# Patient Record
Sex: FEMALE | Race: WHITE | NOT HISPANIC OR LATINO | Employment: OTHER | URBAN - NONMETROPOLITAN AREA
[De-identification: names, ages, dates, MRNs, and addresses within clinical notes are randomized per-mention and may not be internally consistent; named-entity substitution may affect disease eponyms.]

---

## 2017-02-06 RX ORDER — SPIRONOLACTONE 25 MG/1
25 TABLET ORAL DAILY
Qty: 30 TABLET | Refills: 2 | OUTPATIENT
Start: 2017-02-06 | End: 2017-04-13 | Stop reason: SDUPTHER

## 2017-03-24 ENCOUNTER — APPOINTMENT (OUTPATIENT)
Dept: GENERAL RADIOLOGY | Facility: HOSPITAL | Age: 72
End: 2017-03-24

## 2017-03-24 ENCOUNTER — APPOINTMENT (OUTPATIENT)
Dept: CT IMAGING | Facility: HOSPITAL | Age: 72
End: 2017-03-24

## 2017-03-24 ENCOUNTER — HOSPITAL ENCOUNTER (EMERGENCY)
Facility: HOSPITAL | Age: 72
Discharge: HOME | End: 2017-03-24
Attending: FAMILY MEDICINE | Admitting: FAMILY MEDICINE

## 2017-03-24 VITALS
OXYGEN SATURATION: 100 % | BODY MASS INDEX: 24.35 KG/M2 | HEIGHT: 60 IN | RESPIRATION RATE: 20 BRPM | SYSTOLIC BLOOD PRESSURE: 144 MMHG | WEIGHT: 124 LBS | TEMPERATURE: 97.3 F | HEART RATE: 74 BPM | DIASTOLIC BLOOD PRESSURE: 73 MMHG

## 2017-03-24 DIAGNOSIS — M53.3 SACRAL BACK PAIN: ICD-10-CM

## 2017-03-24 DIAGNOSIS — M54.2 NECK PAIN, ACUTE: ICD-10-CM

## 2017-03-24 DIAGNOSIS — M54.6 BACK PAIN OF THORACOLUMBAR REGION: ICD-10-CM
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
## 2017-03-24 DIAGNOSIS — M25.562 ACUTE PAIN OF LEFT KNEE: ICD-10-CM

## 2017-03-24 DIAGNOSIS — M54.50 BACK PAIN OF THORACOLUMBAR REGION: ICD-10-CM

## 2017-03-24 LAB
AMYLASE SERPL-CCNC: 44 U/L (ref 28–100)
ANION GAP SERPL CALCULATED.3IONS-SCNC: 5.8 MMOL/L (ref 3.6–11.2)
BASOPHILS # BLD AUTO: 0.04 10*3/MM3 (ref 0–0.3)
BASOPHILS NFR BLD AUTO: 0.5 % (ref 0–2)
BUN BLD-MCNC: 17 MG/DL (ref 7–21)
BUN/CREAT SERPL: 20 (ref 7–25)
CALCIUM SPEC-SCNC: 8.8 MG/DL (ref 7.7–10)
CHLORIDE SERPL-SCNC: 101 MMOL/L (ref 99–112)
CO2 SERPL-SCNC: 33.2 MMOL/L (ref 24.3–31.9)
CREAT BLD-MCNC: 0.85 MG/DL (ref 0.43–1.29)
DEPRECATED RDW RBC AUTO: 40.1 FL (ref 37–54)
EOSINOPHIL # BLD AUTO: 0.25 10*3/MM3 (ref 0–0.7)
EOSINOPHIL NFR BLD AUTO: 3.3 % (ref 0–7)
ERYTHROCYTE [DISTWIDTH] IN BLOOD BY AUTOMATED COUNT: 12.8 % (ref 11.5–14.5)
GFR SERPL CREATININE-BSD FRML MDRD: 66 ML/MIN/1.73
GLUCOSE BLD-MCNC: 118 MG/DL (ref 70–110)
HCT VFR BLD AUTO: 39.1 % (ref 37–47)
HGB BLD-MCNC: 12.9 G/DL (ref 12–16)
IMM GRANULOCYTES # BLD: 0.02 10*3/MM3 (ref 0–0.03)
IMM GRANULOCYTES NFR BLD: 0.3 % (ref 0–0.5)
LIPASE SERPL-CCNC: 44 U/L (ref 13–60)
LYMPHOCYTES # BLD AUTO: 2.21 10*3/MM3 (ref 1–3)
LYMPHOCYTES NFR BLD AUTO: 29.5 % (ref 16–46)
MCH RBC QN AUTO: 29 PG (ref 27–33)
MCHC RBC AUTO-ENTMCNC: 33 G/DL (ref 33–37)
MCV RBC AUTO: 87.9 FL (ref 80–94)
MONOCYTES # BLD AUTO: 0.82 10*3/MM3 (ref 0.1–0.9)
MONOCYTES NFR BLD AUTO: 10.9 % (ref 0–12)
NEUTROPHILS # BLD AUTO: 4.15 10*3/MM3 (ref 1.4–6.5)
NEUTROPHILS NFR BLD AUTO: 55.5 % (ref 40–75)
OSMOLALITY SERPL CALC.SUM OF ELEC: 282 MOSM/KG (ref 273–305)
PLATELET # BLD AUTO: 231 10*3/MM3 (ref 130–400)
PMV BLD AUTO: 12.1 FL (ref 6–10)
POTASSIUM BLD-SCNC: 3.4 MMOL/L (ref 3.5–5.3)
RBC # BLD AUTO: 4.45 10*6/MM3 (ref 4.2–5.4)
SODIUM BLD-SCNC: 140 MMOL/L (ref 135–153)
WBC NRBC COR # BLD: 7.49 10*3/MM3 (ref 4.5–12.5)

## 2017-03-24 PROCEDURE — 72125 CT NECK SPINE W/O DYE: CPT

## 2017-03-24 PROCEDURE — 80048 BASIC METABOLIC PNL TOTAL CA: CPT | Performed by: FAMILY MEDICINE

## 2017-03-24 PROCEDURE — 25010000002 ONDANSETRON PER 1 MG: Performed by: FAMILY MEDICINE

## 2017-03-24 PROCEDURE — 70486 CT MAXILLOFACIAL W/O DYE: CPT

## 2017-03-24 PROCEDURE — 72128 CT CHEST SPINE W/O DYE: CPT

## 2017-03-24 PROCEDURE — 72125 CT NECK SPINE W/O DYE: CPT | Performed by: RADIOLOGY

## 2017-03-24 PROCEDURE — 96374 THER/PROPH/DIAG INJ IV PUSH: CPT

## 2017-03-24 PROCEDURE — 82150 ASSAY OF AMYLASE: CPT | Performed by: FAMILY MEDICINE

## 2017-03-24 PROCEDURE — 99284 EMERGENCY DEPT VISIT MOD MDM: CPT

## 2017-03-24 PROCEDURE — 72131 CT LUMBAR SPINE W/O DYE: CPT | Performed by: RADIOLOGY

## 2017-03-24 PROCEDURE — 70486 CT MAXILLOFACIAL W/O DYE: CPT | Performed by: RADIOLOGY

## 2017-03-24 PROCEDURE — 70450 CT HEAD/BRAIN W/O DYE: CPT

## 2017-03-24 PROCEDURE — 72192 CT PELVIS W/O DYE: CPT

## 2017-03-24 PROCEDURE — 83690 ASSAY OF LIPASE: CPT | Performed by: FAMILY MEDICINE

## 2017-03-24 PROCEDURE — 85025 COMPLETE CBC W/AUTO DIFF WBC: CPT | Performed by: FAMILY MEDICINE

## 2017-03-24 PROCEDURE — 72131 CT LUMBAR SPINE W/O DYE: CPT

## 2017-03-24 PROCEDURE — 73562 X-RAY EXAM OF KNEE 3: CPT | Performed by: RADIOLOGY

## 2017-03-24 PROCEDURE — 96375 TX/PRO/DX INJ NEW DRUG ADDON: CPT

## 2017-03-24 PROCEDURE — 70450 CT HEAD/BRAIN W/O DYE: CPT | Performed by: RADIOLOGY

## 2017-03-24 PROCEDURE — 72192 CT PELVIS W/O DYE: CPT | Performed by: RADIOLOGY

## 2017-03-24 PROCEDURE — 72128 CT CHEST SPINE W/O DYE: CPT | Performed by: RADIOLOGY

## 2017-03-24 PROCEDURE — 25010000002 MORPHINE PER 10 MG: Performed by: FAMILY MEDICINE

## 2017-03-24 PROCEDURE — 73562 X-RAY EXAM OF KNEE 3: CPT

## 2017-03-24 RX ORDER — ONDANSETRON 2 MG/ML
4 INJECTION INTRAMUSCULAR; INTRAVENOUS ONCE
Status: COMPLETED | OUTPATIENT
Start: 2017-03-24 | End: 2017-03-24

## 2017-03-24 RX ORDER — MORPHINE SULFATE 2 MG/ML
2 INJECTION, SOLUTION INTRAMUSCULAR; INTRAVENOUS ONCE
Status: COMPLETED | OUTPATIENT
Start: 2017-03-24 | End: 2017-03-24

## 2017-03-24 RX ORDER — AMOXICILLIN AND CLAVULANATE POTASSIUM 875; 125 MG/1; MG/1
1 TABLET, FILM COATED ORAL EVERY 12 HOURS
Qty: 20 TABLET | Refills: 0 | Status: SHIPPED | OUTPATIENT
Start: 2017-03-24 | End: 2017-04-03

## 2017-03-24 RX ADMIN — ONDANSETRON 4 MG: 2 INJECTION, SOLUTION INTRAMUSCULAR; INTRAVENOUS at 17:25

## 2017-03-24 RX ADMIN — MORPHINE SULFATE 2 MG: 2 INJECTION, SOLUTION INTRAMUSCULAR; INTRAVENOUS at 17:24
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
## 2017-04-13 ENCOUNTER — TELEPHONE (OUTPATIENT)
Dept: CARDIOLOGY | Facility: CLINIC | Age: 72
End: 2017-04-13

## 2017-04-13 DIAGNOSIS — Z79.899 DRUG THERAPY: Primary | ICD-10-CM

## 2017-04-13 RX ORDER — SPIRONOLACTONE 25 MG/1
25 TABLET ORAL DAILY
Qty: 30 TABLET | Refills: 3 | OUTPATIENT
Start: 2017-04-13 | End: 2017-04-20 | Stop reason: SDUPTHER

## 2017-04-13 NOTE — TELEPHONE ENCOUNTER
Please have her get a BMP to make sure her potassium has corrected since her last one a few weeks ago.

## 2017-04-14 ENCOUNTER — LAB (OUTPATIENT)
Dept: LAB | Facility: HOSPITAL | Age: 72
End: 2017-04-14

## 2017-04-14 ENCOUNTER — TELEPHONE (OUTPATIENT)
Dept: CARDIOLOGY | Facility: CLINIC | Age: 72
End: 2017-04-14

## 2017-04-14 DIAGNOSIS — Z79.899 DRUG THERAPY: ICD-10-CM

## 2017-04-14 LAB
ANION GAP SERPL CALCULATED.3IONS-SCNC: 9.9 MMOL/L (ref 3.6–11.2)
BUN BLD-MCNC: 15 MG/DL (ref 7–21)
BUN/CREAT SERPL: 15.8 (ref 7–25)
CALCIUM SPEC-SCNC: 8.9 MG/DL (ref 7.7–10)
CHLORIDE SERPL-SCNC: 103 MMOL/L (ref 99–112)
CO2 SERPL-SCNC: 26.1 MMOL/L (ref 24.3–31.9)
CREAT BLD-MCNC: 0.95 MG/DL (ref 0.43–1.29)
GFR SERPL CREATININE-BSD FRML MDRD: 58 ML/MIN/1.73
GLUCOSE BLD-MCNC: 85 MG/DL (ref 70–110)
OSMOLALITY SERPL CALC.SUM OF ELEC: 277.6 MOSM/KG (ref 273–305)
POTASSIUM BLD-SCNC: 3.8 MMOL/L (ref 3.5–5.3)
SODIUM BLD-SCNC: 139 MMOL/L (ref 135–153)

## 2017-04-14 PROCEDURE — 36415 COLL VENOUS BLD VENIPUNCTURE: CPT

## 2017-04-14 PROCEDURE — 80048 BASIC METABOLIC PNL TOTAL CA: CPT

## 2017-04-14 NOTE — TELEPHONE ENCOUNTER
See if she is still taking hydrochlorathiazide 25mg QD. If so, take 1/2 (12.5mg QD) and continued to monitor BP twice daily. Call if BP running <110/60 consistently.

## 2017-04-14 NOTE — TELEPHONE ENCOUNTER
"Her daughter stated that Kiah's BP was running low Tuesday and Wednesday. Lowest BP was 77/45 this was on Wednesday. Her daughter stated that she had her eat some salty food and rocking back and forth in her chair.  Kiah stated \"she felt funny\" and had some dizziness and sick to her stomach when this happened. Her top number since Thursday has been in the 120's and bottom number in the 80's.   Advised her daughter to take Kiah to the ER if her BP drops low again. She expressed understanding.     ----- Message from La Hall sent at 4/13/2017 11:36 AM EDT -----  Please call patients daughter Lamar Sharif on cell phone number in regards to patient bloodpressure.    "

## 2017-04-19 ENCOUNTER — TELEPHONE (OUTPATIENT)
Dept: CARDIOLOGY | Facility: CLINIC | Age: 72
End: 2017-04-19

## 2017-04-19 NOTE — TELEPHONE ENCOUNTER
Pt's daughter called and wanted to know Kiah's lab work. I advised pt's daughter that her labs was okay. She expressed understanding.     Notes Recorded by AYDEN Torres on 4/18/2017 at 10:11 AM  BMP ok.

## 2017-04-20 RX ORDER — SPIRONOLACTONE 25 MG/1
25 TABLET ORAL DAILY
Qty: 30 TABLET | Refills: 3 | Status: SHIPPED | OUTPATIENT
Start: 2017-04-20 | End: 2017-04-20 | Stop reason: SDUPTHER

## 2017-04-20 RX ORDER — SPIRONOLACTONE 25 MG/1
TABLET ORAL
Qty: 15 TABLET | Refills: 3 | Status: SHIPPED | OUTPATIENT
Start: 2017-04-20 | End: 2017-08-17 | Stop reason: SDUPTHER

## 2017-04-20 NOTE — TELEPHONE ENCOUNTER
Message    Call patient Received: Today       [facility]                     [phone] RETURNING A CALL REGARDING MEDICATION.

## 2017-04-20 NOTE — TELEPHONE ENCOUNTER
Per call to pt, she is taking 1/2 tablet of 25 mg po daily (12.5 mg po daily).  Re sent rx for Spironolactone 25 mg, 1/2 tab po daily # 15, with 3 RF.

## 2017-04-20 NOTE — TELEPHONE ENCOUNTER
Corrie, on 10/10/16 [facility], you wanted pt to start taking 12.5 mg qd of Spironolactone.  Called pt to confirm dose she is taking, also tried davidr Eunice.  Had to leave  for return call.

## 2017-05-08 ENCOUNTER — HOSPITAL ENCOUNTER (OUTPATIENT)
Dept: PHYSICAL THERAPY | Facility: HOSPITAL | Age: 72
Setting detail: THERAPIES SERIES
Discharge: HOME | End: 2017-05-08

## 2017-05-08 ENCOUNTER — TRANSCRIBE ORDERS (OUTPATIENT)
Dept: PHYSICAL THERAPY | Facility: HOSPITAL | Age: 72
End: 2017-05-08

## 2017-05-08 DIAGNOSIS — M54.2 NECK PAIN: Primary | ICD-10-CM

## 2017-05-08 DIAGNOSIS — M54.50 ACUTE BILATERAL LOW BACK PAIN WITHOUT SCIATICA: ICD-10-CM

## 2017-05-08 DIAGNOSIS — M79.18 LUMBAR MUSCLE PAIN: ICD-10-CM

## 2017-05-08 PROCEDURE — 97162 PT EVAL MOD COMPLEX 30 MIN: CPT | Performed by: PHYSICAL THERAPIST

## 2017-05-09 PROCEDURE — G8978 MOBILITY CURRENT STATUS: HCPCS | Performed by: PHYSICAL THERAPIST

## 2017-05-09 PROCEDURE — G8979 MOBILITY GOAL STATUS: HCPCS | Performed by: PHYSICAL THERAPIST

## 2017-05-11 ENCOUNTER — HOSPITAL ENCOUNTER (OUTPATIENT)
Dept: PHYSICAL THERAPY | Facility: HOSPITAL | Age: 72
Setting detail: THERAPIES SERIES
Discharge: HOME | End: 2017-05-11

## 2017-05-11 DIAGNOSIS — M54.50 ACUTE BILATERAL LOW BACK PAIN WITHOUT SCIATICA: ICD-10-CM

## 2017-05-11 DIAGNOSIS — M54.2 NECK PAIN: Primary | ICD-10-CM

## 2017-05-11 PROCEDURE — 97110 THERAPEUTIC EXERCISES: CPT

## 2017-05-11 PROCEDURE — G0283 ELEC STIM OTHER THAN WOUND: HCPCS

## 2017-05-11 PROCEDURE — 97010 HOT OR COLD PACKS THERAPY: CPT

## 2017-05-11 PROCEDURE — 97140 MANUAL THERAPY 1/> REGIONS: CPT

## 2017-05-16 ENCOUNTER — APPOINTMENT (OUTPATIENT)
Dept: PHYSICAL THERAPY | Facility: HOSPITAL | Age: 72
End: 2017-05-16

## 2017-05-22 ENCOUNTER — HOSPITAL ENCOUNTER (OUTPATIENT)
Dept: PHYSICAL THERAPY | Facility: HOSPITAL | Age: 72
Setting detail: THERAPIES SERIES
Discharge: HOME | End: 2017-05-22

## 2017-05-22 DIAGNOSIS — M54.2 NECK PAIN: Primary | ICD-10-CM

## 2017-05-22 DIAGNOSIS — M54.50 ACUTE BILATERAL LOW BACK PAIN WITHOUT SCIATICA: ICD-10-CM

## 2017-05-22 PROCEDURE — 97110 THERAPEUTIC EXERCISES: CPT | Performed by: PHYSICAL THERAPIST

## 2017-05-22 PROCEDURE — 97010 HOT OR COLD PACKS THERAPY: CPT | Performed by: PHYSICAL THERAPIST

## 2017-05-22 PROCEDURE — 97140 MANUAL THERAPY 1/> REGIONS: CPT | Performed by: PHYSICAL THERAPIST

## 2017-05-22 PROCEDURE — G0283 ELEC STIM OTHER THAN WOUND: HCPCS | Performed by: PHYSICAL THERAPIST

## 2017-05-23 ENCOUNTER — APPOINTMENT (OUTPATIENT)
Dept: PHYSICAL THERAPY | Facility: HOSPITAL | Age: 72
End: 2017-05-23

## 2017-05-31 ENCOUNTER — HOSPITAL ENCOUNTER (OUTPATIENT)
Dept: PHYSICAL THERAPY | Facility: HOSPITAL | Age: 72
Setting detail: THERAPIES SERIES
Discharge: HOME | End: 2017-05-31

## 2017-05-31 DIAGNOSIS — M54.2 NECK PAIN: Primary | ICD-10-CM

## 2017-05-31 DIAGNOSIS — M54.50 ACUTE BILATERAL LOW BACK PAIN WITHOUT SCIATICA: ICD-10-CM

## 2017-05-31 PROCEDURE — 97010 HOT OR COLD PACKS THERAPY: CPT | Performed by: PHYSICAL THERAPIST

## 2017-05-31 PROCEDURE — 97110 THERAPEUTIC EXERCISES: CPT | Performed by: PHYSICAL THERAPIST

## 2017-05-31 PROCEDURE — G0283 ELEC STIM OTHER THAN WOUND: HCPCS | Performed by: PHYSICAL THERAPIST

## 2017-05-31 PROCEDURE — 97140 MANUAL THERAPY 1/> REGIONS: CPT | Performed by: PHYSICAL THERAPIST

## 2017-06-08 ENCOUNTER — APPOINTMENT (OUTPATIENT)
Dept: PHYSICAL THERAPY | Facility: HOSPITAL | Age: 72
End: 2017-06-08

## 2017-06-12 ENCOUNTER — HOSPITAL ENCOUNTER (OUTPATIENT)
Dept: PHYSICAL THERAPY | Facility: HOSPITAL | Age: 72
Setting detail: THERAPIES SERIES
Discharge: HOME | End: 2017-06-12

## 2017-06-12 DIAGNOSIS — M54.2 NECK PAIN: Primary | ICD-10-CM

## 2017-06-12 DIAGNOSIS — M54.50 ACUTE BILATERAL LOW BACK PAIN WITHOUT SCIATICA: ICD-10-CM

## 2017-06-12 PROCEDURE — 97010 HOT OR COLD PACKS THERAPY: CPT | Performed by: PHYSICAL THERAPIST

## 2017-06-12 PROCEDURE — G0283 ELEC STIM OTHER THAN WOUND: HCPCS | Performed by: PHYSICAL THERAPIST

## 2017-06-12 PROCEDURE — 97110 THERAPEUTIC EXERCISES: CPT | Performed by: PHYSICAL THERAPIST

## 2017-06-12 PROCEDURE — G8980 MOBILITY D/C STATUS: HCPCS | Performed by: PHYSICAL THERAPIST

## 2017-06-12 PROCEDURE — G8978 MOBILITY CURRENT STATUS: HCPCS | Performed by: PHYSICAL THERAPIST

## 2017-06-12 PROCEDURE — G8979 MOBILITY GOAL STATUS: HCPCS | Performed by: PHYSICAL THERAPIST

## 2017-06-12 NOTE — THERAPY DISCHARGE NOTE
Outpatient Physical Therapy Ortho Treatment Note/Discharge Summary  [facility]     Patient Name: Kiah Conway  : 1945  MRN: [id number]  Today's Date: 2017      Visit Date: 2017    Visit Dx:    ICD-10-CM ICD-9-CM   1. Neck pain M54.2 723.1   2. Acute bilateral low back pain without sciatica M54.5 724.2     338.19       Patient Active Problem List   Diagnosis   • Cyst of bursa   • Essential hypertension   • Obstructive sleep apnea   • Precordial pain   • SOB (shortness of breath)   • Edema   • Palpitations        Past Medical History:   Diagnosis Date   • Arthritis    • Cardiac disorder    • Diverticulitis    • Dysphagia    • Dysuria    • Fibromyalgia    • GERD (gastroesophageal reflux disease)    • History of rheumatic fever    • Hypertension    • Hypokalemia due to inadequate potassium intake 2014   • Lipoma    • Malignant neoplasm    • Migraine    • Recurrent UTI    • Sleep apnea    • Thyroid cancer    • UTI (urinary tract infection)         Past Surgical History:   Procedure Laterality Date   • BLADDER SURGERY     • HYSTERECTOMY     • THYROID SURGERY               PT Ortho       17 1100    Subjective Comments    Subjective Comments Patient reports that she has been unable to attend therapy due to illness.  She notes that she has noticed little change since starting therapy.  -BC    Subjective Pain    Able to rate subjective pain? yes  -BC    Pre-Treatment Pain Level 5  -BC    Post-Treatment Pain Level 5  -BC    Myotomal Screen- Upper Quarter Clearing    Shoulder flexion (C5) Bilateral:;4 (Good)   Shoulder abduction-4-/5 bilaterally  -BC    Elbow flexion/wrist extension (C6) Bilateral:;4 (Good)  -BC    Elbow extension/wrist flexion (C7) Bilateral:;4 (Good)  -BC    Cervical/Shoulder ROM Screen    Cervical flexion Impaired   20  -BC    Cervical extension Impaired   25  -BC    Cervical lateral flexion Impaired   Right-20, Left-20  -BC    Cervical rotation Impaired   Right-45,  Left-52  -BC    Myotomal Screen- Lower Quarter Clearing    Hip flexion (L2) Bilateral:;4- (Good -)  -BC    Knee extension (L3) Right:;4 (Good);Left:;4- (Good -)  -BC    Knee flexion (S2) Right:;4 (Good);Left:;4- (Good -)  -BC    Lumbar ROM Screen- Lower Quarter Clearing    Lumbar Flexion Impaired   50%  -BC    Lumbar Extension Impaired   25%  -BC    Lumbar Lateral Flexion Impaired   Right-50%, Left-50%  -BC    Lumbar Rotation Impaired   Right-25%, Left-25%  -BC      User Key  (r) = Recorded By, (t) = Taken By, (c) = Cosigned By    Initials Name Provider Type    BC Ashley Jalloh, PT Physical Therapist                            PT Assessment/Plan       06/12/17 1159       PT Assessment    Assessment Comments Session initiated with MH to lumbar/cervical region and ESTIM (IFC) to the lumbar region for 10 minutes; no adverse reactions noted following modalities.  Patient tolerated ther ex, which focused on cervical ROM, lumbar ROM, and lumbar stabilization.  Patient required several verbal and tactile cues for correct form with exercises.    -BC       User Key  (r) = Recorded By, (t) = Taken By, (c) = Cosigned By    Initials Name Provider Type    BC Ashley Jalloh, PT Physical Therapist                Modalities       06/12/17 1100          Moist Heat    MH Applied Yes   No redness following MH  -BC      Location Neck, lumbar  -BC      Rx Minutes 10 mins  -BC      MH Prior to Rx Yes   with ESTIM in seated position  -BC      ELECTRICAL STIMULATION    Attended/Unattended Unattended   No skin irritation following ESTIM  -BC      Stimulation Type IFC  -BC      Max mAmp --   per patient's tolerance  -BC      Location/Electrode Placement/Other Lumbar  -BC      Rx Minutes 10 mins  -BC        User Key  (r) = Recorded By, (t) = Taken By, (c) = Cosigned By    Initials Name Provider Type    SHAWN Jalloh, PT Physical Therapist                Exercises       06/12/17 1100          Subjective Comments     Subjective Comments Patient reports that she has been unable to attend therapy due to illness.  She notes that she has noticed little change since starting therapy.  -BC      Subjective Pain    Able to rate subjective pain? yes  -BC      Pre-Treatment Pain Level 5  -BC      Post-Treatment Pain Level 5  -BC      Exercise 1    Exercise Name 1 UT stretch, chin tucks, levator scap stretch, cervical AROM, LTR, SKTC, PPT,  seated march.  -BC      Cueing 1 Verbal;Tactile  -BC      Time (Minutes) 1 30 min  -BC      Treatment Type 1 Ther Ex  -BC        User Key  (r) = Recorded By, (t) = Taken By, (c) = Cosigned By    Initials Name Provider Type    BC Ashley Jalloh, PT Physical Therapist                               PT OP Goals       06/12/17 1000       PT Short Term Goals    STG 1 Pt will report decrease in c/o pain at worse to 2/10, to promote improved functional mobility and QOL.   -BC     STG 1 Progress Not Met  -BC     STG 2 Pt will demonstrate improved B) UE and B) LE strength to grossly 4+/5 to promote return to functional activities at home such as washing dishes and household chores.   -BC     STG 2 Progress Not Met  -BC     STG 3 Pt will demosntrate improved cervical AROM with flexion, extension by 15 degrees.   -BC     STG 3 Progress Not Met  -BC     STG 4 Pt will be instructed and report daily performance with HEP.   -BC     STG 4 Progress Met  -BC     STG 5 Pt will demosntrates minimal to trace muscule guarding, palpable tenderness or cervcial/upper trap area and lumbo-sacral area.   -BC     STG 5 Progress Not Met  -BC     Long Term Goals    LTG 1 Pt will report no c/o pain with daily functional activities.   -BC     LTG 1 Progress Not Met  -BC     LTG 2 Pt will report improved standing tolerance with household activities such as washing dishes to up to 25 mins.  -BC     LTG 2 Progress Not Met   15min standing tolerance  -BC     LTG 3 Pt will demonstrate normalized cervical AROM and improved lumbar AROM  by grossly 50% to promtoe improved functional mobility with ADLs.   -BC     LTG 3 Progress Not Met  -BC     LTG 4 Pt will score a 20% improvement on the Modified Oswestry LBP Questionaire to promote  decrease pt view of disability.   -BC     LTG 4 Progress Not Met   62%  -BC       User Key  (r) = Recorded By, (t) = Taken By, (c) = Cosigned By    Initials Name Provider Type    SHAWN Jalloh PT Physical Therapist                Therapy Education       06/12/17 1156          Therapy Education    Given HEP;Symptoms/condition management;Pain management;Posture/body mechanics  -BC      Program Reinforced  -BC      How Provided Verbal;Demonstration  -BC      Provided to Patient  -BC      Level of Understanding Verbalized;Demonstrated  -BC        User Key  (r) = Recorded By, (t) = Taken By, (c) = Cosigned By    Initials Name Provider Type    SHAWN Jalloh, PT Physical Therapist                Time Calculation:   Start Time: 1045  Stop Time: 1138  Time Calculation (min): 53 min    Therapy Charges for Today     Code Description Service Date Service Provider Modifiers Qty    55265159593 HC PT MOBILITY PROJECTED 6/12/2017 Ashley Jalloh, PT GP, CI 1    30473196015 HC PT MOBILITY DISCHARGE 6/12/2017 Ashley Jalloh, PT GP, CK 1    17115148791 HC PT THER PROC EA 15 MIN 6/12/2017 Ashley Jalloh, PT GP 2    88407530089 HC PT ELECTRICAL STIM UNATTENDED 6/12/2017 Ashley Jalloh PT  1    58596374419 HC PT HOT/COLD PACK WC NONMCARE 6/12/2017 Ashley Jalloh, PT GP 1          PT G-Codes  Outcome Measure Options: Liz Delacruz  Score: 62%  Functional Limitation: Mobility: Walking and moving around  Mobility: Walking and Moving Around Goal Status (): At least 1 percent but less than 20 percent impaired, limited or restricted  Mobility: Walking and Moving Around Discharge Status (): At least 40 percent but less than 60 percent impaired, limited or restricted     OP PT Discharge  Summary  Date of Discharge: 06/12/17  Reason for Discharge: Maximum functional potential achieved  Outcomes Achieved: Refer to plan of care for updates on goals achieved (No goals met at this time.)  Discharge Destination: Home with home program  Discharge Instructions: Patient to be discharged at this time, due to achieving her maximum level of function.  Patient attended therapy for a total of 5 sessions, beginning on 5/8/2017 and ending on 6/12/2017.  Patient continues to report elevated pain levels and has made no signficant progress in strength or ROM.  Patient is encouraged to follow-up with referring physician regarding elevated pain levels, following discharge from PT.      Ashley Jalloh, PT  6/12/2017

## 2017-06-28 ENCOUNTER — TRANSCRIBE ORDERS (OUTPATIENT)
Dept: ADMINISTRATIVE | Facility: HOSPITAL | Age: 72
End: 2017-06-28

## 2017-06-28 DIAGNOSIS — R41.3 MEMORY LOSS: Primary | ICD-10-CM

## 2017-07-05 ENCOUNTER — HOSPITAL ENCOUNTER (OUTPATIENT)
Dept: CT IMAGING | Facility: HOSPITAL | Age: 72
Discharge: HOME | End: 2017-07-05
Attending: PSYCHIATRY & NEUROLOGY | Admitting: PSYCHIATRY & NEUROLOGY

## 2017-07-05 DIAGNOSIS — R41.3 MEMORY LOSS: ICD-10-CM

## 2017-07-05 PROCEDURE — 70450 CT HEAD/BRAIN W/O DYE: CPT

## 2017-07-05 PROCEDURE — 70450 CT HEAD/BRAIN W/O DYE: CPT | Performed by: RADIOLOGY

## 2017-07-18 ENCOUNTER — TRANSCRIBE ORDERS (OUTPATIENT)
Dept: ADMINISTRATIVE | Facility: HOSPITAL | Age: 72
End: 2017-07-18

## 2017-07-18 ENCOUNTER — LAB (OUTPATIENT)
Dept: LAB | Facility: HOSPITAL | Age: 72
End: 2017-07-18
Attending: INTERNAL MEDICINE

## 2017-07-18 DIAGNOSIS — M06.09 RHEUMATOID ARTHRITIS OF MULTIPLE SITES WITHOUT RHEUMATOID FACTOR (HCC): Primary | ICD-10-CM

## 2017-07-18 DIAGNOSIS — M06.09 RHEUMATOID ARTHRITIS OF MULTIPLE SITES WITHOUT RHEUMATOID FACTOR (HCC): ICD-10-CM

## 2017-07-18 LAB
CHROMATIN AB SERPL-ACNC: 6 IU/ML (ref 0–14)
CRP SERPL-MCNC: 1.03 MG/DL (ref 0–0.99)
ERYTHROCYTE [SEDIMENTATION RATE] IN BLOOD: 17 MM/HR (ref 0–30)

## 2017-07-18 PROCEDURE — 36415 COLL VENOUS BLD VENIPUNCTURE: CPT

## 2017-07-18 PROCEDURE — 86200 CCP ANTIBODY: CPT | Performed by: INTERNAL MEDICINE

## 2017-07-18 PROCEDURE — 86140 C-REACTIVE PROTEIN: CPT | Performed by: INTERNAL MEDICINE

## 2017-07-18 PROCEDURE — 86431 RHEUMATOID FACTOR QUANT: CPT | Performed by: INTERNAL MEDICINE

## 2017-07-18 PROCEDURE — 85652 RBC SED RATE AUTOMATED: CPT | Performed by: INTERNAL MEDICINE

## 2017-07-20 LAB — CCP IGA+IGG SERPL IA-ACNC: 4 UNITS (ref 0–19)

## 2017-08-17 RX ORDER — SPIRONOLACTONE 25 MG/1
TABLET ORAL
Qty: 15 TABLET | Refills: 0 | Status: SHIPPED | OUTPATIENT
Start: 2017-08-17 | End: 2018-06-07 | Stop reason: ALTCHOICE

## 2017-10-03 ENCOUNTER — TRANSCRIBE ORDERS (OUTPATIENT)
Dept: MRI IMAGING | Facility: HOSPITAL | Age: 72
End: 2017-10-03

## 2017-10-03 ENCOUNTER — LAB (OUTPATIENT)
Dept: LAB | Facility: HOSPITAL | Age: 72
End: 2017-10-03

## 2017-10-03 DIAGNOSIS — E04.2 NONTOXIC MULTINODULAR GOITER: Primary | ICD-10-CM

## 2017-10-03 DIAGNOSIS — E04.2 NONTOXIC MULTINODULAR GOITER: ICD-10-CM

## 2017-10-03 DIAGNOSIS — C73 MALIGNANT NEOPLASM OF THYROID GLAND (HCC): ICD-10-CM

## 2017-10-03 LAB — TSH SERPL DL<=0.05 MIU/L-ACNC: 0.31 MIU/ML (ref 0.55–4.78)

## 2017-10-03 PROCEDURE — 84443 ASSAY THYROID STIM HORMONE: CPT | Performed by: INTERNAL MEDICINE

## 2017-10-03 PROCEDURE — 86800 THYROGLOBULIN ANTIBODY: CPT | Performed by: INTERNAL MEDICINE

## 2017-10-03 PROCEDURE — 36415 COLL VENOUS BLD VENIPUNCTURE: CPT

## 2017-10-04 LAB — THYROGLOB AB SERPL-ACNC: 5.2 IU/ML (ref 0–0.9)

## 2017-10-11 ENCOUNTER — OFFICE VISIT (OUTPATIENT)
Dept: CARDIOLOGY | Facility: CLINIC | Age: 72
End: 2017-10-11

## 2017-10-11 VITALS
DIASTOLIC BLOOD PRESSURE: 73 MMHG | BODY MASS INDEX: 24.15 KG/M2 | RESPIRATION RATE: 16 BRPM | WEIGHT: 123 LBS | HEART RATE: 95 BPM | SYSTOLIC BLOOD PRESSURE: 160 MMHG | HEIGHT: 60 IN

## 2017-10-11 DIAGNOSIS — I10 ESSENTIAL HYPERTENSION: ICD-10-CM

## 2017-10-11 DIAGNOSIS — G47.33 OBSTRUCTIVE SLEEP APNEA: ICD-10-CM

## 2017-10-11 DIAGNOSIS — I07.1 RHEUMATIC TRICUSPID VALVE REGURGITATION: ICD-10-CM

## 2017-10-11 DIAGNOSIS — R07.2 PRECORDIAL PAIN: Primary | ICD-10-CM

## 2017-10-11 DIAGNOSIS — I27.20 PULMONARY HYPERTENSION (HCC): ICD-10-CM

## 2017-10-11 PROCEDURE — 93000 ELECTROCARDIOGRAM COMPLETE: CPT | Performed by: PHYSICIAN ASSISTANT

## 2017-10-11 PROCEDURE — 99214 OFFICE O/P EST MOD 30 MIN: CPT | Performed by: PHYSICIAN ASSISTANT

## 2017-10-11 RX ORDER — METOPROLOL TARTRATE 50 MG/1
50 TABLET, FILM COATED ORAL 2 TIMES DAILY
Qty: 60 TABLET | Refills: 5 | Status: SHIPPED | OUTPATIENT
Start: 2017-10-11 | End: 2018-05-02 | Stop reason: SDUPTHER

## 2017-10-11 NOTE — PROGRESS NOTES
MERRY Felix  Kiah Conway  1945  10/11/2017    Patient Active Problem List   Diagnosis   • Cyst of bursa   • Essential hypertension   • Obstructive sleep apnea   • Precordial pain   • SOB (shortness of breath)   • Edema   • Palpitations     Dear MERRY Felix:    Chief Complaint   Patient presents with   • Follow-up     edema   • Chest Pain     Subjective     Kiah Conway is a 72 y.o. female with a past medical history significant for hypertension, history of rheumatic fever, history of severe pulmonary hypertension with history of obstructive sleep apnea, mild to moderate tricuspid regurgitation, and recent complaints of edema. She presents to the office today for follow-up visit. She has chronic complaints of chest pains which have not increased in frequency or severity.  They are sharp and located under the left breast.  They're nonexertional and occurs at random.  They resolve spontaneously after a couple seconds or so.  No associated nausea, vomiting, diaphoresis, or shortness of breath.  She reports that her blood pressure has been running high at home, about what it is running today.  She takes her medications regularly.  She was previously set up with a sleep lab for obstructive sleep apnea, however, apparently was never given a follow-up visit to discuss treatment, if needed.  She has a history of severe pulmonary hypertension.      Current Outpatient Prescriptions:   •  busPIRone (BUSPAR) 5 MG tablet, Take 5 mg by mouth 2 (Two) Times a Day. Take 1 and 1/2 tablets by mouth twice daily as directed, Disp: , Rfl:   •  conjugated estrogens (PREMARIN) 0.625 MG/GM vaginal cream, Insert 1 g into the vagina Daily., Disp: , Rfl:   •  FLUoxetine (PROzac) 20 MG capsule, Take 20 mg by mouth Daily., Disp: , Rfl:   •  hydrochlorothiazide (HYDRODIURIL) 25 MG tablet, Take 25 mg by mouth Daily., Disp: , Rfl:   •  HYDROcodone-acetaminophen (NORCO) 7.5-325 MG per tablet, Take 1 tablet by  "mouth Every 6 (Six) Hours As Needed for moderate pain (4-6)., Disp: , Rfl:   •  hydroxychloroquine (PLAQUENIL) 200 MG tablet, Take 200 mg by mouth Daily., Disp: , Rfl:   •  metoprolol tartrate (LOPRESSOR) 50 MG tablet, Take 1 tablet by mouth 2 (Two) Times a Day., Disp: 60 tablet, Rfl: 5  •  omeprazole (PriLOSEC) 40 MG capsule, Take 40 mg by mouth Daily., Disp: , Rfl:   •  potassium chloride (K-DUR) 10 MEQ CR tablet, Take 10 mEq by mouth 2 (Two) Times a Day., Disp: , Rfl:   •  pregabalin (LYRICA) 150 MG capsule, Take 200 mg by mouth 2 (Two) Times a Day., Disp: , Rfl:   •  spironolactone (ALDACTONE) 25 MG tablet, Take 1/2 tablet daily., Disp: 15 tablet, Rfl: 0    The following portions of the patient's history were reviewed and updated as appropriate: allergies, current medications, past family history, past medical history, past social history, past surgical history and problem list.    Review of Systems   Cardiovascular: Positive for chest pain (occasional), dyspnea on exertion and leg swelling (at night). Negative for irregular heartbeat, near-syncope, orthopnea and palpitations.   Neurological: Positive for dizziness. Negative for light-headedness.     Objective   Blood pressure 160/73, pulse 95, resp. rate 16, height 60\" (152.4 cm), weight 123 lb (55.8 kg).    Physical Exam   Constitutional: She is oriented to person, place, and time. She appears well-developed and well-nourished. No distress.   HENT:   Head: Normocephalic and atraumatic.   Eyes: Conjunctivae are normal. Right eye exhibits no discharge. Left eye exhibits no discharge.   Neck: Normal range of motion. Neck supple. Carotid bruit is not present.   Cardiovascular: Normal rate and regular rhythm.  Exam reveals no gallop and no friction rub.    Murmur (Soft systolic murmur left lower sternal border and right second intercostal space.  Grade 2/6. ) heard.  2+ pedal pulses bilaterally.   Pulmonary/Chest: Effort normal and breath sounds normal. No " respiratory distress. She has no wheezes. She has no rales. She exhibits no tenderness.   Musculoskeletal: Normal range of motion. She exhibits no edema.   Neurological: She is alert and oriented to person, place, and time.   Skin: Skin is warm and dry. No rash noted. She is not diaphoretic. No erythema. No pallor.   Psychiatric: She has a normal mood and affect. Her behavior is normal.   Nursing note and vitals reviewed.      ECG 12 Lead  Date/Time: 10/11/2017 10:44 AM  Performed by: CORRIE SHARMA  Authorized by: CORRIE SHARMA   Comparison: compared with previous ECG   Similar to previous ECG  Rhythm: sinus rhythm  Rate: normal  BPM: 92  T depression: V1  QRS axis: left  Other findings: LVH  Clinical impression: non-specific ECG  Comments: No significant change compared to previous EKG.            Assessment:          Diagnosis Plan   1. Precordial pain  ECG 12 Lead    Which is atypical for angina.   2. Essential hypertension      Uncontrolled.   3. Obstructive sleep apnea      Currently going untreated.   4. Pulmonary hypertension     5. Rheumatic tricuspid valve regurgitation          Plan:       1. Increase metoprolol tatrate to 50 mg twice daily.  2. At this time her chest pain sounds very atypical for angina and has not progressed since her last visit.  I have discussed with her and her daughter about signs to watch for such as increased frequency, severity, associated shortness of breath, nausea, vomiting, diaphoresis, or easy fatigability.  She expressed understanding and states that she will let us know should any of these develop.  3. We'll refer back to Dr. Frank's office for follow-up on her previous sleep study and pulmonary hypertension.  4. We will follow-up in 5-6 months or sooner if needed.    No Follow-up on file.    I appreciate the opportunity to participate in this patient's cardiovascular care.    Best Regards,    Corrie Sharma PA-C

## 2017-12-13 ENCOUNTER — TRANSCRIBE ORDERS (OUTPATIENT)
Dept: ADMINISTRATIVE | Facility: HOSPITAL | Age: 72
End: 2017-12-13

## 2017-12-13 DIAGNOSIS — R10.32 GROIN PAIN, LEFT: ICD-10-CM

## 2017-12-13 DIAGNOSIS — M79.605 PAIN OF LEFT LOWER EXTREMITY: Primary | ICD-10-CM

## 2017-12-14 ENCOUNTER — HOSPITAL ENCOUNTER (OUTPATIENT)
Dept: CARDIOLOGY | Facility: HOSPITAL | Age: 72
Discharge: HOME | End: 2017-12-14
Admitting: NURSE PRACTITIONER

## 2017-12-14 DIAGNOSIS — R10.32 GROIN PAIN, LEFT: ICD-10-CM

## 2017-12-14 DIAGNOSIS — M79.605 PAIN OF LEFT LOWER EXTREMITY: ICD-10-CM

## 2017-12-14 PROCEDURE — 93971 EXTREMITY STUDY: CPT

## 2017-12-14 PROCEDURE — 93971 EXTREMITY STUDY: CPT | Performed by: RADIOLOGY

## 2017-12-25 ENCOUNTER — HOSPITAL ENCOUNTER (EMERGENCY)
Facility: HOSPITAL | Age: 72
Discharge: HOME | End: 2017-12-25
Attending: EMERGENCY MEDICINE | Admitting: EMERGENCY MEDICINE

## 2017-12-25 ENCOUNTER — APPOINTMENT (OUTPATIENT)
Dept: GENERAL RADIOLOGY | Facility: HOSPITAL | Age: 72
End: 2017-12-25

## 2017-12-25 VITALS
RESPIRATION RATE: 16 BRPM | HEIGHT: 60 IN | HEART RATE: 62 BPM | DIASTOLIC BLOOD PRESSURE: 67 MMHG | WEIGHT: 126 LBS | BODY MASS INDEX: 24.74 KG/M2 | TEMPERATURE: 99 F | OXYGEN SATURATION: 94 % | SYSTOLIC BLOOD PRESSURE: 143 MMHG

## 2017-12-25 DIAGNOSIS — B34.9 VIRAL SYNDROME: Primary | ICD-10-CM

## 2017-12-25 LAB
ALBUMIN SERPL-MCNC: 3.7 G/DL (ref 3.4–4.8)
ALBUMIN/GLOB SERPL: 1.9 G/DL (ref 1.5–2.5)
ALP SERPL-CCNC: 72 U/L (ref 35–104)
ALT SERPL W P-5'-P-CCNC: 19 U/L (ref 10–36)
ANION GAP SERPL CALCULATED.3IONS-SCNC: 6.2 MMOL/L (ref 3.6–11.2)
AST SERPL-CCNC: 18 U/L (ref 10–30)
BASOPHILS # BLD AUTO: 0.02 10*3/MM3 (ref 0–0.3)
BASOPHILS NFR BLD AUTO: 0.2 % (ref 0–2)
BILIRUB SERPL-MCNC: 0.5 MG/DL (ref 0.2–1.8)
BILIRUB UR QL STRIP: NEGATIVE
BNP SERPL-MCNC: 334 PG/ML (ref 0–100)
BUN BLD-MCNC: 10 MG/DL (ref 7–21)
BUN/CREAT SERPL: 15.4 (ref 7–25)
CALCIUM SPEC-SCNC: 7.8 MG/DL (ref 7.7–10)
CHLORIDE SERPL-SCNC: 107 MMOL/L (ref 99–112)
CLARITY UR: CLEAR
CO2 SERPL-SCNC: 25.8 MMOL/L (ref 24.3–31.9)
COLOR UR: YELLOW
CREAT BLD-MCNC: 0.65 MG/DL (ref 0.43–1.29)
CRP SERPL-MCNC: 3.1 MG/DL (ref 0–0.99)
DEPRECATED RDW RBC AUTO: 43.4 FL (ref 37–54)
EOSINOPHIL # BLD AUTO: 0.1 10*3/MM3 (ref 0–0.7)
EOSINOPHIL NFR BLD AUTO: 1.2 % (ref 0–7)
ERYTHROCYTE [DISTWIDTH] IN BLOOD BY AUTOMATED COUNT: 13.3 % (ref 11.5–14.5)
FLUAV AG NPH QL: NEGATIVE
FLUBV AG NPH QL IA: NEGATIVE
GFR SERPL CREATININE-BSD FRML MDRD: 89 ML/MIN/1.73
GLOBULIN UR ELPH-MCNC: 2 GM/DL
GLUCOSE BLD-MCNC: 109 MG/DL (ref 70–110)
GLUCOSE UR STRIP-MCNC: NEGATIVE MG/DL
HCT VFR BLD AUTO: 38.5 % (ref 37–47)
HGB BLD-MCNC: 12.7 G/DL (ref 12–16)
HGB UR QL STRIP.AUTO: NEGATIVE
IMM GRANULOCYTES # BLD: 0.02 10*3/MM3 (ref 0–0.03)
IMM GRANULOCYTES NFR BLD: 0.2 % (ref 0–0.5)
KETONES UR QL STRIP: NEGATIVE
LEUKOCYTE ESTERASE UR QL STRIP.AUTO: NEGATIVE
LYMPHOCYTES # BLD AUTO: 1.02 10*3/MM3 (ref 1–3)
LYMPHOCYTES NFR BLD AUTO: 12.7 % (ref 16–46)
MCH RBC QN AUTO: 29.8 PG (ref 27–33)
MCHC RBC AUTO-ENTMCNC: 33 G/DL (ref 33–37)
MCV RBC AUTO: 90.4 FL (ref 80–94)
MONOCYTES # BLD AUTO: 1.18 10*3/MM3 (ref 0.1–0.9)
MONOCYTES NFR BLD AUTO: 14.7 % (ref 0–12)
NEUTROPHILS # BLD AUTO: 5.71 10*3/MM3 (ref 1.4–6.5)
NEUTROPHILS NFR BLD AUTO: 71 % (ref 40–75)
NITRITE UR QL STRIP: NEGATIVE
OSMOLALITY SERPL CALC.SUM OF ELEC: 277.2 MOSM/KG (ref 273–305)
PH UR STRIP.AUTO: 8.5 [PH] (ref 5–8)
PLATELET # BLD AUTO: 182 10*3/MM3 (ref 130–400)
PMV BLD AUTO: 12.4 FL (ref 6–10)
POTASSIUM BLD-SCNC: 3.8 MMOL/L (ref 3.5–5.3)
PROT SERPL-MCNC: 5.7 G/DL (ref 6–8)
PROT UR QL STRIP: NEGATIVE
RBC # BLD AUTO: 4.26 10*6/MM3 (ref 4.2–5.4)
SODIUM BLD-SCNC: 139 MMOL/L (ref 135–153)
SP GR UR STRIP: 1.01 (ref 1–1.03)
TROPONIN I SERPL-MCNC: 0.01 NG/ML
UROBILINOGEN UR QL STRIP: ABNORMAL
WBC NRBC COR # BLD: 8.05 10*3/MM3 (ref 4.5–12.5)

## 2017-12-25 PROCEDURE — 99284 EMERGENCY DEPT VISIT MOD MDM: CPT

## 2017-12-25 PROCEDURE — 84484 ASSAY OF TROPONIN QUANT: CPT | Performed by: PHYSICIAN ASSISTANT

## 2017-12-25 PROCEDURE — 81003 URINALYSIS AUTO W/O SCOPE: CPT | Performed by: NURSE PRACTITIONER

## 2017-12-25 PROCEDURE — 86140 C-REACTIVE PROTEIN: CPT | Performed by: PHYSICIAN ASSISTANT

## 2017-12-25 PROCEDURE — 71020 XR CHEST 2 VW: CPT | Performed by: RADIOLOGY

## 2017-12-25 PROCEDURE — 71020 HC CHEST PA AND LATERAL: CPT

## 2017-12-25 PROCEDURE — 96360 HYDRATION IV INFUSION INIT: CPT

## 2017-12-25 PROCEDURE — 94799 UNLISTED PULMONARY SVC/PX: CPT

## 2017-12-25 PROCEDURE — 80053 COMPREHEN METABOLIC PANEL: CPT | Performed by: PHYSICIAN ASSISTANT

## 2017-12-25 PROCEDURE — 83880 ASSAY OF NATRIURETIC PEPTIDE: CPT | Performed by: PHYSICIAN ASSISTANT

## 2017-12-25 PROCEDURE — 71010 XR CHEST 1 VW: CPT | Performed by: RADIOLOGY

## 2017-12-25 PROCEDURE — 71010 HC CHEST PA OR AP: CPT

## 2017-12-25 PROCEDURE — 93005 ELECTROCARDIOGRAM TRACING: CPT | Performed by: PHYSICIAN ASSISTANT

## 2017-12-25 PROCEDURE — 93010 ELECTROCARDIOGRAM REPORT: CPT | Performed by: INTERNAL MEDICINE

## 2017-12-25 PROCEDURE — 85025 COMPLETE CBC W/AUTO DIFF WBC: CPT | Performed by: PHYSICIAN ASSISTANT

## 2017-12-25 PROCEDURE — 94640 AIRWAY INHALATION TREATMENT: CPT

## 2017-12-25 PROCEDURE — 87040 BLOOD CULTURE FOR BACTERIA: CPT | Performed by: PHYSICIAN ASSISTANT

## 2017-12-25 PROCEDURE — 87804 INFLUENZA ASSAY W/OPTIC: CPT | Performed by: NURSE PRACTITIONER

## 2017-12-25 RX ORDER — IPRATROPIUM BROMIDE AND ALBUTEROL SULFATE 2.5; .5 MG/3ML; MG/3ML
3 SOLUTION RESPIRATORY (INHALATION) ONCE
Status: COMPLETED | OUTPATIENT
Start: 2017-12-25 | End: 2017-12-25

## 2017-12-25 RX ORDER — ALBUTEROL SULFATE 90 UG/1
2 AEROSOL, METERED RESPIRATORY (INHALATION) ONCE
Status: COMPLETED | OUTPATIENT
Start: 2017-12-25 | End: 2017-12-25

## 2017-12-25 RX ORDER — GUAIFENESIN DEXTROMETHORPHAN HYDROBROMIDE ORAL SOLUTION 10; 100 MG/5ML; MG/5ML
5 SOLUTION ORAL EVERY 12 HOURS
Qty: 118 ML | Refills: 0 | Status: SHIPPED | OUTPATIENT
Start: 2017-12-25 | End: 2018-06-07 | Stop reason: ALTCHOICE

## 2017-12-25 RX ORDER — OSELTAMIVIR PHOSPHATE 75 MG/1
75 CAPSULE ORAL ONCE
Status: COMPLETED | OUTPATIENT
Start: 2017-12-25 | End: 2017-12-25

## 2017-12-25 RX ORDER — ACETAMINOPHEN 325 MG/1
650 TABLET ORAL ONCE
Status: COMPLETED | OUTPATIENT
Start: 2017-12-25 | End: 2017-12-25

## 2017-12-25 RX ORDER — OSELTAMIVIR PHOSPHATE 75 MG/1
75 CAPSULE ORAL 2 TIMES DAILY
Qty: 8 CAPSULE | Refills: 0 | Status: SHIPPED | OUTPATIENT
Start: 2017-12-25 | End: 2017-12-29

## 2017-12-25 RX ADMIN — OSELTAMIVIR PHOSPHATE 75 MG: 75 CAPSULE ORAL at 10:03

## 2017-12-25 RX ADMIN — ALBUTEROL SULFATE 2 PUFF: 90 AEROSOL, METERED RESPIRATORY (INHALATION) at 10:02

## 2017-12-25 RX ADMIN — SODIUM CHLORIDE 1000 ML: 9 INJECTION, SOLUTION INTRAVENOUS at 07:26

## 2017-12-25 RX ADMIN — ACETAMINOPHEN 650 MG: 325 TABLET ORAL at 07:25

## 2017-12-25 RX ADMIN — OSELTAMIVIR PHOSPHATE 75 MG: 75 CAPSULE ORAL at 10:02

## 2017-12-25 RX ADMIN — IPRATROPIUM BROMIDE AND ALBUTEROL SULFATE 3 ML: .5; 3 SOLUTION RESPIRATORY (INHALATION) at 08:27

## 2017-12-25 NOTE — ED PROVIDER NOTES
Subjective   History of Present Illness  See Kristin Whitaker's note for H&P  Review of Systems    Past Medical History:   Diagnosis Date   • Arthritis    • Cardiac disorder    • Diverticulitis    • Dysphagia    • Dysuria    • Fibromyalgia    • GERD (gastroesophageal reflux disease)    • History of rheumatic fever    • Hypertension    • Hypokalemia due to inadequate potassium intake 03/03/2014   • Lipoma    • Malignant neoplasm    • Migraine    • Recurrent UTI    • Sleep apnea    • Thyroid cancer    • UTI (urinary tract infection)        No Known Allergies    Past Surgical History:   Procedure Laterality Date   • BLADDER SURGERY     • HYSTERECTOMY     • THYROID SURGERY         Family History   Problem Relation Age of Onset   • Diabetes Mother    • Hypertension Mother    • Other Other      cardiac disorder,cardiovascular disease, malignant neoplasm of brain       Social History     Social History   • Marital status:      Spouse name: N/A   • Number of children: N/A   • Years of education: N/A     Social History Main Topics   • Smoking status: Never Smoker   • Smokeless tobacco: Never Used   • Alcohol use No   • Drug use: No   • Sexual activity: Not Asked     Other Topics Concern   • None     Social History Narrative           Objective   Physical Exam    Procedures         ED Course  ED Course   Value Comment By Time   XR Chest 1 View No apparent acute disease, reviewed by Dr. Abdirahman Whitaker, APRN 12/25 7318    I have examined the patient and have discussed with Dr. Gary.  She is in no apparent distress, lungs are clear.  She states she doesn't feel well and is having chills and body aches, dry cough.  Will add labs and an EKG.  Will get PA and lateral x-ray to further evaluate. AYDEN Yip 12/25 0829   ECG 12 Lead 8:59 - sinus rhythm, rate of 63, LVH, no acute ischemia, reviewed by AYDEN Gonzalez 12/25 0906    Plan is to treat patient for suspected flu.  She is feeling some  better after IV fluids and breathing treatment.  Will start Tamiflu in the ED and will also dispense a dose home for tonight.  She has albuterol at home but family thinks it may be  so will dispense an inhaler home as well.  She was instructed to rest and push fluids.  She will return to the ED if her symptoms worsen. AYDEN Yip  0853                  MDM  Number of Diagnoses or Management Options  Viral syndrome:      Amount and/or Complexity of Data Reviewed  Clinical lab tests: reviewed  Tests in the radiology section of CPT®: reviewed  Tests in the medicine section of CPT®: reviewed  Decide to obtain previous medical records or to obtain history from someone other than the patient: yes    Patient Progress  Patient progress: stable      Final diagnoses:   Viral syndrome            AYDEN Yip  17 1316

## 2017-12-25 NOTE — DISCHARGE INSTRUCTIONS

## 2017-12-25 NOTE — ED NOTES
ANNE MARIE Brasher PA-C notified pt SpO2 88-90% on room air, pt reports some dyspnea.  Pt lying in bed, family member at bedside.  Fall precautions maintained, pt denies additional needs at this time.      Zainab Lee RN  12/25/17 7669

## 2017-12-25 NOTE — ED NOTES
Provided pt with a cup for urine specimen, pt unable to provide @ this time     Aundrea Hauser  12/25/17 0733

## 2017-12-25 NOTE — ED PROVIDER NOTES
Subjective   Patient is a 72 y.o. female presenting with flu symptoms.   History provided by:  Patient   used: No    Flu Symptoms   Presenting symptoms: cough, diarrhea and fever    Severity:  Mild  Onset quality:  Sudden  Duration:  1 day  Progression:  Waxing and waning  Chronicity:  New  Relieved by:  Nothing  Worsened by:  Nothing  Ineffective treatments:  None tried  Associated symptoms: chills    Associated symptoms: no ear pain, no mental status change, no congestion, no neck stiffness and no syncope    Risk factors: being elderly    Risk factors: no diabetes problem        Review of Systems   Constitutional: Positive for chills and fever.   HENT: Negative for congestion and ear pain.    Eyes: Negative.    Respiratory: Positive for cough.    Cardiovascular: Negative.    Gastrointestinal: Positive for diarrhea.   Endocrine: Negative.    Genitourinary: Positive for frequency.   Musculoskeletal: Negative.  Negative for neck stiffness.   Skin: Negative.    Allergic/Immunologic: Negative.    Hematological: Negative.    Psychiatric/Behavioral: Negative.    All other systems reviewed and are negative.      Past Medical History:   Diagnosis Date   • Arthritis    • Cardiac disorder    • Diverticulitis    • Dysphagia    • Dysuria    • Fibromyalgia    • GERD (gastroesophageal reflux disease)    • History of rheumatic fever    • Hypertension    • Hypokalemia due to inadequate potassium intake 03/03/2014   • Lipoma    • Malignant neoplasm    • Migraine    • Recurrent UTI    • Sleep apnea    • Thyroid cancer    • UTI (urinary tract infection)        No Known Allergies    Past Surgical History:   Procedure Laterality Date   • BLADDER SURGERY     • HYSTERECTOMY     • THYROID SURGERY         Family History   Problem Relation Age of Onset   • Diabetes Mother    • Hypertension Mother    • Other Other      cardiac disorder,cardiovascular disease, malignant neoplasm of brain       Social History     Social  History   • Marital status:      Spouse name: N/A   • Number of children: N/A   • Years of education: N/A     Social History Main Topics   • Smoking status: Never Smoker   • Smokeless tobacco: Never Used   • Alcohol use No   • Drug use: No   • Sexual activity: Not Asked     Other Topics Concern   • None     Social History Narrative           Objective   Physical Exam   Constitutional: She is oriented to person, place, and time. She appears well-developed and well-nourished. No distress.   HENT:   Head: Normocephalic and atraumatic.   Nose: Nose normal.   Eyes: EOM are normal. Pupils are equal, round, and reactive to light.   Neck: Normal range of motion. Neck supple. No JVD present. No tracheal deviation present.   Cardiovascular: Normal rate, regular rhythm and normal heart sounds.    No murmur heard.  Pulmonary/Chest: Effort normal and breath sounds normal. No respiratory distress. She has no wheezes.   Abdominal: Soft. Bowel sounds are normal. There is no tenderness.   Musculoskeletal: Normal range of motion. She exhibits no edema or deformity.   Neurological: She is alert and oriented to person, place, and time. No cranial nerve deficit.   Skin: Skin is warm and dry. No rash noted. She is not diaphoretic. No erythema. No pallor.   Psychiatric: She has a normal mood and affect. Her behavior is normal. Judgment and thought content normal.   Nursing note and vitals reviewed.      Procedures         ED Course  ED Course   Value Comment By Time   XR Chest 1 View No apparent acute disease, reviewed by Dr. Abdirahman Whitaker, APRN 12/25 8937                  MDM  Number of Diagnoses or Management Options  Viral syndrome: new and requires workup     Amount and/or Complexity of Data Reviewed  Clinical lab tests: ordered and reviewed  Tests in the radiology section of CPT®: reviewed and ordered  Tests in the medicine section of CPT®: ordered and reviewed    Risk of Complications, Morbidity, and/or  Mortality  Presenting problems: low  Diagnostic procedures: low  Management options: low    Patient Progress  Patient progress: improved      Final diagnoses:   Viral syndrome            Kristin Whitaker, APRN  12/25/17 0755

## 2017-12-30 LAB
BACTERIA SPEC AEROBE CULT: NORMAL
BACTERIA SPEC AEROBE CULT: NORMAL

## 2018-01-24 RX ORDER — SPIRONOLACTONE 50 MG/1
TABLET, FILM COATED ORAL
Qty: 15 TABLET | Refills: 5 | Status: ON HOLD | OUTPATIENT
Start: 2018-01-24 | End: 2018-10-09

## 2018-01-26 ENCOUNTER — APPOINTMENT (OUTPATIENT)
Dept: GENERAL RADIOLOGY | Facility: HOSPITAL | Age: 73
End: 2018-01-26

## 2018-01-26 ENCOUNTER — HOSPITAL ENCOUNTER (EMERGENCY)
Facility: HOSPITAL | Age: 73
Discharge: HOME | End: 2018-01-26
Admitting: NURSE PRACTITIONER

## 2018-01-26 ENCOUNTER — APPOINTMENT (OUTPATIENT)
Dept: CT IMAGING | Facility: HOSPITAL | Age: 73
End: 2018-01-26

## 2018-01-26 VITALS
TEMPERATURE: 98.4 F | DIASTOLIC BLOOD PRESSURE: 90 MMHG | RESPIRATION RATE: 16 BRPM | HEIGHT: 60 IN | BODY MASS INDEX: 24.74 KG/M2 | OXYGEN SATURATION: 98 % | SYSTOLIC BLOOD PRESSURE: 172 MMHG | WEIGHT: 126 LBS | HEART RATE: 61 BPM

## 2018-01-26 DIAGNOSIS — K52.9 COLITIS: Primary | ICD-10-CM

## 2018-01-26 DIAGNOSIS — R19.7 DIARRHEA, UNSPECIFIED TYPE: ICD-10-CM

## 2018-01-26 LAB
ALBUMIN SERPL-MCNC: 4.5 G/DL (ref 3.4–4.8)
ALBUMIN/GLOB SERPL: 1.9 G/DL (ref 1.5–2.5)
ALP SERPL-CCNC: 95 U/L (ref 35–104)
ALT SERPL W P-5'-P-CCNC: 14 U/L (ref 10–36)
ANION GAP SERPL CALCULATED.3IONS-SCNC: 6.1 MMOL/L (ref 3.6–11.2)
AST SERPL-CCNC: 17 U/L (ref 10–30)
BASOPHILS # BLD AUTO: 0.03 10*3/MM3 (ref 0–0.3)
BASOPHILS NFR BLD AUTO: 0.4 % (ref 0–2)
BILIRUB SERPL-MCNC: 0.3 MG/DL (ref 0.2–1.8)
BILIRUB UR QL STRIP: NEGATIVE
BUN BLD-MCNC: 12 MG/DL (ref 7–21)
BUN/CREAT SERPL: 15.8 (ref 7–25)
CALCIUM SPEC-SCNC: 8.8 MG/DL (ref 7.7–10)
CHLORIDE SERPL-SCNC: 104 MMOL/L (ref 99–112)
CLARITY UR: CLEAR
CO2 SERPL-SCNC: 30.9 MMOL/L (ref 24.3–31.9)
COLOR UR: YELLOW
CREAT BLD-MCNC: 0.76 MG/DL (ref 0.43–1.29)
DEPRECATED RDW RBC AUTO: 44.1 FL (ref 37–54)
EOSINOPHIL # BLD AUTO: 0.38 10*3/MM3 (ref 0–0.7)
EOSINOPHIL NFR BLD AUTO: 4.6 % (ref 0–7)
ERYTHROCYTE [DISTWIDTH] IN BLOOD BY AUTOMATED COUNT: 13.5 % (ref 11.5–14.5)
GFR SERPL CREATININE-BSD FRML MDRD: 74 ML/MIN/1.73
GLOBULIN UR ELPH-MCNC: 2.4 GM/DL
GLUCOSE BLD-MCNC: 85 MG/DL (ref 70–110)
GLUCOSE UR STRIP-MCNC: NEGATIVE MG/DL
HCT VFR BLD AUTO: 41.7 % (ref 37–47)
HEMOCCULT STL QL: NEGATIVE
HGB BLD-MCNC: 13.6 G/DL (ref 12–16)
HGB UR QL STRIP.AUTO: NEGATIVE
HOLD SPECIMEN: NORMAL
HOLD SPECIMEN: NORMAL
IMM GRANULOCYTES # BLD: 0.01 10*3/MM3 (ref 0–0.03)
IMM GRANULOCYTES NFR BLD: 0.1 % (ref 0–0.5)
KETONES UR QL STRIP: NEGATIVE
LACTOFERRIN STL QL LA: POSITIVE
LEUKOCYTE ESTERASE UR QL STRIP.AUTO: NEGATIVE
LIPASE SERPL-CCNC: 34 U/L (ref 13–60)
LYMPHOCYTES # BLD AUTO: 2.43 10*3/MM3 (ref 1–3)
LYMPHOCYTES NFR BLD AUTO: 29.2 % (ref 16–46)
MCH RBC QN AUTO: 29.6 PG (ref 27–33)
MCHC RBC AUTO-ENTMCNC: 32.6 G/DL (ref 33–37)
MCV RBC AUTO: 90.7 FL (ref 80–94)
MONOCYTES # BLD AUTO: 0.96 10*3/MM3 (ref 0.1–0.9)
MONOCYTES NFR BLD AUTO: 11.6 % (ref 0–12)
NEUTROPHILS # BLD AUTO: 4.5 10*3/MM3 (ref 1.4–6.5)
NEUTROPHILS NFR BLD AUTO: 54.1 % (ref 40–75)
NITRITE UR QL STRIP: NEGATIVE
OSMOLALITY SERPL CALC.SUM OF ELEC: 280.3 MOSM/KG (ref 273–305)
PH UR STRIP.AUTO: 6 [PH] (ref 5–8)
PLATELET # BLD AUTO: 252 10*3/MM3 (ref 130–400)
PMV BLD AUTO: 12.3 FL (ref 6–10)
POTASSIUM BLD-SCNC: 3.8 MMOL/L (ref 3.5–5.3)
PROT SERPL-MCNC: 6.9 G/DL (ref 6–8)
PROT UR QL STRIP: NEGATIVE
RBC # BLD AUTO: 4.6 10*6/MM3 (ref 4.2–5.4)
SODIUM BLD-SCNC: 141 MMOL/L (ref 135–153)
SP GR UR STRIP: 1.02 (ref 1–1.03)
TROPONIN I SERPL-MCNC: <0.006 NG/ML
UROBILINOGEN UR QL STRIP: NORMAL
WBC NRBC COR # BLD: 8.31 10*3/MM3 (ref 4.5–12.5)
WHOLE BLOOD HOLD SPECIMEN: NORMAL
WHOLE BLOOD HOLD SPECIMEN: NORMAL

## 2018-01-26 PROCEDURE — 71046 X-RAY EXAM CHEST 2 VIEWS: CPT | Performed by: RADIOLOGY

## 2018-01-26 PROCEDURE — 84484 ASSAY OF TROPONIN QUANT: CPT | Performed by: EMERGENCY MEDICINE

## 2018-01-26 PROCEDURE — 83631 LACTOFERRIN FECAL (QUANT): CPT | Performed by: NURSE PRACTITIONER

## 2018-01-26 PROCEDURE — 83690 ASSAY OF LIPASE: CPT | Performed by: EMERGENCY MEDICINE

## 2018-01-26 PROCEDURE — 81003 URINALYSIS AUTO W/O SCOPE: CPT | Performed by: EMERGENCY MEDICINE

## 2018-01-26 PROCEDURE — 96360 HYDRATION IV INFUSION INIT: CPT

## 2018-01-26 PROCEDURE — 85025 COMPLETE CBC W/AUTO DIFF WBC: CPT | Performed by: EMERGENCY MEDICINE

## 2018-01-26 PROCEDURE — 99283 EMERGENCY DEPT VISIT LOW MDM: CPT

## 2018-01-26 PROCEDURE — 74177 CT ABD & PELVIS W/CONTRAST: CPT | Performed by: RADIOLOGY

## 2018-01-26 PROCEDURE — 0 IOPAMIDOL 61 % SOLUTION: Performed by: NURSE PRACTITIONER

## 2018-01-26 PROCEDURE — 87046 STOOL CULTR AEROBIC BACT EA: CPT | Performed by: NURSE PRACTITIONER

## 2018-01-26 PROCEDURE — 93005 ELECTROCARDIOGRAM TRACING: CPT | Performed by: EMERGENCY MEDICINE

## 2018-01-26 PROCEDURE — 36415 COLL VENOUS BLD VENIPUNCTURE: CPT

## 2018-01-26 PROCEDURE — 82272 OCCULT BLD FECES 1-3 TESTS: CPT | Performed by: NURSE PRACTITIONER

## 2018-01-26 PROCEDURE — 87045 FECES CULTURE AEROBIC BACT: CPT | Performed by: NURSE PRACTITIONER

## 2018-01-26 PROCEDURE — 93010 ELECTROCARDIOGRAM REPORT: CPT | Performed by: INTERNAL MEDICINE

## 2018-01-26 PROCEDURE — 71046 X-RAY EXAM CHEST 2 VIEWS: CPT

## 2018-01-26 PROCEDURE — 74177 CT ABD & PELVIS W/CONTRAST: CPT

## 2018-01-26 PROCEDURE — 80053 COMPREHEN METABOLIC PANEL: CPT | Performed by: EMERGENCY MEDICINE

## 2018-01-26 RX ORDER — SODIUM CHLORIDE 0.9 % (FLUSH) 0.9 %
10 SYRINGE (ML) INJECTION AS NEEDED
Status: DISCONTINUED | OUTPATIENT
Start: 2018-01-26 | End: 2018-01-26 | Stop reason: HOSPADM

## 2018-01-26 RX ORDER — PREGABALIN 300 MG/1
300 CAPSULE ORAL 2 TIMES DAILY
Qty: 10 CAPSULE | Refills: 0 | Status: ON HOLD | OUTPATIENT
Start: 2018-01-26 | End: 2018-10-09

## 2018-01-26 RX ADMIN — SODIUM CHLORIDE 500 ML: 9 INJECTION, SOLUTION INTRAVENOUS at 11:49

## 2018-01-26 RX ADMIN — IOPAMIDOL 100 ML: 612 INJECTION, SOLUTION INTRAVENOUS at 13:28

## 2018-01-28 LAB — BACTERIA SPEC AEROBE CULT: NORMAL

## 2018-02-13 ENCOUNTER — OFFICE VISIT (OUTPATIENT)
Dept: SURGERY | Facility: CLINIC | Age: 73
End: 2018-02-13

## 2018-02-13 VITALS
DIASTOLIC BLOOD PRESSURE: 70 MMHG | WEIGHT: 129 LBS | HEIGHT: 60 IN | HEART RATE: 61 BPM | BODY MASS INDEX: 25.32 KG/M2 | SYSTOLIC BLOOD PRESSURE: 122 MMHG

## 2018-02-13 DIAGNOSIS — R59.0 PERIAORTIC LYMPHADENOPATHY: Primary | ICD-10-CM

## 2018-02-13 PROCEDURE — 99214 OFFICE O/P EST MOD 30 MIN: CPT | Performed by: SURGERY

## 2018-02-13 RX ORDER — AMOXICILLIN AND CLAVULANATE POTASSIUM 875; 125 MG/1; MG/1
TABLET, FILM COATED ORAL
COMMUNITY
Start: 2017-12-27 | End: 2018-06-07 | Stop reason: ALTCHOICE

## 2018-02-13 RX ORDER — TRIAMCINOLONE ACETONIDE 1 MG/G
CREAM TOPICAL 2 TIMES DAILY
COMMUNITY
End: 2018-06-07 | Stop reason: ALTCHOICE

## 2018-02-13 NOTE — PROGRESS NOTES
Subjective   Kiah Conway is a 72 y.o. female is being seen for consultation today at the request of Aundrea WHELAN Comments: 72-year-old female referred for incidental finding of periaortic lymphadenopathy of CT of the abdomen and pelvis.  No type B symptoms reported.  No known GI malignancies reported.  In the past year she is undergone EGD and colonoscopy with no tumors identified.  No family history of Crohn's disease or inflammatory bowel disease or malignancy.  No unintentional weight loss.  The patient has a history of thyroid cancer status post lobectomy.  She has 2 enlarged periaortic lymph nodes just below the pancreas but no definitive pancreatic mass or involvement or growth into any surrounding structures identified on the imaging.  On imaging over the past 3 years these lymph nodes are not present on previous images.      Past Medical History:   Diagnosis Date   • Arthritis    • Cardiac disorder    • Diverticulitis    • Dysphagia    • Dysuria    • Fibromyalgia    • GERD (gastroesophageal reflux disease)    • History of rheumatic fever    • Hypertension    • Hypokalemia due to inadequate potassium intake 03/03/2014   • Lipoma    • Malignant neoplasm    • Migraine    • Recurrent UTI    • Sleep apnea    • Thyroid cancer    • UTI (urinary tract infection)        Family History   Problem Relation Age of Onset   • Diabetes Mother    • Hypertension Mother    • Other Other      cardiac disorder,cardiovascular disease, malignant neoplasm of brain       Social History     Social History   • Marital status:      Spouse name: N/A   • Number of children: N/A   • Years of education: N/A     Occupational History   • Not on file.     Social History Main Topics   • Smoking status: Never Smoker   • Smokeless tobacco: Never Used   • Alcohol use No   • Drug use: No   • Sexual activity: Not on file     Other Topics Concern   • Not on file     Social History Narrative       Past Surgical History:  "  Procedure Laterality Date   • BLADDER SURGERY     • HYSTERECTOMY     • THYROID SURGERY         The following portions of the patient's history were reviewed and updated as appropriate: allergies, current medications, past family history, past medical history, past social history, past surgical history and problem list.    Review of Systems   Constitutional: Negative for activity change, appetite change, chills and fever.   HENT: Negative for sore throat and trouble swallowing.    Eyes: Negative for visual disturbance.   Respiratory: Negative for cough and shortness of breath.    Cardiovascular: Negative for chest pain and palpitations.   Gastrointestinal: Negative for abdominal distention, abdominal pain, blood in stool, constipation, diarrhea, nausea and vomiting.   Endocrine: Negative for cold intolerance and heat intolerance.   Genitourinary: Negative for dysuria.   Musculoskeletal: Negative for joint swelling.   Skin: Negative for color change, rash and wound.   Allergic/Immunologic: Negative for immunocompromised state.   Neurological: Negative for dizziness, seizures, weakness and headaches.   Hematological: Negative for adenopathy. Does not bruise/bleed easily.   Psychiatric/Behavioral: Negative for agitation and confusion.         /70  Pulse 61  Ht 152.4 cm (60\")  Wt 58.5 kg (129 lb)  BMI 25.19 kg/m2  Objective   Physical Exam   Constitutional: She is oriented to person, place, and time. She appears well-developed.   HENT:   Head: Normocephalic and atraumatic.   Mouth/Throat: Mucous membranes are normal.   Eyes: Conjunctivae are normal. Pupils are equal, round, and reactive to light.   Neck: Neck supple. No JVD present. No tracheal deviation present. No thyromegaly present.   Cardiovascular: Normal rate and regular rhythm.  Exam reveals no gallop and no friction rub.    No murmur heard.  Pulmonary/Chest: Effort normal and breath sounds normal.   Abdominal: Soft. She exhibits no distension. There " is no splenomegaly or hepatomegaly. There is no tenderness. No hernia.   Musculoskeletal: Normal range of motion. She exhibits no deformity.   Neurological: She is alert and oriented to person, place, and time.   Skin: Skin is warm and dry.   Psychiatric: She has a normal mood and affect.         Kiah was seen today for paraaortic lymph nodes.    Diagnoses and all orders for this visit:    Periaortic lymphadenopathy        Assessment     72-year-old female with finding of periaortic lymphadenopathy on CT of the abdomen and pelvis.  She has a history of thyroid cancer and 2 enlarged lymph nodes with one in the periaortic tissues just to the left of the aorta between the aorta and adrenal gland but no definitive association with any surrounding structures as well as a lymph node in the aortocaval region just inferior to the pancreatic head.  Given the location surgical biopsy would be rather invasive.  The patient will be referred for attempted endoscopic ultrasound to evaluate these lymph nodes or look for any upper or lower digestive tract malignancies that may result in this lymphadenopathy.  She denies type B symptoms and has no unintentional weight loss.  Body mass index is 25.19 kg/(m^2).,Patient's BMI is within normal parameters. No follow-up required.

## 2018-02-15 ENCOUNTER — DOCUMENTATION (OUTPATIENT)
Dept: SURGERY | Facility: CLINIC | Age: 73
End: 2018-02-15

## 2018-02-15 NOTE — PROGRESS NOTES
Called Eunice to discuss Kiah's referral to a GI specialist at  for an endoscopic ultrasound. Patient will be referred to  on 5/7/18 @ 8:00am. Contact phone number is [phone]. Fax that all clinicals were sent to is [phone]. I left a voicemail for her to contact me back at the office. I will wait on her to get back to me if not soon then I will call her again.

## 2018-02-16 ENCOUNTER — TELEPHONE (OUTPATIENT)
Dept: SURGERY | Facility: CLINIC | Age: 73
End: 2018-02-16

## 2018-03-01 ENCOUNTER — TRANSCRIBE ORDERS (OUTPATIENT)
Dept: ADMINISTRATIVE | Facility: HOSPITAL | Age: 73
End: 2018-03-01

## 2018-03-01 DIAGNOSIS — Z12.31 VISIT FOR SCREENING MAMMOGRAM: Primary | ICD-10-CM

## 2018-03-07 ENCOUNTER — APPOINTMENT (OUTPATIENT)
Dept: MAMMOGRAPHY | Facility: HOSPITAL | Age: 73
End: 2018-03-07

## 2018-04-02 ENCOUNTER — TELEPHONE (OUTPATIENT)
Dept: SURGERY | Facility: CLINIC | Age: 73
End: 2018-04-02

## 2018-04-02 NOTE — TELEPHONE ENCOUNTER
Massiel called stating her mother has some abdominal swelling yesterday that she looks as if she was pregnant. I asked Massiel if Kiah is having regular bowel movement. She states she is and usually goes once a day. I informed her if the swelling has not decreased then to take her to the ER so they can do further imaging to see what's going on. At patient's request I called 's office to see if they have had any cancellations due to her conditions. They stated they didn't have any openings for any doctors until July. They said we can call weekly just to make sure. Patients daughter was made aware there were no openings but I will contact them weekly until her appointment to see if anything comes available. If so I will call her and let her know, she was very appreciative of the effort and advice. I told her to contact me any time she has any questions or concerns I will gladly help her and if I can not answer her questions we will find someone who can.

## 2018-04-04 ENCOUNTER — TELEPHONE (OUTPATIENT)
Dept: CARDIOLOGY | Facility: CLINIC | Age: 73
End: 2018-04-04

## 2018-04-04 ENCOUNTER — HOSPITAL ENCOUNTER (OUTPATIENT)
Dept: GENERAL RADIOLOGY | Facility: HOSPITAL | Age: 73
Discharge: HOME | End: 2018-04-04

## 2018-04-04 ENCOUNTER — HOSPITAL ENCOUNTER (OUTPATIENT)
Dept: CARDIOLOGY | Facility: HOSPITAL | Age: 73
Discharge: HOME | End: 2018-04-04
Admitting: NURSE PRACTITIONER

## 2018-04-04 ENCOUNTER — TRANSCRIBE ORDERS (OUTPATIENT)
Dept: LAB | Facility: HOSPITAL | Age: 73
End: 2018-04-04

## 2018-04-04 DIAGNOSIS — R61 EXCESSIVE SWEATING: ICD-10-CM

## 2018-04-04 DIAGNOSIS — I10 HYPERTENSION, UNSPECIFIED TYPE: ICD-10-CM

## 2018-04-04 DIAGNOSIS — I49.9 IRREGULAR HEART RATE: Primary | ICD-10-CM

## 2018-04-04 DIAGNOSIS — I49.9 IRREGULAR HEART RATE: ICD-10-CM

## 2018-04-04 PROCEDURE — 71046 X-RAY EXAM CHEST 2 VIEWS: CPT | Performed by: RADIOLOGY

## 2018-04-04 PROCEDURE — 93010 ELECTROCARDIOGRAM REPORT: CPT | Performed by: INTERNAL MEDICINE

## 2018-04-04 PROCEDURE — 93005 ELECTROCARDIOGRAM TRACING: CPT | Performed by: NURSE PRACTITIONER

## 2018-04-04 PROCEDURE — 71046 X-RAY EXAM CHEST 2 VIEWS: CPT

## 2018-04-04 NOTE — TELEPHONE ENCOUNTER
Aundrea Headley and stated that Kiah was in her office due to excessive sweating. She stated that she listened to her heart and it was out of rhythm and she hadn't noticed that before on Kiah. I advised her that she has an apt on 4.16.18 and that she would need to go tot her ER for IR HB.

## 2018-04-05 ENCOUNTER — HOSPITAL ENCOUNTER (OUTPATIENT)
Dept: MAMMOGRAPHY | Facility: HOSPITAL | Age: 73
Discharge: HOME | End: 2018-04-05
Admitting: NURSE PRACTITIONER

## 2018-04-05 DIAGNOSIS — Z12.31 VISIT FOR SCREENING MAMMOGRAM: ICD-10-CM

## 2018-04-05 PROCEDURE — 77067 SCR MAMMO BI INCL CAD: CPT

## 2018-04-05 PROCEDURE — 77063 BREAST TOMOSYNTHESIS BI: CPT

## 2018-04-05 PROCEDURE — 77063 BREAST TOMOSYNTHESIS BI: CPT | Performed by: RADIOLOGY

## 2018-04-05 PROCEDURE — 77067 SCR MAMMO BI INCL CAD: CPT | Performed by: RADIOLOGY

## 2018-04-16 ENCOUNTER — OFFICE VISIT (OUTPATIENT)
Dept: CARDIOLOGY | Facility: CLINIC | Age: 73
End: 2018-04-16

## 2018-04-16 VITALS
BODY MASS INDEX: 24.5 KG/M2 | DIASTOLIC BLOOD PRESSURE: 73 MMHG | HEART RATE: 58 BPM | SYSTOLIC BLOOD PRESSURE: 137 MMHG | WEIGHT: 124.8 LBS | HEIGHT: 60 IN

## 2018-04-16 DIAGNOSIS — I10 ESSENTIAL HYPERTENSION: ICD-10-CM

## 2018-04-16 DIAGNOSIS — G47.33 OBSTRUCTIVE SLEEP APNEA: ICD-10-CM

## 2018-04-16 DIAGNOSIS — I07.1 RHEUMATIC TRICUSPID VALVE REGURGITATION: ICD-10-CM

## 2018-04-16 DIAGNOSIS — R42 DIZZINESS: ICD-10-CM

## 2018-04-16 DIAGNOSIS — I27.20 PULMONARY HYPERTENSION (HCC): ICD-10-CM

## 2018-04-16 DIAGNOSIS — R07.2 PRECORDIAL PAIN: Primary | ICD-10-CM

## 2018-04-16 DIAGNOSIS — R00.2 PALPITATIONS: ICD-10-CM

## 2018-04-16 PROCEDURE — 99214 OFFICE O/P EST MOD 30 MIN: CPT | Performed by: PHYSICIAN ASSISTANT

## 2018-04-16 PROCEDURE — 93000 ELECTROCARDIOGRAM COMPLETE: CPT | Performed by: PHYSICIAN ASSISTANT

## 2018-04-16 RX ORDER — FLUTICASONE PROPIONATE 50 MCG
SPRAY, SUSPENSION (ML) NASAL
COMMUNITY
Start: 2018-03-30 | End: 2018-06-07 | Stop reason: ALTCHOICE

## 2018-04-16 RX ORDER — SULFAMETHOXAZOLE AND TRIMETHOPRIM 800; 160 MG/1; MG/1
TABLET ORAL
COMMUNITY
Start: 2018-04-05 | End: 2018-06-07 | Stop reason: ALTCHOICE

## 2018-04-16 RX ORDER — BACLOFEN 10 MG/1
10 TABLET ORAL DAILY PRN
Status: ON HOLD | COMMUNITY
Start: 2018-03-13 | End: 2019-11-07

## 2018-04-16 RX ORDER — ESTRADIOL 0.1 MG/G
CREAM VAGINAL
Refills: 12 | COMMUNITY
Start: 2018-01-29 | End: 2018-06-07 | Stop reason: ALTCHOICE

## 2018-04-16 NOTE — PROGRESS NOTES
MERRY Felix  Kiah Conway  1945 04/16/2018    Patient Active Problem List   Diagnosis   • Cyst of bursa   • Essential hypertension   • Obstructive sleep apnea   • Precordial pain   • SOB (shortness of breath)   • Edema   • Palpitations   • Periaortic lymphadenopathy     Dear MERRY Felix:    Chief Complaint   Patient presents with   • Chest Pain   • Palpitations     Subjective     Kiah Conway is a 72 y.o. female with a past medical history significant for hypertension, history of rheumatic fever, history of severe pulmonary hypertension with history of obstructive sleep apnea, and mild to moderate tricuspid regurgitation. She presents to the office today for a follow up visit. She reports recent episodes of palpitations which have been gradually increasing in frequency and severity.  At times it occurs upon standing, other times it will occur at random.  Sometimes it is associated with dizziness, other times she has dizziness without palpitations.  It does make her short of breath.  Her dizziness can occur upon standing.  No near-syncope or actual syncopal episodes.  She also reports spells of diaphoresis.  These can also occur with the episodes of palpitations or without them.  Patient is a very vague historian and is unsure about most of the surrounding factors with her symptoms.  She has had chronic complaints of chest pains which are pinpoint and located under her left breast.  They occur at random, but mostly occur at rest.  She has not tried any sublingual nitroglycerin.  Compared to her last visit, she states that her chest pains have not increased in frequency or severity.      Current Outpatient Prescriptions:   •  amoxicillin-clavulanate (AUGMENTIN) 875-125 MG per tablet, , Disp: , Rfl:   •  baclofen (LIORESAL) 10 MG tablet, , Disp: , Rfl:   •  busPIRone (BUSPAR) 5 MG tablet, Take 5 mg by mouth 2 (Two) Times a Day. Take 1 and 1/2 tablets by mouth twice daily as  directed, Disp: , Rfl:   •  conjugated estrogens (PREMARIN) 0.625 MG/GM vaginal cream, Insert 1 g into the vagina Daily., Disp: , Rfl:   •  ESTRACE VAGINAL 0.1 MG/GM vaginal cream, INSERT 2-4 GRAMS VAGINALLY FOR 7-14 DAYS,THEN DECREASE TO 1-2 GRAMS ONE TO THREE TIMES PER WEEK, Disp: , Rfl: 12  •  FLUoxetine (PROzac) 20 MG capsule, Take 20 mg by mouth Daily., Disp: , Rfl:   •  fluticasone (FLONASE) 50 MCG/ACT nasal spray, , Disp: , Rfl:   •  HYDROcodone-acetaminophen (NORCO) 7.5-325 MG per tablet, Take 1 tablet by mouth Every 6 (Six) Hours As Needed for moderate pain (4-6)., Disp: , Rfl:   •  hydroxychloroquine (PLAQUENIL) 200 MG tablet, Take 200 mg by mouth 2 (Two) Times a Day., Disp: , Rfl:   •  metoprolol tartrate (LOPRESSOR) 50 MG tablet, Take 1 tablet by mouth 2 (Two) Times a Day., Disp: 60 tablet, Rfl: 5  •  omeprazole (PriLOSEC) 40 MG capsule, Take 40 mg by mouth Daily., Disp: , Rfl:   •  potassium chloride (K-DUR) 10 MEQ CR tablet, Take 10 mEq by mouth 2 (Two) Times a Day., Disp: , Rfl:   •  pregabalin (LYRICA) 300 MG capsule, Take 1 capsule by mouth 2 (Two) Times a Day., Disp: 10 capsule, Rfl: 0  •  spironolactone (ALDACTONE) 25 MG tablet, Take 1/2 tablet daily. (Patient taking differently: Take 25 mg by mouth Daily.), Disp: 15 tablet, Rfl: 0  •  spironolactone (ALDACTONE) 50 MG tablet, TAKE 1/2 TABLET BY MOUTH EVERY DAY, Disp: 15 tablet, Rfl: 5  •  sulfamethoxazole-trimethoprim (BACTRIM DS,SEPTRA DS) 800-160 MG per tablet, , Disp: , Rfl:   •  triamcinolone (KENALOG) 0.1 % cream, Apply  topically 2 (Two) Times a Day., Disp: , Rfl:   •  dextromethorphan-guaifenesin (ROBITUSSIN-DM)  MG/5ML liquid, Take 5 mL by mouth Every 12 (Twelve) Hours. (Patient not taking: Reported on 1/26/2018), Disp: 118 mL, Rfl: 0    The following portions of the patient's history were reviewed and updated as appropriate: allergies, current medications, past family history, past medical history, past social history, past surgical  "history and problem list.    Social History     Social History   • Marital status:      Spouse name: N/A   • Number of children: N/A   • Years of education: N/A     Occupational History   • Not on file.     Social History Main Topics   • Smoking status: Never Smoker   • Smokeless tobacco: Never Used   • Alcohol use No   • Drug use: No   • Sexual activity: Not on file     Other Topics Concern   • Not on file     Social History Narrative   • No narrative on file     Review of Systems   Constitution: Positive for diaphoresis.   Cardiovascular: Positive for chest pain (a few), dyspnea on exertion (w/mild exert), irregular heartbeat and palpitations (sometimes). Leg swelling: occasional.   Respiratory: Positive for shortness of breath.    Neurological: Positive for dizziness and light-headedness.     Objective   Blood pressure 137/73, pulse 58, height 152.4 cm (60\"), weight 56.6 kg (124 lb 12.8 oz).  Body mass index is 24.37 kg/m².    Physical Exam   Constitutional: She is oriented to person, place, and time. She appears well-developed and well-nourished. No distress.   HENT:   Head: Normocephalic and atraumatic.   Eyes: Conjunctivae are normal. Right eye exhibits no discharge. Left eye exhibits no discharge.   Neck: Normal range of motion. Neck supple. Carotid bruit is not present.   Cardiovascular: Normal rate, regular rhythm and normal heart sounds.  Exam reveals no gallop and no friction rub.    No murmur heard.  Pulmonary/Chest: Effort normal and breath sounds normal. No respiratory distress. She has no wheezes. She has no rales. She exhibits no tenderness.   Musculoskeletal: Normal range of motion. She exhibits no edema.   Neurological: She is alert and oriented to person, place, and time.   Skin: Skin is warm and dry. No rash noted. She is not diaphoretic. No erythema. No pallor.   Psychiatric: She has a normal mood and affect. Her behavior is normal.   Nursing note and vitals reviewed.      ECG 12 " Lead  Date/Time: 4/16/2018 9:37 AM  Performed by: SERG SHARMA  Authorized by: SERG SHARMA   Comparison: compared with previous ECG   Similar to previous ECG  Rhythm: sinus bradycardia  Rate: bradycardic  BPM: 58  T depression: V1  QRS axis: left  Clinical impression: non-specific ECG  Comments: Sinus bradycardia at a rate of 58 bpm with changes of possible LVH.  Chronic minimal ST elevation in lead 1 and aVL.  No significant change compared to previous EKG.  Borderline prolonged QTC at 456 ms.            Assessment:        Diagnosis Plan   1. Precordial pain  ECG 12 Lead    Holter Monitor - 48 Hour    Adult Transthoracic Echo Complete W/ Cont if Necessary Per Protocol   2. Palpitations  Holter Monitor - 48 Hour    Adult Transthoracic Echo Complete W/ Cont if Necessary Per Protocol   3. Dizziness     4. Obstructive sleep apnea     5. Essential hypertension  ECG 12 Lead   6. Pulmonary hypertension  Adult Transthoracic Echo Complete W/ Cont if Necessary Per Protocol   7. Rheumatic tricuspid valve regurgitation  Adult Transthoracic Echo Complete W/ Cont if Necessary Per Protocol        Plan:       1. We'll evaluate her palpitations and episodes of diaphoresis with a 48 hour Holter monitor to rule out any significant tachycardia or bradycardia arrhythmias.  2. Reevaluate cardiac wall motion, left ventricular systolic function, degree of pulmonary hypertension, and tricuspid valve regurgitation with a transthoracic echocardiogram.  3. We'll continue to monitor her chest pains for now since these appear to be stable, are mostly atypical for angina, and no new EKG changes appreciated. Will consider further evaluation with nuclear stress test remains symptomatic or develops increasing symptoms.  She has a long history of chronic complaints of vague chest pains with no acute EKG changes.  We will evaluate with a Holter monitor and echocardiogram first for now.  4. Orthostatic vitals revealed no  significant change between lying, sitting, and standing.  5. Follow-up in 3-4 weeks or sooner if needed.    No Follow-up on file.    I appreciate the opportunity to participate in this patient's cardiovascular care.    Best Regards,    Corrie Schilling PA-C

## 2018-04-20 ENCOUNTER — HOSPITAL ENCOUNTER (OUTPATIENT)
Dept: CARDIOLOGY | Facility: HOSPITAL | Age: 73
Discharge: HOME | End: 2018-04-20
Admitting: PHYSICIAN ASSISTANT

## 2018-04-20 DIAGNOSIS — R07.2 PRECORDIAL PAIN: ICD-10-CM

## 2018-04-20 DIAGNOSIS — R00.2 PALPITATIONS: ICD-10-CM

## 2018-04-20 DIAGNOSIS — I07.1 RHEUMATIC TRICUSPID VALVE REGURGITATION: ICD-10-CM

## 2018-04-20 DIAGNOSIS — I27.20 PULMONARY HYPERTENSION (HCC): ICD-10-CM

## 2018-04-20 PROCEDURE — 93306 TTE W/DOPPLER COMPLETE: CPT | Performed by: INTERNAL MEDICINE

## 2018-04-20 PROCEDURE — 93306 TTE W/DOPPLER COMPLETE: CPT

## 2018-04-22 LAB
BH CV ECHO MEAS - % IVS THICK: 72.5 %
BH CV ECHO MEAS - % LVPW THICK: 49.7 %
BH CV ECHO MEAS - ACS: 1.9 CM
BH CV ECHO MEAS - AO ROOT AREA (BSA CORRECTED): 1.7
BH CV ECHO MEAS - AO ROOT AREA: 5.5 CM^2
BH CV ECHO MEAS - AO ROOT DIAM: 2.6 CM
BH CV ECHO MEAS - BSA(HAYCOCK): 1.6 M^2
BH CV ECHO MEAS - BSA: 1.5 M^2
BH CV ECHO MEAS - BZI_BMI: 24.2 KILOGRAMS/M^2
BH CV ECHO MEAS - BZI_METRIC_HEIGHT: 152.4 CM
BH CV ECHO MEAS - BZI_METRIC_WEIGHT: 56.2 KG
BH CV ECHO MEAS - CONTRAST EF 4CH: 60.9 ML/M^2
BH CV ECHO MEAS - EDV(CUBED): 78.2 ML
BH CV ECHO MEAS - EDV(MOD-SP4): 46 ML
BH CV ECHO MEAS - EDV(TEICH): 82 ML
BH CV ECHO MEAS - EF(CUBED): 80.7 %
BH CV ECHO MEAS - EF(MOD-SP4): 60.9 %
BH CV ECHO MEAS - EF(TEICH): 73.6 %
BH CV ECHO MEAS - ESV(CUBED): 15.1 ML
BH CV ECHO MEAS - ESV(MOD-SP4): 18 ML
BH CV ECHO MEAS - ESV(TEICH): 21.7 ML
BH CV ECHO MEAS - FS: 42.2 %
BH CV ECHO MEAS - IVS/LVPW: 1.1
BH CV ECHO MEAS - IVSD: 1.1 CM
BH CV ECHO MEAS - IVSS: 1.9 CM
BH CV ECHO MEAS - LA DIMENSION: 3.4 CM
BH CV ECHO MEAS - LA/AO: 1.3
BH CV ECHO MEAS - LV DIASTOLIC VOL/BSA (35-75): 30.2 ML/M^2
BH CV ECHO MEAS - LV MASS(C)D: 151.4 GRAMS
BH CV ECHO MEAS - LV MASS(C)DI: 99.4 GRAMS/M^2
BH CV ECHO MEAS - LV MASS(C)S: 156.6 GRAMS
BH CV ECHO MEAS - LV MASS(C)SI: 102.8 GRAMS/M^2
BH CV ECHO MEAS - LV SYSTOLIC VOL/BSA (12-30): 11.8 ML/M^2
BH CV ECHO MEAS - LVIDD: 4.3 CM
BH CV ECHO MEAS - LVIDS: 2.5 CM
BH CV ECHO MEAS - LVLD AP4: 6.8 CM
BH CV ECHO MEAS - LVLS AP4: 5.2 CM
BH CV ECHO MEAS - LVOT AREA (M): 2.5 CM^2
BH CV ECHO MEAS - LVOT AREA: 2.5 CM^2
BH CV ECHO MEAS - LVOT DIAM: 1.8 CM
BH CV ECHO MEAS - LVPWD: 0.98 CM
BH CV ECHO MEAS - LVPWS: 1.5 CM
BH CV ECHO MEAS - MV A MAX VEL: 72.6 CM/SEC
BH CV ECHO MEAS - MV E MAX VEL: 77.5 CM/SEC
BH CV ECHO MEAS - MV E/A: 1.1
BH CV ECHO MEAS - PA ACC SLOPE: 774.5 CM/SEC^2
BH CV ECHO MEAS - PA ACC TIME: 0.11 SEC
BH CV ECHO MEAS - PA PR(ACCEL): 28.3 MMHG
BH CV ECHO MEAS - RAP SYSTOLE: 10 MMHG
BH CV ECHO MEAS - RVDD: 1.7 CM
BH CV ECHO MEAS - RVSP: 48 MMHG
BH CV ECHO MEAS - SI(CUBED): 41.4 ML/M^2
BH CV ECHO MEAS - SI(MOD-SP4): 18.4 ML/M^2
BH CV ECHO MEAS - SI(TEICH): 39.6 ML/M^2
BH CV ECHO MEAS - SV(CUBED): 63.1 ML
BH CV ECHO MEAS - SV(MOD-SP4): 28 ML
BH CV ECHO MEAS - SV(TEICH): 60.3 ML
BH CV ECHO MEAS - TR MAX VEL: 308.1 CM/SEC
MAXIMAL PREDICTED HEART RATE: 146 BPM
STRESS TARGET HR: 124 BPM

## 2018-04-27 ENCOUNTER — HOSPITAL ENCOUNTER (OUTPATIENT)
Dept: RESPIRATORY THERAPY | Facility: HOSPITAL | Age: 73
Discharge: HOME | End: 2018-04-27
Admitting: PHYSICIAN ASSISTANT

## 2018-04-27 DIAGNOSIS — R00.2 PALPITATIONS: ICD-10-CM

## 2018-04-27 DIAGNOSIS — R07.2 PRECORDIAL PAIN: ICD-10-CM

## 2018-04-27 PROCEDURE — 93225 XTRNL ECG REC<48 HRS REC: CPT

## 2018-04-27 PROCEDURE — 93226 XTRNL ECG REC<48 HR SCAN A/R: CPT

## 2018-05-01 PROCEDURE — 93227 XTRNL ECG REC<48 HR R&I: CPT | Performed by: INTERNAL MEDICINE

## 2018-05-02 RX ORDER — METOPROLOL TARTRATE 50 MG/1
50 TABLET, FILM COATED ORAL EVERY 12 HOURS SCHEDULED
Qty: 60 TABLET | Refills: 5 | Status: ON HOLD | OUTPATIENT
Start: 2018-05-02 | End: 2021-01-01

## 2018-06-07 ENCOUNTER — OFFICE VISIT (OUTPATIENT)
Dept: CARDIOLOGY | Facility: CLINIC | Age: 73
End: 2018-06-07

## 2018-06-07 VITALS
DIASTOLIC BLOOD PRESSURE: 70 MMHG | HEIGHT: 60 IN | OXYGEN SATURATION: 95 % | SYSTOLIC BLOOD PRESSURE: 121 MMHG | WEIGHT: 126.2 LBS | BODY MASS INDEX: 24.77 KG/M2 | HEART RATE: 68 BPM

## 2018-06-07 DIAGNOSIS — R00.2 PALPITATIONS: ICD-10-CM

## 2018-06-07 DIAGNOSIS — R53.83 FATIGUE, UNSPECIFIED TYPE: ICD-10-CM

## 2018-06-07 DIAGNOSIS — C85.90 LYMPHOMA, UNSPECIFIED BODY REGION, UNSPECIFIED LYMPHOMA TYPE (HCC): ICD-10-CM

## 2018-06-07 DIAGNOSIS — R07.2 PRECORDIAL PAIN: ICD-10-CM

## 2018-06-07 DIAGNOSIS — I10 ESSENTIAL HYPERTENSION: Primary | ICD-10-CM

## 2018-06-07 PROCEDURE — 99214 OFFICE O/P EST MOD 30 MIN: CPT | Performed by: NURSE PRACTITIONER

## 2018-06-07 RX ORDER — LORATADINE 10 MG/1
10 TABLET ORAL DAILY
COMMUNITY

## 2018-06-07 RX ORDER — ASPIRIN 81 MG/1
81 TABLET ORAL DAILY
COMMUNITY
End: 2021-01-01 | Stop reason: HOSPADM

## 2018-06-07 NOTE — PROGRESS NOTES
MERRY Felix  Kiah Conway  1945 06/07/2018    Patient Active Problem List   Diagnosis   • Cyst of bursa   • Essential hypertension   • Obstructive sleep apnea   • Precordial pain   • SOB (shortness of breath)   • Edema   • Palpitations   • Periaortic lymphadenopathy       Dear MERRY Felix:    Subjective     Chief Complaint   Patient presents with   • Follow-up   • Med Management     Med list.    • Results     Echo and holter.    • Fatigue   • Shortness of Breath     With walking.    • Edema     Feet and hands.    • Dizziness     Some.    • Palpitations           History of Present Illness:    Kiah Conway is a 72 y.o. female presents today for follow up regarding history of hypertension, rheumatic fever, severe pulmonary hypertension with history of NEIL and mild to moderate tricuspid regurgitation. She has recently had a 48 hour holter monitor and echocardiogram. The echo revealed mild mitral valve regurgitation and mild tricuspid valve regurgitation with an EF of 61-65%. The holter monitor revealed two short 3-4 beat runs of SVT. She reports the palpitations have improved. She has not experienced any palpitations over the past few months. She does have occasional dizziness, but denies syncope and near syncope. She has persistent chest pains. These are located underneath her left breast. The pain is aggravated by bending over. Her left chest wall is also tender to touch. She denies associated dizziness, diaphoresis, and shortness of breath. She also reports her blood pressure was low at home a few days ago with systolic readings in the 90's. She did not feel lightheaded or dizzy at that time. She has not been checking her BP regularly. She notes recently she was diagnosed with lymphoma and will be having MRI soon to evaluate need for treatment.        No Known Allergies:      Current Outpatient Prescriptions:   •  aspirin 81 MG EC tablet, Take 81 mg by mouth Daily., Disp: , Rfl:    •  baclofen (LIORESAL) 10 MG tablet, , Disp: , Rfl:   •  busPIRone (BUSPAR) 5 MG tablet, Take 5 mg by mouth 2 (Two) Times a Day. Take 1 and 1/2 tablets by mouth twice daily as directed, Disp: , Rfl:   •  Calcium Polycarbophil (FIBER-CAPS PO), Take  by mouth., Disp: , Rfl:   •  FLUoxetine (PROzac) 20 MG capsule, Take 20 mg by mouth Daily., Disp: , Rfl:   •  HYDROcodone-acetaminophen (NORCO) 7.5-325 MG per tablet, Take 1 tablet by mouth Every 6 (Six) Hours As Needed for moderate pain (4-6)., Disp: , Rfl:   •  hydroxychloroquine (PLAQUENIL) 200 MG tablet, Take 200 mg by mouth 2 (Two) Times a Day., Disp: , Rfl:   •  loratadine (CLARITIN) 10 MG tablet, Take 10 mg by mouth Daily., Disp: , Rfl:   •  metoprolol tartrate (LOPRESSOR) 50 MG tablet, Take 1 tablet by mouth Every 12 (Twelve) Hours., Disp: 60 tablet, Rfl: 5  •  omeprazole (PriLOSEC) 40 MG capsule, Take 40 mg by mouth Daily., Disp: , Rfl:   •  pregabalin (LYRICA) 300 MG capsule, Take 1 capsule by mouth 2 (Two) Times a Day., Disp: 10 capsule, Rfl: 0  •  Probiotic Product (ALIGN PO), Take  by mouth., Disp: , Rfl:   •  spironolactone (ALDACTONE) 50 MG tablet, TAKE 1/2 TABLET BY MOUTH EVERY DAY, Disp: 15 tablet, Rfl: 5      The following portions of the patient's history were reviewed and updated as appropriate: allergies, current medications, past family history, past medical history, past social history, past surgical history and problem list.    Social History   Substance Use Topics   • Smoking status: Never Smoker   • Smokeless tobacco: Never Used   • Alcohol use No       Review of Systems   Cardiovascular: Positive for leg swelling (Feet and hands. ) and palpitations. Negative for chest pain and syncope.   Respiratory: Positive for shortness of breath (With walking. ).    Neurological: Positive for dizziness (Some. ).       Objective   Vitals:    06/07/18 1126   BP: 121/70   BP Location: Left arm   Patient Position: Sitting   Cuff Size: Adult   Pulse: 68   SpO2:  "95%   Weight: 57.2 kg (126 lb 3.2 oz)   Height: 152.4 cm (60\")     Body mass index is 24.65 kg/m².        Physical Exam    Lab Results   Component Value Date     01/26/2018    K 3.8 01/26/2018     01/26/2018    CO2 30.9 01/26/2018    BUN 12 01/26/2018    CREATININE 0.76 01/26/2018    GLUCOSE 85 01/26/2018    CALCIUM 8.8 01/26/2018    AST 17 01/26/2018    ALT 14 01/26/2018    ALKPHOS 95 01/26/2018    LABIL2 1.9 01/26/2018     No results found for: CKTOTAL  Lab Results   Component Value Date    WBC 8.31 01/26/2018    HGB 13.6 01/26/2018    HCT 41.7 01/26/2018     01/26/2018     Lab Results   Component Value Date    INR <0.90 05/06/2016     Lab Results   Component Value Date    MG 1.9 08/15/2016     Lab Results   Component Value Date    TSH 0.310 (L) 10/03/2017      Lab Results   Component Value Date    .0 (H) 12/25/2017           Procedures      Assessment/Plan    Diagnosis Plan   1. Essential hypertension     2. Palpitations     3. Precordial pain     4. Fatigue, unspecified type     5. Lymphoma, unspecified body region, unspecified lymphoma type                  Recommendations:    1. We have discussed recent echocardiogram and holter findings. The palpitations have improved, will continue the metoprolol 50 mg BID    2. I have asked her to continue to monitor her blood pressure at home. We have discussed parameters. If she is hypotensive at home, will consider decreasing the dose of spironolactone.    3. Her chest pain has not worsened since her last visit and is atypical in nature. We have discussed further evaluation with nuclear stress test. She has declined at this time. If chest pain worsens or she develops any associated symptoms, I have asked her to let us know so we can pursue stress testing. She voices understanding.     4. Follow up in 3 months and PRN.            Patient's Body mass index is 24.65 kg/m². BMI is within normal parameters. No follow-up required.         Return in " about 3 months (around 9/7/2018) for Recheck.    As always, I appreciate very much the opportunity to participate in the cardiovascular care of your patients.      With Best Regards,    MERRY Land

## 2018-07-11 ENCOUNTER — OFFICE VISIT (OUTPATIENT)
Dept: CARDIOLOGY | Facility: CLINIC | Age: 73
End: 2018-07-11

## 2018-07-11 VITALS
HEIGHT: 60 IN | BODY MASS INDEX: 23.75 KG/M2 | DIASTOLIC BLOOD PRESSURE: 77 MMHG | RESPIRATION RATE: 16 BRPM | HEART RATE: 69 BPM | SYSTOLIC BLOOD PRESSURE: 151 MMHG | WEIGHT: 121 LBS

## 2018-07-11 DIAGNOSIS — C85.90 LYMPHOMA, UNSPECIFIED BODY REGION, UNSPECIFIED LYMPHOMA TYPE (HCC): ICD-10-CM

## 2018-07-11 DIAGNOSIS — R94.31 ABNORMAL EKG: ICD-10-CM

## 2018-07-11 DIAGNOSIS — R06.09 EXERTIONAL DYSPNEA: ICD-10-CM

## 2018-07-11 DIAGNOSIS — I10 ESSENTIAL HYPERTENSION: Primary | ICD-10-CM

## 2018-07-11 DIAGNOSIS — R07.2 PRECORDIAL PAIN: ICD-10-CM

## 2018-07-11 PROCEDURE — 99214 OFFICE O/P EST MOD 30 MIN: CPT | Performed by: NURSE PRACTITIONER

## 2018-07-11 PROCEDURE — 93000 ELECTROCARDIOGRAM COMPLETE: CPT | Performed by: NURSE PRACTITIONER

## 2018-07-11 RX ORDER — AMLODIPINE BESYLATE 5 MG/1
2.5 TABLET ORAL DAILY
Qty: 30 TABLET | Refills: 5 | Status: ON HOLD | OUTPATIENT
Start: 2018-07-11 | End: 2018-10-09

## 2018-07-11 RX ORDER — CYCLOPHOSPHAMIDE 1 G/50ML
INJECTION, POWDER, FOR SOLUTION INTRAVENOUS; ORAL ONCE
Status: ON HOLD | COMMUNITY
End: 2018-10-09

## 2018-07-11 RX ORDER — PREDNISONE 10 MG/1
10 TABLET ORAL DAILY
Status: ON HOLD | COMMUNITY
End: 2018-10-09

## 2018-07-11 NOTE — PROGRESS NOTES
MERRY Felix  Kiah Conway  1945 07/11/2018    Patient Active Problem List   Diagnosis   • Cyst of bursa   • Essential hypertension   • Obstructive sleep apnea   • Precordial pain   • SOB (shortness of breath)   • Edema   • Palpitations   • Periaortic lymphadenopathy       Dear MERRY Felix:    Subjective     Chief Complaint   Patient presents with   • Hypertension     uncontrolledBP up and down since chemo trtmnt on 07/06/2018   • Chest Pain     rated 6-7,sharp and heavy   • Palpitations     races   • Shortness of Breath     intermittent   • Med Management     list provided           History of Present Illness:    Kiah Conway is a 72 y.o. female who is here due to recent chest pains and exertional dyspnea. She has a history of hypertension, rheumatic fever, severe pulmonary hypertension with history of NEIL and mild to moderate tricuspid regurgitation. She has recently had a 48 hour holter monitor and echocardiogram. The echo revealed mild mitral valve regurgitation and mild tricuspid valve regurgitation with an EF of 61-65%. The holter monitor revealed two short 3-4 beat runs of SVT. She reports the palpitations have improved. She was recently diagnosed with lymphoma and has had a port placed. She has received her first dose of chemotherapy. Since that time she has had intermittent hypertension. However, at times her blood pressure is around 100 systolic at home. She has been to the ER at  on a few occasions due to the hypertension. None of her home medications have been changed. She has been having worsening chest pain in the left chest wall, under the breast. This is worse with exertion and relieved with rest. It is described as a sharp pain which is a 6 on a 1-10 pain scale. She does have associated diaphoresis and shortness of breath when the pain occurs. At times she feels dizzy and lightheaded. She is a non smoker and denies family history of CAD.       No Known  Allergies:      Current Outpatient Prescriptions:   •  aspirin 81 MG EC tablet, Take 81 mg by mouth Daily., Disp: , Rfl:   •  baclofen (LIORESAL) 10 MG tablet, , Disp: , Rfl:   •  busPIRone (BUSPAR) 5 MG tablet, Take 5 mg by mouth 2 (Two) Times a Day. Take 1 and 1/2 tablets by mouth twice daily as directed, Disp: , Rfl:   •  Calcium Polycarbophil (FIBER-CAPS PO), Take  by mouth., Disp: , Rfl:   •  cyclophosphamide (CYTOXAN) 1 g chemo injection, Infuse  into a venous catheter 1 (One) Time., Disp: , Rfl:   •  DOXOrubicin in sodium chloride 0.9 % 250 mL, Infuse  into a venous catheter 1 (One) Time., Disp: , Rfl:   •  FLUoxetine (PROzac) 20 MG capsule, Take 20 mg by mouth Daily., Disp: , Rfl:   •  HYDROcodone-acetaminophen (NORCO) 7.5-325 MG per tablet, Take 1 tablet by mouth Every 6 (Six) Hours As Needed for moderate pain (4-6)., Disp: , Rfl:   •  hydroxychloroquine (PLAQUENIL) 200 MG tablet, Take 200 mg by mouth 2 (Two) Times a Day., Disp: , Rfl:   •  loratadine (CLARITIN) 10 MG tablet, Take 10 mg by mouth Daily., Disp: , Rfl:   •  metoprolol tartrate (LOPRESSOR) 50 MG tablet, Take 1 tablet by mouth Every 12 (Twelve) Hours., Disp: 60 tablet, Rfl: 5  •  omeprazole (PriLOSEC) 40 MG capsule, Take 40 mg by mouth Daily., Disp: , Rfl:   •  predniSONE (DELTASONE) 10 MG tablet, Take 10 mg by mouth Daily., Disp: , Rfl:   •  pregabalin (LYRICA) 300 MG capsule, Take 1 capsule by mouth 2 (Two) Times a Day., Disp: 10 capsule, Rfl: 0  •  Probiotic Product (ALIGN PO), Take  by mouth., Disp: , Rfl:   •  RiTUXimab (RITUXAN) 10 MG/ML solution injection, Infuse  into a venous catheter 1 (One) Time., Disp: , Rfl:   •  spironolactone (ALDACTONE) 50 MG tablet, TAKE 1/2 TABLET BY MOUTH EVERY DAY, Disp: 15 tablet, Rfl: 5  •  vinCRIStine in sodium chloride 0.9 % 50 mL, Infuse  into a venous catheter 1 (One) Time., Disp: , Rfl:   •  amLODIPine (NORVASC) 5 MG tablet, Take 0.5 tablets by mouth Daily., Disp: 30 tablet, Rfl: 5      The following  "portions of the patient's history were reviewed and updated as appropriate: allergies, current medications, past family history, past medical history, past social history, past surgical history and problem list.    Social History   Substance Use Topics   • Smoking status: Never Smoker   • Smokeless tobacco: Never Used   • Alcohol use No       Review of Systems   Constitution: Positive for malaise/fatigue.   Cardiovascular: Positive for chest pain and palpitations. Negative for leg swelling.   Respiratory: Positive for shortness of breath.        Objective   Vitals:    07/11/18 1325   BP: 151/77   Pulse: 69   Resp: 16   Weight: 54.9 kg (121 lb)   Height: 152.4 cm (60\")     Body mass index is 23.63 kg/m².        Physical Exam   Constitutional: She is oriented to person, place, and time. She appears well-developed and well-nourished.   HENT:   Head: Normocephalic and atraumatic.   Neck: Neck supple. No JVD present.   Cardiovascular: Normal rate, regular rhythm and normal heart sounds.  Exam reveals no S3 and no S4.    No murmur heard.  Pulses:       Dorsalis pedis pulses are 2+ on the right side, and 2+ on the left side.        Posterior tibial pulses are 2+ on the right side, and 2+ on the left side.   Pulmonary/Chest: Effort normal and breath sounds normal.   Abdominal: Soft. Bowel sounds are normal.   Musculoskeletal:   Port noted right chest wall   Neurological: She is alert and oriented to person, place, and time.   Skin: Skin is warm and dry.   Psychiatric: She has a normal mood and affect. Her behavior is normal.       Lab Results   Component Value Date     01/26/2018    K 3.8 01/26/2018     01/26/2018    CO2 30.9 01/26/2018    BUN 12 01/26/2018    CREATININE 0.76 01/26/2018    GLUCOSE 85 01/26/2018    CALCIUM 8.8 01/26/2018    AST 17 01/26/2018    ALT 14 01/26/2018    ALKPHOS 95 01/26/2018    LABIL2 1.9 01/26/2018     No results found for: CKTOTAL  Lab Results   Component Value Date    WBC 8.31 " 01/26/2018    HGB 13.6 01/26/2018    HCT 41.7 01/26/2018     01/26/2018           ECG 12 Lead  Date/Time: 7/11/2018 1:26 PM  Performed by: MERARI JOHNSON  Authorized by: MERARI JOHNSON   Comparison: compared with previous ECG   Rhythm: sinus rhythm  ST Elevation: aVL  ST Depression: V3 and III  T depression: III, aVF, V1, V3, V4, V5 and V6  Comments: QTc 447              Assessment/Plan    Diagnosis Plan   1. Essential hypertension  ECG 12 Lead    Stress Test With Myocardial Perfusion (1 Day)    amLODIPine (NORVASC) 5 MG tablet   2. Precordial pain  ECG 12 Lead    Stress Test With Myocardial Perfusion (1 Day)   3. Lymphoma, unspecified body region, unspecified lymphoma type (CMS/HCC)  ECG 12 Lead    Stress Test With Myocardial Perfusion (1 Day)   4. Exertional dyspnea  ECG 12 Lead    Stress Test With Myocardial Perfusion (1 Day)   5. Abnormal EKG  Stress Test With Myocardial Perfusion (1 Day)                Recommendations:    1. She is now having chest pain which is suspicious for angina. She also has new EKG changes revealing ST depression and T wave inversion in anterior, inferior and lateral leads. I have discussed with Dr. Aguilar. Will order a lexiscan stress test to further evaluate.    2. Continue aspirin, Spironolactone, and metoprolol    3. Her BP is uncontrolled. I will add amlodipine 2.5 mg daily. I have asked her to keep a log of blood pressures    4. If she experiences persistent or worsening chest pain, I have asked her to go to the ER. She voices understanding.     5. Follow up in 3 weeks and PRN.          Patient's Body mass index is 23.63 kg/m². BMI is within normal parameters. No follow-up required.         Return in about 3 weeks (around 8/1/2018) for Recheck.    As always, I appreciate very much the opportunity to participate in the cardiovascular care of your patients.      With Best Regards,    MERRY Land

## 2018-07-12 ENCOUNTER — TELEPHONE (OUTPATIENT)
Dept: CARDIOLOGY | Facility: CLINIC | Age: 73
End: 2018-07-12

## 2018-07-12 NOTE — TELEPHONE ENCOUNTER
Will send a fax cover sheet requesting her ER records.     ----- Message from MERRY Land sent at 7/11/2018  2:59 PM EDT -----  Please obtain recent records from [facility].

## 2018-10-08 ENCOUNTER — TRANSCRIBE ORDERS (OUTPATIENT)
Dept: ADMINISTRATIVE | Facility: HOSPITAL | Age: 73
End: 2018-10-08

## 2018-10-08 ENCOUNTER — APPOINTMENT (OUTPATIENT)
Dept: GENERAL RADIOLOGY | Facility: HOSPITAL | Age: 73
End: 2018-10-08

## 2018-10-08 ENCOUNTER — HOSPITAL ENCOUNTER (INPATIENT)
Facility: HOSPITAL | Age: 73
LOS: 2 days | Discharge: HOME | End: 2018-10-11
Attending: FAMILY MEDICINE | Admitting: HOSPITALIST

## 2018-10-08 ENCOUNTER — HOSPITAL ENCOUNTER (OUTPATIENT)
Dept: GENERAL RADIOLOGY | Facility: HOSPITAL | Age: 73
Discharge: HOME | End: 2018-10-08
Admitting: NURSE PRACTITIONER

## 2018-10-08 DIAGNOSIS — J18.9 PNEUMONIA OF RIGHT LUNG DUE TO INFECTIOUS ORGANISM, UNSPECIFIED PART OF LUNG: Primary | ICD-10-CM

## 2018-10-08 DIAGNOSIS — R05.9 COUGH: Primary | ICD-10-CM

## 2018-10-08 DIAGNOSIS — R06.02 SOB (SHORTNESS OF BREATH): ICD-10-CM

## 2018-10-08 DIAGNOSIS — R05.9 COUGH: ICD-10-CM

## 2018-10-08 LAB
ALBUMIN SERPL-MCNC: 4.3 G/DL (ref 3.4–4.8)
ALBUMIN/GLOB SERPL: 1.8 G/DL (ref 1.5–2.5)
ALP SERPL-CCNC: 68 U/L (ref 35–104)
ALT SERPL W P-5'-P-CCNC: 7 U/L (ref 10–36)
ANION GAP SERPL CALCULATED.3IONS-SCNC: 11.3 MMOL/L (ref 3.6–11.2)
AST SERPL-CCNC: 20 U/L (ref 10–30)
BASOPHILS # BLD AUTO: 0.01 10*3/MM3 (ref 0–0.3)
BASOPHILS NFR BLD AUTO: 0.1 % (ref 0–2)
BILIRUB SERPL-MCNC: 0.5 MG/DL (ref 0.2–1.8)
BNP SERPL-MCNC: 311 PG/ML (ref 0–100)
BUN BLD-MCNC: 20 MG/DL (ref 7–21)
BUN/CREAT SERPL: 14.4 (ref 7–25)
CALCIUM SPEC-SCNC: 8.4 MG/DL (ref 7.7–10)
CHLORIDE SERPL-SCNC: 105 MMOL/L (ref 99–112)
CK MB SERPL-CCNC: 0.9 NG/ML (ref 0–5)
CK MB SERPL-RTO: 1.1 % (ref 0–3)
CK SERPL-CCNC: 80 U/L (ref 24–173)
CO2 SERPL-SCNC: 22.7 MMOL/L (ref 24.3–31.9)
CREAT BLD-MCNC: 1.39 MG/DL (ref 0.43–1.29)
CRP SERPL-MCNC: 12.96 MG/DL (ref 0–0.99)
D-LACTATE SERPL-SCNC: 2 MMOL/L (ref 0.5–2)
DEPRECATED RDW RBC AUTO: 46.5 FL (ref 37–54)
EOSINOPHIL # BLD AUTO: 0.01 10*3/MM3 (ref 0–0.7)
EOSINOPHIL NFR BLD AUTO: 0.1 % (ref 0–7)
ERYTHROCYTE [DISTWIDTH] IN BLOOD BY AUTOMATED COUNT: 14.2 % (ref 11.5–14.5)
ERYTHROCYTE [SEDIMENTATION RATE] IN BLOOD: 30 MM/HR (ref 0–30)
GFR SERPL CREATININE-BSD FRML MDRD: 37 ML/MIN/1.73
GLOBULIN UR ELPH-MCNC: 2.4 GM/DL
GLUCOSE BLD-MCNC: 104 MG/DL (ref 70–110)
HCT VFR BLD AUTO: 34.1 % (ref 37–47)
HGB BLD-MCNC: 11.6 G/DL (ref 12–16)
HOLD SPECIMEN: NORMAL
HOLD SPECIMEN: NORMAL
IMM GRANULOCYTES # BLD: 0.16 10*3/MM3 (ref 0–0.03)
IMM GRANULOCYTES NFR BLD: 0.8 % (ref 0–0.5)
LYMPHOCYTES # BLD AUTO: 1.76 10*3/MM3 (ref 1–3)
LYMPHOCYTES NFR BLD AUTO: 9 % (ref 16–46)
MCH RBC QN AUTO: 32 PG (ref 27–33)
MCHC RBC AUTO-ENTMCNC: 34 G/DL (ref 33–37)
MCV RBC AUTO: 94.2 FL (ref 80–94)
MONOCYTES # BLD AUTO: 0.85 10*3/MM3 (ref 0.1–0.9)
MONOCYTES NFR BLD AUTO: 4.4 % (ref 0–12)
NEUTROPHILS # BLD AUTO: 16.67 10*3/MM3 (ref 1.4–6.5)
NEUTROPHILS NFR BLD AUTO: 85.6 % (ref 40–75)
OSMOLALITY SERPL CALC.SUM OF ELEC: 280.5 MOSM/KG (ref 273–305)
PLATELET # BLD AUTO: 153 10*3/MM3 (ref 130–400)
PMV BLD AUTO: 12.4 FL (ref 6–10)
POTASSIUM BLD-SCNC: 3.5 MMOL/L (ref 3.5–5.3)
PROT SERPL-MCNC: 6.7 G/DL (ref 6–8)
RBC # BLD AUTO: 3.62 10*6/MM3 (ref 4.2–5.4)
SODIUM BLD-SCNC: 139 MMOL/L (ref 135–153)
TROPONIN I SERPL-MCNC: 0.05 NG/ML
TROPONIN I SERPL-MCNC: 0.07 NG/ML
WBC NRBC COR # BLD: 19.46 10*3/MM3 (ref 4.5–12.5)
WHOLE BLOOD HOLD SPECIMEN: NORMAL
WHOLE BLOOD HOLD SPECIMEN: NORMAL

## 2018-10-08 PROCEDURE — 25010000002 METHYLPREDNISOLONE PER 125 MG: Performed by: PHYSICIAN ASSISTANT

## 2018-10-08 PROCEDURE — 84484 ASSAY OF TROPONIN QUANT: CPT | Performed by: FAMILY MEDICINE

## 2018-10-08 PROCEDURE — 80053 COMPREHEN METABOLIC PANEL: CPT | Performed by: FAMILY MEDICINE

## 2018-10-08 PROCEDURE — 82550 ASSAY OF CK (CPK): CPT | Performed by: PHYSICIAN ASSISTANT

## 2018-10-08 PROCEDURE — 99285 EMERGENCY DEPT VISIT HI MDM: CPT

## 2018-10-08 PROCEDURE — 82553 CREATINE MB FRACTION: CPT | Performed by: PHYSICIAN ASSISTANT

## 2018-10-08 PROCEDURE — 71046 X-RAY EXAM CHEST 2 VIEWS: CPT | Performed by: RADIOLOGY

## 2018-10-08 PROCEDURE — 84484 ASSAY OF TROPONIN QUANT: CPT | Performed by: PHYSICIAN ASSISTANT

## 2018-10-08 PROCEDURE — 83880 ASSAY OF NATRIURETIC PEPTIDE: CPT | Performed by: FAMILY MEDICINE

## 2018-10-08 PROCEDURE — 36415 COLL VENOUS BLD VENIPUNCTURE: CPT

## 2018-10-08 PROCEDURE — 94799 UNLISTED PULMONARY SVC/PX: CPT

## 2018-10-08 PROCEDURE — 87040 BLOOD CULTURE FOR BACTERIA: CPT | Performed by: PHYSICIAN ASSISTANT

## 2018-10-08 PROCEDURE — 93005 ELECTROCARDIOGRAM TRACING: CPT | Performed by: EMERGENCY MEDICINE

## 2018-10-08 PROCEDURE — 83605 ASSAY OF LACTIC ACID: CPT | Performed by: PHYSICIAN ASSISTANT

## 2018-10-08 PROCEDURE — 86140 C-REACTIVE PROTEIN: CPT | Performed by: PHYSICIAN ASSISTANT

## 2018-10-08 PROCEDURE — 85025 COMPLETE CBC W/AUTO DIFF WBC: CPT | Performed by: FAMILY MEDICINE

## 2018-10-08 PROCEDURE — 85652 RBC SED RATE AUTOMATED: CPT | Performed by: PHYSICIAN ASSISTANT

## 2018-10-08 PROCEDURE — 71046 X-RAY EXAM CHEST 2 VIEWS: CPT

## 2018-10-08 PROCEDURE — 93010 ELECTROCARDIOGRAM REPORT: CPT | Performed by: INTERNAL MEDICINE

## 2018-10-08 PROCEDURE — 94640 AIRWAY INHALATION TREATMENT: CPT

## 2018-10-08 RX ORDER — METHYLPREDNISOLONE SODIUM SUCCINATE 125 MG/2ML
125 INJECTION, POWDER, LYOPHILIZED, FOR SOLUTION INTRAMUSCULAR; INTRAVENOUS ONCE
Status: COMPLETED | OUTPATIENT
Start: 2018-10-08 | End: 2018-10-08

## 2018-10-08 RX ORDER — ASPIRIN 81 MG/1
324 TABLET, CHEWABLE ORAL ONCE
Status: COMPLETED | OUTPATIENT
Start: 2018-10-08 | End: 2018-10-08

## 2018-10-08 RX ORDER — IPRATROPIUM BROMIDE AND ALBUTEROL SULFATE 2.5; .5 MG/3ML; MG/3ML
3 SOLUTION RESPIRATORY (INHALATION) ONCE
Status: COMPLETED | OUTPATIENT
Start: 2018-10-08 | End: 2018-10-08

## 2018-10-08 RX ORDER — SODIUM CHLORIDE 0.9 % (FLUSH) 0.9 %
10 SYRINGE (ML) INJECTION AS NEEDED
Status: DISCONTINUED | OUTPATIENT
Start: 2018-10-08 | End: 2018-10-11 | Stop reason: HOSPADM

## 2018-10-08 RX ADMIN — ASPIRIN 324 MG: 81 TABLET, CHEWABLE ORAL at 21:02

## 2018-10-08 RX ADMIN — SODIUM CHLORIDE 1000 ML: 9 INJECTION, SOLUTION INTRAVENOUS at 21:46

## 2018-10-08 RX ADMIN — IPRATROPIUM BROMIDE AND ALBUTEROL SULFATE 3 ML: .5; 3 SOLUTION RESPIRATORY (INHALATION) at 21:43

## 2018-10-08 RX ADMIN — METHYLPREDNISOLONE SODIUM SUCCINATE 125 MG: 125 INJECTION, POWDER, FOR SOLUTION INTRAMUSCULAR; INTRAVENOUS at 21:47

## 2018-10-09 ENCOUNTER — APPOINTMENT (OUTPATIENT)
Dept: CT IMAGING | Facility: HOSPITAL | Age: 73
End: 2018-10-09

## 2018-10-09 PROBLEM — J18.9 PNEUMONIA: Status: ACTIVE | Noted: 2018-10-09

## 2018-10-09 LAB
ALBUMIN SERPL-MCNC: 3.9 G/DL (ref 3.4–4.8)
ALBUMIN/GLOB SERPL: 1.8 G/DL (ref 1.5–2.5)
ALP SERPL-CCNC: 65 U/L (ref 35–104)
ALT SERPL W P-5'-P-CCNC: 7 U/L (ref 10–36)
ANION GAP SERPL CALCULATED.3IONS-SCNC: 8.5 MMOL/L (ref 3.6–11.2)
AST SERPL-CCNC: 17 U/L (ref 10–30)
BACTERIA UR QL AUTO: ABNORMAL /HPF
BASOPHILS # BLD AUTO: 0.01 10*3/MM3 (ref 0–0.3)
BASOPHILS NFR BLD AUTO: 0.1 % (ref 0–2)
BILIRUB SERPL-MCNC: 0.4 MG/DL (ref 0.2–1.8)
BILIRUB UR QL STRIP: NEGATIVE
BUN BLD-MCNC: 16 MG/DL (ref 7–21)
BUN/CREAT SERPL: 18.8 (ref 7–25)
CALCIUM SPEC-SCNC: 8.3 MG/DL (ref 7.7–10)
CHLORIDE SERPL-SCNC: 112 MMOL/L (ref 99–112)
CLARITY UR: CLEAR
CO2 SERPL-SCNC: 21.5 MMOL/L (ref 24.3–31.9)
COLOR UR: YELLOW
CREAT BLD-MCNC: 0.85 MG/DL (ref 0.43–1.29)
DEPRECATED RDW RBC AUTO: 45.8 FL (ref 37–54)
EOSINOPHIL # BLD AUTO: 0 10*3/MM3 (ref 0–0.7)
EOSINOPHIL NFR BLD AUTO: 0 % (ref 0–7)
ERYTHROCYTE [DISTWIDTH] IN BLOOD BY AUTOMATED COUNT: 14.1 % (ref 11.5–14.5)
GFR SERPL CREATININE-BSD FRML MDRD: 65 ML/MIN/1.73
GLOBULIN UR ELPH-MCNC: 2.2 GM/DL
GLUCOSE BLD-MCNC: 135 MG/DL (ref 70–110)
GLUCOSE UR STRIP-MCNC: NEGATIVE MG/DL
HCT VFR BLD AUTO: 32.3 % (ref 37–47)
HGB BLD-MCNC: 10.8 G/DL (ref 12–16)
HGB UR QL STRIP.AUTO: NEGATIVE
HYALINE CASTS UR QL AUTO: ABNORMAL /LPF
IMM GRANULOCYTES # BLD: 0.08 10*3/MM3 (ref 0–0.03)
IMM GRANULOCYTES NFR BLD: 0.5 % (ref 0–0.5)
KETONES UR QL STRIP: NEGATIVE
L PNEUMO1 AG UR QL IA: NEGATIVE
LEUKOCYTE ESTERASE UR QL STRIP.AUTO: ABNORMAL
LYMPHOCYTES # BLD AUTO: 0.74 10*3/MM3 (ref 1–3)
LYMPHOCYTES NFR BLD AUTO: 4.7 % (ref 16–46)
M PNEUMO IGM SER QL: NEGATIVE
MCH RBC QN AUTO: 31.8 PG (ref 27–33)
MCHC RBC AUTO-ENTMCNC: 33.4 G/DL (ref 33–37)
MCV RBC AUTO: 95 FL (ref 80–94)
MONOCYTES # BLD AUTO: 0.11 10*3/MM3 (ref 0.1–0.9)
MONOCYTES NFR BLD AUTO: 0.7 % (ref 0–12)
NEUTROPHILS # BLD AUTO: 14.68 10*3/MM3 (ref 1.4–6.5)
NEUTROPHILS NFR BLD AUTO: 94 % (ref 40–75)
NITRITE UR QL STRIP: NEGATIVE
OSMOLALITY SERPL CALC.SUM OF ELEC: 286.3 MOSM/KG (ref 273–305)
PH UR STRIP.AUTO: <=5 [PH] (ref 5–8)
PLATELET # BLD AUTO: 132 10*3/MM3 (ref 130–400)
PMV BLD AUTO: 12.8 FL (ref 6–10)
POTASSIUM BLD-SCNC: 3.1 MMOL/L (ref 3.5–5.3)
PROT SERPL-MCNC: 6.1 G/DL (ref 6–8)
PROT UR QL STRIP: NEGATIVE
RBC # BLD AUTO: 3.4 10*6/MM3 (ref 4.2–5.4)
RBC # UR: ABNORMAL /HPF
REF LAB TEST METHOD: ABNORMAL
SODIUM BLD-SCNC: 142 MMOL/L (ref 135–153)
SP GR UR STRIP: 1.01 (ref 1–1.03)
SQUAMOUS #/AREA URNS HPF: ABNORMAL /HPF
TROPONIN I SERPL-MCNC: 0.03 NG/ML
TROPONIN I SERPL-MCNC: 0.04 NG/ML
UROBILINOGEN UR QL STRIP: ABNORMAL
WBC NRBC COR # BLD: 15.62 10*3/MM3 (ref 4.5–12.5)
WBC UR QL AUTO: ABNORMAL /HPF

## 2018-10-09 PROCEDURE — 94799 UNLISTED PULMONARY SVC/PX: CPT

## 2018-10-09 PROCEDURE — 25010000002 HEPARIN (PORCINE) PER 1000 UNITS: Performed by: INTERNAL MEDICINE

## 2018-10-09 PROCEDURE — 87899 AGENT NOS ASSAY W/OPTIC: CPT | Performed by: INTERNAL MEDICINE

## 2018-10-09 PROCEDURE — 81001 URINALYSIS AUTO W/SCOPE: CPT | Performed by: PHYSICIAN ASSISTANT

## 2018-10-09 PROCEDURE — 71250 CT THORAX DX C-: CPT | Performed by: RADIOLOGY

## 2018-10-09 PROCEDURE — 84484 ASSAY OF TROPONIN QUANT: CPT | Performed by: PHYSICIAN ASSISTANT

## 2018-10-09 PROCEDURE — 25010000002 VANCOMYCIN PER 500 MG

## 2018-10-09 PROCEDURE — 99223 1ST HOSP IP/OBS HIGH 75: CPT | Performed by: INTERNAL MEDICINE

## 2018-10-09 PROCEDURE — 25010000002 METHYLPREDNISOLONE PER 40 MG: Performed by: INTERNAL MEDICINE

## 2018-10-09 PROCEDURE — 80053 COMPREHEN METABOLIC PANEL: CPT | Performed by: INTERNAL MEDICINE

## 2018-10-09 PROCEDURE — 25010000002 VANCOMYCIN PER 500 MG: Performed by: PHYSICIAN ASSISTANT

## 2018-10-09 PROCEDURE — 86738 MYCOPLASMA ANTIBODY: CPT | Performed by: INTERNAL MEDICINE

## 2018-10-09 PROCEDURE — 85025 COMPLETE CBC W/AUTO DIFF WBC: CPT | Performed by: INTERNAL MEDICINE

## 2018-10-09 PROCEDURE — 25010000002 PIPERACILLIN-TAZOBACTAM: Performed by: PHYSICIAN ASSISTANT

## 2018-10-09 PROCEDURE — 25010000002 PIPERACILLIN-TAZOBACTAM: Performed by: INTERNAL MEDICINE

## 2018-10-09 PROCEDURE — 71250 CT THORAX DX C-: CPT

## 2018-10-09 RX ORDER — HEPARIN SODIUM 5000 [USP'U]/ML
5000 INJECTION, SOLUTION INTRAVENOUS; SUBCUTANEOUS EVERY 12 HOURS SCHEDULED
Status: DISCONTINUED | OUTPATIENT
Start: 2018-10-09 | End: 2018-10-11 | Stop reason: HOSPADM

## 2018-10-09 RX ORDER — IPRATROPIUM BROMIDE AND ALBUTEROL SULFATE 2.5; .5 MG/3ML; MG/3ML
3 SOLUTION RESPIRATORY (INHALATION)
Status: DISCONTINUED | OUTPATIENT
Start: 2018-10-09 | End: 2018-10-11 | Stop reason: HOSPADM

## 2018-10-09 RX ORDER — METHYLPREDNISOLONE SODIUM SUCCINATE 40 MG/ML
40 INJECTION, POWDER, LYOPHILIZED, FOR SOLUTION INTRAMUSCULAR; INTRAVENOUS EVERY 12 HOURS
Status: DISCONTINUED | OUTPATIENT
Start: 2018-10-09 | End: 2018-10-09

## 2018-10-09 RX ORDER — CETIRIZINE HYDROCHLORIDE 10 MG/1
5 TABLET ORAL DAILY
Status: DISCONTINUED | OUTPATIENT
Start: 2018-10-09 | End: 2018-10-11 | Stop reason: HOSPADM

## 2018-10-09 RX ORDER — BUSPIRONE HYDROCHLORIDE 5 MG/1
5 TABLET ORAL 2 TIMES DAILY
Status: DISCONTINUED | OUTPATIENT
Start: 2018-10-09 | End: 2018-10-11 | Stop reason: HOSPADM

## 2018-10-09 RX ORDER — SODIUM CHLORIDE 0.9 % (FLUSH) 0.9 %
3 SYRINGE (ML) INJECTION EVERY 12 HOURS SCHEDULED
Status: DISCONTINUED | OUTPATIENT
Start: 2018-10-09 | End: 2018-10-11 | Stop reason: HOSPADM

## 2018-10-09 RX ORDER — PREGABALIN 300 MG/1
300 CAPSULE ORAL 2 TIMES DAILY
Status: ON HOLD | COMMUNITY
End: 2021-01-01 | Stop reason: SDUPTHER

## 2018-10-09 RX ORDER — PANTOPRAZOLE SODIUM 40 MG/1
40 TABLET, DELAYED RELEASE ORAL EVERY MORNING
Status: DISCONTINUED | OUTPATIENT
Start: 2018-10-09 | End: 2018-10-11 | Stop reason: HOSPADM

## 2018-10-09 RX ORDER — PREGABALIN 75 MG/1
150 CAPSULE ORAL EVERY 12 HOURS SCHEDULED
Status: DISCONTINUED | OUTPATIENT
Start: 2018-10-09 | End: 2018-10-11 | Stop reason: HOSPADM

## 2018-10-09 RX ORDER — SODIUM CHLORIDE 9 MG/ML
100 INJECTION, SOLUTION INTRAVENOUS CONTINUOUS
Status: DISCONTINUED | OUTPATIENT
Start: 2018-10-09 | End: 2018-10-10

## 2018-10-09 RX ORDER — L.ACID,PARA/B.BIFIDUM/S.THERM 8B CELL
1 CAPSULE ORAL DAILY
Status: DISCONTINUED | OUTPATIENT
Start: 2018-10-09 | End: 2018-10-11 | Stop reason: HOSPADM

## 2018-10-09 RX ORDER — OMEPRAZOLE 40 MG/1
40 CAPSULE, DELAYED RELEASE ORAL DAILY
COMMUNITY
End: 2021-01-01

## 2018-10-09 RX ORDER — METOPROLOL TARTRATE 50 MG/1
50 TABLET, FILM COATED ORAL EVERY 12 HOURS SCHEDULED
Status: DISCONTINUED | OUTPATIENT
Start: 2018-10-09 | End: 2018-10-09 | Stop reason: SDUPTHER

## 2018-10-09 RX ORDER — BACLOFEN 10 MG/1
10 TABLET ORAL DAILY PRN
Status: DISCONTINUED | OUTPATIENT
Start: 2018-10-09 | End: 2018-10-11 | Stop reason: HOSPADM

## 2018-10-09 RX ORDER — MEGESTROL ACETATE 40 MG/ML
800 SUSPENSION ORAL DAILY
Status: CANCELLED | OUTPATIENT
Start: 2018-10-09

## 2018-10-09 RX ORDER — CALCIUM POLYCARBOPHIL 625 MG 625 MG/1
625 TABLET ORAL DAILY
Status: DISCONTINUED | OUTPATIENT
Start: 2018-10-09 | End: 2018-10-09 | Stop reason: CLARIF

## 2018-10-09 RX ORDER — MEGESTROL ACETATE 40 MG/ML
40 SUSPENSION ORAL DAILY
Status: ON HOLD | COMMUNITY
End: 2019-11-07

## 2018-10-09 RX ORDER — METOPROLOL TARTRATE 50 MG/1
50 TABLET, FILM COATED ORAL EVERY 12 HOURS SCHEDULED
Status: DISCONTINUED | OUTPATIENT
Start: 2018-10-09 | End: 2018-10-11 | Stop reason: HOSPADM

## 2018-10-09 RX ORDER — ASPIRIN 81 MG/1
81 TABLET ORAL DAILY
Status: DISCONTINUED | OUTPATIENT
Start: 2018-10-09 | End: 2018-10-11 | Stop reason: HOSPADM

## 2018-10-09 RX ORDER — BUSPIRONE HYDROCHLORIDE 5 MG/1
2.5 TABLET ORAL 2 TIMES DAILY
COMMUNITY

## 2018-10-09 RX ORDER — HYDROCODONE BITARTRATE AND ACETAMINOPHEN 7.5; 325 MG/1; MG/1
1 TABLET ORAL 3 TIMES DAILY PRN
Status: DISCONTINUED | OUTPATIENT
Start: 2018-10-09 | End: 2018-10-11 | Stop reason: HOSPADM

## 2018-10-09 RX ORDER — SODIUM CHLORIDE 0.9 % (FLUSH) 0.9 %
3-10 SYRINGE (ML) INJECTION AS NEEDED
Status: DISCONTINUED | OUTPATIENT
Start: 2018-10-09 | End: 2018-10-11 | Stop reason: HOSPADM

## 2018-10-09 RX ORDER — POTASSIUM CHLORIDE 20 MEQ/1
40 TABLET, EXTENDED RELEASE ORAL ONCE
Status: COMPLETED | OUTPATIENT
Start: 2018-10-09 | End: 2018-10-09

## 2018-10-09 RX ORDER — HYDROXYCHLOROQUINE SULFATE 200 MG/1
200 TABLET, FILM COATED ORAL DAILY
Status: DISCONTINUED | OUTPATIENT
Start: 2018-10-09 | End: 2018-10-11 | Stop reason: HOSPADM

## 2018-10-09 RX ORDER — HYDROXYCHLOROQUINE SULFATE 200 MG/1
200 TABLET, FILM COATED ORAL DAILY
COMMUNITY
End: 2019-11-12 | Stop reason: HOSPADM

## 2018-10-09 RX ADMIN — CETIRIZINE HYDROCHLORIDE 5 MG: 10 TABLET, FILM COATED ORAL at 13:55

## 2018-10-09 RX ADMIN — PIPERACILLIN SODIUM,TAZOBACTAM SODIUM 3.38 G: 3; .375 INJECTION, POWDER, FOR SOLUTION INTRAVENOUS at 17:11

## 2018-10-09 RX ADMIN — PREGABALIN 150 MG: 75 CAPSULE ORAL at 20:05

## 2018-10-09 RX ADMIN — PIPERACILLIN SODIUM,TAZOBACTAM SODIUM 3.38 G: 3; .375 INJECTION, POWDER, FOR SOLUTION INTRAVENOUS at 09:58

## 2018-10-09 RX ADMIN — IPRATROPIUM BROMIDE AND ALBUTEROL SULFATE 3 ML: .5; 3 SOLUTION RESPIRATORY (INHALATION) at 07:54

## 2018-10-09 RX ADMIN — BUSPIRONE HYDROCHLORIDE 5 MG: 5 TABLET ORAL at 20:05

## 2018-10-09 RX ADMIN — METHYLPREDNISOLONE SODIUM SUCCINATE 40 MG: 40 INJECTION, POWDER, FOR SOLUTION INTRAMUSCULAR; INTRAVENOUS at 09:56

## 2018-10-09 RX ADMIN — METHYLPREDNISOLONE SODIUM SUCCINATE 40 MG: 40 INJECTION, POWDER, FOR SOLUTION INTRAMUSCULAR; INTRAVENOUS at 20:05

## 2018-10-09 RX ADMIN — PIPERACILLIN SODIUM,TAZOBACTAM SODIUM 3.38 G: 3; .375 INJECTION, POWDER, FOR SOLUTION INTRAVENOUS at 01:20

## 2018-10-09 RX ADMIN — SODIUM CHLORIDE 100 ML/HR: 9 INJECTION, SOLUTION INTRAVENOUS at 09:58

## 2018-10-09 RX ADMIN — PANTOPRAZOLE SODIUM 40 MG: 40 TABLET, DELAYED RELEASE ORAL at 13:55

## 2018-10-09 RX ADMIN — HEPARIN SODIUM 5000 UNITS: 5000 INJECTION, SOLUTION INTRAVENOUS; SUBCUTANEOUS at 20:05

## 2018-10-09 RX ADMIN — ASPIRIN 81 MG: 81 TABLET ORAL at 13:55

## 2018-10-09 RX ADMIN — HEPARIN SODIUM 5000 UNITS: 5000 INJECTION, SOLUTION INTRAVENOUS; SUBCUTANEOUS at 09:56

## 2018-10-09 RX ADMIN — VANCOMYCIN HYDROCHLORIDE 1000 MG: 5 INJECTION, POWDER, LYOPHILIZED, FOR SOLUTION INTRAVENOUS at 20:04

## 2018-10-09 RX ADMIN — Medication 3 ML: at 20:07

## 2018-10-09 RX ADMIN — POTASSIUM CHLORIDE 40 MEQ: 1500 TABLET, EXTENDED RELEASE ORAL at 09:56

## 2018-10-09 RX ADMIN — SODIUM CHLORIDE 100 ML/HR: 9 INJECTION, SOLUTION INTRAVENOUS at 20:04

## 2018-10-09 RX ADMIN — VANCOMYCIN HYDROCHLORIDE 1000 MG: 5 INJECTION, POWDER, LYOPHILIZED, FOR SOLUTION INTRAVENOUS at 02:00

## 2018-10-09 RX ADMIN — IPRATROPIUM BROMIDE AND ALBUTEROL SULFATE 3 ML: .5; 3 SOLUTION RESPIRATORY (INHALATION) at 13:00

## 2018-10-09 RX ADMIN — Medication 1 CAPSULE: at 13:55

## 2018-10-09 RX ADMIN — Medication 3 ML: at 10:00

## 2018-10-09 RX ADMIN — IPRATROPIUM BROMIDE AND ALBUTEROL SULFATE 3 ML: .5; 3 SOLUTION RESPIRATORY (INHALATION) at 18:41

## 2018-10-09 RX ADMIN — METOPROLOL TARTRATE 50 MG: 50 TABLET, FILM COATED ORAL at 20:05

## 2018-10-09 RX ADMIN — BUSPIRONE HYDROCHLORIDE 5 MG: 5 TABLET ORAL at 13:56

## 2018-10-09 RX ADMIN — HYDROXYCHLOROQUINE SULFATE 200 MG: 200 TABLET ORAL at 13:55

## 2018-10-09 RX ADMIN — METOPROLOL TARTRATE 50 MG: 50 TABLET, FILM COATED ORAL at 09:56

## 2018-10-09 NOTE — PLAN OF CARE
Problem: Activity Intolerance (Adult)  Goal: Effective Energy Conservation Techniques  Outcome: Ongoing (interventions implemented as appropriate)   10/09/18 0651   Activity Intolerance (Adult)   Effective Energy Conservation Techniques making progress toward outcome

## 2018-10-09 NOTE — ED NOTES
RAEANN HENSLEY is on the line with Dr. Espinosa.     Sym[place], [facility]  10/09/18 8801

## 2018-10-09 NOTE — PLAN OF CARE
Problem: Skin Injury Risk (Adult)  Goal: Identify Related Risk Factors and Signs and Symptoms  Outcome: Outcome(s) achieved Date Met: 10/09/18    Goal: Skin Health and Integrity  Outcome: Ongoing (interventions implemented as appropriate)      Problem: Fall Risk (Adult)  Goal: Identify Related Risk Factors and Signs and Symptoms  Outcome: Outcome(s) achieved Date Met: 10/09/18    Goal: Absence of Fall  Outcome: Ongoing (interventions implemented as appropriate)      Problem: Pain, Chronic (Adult)  Goal: Identify Related Risk Factors and Signs and Symptoms  Outcome: Outcome(s) achieved Date Met: 10/09/18    Goal: Acceptable Pain/Comfort Level and Functional Ability  Outcome: Ongoing (interventions implemented as appropriate)      Problem: Activity Intolerance (Adult)  Goal: Identify Related Risk Factors and Signs and Symptoms  Outcome: Outcome(s) achieved Date Met: 10/09/18    Goal: Activity Tolerance  Outcome: Ongoing (interventions implemented as appropriate)    Goal: Effective Energy Conservation Techniques  Outcome: Ongoing (interventions implemented as appropriate)      Problem: Patient Care Overview  Goal: Plan of Care Review  Outcome: Ongoing (interventions implemented as appropriate)    Goal: Individualization and Mutuality  Outcome: Ongoing (interventions implemented as appropriate)

## 2018-10-09 NOTE — PROGRESS NOTES
Nutrition Services    Patient Name:  Kiah Conway  YOB: 1945  MRN: [id number]  Admit Date:  10/8/2018    Patient reports home med of megace recently started secondary to weight loss and poor appetitie - dislikes supplements - recommend MD evaluate for med order    Electronically signed by:  Kezia Gonzáles RD  10/09/18 4:25 PM

## 2018-10-09 NOTE — PROGRESS NOTES
Discharge Planning Assessment   [place]     Patient Name: Kiah Conway  MRN: [id number]  Today's Date: 10/9/2018    Admit Date: 10/8/2018          Discharge Needs Assessment     Row Name 10/09/18 0955       Living Environment    Lives With alone    Name(s) of Who Lives With Patient Pt says she lives alone and plans to return home at d/c.     Family Caregiver if Needed child(wendy), adult    Family Caregiver Names Pt says her daughter, Eunice Sharif, will be able to stay with her after d/c, if needed.    Quality of Family Relationships helpful;involved;supportive    Able to Return to Prior Arrangements yes       Transition Planning    Patient/Family Anticipates Transition to home    Transportation Anticipated family or friend will provide       Discharge Needs Assessment    Readmission Within the Last 30 Days no previous admission in last 30 days    Equipment Currently Used at Home cane, yvonne   Pt says she has a cane that she purchased at a yard sale.  She uses it PRN.    Equipment Needed After Discharge none    Patient's Choice of Community Agency(s) Pt says she has had HH in the past but does not utilize their services at this time.            Discharge Plan     Row Name 10/09/18 0955       Plan    Plan Pt plans to return home at d/c and her daughter, Eunice, will stay with her if needed.  She has a cane and does not utilize HH services.  She does not drive and her daughter will provide transportation home and to f/u appts.  She has Medicare A&B, Medicaid and [employer] for Rx and gets her Rx filled at [facility]. Ct in pharmacy to check on any available assistance with Rx co-pays.  She sees MERRY Felix as her PCP and sees oncologist at [facility].  She says her next appt is in early November and she will be due for chemo treatment then.      Patient/Family in Agreement with Plan yes           Demographic Summary     Row Name 10/09/18 0955       General Information     Admission Type inpatient    Arrived From emergency department    Referral Source admission list    General Information Comments She sees MERRY Felix, as her PCP.  She also sees oncologist at [facility]; Dr Luciano for GI and Dr Aguilar for cardiology.           Legal     Row Name 10/09/18 0955       Financial/Legal    Finance Comments She has Medicare A&B, [place] Medicaid and says she has [employer] for Rx coverage.  She gets her Rx filled at [facility].  She states she has some Rx that have high co-pay but she cannot remember which ones.  FERNIE spoke with Ct in pharmacy ext [phone] and she will see if there are any assistance options for pt.         Francheska Leos RN

## 2018-10-09 NOTE — PHARMACY RECOMMENDATION
Pharmacy Kinetic Note  Vancomycin and zosyn initiated for the treatment of pneumonia. Based on patient and population kinetic dosing parameters, will dose vancomycin at 1gm IV q24h. Will order trough when steady state is achieved.  Thank You,  Alejandra VenturaD

## 2018-10-09 NOTE — H&P
Hospitalist History and Physical    Patient Identification:  Name: Kiah Conway  Age/Sex: 73 y.o. female  :  1945  MRN: [id number]         Primary Care Physician: Aundrea Headley APRN    Chief Complaint   Patient presents with   • Shortness of Breath       History of Present Illness  Patient is a 73 y.o. female presents with the following: cough and shortness of breath    The patient is a pleasant 74 yo female with PMHx significant for follicular lymphoma, currently in remission, GERD and history of thyroid cancer who presents with a 3 day history of shortness of breath, cough and vomiting.     The patient states she has had a dry cough with shortness of breath. She also reports vomiting. She denies any diarrhea. She denies urinary symptoms. She reports that she was hospitalized at [facility] (details unknown) in 2018.     She denies any chest pain. She denies any dysphagia. The patient was found to have a RML/RLL infiltrate on CT imaging. WBC was elevated at 19.46. Creatinine was slightly elevated from baseline at 1.39 (baseline appears to be 0.6-0.7). The patient has been admitted to the medical surgical floor for further evaluation and management.    Present during visit: the patient's daughter    Past History:  Past Medical History:   Diagnosis Date   • Arthritis    • Cardiac disorder    • Diverticulitis    • Dysphagia    • Dysuria    • Fibromyalgia    • GERD (gastroesophageal reflux disease)    • History of rheumatic fever    • Hypertension    • Hypokalemia due to inadequate potassium intake 2014   • Lipoma    • Malignant neoplasm (CMS/HCC)    • Migraine    • Recurrent UTI    • Sleep apnea    • Thyroid cancer (CMS/HCC)    • UTI (urinary tract infection)      Past Surgical History:   Procedure Laterality Date   • BLADDER SURGERY     • HYSTERECTOMY     • THYROID SURGERY       Family History   Problem Relation Age of Onset   • Diabetes Mother    • Hypertension Mother    • Other Other          cardiac disorder,cardiovascular disease, malignant neoplasm of brain   • Breast cancer Paternal Aunt      Social History   Substance Use Topics   • Smoking status: Never Smoker   • Smokeless tobacco: Never Used   • Alcohol use No     Allergies: Patient has no known allergies.    Review of Systems:  Review of Systems   Constitutional: Positive for fever. Negative for chills and diaphoresis.   HENT: Negative for hearing loss, tinnitus and trouble swallowing.    Eyes: Negative for photophobia, discharge and visual disturbance.   Respiratory: Positive for cough and shortness of breath. Negative for wheezing.    Cardiovascular: Negative for chest pain, palpitations and leg swelling.   Gastrointestinal: Positive for vomiting. Negative for abdominal pain, constipation, diarrhea and nausea.   Endocrine: Negative for polydipsia, polyphagia and polyuria.   Genitourinary: Negative for dysuria, frequency and hematuria.   Musculoskeletal: Negative for gait problem, myalgias and neck pain.   Skin: Negative for rash and wound.   Neurological: Negative for dizziness, tremors, seizures, syncope, weakness and light-headedness.   Hematological: Does not bruise/bleed easily.   Psychiatric/Behavioral: Negative for confusion, hallucinations and suicidal ideas.      Vital Signs  Temp:  [98.3 °F (36.8 °C)-99.4 °F (37.4 °C)] 98.8 °F (37.1 °C)  Heart Rate:  [78-95] 81  Resp:  [12-18] 16  BP: (112-156)/() 137/70  Body mass index is 21.48 kg/m².    Physical Exam:  Physical Exam   Constitutional: She is oriented to person, place, and time. She appears well-developed and well-nourished. No distress.   HENT:   Head: Normocephalic and atraumatic.   Mouth/Throat: Oropharynx is clear and moist.   Eyes: Pupils are equal, round, and reactive to light. Conjunctivae and EOM are normal.   Neck: Neck supple. No tracheal deviation present. No thyromegaly present.   Cardiovascular: Normal rate and regular rhythm.  Exam reveals no gallop and no  friction rub.    No murmur heard.  Pulmonary/Chest: No respiratory distress. She has no wheezes. She has rales in the right middle field and the right lower field.   Abdominal: Soft. Bowel sounds are normal. She exhibits no distension. There is no tenderness. There is no guarding.   Musculoskeletal: She exhibits no tenderness.   Neurological: She is alert and oriented to person, place, and time. No cranial nerve deficit.   Skin: Skin is warm and dry. No rash noted. No erythema.   Psychiatric: She has a normal mood and affect.     Results Review:      Results from last 7 days  Lab Units 10/08/18  2005   WBC 10*3/mm3 19.46*   HEMOGLOBIN g/dL 11.6*   HEMATOCRIT % 34.1*   PLATELETS 10*3/mm3 153       Results from last 7 days  Lab Units 10/08/18  2005   SODIUM mmol/L 139   POTASSIUM mmol/L 3.5   CHLORIDE mmol/L 105   CO2 mmol/L 22.7*   BUN mg/dL 20   CREATININE mg/dL 1.39*   CALCIUM mg/dL 8.4   GLUCOSE mg/dL 104       Results from last 7 days  Lab Units 10/08/18  2005   BILIRUBIN mg/dL 0.5   ALK PHOS U/L 68   AST (SGOT) U/L 20   ALT (SGPT) U/L 7*       Results from last 7 days  Lab Units 10/08/18  2005   CRP mg/dL 12.96*           Results from last 7 days  Lab Units 10/09/18  0416 10/08/18  2219 10/08/18  2005   CK TOTAL U/L  --   --  80   TROPONIN I ng/mL 0.035 0.050* 0.065*   CK MB INDEX %  --   --  1.1       Imaging:    I have personally reviewed the EKG. Pending official cardiology interpretation; per my view: NSR; QTc 483 m/s.    Imaging Results (most recent)     Procedure Component Value Units Date/Time    CT Chest Without Contrast [[id number]] Updated:  10/09/18 0237    XR Chest 2 View [[id number]] Updated:  10/08/18 2030        *Pending official radiology report; per my view, CT scan reveals RML/RLL infiltrates. CXR was unremarkable.    Assessment/Plan     -Suspect RML and RLL pneumonia; technically patient still in window for HCAP given hospitalization at [facility] 7/23/18-7/29/18 and also in the setting of follicular  lymphoma  -Mild troponin elevation that has resolved and likely due to above  -Essential hypertension that is slightly uncontrolled  -Mild QT prolongation, 483 m/s  -Acute kidney injury with creatinine 1.39 and baseline creatinine approximately 0.6-0.7  -Mild macrocytic anemia  -History of follicular lymphoma, reportedly in remission per patient report  -Gastroesophageal reflux disease    The patient has been admitted to the medical surgical floor with telemetry.  Continue broad-spectrum IV antibiotics with pharmacy to dose vancomycin and Zosyn.  She has been placed on IV Solu-Medrol and scheduled nebulized inhalants.  Blood cultures were obtained in the emergency department.  I have requested a respiratory culture and testing for mycoplasma and Legionella.  We will follow on the official CT scan results.  Continue to trend her temperature curve and repeat her CBC and CRP in the morning.    Await the patient's home medication reconciliation to evaluate her antihypertensive medications.  The patient received a 1 L bolus in the emergency department.  We will continue with gentle IV fluid hydration and monitor her blood pressure closely.    Repeat the patient's labs in the morning and continue to monitor on telemetry.    DVT prophylaxis: Subcutaneous heparin    Estimated Length of Stay: > 2 MNs    CODE: FULL/CPR - for now. Patient wishes to think about this and discuss with her daughter.     Patient is considered to be high risk due to: Pneumonia, Immunocompromised state    I discussed the patients findings and my recommendations with patient and family      Yola Espinosa DO  10/09/18  5:31 AM

## 2018-10-09 NOTE — ED PROVIDER NOTES
Subjective   74 y/o female presents to the ED for SOB and cough for 3 days. Patient stated that she thought it was just allergies but stated today had one episode of vomiting and her BP has been running low. Patient stated that her BP was 86/40 and her temp was 99.5. Patient stated that she called her PCP but they could not get her in so they ordered an outpatient chest xray at diagnostic center.  Patient has follicular lymphoma, currently in remission for 6 weeks. She is taking chemotherapy q 3 months at [facility] with Dr. Mckeon.  Patient is due for next round of chemo in November.        History provided by:  Patient   used: No    Shortness of Breath   Severity:  Moderate  Onset quality:  Sudden  Duration:  2 days  Timing:  Constant  Progression:  Worsening  Chronicity:  New  Relieved by:  Nothing  Worsened by:  Nothing  Ineffective treatments:  None tried  Associated symptoms: cough and fever        Review of Systems   Constitutional: Positive for fever.   HENT: Negative.    Eyes: Negative.    Respiratory: Positive for cough and shortness of breath.    Cardiovascular: Negative.    Gastrointestinal: Negative.    Endocrine: Negative.    Genitourinary: Negative.    Musculoskeletal: Negative.    Skin: Negative.    Allergic/Immunologic: Negative.    Neurological: Negative.    Hematological: Negative.    Psychiatric/Behavioral: Negative.    All other systems reviewed and are negative.      Past Medical History:   Diagnosis Date   • Arthritis    • Cardiac disorder    • Diverticulitis    • Dysphagia    • Dysuria    • Fibromyalgia    • GERD (gastroesophageal reflux disease)    • History of rheumatic fever    • Hypertension    • Hypokalemia due to inadequate potassium intake 03/03/2014   • Lipoma    • Malignant neoplasm (CMS/HCC)    • Migraine    • Recurrent UTI    • Sleep apnea    • Thyroid cancer (CMS/HCC)    • UTI (urinary tract infection)        No Known Allergies    Past Surgical  History:   Procedure Laterality Date   • BLADDER SURGERY     • HYSTERECTOMY     • THYROID SURGERY         Family History   Problem Relation Age of Onset   • Diabetes Mother    • Hypertension Mother    • Other Other         cardiac disorder,cardiovascular disease, malignant neoplasm of brain   • Breast cancer Paternal Aunt        Social History     Social History   • Marital status:      Social History Main Topics   • Smoking status: Never Smoker   • Smokeless tobacco: Never Used   • Alcohol use No   • Drug use: No     Other Topics Concern   • Not on file           Objective   Physical Exam   Constitutional: She is oriented to person, place, and time. She appears well-developed and well-nourished.   HENT:   Head: Normocephalic and atraumatic.   Right Ear: External ear normal.   Left Ear: External ear normal.   Nose: Nose normal.   Mouth/Throat: Oropharynx is clear and moist.   Eyes: Pupils are equal, round, and reactive to light. Conjunctivae and EOM are normal.   Neck: Normal range of motion. Neck supple.   Cardiovascular: Normal rate, regular rhythm, normal heart sounds and intact distal pulses.    Pulmonary/Chest: Effort normal and breath sounds normal.   Abdominal: Soft. Bowel sounds are normal.   Musculoskeletal: Normal range of motion.   Neurological: She is alert and oriented to person, place, and time.   Skin: Skin is warm and dry.   Nursing note and vitals reviewed.      Procedures           ED Course  ED Course as of Oct 09 0552   Mon Oct 08, 2018   2102 Negative per Dr. Nova  XR Chest 2 View [ML]   2117 1929- Reviewed by Dr. Nova, rate 82, NSR, no ischemia  ECG 12 Lead [ML]   Tue Oct 09, 2018   0113 D/W Francisca - will get CT chest without contrast.    [ML]   0443 Slightly nodular scattered pulmonary opacities compatible with pneumonia/small airways disease most prominent in the right lower and middle lobes per [facility]  CT Chest Without Contrast [ML]   0513 D/W Dr. Espinosa - she will admit  [ML]       ED Course User Index  [ML] Amy Jasso PA                  [facility]      Final diagnoses:   Pneumonia of right lung due to infectious organism, unspecified part of lung            Amy Jasso PA  10/09/18 0552

## 2018-10-10 LAB
ANION GAP SERPL CALCULATED.3IONS-SCNC: 12.5 MMOL/L (ref 3.6–11.2)
BASOPHILS # BLD AUTO: 0 10*3/MM3 (ref 0–0.3)
BASOPHILS NFR BLD AUTO: 0 % (ref 0–2)
BUN BLD-MCNC: 14 MG/DL (ref 7–21)
BUN/CREAT SERPL: 18.9 (ref 7–25)
CALCIUM SPEC-SCNC: 7.7 MG/DL (ref 7.7–10)
CHLORIDE SERPL-SCNC: 113 MMOL/L (ref 99–112)
CO2 SERPL-SCNC: 18.5 MMOL/L (ref 24.3–31.9)
CREAT BLD-MCNC: 0.74 MG/DL (ref 0.43–1.29)
CRP SERPL-MCNC: 13.59 MG/DL (ref 0–0.99)
D-LACTATE SERPL-SCNC: 1.8 MMOL/L (ref 0.5–2)
D-LACTATE SERPL-SCNC: 2.1 MMOL/L (ref 0.5–2)
DEPRECATED RDW RBC AUTO: 46.7 FL (ref 37–54)
EOSINOPHIL # BLD AUTO: 0.01 10*3/MM3 (ref 0–0.7)
EOSINOPHIL NFR BLD AUTO: 0.1 % (ref 0–7)
ERYTHROCYTE [DISTWIDTH] IN BLOOD BY AUTOMATED COUNT: 14.3 % (ref 11.5–14.5)
GFR SERPL CREATININE-BSD FRML MDRD: 77 ML/MIN/1.73
GLUCOSE BLD-MCNC: 139 MG/DL (ref 70–110)
HCT VFR BLD AUTO: 32.5 % (ref 37–47)
HGB BLD-MCNC: 10.7 G/DL (ref 12–16)
HOLD SPECIMEN: NORMAL
IMM GRANULOCYTES # BLD: 0.05 10*3/MM3 (ref 0–0.03)
IMM GRANULOCYTES NFR BLD: 0.4 % (ref 0–0.5)
LYMPHOCYTES # BLD AUTO: 0.5 10*3/MM3 (ref 1–3)
LYMPHOCYTES NFR BLD AUTO: 3.9 % (ref 16–46)
MCH RBC QN AUTO: 31.8 PG (ref 27–33)
MCHC RBC AUTO-ENTMCNC: 32.9 G/DL (ref 33–37)
MCV RBC AUTO: 96.4 FL (ref 80–94)
MONOCYTES # BLD AUTO: 0.4 10*3/MM3 (ref 0.1–0.9)
MONOCYTES NFR BLD AUTO: 3.1 % (ref 0–12)
NEUTROPHILS # BLD AUTO: 11.91 10*3/MM3 (ref 1.4–6.5)
NEUTROPHILS NFR BLD AUTO: 92.5 % (ref 40–75)
OSMOLALITY SERPL CALC.SUM OF ELEC: 289.6 MOSM/KG (ref 273–305)
PLATELET # BLD AUTO: 149 10*3/MM3 (ref 130–400)
PMV BLD AUTO: 12.8 FL (ref 6–10)
POTASSIUM BLD-SCNC: 3.5 MMOL/L (ref 3.5–5.3)
RBC # BLD AUTO: 3.37 10*6/MM3 (ref 4.2–5.4)
SODIUM BLD-SCNC: 144 MMOL/L (ref 135–153)
WBC NRBC COR # BLD: 12.87 10*3/MM3 (ref 4.5–12.5)

## 2018-10-10 PROCEDURE — 25010000002 VANCOMYCIN PER 500 MG

## 2018-10-10 PROCEDURE — 80048 BASIC METABOLIC PNL TOTAL CA: CPT | Performed by: HOSPITALIST

## 2018-10-10 PROCEDURE — 85025 COMPLETE CBC W/AUTO DIFF WBC: CPT | Performed by: HOSPITALIST

## 2018-10-10 PROCEDURE — 94799 UNLISTED PULMONARY SVC/PX: CPT

## 2018-10-10 PROCEDURE — 99232 SBSQ HOSP IP/OBS MODERATE 35: CPT | Performed by: HOSPITALIST

## 2018-10-10 PROCEDURE — 86140 C-REACTIVE PROTEIN: CPT | Performed by: INTERNAL MEDICINE

## 2018-10-10 PROCEDURE — 83605 ASSAY OF LACTIC ACID: CPT | Performed by: HOSPITALIST

## 2018-10-10 PROCEDURE — 25010000002 PIPERACILLIN-TAZOBACTAM: Performed by: INTERNAL MEDICINE

## 2018-10-10 PROCEDURE — 25010000002 HEPARIN (PORCINE) PER 1000 UNITS: Performed by: INTERNAL MEDICINE

## 2018-10-10 RX ORDER — POTASSIUM CHLORIDE 20 MEQ/1
40 TABLET, EXTENDED RELEASE ORAL ONCE
Status: COMPLETED | OUTPATIENT
Start: 2018-10-10 | End: 2018-10-10

## 2018-10-10 RX ORDER — AMLODIPINE BESYLATE 5 MG/1
5 TABLET ORAL
Status: DISCONTINUED | OUTPATIENT
Start: 2018-10-10 | End: 2018-10-11 | Stop reason: HOSPADM

## 2018-10-10 RX ORDER — METOPROLOL TARTRATE 50 MG/1
50 TABLET, FILM COATED ORAL EVERY 12 HOURS SCHEDULED
Status: DISCONTINUED | OUTPATIENT
Start: 2018-10-10 | End: 2018-10-10

## 2018-10-10 RX ORDER — SODIUM CHLORIDE 9 MG/ML
50 INJECTION, SOLUTION INTRAVENOUS ONCE
Status: COMPLETED | OUTPATIENT
Start: 2018-10-10 | End: 2018-10-10

## 2018-10-10 RX ORDER — METOPROLOL TARTRATE 5 MG/5ML
5 INJECTION INTRAVENOUS EVERY 6 HOURS PRN
Status: DISCONTINUED | OUTPATIENT
Start: 2018-10-10 | End: 2018-10-11 | Stop reason: HOSPADM

## 2018-10-10 RX ADMIN — IPRATROPIUM BROMIDE AND ALBUTEROL SULFATE 3 ML: .5; 3 SOLUTION RESPIRATORY (INHALATION) at 18:57

## 2018-10-10 RX ADMIN — PANTOPRAZOLE SODIUM 40 MG: 40 TABLET, DELAYED RELEASE ORAL at 03:55

## 2018-10-10 RX ADMIN — SODIUM CHLORIDE 50 ML/HR: 9 INJECTION, SOLUTION INTRAVENOUS at 18:29

## 2018-10-10 RX ADMIN — HYDROXYCHLOROQUINE SULFATE 200 MG: 200 TABLET ORAL at 09:23

## 2018-10-10 RX ADMIN — PIPERACILLIN SODIUM,TAZOBACTAM SODIUM 3.38 G: 3; .375 INJECTION, POWDER, FOR SOLUTION INTRAVENOUS at 01:29

## 2018-10-10 RX ADMIN — Medication 1 CAPSULE: at 09:23

## 2018-10-10 RX ADMIN — IPRATROPIUM BROMIDE AND ALBUTEROL SULFATE 3 ML: .5; 3 SOLUTION RESPIRATORY (INHALATION) at 13:58

## 2018-10-10 RX ADMIN — BUSPIRONE HYDROCHLORIDE 5 MG: 5 TABLET ORAL at 20:47

## 2018-10-10 RX ADMIN — IPRATROPIUM BROMIDE AND ALBUTEROL SULFATE 3 ML: .5; 3 SOLUTION RESPIRATORY (INHALATION) at 07:43

## 2018-10-10 RX ADMIN — PIPERACILLIN SODIUM,TAZOBACTAM SODIUM 3.38 G: 3; .375 INJECTION, POWDER, FOR SOLUTION INTRAVENOUS at 16:58

## 2018-10-10 RX ADMIN — AMLODIPINE BESYLATE 5 MG: 5 TABLET ORAL at 11:36

## 2018-10-10 RX ADMIN — METOPROLOL TARTRATE 50 MG: 50 TABLET, FILM COATED ORAL at 20:47

## 2018-10-10 RX ADMIN — VANCOMYCIN HYDROCHLORIDE 1000 MG: 5 INJECTION, POWDER, LYOPHILIZED, FOR SOLUTION INTRAVENOUS at 20:47

## 2018-10-10 RX ADMIN — PIPERACILLIN SODIUM,TAZOBACTAM SODIUM 3.38 G: 3; .375 INJECTION, POWDER, FOR SOLUTION INTRAVENOUS at 09:25

## 2018-10-10 RX ADMIN — METOPROLOL TARTRATE 50 MG: 50 TABLET, FILM COATED ORAL at 09:24

## 2018-10-10 RX ADMIN — POTASSIUM CHLORIDE 40 MEQ: 1500 TABLET, EXTENDED RELEASE ORAL at 11:36

## 2018-10-10 RX ADMIN — PREGABALIN 150 MG: 75 CAPSULE ORAL at 20:47

## 2018-10-10 RX ADMIN — PREGABALIN 150 MG: 75 CAPSULE ORAL at 09:23

## 2018-10-10 RX ADMIN — METOPROLOL TARTRATE 5 MG: 1 INJECTION, SOLUTION INTRAVENOUS at 15:27

## 2018-10-10 RX ADMIN — CETIRIZINE HYDROCHLORIDE 5 MG: 10 TABLET, FILM COATED ORAL at 09:23

## 2018-10-10 RX ADMIN — HEPARIN SODIUM 5000 UNITS: 5000 INJECTION, SOLUTION INTRAVENOUS; SUBCUTANEOUS at 20:47

## 2018-10-10 RX ADMIN — ASPIRIN 81 MG: 81 TABLET ORAL at 09:23

## 2018-10-10 RX ADMIN — HYDROCODONE BITARTRATE AND ACETAMINOPHEN 1 TABLET: 7.5; 325 TABLET ORAL at 03:55

## 2018-10-10 RX ADMIN — HYDROCODONE BITARTRATE AND ACETAMINOPHEN 1 TABLET: 7.5; 325 TABLET ORAL at 20:47

## 2018-10-10 RX ADMIN — METOPROLOL TARTRATE 5 MG: 1 INJECTION, SOLUTION INTRAVENOUS at 06:10

## 2018-10-10 RX ADMIN — Medication 3 ML: at 09:25

## 2018-10-10 RX ADMIN — BUSPIRONE HYDROCHLORIDE 5 MG: 5 TABLET ORAL at 09:23

## 2018-10-10 RX ADMIN — HEPARIN SODIUM 5000 UNITS: 5000 INJECTION, SOLUTION INTRAVENOUS; SUBCUTANEOUS at 09:25

## 2018-10-10 NOTE — PROGRESS NOTES
[facility] HOSPITALIST PROGRESS NOTE     Patient Identification:  Name:  Kiah Conway  Age:  73 y.o.  Sex:  female  :  1945  MRN:  [id number]  Visit Number:  [id number]  ROOM: [address] [place] [address]     Primary Care Provider:  Aundrea Headley APRN    Length of stay:  0    Subjective        Chief Compliant cough    Patient seen and examined this morning.  States that she is feeling better shortness of breath and cough.  Cough is dry.  Denies any chest pain.  Afebrile and no events overnight.  On room air.      Objective     Current Hospital Meds:  aspirin 81 mg Oral Daily   busPIRone 5 mg Oral BID   cetirizine 5 mg Oral Daily   heparin (porcine) 5,000 Units Subcutaneous Q12H   hydroxychloroquine 200 mg Oral Daily   ipratropium-albuterol 3 mL Nebulization Q6H While Awake - RT   lactobacillus acidophilus 1 capsule Oral Daily   methylPREDNISolone sodium succinate 40 mg Intravenous Q12H   metoprolol tartrate 50 mg Oral Q12H   pantoprazole 40 mg Oral QAM   piperacillin-tazobactam 3.375 g Intravenous Q8H   pregabalin 150 mg Oral Q12H   psyllium 1 packet Oral Daily   sodium chloride 3 mL Intravenous Q12H   vancomycin 1,000 mg Intravenous Q24H     sodium chloride 100 mL/hr Last Rate: 100 mL/hr (10/09/18 2004)     ----------------------------------------------------------------------------------------------------------------------  Vital Signs:  Temp:  [98 °F (36.7 °C)-99.4 °F (37.4 °C)] 98.6 °F (37 °C)  Heart Rate:  [] 103  Resp:  [12-19] 19  BP: (112-185)/() 185/76  SpO2:  [92 %-100 %] 96 %  on   ;   Device (Oxygen Therapy): room air  Body mass index is 21.48 kg/m².    Wt Readings from Last 3 Encounters:   10/09/18 49.9 kg (110 lb)   18 54.9 kg (121 lb)   18 57.2 kg (126 lb 3.2 oz)     Intake/Output Summary (Last 24 hours) at 10/09/18 2015  Last data filed at 10/09/18 1245   Gross per 24 hour   Intake             1710 ml   Output              300 ml   Net             1410 ml     Diet  Regular  ----------------------------------------------------------------------------------------------------------------------  Physical exam:  General: Comfortable, Not in distress.  Well-developed and well-nourished.   HENT:  Head:  Normocephalic and atraumatic.  Mouth:  Moist mucous membranes.    Eyes:  Conjunctivae and EOM are normal.  Pupils are equal, round, and reactive to light.  No scleral icterus.    Neck:  Neck supple.  No JVD present.    Cardiovascular:  Normal rate, regular rhythm with no murmur.  Pulmonary/Chest:  No respiratory distress, no wheezes, no crackles, with coarse and decrease breath sounds in the right base.  Abdomen:  Soft.  Bowel sounds are normal.  No distension and no tenderness.   Musculoskeletal:  No edema, no tenderness, and no deformity.  No red or swollen joints anywhere.    Neurological:  Alert and oriented to person, place, and time.  No cranial nerve deficit. No focal deficits. No facial droop.  No slurred speech.   Skin:  Skin is warm and dry. No rash noted. No pallor.   Peripheral vascular:  Strong pulses in all 4 extremities with no clubbing, no cyanosis, no edema.  Genitourinary: no azevedo  ----------------------------------------------------------------------------------------------------------------------  ----------------------------------------------------------------------------------------------------------------------  Results from last 7 days  Lab Units 10/09/18  0702 10/08/18  2118 10/08/18  2005   CRP mg/dL  --   --  12.96*   LACTATE mmol/L  --  2.0  --    WBC 10*3/mm3 15.62*  --  19.46*   HEMOGLOBIN g/dL 10.8*  --  11.6*   HEMATOCRIT % 32.3*  --  34.1*   MCV fL 95.0*  --  94.2*   MCHC g/dL 33.4  --  34.0   PLATELETS 10*3/mm3 132  --  153     Results from last 7 days  Lab Units 10/09/18  0702 10/08/18  2005   SODIUM mmol/L 142 139   POTASSIUM mmol/L 3.1* 3.5   CHLORIDE mmol/L 112 105   CO2 mmol/L 21.5* 22.7*   BUN mg/dL 16 20   CREATININE mg/dL 0.85 1.39*   EGFR IF  NONAFRICN AM mL/min/1.73 65 37*   CALCIUM mg/dL 8.3 8.4   GLUCOSE mg/dL 135* 104   ALBUMIN g/dL 3.90 4.30   BILIRUBIN mg/dL 0.4 0.5   ALK PHOS U/L 65 68   AST (SGOT) U/L 17 20   ALT (SGPT) U/L 7* 7*   Estimated Creatinine Clearance: 45 mL/min (by C-G formula based on SCr of 0.85 mg/dL).    Results from last 7 days  Lab Units 10/09/18  0923 10/09/18  0416 10/08/18  2219 10/08/18  2005   CK TOTAL U/L  --   --   --  80   CKMB ng/mL  --   --   --  0.90   CK MB INDEX %  --   --   --  1.1   TROPONIN I ng/mL 0.026 0.035 0.050* 0.065*     No results found for: HGBA1C, POCGLU  No results found for: AMMONIA    Results from last 7 days  Lab Units 10/09/18  0023   NITRITE UA  Negative   WBC UA /HPF 3-5*   BACTERIA UA /HPF None Seen   SQUAM EPITHEL UA /HPF 3-6*     Blood Culture   Date Value Ref Range Status   10/08/2018 No growth at less than 24 hours  Preliminary   10/08/2018 No growth at less than 24 hours  Preliminary          I have personally looked at the labs and they are summarized above.  ----------------------------------------------------------------------------------------------------------------------  Imaging Results (last 24 hours)     Procedure Component Value Units Date/Time    XR Chest 2 View [[id number]] Collected:  10/09/18 0929     Updated:  10/09/18 0932    Narrative:       EXAMINATION: XR CHEST 2 VW-      CLINICAL INDICATION:     SOA  triage protocol     TECHNIQUE:  XR CHEST 2 VW-      COMPARISON: 10/8/2018      FINDINGS:   Right Port-A-Cath.  Development of interstitial type opacities right mid and lower lung may  represent changes of atypical pneumonia.   Heart size, mediastinum, and pulmonary vascularity are unremarkable.   No pneumothorax.   No effusions.   No acute osseous findings.            Impression:       Developing of right mid and lower lung interstitial  opacities which can BE seen with atypical pneumonia.        This report was finalized on 10/9/2018 9:29 AM by Dr. Jonah Salgado MD.        CT Chest Without Contrast [[id number]] Collected:  10/09/18 0807     Updated:  10/09/18 0810    Narrative:       EXAM: CT CHEST WO CONTRAST-              CLINICAL INDICATION:sob      COMPARISON: Comparison: 5/6/2016     TECHNIQUE: Multiple axial CT images were obtained from lung apex through  upper abdomen WITHOUT administration of IV contrast. Reformatted images  in the coronal and/or sagittal plane(s) were generated from the axial  data set to facilitate diagnostic accuracy and/or surgical planning.  Oral Contrast:NONE.     Radiation dose reduction techniques were utilized per ALARA protocol.  Automated exposure control was initiated through either or [employer] or  DoseRight software packages by  protocol.    DOSE (DLP mGy-cm): 401.71 mGy.cm        FINDINGS:     LUNGS: PATCHY AND GROUNDGLASS OPACITIES OF THE RIGHT MIDDLE LOBE AND  RIGHT LOWER LOBE MOST COMPATIBLE WITH ATYPICAL PNEUMONIA. FAINT  GROUNDGLASS OPACITIES OF THE PERIPHERAL ASPECT LEFT LOWER LOBE ALSO  LIKELY RELATED TO PNEUMONIA. CHRONIC APPEARING INTERSTITIAL LUNG  CHANGES. NO DENSE CONSOLIDATIONS IDENTIFIED OF EITHER LUNG.     HEART: MILD CARDIOMEGALY. MILD CORONARY VASCULAR CALCIFICATIONS.     PERICARDIUM: No effusion.     MEDIASTINUM: No masses. No enlarged lymph nodes.  No fluid collections.     PLEURA: NO PNEUMOTHORAX.     MAJOR AIRWAYS: Clear. No intrinsic mass.     VASCULATURE: No evidence of aneurysm.     VISUALIZED UPPER ABDOMEN:The upper abdomen is unremarkable as  visualized.     OTHER: None.     BONES: MILD DEGENERATIVE CHANGES OF THORACIC SPINE BUT NO ACUTE BONY  FINDINGS IDENTIFIED.       Impression:       1. PATCHY AND GROUNDGLASS OPACITIES RIGHT GREATER THAN LEFT MID AND  LOWER LUNG REGIONS MOST COMPATIBLE WITH BILATERAL ATYPICAL PNEUMONIA.  2. CARDIOMEGALY AND CHRONIC APPEARING LUNG CHANGES.     This report was finalized on 10/9/2018 8:08 AM by Dr. Jonah Salgado MD.           I have personally reviewed the radiology images  and read the final radiology report.    Assessment & Plan      Assessment:  -Sepsis   -RML and RLL pneumonia; technically patient still in window for HCAP given hospitalization at [facility] 7/23/18-7/29/18 and also in the setting of follicular lymphoma  -Acute kidney injury with creatinine 1.39 and baseline creatinine approximately 0.6-0.7  -Metabolic acidosis  -Hypokalemia   -Mild troponin elevation that has resolved and likely due to above   -Essential hypertension that is slightly uncontrolled   -Mild QT prolongation, 483 m/s   -Mild macrocytic anemia   -History of follicular lymphoma, reportedly in remission per patient report   -Gastroesophageal reflux disease    Plan:  Sepsis secondary to pneumonia.  Prelim blood cultures are negative.  Mycoplasma pneumonia IgM antibody negative and Legionella antigen negative.  Leukocytosis trending down.  Follow-up CRP in a.m.  For pneumonia, continue with vancomycin and Zosyn.  De-escalate antibiotics depending on the culture in next 24-48 hrs.  Acute kidney injury resolved.  Metabolic acidosis, continue with IV fluids and monitor.  Replace potassium and monitor.  Troponin trended down.  Anemia, monitor hemoglobin level.  Currently stable.  Heparin for DVT prophylaxis.    The management plan discussed in detail with the patient.  All questions were answered.    The patient is high risk due to the following diagnoses/reasons:  pneumonia    Shelley Lora MD  10/09/18  8:15 PM

## 2018-10-10 NOTE — PLAN OF CARE
Problem: Skin Injury Risk (Adult)  Goal: Skin Health and Integrity  Outcome: Ongoing (interventions implemented as appropriate)      Problem: Fall Risk (Adult)  Goal: Absence of Fall  Outcome: Ongoing (interventions implemented as appropriate)      Problem: Pain, Chronic (Adult)  Goal: Acceptable Pain/Comfort Level and Functional Ability  Outcome: Ongoing (interventions implemented as appropriate)      Problem: Activity Intolerance (Adult)  Goal: Activity Tolerance  Outcome: Ongoing (interventions implemented as appropriate)    Goal: Effective Energy Conservation Techniques  Outcome: Ongoing (interventions implemented as appropriate)      Problem: Patient Care Overview  Goal: Plan of Care Review  Outcome: Ongoing (interventions implemented as appropriate)    Goal: Individualization and Mutuality  Outcome: Ongoing (interventions implemented as appropriate)

## 2018-10-10 NOTE — PROGRESS NOTES
Psychiatric HOSPITALIST PROGRESS NOTE     Patient Identification:  Name:  Kiah Conway  Age:  73 y.o.  Sex:  female  :  1945  MRN:  [id number]  Visit Number:  [id number]  ROOM: [address] [place] [address]     Primary Care Provider:  Aundrea Headley APRN    Length of stay:  1    Subjective        Chief Compliant cough    Patient seen and examined this morning with the daughter at the bedside.  States that she is feeling better with shortness of breath and cough. Did get mild sob with exertion. Cough is dry.  Denies any chest pain.  Afebrile and no events overnight.  On room air.       Objective     Current Hospital Meds:    amLODIPine 5 mg Oral Q24H   aspirin 81 mg Oral Daily   busPIRone 5 mg Oral BID   cetirizine 5 mg Oral Daily   heparin (porcine) 5,000 Units Subcutaneous Q12H   hydroxychloroquine 200 mg Oral Daily   ipratropium-albuterol 3 mL Nebulization Q6H While Awake - RT   lactobacillus acidophilus 1 capsule Oral Daily   metoprolol tartrate 50 mg Oral Q12H   pantoprazole 40 mg Oral QAM   piperacillin-tazobactam 3.375 g Intravenous Q8H   pregabalin 150 mg Oral Q12H   sodium chloride 3 mL Intravenous Q12H   vancomycin 1,000 mg Intravenous Q24H      ----------------------------------------------------------------------------------------------------------------------  Vital Signs:  Temp:  [98.2 °F (36.8 °C)-98.8 °F (37.1 °C)] 98.7 °F (37.1 °C)  Heart Rate:  [] 80  Resp:  [18-19] 18  BP: (143-194)/(75-96) 180/83  SpO2:  [96 %] 96 %  on   ;   Device (Oxygen Therapy): room air  Body mass index is 21.48 kg/m².    Wt Readings from Last 3 Encounters:   10/09/18 49.9 kg (110 lb)   18 54.9 kg (121 lb)   18 57.2 kg (126 lb 3.2 oz)       Intake/Output Summary (Last 24 hours) at 10/10/18 1134  Last data filed at 10/10/18 0300   Gross per 24 hour   Intake              360 ml   Output                0 ml   Net              360 ml     Diet  Regular  ----------------------------------------------------------------------------------------------------------------------  Physical exam:  General: Comfortable, Not in distress.  Well-developed and well-nourished.   HENT:  Head:  Normocephalic and atraumatic.  Mouth:  Moist mucous membranes.    Eyes:  Conjunctivae and EOM are normal.  Pupils are equal, round, and reactive to light.  No scleral icterus.    Neck:  Neck supple.  No JVD present.    Cardiovascular:  Normal rate, regular rhythm with no murmur.  Pulmonary/Chest:  No respiratory distress, no wheezes, no crackles, with coarse and decrease breath sounds in the right base. Left clear.  Abdomen:  Soft.  Bowel sounds are normal.  No distension and no tenderness.   Musculoskeletal:  No edema, no tenderness, and no deformity.  No red or swollen joints anywhere.    Neurological:  Alert and oriented to person, place, and time.  No cranial nerve deficit. No focal deficits. No facial droop.  No slurred speech.   Skin:  Skin is warm and dry. No rash noted. No pallor.   Peripheral vascular:  pulses in all 4 extremities with no clubbing, no cyanosis, no edema.  Genitourinary: no azevedo  ----------------------------------------------------------------------------------------------------------------------  ----------------------------------------------------------------------------------------------------------------------    Results from last 7 days  Lab Units 10/10/18  0458 10/09/18  0702 10/08/18  2118 10/08/18  2005   CRP mg/dL 13.59*  --   --  12.96*   LACTATE mmol/L  --   --  2.0  --    WBC 10*3/mm3 12.87* 15.62*  --  19.46*   HEMOGLOBIN g/dL 10.7* 10.8*  --  11.6*   HEMATOCRIT % 32.5* 32.3*  --  34.1*   MCV fL 96.4* 95.0*  --  94.2*   MCHC g/dL 32.9* 33.4  --  34.0   PLATELETS 10*3/mm3 149 132  --  153       Results from last 7 days  Lab Units 10/10/18  0458 10/09/18  0702 10/08/18  2005   SODIUM mmol/L 144 142 139   POTASSIUM mmol/L 3.5 3.1* 3.5   CHLORIDE  mmol/L 113* 112 105   CO2 mmol/L 18.5* 21.5* 22.7*   BUN mg/dL 14 16 20   CREATININE mg/dL 0.74 0.85 1.39*   EGFR IF NONAFRICN AM mL/min/1.73 77 65 37*   CALCIUM mg/dL 7.7 8.3 8.4   GLUCOSE mg/dL 139* 135* 104   ALBUMIN g/dL  --  3.90 4.30   BILIRUBIN mg/dL  --  0.4 0.5   ALK PHOS U/L  --  65 68   AST (SGOT) U/L  --  17 20   ALT (SGPT) U/L  --  7* 7*   Estimated Creatinine Clearance: 47.9 mL/min (by C-G formula based on SCr of 0.74 mg/dL).    Results from last 7 days  Lab Units 10/09/18  0923 10/09/18  0416 10/08/18  2219 10/08/18  2005   CK TOTAL U/L  --   --   --  80   CKMB ng/mL  --   --   --  0.90   CK MB INDEX %  --   --   --  1.1   TROPONIN I ng/mL 0.026 0.035 0.050* 0.065*     No results found for: HGBA1C, POCGLU  No results found for: AMMONIA      Results from last 7 days  Lab Units 10/09/18  0023   NITRITE UA  Negative   WBC UA /HPF 3-5*   BACTERIA UA /HPF None Seen   SQUAM EPITHEL UA /HPF 3-6*     Blood Culture   Date Value Ref Range Status   10/08/2018 No growth at less than 24 hours  Preliminary   10/08/2018 No growth at less than 24 hours  Preliminary          I have personally looked at the labs and they are summarized above.  ----------------------------------------------------------------------------------------------------------------------  Imaging Results (last 24 hours)     ** No results found for the last 24 hours. **        I have personally reviewed the radiology images and read the final radiology report.    Assessment & Plan      Assessment:  -Sepsis   -RML and RLL pneumonia; technically patient still in window for HCAP given hospitalization at [facility] 7/23/18-7/29/18 and also in the setting of follicular lymphoma  -Acute kidney injury with creatinine 1.39 and baseline creatinine approximately 0.6-0.7  -Metabolic acidosis  -Hypokalemia   -Mild troponin elevation that has resolved and likely due to above   -Essential hypertension that is slightly uncontrolled   -Mild QT prolongation, 483 m/s    -Mild macrocytic anemia   -History of follicular lymphoma, reportedly in remission per patient report   -Gastroesophageal reflux disease    Plan:  Sepsis secondary to pneumonia.  Prelim blood cultures are negative.  Mycoplasma pneumonia IgM antibody negative and Legionella antigen negative.  Leukocytosis trending down. CRP stable.  Follow-up CRP in a.m.  For pneumonia, continue with vancomycin and Zosyn.  DC vanc once the blood cx is negative for 48 hrs.   Acute kidney injury resolved.  Metabolic acidosis, slightly worse, check lactate.  Replace potassium and monitor.  Troponin trended down.  Anemia, monitor hemoglobin level.  Currently stable.  Heparin for DVT prophylaxis.    The management plan discussed in detail with the patient.  All questions were answered.    The patient is high risk due to the following diagnoses/reasons:  pneumonia    Shelley Lora MD  10/10/18  3:54 PM

## 2018-10-10 NOTE — PLAN OF CARE
Problem: Skin Injury Risk (Adult)  Goal: Skin Health and Integrity  Outcome: Ongoing (interventions implemented as appropriate)   10/09/18 2155   Skin Injury Risk (Adult)   Skin Health and Integrity making progress toward outcome       Problem: Fall Risk (Adult)  Goal: Absence of Fall  Outcome: Ongoing (interventions implemented as appropriate)   10/09/18 2155   Fall Risk (Adult)   Absence of Fall making progress toward outcome       Problem: Pain, Chronic (Adult)  Goal: Acceptable Pain/Comfort Level and Functional Ability  Outcome: Ongoing (interventions implemented as appropriate)   10/09/18 2155   Pain, Chronic (Adult)   Acceptable Pain/Comfort Level and Functional Ability making progress toward outcome       Problem: Activity Intolerance (Adult)  Goal: Activity Tolerance  Outcome: Ongoing (interventions implemented as appropriate)   10/09/18 2155   Activity Intolerance (Adult)   Activity Tolerance making progress toward outcome       Problem: Patient Care Overview  Goal: Plan of Care Review  Outcome: Ongoing (interventions implemented as appropriate)   10/09/18 2155   Coping/Psychosocial   Plan of Care Reviewed With patient   Plan of Care Review   Progress no change

## 2018-10-11 VITALS
DIASTOLIC BLOOD PRESSURE: 72 MMHG | BODY MASS INDEX: 21.6 KG/M2 | OXYGEN SATURATION: 94 % | TEMPERATURE: 98.9 F | WEIGHT: 110 LBS | SYSTOLIC BLOOD PRESSURE: 157 MMHG | HEART RATE: 77 BPM | RESPIRATION RATE: 18 BRPM | HEIGHT: 60 IN

## 2018-10-11 LAB
ANION GAP SERPL CALCULATED.3IONS-SCNC: 11.5 MMOL/L (ref 3.6–11.2)
BASOPHILS # BLD AUTO: 0 10*3/MM3 (ref 0–0.3)
BASOPHILS NFR BLD AUTO: 0 % (ref 0–2)
BNP SERPL-MCNC: 111 PG/ML (ref 0–100)
BUN BLD-MCNC: 10 MG/DL (ref 7–21)
BUN/CREAT SERPL: 12.7 (ref 7–25)
CALCIUM SPEC-SCNC: 7.8 MG/DL (ref 7.7–10)
CHLORIDE SERPL-SCNC: 111 MMOL/L (ref 99–112)
CO2 SERPL-SCNC: 23.5 MMOL/L (ref 24.3–31.9)
CREAT BLD-MCNC: 0.79 MG/DL (ref 0.43–1.29)
CRP SERPL-MCNC: 5.98 MG/DL (ref 0–0.99)
D-LACTATE SERPL-SCNC: 1.9 MMOL/L (ref 0.5–2)
DEPRECATED RDW RBC AUTO: 47 FL (ref 37–54)
EOSINOPHIL # BLD AUTO: 0 10*3/MM3 (ref 0–0.7)
EOSINOPHIL NFR BLD AUTO: 0 % (ref 0–7)
ERYTHROCYTE [DISTWIDTH] IN BLOOD BY AUTOMATED COUNT: 14.2 % (ref 11.5–14.5)
GFR SERPL CREATININE-BSD FRML MDRD: 71 ML/MIN/1.73
GLUCOSE BLD-MCNC: 98 MG/DL (ref 70–110)
HCT VFR BLD AUTO: 35.7 % (ref 37–47)
HGB BLD-MCNC: 11.5 G/DL (ref 12–16)
IMM GRANULOCYTES # BLD: 0.02 10*3/MM3 (ref 0–0.03)
IMM GRANULOCYTES NFR BLD: 0.2 % (ref 0–0.5)
LYMPHOCYTES # BLD AUTO: 0.55 10*3/MM3 (ref 1–3)
LYMPHOCYTES NFR BLD AUTO: 5.4 % (ref 16–46)
MAGNESIUM SERPL-MCNC: 1.7 MG/DL (ref 1.7–2.6)
MCH RBC QN AUTO: 30.9 PG (ref 27–33)
MCHC RBC AUTO-ENTMCNC: 32.2 G/DL (ref 33–37)
MCV RBC AUTO: 96 FL (ref 80–94)
MONOCYTES # BLD AUTO: 0.82 10*3/MM3 (ref 0.1–0.9)
MONOCYTES NFR BLD AUTO: 8 % (ref 0–12)
NEUTROPHILS # BLD AUTO: 8.84 10*3/MM3 (ref 1.4–6.5)
NEUTROPHILS NFR BLD AUTO: 86.4 % (ref 40–75)
OSMOLALITY SERPL CALC.SUM OF ELEC: 289.6 MOSM/KG (ref 273–305)
PLATELET # BLD AUTO: 166 10*3/MM3 (ref 130–400)
PMV BLD AUTO: 12.7 FL (ref 6–10)
POTASSIUM BLD-SCNC: 2.8 MMOL/L (ref 3.5–5.3)
POTASSIUM BLD-SCNC: 3 MMOL/L (ref 3.5–5.3)
POTASSIUM BLD-SCNC: 3.9 MMOL/L (ref 3.5–5.3)
RBC # BLD AUTO: 3.72 10*6/MM3 (ref 4.2–5.4)
SODIUM BLD-SCNC: 146 MMOL/L (ref 135–153)
WBC NRBC COR # BLD: 10.23 10*3/MM3 (ref 4.5–12.5)

## 2018-10-11 PROCEDURE — 86140 C-REACTIVE PROTEIN: CPT | Performed by: HOSPITALIST

## 2018-10-11 PROCEDURE — 83880 ASSAY OF NATRIURETIC PEPTIDE: CPT | Performed by: HOSPITALIST

## 2018-10-11 PROCEDURE — 94799 UNLISTED PULMONARY SVC/PX: CPT

## 2018-10-11 PROCEDURE — 25010000002 PIPERACILLIN-TAZOBACTAM: Performed by: INTERNAL MEDICINE

## 2018-10-11 PROCEDURE — 83605 ASSAY OF LACTIC ACID: CPT | Performed by: HOSPITALIST

## 2018-10-11 PROCEDURE — 80048 BASIC METABOLIC PNL TOTAL CA: CPT | Performed by: HOSPITALIST

## 2018-10-11 PROCEDURE — 84132 ASSAY OF SERUM POTASSIUM: CPT | Performed by: HOSPITALIST

## 2018-10-11 PROCEDURE — 25010000003 POTASSIUM CHLORIDE 10 MEQ/100ML SOLUTION: Performed by: HOSPITALIST

## 2018-10-11 PROCEDURE — 99239 HOSP IP/OBS DSCHRG MGMT >30: CPT | Performed by: HOSPITALIST

## 2018-10-11 PROCEDURE — 85025 COMPLETE CBC W/AUTO DIFF WBC: CPT | Performed by: HOSPITALIST

## 2018-10-11 PROCEDURE — 83735 ASSAY OF MAGNESIUM: CPT | Performed by: INTERNAL MEDICINE

## 2018-10-11 PROCEDURE — 25010000002 HEPARIN (PORCINE) PER 1000 UNITS: Performed by: INTERNAL MEDICINE

## 2018-10-11 RX ORDER — POTASSIUM CHLORIDE 20 MEQ/1
40 TABLET, EXTENDED RELEASE ORAL EVERY 4 HOURS
Status: DISCONTINUED | OUTPATIENT
Start: 2018-10-11 | End: 2018-10-11

## 2018-10-11 RX ORDER — DOXYCYCLINE HYCLATE 100 MG
100 TABLET ORAL 2 TIMES DAILY
Qty: 18 TABLET | Refills: 0 | Status: SHIPPED | OUTPATIENT
Start: 2018-10-11 | End: 2018-10-20

## 2018-10-11 RX ORDER — POTASSIUM CHLORIDE 20 MEQ/1
40 TABLET, EXTENDED RELEASE ORAL AS NEEDED
Status: DISCONTINUED | OUTPATIENT
Start: 2018-10-11 | End: 2018-10-11 | Stop reason: HOSPADM

## 2018-10-11 RX ORDER — MAGNESIUM SULFATE HEPTAHYDRATE 40 MG/ML
4 INJECTION, SOLUTION INTRAVENOUS AS NEEDED
Status: DISCONTINUED | OUTPATIENT
Start: 2018-10-11 | End: 2018-10-11 | Stop reason: HOSPADM

## 2018-10-11 RX ORDER — DOXYCYCLINE HYCLATE 100 MG
100 TABLET ORAL 2 TIMES DAILY
Qty: 18 TABLET | Refills: 0 | Status: SHIPPED | OUTPATIENT
Start: 2018-10-11 | End: 2018-10-11

## 2018-10-11 RX ORDER — MAGNESIUM SULFATE HEPTAHYDRATE 40 MG/ML
4 INJECTION, SOLUTION INTRAVENOUS ONCE
Status: COMPLETED | OUTPATIENT
Start: 2018-10-11 | End: 2018-10-11

## 2018-10-11 RX ORDER — AMLODIPINE BESYLATE 5 MG/1
5 TABLET ORAL
Qty: 30 TABLET | Refills: 0 | Status: SHIPPED | OUTPATIENT
Start: 2018-10-12 | End: 2018-10-11

## 2018-10-11 RX ORDER — L.ACID,PARA/B.BIFIDUM/S.THERM 8B CELL
1 CAPSULE ORAL DAILY
Qty: 7 CAPSULE | Refills: 0 | Status: SHIPPED | OUTPATIENT
Start: 2018-10-12 | End: 2018-10-11

## 2018-10-11 RX ORDER — POTASSIUM CHLORIDE 20 MEQ/1
40 TABLET, EXTENDED RELEASE ORAL EVERY 4 HOURS
Status: COMPLETED | OUTPATIENT
Start: 2018-10-11 | End: 2018-10-11

## 2018-10-11 RX ORDER — POTASSIUM CHLORIDE 7.45 MG/ML
10 INJECTION INTRAVENOUS
Status: DISCONTINUED | OUTPATIENT
Start: 2018-10-11 | End: 2018-10-11 | Stop reason: HOSPADM

## 2018-10-11 RX ORDER — CEFDINIR 300 MG/1
300 CAPSULE ORAL 2 TIMES DAILY
Qty: 9 CAPSULE | Refills: 0 | Status: SHIPPED | OUTPATIENT
Start: 2018-10-11 | End: 2018-10-16

## 2018-10-11 RX ORDER — POTASSIUM CHLORIDE 1.5 G/1.77G
40 POWDER, FOR SOLUTION ORAL AS NEEDED
Status: DISCONTINUED | OUTPATIENT
Start: 2018-10-11 | End: 2018-10-11 | Stop reason: HOSPADM

## 2018-10-11 RX ORDER — POTASSIUM CHLORIDE 7.45 MG/ML
10 INJECTION INTRAVENOUS
Status: COMPLETED | OUTPATIENT
Start: 2018-10-11 | End: 2018-10-11

## 2018-10-11 RX ORDER — MAGNESIUM SULFATE HEPTAHYDRATE 40 MG/ML
2 INJECTION, SOLUTION INTRAVENOUS AS NEEDED
Status: DISCONTINUED | OUTPATIENT
Start: 2018-10-11 | End: 2018-10-11 | Stop reason: HOSPADM

## 2018-10-11 RX ORDER — L.ACID,PARA/B.BIFIDUM/S.THERM 8B CELL
1 CAPSULE ORAL DAILY
Qty: 7 CAPSULE | Refills: 0 | Status: ON HOLD | OUTPATIENT
Start: 2018-10-12 | End: 2021-01-01

## 2018-10-11 RX ORDER — CEFDINIR 300 MG/1
300 CAPSULE ORAL 2 TIMES DAILY
Qty: 9 CAPSULE | Refills: 0 | Status: SHIPPED | OUTPATIENT
Start: 2018-10-11 | End: 2018-10-11

## 2018-10-11 RX ORDER — AMLODIPINE BESYLATE 5 MG/1
5 TABLET ORAL
Qty: 30 TABLET | Refills: 0 | Status: SHIPPED | OUTPATIENT
Start: 2018-10-12 | End: 2019-01-03 | Stop reason: SDUPTHER

## 2018-10-11 RX ADMIN — MAGNESIUM SULFATE HEPTAHYDRATE 4 G: 40 INJECTION, SOLUTION INTRAVENOUS at 12:20

## 2018-10-11 RX ADMIN — POTASSIUM CHLORIDE 10 MEQ: 10 INJECTION, SOLUTION INTRAVENOUS at 09:38

## 2018-10-11 RX ADMIN — CETIRIZINE HYDROCHLORIDE 5 MG: 10 TABLET, FILM COATED ORAL at 09:24

## 2018-10-11 RX ADMIN — POTASSIUM CHLORIDE 10 MEQ: 10 INJECTION, SOLUTION INTRAVENOUS at 11:44

## 2018-10-11 RX ADMIN — AMLODIPINE BESYLATE 5 MG: 5 TABLET ORAL at 09:24

## 2018-10-11 RX ADMIN — METOPROLOL TARTRATE 5 MG: 1 INJECTION, SOLUTION INTRAVENOUS at 03:22

## 2018-10-11 RX ADMIN — ASPIRIN 81 MG: 81 TABLET ORAL at 09:24

## 2018-10-11 RX ADMIN — Medication 1 CAPSULE: at 09:20

## 2018-10-11 RX ADMIN — METOPROLOL TARTRATE 50 MG: 50 TABLET, FILM COATED ORAL at 09:20

## 2018-10-11 RX ADMIN — POTASSIUM CHLORIDE 40 MEQ: 1500 TABLET, EXTENDED RELEASE ORAL at 10:38

## 2018-10-11 RX ADMIN — IPRATROPIUM BROMIDE AND ALBUTEROL SULFATE 3 ML: .5; 3 SOLUTION RESPIRATORY (INHALATION) at 13:28

## 2018-10-11 RX ADMIN — BUSPIRONE HYDROCHLORIDE 5 MG: 5 TABLET ORAL at 09:24

## 2018-10-11 RX ADMIN — HYDROXYCHLOROQUINE SULFATE 200 MG: 200 TABLET ORAL at 09:23

## 2018-10-11 RX ADMIN — PREGABALIN 150 MG: 75 CAPSULE ORAL at 09:19

## 2018-10-11 RX ADMIN — POTASSIUM CHLORIDE 10 MEQ: 10 INJECTION, SOLUTION INTRAVENOUS at 12:49

## 2018-10-11 RX ADMIN — PANTOPRAZOLE SODIUM 40 MG: 40 TABLET, DELAYED RELEASE ORAL at 06:41

## 2018-10-11 RX ADMIN — PIPERACILLIN SODIUM,TAZOBACTAM SODIUM 3.38 G: 3; .375 INJECTION, POWDER, FOR SOLUTION INTRAVENOUS at 09:36

## 2018-10-11 RX ADMIN — PIPERACILLIN SODIUM,TAZOBACTAM SODIUM 3.38 G: 3; .375 INJECTION, POWDER, FOR SOLUTION INTRAVENOUS at 00:36

## 2018-10-11 RX ADMIN — POTASSIUM CHLORIDE 40 MEQ: 1500 TABLET, EXTENDED RELEASE ORAL at 14:37

## 2018-10-11 RX ADMIN — HEPARIN SODIUM 5000 UNITS: 5000 INJECTION, SOLUTION INTRAVENOUS; SUBCUTANEOUS at 09:25

## 2018-10-11 RX ADMIN — POTASSIUM CHLORIDE 10 MEQ: 10 INJECTION, SOLUTION INTRAVENOUS at 10:38

## 2018-10-11 RX ADMIN — IPRATROPIUM BROMIDE AND ALBUTEROL SULFATE 3 ML: .5; 3 SOLUTION RESPIRATORY (INHALATION) at 06:38

## 2018-10-11 NOTE — PHARMACY PATIENT ASSISTANCE
Pharmacy was asked to look into pricing for assistance on home medications.  We reviewed the patient's home meds from her pharmacy at [facility] and [facility] and they are mainly $0 - $8.35.  She does have one medication, megace, that is not covered by her insurance and she pays around $21 for a 12 day supply.  It requires a prior authorization.  We have submitted a PA and it is pending.    Thank You;  Ct Barrios RPH  10/11/18  12:15 PM

## 2018-10-11 NOTE — PLAN OF CARE
Problem: Skin Injury Risk (Adult)  Goal: Skin Health and Integrity  Outcome: Ongoing (interventions implemented as appropriate)      Problem: Fall Risk (Adult)  Goal: Absence of Fall  Outcome: Ongoing (interventions implemented as appropriate)      Problem: Pain, Chronic (Adult)  Goal: Acceptable Pain/Comfort Level and Functional Ability  Outcome: Ongoing (interventions implemented as appropriate)      Problem: Activity Intolerance (Adult)  Goal: Activity Tolerance  Outcome: Ongoing (interventions implemented as appropriate)    Goal: Effective Energy Conservation Techniques  Outcome: Ongoing (interventions implemented as appropriate)      Problem: Patient Care Overview  Goal: Plan of Care Review  Outcome: Ongoing (interventions implemented as appropriate)

## 2018-10-11 NOTE — NURSING NOTE
Potassium 3.9 after replacement.     Magnesium infusion currently running (for replacement of mag 1.7).

## 2018-10-11 NOTE — DISCHARGE SUMMARY
[facility] HOSPITALISTS DISCHARGE SUMMARY    Patient Identification:  Name:  Kiah Conway  Age:  73 y.o.  Sex:  female  :  1945  MRN:  [id number]  Visit Number:  [id number]    Date of Admission: 10/8/2018  Date of Discharge:  10/11/2018     PCP: Aundrea Headley APRN    DISCHARGE DIAGNOSIS  -Sepsis   -RML and RLL pneumonia; technically patient still in window for HCAP given hospitalization at [facility] 18-18 and also in the setting of follicular lymphoma  -Acute kidney injury with creatinine 1.39 and baseline creatinine approximately 0.6-0.7  -Metabolic acidosis  -Hypokalemia   -Mild troponin elevation that has resolved and likely due to above   -Essential hypertension that is slightly uncontrolled   -Mild QT prolongation, 483 m/s   -Mild macrocytic anemia   -History of follicular lymphoma, reportedly in remission per patient report   -Gastroesophageal reflux disease     CONSULTS   None    PROCEDURES PERFORMED  None    HOSPITAL COURSE  Ms. Conway is a 73 y.o. female PMHx significant for follicular lymphoma, currently in remission, GERD and history of thyroid cancer who presents to [facility] with a 3 day history of shortness of breath, cough and vomiting.  Please see the admitting history and physical for further details.  CT done at the time of admission showed patchy groundglass opacities right greater than left most compatible with bilateral pneumonia.  Patient was started on vancomycin and Zosyn for healthcare associated pneumonia.  Blood cultures remain negative.  Mycoplasma IgM antibody and legionella antigen negative.  Vancomycin discontinued after 48 hours of blood culture negative.  Patient continued to improve symptomatically with improvement in shortness of breath and cough.  Remained afebrile and vitally stable. Blood Pressure noted to be accelerated but improved with antihypertensive medication.  Amlodipine was added. Electrolytes replaced and monitored.   Since patient was vitally stable and improved symptomatically and would like to go home, it was decided to discharge the patient to home.  Discharge disposition stable.      VITAL SIGNS:  Temp:  [97.5 °F (36.4 °C)-99.2 °F (37.3 °C)] 98.7 °F (37.1 °C)  Heart Rate:  [70-81] 74  Resp:  [18-20] 20  BP: (155-191)/(71-94) 164/85  SpO2:  [94 %-97 %] 94 %  on   ;   Device (Oxygen Therapy): room air    Body mass index is 21.48 kg/m².  Wt Readings from Last 3 Encounters:   10/09/18 49.9 kg (110 lb)   07/11/18 54.9 kg (121 lb)   06/07/18 57.2 kg (126 lb 3.2 oz)       PHYSICAL EXAM:  General: Comfortable, Not in distress.  Well-developed and well-nourished.   HENT:  Head:  Normocephalic and atraumatic.  Mouth:  Moist mucous membranes.    Eyes:  Conjunctivae and EOM are normal.  Pupils are equal, round, and reactive to light.  No scleral icterus.    Neck:  Neck supple.  No JVD present.    Cardiovascular:  Normal rate, regular rhythm with no murmur.  Pulmonary/Chest:  No respiratory distress, no wheezes, no crackles, with coarse and decrease breath sounds in the right base. Left clear.  Abdomen:  Soft.  Bowel sounds are normal.  No distension and no tenderness.   Musculoskeletal:  No edema, no tenderness, and no deformity.  No red or swollen joints anywhere.    Neurological:  Alert and oriented to person, place, and time.  No cranial nerve deficit. No focal deficits. No facial droop.  No slurred speech.   Skin:  Skin is warm and dry. No rash noted. No pallor.   Peripheral vascular:  pulses in all 4 extremities with no clubbing, no cyanosis, no edema.  Genitourinary: no azevedo    DISCHARGE DISPOSITION   To home    DISCHARGE MEDICATIONS:     Discharge Medications      New Medications      Instructions Start Date   amLODIPine 5 MG tablet  Commonly known as:  NORVASC   5 mg, Oral, Every 24 Hours Scheduled      cefdinir 300 MG capsule  Commonly known as:  OMNICEF   300 mg, Oral, 2 Times Daily      doxycycline 100 MG tablet  Commonly  known as:  VIBRAMYICN   100 mg, Oral, 2 Times Daily         Changes to Medications      Instructions Start Date   ALIGN PO  What changed:  Another medication with the same name was added. Make sure you understand how and when to take each.   1 capsule, Oral, Daily      lactobacillus acidophilus capsule capsule  What changed:  You were already taking a medication with the same name, and this prescription was added. Make sure you understand how and when to take each.   1 capsule, Oral, Daily         Continue These Medications      Instructions Start Date   aspirin 81 MG EC tablet   81 mg, Oral, Daily      baclofen 10 MG tablet  Commonly known as:  LIORESAL   10 mg, Oral, Daily PRN      busPIRone 5 MG tablet  Commonly known as:  BUSPAR   5 mg, Oral, 2 Times Daily      FIBER-CAPS PO   1 capsule, Oral, Daily      HYDROcodone-acetaminophen 7.5-325 MG per tablet  Commonly known as:  NORCO   1 tablet, Oral, 3 Times Daily PRN      hydroxychloroquine 200 MG tablet  Commonly known as:  PLAQUENIL   200 mg, Oral, Daily      loratadine 10 MG tablet  Commonly known as:  CLARITIN   10 mg, Oral, Daily      megestrol 40 MG/ML suspension  Commonly known as:  MEGACE   800 mg, Oral, Daily      metoprolol tartrate 50 MG tablet  Commonly known as:  LOPRESSOR   50 mg, Oral, Every 12 Hours Scheduled      omeprazole 40 MG capsule  Commonly known as:  priLOSEC   40 mg, Oral, Daily      pregabalin 300 MG capsule  Commonly known as:  LYRICA   300 mg, Oral, 2 Times Daily             Diet Instructions     Diet: Regular       Discharge Diet:  Regular        Activity Instructions     Activity as Tolerated           No future appointments.    Additional Instructions for the Follow-ups that You Need to Schedule     Discharge Follow-up with PCP    As directed      Currently Documented PCP:  Aundrea Headley APRN  PCP Phone Number:  [phone]    Follow Up Details:  Within 1 week           Follow-up Information     Aundrea Headley APRN .     Specialty:  Family Medicine  Why:  Within 1 week  Contact information:  [address] [place] [id number]  [phone]                    TEST  RESULTS PENDING AT DISCHARGE   Order Current Status    Blood Culture - Blood, Preliminary result    Blood Culture - Blood, Preliminary result           CODE STATUS  Code Status and Medical Interventions:   Ordered at: 10/09/18 0520     Code Status:    CPR     Medical Interventions (Level of Support Prior to Arrest):    Full       Shelley Lora MD  10/11/18  11:56 AM    Please note that this discharge summary required more than 30 minutes to complete.    Please send a copy of this dictation to the following providers:  Aundrea Headley APRN

## 2018-10-11 NOTE — PROGRESS NOTES
Continued Stay Note  [facility]     Patient Name: Kiah Conway  MRN: [id number]  Today's Date: 10/11/2018    Admit Date: 10/8/2018          Discharge Plan     Row Name 10/11/18 1105       Plan    Plan Pt still plans to return home alone at d/c.  She says she feels strong enough to be alone but her daughter, Eunice, will be available if she needs something.  She anticipates being d/c home later today after KCl replacements given.  She denies any need for DME or HH.  She has good O2 sat on [place].  Her daughter will provide transportation.  CM encouraged pt to utilize the Nurse Call Center after d/c if she has any questions.  CM will follow.    Patient/Family in Agreement with Plan yes         Francheska Leos RN

## 2018-10-11 NOTE — PLAN OF CARE
Problem: Skin Injury Risk (Adult)  Goal: Skin Health and Integrity  Outcome: Ongoing (interventions implemented as appropriate)   10/11/18 1023   Skin Injury Risk (Adult)   Skin Health and Integrity making progress toward outcome       Problem: Fall Risk (Adult)  Goal: Absence of Fall  Outcome: Ongoing (interventions implemented as appropriate)   10/11/18 1023   Fall Risk (Adult)   Absence of Fall making progress toward outcome       Problem: Pain, Chronic (Adult)  Goal: Acceptable Pain/Comfort Level and Functional Ability  Outcome: Ongoing (interventions implemented as appropriate)   10/11/18 1023   Pain, Chronic (Adult)   Acceptable Pain/Comfort Level and Functional Ability making progress toward outcome       Problem: Activity Intolerance (Adult)  Goal: Activity Tolerance  Outcome: Ongoing (interventions implemented as appropriate)   10/11/18 1023   Activity Intolerance (Adult)   Activity Tolerance making progress toward outcome       Problem: Patient Care Overview  Goal: Plan of Care Review  Outcome: Ongoing (interventions implemented as appropriate)   10/11/18 1023   Coping/Psychosocial   Plan of Care Reviewed With patient   Plan of Care Review   Progress no change

## 2018-10-12 ENCOUNTER — TELEPHONE (OUTPATIENT)
Dept: MEDSURG UNIT | Facility: HOSPITAL | Age: 73
End: 2018-10-12

## 2018-10-12 NOTE — PHARMACY PATIENT ASSISTANCE
The patient's Megace PA was denied, as the insurance says the drug used for cachexia is not supported by the compendia and is not covered under part D. I informed the patient, and she says she can still keep paying for it.    Traice Fisher, [facility]

## 2018-10-13 LAB
BACTERIA SPEC AEROBE CULT: NORMAL
BACTERIA SPEC AEROBE CULT: NORMAL

## 2018-10-18 ENCOUNTER — HOSPITAL ENCOUNTER (OUTPATIENT)
Dept: GENERAL RADIOLOGY | Facility: HOSPITAL | Age: 73
Discharge: HOME | End: 2018-10-18
Admitting: NURSE PRACTITIONER

## 2018-10-18 ENCOUNTER — TRANSCRIBE ORDERS (OUTPATIENT)
Dept: ADMINISTRATIVE | Facility: HOSPITAL | Age: 73
End: 2018-10-18

## 2018-10-18 DIAGNOSIS — R05.9 COUGH: Primary | ICD-10-CM

## 2018-10-18 DIAGNOSIS — R05.9 COUGH: ICD-10-CM

## 2018-10-18 PROCEDURE — 71046 X-RAY EXAM CHEST 2 VIEWS: CPT

## 2018-10-18 PROCEDURE — 71046 X-RAY EXAM CHEST 2 VIEWS: CPT | Performed by: RADIOLOGY

## 2018-10-25 ENCOUNTER — TELEPHONE (OUTPATIENT)
Dept: CARDIOLOGY | Facility: CLINIC | Age: 73
End: 2018-10-25

## 2018-10-25 NOTE — TELEPHONE ENCOUNTER
April from Aundrea Mchugh office called wanting to know what dosage of Megace we suggest Ms Conway take since her magnesium is low at 1.4. ?  She was in the hospital and after discharge Ms Headley checked her labs and on the 19th her mag was 1.4.    Please return call to April @ [phone].

## 2019-01-03 ENCOUNTER — OFFICE VISIT (OUTPATIENT)
Dept: CARDIOLOGY | Facility: CLINIC | Age: 74
End: 2019-01-03

## 2019-01-03 VITALS
WEIGHT: 114 LBS | HEART RATE: 65 BPM | SYSTOLIC BLOOD PRESSURE: 169 MMHG | BODY MASS INDEX: 22.38 KG/M2 | DIASTOLIC BLOOD PRESSURE: 71 MMHG | HEIGHT: 60 IN | RESPIRATION RATE: 16 BRPM

## 2019-01-03 DIAGNOSIS — I10 ESSENTIAL HYPERTENSION: Primary | ICD-10-CM

## 2019-01-03 DIAGNOSIS — I47.1 PAROXYSMAL SVT (SUPRAVENTRICULAR TACHYCARDIA) (HCC): ICD-10-CM

## 2019-01-03 DIAGNOSIS — C85.90 LYMPHOMA, UNSPECIFIED BODY REGION, UNSPECIFIED LYMPHOMA TYPE (HCC): ICD-10-CM

## 2019-01-03 DIAGNOSIS — I27.20 PULMONARY HYPERTENSION (HCC): ICD-10-CM

## 2019-01-03 DIAGNOSIS — G47.33 OBSTRUCTIVE SLEEP APNEA: ICD-10-CM

## 2019-01-03 PROBLEM — I47.10 PAROXYSMAL SVT (SUPRAVENTRICULAR TACHYCARDIA): Status: ACTIVE | Noted: 2019-01-03

## 2019-01-03 PROCEDURE — 99213 OFFICE O/P EST LOW 20 MIN: CPT | Performed by: NURSE PRACTITIONER

## 2019-01-03 RX ORDER — AMLODIPINE BESYLATE 2.5 MG/1
2.5 TABLET ORAL
Qty: 30 TABLET | Refills: 5 | Status: ON HOLD | OUTPATIENT
Start: 2019-01-03 | End: 2019-11-07

## 2019-01-03 NOTE — PROGRESS NOTES
Aundrea Headley APRN  Kiah Conway  1945 01/03/2019    Patient Active Problem List   Diagnosis   • Cyst of bursa   • Essential hypertension   • Obstructive sleep apnea   • Precordial pain   • SOB (shortness of breath)   • Edema   • Palpitations   • Periaortic lymphadenopathy   • Pneumonia   • Paroxysmal SVT (supraventricular tachycardia) (CMS/[facility])       Dear Aundrea Headley APRN:    Subjective     Chief Complaint   Patient presents with   • Follow-up     6 mos   • Chest Pain     stress test not done, due to cancer treatment interrupt   • Shortness of Breath     mod exertion   • Med Management     verbal           History of Present Illness:    Kiah Conway is a 73 y.o. female with a history of hypertension, severe pulmonary hypertension with history of sleep apnea, mild to moderate tricuspid regurgitation, and lymphoma.  She was previously having left-sided chest pains and a stress test was ordered to further evaluate this.  However, she reports after discussing with her oncologist she did not wish for her to pursue at this time.  Therefore, she did not complete the stress test is ordered.  She does report since her last visit her chest pains have completely resolved.  She was hospitalized for pneumonia and treated in October.  Her breathing is much better.  She denies dizziness, lightheadedness, near syncope, and syncope.  Her blood pressure is elevated in the office today.  She does monitor at home.  Home readings have also been elevated.  However, reports she was placed on amlodipine 5 mg upon discharge from the hospital.  Her daughter reports this caused hypotension.          No Known Allergies:      Current Outpatient Medications:   •  aspirin 81 MG EC tablet, Take 81 mg by mouth Daily., Disp: , Rfl:   •  baclofen (LIORESAL) 10 MG tablet, Take 10 mg by mouth Daily As Needed., Disp: , Rfl:   •  busPIRone (BUSPAR) 5 MG tablet, Take 5 mg by mouth 2 (Two) Times a Day., Disp: , Rfl:   •  Calcium  "Polycarbophil (FIBER-CAPS PO), Take 1 capsule by mouth Daily., Disp: , Rfl:   •  HYDROcodone-acetaminophen (NORCO) 7.5-325 MG per tablet, Take 1 tablet by mouth 3 (Three) Times a Day As Needed for Moderate Pain ., Disp: , Rfl:   •  hydroxychloroquine (PLAQUENIL) 200 MG tablet, Take 200 mg by mouth Daily., Disp: , Rfl:   •  lactobacillus acidophilus (RISAQUAD) capsule capsule, Take 1 capsule by mouth Daily., Disp: 7 capsule, Rfl: 0  •  loratadine (CLARITIN) 10 MG tablet, Take 10 mg by mouth Daily., Disp: , Rfl:   •  megestrol (MEGACE) 40 MG/ML suspension, Take 800 mg by mouth Daily., Disp: , Rfl:   •  metoprolol tartrate (LOPRESSOR) 50 MG tablet, Take 1 tablet by mouth Every 12 (Twelve) Hours., Disp: 60 tablet, Rfl: 5  •  omeprazole (priLOSEC) 40 MG capsule, Take 40 mg by mouth Daily., Disp: , Rfl:   •  pregabalin (LYRICA) 300 MG capsule, Take 300 mg by mouth 2 (Two) Times a Day., Disp: , Rfl:   •  Probiotic Product (ALIGN PO), Take 1 capsule by mouth Daily., Disp: , Rfl:   •  amLODIPine (NORVASC) 2.5 MG tablet, Take 1 tablet by mouth Daily., Disp: 30 tablet, Rfl: 5      The following portions of the patient's history were reviewed and updated as appropriate: allergies, current medications, past family history, past medical history, past social history, past surgical history and problem list.    Social History     Tobacco Use   • Smoking status: Never Smoker   • Smokeless tobacco: Never Used   Substance Use Topics   • Alcohol use: No   • Drug use: No       Review of Systems   Constitution: Positive for malaise/fatigue and weight loss.   Cardiovascular: Negative for chest pain, dyspnea on exertion, leg swelling and palpitations.   Respiratory: Negative for cough, shortness of breath and wheezing.    Neurological: Negative for dizziness and light-headedness.       Objective   Vitals:    01/03/19 1049   BP: 169/71   Pulse: 65   Resp: 16   Weight: 51.7 kg (114 lb)   Height: 152.4 cm (60\")     Body mass index is 22.26 " kg/m².        Physical Exam   Constitutional: She is oriented to person, place, and time. She appears well-developed and well-nourished.   HENT:   Head: Normocephalic and atraumatic.   Neck: No JVD present.   Cardiovascular: Normal rate, regular rhythm and normal heart sounds. Exam reveals no S3 and no S4.   No murmur heard.  Pulmonary/Chest: Effort normal and breath sounds normal. She has no wheezes. She has no rales.   Abdominal: Soft. Bowel sounds are normal.   Musculoskeletal: She exhibits no edema.   Neurological: She is alert and oriented to person, place, and time.   Skin: Skin is warm and dry.   Psychiatric: She has a normal mood and affect. Her behavior is normal.       Lab Results   Component Value Date     10/11/2018    K 3.9 10/11/2018     10/11/2018    CO2 23.5 (L) 10/11/2018    BUN 10 10/11/2018    CREATININE 0.79 10/11/2018    GLUCOSE 98 10/11/2018    CALCIUM 7.8 10/11/2018    AST 17 10/09/2018    ALT 7 (L) 10/09/2018    ALKPHOS 65 10/09/2018    LABIL2 1.4 (L) 05/06/2016     Lab Results   Component Value Date    CKTOTAL 80 10/08/2018     Lab Results   Component Value Date    WBC 10.23 10/11/2018    HGB 11.5 (L) 10/11/2018    HCT 35.7 (L) 10/11/2018     10/11/2018     Lab Results   Component Value Date    INR <0.90 05/06/2016     Lab Results   Component Value Date    MG 1.7 10/11/2018     Lab Results   Component Value Date    TSH 0.310 (L) 10/03/2017      Lab Results   Component Value Date    .0 (H) 10/11/2018           Procedures      Assessment/Plan    Diagnosis Plan   1. Essential hypertension  amLODIPine (NORVASC) 2.5 MG tablet   2. Lymphoma, unspecified body region, unspecified lymphoma type (CMS/HCC)     3. Pulmonary hypertension (CMS/HCC)     4. Obstructive sleep apnea     5. Paroxysmal SVT (supraventricular tachycardia) (CMS/HCC)                  Recommendations:    1. Continue with current dose of metoprolol.     2. Will start amlodipine 2.5 mg daily for her blood  pressure. I have asked her to monitor at home.     3. Follow up in 4 months and PRN.      Patient's Body mass index is 22.26 kg/m². BMI is within normal parameters. No follow-up required.         Return in about 4 months (around 5/3/2019) for Recheck.    As always, I appreciate very much the opportunity to participate in the cardiovascular care of your patients.      With Best Regards,    Maranda Montoya APRN

## 2019-02-04 ENCOUNTER — HOSPITAL ENCOUNTER (EMERGENCY)
Facility: HOSPITAL | Age: 74
Discharge: HOME | End: 2019-02-04
Attending: EMERGENCY MEDICINE | Admitting: EMERGENCY MEDICINE

## 2019-02-04 ENCOUNTER — APPOINTMENT (OUTPATIENT)
Dept: CT IMAGING | Facility: HOSPITAL | Age: 74
End: 2019-02-04

## 2019-02-04 VITALS
DIASTOLIC BLOOD PRESSURE: 71 MMHG | HEIGHT: 60 IN | BODY MASS INDEX: 22.54 KG/M2 | HEART RATE: 74 BPM | WEIGHT: 114.8 LBS | OXYGEN SATURATION: 98 % | RESPIRATION RATE: 18 BRPM | SYSTOLIC BLOOD PRESSURE: 137 MMHG | TEMPERATURE: 98.5 F

## 2019-02-04 DIAGNOSIS — I10 HYPERTENSION, UNSPECIFIED TYPE: Primary | ICD-10-CM

## 2019-02-04 LAB
ALBUMIN SERPL-MCNC: 4.5 G/DL (ref 3.4–4.8)
ALBUMIN/GLOB SERPL: 2 G/DL (ref 1.5–2.5)
ALP SERPL-CCNC: 71 U/L (ref 35–104)
ALT SERPL W P-5'-P-CCNC: 12 U/L (ref 10–36)
ANION GAP SERPL CALCULATED.3IONS-SCNC: 7.7 MMOL/L (ref 3.6–11.2)
AST SERPL-CCNC: 27 U/L (ref 10–30)
BACTERIA UR QL AUTO: ABNORMAL /HPF
BASOPHILS # BLD AUTO: 0.02 10*3/MM3 (ref 0–0.3)
BASOPHILS NFR BLD AUTO: 0.3 % (ref 0–2)
BILIRUB SERPL-MCNC: 0.5 MG/DL (ref 0.2–1.8)
BILIRUB UR QL STRIP: NEGATIVE
BUN BLD-MCNC: 15 MG/DL (ref 7–21)
BUN/CREAT SERPL: 16.3 (ref 7–25)
CALCIUM SPEC-SCNC: 9.2 MG/DL (ref 7.7–10)
CHLORIDE SERPL-SCNC: 107 MMOL/L (ref 99–112)
CLARITY UR: CLEAR
CO2 SERPL-SCNC: 25.3 MMOL/L (ref 24.3–31.9)
COLOR UR: YELLOW
CREAT BLD-MCNC: 0.92 MG/DL (ref 0.43–1.29)
DEPRECATED RDW RBC AUTO: 42.2 FL (ref 37–54)
EOSINOPHIL # BLD AUTO: 0.4 10*3/MM3 (ref 0–0.7)
EOSINOPHIL NFR BLD AUTO: 6.6 % (ref 0–7)
ERYTHROCYTE [DISTWIDTH] IN BLOOD BY AUTOMATED COUNT: 12.9 % (ref 11.5–14.5)
GFR SERPL CREATININE-BSD FRML MDRD: 60 ML/MIN/1.73
GLOBULIN UR ELPH-MCNC: 2.3 GM/DL
GLUCOSE BLD-MCNC: 78 MG/DL (ref 70–110)
GLUCOSE UR STRIP-MCNC: NEGATIVE MG/DL
HCT VFR BLD AUTO: 36.8 % (ref 37–47)
HGB BLD-MCNC: 12.5 G/DL (ref 12–16)
HGB UR QL STRIP.AUTO: NEGATIVE
HYALINE CASTS UR QL AUTO: ABNORMAL /LPF
IMM GRANULOCYTES # BLD AUTO: 0.01 10*3/MM3 (ref 0–0.03)
IMM GRANULOCYTES NFR BLD AUTO: 0.2 % (ref 0–0.5)
KETONES UR QL STRIP: NEGATIVE
LEUKOCYTE ESTERASE UR QL STRIP.AUTO: ABNORMAL
LYMPHOCYTES # BLD AUTO: 1.13 10*3/MM3 (ref 1–3)
LYMPHOCYTES NFR BLD AUTO: 18.7 % (ref 16–46)
MCH RBC QN AUTO: 31.5 PG (ref 27–33)
MCHC RBC AUTO-ENTMCNC: 34 G/DL (ref 33–37)
MCV RBC AUTO: 92.7 FL (ref 80–94)
MONOCYTES # BLD AUTO: 0.56 10*3/MM3 (ref 0.1–0.9)
MONOCYTES NFR BLD AUTO: 9.3 % (ref 0–12)
NEUTROPHILS # BLD AUTO: 3.93 10*3/MM3 (ref 1.4–6.5)
NEUTROPHILS NFR BLD AUTO: 64.9 % (ref 40–75)
NITRITE UR QL STRIP: NEGATIVE
OSMOLALITY SERPL CALC.SUM OF ELEC: 279.1 MOSM/KG (ref 273–305)
PH UR STRIP.AUTO: 6.5 [PH] (ref 5–8)
PLATELET # BLD AUTO: 196 10*3/MM3 (ref 130–400)
PMV BLD AUTO: 12.5 FL (ref 6–10)
POTASSIUM BLD-SCNC: 3.9 MMOL/L (ref 3.5–5.3)
PROT SERPL-MCNC: 6.8 G/DL (ref 6–8)
PROT UR QL STRIP: NEGATIVE
RBC # BLD AUTO: 3.97 10*6/MM3 (ref 4.2–5.4)
RBC # UR: ABNORMAL /HPF
REF LAB TEST METHOD: ABNORMAL
SODIUM BLD-SCNC: 140 MMOL/L (ref 135–153)
SP GR UR STRIP: 1.02 (ref 1–1.03)
SQUAMOUS #/AREA URNS HPF: ABNORMAL /HPF
TROPONIN I SERPL-MCNC: <0.006 NG/ML
TROPONIN I SERPL-MCNC: <0.006 NG/ML
UROBILINOGEN UR QL STRIP: ABNORMAL
WBC NRBC COR # BLD: 6.05 10*3/MM3 (ref 4.5–12.5)
WBC UR QL AUTO: ABNORMAL /HPF

## 2019-02-04 PROCEDURE — 70450 CT HEAD/BRAIN W/O DYE: CPT

## 2019-02-04 PROCEDURE — 84484 ASSAY OF TROPONIN QUANT: CPT | Performed by: PHYSICIAN ASSISTANT

## 2019-02-04 PROCEDURE — 85025 COMPLETE CBC W/AUTO DIFF WBC: CPT | Performed by: EMERGENCY MEDICINE

## 2019-02-04 PROCEDURE — 99283 EMERGENCY DEPT VISIT LOW MDM: CPT

## 2019-02-04 PROCEDURE — 93005 ELECTROCARDIOGRAM TRACING: CPT | Performed by: EMERGENCY MEDICINE

## 2019-02-04 PROCEDURE — 84484 ASSAY OF TROPONIN QUANT: CPT | Performed by: EMERGENCY MEDICINE

## 2019-02-04 PROCEDURE — 93010 ELECTROCARDIOGRAM REPORT: CPT | Performed by: INTERNAL MEDICINE

## 2019-02-04 PROCEDURE — 81001 URINALYSIS AUTO W/SCOPE: CPT | Performed by: EMERGENCY MEDICINE

## 2019-02-04 PROCEDURE — 87086 URINE CULTURE/COLONY COUNT: CPT | Performed by: EMERGENCY MEDICINE

## 2019-02-04 PROCEDURE — 80053 COMPREHEN METABOLIC PANEL: CPT | Performed by: EMERGENCY MEDICINE

## 2019-02-04 PROCEDURE — 36415 COLL VENOUS BLD VENIPUNCTURE: CPT

## 2019-02-04 PROCEDURE — 70450 CT HEAD/BRAIN W/O DYE: CPT | Performed by: RADIOLOGY

## 2019-02-04 RX ORDER — AMLODIPINE BESYLATE 5 MG/1
5 TABLET ORAL ONCE
Status: COMPLETED | OUTPATIENT
Start: 2019-02-04 | End: 2019-02-04

## 2019-02-04 RX ADMIN — AMLODIPINE BESYLATE 5 MG: 5 TABLET ORAL at 11:00

## 2019-02-04 NOTE — ED PROVIDER NOTES
Subjective   Pt sent to ED by PCP for HTN. States she has norvasc that she takes as needed if elevated, but did not take it this morning. She went to PCP for urinary symptoms. She denies any chest pain/pressure, or shortness of breath. States she has had a headache the past few days. Otherwise asymptomatic.         History provided by:  Patient   used: No    Hypertension   Severity:  Moderate  Onset quality:  Unable to specify  Timing:  Constant  Progression:  Improving  Chronicity:  Recurrent  Notable PTA blood pressures:  190/120  Context: normal sodium, not caffeine, not drug abuse, not medication change, not noncompliance, not OTC medications used and not stress    Relieved by:  None tried  Worsened by:  Nothing  Ineffective treatments:  None tried  Associated symptoms: headaches    Associated symptoms: no blurred vision, no chest pain, no confusion, no dizziness, no fever, no hypokalemia, no loss of consciousness, no nausea, no palpitations, no peripheral edema, no shortness of breath and not vomiting    Risk factors: no decongestant use, no diabetes, no family history of hypertension, no kidney disease, no prior aneurysm and no prior stroke        Review of Systems   Constitutional: Negative for activity change and fever.   HENT: Negative for congestion, rhinorrhea and sore throat.    Eyes: Negative for blurred vision, pain and redness.   Respiratory: Negative for cough, shortness of breath and wheezing.    Cardiovascular: Negative for chest pain and palpitations.   Gastrointestinal: Negative for abdominal distention, diarrhea, nausea and vomiting.   Endocrine: Negative for polyphagia and polyuria.   Genitourinary: Negative for decreased urine volume, difficulty urinating and dysuria.   Musculoskeletal: Negative for arthralgias and myalgias.   Skin: Negative for rash and wound.   Allergic/Immunologic: Negative for food allergies and immunocompromised state.   Neurological: Positive for  headaches. Negative for dizziness and loss of consciousness.   Hematological: Negative for adenopathy. Does not bruise/bleed easily.   Psychiatric/Behavioral: Negative for agitation and confusion.   All other systems reviewed and are negative.      Past Medical History:   Diagnosis Date   • Arthritis    • Cardiac disorder    • Diverticulitis    • Dysphagia    • Dysuria    • Fibromyalgia    • GERD (gastroesophageal reflux disease)    • History of rheumatic fever    • Hypertension    • Hypokalemia due to inadequate potassium intake 03/03/2014   • Lipoma    • Malignant neoplasm (CMS/HCC)    • Migraine    • Recurrent UTI    • Sleep apnea    • Thyroid cancer (CMS/HCC)    • UTI (urinary tract infection)        No Known Allergies    Past Surgical History:   Procedure Laterality Date   • BLADDER SURGERY     • HYSTERECTOMY     • THYROID SURGERY         Family History   Problem Relation Age of Onset   • Diabetes Mother    • Hypertension Mother    • Other Other         cardiac disorder,cardiovascular disease, malignant neoplasm of brain   • Breast cancer Paternal Aunt        Social History     Socioeconomic History   • Marital status:      Spouse name: Not on file   • Number of children: Not on file   • Years of education: Not on file   • Highest education level: Not on file   Tobacco Use   • Smoking status: Never Smoker   • Smokeless tobacco: Never Used   Substance and Sexual Activity   • Alcohol use: No   • Drug use: No   • Sexual activity: Defer           Objective   Physical Exam   Constitutional: She is oriented to person, place, and time. She appears well-developed and well-nourished.   HENT:   Head: Normocephalic and atraumatic.   Eyes: EOM are normal. Pupils are equal, round, and reactive to light.   Neck: Normal range of motion. Neck supple.   Cardiovascular: Normal rate, regular rhythm and normal heart sounds.   Pulmonary/Chest: Effort normal and breath sounds normal.   Abdominal: Soft. Bowel sounds are  normal. There is no tenderness.   Musculoskeletal: Normal range of motion.   Neurological: She is alert and oriented to person, place, and time. GCS eye subscore is 4. GCS verbal subscore is 5. GCS motor subscore is 6.   Skin: Skin is warm and dry.   Psychiatric: She has a normal mood and affect. Her behavior is normal. Judgment and thought content normal.   Nursing note and vitals reviewed.      Procedures           ED Course                  MDM  Number of Diagnoses or Management Options     Amount and/or Complexity of Data Reviewed  Clinical lab tests: ordered and reviewed  Tests in the radiology section of CPT®: ordered and reviewed  Tests in the medicine section of CPT®: reviewed and ordered  Independent visualization of images, tracings, or specimens: yes    Patient Progress  Patient progress: stable        Final diagnoses:   Hypertension, unspecified type            Jameel Cox PA  02/04/19 1522

## 2019-02-06 ENCOUNTER — TELEPHONE (OUTPATIENT)
Dept: CARDIOLOGY | Facility: CLINIC | Age: 74
End: 2019-02-06

## 2019-02-06 LAB — BACTERIA SPEC AEROBE CULT: NORMAL

## 2019-02-06 NOTE — TELEPHONE ENCOUNTER
Pt's daughter called back and stated that Kiah BP can be 199/106 and then drop real low.   She stated that Kiah BP this morning was 179/103 she took her BP meds at 8. BP 30 minutes later qas 146/79 and an hour later it was 112/64. BP while on the phone was 151/94 HR 72.

## 2019-02-06 NOTE — TELEPHONE ENCOUNTER
If it was only one reading of 179/103 it was probably not correct and the other rest of correct. Advise her to continue checking and recording it.

## 2019-02-07 NOTE — TELEPHONE ENCOUNTER
Pt's daughter called me back and stated that he BP was high again this morning. 174/102 when she first got up and 30 minutes later her BP was 144/69. Another reading was 114/57. Pt has an apt with PCP today at 3 advised her to call and let us know if they change her medicines she expressed understanding.

## 2019-02-11 ENCOUNTER — HOSPITAL ENCOUNTER (EMERGENCY)
Facility: HOSPITAL | Age: 74
Discharge: HOME | End: 2019-02-11
Admitting: EMERGENCY MEDICINE

## 2019-02-11 VITALS
BODY MASS INDEX: 22.38 KG/M2 | WEIGHT: 114 LBS | SYSTOLIC BLOOD PRESSURE: 138 MMHG | HEIGHT: 60 IN | DIASTOLIC BLOOD PRESSURE: 86 MMHG | HEART RATE: 72 BPM | RESPIRATION RATE: 18 BRPM | OXYGEN SATURATION: 96 % | TEMPERATURE: 97.7 F

## 2019-02-11 DIAGNOSIS — R11.10 VOMITING AND DIARRHEA: Primary | ICD-10-CM

## 2019-02-11 DIAGNOSIS — R19.7 VOMITING AND DIARRHEA: Primary | ICD-10-CM

## 2019-02-11 LAB
ALBUMIN SERPL-MCNC: 4.2 G/DL (ref 3.4–4.8)
ALBUMIN/GLOB SERPL: 1.9 G/DL (ref 1.5–2.5)
ALP SERPL-CCNC: 75 U/L (ref 35–104)
ALT SERPL W P-5'-P-CCNC: 21 U/L (ref 10–36)
AMYLASE SERPL-CCNC: 25 U/L (ref 28–100)
ANION GAP SERPL CALCULATED.3IONS-SCNC: 8.2 MMOL/L (ref 3.6–11.2)
AST SERPL-CCNC: 29 U/L (ref 10–30)
BASOPHILS # BLD AUTO: 0.01 10*3/MM3 (ref 0–0.3)
BASOPHILS NFR BLD AUTO: 0.2 % (ref 0–2)
BILIRUB SERPL-MCNC: 0.5 MG/DL (ref 0.2–1.8)
BILIRUB UR QL STRIP: NEGATIVE
BUN BLD-MCNC: 15 MG/DL (ref 7–21)
BUN/CREAT SERPL: 21.7 (ref 7–25)
CALCIUM SPEC-SCNC: 8.8 MG/DL (ref 7.7–10)
CHLORIDE SERPL-SCNC: 109 MMOL/L (ref 99–112)
CLARITY UR: CLEAR
CO2 SERPL-SCNC: 20.8 MMOL/L (ref 24.3–31.9)
COLOR UR: YELLOW
CREAT BLD-MCNC: 0.69 MG/DL (ref 0.43–1.29)
DEPRECATED RDW RBC AUTO: 41.1 FL (ref 37–54)
EOSINOPHIL # BLD AUTO: 0.12 10*3/MM3 (ref 0–0.7)
EOSINOPHIL NFR BLD AUTO: 1.9 % (ref 0–7)
ERYTHROCYTE [DISTWIDTH] IN BLOOD BY AUTOMATED COUNT: 12.4 % (ref 11.5–14.5)
FLUAV AG NPH QL: NEGATIVE
FLUBV AG NPH QL IA: NEGATIVE
GFR SERPL CREATININE-BSD FRML MDRD: 83 ML/MIN/1.73
GLOBULIN UR ELPH-MCNC: 2.2 GM/DL
GLUCOSE BLD-MCNC: 105 MG/DL (ref 70–110)
GLUCOSE UR STRIP-MCNC: NEGATIVE MG/DL
HCT VFR BLD AUTO: 36.2 % (ref 37–47)
HGB BLD-MCNC: 12.4 G/DL (ref 12–16)
HGB UR QL STRIP.AUTO: NEGATIVE
IMM GRANULOCYTES # BLD AUTO: 0.01 10*3/MM3 (ref 0–0.03)
IMM GRANULOCYTES NFR BLD AUTO: 0.2 % (ref 0–0.5)
KETONES UR QL STRIP: NEGATIVE
LEUKOCYTE ESTERASE UR QL STRIP.AUTO: NEGATIVE
LIPASE SERPL-CCNC: 24 U/L (ref 13–60)
LYMPHOCYTES # BLD AUTO: 0.61 10*3/MM3 (ref 1–3)
LYMPHOCYTES NFR BLD AUTO: 9.8 % (ref 16–46)
MCH RBC QN AUTO: 31.3 PG (ref 27–33)
MCHC RBC AUTO-ENTMCNC: 34.3 G/DL (ref 33–37)
MCV RBC AUTO: 91.4 FL (ref 80–94)
MONOCYTES # BLD AUTO: 0.52 10*3/MM3 (ref 0.1–0.9)
MONOCYTES NFR BLD AUTO: 8.4 % (ref 0–12)
NEUTROPHILS # BLD AUTO: 4.94 10*3/MM3 (ref 1.4–6.5)
NEUTROPHILS NFR BLD AUTO: 79.5 % (ref 40–75)
NITRITE UR QL STRIP: NEGATIVE
OSMOLALITY SERPL CALC.SUM OF ELEC: 276.9 MOSM/KG (ref 273–305)
PH UR STRIP.AUTO: <=5 [PH] (ref 5–8)
PLATELET # BLD AUTO: 160 10*3/MM3 (ref 130–400)
PMV BLD AUTO: 12.9 FL (ref 6–10)
POTASSIUM BLD-SCNC: 3.5 MMOL/L (ref 3.5–5.3)
PROT SERPL-MCNC: 6.4 G/DL (ref 6–8)
PROT UR QL STRIP: ABNORMAL
RBC # BLD AUTO: 3.96 10*6/MM3 (ref 4.2–5.4)
SODIUM BLD-SCNC: 138 MMOL/L (ref 135–153)
SP GR UR STRIP: 1.03 (ref 1–1.03)
UROBILINOGEN UR QL STRIP: ABNORMAL
WBC NRBC COR # BLD: 6.21 10*3/MM3 (ref 4.5–12.5)

## 2019-02-11 PROCEDURE — 81003 URINALYSIS AUTO W/O SCOPE: CPT | Performed by: NURSE PRACTITIONER

## 2019-02-11 PROCEDURE — 96374 THER/PROPH/DIAG INJ IV PUSH: CPT

## 2019-02-11 PROCEDURE — 87804 INFLUENZA ASSAY W/OPTIC: CPT | Performed by: NURSE PRACTITIONER

## 2019-02-11 PROCEDURE — 85025 COMPLETE CBC W/AUTO DIFF WBC: CPT | Performed by: NURSE PRACTITIONER

## 2019-02-11 PROCEDURE — 25010000002 ONDANSETRON PER 1 MG: Performed by: NURSE PRACTITIONER

## 2019-02-11 PROCEDURE — 99283 EMERGENCY DEPT VISIT LOW MDM: CPT

## 2019-02-11 PROCEDURE — 83690 ASSAY OF LIPASE: CPT | Performed by: NURSE PRACTITIONER

## 2019-02-11 PROCEDURE — 80053 COMPREHEN METABOLIC PANEL: CPT | Performed by: NURSE PRACTITIONER

## 2019-02-11 PROCEDURE — 82150 ASSAY OF AMYLASE: CPT | Performed by: NURSE PRACTITIONER

## 2019-02-11 RX ORDER — ONDANSETRON 4 MG/1
4 TABLET, ORALLY DISINTEGRATING ORAL EVERY 6 HOURS PRN
Qty: 12 TABLET | Refills: 0 | Status: SHIPPED | OUTPATIENT
Start: 2019-02-11 | End: 2019-03-07

## 2019-02-11 RX ORDER — ONDANSETRON 2 MG/ML
4 INJECTION INTRAMUSCULAR; INTRAVENOUS ONCE
Status: COMPLETED | OUTPATIENT
Start: 2019-02-11 | End: 2019-02-11

## 2019-02-11 RX ORDER — SODIUM CHLORIDE 0.9 % (FLUSH) 0.9 %
10 SYRINGE (ML) INJECTION AS NEEDED
Status: DISCONTINUED | OUTPATIENT
Start: 2019-02-11 | End: 2019-02-11 | Stop reason: HOSPADM

## 2019-02-11 RX ADMIN — ONDANSETRON 4 MG: 2 INJECTION, SOLUTION INTRAMUSCULAR; INTRAVENOUS at 12:36

## 2019-02-11 RX ADMIN — SODIUM CHLORIDE 250 ML: 9 INJECTION, SOLUTION INTRAVENOUS at 11:24

## 2019-02-11 NOTE — ED PROVIDER NOTES
Subjective     History provided by:  Patient   used: No    Vomiting   The primary symptoms include vomiting and diarrhea. The illness began 2 days ago. The onset was gradual. The problem has been gradually improving.   The illness does not include chills, anorexia, dysphagia, odynophagia, bloating, tenesmus or back pain. Associated medical issues do not include inflammatory bowel disease, GERD, gallstones, liver disease, alcohol abuse, gastric bypass, bowel resection, hemorrhoids or diverticulitis. Risk factors: History of follicular lymphoma.       Review of Systems   Constitutional: Negative.  Negative for chills.   HENT: Negative.    Eyes: Negative.    Respiratory: Negative.    Cardiovascular: Negative.    Gastrointestinal: Positive for diarrhea and vomiting. Negative for anorexia, bloating and dysphagia.   Endocrine: Negative.    Genitourinary: Negative.    Musculoskeletal: Negative.  Negative for back pain.   Skin: Negative.    Allergic/Immunologic: Negative.    Neurological: Negative.    Hematological: Negative.    Psychiatric/Behavioral: Negative.        Past Medical History:   Diagnosis Date   • Arthritis    • Cardiac disorder    • Diverticulitis    • Dysphagia    • Dysuria    • Fibromyalgia    • GERD (gastroesophageal reflux disease)    • History of rheumatic fever    • Hypertension    • Hypokalemia due to inadequate potassium intake 03/03/2014   • Lipoma    • Malignant neoplasm (CMS/HCC)    • Migraine    • Recurrent UTI    • Sleep apnea    • Thyroid cancer (CMS/HCC)    • UTI (urinary tract infection)        No Known Allergies    Past Surgical History:   Procedure Laterality Date   • BLADDER SURGERY     • HYSTERECTOMY     • THYROID SURGERY         Family History   Problem Relation Age of Onset   • Diabetes Mother    • Hypertension Mother    • Other Other         cardiac disorder,cardiovascular disease, malignant neoplasm of brain   • Breast cancer Paternal Aunt        Social History      Socioeconomic History   • Marital status:      Spouse name: Not on file   • Number of children: Not on file   • Years of education: Not on file   • Highest education level: Not on file   Tobacco Use   • Smoking status: Never Smoker   • Smokeless tobacco: Never Used   Substance and Sexual Activity   • Alcohol use: No   • Drug use: No   • Sexual activity: Defer           Objective   Physical Exam   Constitutional: She is oriented to person, place, and time. She appears well-developed and well-nourished.   HENT:   Head: Normocephalic.   Right Ear: External ear normal.   Left Ear: External ear normal.   Mouth/Throat: Oropharynx is clear and moist.   Eyes: Conjunctivae and EOM are normal. Pupils are equal, round, and reactive to light.   Neck: Normal range of motion. Neck supple.   Cardiovascular: Normal rate, regular rhythm, normal heart sounds and intact distal pulses.   Pulmonary/Chest: Effort normal and breath sounds normal.   Abdominal: Soft. Bowel sounds are normal.   Musculoskeletal: Normal range of motion.   Neurological: She is alert and oriented to person, place, and time.   Skin: Skin is warm and dry. Capillary refill takes less than 2 seconds.   Psychiatric: She has a normal mood and affect. Her behavior is normal. Thought content normal.   Nursing note and vitals reviewed.      Procedures           ED Course                  MDM      Final diagnoses:   Vomiting and diarrhea            Johny Cloud, APRN  02/12/19 3085

## 2019-03-07 ENCOUNTER — OFFICE VISIT (OUTPATIENT)
Dept: CARDIOLOGY | Facility: CLINIC | Age: 74
End: 2019-03-07

## 2019-03-07 ENCOUNTER — HOSPITAL ENCOUNTER (OUTPATIENT)
Dept: GENERAL RADIOLOGY | Facility: HOSPITAL | Age: 74
Discharge: HOME | End: 2019-03-07
Admitting: NURSE PRACTITIONER

## 2019-03-07 ENCOUNTER — LAB (OUTPATIENT)
Dept: LAB | Facility: HOSPITAL | Age: 74
End: 2019-03-07

## 2019-03-07 VITALS
HEART RATE: 71 BPM | BODY MASS INDEX: 22.58 KG/M2 | SYSTOLIC BLOOD PRESSURE: 131 MMHG | OXYGEN SATURATION: 97 % | HEIGHT: 60 IN | DIASTOLIC BLOOD PRESSURE: 63 MMHG | WEIGHT: 115 LBS

## 2019-03-07 DIAGNOSIS — R60.0 PEDAL EDEMA: ICD-10-CM

## 2019-03-07 DIAGNOSIS — C85.90 LYMPHOMA, UNSPECIFIED BODY REGION, UNSPECIFIED LYMPHOMA TYPE (HCC): ICD-10-CM

## 2019-03-07 DIAGNOSIS — I10 ESSENTIAL HYPERTENSION: Primary | ICD-10-CM

## 2019-03-07 DIAGNOSIS — R06.02 SOB (SHORTNESS OF BREATH): ICD-10-CM

## 2019-03-07 LAB
ANION GAP SERPL CALCULATED.3IONS-SCNC: 9.9 MMOL/L (ref 3.6–11.2)
BNP SERPL-MCNC: 133 PG/ML (ref 0–100)
BUN BLD-MCNC: 13 MG/DL (ref 7–21)
BUN/CREAT SERPL: 13.4 (ref 7–25)
CALCIUM SPEC-SCNC: 8.9 MG/DL (ref 7.7–10)
CHLORIDE SERPL-SCNC: 107 MMOL/L (ref 99–112)
CO2 SERPL-SCNC: 23.1 MMOL/L (ref 24.3–31.9)
CREAT BLD-MCNC: 0.97 MG/DL (ref 0.43–1.29)
GFR SERPL CREATININE-BSD FRML MDRD: 56 ML/MIN/1.73
GLUCOSE BLD-MCNC: 74 MG/DL (ref 70–110)
OSMOLALITY SERPL CALC.SUM OF ELEC: 278.2 MOSM/KG (ref 273–305)
POTASSIUM BLD-SCNC: 3.1 MMOL/L (ref 3.5–5.3)
SODIUM BLD-SCNC: 140 MMOL/L (ref 135–153)

## 2019-03-07 PROCEDURE — 71046 X-RAY EXAM CHEST 2 VIEWS: CPT

## 2019-03-07 PROCEDURE — 99213 OFFICE O/P EST LOW 20 MIN: CPT | Performed by: NURSE PRACTITIONER

## 2019-03-07 PROCEDURE — 36415 COLL VENOUS BLD VENIPUNCTURE: CPT

## 2019-03-07 PROCEDURE — 83880 ASSAY OF NATRIURETIC PEPTIDE: CPT

## 2019-03-07 PROCEDURE — 71046 X-RAY EXAM CHEST 2 VIEWS: CPT | Performed by: RADIOLOGY

## 2019-03-07 PROCEDURE — 80048 BASIC METABOLIC PNL TOTAL CA: CPT

## 2019-03-07 PROCEDURE — 93000 ELECTROCARDIOGRAM COMPLETE: CPT | Performed by: NURSE PRACTITIONER

## 2019-03-07 RX ORDER — FLUOXETINE HYDROCHLORIDE 20 MG/1
20 CAPSULE ORAL DAILY
COMMUNITY
End: 2021-01-01 | Stop reason: HOSPADM

## 2019-03-07 NOTE — PROGRESS NOTES
Aundrea Headley APRN  Kiah Conway  1945 03/07/2019    Patient Active Problem List   Diagnosis   • Cyst of bursa   • Essential hypertension   • Obstructive sleep apnea   • Precordial pain   • SOB (shortness of breath)   • Edema   • Palpitations   • Periaortic lymphadenopathy   • Pneumonia   • Paroxysmal SVT (supraventricular tachycardia) (CMS/[facility])       Dear Aundrea Headley APRN:    Subjective     Chief Complaint   Patient presents with   • Pedal edema     Bilat ankles, worse yesterday   • Palpitations   • Essential hypertension           History of Present Illness:    Kiah Conway is a 73 y.o. female with a past medical history significant for hypertension, severe pulmonary hypertension with history of sleep apnea, mild to moderate tricuspid regurgitation, and lymphoma.  She presents today at the request of her PCP.  Reports she presented to her PCP this morning and had complaints of bilateral leg edema.  This is been present for the past few days since receiving her last chemotherapy treatment for the lymphoma.  She denies any weight gain, orthopnea, PND, or shortness of breath.  She denies any chest pain or palpitations.  She does become short of breath with moderate exertion such as mopping vigorously or sweeping her floors.  She has been prescribed amlodipine but does not take this.  Her last echocardiogram was in April 2018 and revealed a normal EF with no significant valvular abnormality and no evidence of pulmonary hypertension.          No Known Allergies:      Current Outpatient Medications:   •  amLODIPine (NORVASC) 2.5 MG tablet, Take 1 tablet by mouth Daily. (Patient taking differently: Take 2.5 mg by mouth 1 (One) Time As Needed.), Disp: 30 tablet, Rfl: 5  •  aspirin 81 MG EC tablet, Take 81 mg by mouth Daily., Disp: , Rfl:   •  baclofen (LIORESAL) 10 MG tablet, Take 10 mg by mouth Daily As Needed., Disp: , Rfl:   •  busPIRone (BUSPAR) 5 MG tablet, Take 5 mg by mouth 2 (Two) Times a  Day., Disp: , Rfl:   •  Calcium Polycarbophil (FIBER-CAPS PO), Take 1 capsule by mouth Daily., Disp: , Rfl:   •  FLUoxetine (PROzac) 20 MG capsule, Take 20 mg by mouth Daily., Disp: , Rfl:   •  HYDROcodone-acetaminophen (NORCO) 7.5-325 MG per tablet, Take 1 tablet by mouth 3 (Three) Times a Day As Needed for Moderate Pain ., Disp: , Rfl:   •  hydroxychloroquine (PLAQUENIL) 200 MG tablet, Take 200 mg by mouth Daily., Disp: , Rfl:   •  lactobacillus acidophilus (RISAQUAD) capsule capsule, Take 1 capsule by mouth Daily., Disp: 7 capsule, Rfl: 0  •  loratadine (CLARITIN) 10 MG tablet, Take 10 mg by mouth Daily., Disp: , Rfl:   •  megestrol (MEGACE) 40 MG/ML suspension, Take 800 mg by mouth Daily., Disp: , Rfl:   •  metoprolol tartrate (LOPRESSOR) 50 MG tablet, Take 1 tablet by mouth Every 12 (Twelve) Hours., Disp: 60 tablet, Rfl: 5  •  omeprazole (priLOSEC) 40 MG capsule, Take 40 mg by mouth Daily., Disp: , Rfl:   •  pregabalin (LYRICA) 300 MG capsule, Take 300 mg by mouth 2 (Two) Times a Day., Disp: , Rfl:   •  Probiotic Product (ALIGN PO), Take 1 capsule by mouth Daily., Disp: , Rfl:       The following portions of the patient's history were reviewed and updated as appropriate: allergies, current medications, past family history, past medical history, past social history, past surgical history and problem list.    Social History     Tobacco Use   • Smoking status: Never Smoker   • Smokeless tobacco: Never Used   Substance Use Topics   • Alcohol use: No   • Drug use: No       Review of Systems   Constitution: Negative for weakness and malaise/fatigue.   Cardiovascular: Positive for leg swelling. Negative for chest pain, dyspnea on exertion, irregular heartbeat, near-syncope, orthopnea and palpitations.   Respiratory: Negative for cough, shortness of breath and wheezing.    Neurological: Negative for dizziness and light-headedness.       Objective   Vitals:    03/07/19 1310   BP: 131/63   BP Location: Left arm   Patient  "Position: Sitting   Cuff Size: Adult   Pulse: 71   SpO2: 97%   Weight: 52.2 kg (115 lb)   Height: 152.4 cm (60\")     Body mass index is 22.46 kg/m².        Physical Exam   Constitutional: She is oriented to person, place, and time. She appears well-developed and well-nourished.   HENT:   Head: Normocephalic and atraumatic.   Neck: No JVD present.   Cardiovascular: Normal rate, regular rhythm and normal heart sounds. Exam reveals no S3 and no S4.   No murmur heard.  Pulmonary/Chest: Effort normal and breath sounds normal. She has no wheezes. She has no rales.   Abdominal: Soft. Bowel sounds are normal.   Musculoskeletal: She exhibits edema (1+ edema BLE).   Neurological: She is alert and oriented to person, place, and time.   Skin: Skin is warm and dry.   Psychiatric: She has a normal mood and affect. Her behavior is normal.       Lab Results   Component Value Date     02/11/2019    K 3.5 02/11/2019     02/11/2019    CO2 20.8 (L) 02/11/2019    BUN 15 02/11/2019    CREATININE 0.69 02/11/2019    GLUCOSE 105 02/11/2019    CALCIUM 8.8 02/11/2019    AST 29 02/11/2019    ALT 21 02/11/2019    ALKPHOS 75 02/11/2019    LABIL2 1.4 (L) 05/06/2016     Lab Results   Component Value Date    CKTOTAL 80 10/08/2018     Lab Results   Component Value Date    WBC 6.21 02/11/2019    HGB 12.4 02/11/2019    HCT 36.2 (L) 02/11/2019     02/11/2019     Lab Results   Component Value Date    INR <0.90 05/06/2016     Lab Results   Component Value Date    MG 1.7 10/11/2018     Lab Results   Component Value Date    TSH 0.310 (L) 10/03/2017      Lab Results   Component Value Date    .0 (H) 10/11/2018             ECG 12 Lead  Date/Time: 3/7/2019 1:17 PM  Performed by: Maranda Montoya APRN  Authorized by: Maranda Montoya APRN   Comparison: compared with previous ECG   Similar to previous ECG  Rhythm: sinus rhythm  BPM: 70  Comments: Questionable WPW  Reviewed with Dr. Aguilar              Assessment/Plan    Diagnosis " Plan   1. Essential hypertension     2. SOB (shortness of breath)  Basic Metabolic Panel    BNP    XR Chest 2 View   3. Pedal edema  Basic Metabolic Panel    BNP    XR Chest 2 View   4. Lymphoma, unspecified body region, unspecified lymphoma type (CMS/HCC)  XR Chest 2 View                Recommendations:    1. Will order a BMP, BNP, and chest x-ray to rule out acute congestive heart failure    2. I have encouraged her to keep her feet elevated and avoid high sodium foods    3. Follow up in 4 weeks as scheduled.          Patient's Body mass index is 22.46 kg/m². BMI is within normal parameters. No follow-up required..         Return for Next scheduled follow up.    As always, I appreciate very much the opportunity to participate in the cardiovascular care of your patients.      With Best Regards,    Maranda Montoya, APRN

## 2019-03-08 DIAGNOSIS — E87.6 HYPOKALEMIA: Primary | ICD-10-CM

## 2019-03-08 RX ORDER — POTASSIUM CHLORIDE 750 MG/1
10 TABLET, FILM COATED, EXTENDED RELEASE ORAL 2 TIMES DAILY
Qty: 10 TABLET | Refills: 0 | Status: SHIPPED | OUTPATIENT
Start: 2019-03-08 | End: 2019-03-13

## 2019-03-14 ENCOUNTER — LAB (OUTPATIENT)
Dept: LAB | Facility: HOSPITAL | Age: 74
End: 2019-03-14

## 2019-03-14 DIAGNOSIS — E87.6 HYPOKALEMIA: ICD-10-CM

## 2019-03-14 LAB
ANION GAP SERPL CALCULATED.3IONS-SCNC: 13.2 MMOL/L
BUN BLD-MCNC: 14 MG/DL (ref 8–23)
BUN/CREAT SERPL: 14.7 (ref 7–25)
CALCIUM SPEC-SCNC: 9.2 MG/DL (ref 8.6–10.5)
CHLORIDE SERPL-SCNC: 104 MMOL/L (ref 98–107)
CO2 SERPL-SCNC: 22.8 MMOL/L (ref 22–29)
CREAT BLD-MCNC: 0.95 MG/DL (ref 0.57–1)
GFR SERPL CREATININE-BSD FRML MDRD: 57 ML/MIN/1.73
GLUCOSE BLD-MCNC: 78 MG/DL (ref 65–99)
POTASSIUM BLD-SCNC: 4.5 MMOL/L (ref 3.5–5.2)
SODIUM BLD-SCNC: 140 MMOL/L (ref 136–145)

## 2019-03-14 PROCEDURE — 80048 BASIC METABOLIC PNL TOTAL CA: CPT

## 2019-03-14 PROCEDURE — 36415 COLL VENOUS BLD VENIPUNCTURE: CPT

## 2019-03-15 ENCOUNTER — TELEPHONE (OUTPATIENT)
Dept: CARDIOLOGY | Facility: CLINIC | Age: 74
End: 2019-03-15

## 2019-03-19 ENCOUNTER — OFFICE VISIT (OUTPATIENT)
Dept: UROLOGY | Facility: CLINIC | Age: 74
End: 2019-03-19

## 2019-03-19 VITALS — HEIGHT: 60 IN | BODY MASS INDEX: 22.58 KG/M2 | WEIGHT: 115 LBS

## 2019-03-19 DIAGNOSIS — R33.9 URINARY RETENTION: ICD-10-CM

## 2019-03-19 DIAGNOSIS — N30.00 ACUTE CYSTITIS WITHOUT HEMATURIA: ICD-10-CM

## 2019-03-19 DIAGNOSIS — N39.0 URINARY TRACT INFECTION WITHOUT HEMATURIA, SITE UNSPECIFIED: Primary | ICD-10-CM

## 2019-03-19 DIAGNOSIS — N34.2 ATROPHIC URETHRITIS: ICD-10-CM

## 2019-03-19 LAB
BILIRUB BLD-MCNC: NEGATIVE MG/DL
CLARITY, POC: CLEAR
COLOR UR: YELLOW
GLUCOSE UR STRIP-MCNC: NEGATIVE MG/DL
KETONES UR QL: NEGATIVE
LEUKOCYTE EST, POC: NEGATIVE
NITRITE UR-MCNC: NEGATIVE MG/ML
PH UR: 6 [PH] (ref 5–8)
PROT UR STRIP-MCNC: NEGATIVE MG/DL
RBC # UR STRIP: NEGATIVE /UL
SP GR UR: 1.03 (ref 1–1.03)
UROBILINOGEN UR QL: NORMAL

## 2019-03-19 PROCEDURE — 51798 US URINE CAPACITY MEASURE: CPT | Performed by: UROLOGY

## 2019-03-19 PROCEDURE — 81003 URINALYSIS AUTO W/O SCOPE: CPT | Performed by: UROLOGY

## 2019-03-19 PROCEDURE — 99204 OFFICE O/P NEW MOD 45 MIN: CPT | Performed by: UROLOGY

## 2019-03-19 RX ORDER — NITROFURANTOIN 25; 75 MG/1; MG/1
100 CAPSULE ORAL DAILY
Qty: 60 CAPSULE | Refills: 3 | Status: SHIPPED | OUTPATIENT
Start: 2019-03-19 | End: 2020-07-15

## 2019-03-19 NOTE — PROGRESS NOTES
Chief Complaint:          Chief Complaint   Patient presents with   • Urinary Tract Infection       HPI:   73 y.o. female.  3-year-old white female referred with recurrent urinary tract infections.  Last one she had 2 rounds of Cipro.  She has chills no fever nausea no vomiting dysuria no pressure.  Normal bowel movements no allergies.  Urinalysis was negative.  Her postvoid residual is negligible.  She has had a bladder tack by Dr. Burnette in [place] a history of thyroid cancer and a follicular lymphoma she has severe COPD.  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  With prophylaxis and we discussed other factors including topical Estrace.    Past Medical History:        Past Medical History:   Diagnosis Date   • Arthritis    • Cardiac disorder    • Diverticulitis    • Dysphagia    • Dysuria    • Fibromyalgia    • GERD (gastroesophageal reflux disease)    • History of rheumatic fever    • Hypertension    • Hypokalemia due to inadequate potassium intake 03/03/2014   • Lipoma    • Malignant neoplasm (CMS/HCC)    • Migraine    • Recurrent UTI    • Sleep apnea    • Thyroid cancer (CMS/HCC)    • UTI (urinary tract infection)          Current Meds:     Current Outpatient Medications   Medication Sig Dispense Refill   • amLODIPine (NORVASC) 2.5 MG tablet Take 1 tablet by mouth Daily. (Patient taking differently: Take 2.5 mg by mouth 1 (One) Time As Needed.) 30 tablet 5   • aspirin 81 MG EC tablet Take 81 mg by mouth Daily.     • baclofen (LIORESAL) 10 MG tablet Take 10 mg by mouth Daily As Needed.     • busPIRone (BUSPAR) 5 MG tablet Take 5 mg by mouth 2 (Two) Times a Day.     • Calcium Polycarbophil (FIBER-CAPS PO) Take 1 capsule by mouth Daily.     • FLUoxetine (PROzac) 20 MG capsule Take 20 mg by mouth Daily.     • HYDROcodone-acetaminophen (NORCO) 7.5-325 MG per tablet Take 1 tablet by mouth 3 (Three) Times a Day As Needed for Moderate Pain .     • hydroxychloroquine (PLAQUENIL) 200 MG  tablet Take 200 mg by mouth Daily.     • lactobacillus acidophilus (RISAQUAD) capsule capsule Take 1 capsule by mouth Daily. 7 capsule 0   • loratadine (CLARITIN) 10 MG tablet Take 10 mg by mouth Daily.     • megestrol (MEGACE) 40 MG/ML suspension Take 800 mg by mouth Daily.     • metoprolol tartrate (LOPRESSOR) 50 MG tablet Take 1 tablet by mouth Every 12 (Twelve) Hours. 60 tablet 5   • omeprazole (priLOSEC) 40 MG capsule Take 40 mg by mouth Daily.     • pregabalin (LYRICA) 300 MG capsule Take 300 mg by mouth 2 (Two) Times a Day.     • Probiotic Product (ALIGN PO) Take 1 capsule by mouth Daily.       No current facility-administered medications for this visit.         Allergies:      No Known Allergies     Past Surgical History:     Past Surgical History:   Procedure Laterality Date   • BLADDER SURGERY     • HYSTERECTOMY     • THYROID SURGERY           Social History:     Social History     Socioeconomic History   • Marital status:      Spouse name: Not on file   • Number of children: Not on file   • Years of education: Not on file   • Highest education level: Not on file   Social Needs   • Financial resource strain: Not on file   • Food insecurity - worry: Not on file   • Food insecurity - inability: Not on file   • Transportation needs - medical: Not on file   • Transportation needs - non-medical: Not on file   Occupational History   • Not on file   Tobacco Use   • Smoking status: Never Smoker   • Smokeless tobacco: Never Used   Substance and Sexual Activity   • Alcohol use: No   • Drug use: No   • Sexual activity: Defer   Other Topics Concern   • Not on file   Social History Narrative   • Not on file       Family History:     Family History   Problem Relation Age of Onset   • Diabetes Mother    • Hypertension Mother    • Other Other         cardiac disorder,cardiovascular disease, malignant neoplasm of brain   • Breast cancer Paternal Aunt        Review of Systems:     Review of Systems   Constitutional:  Negative.  Negative for activity change, appetite change, chills, diaphoresis, fatigue and unexpected weight change.   HENT: Negative for congestion, dental problem, drooling, ear discharge, ear pain, facial swelling, hearing loss, mouth sores, nosebleeds, postnasal drip, rhinorrhea, sinus pressure, sneezing, sore throat, tinnitus, trouble swallowing and voice change.    Eyes: Negative.  Negative for photophobia, pain, discharge, redness, itching and visual disturbance.   Respiratory: Negative.  Negative for apnea, cough, choking, chest tightness, shortness of breath, wheezing and stridor.    Cardiovascular: Negative.  Negative for chest pain, palpitations and leg swelling.   Gastrointestinal: Negative.  Negative for abdominal distention, abdominal pain, anal bleeding, blood in stool, constipation, diarrhea, nausea, rectal pain and vomiting.   Endocrine: Negative.  Negative for cold intolerance, heat intolerance, polydipsia, polyphagia and polyuria.   Musculoskeletal: Negative.  Negative for arthralgias, back pain, gait problem, joint swelling, myalgias, neck pain and neck stiffness.   Skin: Negative.  Negative for color change, pallor, rash and wound.   Allergic/Immunologic: Negative.  Negative for environmental allergies, food allergies and immunocompromised state.   Neurological: Negative.  Negative for dizziness, tremors, seizures, syncope, facial asymmetry, speech difficulty, weakness, light-headedness, numbness and headaches.   Hematological: Negative.  Negative for adenopathy. Does not bruise/bleed easily.   Psychiatric/Behavioral: Negative for agitation, behavioral problems, confusion, decreased concentration, dysphoric mood, hallucinations, self-injury, sleep disturbance and suicidal ideas. The patient is not nervous/anxious and is not hyperactive.    All other systems reviewed and are negative.      Physical Exam:     Physical Exam   Constitutional: She appears well-developed and well-nourished.   HENT:    Head: Normocephalic and atraumatic.   Right Ear: External ear normal.   Left Ear: External ear normal.   Mouth/Throat: Oropharynx is clear and moist.   Eyes: Conjunctivae are normal. Pupils are equal, round, and reactive to light.   Cardiovascular: Normal rate, regular rhythm, normal heart sounds and intact distal pulses.   Pulmonary/Chest: Effort normal and breath sounds normal.   Abdominal: Soft. Bowel sounds are normal. She exhibits no distension and no mass. There is no tenderness. There is no rebound and no guarding.   Genitourinary: No vaginal discharge found.   Genitourinary Comments: Atrophic urethritis and vaginitis   Musculoskeletal: Normal range of motion.   Neurological: She is alert. She has normal reflexes.   Skin: Skin is warm and dry.   Psychiatric: She has a normal mood and affect. Her behavior is normal. Judgment and thought content normal.       I have reviewed the following portions of the patient's history: allergies, current medications, past family history, past medical history, past social history, past surgical history, problem list and ROS and confirm it's accurate.      Procedure:       Assessment/Plan:   Recurrent urinary tract infections-patient has been referred and diagnosed with recurrent urinary tract infections.  We discussed the types of organisms that are found in the urinary tract indicating that the vast majority are results of the patient's own gastrointestinal tejas.  We discussed how many of the antibiotics that are utilized can actually exacerbate these infections by creating resistant organisms and there is only a very few antibiotics that are concentrated in the urine and do not affect the rectal reservoir nor cause recurrent yeast vaginitis.  We discussed the risk factors for recurrent infections being intercourse in younger patients and atrophic changes in older patients.  We discussed the symptoms that are found including pain, pressure, burning, frequency, urgency  suprapubic pain and painful intercourse.  I discussed upper tract symptoms including fevers, chills, and indicated the workup would be much more aggressive if the patient were to present with recurrent infections in the face of upper tract symptomatology such as fever.  I discussed the history of vesicoureteral reflux in young patients and finally chronic renal scarring as a result of such.  I recommend concomitant probiotics with treatment with antibiotics to protect the rectal reservoir including over-the-counter yogurt preparations to nasrin oral pills containing the appropriate probiotics.  She has significant recurrent infections we discussed Macrodantin prophylaxis..  Atrophic urethritis and vaginitis-start topical Premarin at low dose, given samples    Patient's Body mass index is 22.46 kg/m². BMI is within normal parameters. No follow-up required..          This document has been electronically signed by RICK VIDALES MD March 19, 2019 10:49 AM

## 2019-03-26 PROBLEM — N30.00 ACUTE CYSTITIS WITHOUT HEMATURIA: Status: ACTIVE | Noted: 2019-03-26

## 2019-03-26 PROBLEM — N34.2 ATROPHIC URETHRITIS: Status: ACTIVE | Noted: 2019-03-26

## 2019-04-05 ENCOUNTER — OFFICE VISIT (OUTPATIENT)
Dept: CARDIOLOGY | Facility: CLINIC | Age: 74
End: 2019-04-05

## 2019-04-05 VITALS
BODY MASS INDEX: 22.58 KG/M2 | WEIGHT: 115 LBS | SYSTOLIC BLOOD PRESSURE: 131 MMHG | HEIGHT: 60 IN | OXYGEN SATURATION: 98 % | DIASTOLIC BLOOD PRESSURE: 76 MMHG | HEART RATE: 64 BPM

## 2019-04-05 DIAGNOSIS — R60.0 LOCALIZED EDEMA: ICD-10-CM

## 2019-04-05 DIAGNOSIS — I47.1 PAROXYSMAL SVT (SUPRAVENTRICULAR TACHYCARDIA) (HCC): ICD-10-CM

## 2019-04-05 DIAGNOSIS — G47.33 OBSTRUCTIVE SLEEP APNEA: ICD-10-CM

## 2019-04-05 DIAGNOSIS — I10 ESSENTIAL HYPERTENSION: Primary | ICD-10-CM

## 2019-04-05 PROCEDURE — 99213 OFFICE O/P EST LOW 20 MIN: CPT | Performed by: NURSE PRACTITIONER

## 2019-04-05 RX ORDER — RANITIDINE 150 MG/1
150 TABLET ORAL 2 TIMES DAILY PRN
COMMUNITY
Start: 2019-04-01 | End: 2021-01-06

## 2019-04-05 NOTE — PROGRESS NOTES
Aundrea Headley APRN  Kiah Conway  1945 04/05/2019    Patient Active Problem List   Diagnosis   • Cyst of bursa   • Essential hypertension   • Obstructive sleep apnea   • Precordial pain   • SOB (shortness of breath)   • Edema   • Palpitations   • Periaortic lymphadenopathy   • Pneumonia   • Paroxysmal SVT (supraventricular tachycardia) (CMS/HCC)   • Acute cystitis without hematuria   • Atrophic urethritis       Dear Aundrea Headley APRN:    Subjective     Chief Complaint   Patient presents with   • Chest Pain/HTN     CXR & Labs follow up; last appt 3/7/19   • Med Management     list provided   • Other     New dx of elevated Iron level per pcp           History of Present Illness:    Kiah Conway is a 73 y.o. female with a past medical history significant for hypertension, severe pulmonary hypertension with history of sleep apnea, mild to moderate tricuspid regurgitation, and lymphoma.  She presents today for routine cardiology follow-up.  Previously, she is complaining of bilateral lower extremity edema and shortness of breath.  This occurred following her chemotherapy infusion.  A chest x-ray was unremarkable.  BNP was 133.  She was hypokalemic and potassium replacement was ordered.  Repeat potassium was normal.  She reports since her last visit, most of the lower extremity edema has resolved.  She denies any chest pains or shortness of breath.  Denies palpitations, dizziness, and lightheadedness.  Her blood pressure has been very well controlled at home.  She has recently been diagnosed with elevated iron levels and has an appointment with hematology next week.          No Known Allergies:      Current Outpatient Medications:   •  amLODIPine (NORVASC) 2.5 MG tablet, Take 1 tablet by mouth Daily. (Patient taking differently: Take 2.5 mg by mouth 1 (One) Time As Needed.), Disp: 30 tablet, Rfl: 5  •  aspirin 81 MG EC tablet, Take 81 mg by mouth Daily., Disp: , Rfl:   •  baclofen (LIORESAL) 10 MG  tablet, Take 10 mg by mouth Daily As Needed., Disp: , Rfl:   •  busPIRone (BUSPAR) 5 MG tablet, Take 5 mg by mouth 2 (Two) Times a Day., Disp: , Rfl:   •  Calcium Polycarbophil (FIBER-CAPS PO), Take 1 capsule by mouth Daily., Disp: , Rfl:   •  conjugated estrogens (PREMARIN) 0.625 MG/GM vaginal cream, Insert  into the vagina Daily., Disp: 30 g, Rfl: 2  •  FLUoxetine (PROzac) 20 MG capsule, Take 20 mg by mouth Daily., Disp: , Rfl:   •  HYDROcodone-acetaminophen (NORCO) 7.5-325 MG per tablet, Take 1 tablet by mouth 3 (Three) Times a Day As Needed for Moderate Pain ., Disp: , Rfl:   •  hydroxychloroquine (PLAQUENIL) 200 MG tablet, Take 200 mg by mouth Daily., Disp: , Rfl:   •  lactobacillus acidophilus (RISAQUAD) capsule capsule, Take 1 capsule by mouth Daily., Disp: 7 capsule, Rfl: 0  •  loratadine (CLARITIN) 10 MG tablet, Take 10 mg by mouth Daily., Disp: , Rfl:   •  megestrol (MEGACE) 40 MG/ML suspension, Take 40 mg by mouth Daily., Disp: , Rfl:   •  metoprolol tartrate (LOPRESSOR) 50 MG tablet, Take 1 tablet by mouth Every 12 (Twelve) Hours., Disp: 60 tablet, Rfl: 5  •  nitrofurantoin, macrocrystal-monohydrate, (MACROBID) 100 MG capsule, Take 1 capsule by mouth Daily., Disp: 60 capsule, Rfl: 3  •  omeprazole (priLOSEC) 40 MG capsule, Take 40 mg by mouth Daily., Disp: , Rfl:   •  pregabalin (LYRICA) 300 MG capsule, Take 300 mg by mouth 2 (Two) Times a Day., Disp: , Rfl:   •  raNITIdine (ZANTAC) 150 MG tablet, , Disp: , Rfl:       The following portions of the patient's history were reviewed and updated as appropriate: allergies, current medications, past family history, past medical history, past social history, past surgical history and problem list.    Social History     Tobacco Use   • Smoking status: Never Smoker   • Smokeless tobacco: Never Used   Substance Use Topics   • Alcohol use: No   • Drug use: No       Review of Systems   Constitution: Positive for weakness and malaise/fatigue.   Cardiovascular:  "Negative for chest pain, dyspnea on exertion, irregular heartbeat, leg swelling, near-syncope, orthopnea, palpitations, paroxysmal nocturnal dyspnea and syncope.   Respiratory: Negative for cough, shortness of breath and wheezing.    Neurological: Negative for dizziness and light-headedness.       Objective   Vitals:    04/05/19 1037   BP: 131/76   BP Location: Right arm   Patient Position: Sitting   Cuff Size: Adult   Pulse: 64   SpO2: 98%   Weight: 52.2 kg (115 lb)   Height: 152.4 cm (60\")     Body mass index is 22.46 kg/m².        Physical Exam   Constitutional: She is oriented to person, place, and time. She appears well-developed and well-nourished.   HENT:   Head: Normocephalic and atraumatic.   Cardiovascular: Normal rate, regular rhythm and normal heart sounds. Exam reveals no S3 and no S4.   No murmur heard.  Pulmonary/Chest: Effort normal and breath sounds normal. She has no wheezes. She has no rales.   Abdominal: Soft. Bowel sounds are normal.   Musculoskeletal: She exhibits edema (trace BLE edema).   Neurological: She is alert and oriented to person, place, and time.   Skin: Skin is warm and dry.   Psychiatric: She has a normal mood and affect. Her behavior is normal.       Lab Results   Component Value Date     03/14/2019    K 4.5 03/14/2019     03/14/2019    CO2 22.8 03/14/2019    BUN 14 03/14/2019    CREATININE 0.95 03/14/2019    GLUCOSE 78 03/14/2019    CALCIUM 9.2 03/14/2019    AST 29 02/11/2019    ALT 21 02/11/2019    ALKPHOS 75 02/11/2019    LABIL2 1.4 (L) 05/06/2016     Lab Results   Component Value Date    CKTOTAL 80 10/08/2018     Lab Results   Component Value Date    WBC 6.21 02/11/2019    HGB 12.4 02/11/2019    HCT 36.2 (L) 02/11/2019     02/11/2019     Lab Results   Component Value Date    INR <0.90 05/06/2016     Lab Results   Component Value Date    MG 1.7 10/11/2018     Lab Results   Component Value Date    TSH 0.310 (L) 10/03/2017      Lab Results   Component Value " Date    .0 (H) 03/07/2019           Procedures      Assessment/Plan    Diagnosis Plan   1. Essential hypertension     2. Localized edema     3. Paroxysmal SVT (supraventricular tachycardia) (CMS/HCC)     4. Obstructive sleep apnea                  Recommendations:    1. Continue current dose of metoprolol    2. Follow up in 4 months and PRN      Patient's Body mass index is 22.46 kg/m². BMI is within normal parameters. No follow-up required..         Return in about 4 months (around 8/5/2019) for Recheck.    As always, I appreciate very much the opportunity to participate in the cardiovascular care of your patients.      With Best Regards,    Maranda Montoya, MERRY

## 2019-04-16 ENCOUNTER — PRIOR AUTHORIZATION (OUTPATIENT)
Dept: UROLOGY | Facility: CLINIC | Age: 74
End: 2019-04-16

## 2019-04-22 ENCOUNTER — CONSULT (OUTPATIENT)
Dept: ONCOLOGY | Facility: CLINIC | Age: 74
End: 2019-04-22

## 2019-04-22 VITALS
RESPIRATION RATE: 20 BRPM | TEMPERATURE: 98.1 F | OXYGEN SATURATION: 95 % | SYSTOLIC BLOOD PRESSURE: 137 MMHG | BODY MASS INDEX: 22.42 KG/M2 | HEIGHT: 60 IN | HEART RATE: 70 BPM | DIASTOLIC BLOOD PRESSURE: 67 MMHG | WEIGHT: 114.2 LBS

## 2019-04-22 DIAGNOSIS — R79.89 ELEVATED FERRITIN: Primary | ICD-10-CM

## 2019-04-22 LAB
ALBUMIN SERPL-MCNC: 4.11 G/DL (ref 3.5–5.2)
ALBUMIN/GLOB SERPL: 1.6 G/DL
ALP SERPL-CCNC: 85 U/L (ref 39–117)
ALT SERPL W P-5'-P-CCNC: 10 U/L (ref 1–33)
ANION GAP SERPL CALCULATED.3IONS-SCNC: 13.8 MMOL/L
AST SERPL-CCNC: 16 U/L (ref 1–32)
BASOPHILS # BLD AUTO: 0.02 10*3/MM3 (ref 0–0.2)
BASOPHILS NFR BLD AUTO: 0.3 % (ref 0–1.5)
BILIRUB SERPL-MCNC: 0.4 MG/DL (ref 0.2–1.2)
BUN BLD-MCNC: 17 MG/DL (ref 8–23)
BUN/CREAT SERPL: 17.3 (ref 7–25)
CALCIUM SPEC-SCNC: 8.7 MG/DL (ref 8.6–10.5)
CHLORIDE SERPL-SCNC: 104 MMOL/L (ref 98–107)
CO2 SERPL-SCNC: 24.2 MMOL/L (ref 22–29)
CREAT BLD-MCNC: 0.98 MG/DL (ref 0.57–1)
CRP SERPL-MCNC: 0.31 MG/DL (ref 0–0.5)
DEPRECATED RDW RBC AUTO: 44.7 FL (ref 37–54)
EOSINOPHIL # BLD AUTO: 0.44 10*3/MM3 (ref 0–0.4)
EOSINOPHIL NFR BLD AUTO: 6.3 % (ref 0.3–6.2)
ERYTHROCYTE [DISTWIDTH] IN BLOOD BY AUTOMATED COUNT: 13.6 % (ref 12.3–15.4)
ERYTHROCYTE [SEDIMENTATION RATE] IN BLOOD: 16 MM/HR (ref 0–30)
FERRITIN SERPL-MCNC: 200.8 NG/ML (ref 13–150)
GFR SERPL CREATININE-BSD FRML MDRD: 55 ML/MIN/1.73
GLOBULIN UR ELPH-MCNC: 2.6 GM/DL
GLUCOSE BLD-MCNC: 103 MG/DL (ref 65–99)
HCT VFR BLD AUTO: 35.9 % (ref 34–46.6)
HGB BLD-MCNC: 12.2 G/DL (ref 12–15.9)
IMM GRANULOCYTES # BLD AUTO: 0.01 10*3/MM3 (ref 0–0.05)
IMM GRANULOCYTES NFR BLD AUTO: 0.1 % (ref 0–0.5)
IRON 24H UR-MRATE: 127 MCG/DL (ref 37–145)
IRON SATN MFR SERPL: 42 % (ref 20–50)
LYMPHOCYTES # BLD AUTO: 0.79 10*3/MM3 (ref 0.7–3.1)
LYMPHOCYTES NFR BLD AUTO: 11.4 % (ref 19.6–45.3)
MCH RBC QN AUTO: 31.7 PG (ref 26.6–33)
MCHC RBC AUTO-ENTMCNC: 34 G/DL (ref 31.5–35.7)
MCV RBC AUTO: 93.2 FL (ref 79–97)
MONOCYTES # BLD AUTO: 0.62 10*3/MM3 (ref 0.1–0.9)
MONOCYTES NFR BLD AUTO: 8.9 % (ref 5–12)
NEUTROPHILS # BLD AUTO: 5.06 10*3/MM3 (ref 1.7–7)
NEUTROPHILS NFR BLD AUTO: 73 % (ref 42.7–76)
PLATELET # BLD AUTO: 189 10*3/MM3 (ref 140–450)
PMV BLD AUTO: 12.2 FL (ref 6–12)
POTASSIUM BLD-SCNC: 4.3 MMOL/L (ref 3.5–5.2)
PROT SERPL-MCNC: 6.7 G/DL (ref 6–8.5)
RBC # BLD AUTO: 3.85 10*6/MM3 (ref 3.77–5.28)
SODIUM BLD-SCNC: 142 MMOL/L (ref 136–145)
TIBC SERPL-MCNC: 304 MCG/DL (ref 298–536)
TRANSFERRIN SERPL-MCNC: 204 MG/DL (ref 200–360)
WBC NRBC COR # BLD: 6.94 10*3/MM3 (ref 3.4–10.8)

## 2019-04-22 PROCEDURE — 85652 RBC SED RATE AUTOMATED: CPT | Performed by: NURSE PRACTITIONER

## 2019-04-22 PROCEDURE — 36415 COLL VENOUS BLD VENIPUNCTURE: CPT | Performed by: NURSE PRACTITIONER

## 2019-04-22 PROCEDURE — 83540 ASSAY OF IRON: CPT | Performed by: NURSE PRACTITIONER

## 2019-04-22 PROCEDURE — 86140 C-REACTIVE PROTEIN: CPT | Performed by: NURSE PRACTITIONER

## 2019-04-22 PROCEDURE — 82728 ASSAY OF FERRITIN: CPT | Performed by: NURSE PRACTITIONER

## 2019-04-22 PROCEDURE — 99203 OFFICE O/P NEW LOW 30 MIN: CPT | Performed by: NURSE PRACTITIONER

## 2019-04-22 PROCEDURE — 84466 ASSAY OF TRANSFERRIN: CPT | Performed by: NURSE PRACTITIONER

## 2019-04-22 PROCEDURE — 85025 COMPLETE CBC W/AUTO DIFF WBC: CPT | Performed by: NURSE PRACTITIONER

## 2019-04-22 PROCEDURE — 80053 COMPREHEN METABOLIC PANEL: CPT | Performed by: NURSE PRACTITIONER

## 2019-05-14 ENCOUNTER — OFFICE VISIT (OUTPATIENT)
Dept: UROLOGY | Facility: CLINIC | Age: 74
End: 2019-05-14

## 2019-05-14 VITALS — HEIGHT: 60 IN | BODY MASS INDEX: 22.38 KG/M2 | WEIGHT: 114 LBS

## 2019-05-14 DIAGNOSIS — N30.00 ACUTE CYSTITIS WITHOUT HEMATURIA: Primary | ICD-10-CM

## 2019-05-14 DIAGNOSIS — N39.0 URINARY TRACT INFECTION WITHOUT HEMATURIA, SITE UNSPECIFIED: ICD-10-CM

## 2019-05-14 PROCEDURE — 81003 URINALYSIS AUTO W/O SCOPE: CPT | Performed by: UROLOGY

## 2019-05-14 PROCEDURE — 99213 OFFICE O/P EST LOW 20 MIN: CPT | Performed by: UROLOGY

## 2019-05-14 RX ORDER — FLUCONAZOLE 100 MG/1
100 TABLET ORAL DAILY
Qty: 3 TABLET | Refills: 3 | Status: SHIPPED | OUTPATIENT
Start: 2019-05-14 | End: 2019-05-17

## 2019-05-14 NOTE — PROGRESS NOTES
Chief Complaint:          Chief Complaint   Patient presents with   • Urinary Tract Infection     8 week follow up        HPI:   73 y.o. female  white female referred with recurrent urinary tract infections.  Last one she had 2 rounds of Cipro.  She has chills no fever nausea no vomiting dysuria no pressure.  Normal bowel movements no allergies.  Urinalysis was negative.  Her postvoid residual is negligible.  She has had a bladder tack by Dr. Burnette in [place] a history of thyroid cancer and a follicular lymphoma she has severe COPD.  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  With prophylaxis and we discussed other factors including topical Estrace.  She returns today.  She had a rash is a lacy reticular rash its better I do not think it is related to the Estrace cream because she never got it because it was $900 for tube which is ridiculous.  But she is doing well with the Macrodantin.  With chemo she got a yeast that she was placed on prophylactic Diflucan which helped a lot otherwise her exam is unremarkable I gave her reassurance I will see her back in 6 months.      Past Medical History:        Past Medical History:   Diagnosis Date   • Arthritis    • Cardiac disorder    • Diverticulitis    • Dysphagia    • Dysuria    • Fibromyalgia    • GERD (gastroesophageal reflux disease)    • History of rheumatic fever    • Hypertension    • Hypokalemia due to inadequate potassium intake 03/03/2014   • Lipoma    • Malignant neoplasm (CMS/HCC)    • Migraine    • Recurrent UTI    • Sleep apnea    • Thyroid cancer (CMS/HCC)    • UTI (urinary tract infection)          Current Meds:     Current Outpatient Medications   Medication Sig Dispense Refill   • amLODIPine (NORVASC) 2.5 MG tablet Take 1 tablet by mouth Daily. (Patient taking differently: Take 2.5 mg by mouth 1 (One) Time As Needed.) 30 tablet 5   • aspirin 81 MG EC tablet Take 81 mg by mouth Daily.     • baclofen (LIORESAL) 10 MG tablet  Take 10 mg by mouth Daily As Needed.     • busPIRone (BUSPAR) 5 MG tablet Take 5 mg by mouth 2 (Two) Times a Day.     • Calcium Polycarbophil (FIBER-CAPS PO) Take 1 capsule by mouth Daily.     • conjugated estrogens (PREMARIN) 0.625 MG/GM vaginal cream Insert  into the vagina Daily. 30 g 2   • FLUoxetine (PROzac) 20 MG capsule Take 20 mg by mouth Daily.     • HYDROcodone-acetaminophen (NORCO) 7.5-325 MG per tablet Take 1 tablet by mouth 3 (Three) Times a Day As Needed for Moderate Pain .     • hydroxychloroquine (PLAQUENIL) 200 MG tablet Take 200 mg by mouth Daily.     • lactobacillus acidophilus (RISAQUAD) capsule capsule Take 1 capsule by mouth Daily. 7 capsule 0   • loratadine (CLARITIN) 10 MG tablet Take 10 mg by mouth Daily.     • megestrol (MEGACE) 40 MG/ML suspension Take 40 mg by mouth Daily.     • metoprolol tartrate (LOPRESSOR) 50 MG tablet Take 1 tablet by mouth Every 12 (Twelve) Hours. 60 tablet 5   • nitrofurantoin, macrocrystal-monohydrate, (MACROBID) 100 MG capsule Take 1 capsule by mouth Daily. 60 capsule 3   • omeprazole (priLOSEC) 40 MG capsule Take 40 mg by mouth Daily.     • pregabalin (LYRICA) 300 MG capsule Take 300 mg by mouth 2 (Two) Times a Day.     • raNITIdine (ZANTAC) 150 MG tablet      • fluconazole (DIFLUCAN) 100 MG tablet Take 1 tablet by mouth Daily for 3 days. 3 tablet 3     No current facility-administered medications for this visit.         Allergies:      No Known Allergies     Past Surgical History:     Past Surgical History:   Procedure Laterality Date   • BLADDER SURGERY     • HYSTERECTOMY     • THYROID SURGERY           Social History:     Social History     Socioeconomic History   • Marital status:      Spouse name: Not on file   • Number of children: Not on file   • Years of education: Not on file   • Highest education level: Not on file   Tobacco Use   • Smoking status: Never Smoker   • Smokeless tobacco: Never Used   Substance and Sexual Activity   • Alcohol use: No    • Drug use: No   • Sexual activity: Defer       Family History:     Family History   Problem Relation Age of Onset   • Diabetes Mother    • Hypertension Mother    • Other Other         cardiac disorder,cardiovascular disease, malignant neoplasm of brain   • Breast cancer Paternal Aunt        Review of Systems:     Review of Systems   Constitutional: Negative.  Negative for activity change, appetite change, chills, diaphoresis, fatigue and unexpected weight change.   HENT: Negative for congestion, dental problem, drooling, ear discharge, ear pain, facial swelling, hearing loss, mouth sores, nosebleeds, postnasal drip, rhinorrhea, sinus pressure, sneezing, sore throat, tinnitus, trouble swallowing and voice change.    Eyes: Negative.  Negative for photophobia, pain, discharge, redness, itching and visual disturbance.   Respiratory: Negative.  Negative for apnea, cough, choking, chest tightness, shortness of breath, wheezing and stridor.    Cardiovascular: Negative.  Negative for chest pain, palpitations and leg swelling.   Gastrointestinal: Negative.  Negative for abdominal distention, abdominal pain, anal bleeding, blood in stool, constipation, diarrhea, nausea, rectal pain and vomiting.   Endocrine: Negative.  Negative for cold intolerance, heat intolerance, polydipsia, polyphagia and polyuria.   Musculoskeletal: Negative.  Negative for arthralgias, back pain, gait problem, joint swelling, myalgias, neck pain and neck stiffness.   Skin: Negative.  Negative for color change, pallor, rash and wound.   Allergic/Immunologic: Negative.  Negative for environmental allergies, food allergies and immunocompromised state.   Neurological: Negative.  Negative for dizziness, tremors, seizures, syncope, facial asymmetry, speech difficulty, weakness, light-headedness, numbness and headaches.   Hematological: Negative.  Negative for adenopathy. Does not bruise/bleed easily.   Psychiatric/Behavioral: Negative for agitation,  behavioral problems, confusion, decreased concentration, dysphoric mood, hallucinations, self-injury, sleep disturbance and suicidal ideas. The patient is not nervous/anxious and is not hyperactive.    All other systems reviewed and are negative.      Physical Exam:     Physical Exam   Constitutional: She appears well-developed and well-nourished.   HENT:   Head: Normocephalic and atraumatic.   Right Ear: External ear normal.   Left Ear: External ear normal.   Mouth/Throat: Oropharynx is clear and moist.   Eyes: Conjunctivae are normal. Pupils are equal, round, and reactive to light.   Cardiovascular: Normal rate, regular rhythm, normal heart sounds and intact distal pulses.   Pulmonary/Chest: Effort normal and breath sounds normal.   Abdominal: Soft. Bowel sounds are normal. She exhibits no distension and no mass. There is no tenderness. There is no rebound and no guarding.   Genitourinary: No vaginal discharge found.   Musculoskeletal: Normal range of motion.   Neurological: She is alert. She has normal reflexes.   Skin: Skin is warm and dry.   Psychiatric: She has a normal mood and affect. Her behavior is normal. Judgment and thought content normal.       I have reviewed the following portions of the patient's history: allergies, current medications, past family history, past medical history, past social history, past surgical history, problem list and ROS and confirm it's accurate.      Procedure:       Assessment/Plan:   Recurrent urinary tract infections-patient has been referred and diagnosed with recurrent urinary tract infections.  We discussed the types of organisms that are found in the urinary tract indicating that the vast majority are results of the patient's own gastrointestinal tejas.  We discussed how many of the antibiotics that are utilized can actually exacerbate these infections by creating resistant organisms and there is only a very few antibiotics that are concentrated in the urine and do not  affect the rectal reservoir nor cause recurrent yeast vaginitis.  We discussed the risk factors for recurrent infections being intercourse in younger patients and atrophic changes in older patients.  We discussed the symptoms that are found including pain, pressure, burning, frequency, urgency suprapubic pain and painful intercourse.  I discussed upper tract symptoms including fevers, chills, and indicated the workup would be much more aggressive if the patient were to present with recurrent infections in the face of upper tract symptomatology such as fever.  I discussed the history of vesicoureteral reflux in young patients and finally chronic renal scarring as a result of such.  I recommend concomitant probiotics with treatment with antibiotics to protect the rectal reservoir including over-the-counter yogurt preparations to nasrin oral pills containing the appropriate probiotics.    Patient's Body mass index is 22.23 kg/m². BMI is within normal parameters. No follow-up required..          This document has been electronically signed by RICK VIDALES MD May 14, 2019 12:05 PM

## 2019-06-13 ENCOUNTER — HOSPITAL ENCOUNTER (EMERGENCY)
Facility: HOSPITAL | Age: 74
Discharge: HOME | End: 2019-06-13
Attending: FAMILY MEDICINE | Admitting: FAMILY MEDICINE

## 2019-06-13 ENCOUNTER — APPOINTMENT (OUTPATIENT)
Dept: GENERAL RADIOLOGY | Facility: HOSPITAL | Age: 74
End: 2019-06-13

## 2019-06-13 VITALS
TEMPERATURE: 98 F | SYSTOLIC BLOOD PRESSURE: 148 MMHG | DIASTOLIC BLOOD PRESSURE: 82 MMHG | HEIGHT: 60 IN | OXYGEN SATURATION: 99 % | BODY MASS INDEX: 22.38 KG/M2 | WEIGHT: 114 LBS | HEART RATE: 80 BPM | RESPIRATION RATE: 16 BRPM

## 2019-06-13 DIAGNOSIS — S65.517A LACERATION OF BLOOD VESSEL OF LEFT LITTLE FINGER, INITIAL ENCOUNTER: ICD-10-CM

## 2019-06-13 DIAGNOSIS — S62.667B OPEN NONDISPLACED FRACTURE OF DISTAL PHALANX OF LEFT LITTLE FINGER, INITIAL ENCOUNTER: Primary | ICD-10-CM

## 2019-06-13 PROCEDURE — 99284 EMERGENCY DEPT VISIT MOD MDM: CPT

## 2019-06-13 PROCEDURE — 25010000002 MORPHINE PER 10 MG: Performed by: FAMILY MEDICINE

## 2019-06-13 PROCEDURE — 96372 THER/PROPH/DIAG INJ SC/IM: CPT

## 2019-06-13 PROCEDURE — 73140 X-RAY EXAM OF FINGER(S): CPT | Performed by: RADIOLOGY

## 2019-06-13 PROCEDURE — 73140 X-RAY EXAM OF FINGER(S): CPT

## 2019-06-13 RX ORDER — CEPHALEXIN 500 MG/1
500 CAPSULE ORAL 2 TIMES DAILY
Qty: 20 CAPSULE | Refills: 0 | Status: ON HOLD | OUTPATIENT
Start: 2019-06-13 | End: 2019-11-07

## 2019-06-13 RX ORDER — CEPHALEXIN 250 MG/1
500 CAPSULE ORAL ONCE
Status: DISCONTINUED | OUTPATIENT
Start: 2019-06-14 | End: 2019-06-13 | Stop reason: HOSPADM

## 2019-06-13 RX ORDER — ALBENDAZOLE 200 MG/1
400 TABLET, FILM COATED ORAL 2 TIMES DAILY WITH MEALS
Status: DISCONTINUED | OUTPATIENT
Start: 2019-06-13 | End: 2019-06-13 | Stop reason: HOSPADM

## 2019-06-13 RX ORDER — MORPHINE SULFATE 2 MG/ML
4 INJECTION, SOLUTION INTRAMUSCULAR; INTRAVENOUS ONCE
Status: COMPLETED | OUTPATIENT
Start: 2019-06-13 | End: 2019-06-13

## 2019-06-13 RX ORDER — HYDROCODONE BITARTRATE AND ACETAMINOPHEN 5; 325 MG/1; MG/1
1 TABLET ORAL EVERY 8 HOURS PRN
Qty: 12 TABLET | Refills: 0 | OUTPATIENT
Start: 2019-06-13 | End: 2019-08-02

## 2019-06-13 RX ORDER — CEPHALEXIN 250 MG/1
500 CAPSULE ORAL ONCE
Status: COMPLETED | OUTPATIENT
Start: 2019-06-13 | End: 2019-06-13

## 2019-06-13 RX ORDER — LIDOCAINE HYDROCHLORIDE 10 MG/ML
20 INJECTION, SOLUTION EPIDURAL; INFILTRATION; INTRACAUDAL; PERINEURAL ONCE
Status: DISCONTINUED | OUTPATIENT
Start: 2019-06-13 | End: 2019-06-13 | Stop reason: HOSPADM

## 2019-06-13 RX ORDER — MORPHINE SULFATE 2 MG/ML
4 INJECTION, SOLUTION INTRAMUSCULAR; INTRAVENOUS ONCE
Status: DISCONTINUED | OUTPATIENT
Start: 2019-06-13 | End: 2019-06-13

## 2019-06-13 RX ADMIN — ALBENDAZOLE 400 MG: 200 TABLET, FILM COATED ORAL at 19:33

## 2019-06-13 RX ADMIN — CEPHALEXIN 500 MG: 250 CAPSULE ORAL at 19:33

## 2019-06-13 RX ADMIN — MORPHINE SULFATE 4 MG: 2 INJECTION, SOLUTION INTRAMUSCULAR; INTRAVENOUS at 18:18

## 2019-06-22 ENCOUNTER — APPOINTMENT (OUTPATIENT)
Dept: CT IMAGING | Facility: HOSPITAL | Age: 74
End: 2019-06-22

## 2019-06-22 ENCOUNTER — HOSPITAL ENCOUNTER (EMERGENCY)
Facility: HOSPITAL | Age: 74
Discharge: HOME | End: 2019-06-22
Attending: EMERGENCY MEDICINE | Admitting: EMERGENCY MEDICINE

## 2019-06-22 ENCOUNTER — APPOINTMENT (OUTPATIENT)
Dept: GENERAL RADIOLOGY | Facility: HOSPITAL | Age: 74
End: 2019-06-22

## 2019-06-22 ENCOUNTER — APPOINTMENT (OUTPATIENT)
Dept: ULTRASOUND IMAGING | Facility: HOSPITAL | Age: 74
End: 2019-06-22

## 2019-06-22 VITALS
HEART RATE: 63 BPM | HEIGHT: 60 IN | SYSTOLIC BLOOD PRESSURE: 130 MMHG | WEIGHT: 115 LBS | RESPIRATION RATE: 16 BRPM | BODY MASS INDEX: 22.58 KG/M2 | DIASTOLIC BLOOD PRESSURE: 66 MMHG | TEMPERATURE: 98.6 F | OXYGEN SATURATION: 97 %

## 2019-06-22 DIAGNOSIS — M79.602 LEFT ARM PAIN: ICD-10-CM

## 2019-06-22 DIAGNOSIS — R53.83 FATIGUE, UNSPECIFIED TYPE: ICD-10-CM

## 2019-06-22 DIAGNOSIS — R22.9 SOFT TISSUE SWELLING: Primary | ICD-10-CM

## 2019-06-22 LAB
ALBUMIN SERPL-MCNC: 3.88 G/DL (ref 3.5–5.2)
ALBUMIN/GLOB SERPL: 1.4 G/DL
ALP SERPL-CCNC: 82 U/L (ref 39–117)
ALT SERPL W P-5'-P-CCNC: 10 U/L (ref 1–33)
ANION GAP SERPL CALCULATED.3IONS-SCNC: 15.5 MMOL/L
AST SERPL-CCNC: 15 U/L (ref 1–32)
BASOPHILS # BLD AUTO: 0.02 10*3/MM3 (ref 0–0.2)
BASOPHILS NFR BLD AUTO: 0.2 % (ref 0–1.5)
BILIRUB SERPL-MCNC: 0.5 MG/DL (ref 0.2–1.2)
BILIRUB UR QL STRIP: NEGATIVE
BUN BLD-MCNC: 15 MG/DL (ref 8–23)
BUN/CREAT SERPL: 16.5 (ref 7–25)
CALCIUM SPEC-SCNC: 9.2 MG/DL (ref 8.6–10.5)
CHLORIDE SERPL-SCNC: 103 MMOL/L (ref 98–107)
CLARITY UR: CLEAR
CO2 SERPL-SCNC: 19.5 MMOL/L (ref 22–29)
COLOR UR: YELLOW
CREAT BLD-MCNC: 0.91 MG/DL (ref 0.57–1)
CRP SERPL-MCNC: 17.08 MG/DL (ref 0–0.5)
DEPRECATED RDW RBC AUTO: 46 FL (ref 37–54)
EOSINOPHIL # BLD AUTO: 0 10*3/MM3 (ref 0–0.4)
EOSINOPHIL NFR BLD AUTO: 0 % (ref 0.3–6.2)
ERYTHROCYTE [DISTWIDTH] IN BLOOD BY AUTOMATED COUNT: 13.6 % (ref 12.3–15.4)
GFR SERPL CREATININE-BSD FRML MDRD: 60 ML/MIN/1.73
GLOBULIN UR ELPH-MCNC: 2.7 GM/DL
GLUCOSE BLD-MCNC: 113 MG/DL (ref 65–99)
GLUCOSE UR STRIP-MCNC: NEGATIVE MG/DL
HCT VFR BLD AUTO: 34.2 % (ref 34–46.6)
HGB BLD-MCNC: 11.6 G/DL (ref 12–15.9)
HGB UR QL STRIP.AUTO: NEGATIVE
IMM GRANULOCYTES # BLD AUTO: 0 10*3/MM3 (ref 0–0.05)
IMM GRANULOCYTES NFR BLD AUTO: 0 % (ref 0–0.5)
KETONES UR QL STRIP: NEGATIVE
LEUKOCYTE ESTERASE UR QL STRIP.AUTO: NEGATIVE
LIPASE SERPL-CCNC: 18 U/L (ref 13–60)
LYMPHOCYTES # BLD AUTO: 0.7 10*3/MM3 (ref 0.7–3.1)
LYMPHOCYTES NFR BLD AUTO: 7.1 % (ref 19.6–45.3)
MCH RBC QN AUTO: 31.5 PG (ref 26.6–33)
MCHC RBC AUTO-ENTMCNC: 33.9 G/DL (ref 31.5–35.7)
MCV RBC AUTO: 92.9 FL (ref 79–97)
MONOCYTES # BLD AUTO: 1.13 10*3/MM3 (ref 0.1–0.9)
MONOCYTES NFR BLD AUTO: 11.5 % (ref 5–12)
NEUTROPHILS # BLD AUTO: 7.95 10*3/MM3 (ref 1.7–7)
NEUTROPHILS NFR BLD AUTO: 81.2 % (ref 42.7–76)
NITRITE UR QL STRIP: NEGATIVE
PH UR STRIP.AUTO: 6 [PH] (ref 5–8)
PLATELET # BLD AUTO: 233 10*3/MM3 (ref 140–450)
PMV BLD AUTO: 12.4 FL (ref 6–12)
POTASSIUM BLD-SCNC: 4.4 MMOL/L (ref 3.5–5.2)
PROT SERPL-MCNC: 6.6 G/DL (ref 6–8.5)
PROT UR QL STRIP: NEGATIVE
RBC # BLD AUTO: 3.68 10*6/MM3 (ref 3.77–5.28)
SODIUM BLD-SCNC: 138 MMOL/L (ref 136–145)
SP GR UR STRIP: 1.01 (ref 1–1.03)
TROPONIN T SERPL-MCNC: <0.01 NG/ML (ref 0–0.03)
TSH SERPL DL<=0.05 MIU/L-ACNC: 2.17 MIU/ML (ref 0.27–4.2)
UROBILINOGEN UR QL STRIP: NORMAL
WBC NRBC COR # BLD: 9.8 10*3/MM3 (ref 3.4–10.8)

## 2019-06-22 PROCEDURE — 71045 X-RAY EXAM CHEST 1 VIEW: CPT | Performed by: RADIOLOGY

## 2019-06-22 PROCEDURE — 84443 ASSAY THYROID STIM HORMONE: CPT | Performed by: PHYSICIAN ASSISTANT

## 2019-06-22 PROCEDURE — 70450 CT HEAD/BRAIN W/O DYE: CPT | Performed by: RADIOLOGY

## 2019-06-22 PROCEDURE — 70450 CT HEAD/BRAIN W/O DYE: CPT

## 2019-06-22 PROCEDURE — 73201 CT UPPER EXTREMITY W/DYE: CPT | Performed by: RADIOLOGY

## 2019-06-22 PROCEDURE — 73201 CT UPPER EXTREMITY W/DYE: CPT

## 2019-06-22 PROCEDURE — 73130 X-RAY EXAM OF HAND: CPT

## 2019-06-22 PROCEDURE — 83690 ASSAY OF LIPASE: CPT | Performed by: PHYSICIAN ASSISTANT

## 2019-06-22 PROCEDURE — 96374 THER/PROPH/DIAG INJ IV PUSH: CPT

## 2019-06-22 PROCEDURE — 84484 ASSAY OF TROPONIN QUANT: CPT | Performed by: PHYSICIAN ASSISTANT

## 2019-06-22 PROCEDURE — 85025 COMPLETE CBC W/AUTO DIFF WBC: CPT | Performed by: PHYSICIAN ASSISTANT

## 2019-06-22 PROCEDURE — 25010000002 KETOROLAC TROMETHAMINE PER 15 MG: Performed by: PHYSICIAN ASSISTANT

## 2019-06-22 PROCEDURE — 25010000002 MORPHINE PER 10 MG: Performed by: EMERGENCY MEDICINE

## 2019-06-22 PROCEDURE — 73140 X-RAY EXAM OF FINGER(S): CPT

## 2019-06-22 PROCEDURE — 96375 TX/PRO/DX INJ NEW DRUG ADDON: CPT

## 2019-06-22 PROCEDURE — 81003 URINALYSIS AUTO W/O SCOPE: CPT | Performed by: PHYSICIAN ASSISTANT

## 2019-06-22 PROCEDURE — 71045 X-RAY EXAM CHEST 1 VIEW: CPT

## 2019-06-22 PROCEDURE — 0 IOVERSOL 68 % SOLUTION: Performed by: EMERGENCY MEDICINE

## 2019-06-22 PROCEDURE — 80053 COMPREHEN METABOLIC PANEL: CPT | Performed by: PHYSICIAN ASSISTANT

## 2019-06-22 PROCEDURE — 93971 EXTREMITY STUDY: CPT | Performed by: RADIOLOGY

## 2019-06-22 PROCEDURE — 73140 X-RAY EXAM OF FINGER(S): CPT | Performed by: RADIOLOGY

## 2019-06-22 PROCEDURE — 86140 C-REACTIVE PROTEIN: CPT | Performed by: PHYSICIAN ASSISTANT

## 2019-06-22 PROCEDURE — 87040 BLOOD CULTURE FOR BACTERIA: CPT | Performed by: PHYSICIAN ASSISTANT

## 2019-06-22 PROCEDURE — 73130 X-RAY EXAM OF HAND: CPT | Performed by: RADIOLOGY

## 2019-06-22 PROCEDURE — 99283 EMERGENCY DEPT VISIT LOW MDM: CPT

## 2019-06-22 PROCEDURE — 93971 EXTREMITY STUDY: CPT

## 2019-06-22 RX ORDER — SODIUM CHLORIDE 0.9 % (FLUSH) 0.9 %
10 SYRINGE (ML) INJECTION AS NEEDED
Status: DISCONTINUED | OUTPATIENT
Start: 2019-06-22 | End: 2019-06-22 | Stop reason: HOSPADM

## 2019-06-22 RX ORDER — KETOROLAC TROMETHAMINE 30 MG/ML
15 INJECTION, SOLUTION INTRAMUSCULAR; INTRAVENOUS ONCE
Status: COMPLETED | OUTPATIENT
Start: 2019-06-22 | End: 2019-06-22

## 2019-06-22 RX ORDER — MORPHINE SULFATE 2 MG/ML
2 INJECTION, SOLUTION INTRAMUSCULAR; INTRAVENOUS ONCE
Status: COMPLETED | OUTPATIENT
Start: 2019-06-22 | End: 2019-06-22

## 2019-06-22 RX ADMIN — MORPHINE SULFATE 2 MG: 2 INJECTION, SOLUTION INTRAMUSCULAR; INTRAVENOUS at 12:10

## 2019-06-22 RX ADMIN — IOVERSOL 100 ML: 678 INJECTION INTRA-ARTERIAL; INTRAVENOUS at 14:21

## 2019-06-22 RX ADMIN — KETOROLAC TROMETHAMINE 15 MG: 30 INJECTION, SOLUTION INTRAMUSCULAR; INTRAVENOUS at 12:10

## 2019-06-22 RX ADMIN — SODIUM CHLORIDE 1000 ML: 9 INJECTION, SOLUTION INTRAVENOUS at 11:34

## 2019-06-27 LAB
BACTERIA SPEC AEROBE CULT: NORMAL
BACTERIA SPEC AEROBE CULT: NORMAL

## 2019-08-02 ENCOUNTER — HOSPITAL ENCOUNTER (EMERGENCY)
Facility: HOSPITAL | Age: 74
Discharge: HOME | End: 2019-08-02
Attending: EMERGENCY MEDICINE | Admitting: EMERGENCY MEDICINE

## 2019-08-02 ENCOUNTER — APPOINTMENT (OUTPATIENT)
Dept: GENERAL RADIOLOGY | Facility: HOSPITAL | Age: 74
End: 2019-08-02

## 2019-08-02 VITALS
OXYGEN SATURATION: 98 % | TEMPERATURE: 98 F | HEART RATE: 74 BPM | WEIGHT: 116 LBS | DIASTOLIC BLOOD PRESSURE: 80 MMHG | BODY MASS INDEX: 22.78 KG/M2 | SYSTOLIC BLOOD PRESSURE: 145 MMHG | RESPIRATION RATE: 16 BRPM | HEIGHT: 60 IN

## 2019-08-02 DIAGNOSIS — S82.102A CLOSED FRACTURE OF PROXIMAL END OF LEFT TIBIA, UNSPECIFIED FRACTURE MORPHOLOGY, INITIAL ENCOUNTER: Primary | ICD-10-CM

## 2019-08-02 PROCEDURE — 73562 X-RAY EXAM OF KNEE 3: CPT

## 2019-08-02 PROCEDURE — 99283 EMERGENCY DEPT VISIT LOW MDM: CPT

## 2019-08-02 PROCEDURE — 73562 X-RAY EXAM OF KNEE 3: CPT | Performed by: RADIOLOGY

## 2019-08-02 NOTE — ED NOTES
Orange fall bracelet placed on pt and education given, pt verb understanding     Ct Rousseau, DOLLY  08/02/19 [id number]

## 2019-08-02 NOTE — ED PROVIDER NOTES
Subjective   73-year-old female who presents to the ED today after a fall.  This fall occurred yesterday.  She states her granddaughter stopped abruptly in front of her and she tripped over her.  She is complaining of left knee pain.  She states today she was unable to bear weight on it.  She states it has not been swollen and does not appear bruised.  She does have a history of arthritis.  She takes Norco 7.5 mg at home.  She states it has not helped with the pain.        History provided by:  Patient  Fall   Mechanism of injury: fall    Injury location:  Leg  Leg injury location:  L knee  Incident location: [facility].  Time since incident:  1 day  Arrived directly from scene: no    Fall:     Fall occurred:  Tripped    Impact surface:  Hard floor  Suspicion of alcohol use: no    Suspicion of drug use: no    Prior to arrival data:     Patient ambulatory at scene: yes      Blood loss:  None    Responsiveness at scene:  Alert    Orientation at scene:  Person, place, situation and time    Loss of consciousness: no      Amnesic to event: no    Associated symptoms: no abdominal pain, no back pain, no blindness, no chest pain, no difficulty breathing, no headaches, no hearing loss, no loss of consciousness, no nausea, no neck pain, no seizures and no vomiting        Review of Systems   Constitutional: Negative.    HENT: Negative for hearing loss.    Eyes: Negative.  Negative for blindness.   Respiratory: Negative.    Cardiovascular: Negative for chest pain.   Gastrointestinal: Negative for abdominal pain, nausea and vomiting.   Genitourinary: Negative.    Musculoskeletal: Negative for back pain and neck pain.   Skin: Negative.    Neurological: Negative for seizures, loss of consciousness and headaches.   Psychiatric/Behavioral: Negative.    All other systems reviewed and are negative.      Past Medical History:   Diagnosis Date   • Arthritis    • Cardiac disorder    • Diverticulitis    • Dysphagia    • Dysuria    •  Fibromyalgia    • GERD (gastroesophageal reflux disease)    • History of rheumatic fever    • Hypertension    • Hypokalemia due to inadequate potassium intake 03/03/2014   • Lipoma    • Malignant neoplasm (CMS/HCC)    • Migraine    • Recurrent UTI    • Sleep apnea    • Thyroid cancer (CMS/HCC)    • UTI (urinary tract infection)        No Known Allergies    Past Surgical History:   Procedure Laterality Date   • BLADDER SURGERY     • HYSTERECTOMY     • THYROID SURGERY         Family History   Problem Relation Age of Onset   • Diabetes Mother    • Hypertension Mother    • Other Other         cardiac disorder,cardiovascular disease, malignant neoplasm of brain   • Breast cancer Paternal Aunt        Social History     Socioeconomic History   • Marital status:      Spouse name: Not on file   • Number of children: Not on file   • Years of education: Not on file   • Highest education level: Not on file   Tobacco Use   • Smoking status: Never Smoker   • Smokeless tobacco: Never Used   Substance and Sexual Activity   • Alcohol use: No   • Drug use: No   • Sexual activity: Defer           Objective   Physical Exam   Constitutional: She is oriented to person, place, and time. She appears well-developed and well-nourished. No distress.   HENT:   Head: Normocephalic and atraumatic.   Right Ear: External ear normal.   Left Ear: External ear normal.   Nose: Nose normal.   Mouth/Throat: Oropharynx is clear and moist.   Eyes: Conjunctivae and EOM are normal. Pupils are equal, round, and reactive to light.   Neck: Normal range of motion. Neck supple.   Cardiovascular: Normal rate, regular rhythm, normal heart sounds and intact distal pulses.   Pulmonary/Chest: Effort normal and breath sounds normal.   Abdominal: Soft. Bowel sounds are normal.   Musculoskeletal: She exhibits tenderness. She exhibits no deformity.   Patient had ace wrap on left knee.  Removed this to examine knee.  Mild pain with palpation over the anterior knee.   Pain with flexion.  No swelling or bruising noted.   Neurological: She is alert and oriented to person, place, and time.   Skin: Skin is warm and dry. Capillary refill takes less than 2 seconds.   Psychiatric: She has a normal mood and affect. Her behavior is normal. Judgment and thought content normal.   Nursing note and vitals reviewed.      Splint - Cast - Strapping  Performed by: Monica Brasher PA  Authorized by: Dima Dupree MD     Consent:     Consent obtained:  Verbal    Consent given by:  Patient  Pre-procedure details:     Sensation:  Normal  Procedure details:     Laterality:  Left    Location:  Knee    Knee:  L knee    Splint type: hinged knee brace.    Supplies:  Prefabricated splint  Post-procedure details:     Pain:  Improved    Sensation:  Normal    Patient tolerance of procedure:  Tolerated well, no immediate complications               ED Course  ED Course as of Aug 02 1956   Fri Aug 02, 2019   1530 No acute findings on x-ray per Dr. Dupree.  Patient placed in hinged knee brace.  She has a walker at home that she can use.  She was advised to ice and elevate and she will continue her home medications as prescribed.  She was advised to follow up with her PCP and will follow up with orthopedics if no improvement.  [AH]   1903 X-ray reports proximal tibia fracture.  I have called the patient back and notified her of this.  I recommended that she return to the ED to have a knee immobilizer placed.  She states she will return.  I have notified lead nurse as well as triage.  [AH]   1955 Patient has returned to the ED.  She was placed in a left knee immobilizer, placed by ED tech, supervised by me.  Patient neurovascularly intact.  She will follow up with orthopedics next week.  She has a walker at home that she will use.  [AH]      ED Course User Index  [AH] Monica Brasher PA                  MDM  Number of Diagnoses or Management Options  Sprain of left knee, unspecified ligament, initial encounter:       Amount and/or Complexity of Data Reviewed  Tests in the radiology section of CPT®: reviewed    Patient Progress  Patient progress: stable        Final diagnoses:   Closed fracture of proximal end of left tibia, unspecified fracture morphology, initial encounter            Monica Brasher PA  08/02/19 1956       Monica Brasher PA  08/02/19 1956

## 2019-08-28 ENCOUNTER — OFFICE VISIT (OUTPATIENT)
Dept: CARDIOLOGY | Facility: CLINIC | Age: 74
End: 2019-08-28

## 2019-08-28 VITALS
DIASTOLIC BLOOD PRESSURE: 75 MMHG | SYSTOLIC BLOOD PRESSURE: 157 MMHG | BODY MASS INDEX: 22.58 KG/M2 | HEIGHT: 60 IN | WEIGHT: 115 LBS | HEART RATE: 85 BPM | OXYGEN SATURATION: 92 %

## 2019-08-28 DIAGNOSIS — Z53.21 PATIENT LEFT WITHOUT BEING SEEN: Primary | ICD-10-CM

## 2019-09-06 ENCOUNTER — OFFICE VISIT (OUTPATIENT)
Dept: CARDIOLOGY | Facility: CLINIC | Age: 74
End: 2019-09-06

## 2019-09-06 VITALS
WEIGHT: 113 LBS | DIASTOLIC BLOOD PRESSURE: 81 MMHG | RESPIRATION RATE: 16 BRPM | BODY MASS INDEX: 22.19 KG/M2 | HEART RATE: 71 BPM | HEIGHT: 60 IN | SYSTOLIC BLOOD PRESSURE: 176 MMHG

## 2019-09-06 DIAGNOSIS — I10 ESSENTIAL HYPERTENSION: Primary | ICD-10-CM

## 2019-09-06 DIAGNOSIS — R00.2 PALPITATIONS: ICD-10-CM

## 2019-09-06 DIAGNOSIS — I47.1 PAROXYSMAL SVT (SUPRAVENTRICULAR TACHYCARDIA) (HCC): ICD-10-CM

## 2019-09-06 PROCEDURE — 99213 OFFICE O/P EST LOW 20 MIN: CPT | Performed by: NURSE PRACTITIONER

## 2019-09-06 NOTE — PROGRESS NOTES
Aundrea Headley APRN  Kiah Conway  1945 09/06/2019    Patient Active Problem List   Diagnosis   • Cyst of bursa   • Essential hypertension   • Obstructive sleep apnea   • Precordial pain   • SOB (shortness of breath)   • Edema   • Palpitations   • Periaortic lymphadenopathy   • Pneumonia   • Paroxysmal SVT (supraventricular tachycardia) (CMS/HCC)   • Acute cystitis without hematuria   • Atrophic urethritis       Dear Aundrea Headley APRN:    Subjective     Chief Complaint   Patient presents with   • Hypertension   • Chest Pain           History of Present Illness:    Kiah Conway is a 74 y.o. female with a history of hypertension, sleep apnea, lymphoma, and paroxysmal SVT. She presents today for routine cardiology follow up. Reports she has been doing fairly well. She denies any chest pains, shortness of breath, palpitations, dizziness, or lightheadedness. Her blood pressure has been elevated recently. She has not been taking amlodipine routinely. She has not had any recent lipid panel.          No Known Allergies:      Current Outpatient Medications:   •  amLODIPine (NORVASC) 2.5 MG tablet, Take 1 tablet by mouth Daily. (Patient taking differently: Take 2.5 mg by mouth 1 (One) Time As Needed.), Disp: 30 tablet, Rfl: 5  •  aspirin 81 MG EC tablet, Take 81 mg by mouth Daily., Disp: , Rfl:   •  baclofen (LIORESAL) 10 MG tablet, Take 10 mg by mouth Daily As Needed., Disp: , Rfl:   •  busPIRone (BUSPAR) 5 MG tablet, Take 5 mg by mouth 2 (Two) Times a Day., Disp: , Rfl:   •  Calcium Polycarbophil (FIBER-CAPS PO), Take 1 capsule by mouth Daily., Disp: , Rfl:   •  cephalexin (KEFLEX) 500 MG capsule, Take 1 capsule by mouth 2 (Two) Times a Day., Disp: 20 capsule, Rfl: 0  •  conjugated estrogens (PREMARIN) 0.625 MG/GM vaginal cream, Insert  into the vagina Daily., Disp: 30 g, Rfl: 2  •  FLUoxetine (PROzac) 20 MG capsule, Take 20 mg by mouth Daily., Disp: , Rfl:   •  HYDROcodone-acetaminophen (NORCO)  "7.5-325 MG per tablet, Take 1 tablet by mouth 3 (Three) Times a Day As Needed for Moderate Pain ., Disp: , Rfl:   •  hydroxychloroquine (PLAQUENIL) 200 MG tablet, Take 200 mg by mouth Daily., Disp: , Rfl:   •  lactobacillus acidophilus (RISAQUAD) capsule capsule, Take 1 capsule by mouth Daily., Disp: 7 capsule, Rfl: 0  •  loratadine (CLARITIN) 10 MG tablet, Take 10 mg by mouth Daily., Disp: , Rfl:   •  megestrol (MEGACE) 40 MG/ML suspension, Take 40 mg by mouth Daily., Disp: , Rfl:   •  metoprolol tartrate (LOPRESSOR) 50 MG tablet, Take 1 tablet by mouth Every 12 (Twelve) Hours., Disp: 60 tablet, Rfl: 5  •  nitrofurantoin, macrocrystal-monohydrate, (MACROBID) 100 MG capsule, Take 1 capsule by mouth Daily., Disp: 60 capsule, Rfl: 3  •  omeprazole (priLOSEC) 40 MG capsule, Take 40 mg by mouth Daily., Disp: , Rfl:   •  pregabalin (LYRICA) 300 MG capsule, Take 300 mg by mouth 2 (Two) Times a Day., Disp: , Rfl:   •  raNITIdine (ZANTAC) 150 MG tablet, , Disp: , Rfl:       The following portions of the patient's history were reviewed and updated as appropriate: allergies, current medications, past family history, past medical history, past social history, past surgical history and problem list.    Social History     Tobacco Use   • Smoking status: Never Smoker   • Smokeless tobacco: Never Used   Substance Use Topics   • Alcohol use: No   • Drug use: No       Review of Systems   Constitution: Negative for weakness and malaise/fatigue.   Cardiovascular: Negative for chest pain, dyspnea on exertion, irregular heartbeat, leg swelling, near-syncope, orthopnea, palpitations, paroxysmal nocturnal dyspnea and syncope.   Respiratory: Negative for cough, shortness of breath and wheezing.    Neurological: Negative for dizziness and light-headedness.       Objective   Vitals:    09/06/19 0918   BP: 176/81   BP Location: Right arm   Pulse: 71   Resp: 16   Weight: 51.3 kg (113 lb)   Height: 152.4 cm (60\")     Body mass index is 22.07 " kg/m².        Physical Exam   Constitutional: She is oriented to person, place, and time. She appears well-developed and well-nourished.   HENT:   Head: Normocephalic and atraumatic.   Cardiovascular: Normal rate, regular rhythm and normal heart sounds. Exam reveals no S3 and no S4.   No murmur heard.  Pulmonary/Chest: Effort normal and breath sounds normal. She has no wheezes. She has no rales.   Abdominal: Soft. Bowel sounds are normal.   Musculoskeletal: She exhibits no edema.   Neurological: She is alert and oriented to person, place, and time.   Skin: Skin is warm and dry.   Psychiatric: She has a normal mood and affect. Her behavior is normal.       Lab Results   Component Value Date     06/22/2019    K 4.4 06/22/2019     06/22/2019    CO2 19.5 (L) 06/22/2019    BUN 15 06/22/2019    CREATININE 0.91 06/22/2019    GLUCOSE 113 (H) 06/22/2019    CALCIUM 9.2 06/22/2019    AST 15 06/22/2019    ALT 10 06/22/2019    ALKPHOS 82 06/22/2019    LABIL2 1.4 (L) 05/06/2016     Lab Results   Component Value Date    CKTOTAL 80 10/08/2018     Lab Results   Component Value Date    WBC 9.80 06/22/2019    HGB 11.6 (L) 06/22/2019    HCT 34.2 06/22/2019     06/22/2019     Lab Results   Component Value Date    INR <0.90 05/06/2016     Lab Results   Component Value Date    MG 1.7 10/11/2018     Lab Results   Component Value Date    TSH 2.170 06/22/2019      Lab Results   Component Value Date    .0 (H) 03/07/2019           Procedures      Assessment/Plan    Diagnosis Plan   1. Essential hypertension  Comprehensive Metabolic Panel    Lipid Panel   2. Palpitations     3. Paroxysmal SVT (supraventricular tachycardia) (CMS/[facility])                  Recommendations:    1. Resume amlodipine 2.5 mg daily for uncontrolled hypertension.    2. CMP and lipid panel ordered.    3. Follow up in 6 months and PRN.          Patient's Body mass index is 22.07 kg/m². BMI is within normal parameters. No follow-up required..          Return in about 6 months (around 3/6/2020) for Recheck.    As always, I appreciate very much the opportunity to participate in the cardiovascular care of your patients.      With Best Regards,    MERRY Land

## 2019-09-19 ENCOUNTER — LAB (OUTPATIENT)
Dept: LAB | Facility: HOSPITAL | Age: 74
End: 2019-09-19

## 2019-09-19 DIAGNOSIS — I10 ESSENTIAL HYPERTENSION: ICD-10-CM

## 2019-09-19 LAB
ALBUMIN SERPL-MCNC: 4.3 G/DL (ref 3.5–5.2)
ALBUMIN/GLOB SERPL: 1.8 G/DL
ALP SERPL-CCNC: 79 U/L (ref 39–117)
ALT SERPL W P-5'-P-CCNC: 11 U/L (ref 1–33)
ANION GAP SERPL CALCULATED.3IONS-SCNC: 14.9 MMOL/L (ref 5–15)
AST SERPL-CCNC: 18 U/L (ref 1–32)
BILIRUB SERPL-MCNC: 0.4 MG/DL (ref 0.2–1.2)
BUN BLD-MCNC: 14 MG/DL (ref 8–23)
BUN/CREAT SERPL: 14.7 (ref 7–25)
CALCIUM SPEC-SCNC: 9.2 MG/DL (ref 8.6–10.5)
CHLORIDE SERPL-SCNC: 102 MMOL/L (ref 98–107)
CHOLEST SERPL-MCNC: 197 MG/DL (ref 0–200)
CO2 SERPL-SCNC: 24.1 MMOL/L (ref 22–29)
CREAT BLD-MCNC: 0.95 MG/DL (ref 0.57–1)
GFR SERPL CREATININE-BSD FRML MDRD: 57 ML/MIN/1.73
GLOBULIN UR ELPH-MCNC: 2.4 GM/DL
GLUCOSE BLD-MCNC: 93 MG/DL (ref 65–99)
HDLC SERPL-MCNC: 46 MG/DL (ref 40–60)
LDLC SERPL CALC-MCNC: 125 MG/DL (ref 0–100)
LDLC/HDLC SERPL: 2.73 {RATIO}
POTASSIUM BLD-SCNC: 4.5 MMOL/L (ref 3.5–5.2)
PROT SERPL-MCNC: 6.7 G/DL (ref 6–8.5)
SODIUM BLD-SCNC: 141 MMOL/L (ref 136–145)
TRIGL SERPL-MCNC: 128 MG/DL (ref 0–150)
VLDLC SERPL-MCNC: 25.6 MG/DL (ref 5–40)

## 2019-09-19 PROCEDURE — 80061 LIPID PANEL: CPT

## 2019-09-19 PROCEDURE — 36415 COLL VENOUS BLD VENIPUNCTURE: CPT

## 2019-09-19 PROCEDURE — 80053 COMPREHEN METABOLIC PANEL: CPT

## 2019-09-20 ENCOUNTER — TELEPHONE (OUTPATIENT)
Dept: CARDIOLOGY | Facility: CLINIC | Age: 74
End: 2019-09-20

## 2019-09-20 DIAGNOSIS — E78.5 DYSLIPIDEMIA (HIGH LDL; LOW HDL): Primary | ICD-10-CM

## 2019-09-20 RX ORDER — ATORVASTATIN CALCIUM 10 MG/1
10 TABLET, FILM COATED ORAL DAILY
Qty: 30 TABLET | Refills: 11 | Status: SHIPPED | OUTPATIENT
Start: 2019-09-20 | End: 2021-02-03 | Stop reason: SDUPTHER

## 2019-09-20 NOTE — TELEPHONE ENCOUNTER
Result Notes for Comprehensive Metabolic Panel     Notes recorded by Maranda Montoya APRN on 9/20/2019 at 11:37 AM EDT  I sent in atorvastatin 10 mg every evening. We will recheck her labs after her next visit. Thanks.  ------    Notes recorded by Kiah Headley CMA on 9/20/2019 at 11:21 AM EDT  Per pt's daughter Lamar (on pt's hipaa-pt not able to come to phone), she states her mother to her knowledge has never had any issues w/statins or even had to take them.  She states she helps her mom with her daily medication and for us to go ahead and send it to pharmacy and if pt has any concerns she will call us.  ------    Notes recorded by Maranda Montoya APRN on 9/20/2019 at 10:00 AM EDT  Her cholesterol is elevated. Can she take statins?

## 2019-09-20 NOTE — TELEPHONE ENCOUNTER
----- Message from MERRY Land sent at 9/20/2019 11:37 AM EDT -----  I sent in atorvastatin 10 mg every evening. We will recheck her labs after her next visit. Thanks.

## 2019-11-07 ENCOUNTER — HOSPITAL ENCOUNTER (INPATIENT)
Facility: HOSPITAL | Age: 74
LOS: 5 days | Discharge: HOME | End: 2019-11-12
Attending: EMERGENCY MEDICINE | Admitting: INTERNAL MEDICINE

## 2019-11-07 DIAGNOSIS — C82.90 FOLLICULAR LYMPHOMA, UNSPECIFIED FOLLICULAR LYMPHOMA TYPE, UNSPECIFIED BODY REGION (HCC): ICD-10-CM

## 2019-11-07 DIAGNOSIS — A41.9 SEPSIS, DUE TO UNSPECIFIED ORGANISM, UNSPECIFIED WHETHER ACUTE ORGAN DYSFUNCTION PRESENT (HCC): Primary | ICD-10-CM

## 2019-11-07 DIAGNOSIS — R53.81 DEBILITY: ICD-10-CM

## 2019-11-07 PROBLEM — R94.31 PROLONGED Q-T INTERVAL ON ECG: Status: ACTIVE | Noted: 2019-11-07

## 2019-11-07 PROBLEM — G43.909 MIGRAINES: Chronic | Status: ACTIVE | Noted: 2019-11-07

## 2019-11-07 PROBLEM — R79.81 HYPOCARBIA: Status: ACTIVE | Noted: 2019-11-07

## 2019-11-07 PROBLEM — D70.9 NEUTROPENIA (HCC): Chronic | Status: ACTIVE | Noted: 2019-11-07

## 2019-11-07 PROBLEM — K21.9 GERD WITHOUT ESOPHAGITIS: Chronic | Status: ACTIVE | Noted: 2019-11-07

## 2019-11-07 PROBLEM — E83.51 HYPOCALCEMIA: Status: ACTIVE | Noted: 2019-11-07

## 2019-11-07 PROBLEM — R73.9 HYPERGLYCEMIA: Status: ACTIVE | Noted: 2019-11-07

## 2019-11-07 PROBLEM — Z85.850 HISTORY OF THYROID CANCER: Chronic | Status: ACTIVE | Noted: 2019-11-07

## 2019-11-07 PROBLEM — I47.1 PAROXYSMAL SVT (SUPRAVENTRICULAR TACHYCARDIA) (HCC): Chronic | Status: ACTIVE | Noted: 2019-01-03

## 2019-11-07 PROBLEM — R13.10 DYSPHAGIA: Chronic | Status: ACTIVE | Noted: 2019-11-07

## 2019-11-07 PROBLEM — R59.0 PERIAORTIC LYMPHADENOPATHY: Status: RESOLVED | Noted: 2018-02-13 | Resolved: 2019-11-07

## 2019-11-07 PROBLEM — R50.81 NEUTROPENIC FEVER (HCC): Status: ACTIVE | Noted: 2019-11-07

## 2019-11-07 PROBLEM — J18.9 PNEUMONIA: Status: RESOLVED | Noted: 2018-10-09 | Resolved: 2019-11-07

## 2019-11-07 PROBLEM — M79.7 FIBROMYALGIA: Chronic | Status: ACTIVE | Noted: 2019-11-07

## 2019-11-07 PROBLEM — I47.1 PAROXYSMAL SVT (SUPRAVENTRICULAR TACHYCARDIA) (HCC): Chronic | Status: RESOLVED | Noted: 2019-01-03 | Resolved: 2019-11-07

## 2019-11-07 PROBLEM — M19.90 ARTHRITIS: Chronic | Status: ACTIVE | Noted: 2019-11-07

## 2019-11-07 PROBLEM — I47.10 PAROXYSMAL SVT (SUPRAVENTRICULAR TACHYCARDIA): Chronic | Status: RESOLVED | Noted: 2019-01-03 | Resolved: 2019-11-07

## 2019-11-07 PROBLEM — E87.8 HYPOCARBIA: Status: ACTIVE | Noted: 2019-11-07

## 2019-11-07 PROBLEM — D70.9 NEUTROPENIC FEVER (HCC): Status: ACTIVE | Noted: 2019-11-07

## 2019-11-07 PROBLEM — I47.10 PAROXYSMAL SVT (SUPRAVENTRICULAR TACHYCARDIA): Chronic | Status: ACTIVE | Noted: 2019-01-03

## 2019-11-07 PROBLEM — D64.9 NORMOCYTIC ANEMIA: Status: ACTIVE | Noted: 2019-11-07

## 2019-11-07 PROBLEM — E87.8 HYPERCHLOREMIA: Status: ACTIVE | Noted: 2019-11-07

## 2019-11-07 PROBLEM — E87.6 HYPOKALEMIA: Status: ACTIVE | Noted: 2019-11-07

## 2019-11-07 PROBLEM — Z86.79 HISTORY OF RHEUMATIC FEVER: Chronic | Status: ACTIVE | Noted: 2019-11-07

## 2019-11-07 PROBLEM — N30.00 ACUTE CYSTITIS WITHOUT HEMATURIA: Status: RESOLVED | Noted: 2019-03-26 | Resolved: 2019-11-07

## 2019-11-07 LAB
ALBUMIN SERPL-MCNC: 3.5 G/DL (ref 3.5–5.2)
ALBUMIN/GLOB SERPL: 1.3 G/DL
ALP SERPL-CCNC: 72 U/L (ref 39–117)
ALT SERPL W P-5'-P-CCNC: 8 U/L (ref 1–33)
ANION GAP SERPL CALCULATED.3IONS-SCNC: 12.8 MMOL/L (ref 5–15)
AST SERPL-CCNC: 13 U/L (ref 1–32)
BACTERIA UR QL AUTO: ABNORMAL /HPF
BILIRUB SERPL-MCNC: 0.4 MG/DL (ref 0.2–1.2)
BILIRUB UR QL STRIP: NEGATIVE
BUN BLD-MCNC: 14 MG/DL (ref 8–23)
BUN/CREAT SERPL: 16.1 (ref 7–25)
CALCIUM SPEC-SCNC: 7.8 MG/DL (ref 8.6–10.5)
CHLORIDE SERPL-SCNC: 108 MMOL/L (ref 98–107)
CLARITY UR: ABNORMAL
CO2 SERPL-SCNC: 19.2 MMOL/L (ref 22–29)
COLOR UR: ABNORMAL
CREAT BLD-MCNC: 0.87 MG/DL (ref 0.57–1)
CRP SERPL-MCNC: 4.61 MG/DL (ref 0–0.5)
D-LACTATE SERPL-SCNC: 1.9 MMOL/L (ref 0.5–2)
DEPRECATED RDW RBC AUTO: 43.8 FL (ref 37–54)
ERYTHROCYTE [DISTWIDTH] IN BLOOD BY AUTOMATED COUNT: 12.8 % (ref 12.3–15.4)
ERYTHROCYTE [SEDIMENTATION RATE] IN BLOOD: 42 MM/HR (ref 0–30)
GFR SERPL CREATININE-BSD FRML MDRD: 63 ML/MIN/1.73
GLOBULIN UR ELPH-MCNC: 2.6 GM/DL
GLUCOSE BLD-MCNC: 137 MG/DL (ref 65–99)
GLUCOSE UR STRIP-MCNC: NEGATIVE MG/DL
HBA1C MFR BLD: 4.3 % (ref 4.8–5.6)
HCT VFR BLD AUTO: 34.5 % (ref 34–46.6)
HGB BLD-MCNC: 11.4 G/DL (ref 12–15.9)
HGB UR QL STRIP.AUTO: NEGATIVE
HYALINE CASTS UR QL AUTO: ABNORMAL /LPF
KETONES UR QL STRIP: NEGATIVE
LARGE PLATELETS: ABNORMAL
LEUKOCYTE ESTERASE UR QL STRIP.AUTO: ABNORMAL
LYMPHOCYTES # BLD MANUAL: 0.47 10*3/MM3 (ref 0.7–3.1)
LYMPHOCYTES NFR BLD MANUAL: 22 % (ref 5–12)
LYMPHOCYTES NFR BLD MANUAL: 62 % (ref 19.6–45.3)
MAGNESIUM SERPL-MCNC: 1.2 MG/DL (ref 1.6–2.4)
MCH RBC QN AUTO: 30.8 PG (ref 26.6–33)
MCHC RBC AUTO-ENTMCNC: 33 G/DL (ref 31.5–35.7)
MCV RBC AUTO: 93.2 FL (ref 79–97)
MONOCYTES # BLD AUTO: 0.17 10*3/MM3 (ref 0.1–0.9)
NEUTROPHILS # BLD AUTO: 0.12 10*3/MM3 (ref 1.7–7)
NEUTROPHILS NFR BLD MANUAL: 16 % (ref 42.7–76)
NITRITE UR QL STRIP: NEGATIVE
PH UR STRIP.AUTO: 5.5 [PH] (ref 5–8)
PLATELET # BLD AUTO: 186 10*3/MM3 (ref 140–450)
PMV BLD AUTO: 12.9 FL (ref 6–12)
POTASSIUM BLD-SCNC: 3.3 MMOL/L (ref 3.5–5.2)
PROT SERPL-MCNC: 6.1 G/DL (ref 6–8.5)
PROT UR QL STRIP: ABNORMAL
RBC # BLD AUTO: 3.7 10*6/MM3 (ref 3.77–5.28)
RBC # UR: ABNORMAL /HPF
RBC MORPH BLD: NORMAL
REF LAB TEST METHOD: ABNORMAL
SCAN SLIDE: NORMAL
SODIUM BLD-SCNC: 140 MMOL/L (ref 136–145)
SP GR UR STRIP: 1.02 (ref 1–1.03)
SQUAMOUS #/AREA URNS HPF: ABNORMAL /HPF
TSH SERPL DL<=0.05 MIU/L-ACNC: 1.35 UIU/ML (ref 0.27–4.2)
UROBILINOGEN UR QL STRIP: ABNORMAL
WBC NRBC COR # BLD: 0.76 10*3/MM3 (ref 3.4–10.8)
WBC UR QL AUTO: ABNORMAL /HPF

## 2019-11-07 PROCEDURE — 86140 C-REACTIVE PROTEIN: CPT | Performed by: PHYSICIAN ASSISTANT

## 2019-11-07 PROCEDURE — 85025 COMPLETE CBC W/AUTO DIFF WBC: CPT | Performed by: PHYSICIAN ASSISTANT

## 2019-11-07 PROCEDURE — 99223 1ST HOSP IP/OBS HIGH 75: CPT | Performed by: INTERNAL MEDICINE

## 2019-11-07 PROCEDURE — 25010000002 FILGRASTIM PER 480 MCG: Performed by: INTERNAL MEDICINE

## 2019-11-07 PROCEDURE — 83605 ASSAY OF LACTIC ACID: CPT | Performed by: PHYSICIAN ASSISTANT

## 2019-11-07 PROCEDURE — 99284 EMERGENCY DEPT VISIT MOD MDM: CPT

## 2019-11-07 PROCEDURE — 83036 HEMOGLOBIN GLYCOSYLATED A1C: CPT | Performed by: PHYSICIAN ASSISTANT

## 2019-11-07 PROCEDURE — 25010000002 PROMETHAZINE PER 50 MG: Performed by: FAMILY MEDICINE

## 2019-11-07 PROCEDURE — 25010000002 VANCOMYCIN 5 G RECONSTITUTED SOLUTION 5,000 MG VIAL: Performed by: PHYSICIAN ASSISTANT

## 2019-11-07 PROCEDURE — 84443 ASSAY THYROID STIM HORMONE: CPT | Performed by: PHYSICIAN ASSISTANT

## 2019-11-07 PROCEDURE — 80053 COMPREHEN METABOLIC PANEL: CPT | Performed by: PHYSICIAN ASSISTANT

## 2019-11-07 PROCEDURE — 85007 BL SMEAR W/DIFF WBC COUNT: CPT | Performed by: PHYSICIAN ASSISTANT

## 2019-11-07 PROCEDURE — 25010000002 HEPARIN (PORCINE) PER 1000 UNITS: Performed by: INTERNAL MEDICINE

## 2019-11-07 PROCEDURE — 25010000002 PIPERACILLIN-TAZOBACTAM: Performed by: PHYSICIAN ASSISTANT

## 2019-11-07 PROCEDURE — 85652 RBC SED RATE AUTOMATED: CPT | Performed by: PHYSICIAN ASSISTANT

## 2019-11-07 PROCEDURE — 81001 URINALYSIS AUTO W/SCOPE: CPT | Performed by: PHYSICIAN ASSISTANT

## 2019-11-07 PROCEDURE — 87040 BLOOD CULTURE FOR BACTERIA: CPT | Performed by: PHYSICIAN ASSISTANT

## 2019-11-07 PROCEDURE — 93005 ELECTROCARDIOGRAM TRACING: CPT | Performed by: PHYSICIAN ASSISTANT

## 2019-11-07 PROCEDURE — 25010000002 DEXAMETHASONE PER 1 MG: Performed by: PHYSICIAN ASSISTANT

## 2019-11-07 PROCEDURE — 83735 ASSAY OF MAGNESIUM: CPT | Performed by: INTERNAL MEDICINE

## 2019-11-07 RX ORDER — HYDROCHLOROTHIAZIDE 25 MG/1
25 TABLET ORAL DAILY PRN
Status: CANCELLED | OUTPATIENT
Start: 2019-11-07

## 2019-11-07 RX ORDER — HYDROCHLOROTHIAZIDE 25 MG/1
25 TABLET ORAL DAILY PRN
COMMUNITY
End: 2019-11-12 | Stop reason: HOSPADM

## 2019-11-07 RX ORDER — SODIUM CHLORIDE 0.9 % (FLUSH) 0.9 %
10 SYRINGE (ML) INJECTION EVERY 12 HOURS SCHEDULED
Status: DISCONTINUED | OUTPATIENT
Start: 2019-11-07 | End: 2019-11-12 | Stop reason: HOSPADM

## 2019-11-07 RX ORDER — HYDRALAZINE HYDROCHLORIDE 20 MG/ML
10 INJECTION INTRAMUSCULAR; INTRAVENOUS EVERY 6 HOURS PRN
Status: DISCONTINUED | OUTPATIENT
Start: 2019-11-07 | End: 2019-11-07

## 2019-11-07 RX ORDER — MAGNESIUM SULFATE HEPTAHYDRATE 40 MG/ML
2 INJECTION, SOLUTION INTRAVENOUS
Status: COMPLETED | OUTPATIENT
Start: 2019-11-08 | End: 2019-11-08

## 2019-11-07 RX ORDER — ACETAMINOPHEN 325 MG/1
650 TABLET ORAL EVERY 6 HOURS PRN
Status: DISCONTINUED | OUTPATIENT
Start: 2019-11-07 | End: 2019-11-12 | Stop reason: HOSPADM

## 2019-11-07 RX ORDER — PANTOPRAZOLE SODIUM 40 MG/1
40 TABLET, DELAYED RELEASE ORAL
Status: DISCONTINUED | OUTPATIENT
Start: 2019-11-08 | End: 2019-11-12 | Stop reason: HOSPADM

## 2019-11-07 RX ORDER — MAGNESIUM SULFATE HEPTAHYDRATE 40 MG/ML
2 INJECTION, SOLUTION INTRAVENOUS AS NEEDED
Status: DISCONTINUED | OUTPATIENT
Start: 2019-11-07 | End: 2019-11-12 | Stop reason: HOSPADM

## 2019-11-07 RX ORDER — SODIUM CHLORIDE 0.9 % (FLUSH) 0.9 %
10 SYRINGE (ML) INJECTION AS NEEDED
Status: DISCONTINUED | OUTPATIENT
Start: 2019-11-07 | End: 2019-11-12 | Stop reason: HOSPADM

## 2019-11-07 RX ORDER — NITROFURANTOIN 25; 75 MG/1; MG/1
100 CAPSULE ORAL 2 TIMES DAILY
Status: CANCELLED | OUTPATIENT
Start: 2019-11-07 | End: 2020-05-08

## 2019-11-07 RX ORDER — L.ACID,PARA/B.BIFIDUM/S.THERM 8B CELL
1 CAPSULE ORAL DAILY
Status: DISCONTINUED | OUTPATIENT
Start: 2019-11-08 | End: 2019-11-08

## 2019-11-07 RX ORDER — PANTOPRAZOLE SODIUM 40 MG/1
40 TABLET, DELAYED RELEASE ORAL EVERY MORNING
Status: CANCELLED | OUTPATIENT
Start: 2019-11-08

## 2019-11-07 RX ORDER — PREGABALIN 100 MG/1
300 CAPSULE ORAL 2 TIMES DAILY
Status: CANCELLED | OUTPATIENT
Start: 2019-11-07

## 2019-11-07 RX ORDER — ACETAMINOPHEN 500 MG
1000 TABLET ORAL ONCE
Status: COMPLETED | OUTPATIENT
Start: 2019-11-07 | End: 2019-11-07

## 2019-11-07 RX ORDER — MEGESTROL ACETATE 40 MG/1
40 TABLET ORAL 2 TIMES DAILY
COMMUNITY
End: 2021-01-01 | Stop reason: HOSPADM

## 2019-11-07 RX ORDER — HYDROXYCHLOROQUINE SULFATE 200 MG/1
200 TABLET, FILM COATED ORAL DAILY
Status: CANCELLED | OUTPATIENT
Start: 2019-11-08

## 2019-11-07 RX ORDER — PROMETHAZINE HYDROCHLORIDE 25 MG/ML
12.5 INJECTION, SOLUTION INTRAMUSCULAR; INTRAVENOUS EVERY 6 HOURS PRN
Status: DISCONTINUED | OUTPATIENT
Start: 2019-11-07 | End: 2019-11-12 | Stop reason: HOSPADM

## 2019-11-07 RX ORDER — MAGNESIUM SULFATE HEPTAHYDRATE 40 MG/ML
4 INJECTION, SOLUTION INTRAVENOUS AS NEEDED
Status: DISCONTINUED | OUTPATIENT
Start: 2019-11-07 | End: 2019-11-12 | Stop reason: HOSPADM

## 2019-11-07 RX ORDER — HEPARIN SODIUM 5000 [USP'U]/ML
5000 INJECTION, SOLUTION INTRAVENOUS; SUBCUTANEOUS EVERY 12 HOURS SCHEDULED
Status: DISCONTINUED | OUTPATIENT
Start: 2019-11-07 | End: 2019-11-12 | Stop reason: HOSPADM

## 2019-11-07 RX ORDER — MULTIVITAMIN
1 TABLET ORAL DAILY
Status: DISCONTINUED | OUTPATIENT
Start: 2019-11-08 | End: 2019-11-12 | Stop reason: HOSPADM

## 2019-11-07 RX ORDER — NITROGLYCERIN 0.4 MG/1
0.4 TABLET SUBLINGUAL
Status: DISCONTINUED | OUTPATIENT
Start: 2019-11-07 | End: 2019-11-12 | Stop reason: HOSPADM

## 2019-11-07 RX ADMIN — DEXAMETHASONE SODIUM PHOSPHATE 10 MG: 4 INJECTION, SOLUTION INTRAMUSCULAR; INTRAVENOUS at 18:10

## 2019-11-07 RX ADMIN — SODIUM CHLORIDE 500 ML: 9 INJECTION, SOLUTION INTRAVENOUS at 19:13

## 2019-11-07 RX ADMIN — PROMETHAZINE HYDROCHLORIDE 12.5 MG: 25 INJECTION, SOLUTION INTRAMUSCULAR; INTRAVENOUS at 18:56

## 2019-11-07 RX ADMIN — FILGRASTIM 480 MCG: 480 INJECTION, SOLUTION INTRAVENOUS; SUBCUTANEOUS at 21:53

## 2019-11-07 RX ADMIN — PIPERACILLIN AND TAZOBACTAM 4.5 G: 4; .5 INJECTION, POWDER, LYOPHILIZED, FOR SOLUTION INTRAVENOUS; PARENTERAL at 18:18

## 2019-11-07 RX ADMIN — VANCOMYCIN HYDROCHLORIDE 1000 MG: 5 INJECTION, POWDER, LYOPHILIZED, FOR SOLUTION INTRAVENOUS at 18:58

## 2019-11-07 RX ADMIN — HEPARIN SODIUM 5000 UNITS: 5000 INJECTION INTRAVENOUS; SUBCUTANEOUS at 21:53

## 2019-11-07 RX ADMIN — PIPERACILLIN SODIUM,TAZOBACTAM SODIUM 3.38 G: 3; .375 INJECTION, POWDER, FOR SOLUTION INTRAVENOUS at 23:51

## 2019-11-07 RX ADMIN — ACETAMINOPHEN 1000 MG: 500 TABLET ORAL at 16:21

## 2019-11-07 NOTE — ED PROVIDER NOTES
Subjective   74-year-old female patient presents to the emergency room today with an abscess to her left hand.  Patient states she noticed the spot come up on her hand yesterday.  States that she has now got red streaking up her arm.  Patient states that she has a rash on each arm bilaterally.      Patient has a follicular lymphoma.  She is followed by the [facility].  She gets chemotherapy every 3 months.  Patient had chemo 1 month and a half ago.  She is currently in remission and has been in remission for 1 year.        History provided by:  Patient   used: No        Review of Systems   Constitutional: Positive for fatigue and fever.   HENT: Negative.    Eyes: Negative.    Respiratory: Negative.    Cardiovascular: Negative.    Gastrointestinal: Negative.    Endocrine: Negative.    Genitourinary: Negative.    Musculoskeletal: Negative.    Skin: Positive for wound.   Allergic/Immunologic: Negative.    Neurological: Negative.    Hematological: Negative.    Psychiatric/Behavioral: Negative.    All other systems reviewed and are negative.      Past Medical History:   Diagnosis Date   • Arthritis    • Broken leg, left, closed, initial encounter    • Cardiac disorder    • Diverticulitis    • Dysphagia    • Dysuria    • Fibromyalgia    • GERD (gastroesophageal reflux disease)    • History of rheumatic fever    • Hypertension    • Hypokalemia due to inadequate potassium intake 03/03/2014   • Lipoma    • Malignant neoplasm (CMS/HCC)    • Migraine    • Recurrent UTI    • Sleep apnea    • Thyroid cancer (CMS/HCC)    • UTI (urinary tract infection)        No Known Allergies    Past Surgical History:   Procedure Laterality Date   • BLADDER SURGERY     • FINGER SURGERY      reattached    • HYSTERECTOMY     • THYROID SURGERY         Family History   Problem Relation Age of Onset   • Diabetes Mother    • Hypertension Mother    • Other Other         cardiac disorder,cardiovascular disease,  malignant neoplasm of brain   • Breast cancer Paternal Aunt        Social History     Socioeconomic History   • Marital status:      Spouse name: Not on file   • Number of children: Not on file   • Years of education: Not on file   • Highest education level: Not on file   Tobacco Use   • Smoking status: Never Smoker   • Smokeless tobacco: Never Used   Substance and Sexual Activity   • Alcohol use: No   • Drug use: No   • Sexual activity: Defer           Objective   Physical Exam   Constitutional: She is oriented to person, place, and time. She appears well-developed and well-nourished.   HENT:   Head: Normocephalic and atraumatic.   Right Ear: External ear normal.   Left Ear: External ear normal.   Nose: Nose normal.   Mouth/Throat: Oropharynx is clear and moist.   Eyes: Conjunctivae and EOM are normal. Pupils are equal, round, and reactive to light.   Neck: Normal range of motion. Neck supple.   Cardiovascular: Normal rate, regular rhythm, normal heart sounds and intact distal pulses.   Pulmonary/Chest: Effort normal and breath sounds normal.   Abdominal: Soft. Bowel sounds are normal.   Musculoskeletal: Normal range of motion.        Hands:  Neurological: She is alert and oriented to person, place, and time.   Skin: Skin is warm and dry.        Nursing note and vitals reviewed.      Procedures           ED Course  ED Course as of Nov 07 1848   Thu Nov 07, 2019   1800 Discussed case with Dr. Nava - will admit.  Recommends 500cc fluid bolus.   [ML]   1804 I have seen and evaluated this patient and agree with Amy's work-up, documentation and plan.  Patient is currently comfortable and has no questions or complaints.  [EG]   1828 D/W Dr. King - she will admit.   [ML]      ED Course User Index  [EG] Jenifer Nava,   [ML] Amy Jasso PA                  [facility]    Final diagnoses:   Sepsis, due to unspecified organism, unspecified whether acute organ dysfunction present (CMS/[facility])               Amy Jasso PA  11/07/19 1849

## 2019-11-08 ENCOUNTER — APPOINTMENT (OUTPATIENT)
Dept: GENERAL RADIOLOGY | Facility: HOSPITAL | Age: 74
End: 2019-11-08

## 2019-11-08 ENCOUNTER — APPOINTMENT (OUTPATIENT)
Dept: CARDIOLOGY | Facility: HOSPITAL | Age: 74
End: 2019-11-08

## 2019-11-08 ENCOUNTER — APPOINTMENT (OUTPATIENT)
Dept: ULTRASOUND IMAGING | Facility: HOSPITAL | Age: 74
End: 2019-11-08

## 2019-11-08 LAB
ALBUMIN SERPL-MCNC: 3.61 G/DL (ref 3.5–5.2)
ALBUMIN/GLOB SERPL: 1.3 G/DL
ALP SERPL-CCNC: 69 U/L (ref 39–117)
ALT SERPL W P-5'-P-CCNC: 9 U/L (ref 1–33)
ANION GAP SERPL CALCULATED.3IONS-SCNC: 14.2 MMOL/L (ref 5–15)
AST SERPL-CCNC: 14 U/L (ref 1–32)
BASOPHILS # BLD MANUAL: 0.01 10*3/MM3 (ref 0–0.2)
BASOPHILS NFR BLD AUTO: 2 % (ref 0–1.5)
BH CV ECHO MEAS - % IVS THICK: 65.2 %
BH CV ECHO MEAS - % LVPW THICK: 44.2 %
BH CV ECHO MEAS - ACS: 1.2 CM
BH CV ECHO MEAS - AO MAX PG: 11.6 MMHG
BH CV ECHO MEAS - AO MEAN PG: 6.4 MMHG
BH CV ECHO MEAS - AO ROOT AREA (BSA CORRECTED): 1.9
BH CV ECHO MEAS - AO ROOT AREA: 5.8 CM^2
BH CV ECHO MEAS - AO ROOT DIAM: 2.7 CM
BH CV ECHO MEAS - AO V2 MAX: 170.4 CM/SEC
BH CV ECHO MEAS - AO V2 MEAN: 118.8 CM/SEC
BH CV ECHO MEAS - AO V2 VTI: 36 CM
BH CV ECHO MEAS - BSA(HAYCOCK): 1.5 M^2
BH CV ECHO MEAS - BSA: 1.4 M^2
BH CV ECHO MEAS - BZI_BMI: 21.3 KILOGRAMS/M^2
BH CV ECHO MEAS - BZI_METRIC_HEIGHT: 152.4 CM
BH CV ECHO MEAS - BZI_METRIC_WEIGHT: 49.4 KG
BH CV ECHO MEAS - EDV(CUBED): 49 ML
BH CV ECHO MEAS - EDV(MOD-SP4): 14 ML
BH CV ECHO MEAS - EDV(TEICH): 56.6 ML
BH CV ECHO MEAS - EF(CUBED): 88.5 %
BH CV ECHO MEAS - EF(MOD-SP4): 71.4 %
BH CV ECHO MEAS - EF(TEICH): 83.3 %
BH CV ECHO MEAS - ESV(CUBED): 5.6 ML
BH CV ECHO MEAS - ESV(MOD-SP4): 4 ML
BH CV ECHO MEAS - ESV(TEICH): 9.4 ML
BH CV ECHO MEAS - FS: 51.4 %
BH CV ECHO MEAS - IVS/LVPW: 0.99
BH CV ECHO MEAS - IVSD: 1.1 CM
BH CV ECHO MEAS - IVSS: 1.9 CM
BH CV ECHO MEAS - LA DIMENSION: 2.2 CM
BH CV ECHO MEAS - LA/AO: 0.81
BH CV ECHO MEAS - LV DIASTOLIC VOL/BSA (35-75): 9.7 ML/M^2
BH CV ECHO MEAS - LV MASS(C)D: 132 GRAMS
BH CV ECHO MEAS - LV MASS(C)DI: 91.5 GRAMS/M^2
BH CV ECHO MEAS - LV MASS(C)S: 117.5 GRAMS
BH CV ECHO MEAS - LV MASS(C)SI: 81.5 GRAMS/M^2
BH CV ECHO MEAS - LV SYSTOLIC VOL/BSA (12-30): 2.8 ML/M^2
BH CV ECHO MEAS - LVIDD: 3.7 CM
BH CV ECHO MEAS - LVIDS: 1.8 CM
BH CV ECHO MEAS - LVLD AP4: 5.8 CM
BH CV ECHO MEAS - LVLS AP4: 5.1 CM
BH CV ECHO MEAS - LVOT AREA (M): 1.8 CM^2
BH CV ECHO MEAS - LVOT AREA: 1.8 CM^2
BH CV ECHO MEAS - LVOT DIAM: 1.5 CM
BH CV ECHO MEAS - LVPWD: 1.1 CM
BH CV ECHO MEAS - LVPWS: 1.6 CM
BH CV ECHO MEAS - MV A MAX VEL: 94.8 CM/SEC
BH CV ECHO MEAS - MV E MAX VEL: 105.6 CM/SEC
BH CV ECHO MEAS - MV E/A: 1.1
BH CV ECHO MEAS - PA ACC SLOPE: 832.9 CM/SEC^2
BH CV ECHO MEAS - PA ACC TIME: 0.13 SEC
BH CV ECHO MEAS - PA PR(ACCEL): 22 MMHG
BH CV ECHO MEAS - RAP SYSTOLE: 10 MMHG
BH CV ECHO MEAS - RVSP: 43.5 MMHG
BH CV ECHO MEAS - SI(AO): 144.1 ML/M^2
BH CV ECHO MEAS - SI(CUBED): 30.1 ML/M^2
BH CV ECHO MEAS - SI(MOD-SP4): 6.9 ML/M^2
BH CV ECHO MEAS - SI(TEICH): 32.7 ML/M^2
BH CV ECHO MEAS - SV(AO): 207.8 ML
BH CV ECHO MEAS - SV(CUBED): 43.4 ML
BH CV ECHO MEAS - SV(MOD-SP4): 10 ML
BH CV ECHO MEAS - SV(TEICH): 47.2 ML
BH CV ECHO MEAS - TR MAX VEL: 289.6 CM/SEC
BILIRUB SERPL-MCNC: 0.5 MG/DL (ref 0.2–1.2)
BUN BLD-MCNC: 12 MG/DL (ref 8–23)
BUN/CREAT SERPL: 15.8 (ref 7–25)
CALCIUM SPEC-SCNC: 7.8 MG/DL (ref 8.6–10.5)
CHLORIDE SERPL-SCNC: 108 MMOL/L (ref 98–107)
CHOLEST SERPL-MCNC: 120 MG/DL (ref 0–200)
CO2 SERPL-SCNC: 20.8 MMOL/L (ref 22–29)
CREAT BLD-MCNC: 0.76 MG/DL (ref 0.57–1)
CRP SERPL-MCNC: 8.69 MG/DL (ref 0–0.5)
D-LACTATE SERPL-SCNC: 1.1 MMOL/L (ref 0.5–2)
DEPRECATED RDW RBC AUTO: 43.1 FL (ref 37–54)
ERYTHROCYTE [DISTWIDTH] IN BLOOD BY AUTOMATED COUNT: 12.6 % (ref 12.3–15.4)
GFR SERPL CREATININE-BSD FRML MDRD: 74 ML/MIN/1.73
GLOBULIN UR ELPH-MCNC: 2.7 GM/DL
GLUCOSE BLD-MCNC: 150 MG/DL (ref 65–99)
HCT VFR BLD AUTO: 31.4 % (ref 34–46.6)
HDLC SERPL-MCNC: 39 MG/DL (ref 40–60)
HGB BLD-MCNC: 10.2 G/DL (ref 12–15.9)
LDLC SERPL CALC-MCNC: 65 MG/DL (ref 0–100)
LDLC/HDLC SERPL: 1.68 {RATIO}
LYMPHOCYTES # BLD MANUAL: 0.3 10*3/MM3 (ref 0.7–3.1)
LYMPHOCYTES NFR BLD MANUAL: 26 % (ref 5–12)
LYMPHOCYTES NFR BLD MANUAL: 60 % (ref 19.6–45.3)
MAXIMAL PREDICTED HEART RATE: 144 BPM
MCH RBC QN AUTO: 30.3 PG (ref 26.6–33)
MCHC RBC AUTO-ENTMCNC: 32.5 G/DL (ref 31.5–35.7)
MCV RBC AUTO: 93.2 FL (ref 79–97)
MONOCYTES # BLD AUTO: 0.13 10*3/MM3 (ref 0.1–0.9)
NEUTROPHILS # BLD AUTO: 0.06 10*3/MM3 (ref 1.7–7)
NEUTROPHILS NFR BLD MANUAL: 10 % (ref 42.7–76)
NEUTS BAND NFR BLD MANUAL: 2 % (ref 0–5)
NRBC SPEC MANUAL: 2 /100 WBC (ref 0–0.2)
OVALOCYTES BLD QL SMEAR: ABNORMAL
PHOSPHATE SERPL-MCNC: 3.3 MG/DL (ref 2.5–4.5)
PLAT MORPH BLD: NORMAL
PLATELET # BLD AUTO: 145 10*3/MM3 (ref 140–450)
PMV BLD AUTO: 13.6 FL (ref 6–12)
POTASSIUM BLD-SCNC: 3.6 MMOL/L (ref 3.5–5.2)
PROT SERPL-MCNC: 6.3 G/DL (ref 6–8.5)
RBC # BLD AUTO: 3.37 10*6/MM3 (ref 3.77–5.28)
SCAN SLIDE: NORMAL
SODIUM BLD-SCNC: 143 MMOL/L (ref 136–145)
STRESS TARGET HR: 122 BPM
TRIGL SERPL-MCNC: 78 MG/DL (ref 0–150)
VLDLC SERPL-MCNC: 15.6 MG/DL
WBC NRBC COR # BLD: 0.5 10*3/MM3 (ref 3.4–10.8)

## 2019-11-08 PROCEDURE — 25010000002 VANCOMYCIN 5 G RECONSTITUTED SOLUTION 5,000 MG VIAL: Performed by: INTERNAL MEDICINE

## 2019-11-08 PROCEDURE — 76705 ECHO EXAM OF ABDOMEN: CPT

## 2019-11-08 PROCEDURE — 92611 MOTION FLUOROSCOPY/SWALLOW: CPT | Performed by: SPEECH-LANGUAGE PATHOLOGIST

## 2019-11-08 PROCEDURE — 93306 TTE W/DOPPLER COMPLETE: CPT

## 2019-11-08 PROCEDURE — 74230 X-RAY XM SWLNG FUNCJ C+: CPT | Performed by: RADIOLOGY

## 2019-11-08 PROCEDURE — 74230 X-RAY XM SWLNG FUNCJ C+: CPT

## 2019-11-08 PROCEDURE — 94799 UNLISTED PULMONARY SVC/PX: CPT

## 2019-11-08 PROCEDURE — 80053 COMPREHEN METABOLIC PANEL: CPT | Performed by: PHYSICIAN ASSISTANT

## 2019-11-08 PROCEDURE — 25010000002 HEPARIN (PORCINE) PER 1000 UNITS: Performed by: INTERNAL MEDICINE

## 2019-11-08 PROCEDURE — 86140 C-REACTIVE PROTEIN: CPT | Performed by: PHYSICIAN ASSISTANT

## 2019-11-08 PROCEDURE — 93306 TTE W/DOPPLER COMPLETE: CPT | Performed by: INTERNAL MEDICINE

## 2019-11-08 PROCEDURE — 84100 ASSAY OF PHOSPHORUS: CPT | Performed by: PHYSICIAN ASSISTANT

## 2019-11-08 PROCEDURE — 80061 LIPID PANEL: CPT | Performed by: PHYSICIAN ASSISTANT

## 2019-11-08 PROCEDURE — 76705 ECHO EXAM OF ABDOMEN: CPT | Performed by: RADIOLOGY

## 2019-11-08 PROCEDURE — 83605 ASSAY OF LACTIC ACID: CPT | Performed by: PHYSICIAN ASSISTANT

## 2019-11-08 PROCEDURE — 25010000002 MAGNESIUM SULFATE 2 GM/50ML SOLUTION: Performed by: INTERNAL MEDICINE

## 2019-11-08 PROCEDURE — 85025 COMPLETE CBC W/AUTO DIFF WBC: CPT | Performed by: PHYSICIAN ASSISTANT

## 2019-11-08 PROCEDURE — 85007 BL SMEAR W/DIFF WBC COUNT: CPT | Performed by: PHYSICIAN ASSISTANT

## 2019-11-08 PROCEDURE — 25010000002 PIPERACILLIN-TAZOBACTAM: Performed by: INTERNAL MEDICINE

## 2019-11-08 PROCEDURE — 99222 1ST HOSP IP/OBS MODERATE 55: CPT | Performed by: NURSE PRACTITIONER

## 2019-11-08 RX ORDER — MEGESTROL ACETATE 40 MG/1
40 TABLET ORAL 2 TIMES DAILY
Status: DISCONTINUED | OUTPATIENT
Start: 2019-11-08 | End: 2019-11-12 | Stop reason: HOSPADM

## 2019-11-08 RX ORDER — CETIRIZINE HYDROCHLORIDE 10 MG/1
5 TABLET ORAL DAILY
Status: DISCONTINUED | OUTPATIENT
Start: 2019-11-08 | End: 2019-11-12 | Stop reason: HOSPADM

## 2019-11-08 RX ORDER — L.ACID,PARA/B.BIFIDUM/S.THERM 8B CELL
1 CAPSULE ORAL DAILY
Status: DISCONTINUED | OUTPATIENT
Start: 2019-11-08 | End: 2019-11-12 | Stop reason: HOSPADM

## 2019-11-08 RX ORDER — METOPROLOL TARTRATE 50 MG/1
50 TABLET, FILM COATED ORAL EVERY 12 HOURS SCHEDULED
Status: DISCONTINUED | OUTPATIENT
Start: 2019-11-08 | End: 2019-11-12 | Stop reason: HOSPADM

## 2019-11-08 RX ORDER — CALCIUM POLYCARBOPHIL 625 MG 625 MG/1
625 TABLET ORAL DAILY
Status: DISCONTINUED | OUTPATIENT
Start: 2019-11-08 | End: 2019-11-08

## 2019-11-08 RX ORDER — BUSPIRONE HYDROCHLORIDE 5 MG/1
5 TABLET ORAL 2 TIMES DAILY
Status: DISCONTINUED | OUTPATIENT
Start: 2019-11-08 | End: 2019-11-12 | Stop reason: HOSPADM

## 2019-11-08 RX ORDER — FLUOXETINE HYDROCHLORIDE 20 MG/1
20 CAPSULE ORAL DAILY
Status: DISCONTINUED | OUTPATIENT
Start: 2019-11-08 | End: 2019-11-08

## 2019-11-08 RX ORDER — ATORVASTATIN CALCIUM 10 MG/1
10 TABLET, FILM COATED ORAL DAILY
Status: DISCONTINUED | OUTPATIENT
Start: 2019-11-08 | End: 2019-11-12 | Stop reason: HOSPADM

## 2019-11-08 RX ORDER — ASPIRIN 81 MG/1
81 TABLET ORAL DAILY
Status: DISCONTINUED | OUTPATIENT
Start: 2019-11-08 | End: 2019-11-12 | Stop reason: HOSPADM

## 2019-11-08 RX ORDER — FAMOTIDINE 20 MG/1
20 TABLET, FILM COATED ORAL 2 TIMES DAILY PRN
Status: DISCONTINUED | OUTPATIENT
Start: 2019-11-08 | End: 2019-11-12 | Stop reason: HOSPADM

## 2019-11-08 RX ORDER — HYDROCODONE BITARTRATE AND ACETAMINOPHEN 7.5; 325 MG/1; MG/1
1 TABLET ORAL 3 TIMES DAILY PRN
Status: DISCONTINUED | OUTPATIENT
Start: 2019-11-08 | End: 2019-11-12 | Stop reason: HOSPADM

## 2019-11-08 RX ORDER — PREGABALIN 75 MG/1
150 CAPSULE ORAL EVERY 12 HOURS SCHEDULED
Status: DISCONTINUED | OUTPATIENT
Start: 2019-11-08 | End: 2019-11-12 | Stop reason: HOSPADM

## 2019-11-08 RX ADMIN — PREGABALIN 150 MG: 75 CAPSULE ORAL at 02:24

## 2019-11-08 RX ADMIN — MAGNESIUM SULFATE IN WATER 2 G: 40 INJECTION, SOLUTION INTRAVENOUS at 00:41

## 2019-11-08 RX ADMIN — HEPARIN SODIUM 5000 UNITS: 5000 INJECTION INTRAVENOUS; SUBCUTANEOUS at 20:15

## 2019-11-08 RX ADMIN — ASPIRIN 81 MG: 81 TABLET ORAL at 08:40

## 2019-11-08 RX ADMIN — PIPERACILLIN SODIUM,TAZOBACTAM SODIUM 3.38 G: 3; .375 INJECTION, POWDER, FOR SOLUTION INTRAVENOUS at 08:40

## 2019-11-08 RX ADMIN — PANTOPRAZOLE SODIUM 40 MG: 40 TABLET, DELAYED RELEASE ORAL at 04:20

## 2019-11-08 RX ADMIN — ATORVASTATIN CALCIUM 10 MG: 10 TABLET, FILM COATED ORAL at 08:41

## 2019-11-08 RX ADMIN — MAGNESIUM SULFATE IN WATER 2 G: 40 INJECTION, SOLUTION INTRAVENOUS at 02:56

## 2019-11-08 RX ADMIN — PREGABALIN 150 MG: 75 CAPSULE ORAL at 08:40

## 2019-11-08 RX ADMIN — BUSPIRONE HYDROCHLORIDE 5 MG: 5 TABLET ORAL at 08:40

## 2019-11-08 RX ADMIN — HYDROCODONE BITARTRATE AND ACETAMINOPHEN 1 TABLET: 7.5; 325 TABLET ORAL at 09:20

## 2019-11-08 RX ADMIN — METOPROLOL TARTRATE 50 MG: 50 TABLET, FILM COATED ORAL at 02:24

## 2019-11-08 RX ADMIN — MEGESTROL ACETATE 40 MG: 40 TABLET ORAL at 08:40

## 2019-11-08 RX ADMIN — Medication 1 CAPSULE: at 08:40

## 2019-11-08 RX ADMIN — MAGNESIUM SULFATE IN WATER 2 G: 40 INJECTION, SOLUTION INTRAVENOUS at 04:38

## 2019-11-08 RX ADMIN — Medication 1 TABLET: at 08:41

## 2019-11-08 RX ADMIN — MEGESTROL ACETATE 40 MG: 40 TABLET ORAL at 20:15

## 2019-11-08 RX ADMIN — METOPROLOL TARTRATE 50 MG: 50 TABLET, FILM COATED ORAL at 08:40

## 2019-11-08 RX ADMIN — CONJUGATED ESTROGENS 1 APPLICATION: 0.62 CREAM VAGINAL at 08:52

## 2019-11-08 RX ADMIN — PIPERACILLIN SODIUM,TAZOBACTAM SODIUM 3.38 G: 3; .375 INJECTION, POWDER, FOR SOLUTION INTRAVENOUS at 16:48

## 2019-11-08 RX ADMIN — CETIRIZINE HYDROCHLORIDE 5 MG: 10 TABLET, FILM COATED ORAL at 08:40

## 2019-11-08 RX ADMIN — BUSPIRONE HYDROCHLORIDE 5 MG: 5 TABLET ORAL at 20:15

## 2019-11-08 RX ADMIN — ACETAMINOPHEN 650 MG: 325 TABLET ORAL at 01:55

## 2019-11-08 RX ADMIN — PREGABALIN 150 MG: 75 CAPSULE ORAL at 20:15

## 2019-11-08 RX ADMIN — METOPROLOL TARTRATE 50 MG: 50 TABLET, FILM COATED ORAL at 20:15

## 2019-11-08 RX ADMIN — VANCOMYCIN HYDROCHLORIDE 1000 MG: 5 INJECTION, POWDER, LYOPHILIZED, FOR SOLUTION INTRAVENOUS at 20:15

## 2019-11-08 NOTE — PROGRESS NOTES
Nutrition Services    Patient Name:  Kiah Conway  YOB: 1945  MRN: [id number]  Admit Date:  11/7/2019    RD to liberate diet to regular diet. Per best practices a neutropenic diet is not recommended. Per CDC and [employer] Pt's who received fresh fruits and vegetables had better health outcomes than those on a neutropenic diet.      RD available for additional questions. Thank you!    Electronically signed by:  Lilly Sears RD  11/08/19 2:28 PM

## 2019-11-08 NOTE — PLAN OF CARE
Problem: Patient Care Overview  Goal: Plan of Care Review  Outcome: Ongoing (interventions implemented as appropriate)   11/08/19 6056   Coping/Psychosocial   Plan of Care Reviewed With patient   Plan of Care Review   Progress no change   OTHER   Outcome Summary MBS completed. Silent aspiration w/ thin liquids via all presentation styles after the swallow. Will modify po diet to regular diet consistency w/ nectar thick liquids. Water protocol between meals/meds. SLP to f/u for formal dysphagia tx.

## 2019-11-08 NOTE — NURSING NOTE
Upper back with mild rash and scattered dry scabs.  Patient reports itching at times but not now.      Left anterior hand has a 1x1cm area of blanchable erythema.  No drainage or odor noted.  Will leave open to air.

## 2019-11-08 NOTE — PLAN OF CARE
DYSPHAGIA THERAPY PLAN OF CARE:    Kiah Conway will benefit from formal dysphagia therapy  X3-5 days per week at 15-60 minutes sessions, as functionally tolerated, for 7-10 Days, to address:    Long Term Goal:  Pt will accept least restrictive diet tolerance w/o overt s/s aspiration.     Short Term Goals:   1. Pt will perform resistive breathing exercises x3 sets x5 reps w/resistance of 1-4 To improve respiratory support and control.   2. Pt will perform laryngeal adduction/elevation exercises x3 sets x10 reps w/ min cues.   3. Pt will perform Shaker exercises sustained x3 sets x5 reps for 30+ seconds over 3 consecutive sessions.   4. Pt will perform Shaker exercises repetitive x3 sets x12 reps w/ mod cues.   5. Pt will perform compensatory techniques during meals w/ min cues.  6. Pt will participate in instrumental re-evaluation of swallowing fnx in 5-7 days, pending progress towards this poc.    Thank you-  Gosia Bean M.S., CCC-SLP

## 2019-11-08 NOTE — PLAN OF CARE
Problem: Sepsis/Septic Shock (Adult)  Goal: Signs and Symptoms of Listed Potential Problems Will be Absent, Minimized or Managed (Sepsis/Septic Shock)  Outcome: Ongoing (interventions implemented as appropriate)      Problem: Fall Risk (Adult)  Goal: Identify Related Risk Factors and Signs and Symptoms  Outcome: Ongoing (interventions implemented as appropriate)    Goal: Absence of Fall  Outcome: Ongoing (interventions implemented as appropriate)      Problem: Skin Injury Risk (Adult)  Goal: Identify Related Risk Factors and Signs and Symptoms  Outcome: Ongoing (interventions implemented as appropriate)    Goal: Skin Health and Integrity  Outcome: Ongoing (interventions implemented as appropriate)      Problem: Patient Care Overview  Goal: Plan of Care Review  Outcome: Ongoing (interventions implemented as appropriate)    Goal: Individualization and Mutuality  Outcome: Ongoing (interventions implemented as appropriate)    Goal: Discharge Needs Assessment  Outcome: Ongoing (interventions implemented as appropriate)    Goal: Interprofessional Rounds/Family Conf  Outcome: Ongoing (interventions implemented as appropriate)      Problem: Pain, Chronic (Adult)  Goal: Identify Related Risk Factors and Signs and Symptoms  Outcome: Ongoing (interventions implemented as appropriate)    Goal: Acceptable Pain/Comfort Level and Functional Ability  Outcome: Ongoing (interventions implemented as appropriate)

## 2019-11-08 NOTE — PROGRESS NOTES
Patient initiated on vancomycin for sepsis. Based on patient's age, weight and SCr, will start vancomycin at 1 gm daily. Will check a trough level at steady state and follow.    Thank you for this consult,    Sherine Martinez RP

## 2019-11-08 NOTE — PROGRESS NOTES
Patient seen and examined, admitted for neutropenic fever significantly low white count, source likely left for medical cellulitis, patient could not recall how she stained any injury.  Significant improvement noted and reported by patient after getting antibiotic.  No Diarrhea, appetite is good.    -Neutropenic fever source most likely from left hand cellulitis with today's absolute neutrophil count of 60    -Essential hypertension  -Chronic illness malnourishment  -Severe osteoarthritis and rheumatoid arthritis on DMARDs  -History of GERD  -History of PSVT  -Acute hypomagnesemia  -Small right forehead hematoma from trauma accidental with her grandchild  -History of follicular lymphoma status post chemotherapy last dose was October 11 of this year    Continue with Neupogen, broad-spectrum antibiotic, cultures, GI prophylaxis, DVT prophylaxis, replace electrolytes.

## 2019-11-08 NOTE — H&P
[facility] Medicine Services  HISTORY & PHYSICAL    Patient Identification:  Name:  Kiah Conway  Age:  74 y.o.  Sex:  female  :  1945  MRN:  [id number]   Visit Number:  [id number]  Primary Care Physician:  Aundrea Headley APRN     Subjective     Chief complaint:   Chief Complaint   Patient presents with   • Abscess     History of presenting illness:   Patient is a 74 y.o. female with past medical history significant for hyperlipidemia, essential hypertension, follicular lymphoma (in remission), GERD, and fibromyalgia, that presented to the [facility] emergency department for evaluation of a left hand abscess.  The patient states that she noticed the lesion last night and states that it began swelling and streaking up her left arm today.  The patient has a history of follicular lymphoma that is in remission, and the patient states that she received chemotherapy about a month and a half ago at the [facility] in [place].  She receives chemotherapy every 3 months and states that she has been in remission for 1 year. She is unsure how she obtained the lesion but states that it is very tender to the touch and with movement. Daughter, at bedside states that since arriving to the hospital, the edema and streaking has improved significantly. The patient admits to a decrease in appetite, chills, occasional choking on foods, cough x 2 days (non-productive), left lower quadrant abdominal pain, diarrhea x 1 this morning, dysuria, frequency, urgency, left arm wound, diffuse rash that has been present for 9 months, chronic numbness/tingling, intermittent leg edema with dependency, and weakness. She denies diaphoresis, congestion, sore throat, chest tightness, shortness of breath, wheezing, chest pain, palpitations, hematochezia, nausea, vomiting, constipation, dizziness, lightheadedness, syncope, or confusion.  The patient's daughter at bedside did mention the  presence of a diffuse papular rash located on the patient's bilateral lower extremities and upper portion of back.  She states that the rashes been present for around 9 months, and states that they have used steroid creams from their PCP with no relief.  The daughter states that the patient has an appointment scheduled with dermatology but has not had the appointment yet.    Upon arrival to the ED, vitals were temperature 100.2 °F, pulse 89, respiration 16, /50, SPO2 99% on room air.  CMP with glucose 137, potassium 3.3, CO2 19.2, chloride 108, calcium 7.8.  CRP 4.61, lactate 1.9.  CBC with WBC 0.76, RBC 3.70, hemoglobin 11.4.  ANC 0.12.  Urinalysis with dark yellow color, cloudy appearance, trace leukocytes, 1+ protein, 1+ bacteria, 13-20 squamous epithelium.  Blood cultures pending x2. Patient received PO tylenol, IV dexamethasone 10 mg x 1, IV zosyn x 1, IV phenergan x 1, sodium chloride 500 mL bolus x 1 in emergency department.     Patient has been admitted to the telemetry floor for further evaluation and treatment.   ---------------------------------------------------------------------------------------------------------------------   Review of Systems   Constitutional: Positive for appetite change (Loss of appetite), chills and fatigue. Negative for activity change, diaphoresis and fever.   HENT: Negative for congestion and sore throat.    Eyes: Negative for discharge and itching.   Respiratory: Positive for cough (X2 days, nonproductive) and choking (Occasional choking on foods). Negative for chest tightness, shortness of breath and wheezing.    Cardiovascular: Positive for leg swelling (Intermittent with dependency). Negative for chest pain and palpitations.   Gastrointestinal: Positive for abdominal pain (Left lower quadrant, soreness) and diarrhea (X1 episode this morning). Negative for blood in stool, constipation, nausea and vomiting.   Endocrine: Negative for cold intolerance and heat  intolerance.   Genitourinary: Positive for dysuria, frequency and urgency.   Musculoskeletal: Positive for arthralgias (History of fibromyalgia).   Skin: Positive for rash (Diffuse papular rash on back and bilateral lower extremities) and wound (Left hand lesion).   Allergic/Immunologic: Positive for immunocompromised state (Follicular lymphoma, in remission).   Neurological: Positive for weakness and numbness (Chronic in bilateral upper and lower extremities). Negative for dizziness, syncope and light-headedness.   Hematological: Bruises/bleeds easily.   Psychiatric/Behavioral: Negative for agitation and confusion.      ---------------------------------------------------------------------------------------------------------------------   Past Medical History:   Diagnosis Date   • Arthritis    • Cardiac disorder    • Diverticulitis    • Dysphagia    • Fibromyalgia    • GERD (gastroesophageal reflux disease)    • History of rheumatic fever    • Hypertension    • Hypokalemia due to inadequate potassium intake 03/03/2014   • Lipoma    • Malignant neoplasm (CMS/HCC)    • Migraine    • Recurrent UTI    • Sleep apnea    • Thyroid cancer (CMS/HCC)      Past Surgical History:   Procedure Laterality Date   • BLADDER SURGERY     • FINGER SURGERY      reattached    • HYSTERECTOMY     • THYROID SURGERY       Family History   Problem Relation Age of Onset   • Diabetes Mother    • Hypertension Mother    • Other Other         cardiac disorder,cardiovascular disease, malignant neoplasm of brain   • Breast cancer Paternal Aunt      Social History     Socioeconomic History   • Marital status:      Spouse name: Not on file   • Number of children: Not on file   • Years of education: Not on file   • Highest education level: Not on file   Tobacco Use   • Smoking status: Never Smoker   • Smokeless tobacco: Never Used   Substance and Sexual Activity   • Alcohol use: No   • Drug use: No   • Sexual activity: Defer      ---------------------------------------------------------------------------------------------------------------------   Allergies:  Patient has no known allergies.  ---------------------------------------------------------------------------------------------------------------------   Medications below are reported home medications pulling from within the system; at this time, these medications have not been reconciled unless otherwise specified and are in the verification process for further verifcation as current home medications.    Prior to Admission Medications     Prescriptions Last Dose Informant Patient Reported? Taking?    amLODIPine (NORVASC) 2.5 MG tablet   No No    Take 1 tablet by mouth Daily.    Patient taking differently:  Take 2.5 mg by mouth 1 (One) Time As Needed.    aspirin 81 MG EC tablet  Pharmacy Yes No    Take 81 mg by mouth Daily.    atorvastatin (LIPITOR) 10 MG tablet   No No    Take 1 tablet by mouth Daily.    baclofen (LIORESAL) 10 MG tablet  Pharmacy Yes No    Take 10 mg by mouth Daily As Needed.    busPIRone (BUSPAR) 5 MG tablet  Pharmacy Yes No    Take 5 mg by mouth 2 (Two) Times a Day.    Calcium Polycarbophil (FIBER-CAPS PO)  Self Yes No    Take 1 capsule by mouth Daily.    cephalexin (KEFLEX) 500 MG capsule   No No    Take 1 capsule by mouth 2 (Two) Times a Day.    conjugated estrogens (PREMARIN) 0.625 MG/GM vaginal cream   No No    Insert  into the vagina Daily.    FLUoxetine (PROzac) 20 MG capsule  Self Yes No    Take 20 mg by mouth Daily.    HYDROcodone-acetaminophen (NORCO) 7.5-325 MG per tablet  Pharmacy Yes No    Take 1 tablet by mouth 3 (Three) Times a Day As Needed for Moderate Pain .    hydroxychloroquine (PLAQUENIL) 200 MG tablet  Pharmacy Yes No    Take 200 mg by mouth Daily.    lactobacillus acidophilus (RISAQUAD) capsule capsule   No No    Take 1 capsule by mouth Daily.    loratadine (CLARITIN) 10 MG tablet  Pharmacy Yes No    Take 10 mg by mouth Daily.    megestrol  (MEGACE) 40 MG/ML suspension  Pharmacy Yes No    Take 40 mg by mouth Daily.    metoprolol tartrate (LOPRESSOR) 50 MG tablet  Pharmacy No No    Take 1 tablet by mouth Every 12 (Twelve) Hours.    nitrofurantoin, macrocrystal-monohydrate, (MACROBID) 100 MG capsule   No No    Take 1 capsule by mouth Daily.    omeprazole (priLOSEC) 40 MG capsule  Pharmacy Yes No    Take 40 mg by mouth Daily.    pregabalin (LYRICA) 300 MG capsule  Pharmacy Yes No    Take 300 mg by mouth 2 (Two) Times a Day.    raNITIdine (ZANTAC) 150 MG tablet   Yes No        ---------------------------------------------------------------------------------------------------------------------    Objective     Hospital Scheduled Meds:    filgrastim (NEUPOGEN) injection 480 mcg Subcutaneous Daily   heparin (porcine) 5,000 Units Subcutaneous Q12H   [START ON 11/8/2019] piperacillin-tazobactam 3.375 g Intravenous Q8H   sodium chloride 10 mL Intravenous Q12H   [START ON 11/8/2019] vancomycin 15 mg/kg Intravenous Q24H          Current listed hospital scheduled medications may not yet reflect those currently placed in orders that are signed and held, awaiting patient's arrival to floor/unit.    ---------------------------------------------------------------------------------------------------------------------   Vital Signs:  Temp:  [98.3 °F (36.8 °C)-100.2 °F (37.9 °C)] 98.3 °F (36.8 °C)  Heart Rate:  [76-89] 76  Resp:  [16-18] 18  BP: (124-145)/(50-82) 124/82  No data found.  SpO2 Percentage    11/07/19 1506 11/07/19 1800 11/07/19 1941   SpO2: 99% 97% 93%     SpO2:  [93 %-99 %] 93 %  on   ;   Device (Oxygen Therapy): room air    Body mass index is 21.39 kg/m².  Wt Readings from Last 3 Encounters:   11/07/19 49.7 kg (109 lb 8 oz)   09/06/19 51.3 kg (113 lb)   08/28/19 52.2 kg (115 lb)     ---------------------------------------------------------------------------------------------------------------------   Physical Exam:  Physical Exam   Constitutional: She is  oriented to person, place, and time. She appears well-developed and well-nourished. She is cooperative.  Non-toxic appearance. She does not have a sickly appearance. She does not appear ill. No distress.   Upon evaluation patient lying in bed in no acute distress   HENT:   Head: Normocephalic and atraumatic.   Nose: Nose normal. No nasal deformity. No epistaxis.   Mouth/Throat: Uvula is midline, oropharynx is clear and moist and mucous membranes are normal. Mucous membranes are not pale, not dry and not cyanotic.   Eyes: Conjunctivae, EOM and lids are normal. Pupils are equal, round, and reactive to light. Right eye exhibits no discharge. Left eye exhibits no discharge. Right conjunctiva is not injected. Left conjunctiva is not injected.   Neck: Trachea normal and full passive range of motion without pain. No JVD present. No thyroid mass present.   Cardiovascular: Regular rhythm, normal heart sounds, intact distal pulses and normal pulses. Exam reveals no decreased pulses.   Pulses:       Popliteal pulses are 2+ on the right side, and 2+ on the left side.        Dorsalis pedis pulses are 2+ on the right side, and 2+ on the left side.   Pulmonary/Chest: Effort normal and breath sounds normal. No stridor. No respiratory distress. She has no decreased breath sounds. She has no wheezes. She has no rhonchi. She has no rales.   Abdominal: Soft. Normal appearance and bowel sounds are normal. She exhibits no distension, no ascites and no mass. There is no tenderness. There is no rigidity and no guarding.   Genitourinary:   Genitourinary Comments: No Verdugo catheter, producing urine   Musculoskeletal:        Right lower leg: She exhibits no edema.        Left lower leg: She exhibits no edema.     Vascular Status -  Her right foot exhibits normal foot vasculature  and no edema. Her left foot exhibits normal foot vasculature  and no edema.  Skin Integrity  -  Her right foot skin is intact.Her left foot skin is  intact..  Neurological: She is alert and oriented to person, place, and time. She has normal strength. She is not disoriented. No cranial nerve deficit or sensory deficit. She displays a negative Romberg sign.   No focal neurological deficits.  Patient answers questions appropriately and is alert and oriented to place and time.  Strength equal bilaterally in upper and lower extremities.  Sensation intact bilaterally in upper and lower extremities.  Patient able to move arms and legs equally in upper and lower extremities.   Skin: Ecchymosis (Medial to right eyebrow), lesion (On dorsum of left hand) and rash (Papular rash on bilateral lower extremities and upper portion of back) noted. No abrasion noted. Rash is papular. There is erythema (On dorsum of left hand).   Psychiatric: She has a normal mood and affect. Her speech is normal and behavior is normal. Judgment and thought content normal. Her mood appears not anxious. She is not actively hallucinating. Cognition and memory are normal. Cognition and memory are not impaired. She does not exhibit a depressed mood. She is attentive.     ---------------------------------------------------------------------------------------------------------------------  EKG:    Pending cardiology read. Per my evaluation, normal sinus rhythm with left ventricular hypertrophy, nonspecific ST and T wave abnormality, prolonged QT interval 497 ms. HR     Telemetry:    Patient not currently on telemetry.     I have personally reviewed the EKG/Telemetry strip  ---------------------------------------------------------------------------------------------------------------------             Results from last 7 days   Lab Units 11/07/19  1636 11/07/19  1540   CRP mg/dL 4.61*  --    LACTATE mmol/L  --  1.9   WBC 10*3/mm3  --  0.76*   HEMOGLOBIN g/dL  --  11.4*   HEMATOCRIT %  --  34.5   MCV fL  --  93.2   MCHC g/dL  --  33.0   PLATELETS 10*3/mm3  --  186     Results from last 7 days   Lab Units  11/07/19  1636   SODIUM mmol/L 140   POTASSIUM mmol/L 3.3*   CHLORIDE mmol/L 108*   CO2 mmol/L 19.2*   BUN mg/dL 14   CREATININE mg/dL 0.87   EGFR IF NONAFRICN AM mL/min/1.73 63   CALCIUM mg/dL 7.8*   GLUCOSE mg/dL 137*   ALBUMIN g/dL 3.50   BILIRUBIN mg/dL 0.4   ALK PHOS U/L 72   AST (SGOT) U/L 13   ALT (SGPT) U/L 8   Estimated Creatinine Clearance: 43.2 mL/min (by C-G formula based on SCr of 0.87 mg/dL).  No results found for: AMMONIA    No results found for: HGBA1C, POCGLU  No results found for: HGBA1C  Lab Results   Component Value Date    TSH 2.170 06/22/2019       Pain Management Panel     There is no flowsheet data to display.        I have personally reviewed the above laboratory results.   ---------------------------------------------------------------------------------------------------------------------  Imaging Results (Last 7 Days)     ** No results found for the last 168 hours. **        I have personally reviewed the above radiology results.     Last Echocardiogram:  Results for orders placed during the hospital encounter of 04/20/18   Adult Transthoracic Echo Complete W/ Cont if Necessary Per Protocol    Narrative · All left ventricular wall segments contract normally.  · Estimated EF appears to be in the range of 61 - 65%.  · The aortic valve is structurally normal. No aortic valve regurgitation   is present. No aortic valve stenosis is present.  · The mitral valve is normal in structure. Mild mitral valve regurgitation   is present. No significant mitral valve stenosis is present.  · The tricuspid valve is normal. No tricuspid valve stenosis is present.   Mild tricuspid valve regurgitation is present.  · There is no evidence of pericardial effusion.        ---------------------------------------------------------------------------------------------------------------------    Assessment & Plan      Active Hospital Problems    Diagnosis POA   • **Sepsis (CMS/[facility]) [A41.9] Yes   • Neutropenic fever  (CMS/[facility]) [D70.9, R50.81] Yes   • Hyperglycemia [R73.9] Yes   • GERD without esophagitis [K21.9] Yes   • Fibromyalgia [M79.7] Yes   • Arthritis [M19.90] Yes   • Dysphagia [R13.10] Yes   • History of rheumatic fever [Z86.79] Yes   • Migraines [G43.909] Yes   • History of thyroid cancer [Z85.850] Not Applicable   • Hypokalemia [E87.6] Yes   • Hypocarbia [E87.8] Yes   • Hyperchloremia [E87.8] Yes   • Hypocalcemia [E83.51] Yes   • Normocytic anemia [D64.9] Yes   • Paroxysmal SVT (supraventricular tachycardia) (CMS/[facility]) [I47.1] Yes   • Obstructive sleep apnea [G47.33] Yes   • Essential hypertension [I10] Yes     · Cellulitis of left hand: ANC 0.12, WBC 0.76. Patient received fluid bolus in emergency department. Continue empiric antibiotic coverage with IV vancomycin and Zosyn.  Blood cultures pending x2.  Neutropenic precautions in place. Neupogen ordered. Repeat AM CBC. Continue to trend temperature curve and repeat lactic acid and CRP in the AM.     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 Hematology/oncology consulted, assistance is much appreciated. Continue to trend temperature curve and repeat lactic acid and CRP in a.m.  Repeat a.m. CBC.    · Hyperglycemia present upon admission without known history of diabetes mellitus: likely due to IV steroids received in the emergency department. Obtain hemoglobin A1c.     · Acute hypokalemia present upon admission: Repeat AM CMP. Will monitor for now and replace as necessary if potassium remains low. Magnesium low, will be replaced as necessary.     · Acute hypomagnesemia: Mag 1.2 present upon admission. Replace per protocol. Repeat AM Mag.     · Hypocarbia: Patient is in no acute respiratory distress.  Repeat a.m. CMP.  Monitor closely.    · Hypocalcemia: albumin WNL. Repeat AM CMP. Monitor closely.     · Normocytic anemia: No active signs of bleeding. H&H stable.  Patient  denies any hematochezia.  Monitor H&H closely.    · Prolong QTc (497 ms): avoid QTc prolonging agents. Monitor closely on telemetry. Magnesium low, replace as necessary per protocol.     · Essential hypertension:  appears controlled. Continue home medications once reconciled per pharmacy with holding parameters.     · Hyperlipidemia: continue home statin. Recent lipid panel shows fair control.     · Paroxysmal supraventricular tachycardia: patient currently in normal sinus rhythm and chest pain free. Continue home medications. Monitor closely on telemetry. Correct electrolytes.      · Obstructive sleep apnea: Patient denies using BiPAP or CPAP at home.  Monitor continuous pulse oximetry.     · Dysphagia: Patient states she occasionally gets choked on foods.  SLP consulted. Aspiration precautions in place.     · History of rheumatic fever: patient states she occasionally experiences leg edema with dependency. No edema present upon exam. Last echo was in April 2018. Continue outpatient monitoring.     · Migraines: supportive care.     · GERD: Protonix. Continue home medications.     · Arthritis: supportive care.     · Fibromyalgia: Supportive care.  Continue home medications once reconciled per pharmacy.      · F/E/N: No IV fluids. Replace electrolytes as necessary per protocol. Regular diet; thin    ---------------------------------------------------  DVT Prophylaxis: Subcutaneous heparin   GI Prophylaxis: Protonix   Activity: up with assistance     The patient is considered to be a high risk patient due to: sepsis and immunocompromised state.     INPATIENT status due to the need for care which can only be reasonably provided in an hospital setting such as aggressive/expedited ancillary services and/or consultation services, the necessity for IV medications, close physician monitoring and/or the possible need for procedures.  In such, I feel patient’s risk for adverse outcomes and need for care warrant INPATIENT  evaluation and predict the patient’s care encounter to likely last beyond 2 midnights.      Code Status: FULL CODE     I have discussed the patient's assessment and plan with the patient, daughter at bedside, DOLLY Malone, and attending physician.      Ashley Patrick PA-C  Hospitalist Service -- [facility]       11/07/19  8:20 PM    Attending Physician: Dr. Francisca DO

## 2019-11-08 NOTE — PROGRESS NOTES
Discharge Planning Assessment  [facility]     Patient Name: Kiah Conway  MRN: [id number]  Today's Date: 11/8/2019    Admit Date: 11/7/2019    Discharge Needs Assessment     Row Name 11/08/19 1512       Living Environment    Lives With  alone    Current Living Arrangements  home/apartment/condo    Primary Care Provided by  self;child(wendy)    Provides Primary Care For  no one, unable/limited ability to care for self    Family Caregiver if Needed  child(wendy), adult    Family Caregiver Names  Pt has a daughter named Lamar.    Quality of Family Relationships  helpful;involved    Able to Return to Prior Arrangements  yes       Resource/Environmental Concerns    Transportation Concerns  car, none       Transition Planning    Patient/Family Anticipates Transition to  home with family    Transportation Anticipated  family or friend will provide       Discharge Needs Assessment    Equipment Currently Used at Home  walker, rolling;cane, straight    Equipment Needed After Discharge  none        Discharge Plan     Row Name 11/08/19 1514       Plan    Plan  SS spoke with pt on this day. Pt lives at home alone, but her daughter Lamar lives close by. Pt does not receive  services, but is requesting them at discharge with no preference of agency. Pt has a walker and cane. Pt does not have a POA or aliving will. Pt uses [facility], and her PCP is Aundrea Gerard. Pt plans to return home at discharge, with transportation provided by her daughter. SS will follow and assist needed.     Patient/Family in Agreement with Plan  yes            Demographic Summary     Row Name 11/08/19 1503       General Information    Admission Type  inpatient    Referral Source  nursing    Reason for Consult  discharge planning    Preferred Language  English     Used During This Interaction  no      VAUGHN Bruno

## 2019-11-08 NOTE — MBS/VFSS/FEES
"Acute Care - Speech Language Pathology   Swallow Initial Evaluation  Jered   MODIFIED BARIUM SWALLOW STUDY     Patient Name: Kiah Conway  : 1945  MRN: [id number]  Today's Date: 2019      Admit Date: 2019    Kiah Conway  presents to the radiology suite this am from 3323/1P to participate in an instrumental MBS to assess safety/efficacy of swallowing fnx, determine safest/least restrictive diet. D/w DOLLY Ricketts who provides verbal clearance for pt to transport to radiology suite via wheelchair to participate. Per chart review, pt reports \"difficulty w/ solid foods as well as intermittent choking\".        Social History     Socioeconomic History   • Marital status:      Spouse name: Not on file   • Number of children: Not on file   • Years of education: Not on file   • Highest education level: Not on file   Tobacco Use   • Smoking status: Never Smoker   • Smokeless tobacco: Never Used   Substance and Sexual Activity   • Alcohol use: No   • Drug use: No   • Sexual activity: Defer        NO recent chest xray available for review at this time.    Diet Orders (active) (From admission, onward)    Start     Ordered    19 1433  Diet Regular; Nectar / Syrup Thick  Diet Effective Now     Comments:  Water Protocol between meals and meds.    19 1432          She is observed on ra w/o complications, tolerating well.    Risks and benefits of the procedure are explained w/ verbalizing understanding/agreement to participate. Proceed per protocol.    Pt is positioned upright and centered in a soft strap supportive wheelchair to accept multiple po presentations of solid cracker, puree, honey thick, nectar thick, and thin liquids via spoon, cup and straw, along w/ whole placebo pill in puree. Pt is able to self feed.     All views are from the lateral plane.     Facial/oral structures are symmetrical upon observation w/o lingual deviation upon protrusion. Oral mucosa are moist, pink and " clean. Secretions are clear, thin and well controlled. OROM/AMY is wfl to imitate oral postures. Gag is not assessed. Volitional cough is adequate in intensity, clear in quality, nonproductive. Vocal quality is adequate in intensity, clear in quality w/ intelligible speech. Pt is a/a and cooperative to particpate.  Pt  is oriented to person, place, and time, follows simple directives, and participates in simple conversational exchanges.     Upon po presentations, adequate bolus anticipation w/ good labial seal for bolus clearance via spoon bowl, cup rim stability and suction via straw.  Bolus formation, manipulation,  and control are wfl w/ rotary mastication pattern. A-p transit is timely w/o oral residue. Tongue base retraction and linguavelar seal are incomplete w/ premature spillage to the bilateral pyriforms w/ thin liquids. No laryngeal penetration or aspiration is evidenced before the swallow.     Pharyngeal swallow is delayed w/ mildly weak hyolaryngeal elevation and epiglottic inversion. Pharyngeal contraction is adequate w/o significant residue. Fleeting laryngeal penetration w/ nectar thick liquids via cup and straw,  However clears upon completion of swallow. Deep laryngeal penetration w/ thin liquids via all presentation styles w/ silent aspiration occurring after the swallow. Cued cough ineffective to clear aspirated material. Re-evaluation of nectar thick liquids revealed fleeting laryngeal penetration only. No other laryngeal penetration or aspiration evidenced during or after the swallow.     Full esophageal sweep reveals no mucosal abnormalities. Motility is wfl w/o retrograde flow noted.      Pt is able to manipulate and swallow whole barium pill in puree presentation w/o difficulty.      Visit Dx:     ICD-10-CM ICD-9-CM   1. Sepsis, due to unspecified organism, unspecified whether acute organ dysfunction present (CMS/[facility]) A41.9 038.9     995.91     Patient Active Problem List   Diagnosis   •  Essential hypertension   • Obstructive sleep apnea   • Atrophic urethritis   • Neutropenia (CMS/HCC)   • Hyperglycemia   • GERD without esophagitis   • Fibromyalgia   • Arthritis   • Dysphagia   • History of rheumatic fever   • Migraines   • History of thyroid cancer   • Hypokalemia   • Hypocarbia   • Hyperchloremia   • Hypocalcemia   • Normocytic anemia   • Follicular lymphoma (CMS/HCC)   • Prolonged Q-T interval on ECG     Past Medical History:   Diagnosis Date   • Arthritis    • Cardiac disorder    • Diverticulitis    • Dysphagia    • Fibromyalgia    • Follicular lymphoma (CMS/HCC)     Remisson    • GERD (gastroesophageal reflux disease)    • History of rheumatic fever    • Hypertension    • Hypokalemia due to inadequate potassium intake 03/03/2014   • Lipoma    • Malignant neoplasm (CMS/HCC)    • Migraine    • Recurrent UTI    • Rheumatoid arthritis (CMS/HCC)    • Sleep apnea    • Thyroid cancer (CMS/HCC)      Past Surgical History:   Procedure Laterality Date   • BLADDER SURGERY     • FINGER SURGERY      reattached    • HYSTERECTOMY     • THYROID SURGERY           EDUCATION  The patient has been educated in the following areas:   Dysphagia (Swallowing Impairment) Oral Care/Hydration Modified Diet Instruction.    SLP Recommendation and Plan    Impression: Per this evaluation, Ms. Conway presents w/ WFL oral phase, mildly impaired pharyngeal phase dysphagia. Fleeting laryngeal penetration w/ nectar thick liquids via cup and straw,  However clears upon completion of swallow. Deep laryngeal penetration w/ thin liquids via all presentation styles w/ silent aspiration occurring after the swallow. No other laryngeal penetration or aspiration evidenced. She is felt to most benefit from regular diet consistency w/ NECTAR thick liquids. Initiate Matthew Water Protocol between meals/meds to encourage compliance/hydration. Meds whole in puree/nectars. Upright and centered for all po intake. STEFANY precautions. [place]  aspiration precautions. Oral care protocol.    Recommendations:   1. Regular diet consistency w/ NECTAR thick liquids.  2. Meds whole in puree/thins.   3. Moises precautions.   4. Oral care protocol.   5. Matthew Water Protocol between meals/meds.  6. Universal aspiration precautions  7. Upright and centered for all po intake  8. No milkshakes, jello or ice cream  9. Formal dysphagia tx.     SLP to f/u for diet safety/acceptance and formal dysphagia tx as functionally tolerated.    D/w pt results and recommendations w/ verbal understanding and agreement.     D/w DOLLY Ricketts results and recommendations w/ verbal understanding and agreement.     Thank you for allowing me to participate in the care of your patient-  Gosia Bean M.S., CCC-SLP  Plan of Care Reviewed With: patient  Progress: no change  Outcome Summary: MBS completed. Silent aspiration w/ thin liquids via all presentation styles after the swallow. Will modify po diet to regular diet consistency w/ nectar thick liquids. Water protocol between meals/meds. SLP to f/u for formal dysphagia tx.           Time Calculation:   Time Calculation- SLP     Row Name 11/08/19 1537             Time Calculation- SLP    SLP - Next Appointment  11/09/19  -        User Key  (r) = Recorded By, (t) = Taken By, (c) = Cosigned By    Initials Name Provider Type    Gosia Philippe MS CCC-SLP Speech and Language Pathologist          Therapy Charges for Today     Code Description Service Date Service Provider Modifiers Qty    [id number] HC ST MOTION FLUORO EVAL SWALLOW 8 11/8/2019 Gosia Bean MS CCC-SLP GN 1               Gosia Bean MS CCC-SLP  11/8/2019

## 2019-11-08 NOTE — CONSULTS
Name:  Kiah Conway  :  1945    DATE OF ADMISSION  2019    DATE OF CONSULT  2019     REFERRING PHYSICIAN  Dr. King     PRIMARY CARE PHYSICIAN  Aundrea Headley APRN    REASON FOR CONSULT  Follicular Lymphoma, neutropenia    CHIEF COMPLAINT:  Chief Complaint   Patient presents with   • Abscess     HISTORY OF PRESENT ILLNESS:   Kiah Conway is a 74 y.o. female who is being seen in consultation at the request of Dr. King given her history of follicular lymphoma and for further evaluation and management of neutropenia. Patient is being followed by [facility] and is currently receiving maintenance Rituxan with her most recent treatment on 10/11. She presented with complaints of a left hand lesion, swelling and erythema extending to her forearm.  She was admitted with cellulitis of her left hand. Noted neutropenia on admission, ANC 0.12. Primary team has started her on IV Vancomycin and Zosyn. She is in neutropenic precautions and receiving Neupogen. Ms. Conway reports feeling overall well this morning. She continues to have pain in her left hand and swelling. Her main complaint today is a chronic, diffuse, pruritic rash which she has had for the past ~9 months. She says she has tried several different ointments/creams per her oncologist with no improvement and has been referred to dermatology. She says she has an upcoming appointment scheduled. She also complains of chronic aches/pains related to rheumatoid arthritis. She otherwise is well and denies any other complaints today.     PAST MEDICAL HISTORY  Past Medical History:   Diagnosis Date   • Arthritis    • Cardiac disorder    • Diverticulitis    • Dysphagia    • Fibromyalgia    • Follicular lymphoma (CMS/HCC)     Remisson    • GERD (gastroesophageal reflux disease)    • History of rheumatic fever    • Hypertension    • Hypokalemia due to inadequate potassium intake 2014   • Lipoma    • Malignant neoplasm  (CMS/HCC)    • Migraine    • Recurrent UTI    • Rheumatoid arthritis (CMS/HCC)    • Sleep apnea    • Thyroid cancer (CMS/HCC)        PAST SURGICAL HISTORY  Past Surgical History:   Procedure Laterality Date   • BLADDER SURGERY     • FINGER SURGERY      reattached    • HYSTERECTOMY     • THYROID SURGERY         SOCIAL HISTORY  Social History     Socioeconomic History   • Marital status:      Spouse name: Not on file   • Number of children: Not on file   • Years of education: Not on file   • Highest education level: Not on file   Tobacco Use   • Smoking status: Never Smoker   • Smokeless tobacco: Never Used   Substance and Sexual Activity   • Alcohol use: No   • Drug use: No   • Sexual activity: Defer       FAMILY HISTORY  Family History   Problem Relation Age of Onset   • Diabetes Mother    • Hypertension Mother    • Other Other         cardiac disorder,cardiovascular disease, malignant neoplasm of brain   • Breast cancer Paternal Aunt        ALLERGIES  No Known Allergies    INPATIENT MEDICATIONS  Current Facility-Administered Medications   Medication Dose Route Frequency Provider Last Rate Last Dose   • acetaminophen (TYLENOL) tablet 650 mg  650 mg Oral Q6H PRN Ashley Patrick PA-C   650 mg at 11/08/19 0155   • aspirin EC tablet 81 mg  81 mg Oral Daily Yola Espinosa DO   81 mg at 11/08/19 0840   • atorvastatin (LIPITOR) tablet 10 mg  10 mg Oral Daily Yola Espinosa DO   10 mg at 11/08/19 0841   • busPIRone (BUSPAR) tablet 5 mg  5 mg Oral BID Yola Espinosa DO   5 mg at 11/08/19 0840   • cetirizine (zyrTEC) tablet 5 mg  5 mg Oral Daily Yola Espinosa DO   5 mg at 11/08/19 0840   • conjugated estrogens (PREMARIN) vaginal cream 1 application  1 g Vaginal Daily Yola Espinosa DO   1 application at 11/08/19 0852   • famotidine (PEPCID) tablet 20 mg  20 mg Oral BID PRN Yola Espinosa DO       • filgrastim (NEUPOGEN) injection 480 mcg  480 mcg Subcutaneous Daily  Landon King MD   480 mcg at 11/07/19 2153   • heparin (porcine) 5000 UNIT/ML injection 5,000 Units  5,000 Units Subcutaneous Q12H Landon King MD   5,000 Units at 11/07/19 2153   • HYDROcodone-acetaminophen (NORCO) 7.5-325 MG per tablet 1 tablet  1 tablet Oral TID PRN Yola Espinosa, DO   1 tablet at 11/08/19 0920   • lactobacillus acidophilus (RISAQUAD) capsule 1 capsule  1 capsule Oral Daily Yola Espinosa DO   1 capsule at 11/08/19 0840   • Magnesium Sulfate 2 gram Bolus, followed by 8 gram infusion (total Mg dose 10 grams)- Mg less than or equal to 1mg/dL  2 g Intravenous PRN Ashley Patrick PA-C        Or   • Magnesium Sulfate 2 gram / 50mL Infusion (GIVE X 3 BAGS TO EQUAL 6GM TOTAL DOSE) - Mg 1.1 - 1.5 mg/dl  2 g Intravenous PRN Ashley Patrick PA-C        Or   • Magnesium Sulfate 4 gram infusion- Mg 1.6-1.9 mg/dL  4 g Intravenous PRN Ashley Patrick PA-C       • megestrol (MEGACE) tablet 40 mg  40 mg Oral BID Yola Espinosa DO   40 mg at 11/08/19 0840   • metoprolol tartrate (LOPRESSOR) tablet 50 mg  50 mg Oral Q12H Yola Espinosa DO   50 mg at 11/08/19 0840   • multivitamin (DAILY VLAD) tablet 1 tablet  1 tablet Oral Daily Ashley Patrick PA-C   1 tablet at 11/08/19 0841   • nitroglycerin (NITROSTAT) SL tablet 0.4 mg  0.4 mg Sublingual Q5 Min PRN Landon King MD       • pantoprazole (PROTONIX) EC tablet 40 mg  40 mg Oral Q AM Ashley Patrick PA-C   40 mg at 11/08/19 0420   • piperacillin-tazobactam (ZOSYN) 3.375 g/100 mL 0.9% NS IVPB (mbp)  3.375 g Intravenous Q8H Landon King MD   3.375 g at 11/08/19 0840   • pregabalin (LYRICA) capsule 150 mg  150 mg Oral Q12H Yola Espinosa DO   150 mg at 11/08/19 0840   • promethazine (PHENERGAN) injection 12.5 mg  12.5 mg Intramuscular Q6H PRN Ashley Patrick PA-C       • psyllium (METAMUCIL SMOOTH TEXTURE) 1 packet  1 packet Oral Daily Landon King MD       • sodium  "chloride 0.9 % flush 10 mL  10 mL Intravenous PRN Amy Jasso PA       • sodium chloride 0.9 % flush 10 mL  10 mL Intravenous Q12H Landon King MD       • sodium chloride 0.9 % flush 10 mL  10 mL Intravenous PRN Landon King MD       • vancomycin (VANCOCIN) 1,000 mg in sodium chloride 0.9 % 250 mL IVPB  20 mg/kg Intravenous Q24H Landno King MD         Review of Systems  A comprehensive 14 point review of systems was performed.  Significant findings as mentioned above.  All other systems reviewed and are negative.     Physical Exam   Vital Signs: /62 (BP Location: Right arm, Patient Position: Lying)   Pulse 78   Temp 97.8 °F (36.6 °C) (Oral)   Resp 16   Ht 152.4 cm (60\")   Wt 49.8 kg (109 lb 12.8 oz)   SpO2 97%   BMI 21.44 kg/m²   General: Alert and oriented x 3, in no acute distress.   Head: ATNC   Eyes: PERRL, No evidence of conjunctivitis.   Nose: No nasal discharge.   Mouth: Oral mucosal membranes moist. No oral ulceration or hemorrhages.   Neck: Neck supple. No thyromegaly. No JVD.   Lungs: Clear in all fields to A&P without rales, rhonchi or wheezing.   Heart: S1, S2. Regular rate and rhythm. No murmurs, rubs, or gallops.   Abdomen: Soft. Bowel sounds are normoactive. Nontender with palpation.   Extremities: No cyanosis or edema.   Integumentary: Maculopapular rash noted on BUE, BLE and chest. Lesion on left hand, no drainage.   Neurologic: Grossly non-focal exam    IMAGING:  No results found.    RECENT LABS:  Lab Results   Component Value Date    WBC 0.50 (C) 11/08/2019    HGB 10.2 (L) 11/08/2019    HCT 31.4 (L) 11/08/2019    MCV 93.2 11/08/2019    RDW 12.6 11/08/2019     11/08/2019    NEUTRORELPCT 81.2 (H) 06/22/2019    LYMPHORELPCT 7.1 (L) 06/22/2019    MONORELPCT 11.5 06/22/2019    EOSRELPCT 0.0 (L) 06/22/2019    BASORELPCT 0.2 06/22/2019    NEUTROABS 0.06 (L) 11/08/2019    LYMPHSABS 0.70 06/22/2019       Lab Results   Component Value Date     " 11/08/2019    K 3.6 11/08/2019    CO2 20.8 (L) 11/08/2019     (H) 11/08/2019    BUN 12 11/08/2019    CREATININE 0.76 11/08/2019    EGFRIFNONA 74 11/08/2019    GLUCOSE 150 (H) 11/08/2019    CALCIUM 7.8 (L) 11/08/2019    ALKPHOS 69 11/08/2019    AST 14 11/08/2019    ALT 9 11/08/2019    BILITOT 0.5 11/08/2019    ALBUMIN 3.61 11/08/2019    PROTEINTOT 6.3 11/08/2019    MG 1.2 (L) 11/07/2019    PHOS 3.3 11/08/2019     Lab Results   Component Value Date    FERRITIN 200.80 (H) 04/22/2019    IRON 127 04/22/2019    TIBC 304 04/22/2019    LABIRON 42 04/22/2019       ASSESSMENT & PLAN:  Kiah Conway is a very pleasant 74 y.o. female with    1.  Follicular Lymphoma:   -Being followed by [facility] and currently receiving maintenance Rituxan with most recent treatment on 10/11.   -She will need to follow up with primary oncologist after discharge.     2. Neutropenia:  -Likely multifactorial and secondary to infectious process/underlying inflammation with Rituxan treatment and follicular lymphoma also contributing. Continue broad spectrum antibiotics and neutropenic precautions. Agree with Neupogen, would discontinue once ANC >1500. Will continue to monitor. Will obtain US of spleen. If WBCs do not improve, will need to investigate further.     3. Chronic rash:   -? Etiology. She has been referred to dermatology per primary oncologist and has an upcoming appointment scheduled.       The patient was in agreement with the plan and all questions were answered to her satisfaction.     Thank you so much for the consultation and allowing us to participate in the care of Ms. Conway. Please do not hesitate to contact Hem/Onc with any questions or concerns.         Electronically Signed by: MERRY Porter , November 8, 2019 1:25 PM

## 2019-11-09 LAB
ANION GAP SERPL CALCULATED.3IONS-SCNC: 11.9 MMOL/L (ref 5–15)
BASOPHILS # BLD MANUAL: 0.06 10*3/MM3 (ref 0–0.2)
BASOPHILS NFR BLD AUTO: 6 % (ref 0–1.5)
BUN BLD-MCNC: 13 MG/DL (ref 8–23)
BUN/CREAT SERPL: 16.5 (ref 7–25)
CALCIUM SPEC-SCNC: 7.6 MG/DL (ref 8.6–10.5)
CHLORIDE SERPL-SCNC: 111 MMOL/L (ref 98–107)
CO2 SERPL-SCNC: 21.1 MMOL/L (ref 22–29)
CREAT BLD-MCNC: 0.79 MG/DL (ref 0.57–1)
DEPRECATED RDW RBC AUTO: 45.3 FL (ref 37–54)
ERYTHROCYTE [DISTWIDTH] IN BLOOD BY AUTOMATED COUNT: 12.8 % (ref 12.3–15.4)
GFR SERPL CREATININE-BSD FRML MDRD: 71 ML/MIN/1.73
GLUCOSE BLD-MCNC: 73 MG/DL (ref 65–99)
HCT VFR BLD AUTO: 30.6 % (ref 34–46.6)
HGB BLD-MCNC: 9.5 G/DL (ref 12–15.9)
HYPOCHROMIA BLD QL: ABNORMAL
LYMPHOCYTES # BLD MANUAL: 0.47 10*3/MM3 (ref 0.7–3.1)
LYMPHOCYTES NFR BLD MANUAL: 36 % (ref 5–12)
LYMPHOCYTES NFR BLD MANUAL: 44 % (ref 19.6–45.3)
MACROCYTES BLD QL SMEAR: ABNORMAL
MAGNESIUM SERPL-MCNC: 2 MG/DL (ref 1.6–2.4)
MCH RBC QN AUTO: 30.4 PG (ref 26.6–33)
MCHC RBC AUTO-ENTMCNC: 31 G/DL (ref 31.5–35.7)
MCV RBC AUTO: 97.8 FL (ref 79–97)
MONOCYTES # BLD AUTO: 0.39 10*3/MM3 (ref 0.1–0.9)
NEUTROPHILS # BLD AUTO: 0.15 10*3/MM3 (ref 1.7–7)
NEUTROPHILS NFR BLD MANUAL: 12 % (ref 42.7–76)
NEUTS BAND NFR BLD MANUAL: 2 % (ref 0–5)
PHOSPHATE SERPL-MCNC: 2.6 MG/DL (ref 2.5–4.5)
PLAT MORPH BLD: NORMAL
PLATELET # BLD AUTO: 149 10*3/MM3 (ref 140–450)
PMV BLD AUTO: 13.6 FL (ref 6–12)
POIKILOCYTOSIS BLD QL SMEAR: ABNORMAL
POTASSIUM BLD-SCNC: 3 MMOL/L (ref 3.5–5.2)
RBC # BLD AUTO: 3.13 10*6/MM3 (ref 3.77–5.28)
SCAN SLIDE: NORMAL
SODIUM BLD-SCNC: 144 MMOL/L (ref 136–145)
TROPONIN T SERPL-MCNC: <0.01 NG/ML (ref 0–0.03)
WBC NRBC COR # BLD: 1.07 10*3/MM3 (ref 3.4–10.8)

## 2019-11-09 PROCEDURE — 99232 SBSQ HOSP IP/OBS MODERATE 35: CPT | Performed by: HOSPITALIST

## 2019-11-09 PROCEDURE — 84484 ASSAY OF TROPONIN QUANT: CPT | Performed by: HOSPITALIST

## 2019-11-09 PROCEDURE — 25010000002 HEPARIN (PORCINE) PER 1000 UNITS: Performed by: INTERNAL MEDICINE

## 2019-11-09 PROCEDURE — 85060 BLOOD SMEAR INTERPRETATION: CPT | Performed by: HOSPITALIST

## 2019-11-09 PROCEDURE — 25010000002 FILGRASTIM PER 480 MCG: Performed by: INTERNAL MEDICINE

## 2019-11-09 PROCEDURE — 93010 ELECTROCARDIOGRAM REPORT: CPT | Performed by: INTERNAL MEDICINE

## 2019-11-09 PROCEDURE — 83735 ASSAY OF MAGNESIUM: CPT | Performed by: HOSPITALIST

## 2019-11-09 PROCEDURE — 80048 BASIC METABOLIC PNL TOTAL CA: CPT | Performed by: HOSPITALIST

## 2019-11-09 PROCEDURE — 85025 COMPLETE CBC W/AUTO DIFF WBC: CPT | Performed by: HOSPITALIST

## 2019-11-09 PROCEDURE — 94799 UNLISTED PULMONARY SVC/PX: CPT

## 2019-11-09 PROCEDURE — 25010000002 VANCOMYCIN 5 G RECONSTITUTED SOLUTION 5,000 MG VIAL: Performed by: INTERNAL MEDICINE

## 2019-11-09 PROCEDURE — 93005 ELECTROCARDIOGRAM TRACING: CPT | Performed by: HOSPITALIST

## 2019-11-09 PROCEDURE — 84100 ASSAY OF PHOSPHORUS: CPT | Performed by: HOSPITALIST

## 2019-11-09 PROCEDURE — 25010000002 PIPERACILLIN-TAZOBACTAM: Performed by: INTERNAL MEDICINE

## 2019-11-09 PROCEDURE — 85007 BL SMEAR W/DIFF WBC COUNT: CPT | Performed by: HOSPITALIST

## 2019-11-09 RX ORDER — POTASSIUM CHLORIDE 20 MEQ/1
40 TABLET, EXTENDED RELEASE ORAL AS NEEDED
Status: DISCONTINUED | OUTPATIENT
Start: 2019-11-09 | End: 2019-11-12 | Stop reason: HOSPADM

## 2019-11-09 RX ORDER — POTASSIUM CHLORIDE 1.5 G/1.77G
40 POWDER, FOR SOLUTION ORAL AS NEEDED
Status: DISCONTINUED | OUTPATIENT
Start: 2019-11-09 | End: 2019-11-12 | Stop reason: HOSPADM

## 2019-11-09 RX ORDER — POTASSIUM CHLORIDE 20 MEQ/1
40 TABLET, EXTENDED RELEASE ORAL EVERY 4 HOURS
Status: DISCONTINUED | OUTPATIENT
Start: 2019-11-09 | End: 2019-11-09

## 2019-11-09 RX ORDER — HYDROCODONE BITARTRATE AND ACETAMINOPHEN 5; 325 MG/1; MG/1
1 TABLET ORAL ONCE
Status: COMPLETED | OUTPATIENT
Start: 2019-11-09 | End: 2019-11-09

## 2019-11-09 RX ORDER — POTASSIUM CHLORIDE 1.5 G/1.77G
40 POWDER, FOR SOLUTION ORAL AS NEEDED
Status: DISCONTINUED | OUTPATIENT
Start: 2019-11-09 | End: 2019-11-09

## 2019-11-09 RX ORDER — POTASSIUM CHLORIDE 7.45 MG/ML
10 INJECTION INTRAVENOUS
Status: DISCONTINUED | OUTPATIENT
Start: 2019-11-09 | End: 2019-11-12 | Stop reason: HOSPADM

## 2019-11-09 RX ORDER — POTASSIUM CHLORIDE 1.5 G/1.77G
40 POWDER, FOR SOLUTION ORAL EVERY 4 HOURS
Status: DISPENSED | OUTPATIENT
Start: 2019-11-09 | End: 2019-11-10

## 2019-11-09 RX ADMIN — FILGRASTIM 480 MCG: 480 INJECTION, SOLUTION INTRAVENOUS; SUBCUTANEOUS at 08:56

## 2019-11-09 RX ADMIN — ATORVASTATIN CALCIUM 10 MG: 10 TABLET, FILM COATED ORAL at 08:57

## 2019-11-09 RX ADMIN — HYDROCODONE BITARTRATE AND ACETAMINOPHEN 1 TABLET: 5; 325 TABLET ORAL at 21:04

## 2019-11-09 RX ADMIN — PIPERACILLIN SODIUM,TAZOBACTAM SODIUM 3.38 G: 3; .375 INJECTION, POWDER, FOR SOLUTION INTRAVENOUS at 08:56

## 2019-11-09 RX ADMIN — PREGABALIN 150 MG: 75 CAPSULE ORAL at 19:42

## 2019-11-09 RX ADMIN — HEPARIN SODIUM 5000 UNITS: 5000 INJECTION INTRAVENOUS; SUBCUTANEOUS at 19:44

## 2019-11-09 RX ADMIN — POTASSIUM CHLORIDE 40 MEQ: 1.5 POWDER, FOR SOLUTION ORAL at 16:34

## 2019-11-09 RX ADMIN — BUSPIRONE HYDROCHLORIDE 5 MG: 5 TABLET ORAL at 08:57

## 2019-11-09 RX ADMIN — PREGABALIN 150 MG: 75 CAPSULE ORAL at 09:49

## 2019-11-09 RX ADMIN — SODIUM CHLORIDE, PRESERVATIVE FREE 10 ML: 5 INJECTION INTRAVENOUS at 19:43

## 2019-11-09 RX ADMIN — ASPIRIN 81 MG: 81 TABLET ORAL at 08:57

## 2019-11-09 RX ADMIN — HYDROCODONE BITARTRATE AND ACETAMINOPHEN 1 TABLET: 7.5; 325 TABLET ORAL at 16:34

## 2019-11-09 RX ADMIN — PIPERACILLIN SODIUM,TAZOBACTAM SODIUM 3.38 G: 3; .375 INJECTION, POWDER, FOR SOLUTION INTRAVENOUS at 16:34

## 2019-11-09 RX ADMIN — CETIRIZINE HYDROCHLORIDE 5 MG: 10 TABLET, FILM COATED ORAL at 08:57

## 2019-11-09 RX ADMIN — HYDROCODONE BITARTRATE AND ACETAMINOPHEN 1 TABLET: 7.5; 325 TABLET ORAL at 09:15

## 2019-11-09 RX ADMIN — POTASSIUM CHLORIDE 40 MEQ: 1.5 POWDER, FOR SOLUTION ORAL at 13:05

## 2019-11-09 RX ADMIN — VANCOMYCIN HYDROCHLORIDE 1000 MG: 5 INJECTION, POWDER, LYOPHILIZED, FOR SOLUTION INTRAVENOUS at 19:42

## 2019-11-09 RX ADMIN — BUSPIRONE HYDROCHLORIDE 5 MG: 5 TABLET ORAL at 19:43

## 2019-11-09 RX ADMIN — Medication 1 CAPSULE: at 08:57

## 2019-11-09 RX ADMIN — METOPROLOL TARTRATE 50 MG: 50 TABLET, FILM COATED ORAL at 08:57

## 2019-11-09 RX ADMIN — CONJUGATED ESTROGENS 1 APPLICATION: 0.62 CREAM VAGINAL at 09:44

## 2019-11-09 RX ADMIN — Medication 1 TABLET: at 08:57

## 2019-11-09 RX ADMIN — MEGESTROL ACETATE 40 MG: 40 TABLET ORAL at 08:57

## 2019-11-09 RX ADMIN — MEGESTROL ACETATE 40 MG: 40 TABLET ORAL at 19:43

## 2019-11-09 RX ADMIN — POTASSIUM CHLORIDE 40 MEQ: 1500 TABLET, EXTENDED RELEASE ORAL at 19:42

## 2019-11-09 RX ADMIN — PANTOPRAZOLE SODIUM 40 MG: 40 TABLET, DELAYED RELEASE ORAL at 05:11

## 2019-11-09 NOTE — PLAN OF CARE
Problem: Sepsis/Septic Shock (Adult)  Goal: Signs and Symptoms of Listed Potential Problems Will be Absent, Minimized or Managed (Sepsis/Septic Shock)  Outcome: Ongoing (interventions implemented as appropriate)      Problem: Fall Risk (Adult)  Goal: Identify Related Risk Factors and Signs and Symptoms  Outcome: Ongoing (interventions implemented as appropriate)    Goal: Absence of Fall  Outcome: Ongoing (interventions implemented as appropriate)      Problem: Skin Injury Risk (Adult)  Goal: Identify Related Risk Factors and Signs and Symptoms  Outcome: Ongoing (interventions implemented as appropriate)    Goal: Skin Health and Integrity  Outcome: Ongoing (interventions implemented as appropriate)      Problem: Patient Care Overview  Goal: Individualization and Mutuality  Outcome: Ongoing (interventions implemented as appropriate)    Goal: Discharge Needs Assessment  Outcome: Ongoing (interventions implemented as appropriate)    Goal: Interprofessional Rounds/Family Conf  Outcome: Ongoing (interventions implemented as appropriate)      Problem: Pain, Chronic (Adult)  Goal: Identify Related Risk Factors and Signs and Symptoms  Outcome: Ongoing (interventions implemented as appropriate)    Goal: Acceptable Pain/Comfort Level and Functional Ability  Outcome: Ongoing (interventions implemented as appropriate)

## 2019-11-09 NOTE — PLAN OF CARE
Problem: Sepsis/Septic Shock (Adult)  Goal: Signs and Symptoms of Listed Potential Problems Will be Absent, Minimized or Managed (Sepsis/Septic Shock)  Outcome: Ongoing (interventions implemented as appropriate)      Problem: Fall Risk (Adult)  Goal: Absence of Fall  Outcome: Ongoing (interventions implemented as appropriate)      Problem: Skin Injury Risk (Adult)  Goal: Skin Health and Integrity  Outcome: Ongoing (interventions implemented as appropriate)

## 2019-11-09 NOTE — PROGRESS NOTES
[facility] HOSPITALIST PROGRESS NOTE     Patient Identification:  Name:  Kiah Conway  Age:  74 y.o.  Sex:  female  :  1945  MRN:  [id number]  Visit Number:  [id number]  Primary Care Provider:  Aundrea Headley APRN    Length of stay:  2    Subjective: Patient seen and examined, patient reports feeling better, no fever, no diarrhea, had bowel movement.  No complaints at this time.  Hematology recommendation noted appreciated.  White count slightly improved.  Absolute neutrophil count also improved.    Chief Complaint: Fever left hand swelling  ----------------------------------------------------------------------------------------------------------------------  Current Hospital Meds:    aspirin 81 mg Oral Daily   atorvastatin 10 mg Oral Daily   busPIRone 5 mg Oral BID   cetirizine 5 mg Oral Daily   conjugated estrogens 1 g Vaginal Daily   filgrastim (NEUPOGEN) injection 480 mcg Subcutaneous Daily   heparin (porcine) 5,000 Units Subcutaneous Q12H   lactobacillus acidophilus 1 capsule Oral Daily   megestrol 40 mg Oral BID   metoprolol tartrate 50 mg Oral Q12H   multivitamin 1 tablet Oral Daily   pantoprazole 40 mg Oral Q AM   piperacillin-tazobactam 3.375 g Intravenous Q8H   potassium chloride 40 mEq Oral Q4H   pregabalin 150 mg Oral Q12H   psyllium 1 packet Oral Daily   sodium chloride 10 mL Intravenous Q12H   vancomycin 20 mg/kg Intravenous Q24H        ----------------------------------------------------------------------------------------------------------------------  Vital Signs:  Temp:  [98.2 °F (36.8 °C)-98.9 °F (37.2 °C)] 98.2 °F (36.8 °C)  Heart Rate:  [56-80] 56  Resp:  [18] 18  BP: (130-156)/(52-70) 136/58       Tele: Sinus rhythm 71 bpm      19  [id number] 19  0500   Weight: 49.7 kg (109 lb 8 oz) 49.8 kg (109 lb 12.8 oz) 50.1 kg (110 lb 6.4 oz)     Body mass index is 21.56 kg/m².    Intake/Output Summary (Last 24 hours) at 2019 0977  Last data filed  at 11/9/2019 1300  Gross per 24 hour   Intake 900 ml   Output 1360 ml   Net -460 ml     Diet Regular; Nectar / Syrup Thick  ----------------------------------------------------------------------------------------------------------------------  Physical exam:  General: Comfortable,awake, alert, oriented to self, place, and time, chronically ill-appearing she is not in obvious respiratory distress.    Skin:  Skin is warm and dry. No pallor.  Mildly raised erythematous rash mostly on her back no skin break.  HENT:  Head:  Normocephalic and atraumatic.  Mouth:  Moist mucous membranes.  Right forehead mild hematoma from previous trauma  Eyes:  Conjunctivae and EOM are normal.  Pupils are equal, round, and reactive to light.  No scleral icterus.    Neck:  Neck supple.  No JVD present.    Pulmonary/Chest:  No respiratory distress, no wheezes, no crackles, with normal breath sounds and good air movement.  Cardiovascular:  Normal rate, regular rhythm and normal heart sounds with no murmur.  Abdominal:  Soft.  Bowel sounds are normal.  No distension and no tenderness.   Extremities:  No edema, no tenderness, and no deformity.  No red or swollen joints anywhere.  Strong pulses in all 4 extremities with no clubbing, no cyanosis, small abscess/folliculitis of the left knuckle almost completely resolved, no limitation of movement.  Neurological:  Motor strength equal no obvious deficit, sensory grossly intact.   No cranial nerve deficit.  No tongue deviation.  No facial droop.  No slurred speech.    Genitourinary: No Verdugo catheter    ----------------------------------------------------------------------------------------------------------------------  ----------------------------------------------------------------------------------------------------------------------  Results from last 7 days   Lab Units 11/09/19  0924   TROPONIN T ng/mL <0.010     Results from last 7 days   Lab Units 11/09/19  0355 11/08/19  0606  11/07/19  1636 11/07/19  1540   CRP mg/dL  --  8.69* 4.61*  --    LACTATE mmol/L  --  1.1  --  1.9   WBC 10*3/mm3 1.07* 0.50*  --  0.76*   HEMOGLOBIN g/dL 9.5* 10.2*  --  11.4*   HEMATOCRIT % 30.6* 31.4*  --  34.5   MCV fL 97.8* 93.2  --  93.2   MCHC g/dL 31.0* 32.5  --  33.0   PLATELETS 10*3/mm3 149 145  --  186         Results from last 7 days   Lab Units 11/09/19  0355 11/08/19  0606 11/07/19  1636   SODIUM mmol/L 144 143 140   POTASSIUM mmol/L 3.0* 3.6 3.3*   MAGNESIUM mg/dL 2.0  --  1.2*   CHLORIDE mmol/L 111* 108* 108*   CO2 mmol/L 21.1* 20.8* 19.2*   BUN mg/dL 13 12 14   CREATININE mg/dL 0.79 0.76 0.87   EGFR IF NONAFRICN AM mL/min/1.73 71 74 63   CALCIUM mg/dL 7.6* 7.8* 7.8*   GLUCOSE mg/dL 73 150* 137*   ALBUMIN g/dL  --  3.61 3.50   BILIRUBIN mg/dL  --  0.5 0.4   ALK PHOS U/L  --  69 72   AST (SGOT) U/L  --  14 13   ALT (SGPT) U/L  --  9 8   Estimated Creatinine Clearance: 47.3 mL/min (by C-G formula based on SCr of 0.79 mg/dL).    No results found for: AMMONIA  Results from last 7 days   Lab Units 11/08/19  0606   CHOLESTEROL mg/dL 120   TRIGLYCERIDES mg/dL 78   HDL CHOL mg/dL 39*   LDL CHOL mg/dL 65     Blood Culture   Date Value Ref Range Status   11/07/2019 No growth at 2 days  Preliminary   11/07/2019 No growth at 2 days  Preliminary                I have personally looked at the labs and they are summarized above.  ----------------------------------------------------------------------------------------------------------------------  Imaging Results (Last 24 Hours)     Procedure Component Value Units Date/Time    US Spleen [[id number]] Collected:  11/08/19 1701     Updated:  11/08/19 1703    Narrative:       EXAMINATION: US SPLEEN-      CLINICAL INDICATION: follicular; A41.9-Sepsis, unspecified organism        COMPARISON: None available.     FINDINGS: Sonographic imaging of the spleen shows spleen measuring 8.63  cm. It is homogeneous. No solid or cystic splenic mass.       Impression:        Homogeneous echotexture of the spleen.      This report was finalized on 11/8/2019 5:01 PM by Dr. Chente Choi MD.           ----------------------------------------------------------------------------------------------------------------------  Assessment and Plan:  -Neutropenic fever  -Left hand with area of small abscess/folliculitis with cellulitis now resolved  -Essential hypertension  -Chronic illness malnourishment  -History of rheumatoid arthritis and severe osteoarthritis  -Immunocompromised due to neutropenia and on chronic DMARDs  -Right forehead hematoma from trauma  -History of follicular lymphoma last Rituxan dose October 11    Continue with Neupogen, broad-spectrum antibiotic, follow-up cultures, reverse isolation.  With regards to her mildly erythematous pruritic rash unclear etiology at this time she has a scheduled with dermatology care of [facility].    Amy Tipton MD  11/09/19  4:24 PM

## 2019-11-10 LAB
BASOPHILS # BLD MANUAL: 0.03 10*3/MM3 (ref 0–0.2)
BASOPHILS NFR BLD AUTO: 2 % (ref 0–1.5)
DEPRECATED RDW RBC AUTO: 44.4 FL (ref 37–54)
ERYTHROCYTE [DISTWIDTH] IN BLOOD BY AUTOMATED COUNT: 12.9 % (ref 12.3–15.4)
HCT VFR BLD AUTO: 32.1 % (ref 34–46.6)
HGB BLD-MCNC: 10.3 G/DL (ref 12–15.9)
LARGE PLATELETS: ABNORMAL
LYMPHOCYTES # BLD MANUAL: 0.84 10*3/MM3 (ref 0.7–3.1)
LYMPHOCYTES NFR BLD MANUAL: 16 % (ref 5–12)
LYMPHOCYTES NFR BLD MANUAL: 50 % (ref 19.6–45.3)
MCH RBC QN AUTO: 30.6 PG (ref 26.6–33)
MCHC RBC AUTO-ENTMCNC: 32.1 G/DL (ref 31.5–35.7)
MCV RBC AUTO: 95.3 FL (ref 79–97)
METAMYELOCYTES NFR BLD MANUAL: 4 % (ref 0–0)
MONOCYTES # BLD AUTO: 0.27 10*3/MM3 (ref 0.1–0.9)
NEUTROPHILS # BLD AUTO: 0.47 10*3/MM3 (ref 1.7–7)
NEUTROPHILS NFR BLD MANUAL: 26 % (ref 42.7–76)
NEUTS BAND NFR BLD MANUAL: 2 % (ref 0–5)
PLATELET # BLD AUTO: 119 10*3/MM3 (ref 140–450)
PMV BLD AUTO: 13 FL (ref 6–12)
POIKILOCYTOSIS BLD QL SMEAR: ABNORMAL
POTASSIUM BLD-SCNC: 4.1 MMOL/L (ref 3.5–5.2)
RBC # BLD AUTO: 3.37 10*6/MM3 (ref 3.77–5.28)
SCAN SLIDE: NORMAL
SMALL PLATELETS BLD QL SMEAR: ABNORMAL
VANCOMYCIN TROUGH SERPL-MCNC: 10.5 MCG/ML (ref 5–20)
WBC NRBC COR # BLD: 1.67 10*3/MM3 (ref 3.4–10.8)

## 2019-11-10 PROCEDURE — 99232 SBSQ HOSP IP/OBS MODERATE 35: CPT | Performed by: INTERNAL MEDICINE

## 2019-11-10 PROCEDURE — 85025 COMPLETE CBC W/AUTO DIFF WBC: CPT | Performed by: HOSPITALIST

## 2019-11-10 PROCEDURE — 94799 UNLISTED PULMONARY SVC/PX: CPT

## 2019-11-10 PROCEDURE — 80202 ASSAY OF VANCOMYCIN: CPT | Performed by: INTERNAL MEDICINE

## 2019-11-10 PROCEDURE — 85007 BL SMEAR W/DIFF WBC COUNT: CPT | Performed by: HOSPITALIST

## 2019-11-10 PROCEDURE — 25010000002 PIPERACILLIN-TAZOBACTAM: Performed by: INTERNAL MEDICINE

## 2019-11-10 PROCEDURE — 93010 ELECTROCARDIOGRAM REPORT: CPT | Performed by: INTERNAL MEDICINE

## 2019-11-10 PROCEDURE — 25010000002 FILGRASTIM PER 480 MCG: Performed by: INTERNAL MEDICINE

## 2019-11-10 PROCEDURE — 25010000002 MORPHINE PER 10 MG: Performed by: INTERNAL MEDICINE

## 2019-11-10 PROCEDURE — 25010000002 CEFTRIAXONE: Performed by: INTERNAL MEDICINE

## 2019-11-10 PROCEDURE — 93005 ELECTROCARDIOGRAM TRACING: CPT | Performed by: INTERNAL MEDICINE

## 2019-11-10 PROCEDURE — 25010000002 HEPARIN (PORCINE) PER 1000 UNITS: Performed by: INTERNAL MEDICINE

## 2019-11-10 PROCEDURE — 84132 ASSAY OF SERUM POTASSIUM: CPT | Performed by: HOSPITALIST

## 2019-11-10 PROCEDURE — 25010000002 VANCOMYCIN 5 G RECONSTITUTED SOLUTION 5,000 MG VIAL: Performed by: INTERNAL MEDICINE

## 2019-11-10 RX ORDER — MORPHINE SULFATE 2 MG/ML
2 INJECTION, SOLUTION INTRAMUSCULAR; INTRAVENOUS ONCE
Status: COMPLETED | OUTPATIENT
Start: 2019-11-10 | End: 2019-11-10

## 2019-11-10 RX ADMIN — BUSPIRONE HYDROCHLORIDE 5 MG: 5 TABLET ORAL at 20:45

## 2019-11-10 RX ADMIN — MEGESTROL ACETATE 40 MG: 40 TABLET ORAL at 20:45

## 2019-11-10 RX ADMIN — METOPROLOL TARTRATE 50 MG: 50 TABLET, FILM COATED ORAL at 20:45

## 2019-11-10 RX ADMIN — METOPROLOL TARTRATE 50 MG: 50 TABLET, FILM COATED ORAL at 08:15

## 2019-11-10 RX ADMIN — VANCOMYCIN HYDROCHLORIDE 1000 MG: 5 INJECTION, POWDER, LYOPHILIZED, FOR SOLUTION INTRAVENOUS at 18:51

## 2019-11-10 RX ADMIN — PIPERACILLIN SODIUM,TAZOBACTAM SODIUM 3.38 G: 3; .375 INJECTION, POWDER, FOR SOLUTION INTRAVENOUS at 17:28

## 2019-11-10 RX ADMIN — ATORVASTATIN CALCIUM 10 MG: 10 TABLET, FILM COATED ORAL at 08:15

## 2019-11-10 RX ADMIN — Medication 1 CAPSULE: at 08:15

## 2019-11-10 RX ADMIN — BUSPIRONE HYDROCHLORIDE 5 MG: 5 TABLET ORAL at 08:15

## 2019-11-10 RX ADMIN — MEGESTROL ACETATE 40 MG: 40 TABLET ORAL at 08:15

## 2019-11-10 RX ADMIN — SODIUM CHLORIDE, PRESERVATIVE FREE 10 ML: 5 INJECTION INTRAVENOUS at 20:45

## 2019-11-10 RX ADMIN — MORPHINE SULFATE 2 MG: 2 INJECTION, SOLUTION INTRAMUSCULAR; INTRAVENOUS at 02:46

## 2019-11-10 RX ADMIN — PIPERACILLIN SODIUM,TAZOBACTAM SODIUM 3.38 G: 3; .375 INJECTION, POWDER, FOR SOLUTION INTRAVENOUS at 08:15

## 2019-11-10 RX ADMIN — HEPARIN SODIUM 5000 UNITS: 5000 INJECTION INTRAVENOUS; SUBCUTANEOUS at 08:16

## 2019-11-10 RX ADMIN — ASPIRIN 81 MG: 81 TABLET ORAL at 08:15

## 2019-11-10 RX ADMIN — CEFTRIAXONE 1 G: 1 INJECTION, POWDER, FOR SOLUTION INTRAMUSCULAR; INTRAVENOUS at 21:35

## 2019-11-10 RX ADMIN — FILGRASTIM 480 MCG: 480 INJECTION, SOLUTION INTRAVENOUS; SUBCUTANEOUS at 10:46

## 2019-11-10 RX ADMIN — HEPARIN SODIUM 5000 UNITS: 5000 INJECTION INTRAVENOUS; SUBCUTANEOUS at 20:45

## 2019-11-10 RX ADMIN — PREGABALIN 150 MG: 75 CAPSULE ORAL at 08:15

## 2019-11-10 RX ADMIN — PSYLLIUM HUSK 1 PACKET: 3.4 POWDER ORAL at 08:15

## 2019-11-10 RX ADMIN — PREGABALIN 150 MG: 75 CAPSULE ORAL at 20:45

## 2019-11-10 RX ADMIN — HYDROCODONE BITARTRATE AND ACETAMINOPHEN 1 TABLET: 7.5; 325 TABLET ORAL at 09:00

## 2019-11-10 RX ADMIN — HYDROCODONE BITARTRATE AND ACETAMINOPHEN 1 TABLET: 7.5; 325 TABLET ORAL at 17:27

## 2019-11-10 RX ADMIN — PANTOPRAZOLE SODIUM 40 MG: 40 TABLET, DELAYED RELEASE ORAL at 02:47

## 2019-11-10 RX ADMIN — SODIUM CHLORIDE, PRESERVATIVE FREE 10 ML: 5 INJECTION INTRAVENOUS at 08:16

## 2019-11-10 RX ADMIN — CONJUGATED ESTROGENS 1 APPLICATION: 0.62 CREAM VAGINAL at 08:15

## 2019-11-10 RX ADMIN — HYDROCODONE BITARTRATE AND ACETAMINOPHEN 1 TABLET: 7.5; 325 TABLET ORAL at 00:23

## 2019-11-10 RX ADMIN — PIPERACILLIN SODIUM,TAZOBACTAM SODIUM 3.38 G: 3; .375 INJECTION, POWDER, FOR SOLUTION INTRAVENOUS at 00:23

## 2019-11-10 RX ADMIN — Medication 1 TABLET: at 08:15

## 2019-11-10 RX ADMIN — CETIRIZINE HYDROCHLORIDE 5 MG: 10 TABLET, FILM COATED ORAL at 08:15

## 2019-11-10 NOTE — PROGRESS NOTES
[facility] HOSPITALIST PROGRESS NOTE     Patient Identification:  Name:  Kiah Conway  Age:  74 y.o.  Sex:  female  :  1945  MRN:  [id number]  Visit Number:  [id number]  ROOM: [address] [place] [address]     Primary Care Provider:  Aundrea Headley APRN     Date of Admission: 2019    Length of stay in inpatient status:  3    Subjective     Chief Compliant:    Chief Complaint   Patient presents with   • Abscess       History of Presenting Illness: 74-year-old female admitted on 2019 with an abscess on her left hand; she was found to have febrile neutropenia and sepsis that resulted from chemotherapy for her follicular lymphoma.  The patient has been receiving Neupogen as well as the antibiotics listed below.  The patient states that she feels better.  She denies chest pain.  She denies trouble breathing.  She denies coughing.  She is tolerating her regular diet with no issues.  She would like to go home soon as possible.  She thinks that the abscess on her left hand has almost went away and that there is been no drainage.    Objective     Current Hospital Meds:  aspirin 81 mg Oral Daily   atorvastatin 10 mg Oral Daily   busPIRone 5 mg Oral BID   cetirizine 5 mg Oral Daily   conjugated estrogens 1 g Vaginal Daily   filgrastim (NEUPOGEN) injection 480 mcg Subcutaneous Daily   heparin (porcine) 5,000 Units Subcutaneous Q12H   lactobacillus acidophilus 1 capsule Oral Daily   megestrol 40 mg Oral BID   metoprolol tartrate 50 mg Oral Q12H   multivitamin 1 tablet Oral Daily   pantoprazole 40 mg Oral Q AM   piperacillin-tazobactam 3.375 g Intravenous Q8H   pregabalin 150 mg Oral Q12H   psyllium 1 packet Oral Daily   sodium chloride 10 mL Intravenous Q12H   vancomycin 20 mg/kg Intravenous Q24H        Current Antimicrobial Therapy:  Anti-Infectives (From admission, onward)    Ordered     Dose/Rate Route Frequency Start Stop    19 0808  vancomycin (VANCOCIN) 1,000 mg in sodium chloride 0.9 % 250 mL  IVPB     Gill Sandoval [facility] reviewed the order on 11/10/19 0955.   Ordering Provider:  Landon King MD    20 mg/kg × 49.7 kg  over 60 Minutes Intravenous Every 24 Hours 11/08/19 1900 11/14/19 1859    11/07/19 1841  piperacillin-tazobactam (ZOSYN) 3.375 g/100 mL 0.9% NS IVPB (mbp)     Ordering Provider:  Landon King MD    3.375 g  over 4 Hours Intravenous Every 8 Hours 11/08/19 0000 11/14/19 2359    11/07/19 1740  vancomycin (VANCOCIN) 1,000 mg in sodium chloride 0.9 % 250 mL IVPB     Ordering Provider:  Amy Jasso PA    20 mg/kg × 49 kg Intravenous Once 11/07/19 1800 11/07/19 1858    11/07/19 1740  piperacillin-tazobactam (ZOSYN) 4.5 g/100 mL 0.9% NS IVPB (mbp)     Ordering Provider:  Amy Jasso PA    4.5 g  over 30 Minutes Intravenous Once 11/07/19 1742 11/07/19 1855        Current Diuretic Therapy:  Diuretics (From admission, onward)    None        ----------------------------------------------------------------------------------------------------------------------  Vital Signs:  Temp:  [97.1 °F (36.2 °C)-98.6 °F (37 °C)] 98.5 °F (36.9 °C)  Heart Rate:  [58-88] 88  Resp:  [18] 18  BP: (110-154)/(50-80) 132/80  SpO2:  [90 %-96 %] 96 %  on   ;   Device (Oxygen Therapy): room air  Body mass index is 21.44 kg/m².    Wt Readings from Last 3 Encounters:   11/10/19 49.8 kg (109 lb 12.8 oz)   09/06/19 51.3 kg (113 lb)   08/28/19 52.2 kg (115 lb)     Intake & Output (last 3 days)       11/07 0701 - 11/08 0700 11/08 0701 - 11/09 0700 11/09 0701 - 11/10 0700 11/10 0701 - 11/11 0700    P.O.  240    I.V. (mL/kg) 150 (3)       IV Piggyback 300  100     Total Intake(mL/kg) 930 (18.7) 660 (13.2) 1180 (23.7) 240 (4.8)    Urine (mL/kg/hr) 500 501 (0.4) 2110 (1.8)     Stool 0 0      Total Output      Net +430 +159 -930 +240            Urine Unmeasured Occurrence  2 x      Stool Unmeasured Occurrence 1 x 2 x          Diet Regular; Nectar / Syrup  Thick  ----------------------------------------------------------------------------------------------------------------------  Physical Exam   Constitutional: She is oriented to person, place, and time. She appears well-developed and well-nourished. No distress.   HENT:   Head: Normocephalic and atraumatic.   Nose: Nose normal.   Eyes: EOM are normal. Pupils are equal, round, and reactive to light. No scleral icterus.   Neck: Normal range of motion. Neck supple.   Cardiovascular: Normal rate, regular rhythm and normal heart sounds.   No murmur heard.  Pulmonary/Chest: Effort normal and breath sounds normal. No respiratory distress.   Musculoskeletal: She exhibits no edema.   Neurological: She is alert and oriented to person, place, and time. No cranial nerve deficit.   Skin: Capillary refill takes less than 2 seconds. No erythema. No pallor.   Psychiatric: She has a normal mood and affect. Her behavior is normal.     ----------------------------------------------------------------------------------------------------------------------  Tele: Normal sinus rhythm with heart rate 70s to 90s.  I have personally reviewed/looked at the telemetry strips.  ----------------------------------------------------------------------------------------------------------------------  LABS:    CBC and coagulation:  Results from last 7 days   Lab Units 11/10/19  0600 11/09/19  0355 11/08/19  0606 11/07/19  1636 11/07/19  1540   LACTATE mmol/L  --   --  1.1  --  1.9   SED RATE mm/hr  --   --   --   --  42*   CRP mg/dL  --   --  8.69* 4.61*  --    WBC 10*3/mm3 1.67* 1.07* 0.50*  --  0.76*   HEMOGLOBIN g/dL 10.3* 9.5* 10.2*  --  11.4*   HEMATOCRIT % 32.1* 30.6* 31.4*  --  34.5   MCV fL 95.3 97.8* 93.2  --  93.2   MCHC g/dL 32.1 31.0* 32.5  --  33.0   PLATELETS 10*3/mm3 119* 149 145  --  186     Acid/base balance:      Renal and electrolytes:  Results from last 7 days   Lab Units 11/10/19  0802 11/09/19  0355 11/08/19  0606 11/07/19  1636    SODIUM mmol/L  --  144 143 140   POTASSIUM mmol/L 4.1 3.0* 3.6 3.3*   MAGNESIUM mg/dL  --  2.0  --  1.2*   CHLORIDE mmol/L  --  111* 108* 108*   CO2 mmol/L  --  21.1* 20.8* 19.2*   BUN mg/dL  --  13 12 14   CREATININE mg/dL  --  0.79 0.76 0.87   EGFR IF NONAFRICN AM mL/min/1.73  --  71 74 63   CALCIUM mg/dL  --  7.6* 7.8* 7.8*   PHOSPHORUS mg/dL  --  2.6 3.3  --    GLUCOSE mg/dL  --  73 150* 137*     Estimated Creatinine Clearance: 47 mL/min (by C-G formula based on SCr of 0.79 mg/dL).    Liver and pancreatic function:  Results from last 7 days   Lab Units 11/08/19  0606 11/07/19  1636   ALBUMIN g/dL 3.61 3.50   BILIRUBIN mg/dL 0.5 0.4   ALK PHOS U/L 69 72   AST (SGOT) U/L 14 13   ALT (SGPT) U/L 9 8     Endocrine function:  Lab Results   Component Value Date    HGBA1C 4.30 (L) 11/07/2019     Point of care bedside glucose levels:      Results from last 7 days   Lab Units 11/09/19  0355 11/08/19  0606 11/07/19  1636   GLUCOSE mg/dL 73 150* 137*     Lab Results   Component Value Date    TSH 1.350 11/07/2019     Cardiac:  Results from last 7 days   Lab Units 11/09/19  0924   TROPONIN T ng/mL <0.010     Results from last 7 days   Lab Units 11/08/19  0606   CHOLESTEROL mg/dL 120   TRIGLYCERIDES mg/dL 78   HDL CHOL mg/dL 39*   LDL CHOL mg/dL 65     Cultures:  Brief Urine Lab Results  (Last result in the past 365 days)      Color   Clarity   Blood   Leuk Est   Nitrite   Protein   CREAT   Urine HCG        11/07/19 1549 Dark Yellow Cloudy Negative Trace Negative 30 mg/dL (1+)             Lab Results   Component Value Date    URINECX >100,000 CFU/mL Normal Urogenital Becky 02/04/2019     Lab Results   Component Value Date    BLOODCX No growth at 3 days 11/07/2019    BLOODCX No growth at 3 days 11/07/2019    BLOODCX No growth at 5 days 06/22/2019     Lab Results   Component Value Date    STOOLCX Normal Fecal Becky 01/26/2018     I have personally looked at the labs and they are summarized above.      Assessment & Plan       -Neutropenic fever and sepsis, suspect due to cellulitis of the left hand  -Absolute neutropenia and pancytopenia secondary to follicular lymphoma treatment  -Hyperglycemia  -Acute hypokalemia and acute hypomagnesemia  -Normocytic anemia  -Prolonged QTC of 497 ms  -Essential hypertension  -Hyperlipidemia  -Paroxysmal supraventricular tachycardia  -Obstructive sleep apnea  -Mildly impaired pharyngeal phase dysphagia    Her absolute neutrophil count is 470 today; we will continue with the Neupogen injections.  Patient will be evaluated by hematology/oncology tomorrow.  Her platelets are still decreasing but they are at 119,000; we will continue to monitor her platelets and if they drop below 20,000 it is then that we will consider platelet transfusion.  Her hemoglobin level has a flat trend and we will continue to monitor this; there are no obvious sources of bleeding anywhere.  Since her blood cultures have not grown anything and she is improving, I will stop the Zosyn and vancomycin and just give her IV Rocephin for now.  We will monitor her blood work daily to follow her blood counts.  Her electrolytes have been replaced and we will continue to monitor these; I will repeat an EKG now since her magnesium and potassium levels are normal to see if her QTC decreases.  Hopefully she can go home in the next day or 2.    VTE Prophylaxis:   Mechanical Order History:     None      Pharmalogical Order History:     Ordered     Dose Route Frequency Stop    11/07/19 1934  heparin (porcine) 5000 UNIT/ML injection 5,000 Units      5,000 Units SC Every 12 Hours Scheduled --        Landon King MD  [facility]  11/10/19  5:20 PM

## 2019-11-11 ENCOUNTER — APPOINTMENT (OUTPATIENT)
Dept: GENERAL RADIOLOGY | Facility: HOSPITAL | Age: 74
End: 2019-11-11

## 2019-11-11 LAB
25(OH)D3 SERPL-MCNC: 18.1 NG/ML (ref 30–100)
ALBUMIN SERPL-MCNC: 3.17 G/DL (ref 3.5–5.2)
ALBUMIN/GLOB SERPL: 1.3 G/DL
ALP SERPL-CCNC: 85 U/L (ref 39–117)
ALT SERPL W P-5'-P-CCNC: 12 U/L (ref 1–33)
ANION GAP SERPL CALCULATED.3IONS-SCNC: 10 MMOL/L (ref 5–15)
AST SERPL-CCNC: 22 U/L (ref 1–32)
BILIRUB SERPL-MCNC: 0.3 MG/DL (ref 0.2–1.2)
BUN BLD-MCNC: 9 MG/DL (ref 8–23)
BUN/CREAT SERPL: 11.5 (ref 7–25)
CA-I SERPL ISE-MCNC: 1.21 MMOL/L (ref 1.12–1.32)
CALCIUM SPEC-SCNC: 8.1 MG/DL (ref 8.6–10.5)
CHLORIDE SERPL-SCNC: 108 MMOL/L (ref 98–107)
CO2 SERPL-SCNC: 23 MMOL/L (ref 22–29)
CREAT BLD-MCNC: 0.78 MG/DL (ref 0.57–1)
DEPRECATED RDW RBC AUTO: 44.8 FL (ref 37–54)
EOSINOPHIL # BLD MANUAL: 0.06 10*3/MM3 (ref 0–0.4)
EOSINOPHIL NFR BLD MANUAL: 1 % (ref 0.3–6.2)
ERYTHROCYTE [DISTWIDTH] IN BLOOD BY AUTOMATED COUNT: 13 % (ref 12.3–15.4)
GFR SERPL CREATININE-BSD FRML MDRD: 72 ML/MIN/1.73
GLOBULIN UR ELPH-MCNC: 2.4 GM/DL
GLUCOSE BLD-MCNC: 89 MG/DL (ref 65–99)
HCT VFR BLD AUTO: 34.1 % (ref 34–46.6)
HGB BLD-MCNC: 11.1 G/DL (ref 12–15.9)
LYMPHOCYTES # BLD MANUAL: 0.97 10*3/MM3 (ref 0.7–3.1)
LYMPHOCYTES NFR BLD MANUAL: 16 % (ref 19.6–45.3)
LYMPHOCYTES NFR BLD MANUAL: 16 % (ref 5–12)
MAGNESIUM SERPL-MCNC: 1.3 MG/DL (ref 1.6–2.4)
MCH RBC QN AUTO: 30.7 PG (ref 26.6–33)
MCHC RBC AUTO-ENTMCNC: 32.6 G/DL (ref 31.5–35.7)
MCV RBC AUTO: 94.2 FL (ref 79–97)
METAMYELOCYTES NFR BLD MANUAL: 1 % (ref 0–0)
MONOCYTES # BLD AUTO: 0.97 10*3/MM3 (ref 0.1–0.9)
MYELOCYTES NFR BLD MANUAL: 2 % (ref 0–0)
NEUTROPHILS # BLD AUTO: 3.9 10*3/MM3 (ref 1.7–7)
NEUTROPHILS NFR BLD MANUAL: 53 % (ref 42.7–76)
NEUTS BAND NFR BLD MANUAL: 11 % (ref 0–5)
NEUTS VAC BLD QL SMEAR: ABNORMAL
NRBC SPEC MANUAL: 2 /100 WBC (ref 0–0.2)
PHOSPHATE SERPL-MCNC: 2.8 MG/DL (ref 2.5–4.5)
PLAT MORPH BLD: NORMAL
PLATELET # BLD AUTO: 131 10*3/MM3 (ref 140–450)
PMV BLD AUTO: 13.2 FL (ref 6–12)
POIKILOCYTOSIS BLD QL SMEAR: ABNORMAL
POTASSIUM BLD-SCNC: 3.6 MMOL/L (ref 3.5–5.2)
PROT SERPL-MCNC: 5.6 G/DL (ref 6–8.5)
RBC # BLD AUTO: 3.62 10*6/MM3 (ref 3.77–5.28)
SCAN SLIDE: NORMAL
SODIUM BLD-SCNC: 141 MMOL/L (ref 136–145)
TOXIC GRANULATION: ABNORMAL
WBC NRBC COR # BLD: 6.09 10*3/MM3 (ref 3.4–10.8)

## 2019-11-11 PROCEDURE — 85007 BL SMEAR W/DIFF WBC COUNT: CPT | Performed by: INTERNAL MEDICINE

## 2019-11-11 PROCEDURE — 82306 VITAMIN D 25 HYDROXY: CPT | Performed by: INTERNAL MEDICINE

## 2019-11-11 PROCEDURE — 25010000002 HEPARIN (PORCINE) PER 1000 UNITS: Performed by: INTERNAL MEDICINE

## 2019-11-11 PROCEDURE — 84100 ASSAY OF PHOSPHORUS: CPT | Performed by: INTERNAL MEDICINE

## 2019-11-11 PROCEDURE — 71045 X-RAY EXAM CHEST 1 VIEW: CPT | Performed by: RADIOLOGY

## 2019-11-11 PROCEDURE — 99232 SBSQ HOSP IP/OBS MODERATE 35: CPT | Performed by: INTERNAL MEDICINE

## 2019-11-11 PROCEDURE — 72100 X-RAY EXAM L-S SPINE 2/3 VWS: CPT | Performed by: RADIOLOGY

## 2019-11-11 PROCEDURE — 83735 ASSAY OF MAGNESIUM: CPT | Performed by: INTERNAL MEDICINE

## 2019-11-11 PROCEDURE — 25010000002 MAGNESIUM SULFATE 2 GM/50ML SOLUTION: Performed by: PHYSICIAN ASSISTANT

## 2019-11-11 PROCEDURE — 25010000002 MAGNESIUM SULFATE 2 GM/50ML SOLUTION: Performed by: INTERNAL MEDICINE

## 2019-11-11 PROCEDURE — 82330 ASSAY OF CALCIUM: CPT | Performed by: INTERNAL MEDICINE

## 2019-11-11 PROCEDURE — 72100 X-RAY EXAM L-S SPINE 2/3 VWS: CPT

## 2019-11-11 PROCEDURE — 71045 X-RAY EXAM CHEST 1 VIEW: CPT

## 2019-11-11 PROCEDURE — 25010000002 CEFTRIAXONE: Performed by: INTERNAL MEDICINE

## 2019-11-11 PROCEDURE — 85025 COMPLETE CBC W/AUTO DIFF WBC: CPT | Performed by: INTERNAL MEDICINE

## 2019-11-11 PROCEDURE — 80053 COMPREHEN METABOLIC PANEL: CPT | Performed by: INTERNAL MEDICINE

## 2019-11-11 RX ORDER — MAGNESIUM SULFATE HEPTAHYDRATE 40 MG/ML
2 INJECTION, SOLUTION INTRAVENOUS
Status: DISPENSED | OUTPATIENT
Start: 2019-11-11 | End: 2019-11-11

## 2019-11-11 RX ADMIN — Medication 1 CAPSULE: at 08:05

## 2019-11-11 RX ADMIN — CEFTRIAXONE 1 G: 1 INJECTION, POWDER, FOR SOLUTION INTRAMUSCULAR; INTRAVENOUS at 20:12

## 2019-11-11 RX ADMIN — HEPARIN SODIUM 5000 UNITS: 5000 INJECTION INTRAVENOUS; SUBCUTANEOUS at 08:04

## 2019-11-11 RX ADMIN — PANTOPRAZOLE SODIUM 40 MG: 40 TABLET, DELAYED RELEASE ORAL at 05:14

## 2019-11-11 RX ADMIN — ATORVASTATIN CALCIUM 10 MG: 10 TABLET, FILM COATED ORAL at 08:05

## 2019-11-11 RX ADMIN — BUSPIRONE HYDROCHLORIDE 5 MG: 5 TABLET ORAL at 08:05

## 2019-11-11 RX ADMIN — SODIUM CHLORIDE, PRESERVATIVE FREE 10 ML: 5 INJECTION INTRAVENOUS at 20:14

## 2019-11-11 RX ADMIN — MAGNESIUM SULFATE IN WATER 2 G: 40 INJECTION, SOLUTION INTRAVENOUS at 14:07

## 2019-11-11 RX ADMIN — HYDROCODONE BITARTRATE AND ACETAMINOPHEN 1 TABLET: 7.5; 325 TABLET ORAL at 02:19

## 2019-11-11 RX ADMIN — MAGNESIUM SULFATE IN WATER 2 G: 40 INJECTION, SOLUTION INTRAVENOUS at 08:07

## 2019-11-11 RX ADMIN — PREGABALIN 150 MG: 75 CAPSULE ORAL at 08:04

## 2019-11-11 RX ADMIN — Medication 1 TABLET: at 08:05

## 2019-11-11 RX ADMIN — METOPROLOL TARTRATE 50 MG: 50 TABLET, FILM COATED ORAL at 20:10

## 2019-11-11 RX ADMIN — METOPROLOL TARTRATE 50 MG: 50 TABLET, FILM COATED ORAL at 08:05

## 2019-11-11 RX ADMIN — SODIUM CHLORIDE, PRESERVATIVE FREE 10 ML: 5 INJECTION INTRAVENOUS at 08:05

## 2019-11-11 RX ADMIN — HYDROCODONE BITARTRATE AND ACETAMINOPHEN 1 TABLET: 7.5; 325 TABLET ORAL at 11:38

## 2019-11-11 RX ADMIN — MAGNESIUM SULFATE IN WATER 2 G: 40 INJECTION, SOLUTION INTRAVENOUS at 11:31

## 2019-11-11 RX ADMIN — CETIRIZINE HYDROCHLORIDE 5 MG: 10 TABLET, FILM COATED ORAL at 08:05

## 2019-11-11 RX ADMIN — HEPARIN SODIUM 5000 UNITS: 5000 INJECTION INTRAVENOUS; SUBCUTANEOUS at 20:10

## 2019-11-11 RX ADMIN — BUSPIRONE HYDROCHLORIDE 5 MG: 5 TABLET ORAL at 20:10

## 2019-11-11 RX ADMIN — PREGABALIN 150 MG: 75 CAPSULE ORAL at 20:10

## 2019-11-11 RX ADMIN — MEGESTROL ACETATE 40 MG: 40 TABLET ORAL at 08:04

## 2019-11-11 RX ADMIN — HYDROCODONE BITARTRATE AND ACETAMINOPHEN 1 TABLET: 7.5; 325 TABLET ORAL at 20:11

## 2019-11-11 RX ADMIN — MEGESTROL ACETATE 40 MG: 40 TABLET ORAL at 20:10

## 2019-11-11 RX ADMIN — CONJUGATED ESTROGENS 1 APPLICATION: 0.62 CREAM VAGINAL at 08:05

## 2019-11-11 RX ADMIN — ASPIRIN 81 MG: 81 TABLET ORAL at 08:06

## 2019-11-11 NOTE — PROGRESS NOTES
Discharge Planning Assessment   Jered     Patient Name: Kiah Conway  MRN: [id number]  Today's Date: 11/11/2019    Admit Date: 11/7/2019      Discharge Plan     Row Name 11/11/19 1717       Plan    Plan  SS spoke with pt on this day. Pt lives at home alone, but her daughter Lamar lives close by. Pt does not receive HH services, but is requesting them at discharge with no preference of agency. Pt has a walker and cane. Pt plans to return home at discharge, with transportation provided by her daughter. SS will follow and assist needed.  Pt requesting a wheelchair at d/c. SS will arrange with MD order.     Patient/Family in Agreement with Plan  yes        Expected Discharge Date and Time     Expected Discharge Date Expected Discharge Time    Nov 11, 2019               VAUGHN Bruno

## 2019-11-11 NOTE — PROGRESS NOTES
[facility] HOSPITALIST PROGRESS NOTE     Patient Identification:  Name:  Kiah Conway  Age:  74 y.o.  Sex:  female  :  1945  MRN:  [id number]  Visit Number:  [id number]  ROOM: [address] [place] [address]     Primary Care Provider:  Aundrea Headley APRN    Length of stay in inpatient status:  4    Subjective     Chief Compliant:    Chief Complaint   Patient presents with   • Abscess     History of Presenting Illness:    Patient stable today, no acute events overnight, clinically much improved and now only complaint is back pain, Xrays today w/o fx or lytic lesions just showed DDD, reports from nursing are patient is hypoxic w/ ambulation, CXR today w/o acute processes, does have sleep apnea and no home CPAP, reportedly did have sleep study but doesn't know results and was never told, Chest CT last year was read as c/f atypical PNA but also noted chronic pulmonary changes though doesn't have hx of smoking, recent echo unremarkable as well.  Denies any fevers or chills, reports her hand looks and feels much better, her ANC is much improved today as well.  Objective     Current Hospital Meds:  aspirin 81 mg Oral Daily   atorvastatin 10 mg Oral Daily   busPIRone 5 mg Oral BID   cefTRIAXone 1 g Intravenous Q24H   cetirizine 5 mg Oral Daily   conjugated estrogens 1 g Vaginal Daily   heparin (porcine) 5,000 Units Subcutaneous Q12H   lactobacillus acidophilus 1 capsule Oral Daily   megestrol 40 mg Oral BID   metoprolol tartrate 50 mg Oral Q12H   multivitamin 1 tablet Oral Daily   pantoprazole 40 mg Oral Q AM   pregabalin 150 mg Oral Q12H   psyllium 1 packet Oral Daily   sodium chloride 10 mL Intravenous Q12H        Current Antimicrobial Therapy:  Anti-Infectives (From admission, onward)    Ordered     Dose/Rate Route Frequency Start Stop    11/10/19 2043  cefTRIAXone (ROCEPHIN) 1 g/100 mL 0.9% NS (MBP)     Ordering Provider:  Landon King MD    1 g  over 30 Minutes Intravenous Every 24 Hours 11/10/19 2130  11/17/19 2129 11/07/19 1740  vancomycin (VANCOCIN) 1,000 mg in sodium chloride 0.9 % 250 mL IVPB     Ordering Provider:  Amy Jasso PA    20 mg/kg × 49 kg Intravenous Once 11/07/19 1800 11/07/19 1858 11/07/19 1740  piperacillin-tazobactam (ZOSYN) 4.5 g/100 mL 0.9% NS IVPB (mbp)     Ordering Provider:  Amy Jasso PA    4.5 g  over 30 Minutes Intravenous Once 11/07/19 1742 11/07/19 1855        Current Diuretic Therapy:  Diuretics (From admission, onward)    None        ----------------------------------------------------------------------------------------------------------------------  Vital Signs:  Temp:  [97.8 °F (36.6 °C)-98.3 °F (36.8 °C)] 98.3 °F (36.8 °C)  Heart Rate:  [70-85] 70  Resp:  [18] 18  BP: (117-182)/(49-75) 123/49  SpO2:  [91 %-97 %] 95 %  on   ;   Device (Oxygen Therapy): room air  Body mass index is 21.58 kg/m².    Wt Readings from Last 3 Encounters:   11/11/19 50.1 kg (110 lb 8 oz)   09/06/19 51.3 kg (113 lb)   08/28/19 52.2 kg (115 lb)     Intake & Output (last 3 days)       11/08 0701 - 11/09 0700 11/09 0701 - 11/10 0700 11/10 0701 - 11/11 0700 11/11 0701 - 11/12 0700    P.O. 660 1080 720 480    I.V. (mL/kg)        IV Piggyback  100 350     Total Intake(mL/kg) 660 (13.2) 1180 (23.7) 1070 (21.4) 480 (9.6)    Urine (mL/kg/hr) 501 (0.4) 2110 (1.8) 200 (0.2)     Stool 0       Total Output 501 2110 200     Net +159 -930 +870 +480            Urine Unmeasured Occurrence 2 x       Stool Unmeasured Occurrence 2 x           Diet Regular; Nectar / Syrup Thick  ----------------------------------------------------------------------------------------------------------------------  Physical exam:  Constitutional:  Elderly, no distress.      HENT:  Head:  Normocephalic and atraumatic.  Mouth:  Moist mucous membranes.    Eyes:  Conjunctivae and EOM are normal. No scleral icterus.    Neck:  Neck supple.  No JVD present.    Cardiovascular:  Normal rate, regular rhythm and normal heart  sounds with no murmur.  Pulmonary/Chest:  No respiratory distress, no wheezes, no crackles, with normal breath sounds and good air movement.  Abdominal:  Soft.  Bowel sounds are normal.  No distension and no tenderness.   Musculoskeletal:  No edema, no tenderness, and no deformity.  No red or swollen joints anywhere.    Neurological:  Alert and oriented to person, place, and time.  No cranial nerve deficit.  No tongue deviation.  No facial droop.  No slurred speech.   Skin:  Skin is warm and dry.  Hand abscess clean and dry, no drainage  Peripheral vascular:  Pulses in all 4 extremities with no clubbing, no cyanosis, no edema.  ----------------------------------------------------------------------------------------------------------------------  Tele:    ----------------------------------------------------------------------------------------------------------------------  Results from last 7 days   Lab Units 11/11/19  0516 11/10/19  0600 11/09/19  0355 11/08/19  0606 11/07/19  1636 11/07/19  1540   CRP mg/dL  --   --   --  8.69* 4.61*  --    LACTATE mmol/L  --   --   --  1.1  --  1.9   WBC 10*3/mm3 6.09 1.67* 1.07* 0.50*  --  0.76*   HEMOGLOBIN g/dL 11.1* 10.3* 9.5* 10.2*  --  11.4*   HEMATOCRIT % 34.1 32.1* 30.6* 31.4*  --  34.5   MCV fL 94.2 95.3 97.8* 93.2  --  93.2   MCHC g/dL 32.6 32.1 31.0* 32.5  --  33.0   PLATELETS 10*3/mm3 131* 119* 149 145  --  186         Results from last 7 days   Lab Units 11/11/19  0517 11/11/19  0516 11/10/19  0802 11/09/19  0355 11/08/19  0606 11/07/19  1636   SODIUM mmol/L  --  141  --  144 143 140   POTASSIUM mmol/L  --  3.6 4.1 3.0* 3.6 3.3*   MAGNESIUM mg/dL  --  1.3*  --  2.0  --  1.2*   CHLORIDE mmol/L  --  108*  --  111* 108* 108*   CO2 mmol/L  --  23.0  --  21.1* 20.8* 19.2*   BUN mg/dL  --  9  --  13 12 14   CREATININE mg/dL  --  0.78  --  0.79 0.76 0.87   EGFR IF NONAFRICN AM mL/min/1.73  --  72  --  71 74 63   CALCIUM mg/dL  --  8.1*  --  7.6* 7.8* 7.8*   IONIZED CALCIUM  mmol/L 1.21  --   --   --   --   --    PHOSPHORUS mg/dL  --  2.8  --  2.6 3.3  --    GLUCOSE mg/dL  --  89  --  73 150* 137*   ALBUMIN g/dL  --  3.17*  --   --  3.61 3.50   BILIRUBIN mg/dL  --  0.3  --   --  0.5 0.4   ALK PHOS U/L  --  85  --   --  69 72   AST (SGOT) U/L  --  22  --   --  14 13   ALT (SGPT) U/L  --  12  --   --  9 8   Estimated Creatinine Clearance: 47.3 mL/min (by C-G formula based on SCr of 0.78 mg/dL).  No results found for: AMMONIA  Results from last 7 days   Lab Units 11/09/19  0924   TROPONIN T ng/mL <0.010         Results from last 7 days   Lab Units 11/08/19  0606   CHOLESTEROL mg/dL 120   TRIGLYCERIDES mg/dL 78   HDL CHOL mg/dL 39*   LDL CHOL mg/dL 65     No results found for: HGBA1C, POCGLU  Lab Results   Component Value Date    TSH 1.350 11/07/2019     No results found for: PREGTESTUR, PREGSERUM, HCG, HCGQUANT  Pain Management Panel     There is no flowsheet data to display.        Brief Urine Lab Results  (Last result in the past 365 days)      Color   Clarity   Blood   Leuk Est   Nitrite   Protein   CREAT   Urine HCG        11/07/19 1549 Dark Yellow Cloudy Negative Trace Negative 30 mg/dL (1+)             Blood Culture   Date Value Ref Range Status   11/07/2019 No growth at 4 days  Preliminary   11/07/2019 No growth at 4 days  Preliminary     Results from last 7 days   Lab Units 11/08/19  0606 11/07/19  1636 11/07/19  1540   LACTATE mmol/L 1.1  --  1.9   SED RATE mm/hr  --   --  42*   CRP mg/dL 8.69* 4.61*  --      I have personally looked at the labs and they are summarized above.  ----------------------------------------------------------------------------------------------------------------------  Detailed radiology reports for the last 24 hours:    Imaging Results (Last 24 Hours)     Procedure Component Value Units Date/Time    XR Chest 1 View [[id number]] Collected:  11/11/19 1514     Updated:  11/11/19 1517    Narrative:       EXAMINATION: XR CHEST 1 VW-      CLINICAL INDICATION:      hypoxia; A41.9-Sepsis, unspecified organism     TECHNIQUE:  XR CHEST 1 VW-      COMPARISON: 06/22/2019      FINDINGS:   Right Port-A-Cath again noted.  Lungs are aerated.   Heart size, mediastinum, and pulmonary vascularity are unremarkable.   No pneumothorax.   No effusions.   No acute osseous findings.          Impression:       No radiographic evidence of acute cardiac or pulmonary  disease.     This report was finalized on 11/11/2019 3:14 PM by Dr. Jonah Salgado MD.       XR Spine Lumbar AP & Lateral [[id number]] Collected:  11/11/19 1512     Updated:  11/11/19 1516    Narrative:       EXAMINATION: XR SPINE LUMBAR AP AND LATERAL-      CLINICAL INDICATION: back pain; A41.9-Sepsis, unspecified organism      TECHNIQUE: AP, lateral, X-rays of the lumbosacral spine.      COMPARISON: NONE.      FINDINGS: Wedging deformity is noted of L1 vertebral body and is stable  from previous exam.Multilevel degenerative changes are stable. No acute  fracture radiographically evident.       Impression:       Stable appearance of lumbar spine with multilevel  degenerative changes. No acute findings radiographically evident.     This report was finalized on 11/11/2019 3:14 PM by Dr. Jonah Salgado MD.           Assessment & Plan    -Neutropenic fever and sepsis, suspect due to cellulitis of the left hand  -Absolute neutropenia and pancytopenia secondary to follicular lymphoma treatment  -Hyperglycemia  -Acute hypokalemia and acute hypomagnesemia  -Normocytic anemia  -Prolonged QTC of 497 ms  -Essential hypertension  -Hyperlipidemia  -Paroxysmal supraventricular tachycardia  -Obstructive sleep apnea  -Mildly impaired pharyngeal phase dysphagia  -DDD, back pain    Patient doing quite well today, ANC much improved, Heme/Onc recommending outpatient f/u w/ primary oncologist, discontinue Neupogen for ANC > 1500, f/u outpatient derm for chronic rash.  She has had some hypoxia today w/ ambulation but CXR was clear, old CT last year w/  some chronic lung changes noted but denies smoking hx, has never had PFTs, does have sleep apnea and had sleep study but was never told results and doesn't have home CPAP, would likely benefit from outpatient sleep study again if has been some time and PFTs.  Additionally, noted back pain, checked lumbar Xray and showed degenerative changes but no fx's or lytic lesions.  Her hand abscess is all but healed and on IV ceftriaxone but likely can complete short course soon.  Will monitor overnight and ambulate in Am, if needs home oxygen will set up but likely discharge in AM.  Needs home health order and wheelchair at discharge.    VTE Prophylaxis:   Mechanical Order History:     None      Pharmalogical Order History:     Ordered     Dose Route Frequency Stop    11/07/19 1934  heparin (porcine) 5000 UNIT/ML injection 5,000 Units      5,000 Units SC Every 12 Hours Scheduled --        Errol Guy MD  [facility]  11/11/19  5:30 PM

## 2019-11-12 VITALS
HEIGHT: 60 IN | HEART RATE: 80 BPM | SYSTOLIC BLOOD PRESSURE: 151 MMHG | BODY MASS INDEX: 22.26 KG/M2 | TEMPERATURE: 97.6 F | OXYGEN SATURATION: 96 % | DIASTOLIC BLOOD PRESSURE: 73 MMHG | RESPIRATION RATE: 20 BRPM | WEIGHT: 113.4 LBS

## 2019-11-12 PROBLEM — D70.9 NEUTROPENIA (HCC): Chronic | Status: RESOLVED | Noted: 2019-11-07 | Resolved: 2019-11-12

## 2019-11-12 PROBLEM — R94.31 PROLONGED Q-T INTERVAL ON ECG: Status: RESOLVED | Noted: 2019-11-07 | Resolved: 2019-11-12

## 2019-11-12 LAB
BACTERIA SPEC AEROBE CULT: NORMAL
BACTERIA SPEC AEROBE CULT: NORMAL
CYTOLOGIST CVX/VAG CYTO: NORMAL
DEPRECATED RDW RBC AUTO: 46.7 FL (ref 37–54)
ERYTHROCYTE [DISTWIDTH] IN BLOOD BY AUTOMATED COUNT: 13.2 % (ref 12.3–15.4)
HCT VFR BLD AUTO: 33.1 % (ref 34–46.6)
HGB BLD-MCNC: 10.5 G/DL (ref 12–15.9)
HYPOCHROMIA BLD QL: ABNORMAL
LYMPHOCYTES # BLD MANUAL: 1.38 10*3/MM3 (ref 0.7–3.1)
LYMPHOCYTES NFR BLD MANUAL: 13 % (ref 19.6–45.3)
LYMPHOCYTES NFR BLD MANUAL: 6 % (ref 5–12)
MAGNESIUM SERPL-MCNC: 2.3 MG/DL (ref 1.6–2.4)
MCH RBC QN AUTO: 30.8 PG (ref 26.6–33)
MCHC RBC AUTO-ENTMCNC: 31.7 G/DL (ref 31.5–35.7)
MCV RBC AUTO: 97.1 FL (ref 79–97)
METAMYELOCYTES NFR BLD MANUAL: 2 % (ref 0–0)
MONOCYTES # BLD AUTO: 0.64 10*3/MM3 (ref 0.1–0.9)
MYELOCYTES NFR BLD MANUAL: 2 % (ref 0–0)
NEUTROPHILS # BLD AUTO: 8.18 10*3/MM3 (ref 1.7–7)
NEUTROPHILS NFR BLD MANUAL: 70 % (ref 42.7–76)
NEUTS BAND NFR BLD MANUAL: 7 % (ref 0–5)
NEUTS VAC BLD QL SMEAR: ABNORMAL
PATH INTERP BLD-IMP: NORMAL
PLAT MORPH BLD: NORMAL
PLATELET # BLD AUTO: 150 10*3/MM3 (ref 140–450)
PMV BLD AUTO: 13.3 FL (ref 6–12)
RBC # BLD AUTO: 3.41 10*6/MM3 (ref 3.77–5.28)
SCAN SLIDE: NORMAL
TOXIC GRANULATION: ABNORMAL
WBC NRBC COR # BLD: 10.62 10*3/MM3 (ref 3.4–10.8)

## 2019-11-12 PROCEDURE — 83735 ASSAY OF MAGNESIUM: CPT | Performed by: INTERNAL MEDICINE

## 2019-11-12 PROCEDURE — 85025 COMPLETE CBC W/AUTO DIFF WBC: CPT | Performed by: INTERNAL MEDICINE

## 2019-11-12 PROCEDURE — 25010000002 HEPARIN (PORCINE) PER 1000 UNITS: Performed by: INTERNAL MEDICINE

## 2019-11-12 PROCEDURE — 94799 UNLISTED PULMONARY SVC/PX: CPT

## 2019-11-12 PROCEDURE — 99239 HOSP IP/OBS DSCHRG MGMT >30: CPT | Performed by: PHYSICIAN ASSISTANT

## 2019-11-12 PROCEDURE — 85007 BL SMEAR W/DIFF WBC COUNT: CPT | Performed by: INTERNAL MEDICINE

## 2019-11-12 RX ORDER — ERGOCALCIFEROL 1.25 MG/1
50000 CAPSULE ORAL WEEKLY
Qty: 8 CAPSULE | Refills: 0 | Status: SHIPPED | OUTPATIENT
Start: 2019-11-12 | End: 2020-01-11

## 2019-11-12 RX ORDER — MULTIVITAMIN
1 TABLET ORAL DAILY
Qty: 30 TABLET | Refills: 0 | Status: SHIPPED | OUTPATIENT
Start: 2019-11-12 | End: 2019-12-12

## 2019-11-12 RX ADMIN — Medication 1 CAPSULE: at 09:14

## 2019-11-12 RX ADMIN — PSYLLIUM HUSK 1 PACKET: 3.4 POWDER ORAL at 09:14

## 2019-11-12 RX ADMIN — Medication 1 TABLET: at 09:13

## 2019-11-12 RX ADMIN — METOPROLOL TARTRATE 50 MG: 50 TABLET, FILM COATED ORAL at 09:14

## 2019-11-12 RX ADMIN — PANTOPRAZOLE SODIUM 40 MG: 40 TABLET, DELAYED RELEASE ORAL at 05:14

## 2019-11-12 RX ADMIN — ATORVASTATIN CALCIUM 10 MG: 10 TABLET, FILM COATED ORAL at 09:14

## 2019-11-12 RX ADMIN — BUSPIRONE HYDROCHLORIDE 5 MG: 5 TABLET ORAL at 09:14

## 2019-11-12 RX ADMIN — MEGESTROL ACETATE 40 MG: 40 TABLET ORAL at 09:14

## 2019-11-12 RX ADMIN — HEPARIN SODIUM 5000 UNITS: 5000 INJECTION INTRAVENOUS; SUBCUTANEOUS at 09:14

## 2019-11-12 RX ADMIN — PREGABALIN 150 MG: 75 CAPSULE ORAL at 09:14

## 2019-11-12 RX ADMIN — CETIRIZINE HYDROCHLORIDE 5 MG: 10 TABLET, FILM COATED ORAL at 09:14

## 2019-11-12 RX ADMIN — ASPIRIN 81 MG: 81 TABLET ORAL at 09:14

## 2019-11-12 NOTE — DISCHARGE SUMMARY
[facility] Medicine Services  DISCHARGE SUMMARY    Date of Admission: 11/7/2019    Date of Discharge:  11/12/2019    PCP: Chip Manzano APRN    Discharging Provider: Corrie Hatch PA-C  Attending Physician on day of DC: Dr. Errol Guy    Admission Diagnosis:   Please see admission H&P    Discharge Diagnosis:   · Neutropenic fever and sepsis suspected to be due to cellulitis of the left hand, improved  · Absolute neutropenia and pancytopenia secondary to follicular lymphoma treatment  · Hyperglycemia  · Acute hypokalemia improved  · Acute hypomagnesemia improved  · Normocytic anemia  · Prolonged QTC of 497 ms  · Essential hypertension  · Hyperlipidemia  · Paroxysmal supraventricular tachycardia  · Obstructive sleep apnea  · Mildly impaired pharyngeal phase dysphagia  · Degenerative disc disease  · Vitamin D deficiency  · Rheumatoid arthritis     Consults:   · Hematology/Oncology    HPI     History of Present Illness:  Kiah Conway is a 74 y.o. female past medical history significant for hyperlipidemia, essential hypertension, follicular lymphoma (in remission), GERD, and fibromyalgia who presented to the emergency department of [facility] for evaluation of left hand abscess on 11/7/2019.  She reported she first noticed the lesion on the night prior to presentation and reported that it began swelling and streaking up to her left arm on the day she presented.  She was found in the emergency department to have cellulitis of the left hand and absolute neutropenia felt to likely be secondary to follicular lymphoma with underlying cellulitis.  She was admitted for further evaluation and treatment and started on IV vancomycin and Zosyn.       Hospital Course     Hospital Course  Kiah Conway was admitted as outlined in above HPI.  Mrs. Brandt was started on IV vancomycin and Zosyn.  Neupogen was also ordered with hematology oncology also consulted for further  evaluation.  She is a patient of [facility].  Given absolute neutropenia and cellulitis on admission, her home Plaquenil was held.  At the time of discharge, this does remain held until she follows up with her PCP and oncologist to be restarted at their discretion.    As Mrs. Conway improved her IV antibiotics were transitioned to IV Rocephin.  Consultation evaluation by hematology oncology team recommended follow-up with primary oncologist following discharge in addition to agreeing with Neupogen given neutropenia with advised to discontinue once ANC was greater than 1500.    There is also concern for underlying mild obstructive sleep apnea.'s been recommended she follow-up with her primary care provider for further arrangement at the discretion of PFTs and outpatient sleep study.    Given reported back pain in setting of known follicular lymphoma and x-ray of the spine lumbar was obtained from further evaluation to rule out underlying lytic lesions.  There were changes of degenerative disc disease.  Wheelchair was arranged given ongoing debility in the ending of follicular lymphoma.  Additionally her home prophylactic Macrobid was also restarted at the time of discharge.         Pertinent Laboratory and Radiology Results     Pertinent Test Results:          Results from last 7 days   Lab Units 11/12/19  0233 11/11/19  0516 11/10/19  0802 11/10/19  0600 11/09/19  0355 11/08/19  0606 11/07/19  1636 11/07/19  1540   WBC 10*3/mm3 10.62 6.09  --  1.67* 1.07* 0.50*  --  0.76*   HEMOGLOBIN g/dL 10.5* 11.1*  --  10.3* 9.5* 10.2*  --  11.4*   HEMATOCRIT % 33.1* 34.1  --  32.1* 30.6* 31.4*  --  34.5   PLATELETS 10*3/mm3 150 131*  --  119* 149 145  --  186   MCV fL 97.1* 94.2  --  95.3 97.8* 93.2  --  93.2   SODIUM mmol/L  --  141  --   --  144 143 140  --    POTASSIUM mmol/L  --  3.6 4.1  --  3.0* 3.6 3.3*  --    CHLORIDE mmol/L  --  108*  --   --  111* 108* 108*  --    CO2 mmol/L  --  23.0  --   --  21.1* 20.8*  19.2*  --    BUN mg/dL  --  9  --   --  13 12 14  --    CREATININE mg/dL  --  0.78  --   --  0.79 0.76 0.87  --    GLUCOSE mg/dL  --  89  --   --  73 150* 137*  --    CALCIUM mg/dL  --  8.1*  --   --  7.6* 7.8* 7.8*  --         Results from last 7 days   Lab Units 11/09/19  0924   TROPONIN T ng/mL <0.010     Results from last 7 days   Lab Units 11/12/19  0233 11/11/19  0516 11/10/19  0600 11/09/19  0355 11/08/19  0606 11/07/19  1636 11/07/19  1540   CRP mg/dL  --   --   --   --  8.69* 4.61*  --    LACTATE mmol/L  --   --   --   --  1.1  --  1.9   WBC 10*3/mm3 10.62 6.09 1.67* 1.07* 0.50*  --  0.76*     Results from last 7 days   Lab Units 11/11/19  0516 11/08/19  0606 11/07/19  1636   BILIRUBIN mg/dL 0.3 0.5 0.4   ALK PHOS U/L 85 69 72   ALT (SGPT) U/L 12 9 8   AST (SGOT) U/L 22 14 13     Results from last 7 days   Lab Units 11/08/19  0606   CHOLESTEROL mg/dL 120   TRIGLYCERIDES mg/dL 78   HDL CHOL mg/dL 39*     Results from last 7 days   Lab Units 11/07/19  1636 11/07/19  1540   TSH uIU/mL 1.350  --    HEMOGLOBIN A1C %  --  4.30*     Brief Urine Lab Results  (Last result in the past 365 days)      Color   Clarity   Blood   Leuk Est   Nitrite   Protein   CREAT   Urine HCG        11/07/19 1549 Dark Yellow Cloudy Negative Trace Negative 30 mg/dL (1+)                   Results for orders placed during the hospital encounter of 12/14/17   Duplex venous lower extremity left CAR    Narrative EXAMINATION: DUPLEX VENOUS LOWER EXTREMITY LEFT CAR-      CLINICAL INDICATION:     Leg Pain and Swelling  PAIN      TECHNIQUE: Multiplanar gray scale and Doppler vascular sonographic  imaging of the deep veins  without and with compression.      COMPARISON: NONE      FINDINGS: The visualized deep veins demonstrate flow and are  compressible. No evidence of deep venous thrombosis.             Impression No DVT.     This report was finalized on 12/14/2017 1:12 PM by Dr. Santosh Andrade MD.          Results for orders placed during the  hospital encounter of 11/07/19   Adult Transthoracic Echo Complete W/ Cont if Necessary Per Protocol    Narrative · Normal left ventricular cavity size and wall thickness noted. All left   ventricular wall segments contract normally.  · Estimated EF appears to be in the range of 66 - 70%.  · The aortic valve is structurally normal. Mild aortic valve regurgitation   is present. No aortic valve stenosis is present.  · The mitral valve is normal in structure. Trace mitral valve   regurgitation is present. No significant mitral valve stenosis is present.  · The tricuspid valve is normal. Mild tricuspid valve regurgitation is   present. Estimated right ventricular systolic pressure from tricuspid   regurgitation is mildly elevated (35-45 mmHg).  · There is no evidence of pericardial effusion.           Order Current Status    Slide Review, Hematology In process    Blood Culture - Blood, Arm, Left Preliminary result    Blood Culture - Blood, Arm, Right Preliminary result            ----------------------------------------------------------------------------------------------------------------------  Us Spleen    Result Date: 11/8/2019  Homogeneous echotexture of the spleen.  This report was finalized on 11/8/2019 5:01 PM by Dr. Chente Choi MD.      Xr Chest 1 View    Result Date: 11/11/2019  No radiographic evidence of acute cardiac or pulmonary disease.  This report was finalized on 11/11/2019 3:14 PM by Dr. Jonah Salgado MD.      Xr Spine Lumbar Ap & Lateral    Result Date: 11/11/2019  Stable appearance of lumbar spine with multilevel degenerative changes. No acute findings radiographically evident.  This report was finalized on 11/11/2019 3:14 PM by Dr. Jonah Salgado MD.      Fl Video Swallow    Result Date: 11/8/2019  Aspiration with thin liquids.  For additional information please see the report provided by the speech therapy service.  This report was finalized on 11/8/2019 3:43 PM by Dr. Chente Choi MD.           Microbiology Results (last 10 days)     Procedure Component Value - Date/Time    Blood Culture - Blood, Arm, Left [[id number]] Collected:  11/07/19 1540    Lab Status:  Preliminary result Specimen:  Blood from Arm, Left Updated:  11/11/19 1600     Blood Culture No growth at 4 days    Blood Culture - Blood, Arm, Right [[id number]] Collected:  11/07/19 1535    Lab Status:  Preliminary result Specimen:  Blood from Arm, Right Updated:  11/11/19 1600     Blood Culture No growth at 4 days          Labs above have been reviewed on the day of discharge.  Radiology images from prior 30 days were reviewed prior to discharge as incorporated into this document.     Discharge Vitals and Physical Examination       Vital Signs  Temp:  [97.6 °F (36.4 °C)-99.4 °F (37.4 °C)] 97.6 °F (36.4 °C)  Heart Rate:  [70-81] 80  Resp:  [18-20] 20  BP: (123-166)/(49-73) 151/73     PHYSICAM EXAMINATION:   Physical Exam      Discharge Disposition, Discharge Medications, and Discharge Appointments     Discharge Disposition:   home    Condition on Discharge:  Fair    Discharge Medications:     Discharge Medications      New Medications      Instructions Start Date   multivitamin tablet tablet   1 tablet, Oral, Daily      vitamin D 1.25 MG (04882 UT) capsule capsule  Commonly known as:  ERGOCALCIFEROL   50,000 Units, Oral, Weekly         Changes to Medications      Instructions Start Date   nitrofurantoin (macrocrystal-monohydrate) 100 MG capsule  Commonly known as:  MACROBID  What changed:  when to take this   100 mg, Oral, Daily         Continue These Medications      Instructions Start Date   aspirin 81 MG EC tablet   81 mg, Oral, Daily      atorvastatin 10 MG tablet  Commonly known as:  LIPITOR   10 mg, Oral, Daily      busPIRone 5 MG tablet  Commonly known as:  BUSPAR   5 mg, Oral, 2 Times Daily      conjugated estrogens 0.625 MG/GM vaginal cream  Commonly known as:  PREMARIN   1 g, Vaginal, Daily      FIBER-CAPS PO   1 capsule, Oral,  Daily      FLUoxetine 20 MG capsule  Commonly known as:  PROzac   20 mg, Oral, Daily      HYDROcodone-acetaminophen 7.5-325 MG per tablet  Commonly known as:  NORCO   1 tablet, Oral, 3 Times Daily PRN      loratadine 10 MG tablet  Commonly known as:  CLARITIN   10 mg, Oral, Daily      megestrol 40 MG tablet  Commonly known as:  MEGACE   40 mg, Oral, 2 Times Daily      metoprolol tartrate 50 MG tablet  Commonly known as:  LOPRESSOR   50 mg, Oral, Every 12 Hours Scheduled      omeprazole 40 MG capsule  Commonly known as:  priLOSEC   40 mg, Oral, Daily      pregabalin 300 MG capsule  Commonly known as:  LYRICA   300 mg, Oral, 2 Times Daily      raNITIdine 150 MG tablet  Commonly known as:  ZANTAC   150 mg, Oral, 2 Times Daily PRN      Risaquad-2 capsule capsule   1 capsule, Oral, Daily         Stop These Medications    hydroCHLOROthiazide 25 MG tablet  Commonly known as:  HYDRODIURIL     hydroxychloroquine 200 MG tablet  Commonly known as:  PLAQUENIL            Discharged medication regimen discussed with attending physician prior to discharge.     Discharge Diet:   regular diet  Dietary Orders (From admission, onward)    Start     Ordered    11/08/19 1433  Diet Regular; Nectar / Syrup Thick  Diet Effective Now     Comments:  Water Protocol between meals and meds.   Question Answer Comment   Diet Texture / Consistency Regular    Fluid Consistency [place] / Syrup Thick        11/08/19 1432          Activity at Discharge:  activity as tolerated         Discharge Disposition:    Home or Self Care        Follow-up Appointments:  Your Scheduled Appointments    Mar 06, 2020  9:15 AM EST  Follow Up with MERRY Land  [facility] CARDIOLOGY (--) [address] [place] [address] [phone]   Arrive 15 minutes prior to appointment.    Additional instructions:      Pt  Has  And  Apt  With  Chip borrero  For  Nov 18  At 3 :15    And  Called  Dr Rahul bazan  At  [facility]  Stated   They  Would  Call  Patient  With  Apt dr bazan  Office  Called  With  Apt  For nov 26 at 2:30                    Additional Instructions for the Follow-ups that You Need to Schedule     Discharge Follow-up with PCP   As directed       Currently Documented PCP:    Aundrea Headley APRN    PCP Phone Number:    [phone]     Follow Up Details:  Hand cellulitis, vitamin d deficiency, neutropenic fever         Discharge Follow-up with Specified Provider: [email or web address] Hem/Onc; 2 Weeks   As directed      To:  [email or web address] Hem/Onc    Follow Up:  2 Weeks           Follow-up Information     Aundrea Headley APRN .    Specialty:  Family Medicine  Why:  Hand cellulitis, vitamin d deficiency, neutropenic fever  Contact information:  [address] [place] [id number]  [phone]                   Additional Instructions for the Follow-ups that You Need to Schedule     Discharge Follow-up with PCP   As directed       Currently Documented PCP:    Aundrea Headley APRN    PCP Phone Number:    [phone]     Follow Up Details:  Hand cellulitis, vitamin d deficiency, neutropenic fever         Discharge Follow-up with Specified Provider: [email or web address] Hem/Onc; 2 Weeks   As directed      To:  [email or web address] Hem/Onc    Follow Up:  2 Weeks               Test Results Pending at Discharge:     Order Current Status    Slide Review, Hematology In process    Blood Culture - Blood, Arm, Left Preliminary result    Blood Culture - Blood, Arm, Right Preliminary result             Corrie Hatch PA-C  [facility] Medicine Team  11/12/19  1:25 PM      Time: Greater than 30 minutes spent on this discharge.

## 2019-11-12 NOTE — NURSING NOTE
"Transitional Care Note    Enrolled in [facility] Transitional Care Note    Enrolled in [facility] Transitional Care Services under theTCM Model to be followed for 6 weeks post discharge.  BTC will assist with support and education at time of transition home from hospital.  Hospital  will follow throughout the stay at [facility].  Home  will visit within 48 hours of discharge and follow with home vitals and telephone contact for 6 weeks.      Patient admitted to [facility] via Emergency Department, complaints of abcess.  Admitted for further treatment and monitoring.      Patient up in bathroom.  Daughter at bedside.  Pleasant and talkative.  Patient thinks she may get to go home this evening.    Explained transition to home program and Patient and Family is agreeable to home visits.  Home  will be Bridgette Owens RN    Clinical Assessment Instrument Scores:  >Short Portable Mental Status 10, normal mental function  >Geriatric Depression Scale 4, normal  >IADL 4, Independent with most ADL's  >RICK-ADL 4, Independent with most self-care  >Overall Quality of Life \"fair\"  >Subjective Health Rating \"poor\"  >Symptom Bother Scale 30  >General Anxiety Scale  2  >REAL SF 0 indicates reads on 3rd grade or below   "

## 2019-11-12 NOTE — DISCHARGE INSTR - APPOINTMENTS
Pt  Has  And  Apt  With  Chip borrero  For  Nov 18  At 3 :15    And  Called  Dr Rahul bazan  At  [facility]  Stated  They  Would  Call  Patient  With  Apt dr bazan  Office  Called  With  Apt  For nov 26 at 2:30

## 2019-11-12 NOTE — PROGRESS NOTES
Discharge Planning Assessment   [facility]     Patient Name: Kiah Conway  MRN: [id number]  Today's Date: 11/12/2019    Admit Date: 11/7/2019        Discharge Plan     Row Name 11/12/19 1536       Plan    Final Discharge Disposition Code  01 - home or self-care    Final Note  Pt discharged home with Physician order for Wheelchair.  Pt family provided DME list of providers and stated no preference.  SS made referral to [facility] [employer] [place].  [facility] to deliver wheelchair to pt's home.              GALLO LittlejohnW

## 2019-11-13 ENCOUNTER — READMISSION MANAGEMENT (OUTPATIENT)
Dept: CALL CENTER | Facility: HOSPITAL | Age: 74
End: 2019-11-13

## 2019-11-13 NOTE — OUTREACH NOTE
Prep Survey      Responses   Facility patient discharged from?  [facility]   Is patient eligible?  Yes   Discharge diagnosis  sepsis   Does the patient have one of the following disease processes/diagnoses(primary or secondary)?  Sepsis   Does the patient have Home health ordered?  No   Is there a DME ordered?  Yes   What DME was ordered?  [facility]   Comments regarding appointments  see AVS   Prep survey completed?  Yes          Bibiana Melgar RN

## 2019-11-14 ENCOUNTER — READMISSION MANAGEMENT (OUTPATIENT)
Dept: CALL CENTER | Facility: HOSPITAL | Age: 74
End: 2019-11-14

## 2019-11-14 NOTE — OUTREACH NOTE
Sepsis Week 1 Survey      Responses   Facility patient discharged from?  [facility]   Does the patient have one of the following disease processes/diagnoses(primary or secondary)?  Sepsis   Is there a successful TCM telephone encounter documented?  No   Week 1 attempt successful?  Yes   Call start time  1606   Call end time  1635   Discharge diagnosis  sepsis   Is patient permission given to speak with other caregiver?  Yes   Person spoke with today (if not patient) and relationship  Corrie/ granddaughter   Meds reviewed with patient/caregiver?  Yes   Is the patient having any side effects they believe may be caused by any medication additions or changes?  No   Does the patient have all medications related to this admission filled (includes all antibiotics, inhalers, nebulizers,steroids,etc.)  Yes   Is the patient taking all medications as directed (includes completed medication regime)?  Yes   Does the patient have a primary care provider?   Yes   Does the patient have an appointment with their PCP within 7 days of discharge?  Yes   Has the patient kept scheduled appointments due by today?  N/A   Has home health visited the patient within 72 hours of discharge?  N/A   What DME was ordered?  [facility]   Has all DME been delivered?  Yes   Psychosocial issues?  No   Did the patient receive a copy of their discharge instructions?  Yes   Nursing interventions  Reviewed instructions with patient   What is the patient's perception of their health status since discharge?  Improving   Nursing interventions  Nurse provided patient education   Is the patient/caregiver able to teach back Sepsis?  S - Shivering,fever or very cold   Nursing interventions  Nurse provided reassurance to patient, Nurse provided patient education   Is patient/caregiver able to teach back steps to recovery at home?  Set small, achievable goals for return to baseline health, Rest and regain strength, Make a list of questions for PCP appoinment   Is the  patient/caregiver able to teach back signs and symptoms of worsening condition:  Fever, Hyperthermia   Is the patient/caregiver able to teach back the hierarchy of who to call/visit for symptoms/problems? PCP, Specialist, Home health nurse, Urgent Care, ED, 911  Yes   Week 1 call completed?  Yes          Aidan Handy RN

## 2019-11-21 ENCOUNTER — READMISSION MANAGEMENT (OUTPATIENT)
Dept: CALL CENTER | Facility: HOSPITAL | Age: 74
End: 2019-11-21

## 2019-11-21 NOTE — OUTREACH NOTE
Sepsis Week 2 Survey      Responses   Facility patient discharged from?  [facility]   Does the patient have one of the following disease processes/diagnoses(primary or secondary)?  Sepsis   Week 2 attempt successful?  Yes   Call start time  1402   Call end time  1407   Discharge diagnosis  sepsis   Person spoke with today (if not patient) and relationship  Eunice-daughter   Meds reviewed with patient/caregiver?  Yes   Is the patient having any side effects they believe may be caused by any medication additions or changes?  No   Does the patient have all medications related to this admission filled (includes all antibiotics, inhalers, nebulizers,steroids,etc.)  Yes   Is the patient taking all medications as directed (includes completed medication regime)?  Yes   Has the patient kept scheduled appointments due by today?  Yes   Comments  Pt to [facility] 11/26/19   Psychosocial issues?  No   What is the patient's perception of their health status since discharge?  Improving   Nursing interventions  Nurse provided patient education   Is the patient/caregiver able to teach back Sepsis?  S - Shivering,fever or very cold, S - Short of breath, S - Sleepy, difficult to arouse,confused   Nursing interventions  Nurse provided patient education   Is patient/caregiver able to teach back steps to recovery at home?  Eat a balanced diet, Set small, achievable goals for return to baseline health, Rest and regain strength, Make a list of questions for PCP appoinment   Is the patient/caregiver able to teach back signs and symptoms of worsening condition:  Fever, Hyperthermia, Rapid heart rate (>90), Shortness of breath/rapid respiratory rate, Altered mental status(confusion/coma), Edema   Week 2 call completed?  Yes   Wrap up additional comments  Pt had blood work done 11/18/19 at PCP office. Eunice was informed the WBC was WNL but will call back for the other results.          Lisseth Leroy RN

## 2019-11-29 ENCOUNTER — READMISSION MANAGEMENT (OUTPATIENT)
Dept: CALL CENTER | Facility: HOSPITAL | Age: 74
End: 2019-11-29

## 2019-11-29 NOTE — OUTREACH NOTE
Sepsis Week 3 Survey      Responses   Facility patient discharged from?  [facility]   Does the patient have one of the following disease processes/diagnoses(primary or secondary)?  Sepsis   Week 3 attempt successful?  Yes   Call start time  1600   Call end time  1606   Discharge diagnosis  sepsis   Person spoke with today (if not patient) and relationship  Eunice-daughter   Meds reviewed with patient/caregiver?  Yes   Is the patient taking all medications as directed (includes completed medication regime)?  Yes   Has the patient kept scheduled appointments due by today?  Yes   Comments  Pt needs an appointment with derm   Psychosocial issues?  No   What is the patient's perception of their health status since discharge?  Improving   Nursing interventions  Nurse provided patient education   Is the patient/caregiver able to teach back Sepsis?  I -   I feel like I might die-a feeling of hopelessness, S - Sleepy, difficult to arouse,confused, S - Shivering,fever or very cold   Nursing interventions  Nurse provided patient education   Is patient/caregiver able to teach back steps to recovery at home?  Make a list of questions for PCP appoinment, Eat a balanced diet, Rest and regain strength, Set small, achievable goals for return to baseline health, Exercise as tolerated   Is the patient/caregiver able to teach back signs and symptoms of worsening condition:  Edema, Altered mental status(confusion/coma), Fever   Week 3 call completed?  Yes          Lisseth Leroy RN

## 2019-12-06 ENCOUNTER — TELEPHONE (OUTPATIENT)
Dept: UROLOGY | Facility: CLINIC | Age: 74
End: 2019-12-06

## 2019-12-06 DIAGNOSIS — N39.0 URINARY TRACT INFECTION WITHOUT HEMATURIA, SITE UNSPECIFIED: Primary | ICD-10-CM

## 2019-12-06 RX ORDER — NITROFURANTOIN MACROCRYSTALS 100 MG/1
100 CAPSULE ORAL 4 TIMES DAILY
Qty: 28 CAPSULE | Refills: 0 | Status: SHIPPED | OUTPATIENT
Start: 2019-12-06 | End: 2019-12-13

## 2019-12-06 NOTE — TELEPHONE ENCOUNTER
Pt daughter is calling in requesting refill of Macrobid called into [facility]. Has a future appt

## 2019-12-09 ENCOUNTER — READMISSION MANAGEMENT (OUTPATIENT)
Dept: CALL CENTER | Facility: HOSPITAL | Age: 74
End: 2019-12-09

## 2019-12-09 NOTE — OUTREACH NOTE
Sepsis Week 4 Survey      Responses   Facility patient discharged from?  [facility]   Does the patient have one of the following disease processes/diagnoses(primary or secondary)?  Sepsis   Week 4 attempt successful?  Yes   Call start time  1630   Call end time  1632   Discharge diagnosis  sepsis   Person spoke with today (if not patient) and relationship  Eunice-daughter   Meds reviewed with patient/caregiver?  Yes   Is the patient taking all medications as directed (includes completed medication regime)?  Yes   Has the patient kept scheduled appointments due by today?  Yes   Psychosocial issues?  No   Comments  Pt feeling better, cellulitis of left hand has improved per dtr.    What is the patient's perception of their health status since discharge?  Improving   Week 4 call completed?  Yes   Would the patient like one additional call?  No   Graduated  Yes          Gale Lam RN

## 2019-12-10 ENCOUNTER — OFFICE VISIT (OUTPATIENT)
Dept: UROLOGY | Facility: CLINIC | Age: 74
End: 2019-12-10

## 2019-12-10 VITALS — BODY MASS INDEX: 22.93 KG/M2 | HEIGHT: 60 IN | WEIGHT: 116.8 LBS

## 2019-12-10 DIAGNOSIS — N30.00 ACUTE CYSTITIS WITHOUT HEMATURIA: ICD-10-CM

## 2019-12-10 DIAGNOSIS — N34.2 ATROPHIC URETHRITIS: Primary | ICD-10-CM

## 2019-12-10 PROCEDURE — 99213 OFFICE O/P EST LOW 20 MIN: CPT | Performed by: NURSE PRACTITIONER

## 2019-12-10 RX ORDER — NITROFURANTOIN 25; 75 MG/1; MG/1
100 CAPSULE ORAL DAILY
Qty: 56 CAPSULE | Refills: 3 | Status: SHIPPED | OUTPATIENT
Start: 2019-12-10 | End: 2020-07-15

## 2019-12-10 NOTE — PROGRESS NOTES
Chief Complaint:          Chief Complaint   Patient presents with   • Urinary Tract Infection     6 month f/u       HPI:   74 y.o. female  referred with recurrent urinary tract infections.  Last one she had 2 rounds of Cipro.  She has chills no fever nausea no vomiting dysuria no pressure.  Normal bowel movements no allergies.  Urinalysis was negative.  Her postvoid residual is negligible.  She has had a bladder tack by Dr. Burnette in [place] a history of thyroid cancer and a follicular lymphoma she has severe COPD.  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  With prophylaxis and we discussed other factors including topical Estrace.  She returns today.  She had a rash is a lacy reticular rash its better I do not think it is related to the Estrace cream because she never got it because it was $900 for tube which is ridiculous.  But she is doing well with the Macrodantin.  With chemo she got a yeast that she was placed on prophylactic Diflucan which helped a lot otherwise her exam is unremarkable I gave her reassurance I will see her back in 6 months.  She returns today.  She was hospitalized in November extensively for some type of septic episode.  She had multiple antibiotics.  She developed post antibiotic associated diarrhea I put her back on the Macrobid.  She has an extremely low white count and she is continuing continue more therapy for a follicular lymphoma in the retroperitoneum I will have her continue on her prophylaxis.  Other than that from urologic standpoint she is stable      Past Medical History:        Past Medical History:   Diagnosis Date   • Arthritis    • Cardiac disorder    • Diverticulitis    • Dysphagia    • Fibromyalgia    • Follicular lymphoma (CMS/HCC)     Remisson    • GERD (gastroesophageal reflux disease)    • History of rheumatic fever    • Hypertension    • Hypokalemia due to inadequate potassium intake 03/03/2014   • Lipoma    • Malignant neoplasm  (CMS/formerly [facility])    • Migraine    • Neutropenia (CMS/formerly [facility]) 11/7/2019   • Obstructive sleep apnea 7/14/2016   • Prolonged Q-T interval on ECG 11/7/2019   • Recurrent UTI    • Rheumatoid arthritis (CMS/formerly [facility])    • Sleep apnea    • Thyroid cancer (CMS/HCC)          Current Meds:     Current Outpatient Medications   Medication Sig Dispense Refill   • aspirin 81 MG EC tablet Take 81 mg by mouth Daily.     • atorvastatin (LIPITOR) 10 MG tablet Take 1 tablet by mouth Daily. 30 tablet 11   • busPIRone (BUSPAR) 5 MG tablet Take 5 mg by mouth 2 (Two) Times a Day.     • Calcium Polycarbophil (FIBER-CAPS PO) Take 1 capsule by mouth Daily.     • conjugated estrogens (PREMARIN) 0.625 MG/GM vaginal cream Insert 1 g into the vagina Daily.     • FLUoxetine (PROzac) 20 MG capsule Take 20 mg by mouth Daily.     • HYDROcodone-acetaminophen (NORCO) 7.5-325 MG per tablet Take 1 tablet by mouth 3 (Three) Times a Day As Needed for Moderate Pain .     • lactobacillus acidophilus (RISAQUAD) capsule capsule Take 1 capsule by mouth Daily. 7 capsule 0   • loratadine (CLARITIN) 10 MG tablet Take 10 mg by mouth Daily.     • megestrol (MEGACE) 40 MG tablet Take 40 mg by mouth 2 (Two) Times a Day.     • metoprolol tartrate (LOPRESSOR) 50 MG tablet Take 1 tablet by mouth Every 12 (Twelve) Hours. 60 tablet 5   • multivitamin (DAILY VLAD) tablet tablet Take 1 tablet by mouth Daily for 30 days. 30 tablet 0   • nitrofurantoin, macrocrystal-monohydrate, (MACROBID) 100 MG capsule Take 1 capsule by mouth Daily. (Patient taking differently: Take 100 mg by mouth 2 (Two) Times a Day.) 60 capsule 3   • omeprazole (priLOSEC) 40 MG capsule Take 40 mg by mouth Daily.     • pregabalin (LYRICA) 300 MG capsule Take 300 mg by mouth 2 (Two) Times a Day.     • raNITIdine (ZANTAC) 150 MG tablet Take 150 mg by mouth 2 (Two) Times a Day As Needed for Heartburn or Indigestion.     • vitamin D (ERGOCALCIFEROL) 1.25 MG (33171 UT) capsule capsule Take 1 capsule by mouth 1 (One) Time  Per Week for 60 days. 8 capsule 0   • nitrofurantoin (MACRODANTIN) 100 MG capsule Take 1 capsule by mouth 4 (Four) Times a Day for 7 days. 28 capsule 0     No current facility-administered medications for this visit.         Allergies:      No Known Allergies     Past Surgical History:     Past Surgical History:   Procedure Laterality Date   • BLADDER SURGERY     • FINGER SURGERY      reattached    • HYSTERECTOMY     • THYROID SURGERY           Social History:     Social History     Socioeconomic History   • Marital status:      Spouse name: Not on file   • Number of children: Not on file   • Years of education: Not on file   • Highest education level: Not on file   Tobacco Use   • Smoking status: Never Smoker   • Smokeless tobacco: Never Used   Substance and Sexual Activity   • Alcohol use: No   • Drug use: No   • Sexual activity: Defer       Family History:     Family History   Problem Relation Age of Onset   • Diabetes Mother    • Hypertension Mother    • Other Other         cardiac disorder,cardiovascular disease, malignant neoplasm of brain   • Breast cancer Paternal Aunt        Review of Systems:     Review of Systems   Constitutional: Negative.  Negative for activity change, appetite change, chills, diaphoresis, fatigue and unexpected weight change.   HENT: Negative for congestion, dental problem, drooling, ear discharge, ear pain, facial swelling, hearing loss, mouth sores, nosebleeds, postnasal drip, rhinorrhea, sinus pressure, sneezing, sore throat, tinnitus, trouble swallowing and voice change.    Eyes: Negative.  Negative for photophobia, pain, discharge, redness, itching and visual disturbance.   Respiratory: Negative.  Negative for apnea, cough, choking, chest tightness, shortness of breath, wheezing and stridor.    Cardiovascular: Negative.  Negative for chest pain, palpitations and leg swelling.   Gastrointestinal: Positive for diarrhea. Negative for abdominal distention, abdominal pain, anal  bleeding, blood in stool, constipation, nausea, rectal pain and vomiting.   Endocrine: Negative.  Negative for cold intolerance, heat intolerance, polydipsia, polyphagia and polyuria.   Musculoskeletal: Negative.  Negative for arthralgias, back pain, gait problem, joint swelling, myalgias, neck pain and neck stiffness.   Skin: Negative.  Negative for color change, pallor, rash and wound.   Allergic/Immunologic: Negative.  Negative for environmental allergies, food allergies and immunocompromised state.   Neurological: Negative.  Negative for dizziness, tremors, seizures, syncope, facial asymmetry, speech difficulty, weakness, light-headedness, numbness and headaches.   Hematological: Negative.  Negative for adenopathy. Does not bruise/bleed easily.   Psychiatric/Behavioral: Negative for agitation, behavioral problems, confusion, decreased concentration, dysphoric mood, hallucinations, self-injury, sleep disturbance and suicidal ideas. The patient is not nervous/anxious and is not hyperactive.    All other systems reviewed and are negative.      Physical Exam:     Physical Exam   Constitutional: She appears well-developed and well-nourished.   HENT:   Head: Normocephalic and atraumatic.   Right Ear: External ear normal.   Left Ear: External ear normal.   Mouth/Throat: Oropharynx is clear and moist.   Eyes: Pupils are equal, round, and reactive to light. Conjunctivae are normal.   Cardiovascular: Normal rate, regular rhythm, normal heart sounds and intact distal pulses.   Pulmonary/Chest: Effort normal and breath sounds normal.   Abdominal: Soft. Bowel sounds are normal. She exhibits no distension and no mass. There is no tenderness. There is no rebound and no guarding.   Genitourinary: No vaginal discharge found.   Genitourinary Comments: Extremely frail appearing almost moribund   Musculoskeletal: Normal range of motion.   Neurological: She is alert. She has normal reflexes.   Skin: Skin is warm and dry.    Psychiatric: She has a normal mood and affect. Her behavior is normal. Judgment and thought content normal.       I have reviewed the following portions of the patient's history: allergies, current medications, past family history, past medical history, past social history, past surgical history, problem list and ROS and confirm it's accurate.      Procedure:       Assessment/Plan:   Recurrent urinary tract infections-patient has been referred and diagnosed with recurrent urinary tract infections.  We discussed the types of organisms that are found in the urinary tract indicating that the vast majority are results of the patient's own gastrointestinal tejas.  We discussed how many of the antibiotics that are utilized can actually exacerbate these infections by creating resistant organisms and there is only a very few antibiotics that are concentrated in the urine and do not affect the rectal reservoir nor cause recurrent yeast vaginitis.  We discussed the risk factors for recurrent infections being intercourse in younger patients and atrophic changes in older patients.  We discussed the symptoms that are found including pain, pressure, burning, frequency, urgency suprapubic pain and painful intercourse.  I discussed upper tract symptoms including fevers, chills, and indicated the workup would be much more aggressive if the patient were to present with recurrent infections in the face of upper tract symptomatology such as fever.  I discussed the history of vesicoureteral reflux in young patients and finally chronic renal scarring as a result of such.  I recommend concomitant probiotics with treatment with antibiotics to protect the rectal reservoir including over-the-counter yogurt preparations to nasrin oral pills containing the appropriate probiotics.  She has a known follicular lymphoma a depressed white count and I am maintaining her on prophylaxis she is a tremendous risk for recurrent  sepsis      .      Patient's Body mass index is 22.15 kg/m². BMI is within normal parameters. No follow-up required..              This document has been electronically signed by RICK VIDALES MD December 10, 2019 11:55 AM

## 2019-12-11 PROBLEM — N39.0 URINARY TRACT INFECTION WITHOUT HEMATURIA: Status: ACTIVE | Noted: 2019-12-11

## 2020-01-15 ENCOUNTER — TRANSCRIBE ORDERS (OUTPATIENT)
Dept: ADMINISTRATIVE | Facility: HOSPITAL | Age: 75
End: 2020-01-15

## 2020-01-15 ENCOUNTER — HOSPITAL ENCOUNTER (OUTPATIENT)
Dept: GENERAL RADIOLOGY | Facility: HOSPITAL | Age: 75
Discharge: HOME | End: 2020-01-15
Admitting: NURSE PRACTITIONER

## 2020-01-15 DIAGNOSIS — M79.672 LEFT FOOT PAIN: Primary | ICD-10-CM

## 2020-01-15 DIAGNOSIS — M79.672 LEFT FOOT PAIN: ICD-10-CM

## 2020-01-15 PROCEDURE — 73630 X-RAY EXAM OF FOOT: CPT

## 2020-01-15 PROCEDURE — 73630 X-RAY EXAM OF FOOT: CPT | Performed by: RADIOLOGY

## 2020-05-12 ENCOUNTER — TRANSCRIBE ORDERS (OUTPATIENT)
Dept: ADMINISTRATIVE | Facility: HOSPITAL | Age: 75
End: 2020-05-12

## 2020-05-12 ENCOUNTER — LAB (OUTPATIENT)
Dept: LAB | Facility: HOSPITAL | Age: 75
End: 2020-05-12

## 2020-05-12 ENCOUNTER — HOSPITAL ENCOUNTER (OUTPATIENT)
Dept: INFUSION THERAPY | Facility: HOSPITAL | Age: 75
Discharge: HOME | End: 2020-05-12
Admitting: NURSE PRACTITIONER

## 2020-05-12 DIAGNOSIS — E78.5 HYPERLIPIDEMIA, UNSPECIFIED HYPERLIPIDEMIA TYPE: ICD-10-CM

## 2020-05-12 DIAGNOSIS — I10 ESSENTIAL HYPERTENSION, MALIGNANT: Primary | ICD-10-CM

## 2020-05-12 DIAGNOSIS — I10 ESSENTIAL HYPERTENSION, MALIGNANT: ICD-10-CM

## 2020-05-12 DIAGNOSIS — R53.83 TIREDNESS: ICD-10-CM

## 2020-05-12 DIAGNOSIS — D72.829 LEUKOCYTOSIS, UNSPECIFIED TYPE: ICD-10-CM

## 2020-05-12 DIAGNOSIS — R79.0 CALCIUM BLOOD DECREASED: ICD-10-CM

## 2020-05-12 LAB
ALBUMIN SERPL-MCNC: 3.91 G/DL (ref 3.5–5.2)
ALBUMIN/GLOB SERPL: 1.8 G/DL
ALP SERPL-CCNC: 79 U/L (ref 39–117)
ALT SERPL W P-5'-P-CCNC: 11 U/L (ref 1–33)
ANION GAP SERPL CALCULATED.3IONS-SCNC: 13.1 MMOL/L (ref 5–15)
AST SERPL-CCNC: 16 U/L (ref 1–32)
BILIRUB SERPL-MCNC: 0.3 MG/DL (ref 0.2–1.2)
BUN BLD-MCNC: 8 MG/DL (ref 8–23)
BUN/CREAT SERPL: 8.8 (ref 7–25)
CALCIUM SPEC-SCNC: 8.8 MG/DL (ref 8.6–10.5)
CHLORIDE SERPL-SCNC: 105 MMOL/L (ref 98–107)
CHOLEST SERPL-MCNC: 128 MG/DL (ref 0–200)
CO2 SERPL-SCNC: 22.9 MMOL/L (ref 22–29)
CREAT BLD-MCNC: 0.91 MG/DL (ref 0.57–1)
DEPRECATED RDW RBC AUTO: 45.9 FL (ref 37–54)
EOSINOPHIL # BLD MANUAL: 1.35 10*3/MM3 (ref 0–0.4)
EOSINOPHIL NFR BLD MANUAL: 21 % (ref 0.3–6.2)
ERYTHROCYTE [DISTWIDTH] IN BLOOD BY AUTOMATED COUNT: 13.3 % (ref 12.3–15.4)
GFR SERPL CREATININE-BSD FRML MDRD: 60 ML/MIN/1.73
GLOBULIN UR ELPH-MCNC: 2.2 GM/DL
GLUCOSE BLD-MCNC: 129 MG/DL (ref 65–99)
HCT VFR BLD AUTO: 34.5 % (ref 34–46.6)
HDLC SERPL-MCNC: 35 MG/DL (ref 40–60)
HGB BLD-MCNC: 11.6 G/DL (ref 12–15.9)
LDLC SERPL CALC-MCNC: 56 MG/DL (ref 0–100)
LDLC/HDLC SERPL: 1.6 {RATIO}
LYMPHOCYTES # BLD MANUAL: 0.45 10*3/MM3 (ref 0.7–3.1)
LYMPHOCYTES NFR BLD MANUAL: 7 % (ref 19.6–45.3)
LYMPHOCYTES NFR BLD MANUAL: 7 % (ref 5–12)
MAGNESIUM SERPL-MCNC: 1.7 MG/DL (ref 1.6–2.4)
MCH RBC QN AUTO: 31.5 PG (ref 26.6–33)
MCHC RBC AUTO-ENTMCNC: 33.6 G/DL (ref 31.5–35.7)
MCV RBC AUTO: 93.8 FL (ref 79–97)
MONOCYTES # BLD AUTO: 0.45 10*3/MM3 (ref 0.1–0.9)
NEUTROPHILS # BLD AUTO: 4.19 10*3/MM3 (ref 1.7–7)
NEUTROPHILS NFR BLD MANUAL: 63 % (ref 42.7–76)
NEUTS BAND NFR BLD MANUAL: 2 % (ref 0–5)
PLATELET # BLD AUTO: 199 10*3/MM3 (ref 140–450)
PMV BLD AUTO: 13 FL (ref 6–12)
POTASSIUM BLD-SCNC: 3.9 MMOL/L (ref 3.5–5.2)
PROT SERPL-MCNC: 6.1 G/DL (ref 6–8.5)
RBC # BLD AUTO: 3.68 10*6/MM3 (ref 3.77–5.28)
RBC # BLD AUTO: 3.68 10*6/MM3 (ref 3.77–5.28)
RBC MORPH BLD: NORMAL
SCAN SLIDE: NORMAL
SMALL PLATELETS BLD QL SMEAR: ABNORMAL
SODIUM BLD-SCNC: 141 MMOL/L (ref 136–145)
TRIGL SERPL-MCNC: 185 MG/DL (ref 0–150)
TSH SERPL DL<=0.05 MIU/L-ACNC: 1.52 UIU/ML (ref 0.27–4.2)
VLDLC SERPL-MCNC: 37 MG/DL
WBC NRBC COR # BLD: 6.45 10*3/MM3 (ref 3.4–10.8)

## 2020-05-12 PROCEDURE — 85007 BL SMEAR W/DIFF WBC COUNT: CPT | Performed by: NURSE PRACTITIONER

## 2020-05-12 PROCEDURE — 85041 AUTOMATED RBC COUNT: CPT | Performed by: NURSE PRACTITIONER

## 2020-05-12 PROCEDURE — 36591 DRAW BLOOD OFF VENOUS DEVICE: CPT

## 2020-05-12 PROCEDURE — 85025 COMPLETE CBC W/AUTO DIFF WBC: CPT | Performed by: NURSE PRACTITIONER

## 2020-05-12 PROCEDURE — 25010000003 HEPARIN LOCK FLUSH PER 10 UNITS: Performed by: NURSE PRACTITIONER

## 2020-05-12 PROCEDURE — 83735 ASSAY OF MAGNESIUM: CPT | Performed by: NURSE PRACTITIONER

## 2020-05-12 PROCEDURE — 80053 COMPREHEN METABOLIC PANEL: CPT | Performed by: NURSE PRACTITIONER

## 2020-05-12 PROCEDURE — 84443 ASSAY THYROID STIM HORMONE: CPT | Performed by: NURSE PRACTITIONER

## 2020-05-12 PROCEDURE — 80061 LIPID PANEL: CPT | Performed by: NURSE PRACTITIONER

## 2020-05-12 RX ORDER — HEPARIN SODIUM (PORCINE) LOCK FLUSH IV SOLN 100 UNIT/ML 100 UNIT/ML
5 SOLUTION INTRAVENOUS AS NEEDED
Status: DISCONTINUED | OUTPATIENT
Start: 2020-05-12 | End: 2020-05-14 | Stop reason: HOSPADM

## 2020-05-12 RX ORDER — SODIUM CHLORIDE 0.9 % (FLUSH) 0.9 %
10 SYRINGE (ML) INJECTION AS NEEDED
Status: DISCONTINUED | OUTPATIENT
Start: 2020-05-12 | End: 2020-05-14 | Stop reason: HOSPADM

## 2020-05-12 RX ORDER — SODIUM CHLORIDE 0.9 % (FLUSH) 0.9 %
10 SYRINGE (ML) INJECTION EVERY 12 HOURS SCHEDULED
Status: DISCONTINUED | OUTPATIENT
Start: 2020-05-12 | End: 2020-05-14 | Stop reason: HOSPADM

## 2020-05-12 RX ORDER — SODIUM CHLORIDE 0.9 % (FLUSH) 0.9 %
20 SYRINGE (ML) INJECTION AS NEEDED
Status: DISCONTINUED | OUTPATIENT
Start: 2020-05-12 | End: 2020-05-14 | Stop reason: HOSPADM

## 2020-05-12 RX ADMIN — SODIUM CHLORIDE, PRESERVATIVE FREE 20 ML: 5 INJECTION INTRAVENOUS at 15:02

## 2020-05-12 RX ADMIN — Medication 500 UNITS: at 15:02

## 2020-05-27 ENCOUNTER — TRANSCRIBE ORDERS (OUTPATIENT)
Dept: ADMINISTRATIVE | Facility: HOSPITAL | Age: 75
End: 2020-05-27

## 2020-05-27 ENCOUNTER — LAB (OUTPATIENT)
Dept: LAB | Facility: HOSPITAL | Age: 75
End: 2020-05-27

## 2020-05-27 DIAGNOSIS — R53.83 TIREDNESS: ICD-10-CM

## 2020-05-27 DIAGNOSIS — R79.0 CALCIUM BLOOD DECREASED: ICD-10-CM

## 2020-05-27 DIAGNOSIS — I10 ESSENTIAL HYPERTENSION, MALIGNANT: ICD-10-CM

## 2020-05-27 DIAGNOSIS — D72.829 LEUKOCYTOSIS, UNSPECIFIED TYPE: ICD-10-CM

## 2020-05-27 DIAGNOSIS — I10 ESSENTIAL HYPERTENSION: Primary | ICD-10-CM

## 2020-05-27 DIAGNOSIS — I10 ESSENTIAL HYPERTENSION: ICD-10-CM

## 2020-05-27 DIAGNOSIS — E78.5 HYPERLIPIDEMIA, UNSPECIFIED HYPERLIPIDEMIA TYPE: ICD-10-CM

## 2020-05-27 PROCEDURE — 83735 ASSAY OF MAGNESIUM: CPT

## 2020-05-27 PROCEDURE — 84439 ASSAY OF FREE THYROXINE: CPT

## 2020-05-27 PROCEDURE — 36415 COLL VENOUS BLD VENIPUNCTURE: CPT

## 2020-05-27 PROCEDURE — 85025 COMPLETE CBC W/AUTO DIFF WBC: CPT

## 2020-05-27 PROCEDURE — 84443 ASSAY THYROID STIM HORMONE: CPT

## 2020-05-27 PROCEDURE — 80061 LIPID PANEL: CPT

## 2020-05-27 PROCEDURE — 80053 COMPREHEN METABOLIC PANEL: CPT

## 2020-05-28 LAB
ALBUMIN SERPL-MCNC: 4.4 G/DL (ref 3.5–5.2)
ALBUMIN/GLOB SERPL: 1.8 G/DL
ALP SERPL-CCNC: 75 U/L (ref 39–117)
ALT SERPL W P-5'-P-CCNC: 12 U/L (ref 1–33)
ANION GAP SERPL CALCULATED.3IONS-SCNC: 12.4 MMOL/L (ref 5–15)
AST SERPL-CCNC: 20 U/L (ref 1–32)
BASOPHILS # BLD AUTO: 0.07 10*3/MM3 (ref 0–0.2)
BASOPHILS NFR BLD AUTO: 0.8 % (ref 0–1.5)
BILIRUB SERPL-MCNC: 0.4 MG/DL (ref 0.2–1.2)
BUN BLD-MCNC: 8 MG/DL (ref 8–23)
BUN/CREAT SERPL: 8.5 (ref 7–25)
CALCIUM SPEC-SCNC: 9.3 MG/DL (ref 8.6–10.5)
CHLORIDE SERPL-SCNC: 102 MMOL/L (ref 98–107)
CHOLEST SERPL-MCNC: 152 MG/DL (ref 0–200)
CO2 SERPL-SCNC: 22.6 MMOL/L (ref 22–29)
CREAT BLD-MCNC: 0.94 MG/DL (ref 0.57–1)
DEPRECATED RDW RBC AUTO: 46.3 FL (ref 37–54)
EOSINOPHIL # BLD AUTO: 1.92 10*3/MM3 (ref 0–0.4)
EOSINOPHIL NFR BLD AUTO: 23.3 % (ref 0.3–6.2)
ERYTHROCYTE [DISTWIDTH] IN BLOOD BY AUTOMATED COUNT: 13.2 % (ref 12.3–15.4)
GFR SERPL CREATININE-BSD FRML MDRD: 58 ML/MIN/1.73
GLOBULIN UR ELPH-MCNC: 2.4 GM/DL
GLUCOSE BLD-MCNC: 69 MG/DL (ref 65–99)
HCT VFR BLD AUTO: 38.9 % (ref 34–46.6)
HDLC SERPL-MCNC: 35 MG/DL (ref 40–60)
HGB BLD-MCNC: 12.9 G/DL (ref 12–15.9)
IMM GRANULOCYTES # BLD AUTO: 0.02 10*3/MM3 (ref 0–0.05)
IMM GRANULOCYTES NFR BLD AUTO: 0.2 % (ref 0–0.5)
LDLC SERPL CALC-MCNC: 95 MG/DL (ref 0–100)
LDLC/HDLC SERPL: 2.71 {RATIO}
LYMPHOCYTES # BLD AUTO: 0.66 10*3/MM3 (ref 0.7–3.1)
LYMPHOCYTES NFR BLD AUTO: 8 % (ref 19.6–45.3)
MAGNESIUM SERPL-MCNC: 1.9 MG/DL (ref 1.6–2.4)
MCH RBC QN AUTO: 31.5 PG (ref 26.6–33)
MCHC RBC AUTO-ENTMCNC: 33.2 G/DL (ref 31.5–35.7)
MCV RBC AUTO: 95.1 FL (ref 79–97)
MONOCYTES # BLD AUTO: 0.82 10*3/MM3 (ref 0.1–0.9)
MONOCYTES NFR BLD AUTO: 9.9 % (ref 5–12)
NEUTROPHILS # BLD AUTO: 4.76 10*3/MM3 (ref 1.7–7)
NEUTROPHILS NFR BLD AUTO: 57.8 % (ref 42.7–76)
NRBC BLD AUTO-RTO: 0 /100 WBC (ref 0–0.2)
PLATELET # BLD AUTO: 255 10*3/MM3 (ref 140–450)
PMV BLD AUTO: 12.7 FL (ref 6–12)
POTASSIUM BLD-SCNC: 4.3 MMOL/L (ref 3.5–5.2)
PROT SERPL-MCNC: 6.8 G/DL (ref 6–8.5)
RBC # BLD AUTO: 4.09 10*6/MM3 (ref 3.77–5.28)
SODIUM BLD-SCNC: 137 MMOL/L (ref 136–145)
T4 FREE SERPL-MCNC: 1.26 NG/DL (ref 0.93–1.7)
TRIGL SERPL-MCNC: 111 MG/DL (ref 0–150)
TSH SERPL DL<=0.05 MIU/L-ACNC: 3.34 UIU/ML (ref 0.27–4.2)
VLDLC SERPL-MCNC: 22.2 MG/DL (ref 5–40)
WBC NRBC COR # BLD: 8.25 10*3/MM3 (ref 3.4–10.8)

## 2020-06-15 ENCOUNTER — HOSPITAL ENCOUNTER (OUTPATIENT)
Dept: INFUSION THERAPY | Facility: HOSPITAL | Age: 75
Discharge: HOME | End: 2020-06-15
Admitting: NURSE PRACTITIONER

## 2020-06-15 ENCOUNTER — TRANSCRIBE ORDERS (OUTPATIENT)
Dept: INFUSION THERAPY | Facility: HOSPITAL | Age: 75
End: 2020-06-15

## 2020-06-15 VITALS — SYSTOLIC BLOOD PRESSURE: 177 MMHG | TEMPERATURE: 97 F | HEART RATE: 64 BPM | DIASTOLIC BLOOD PRESSURE: 74 MMHG

## 2020-06-15 DIAGNOSIS — I10 ESSENTIAL HYPERTENSION, MALIGNANT: Primary | ICD-10-CM

## 2020-06-15 DIAGNOSIS — R53.83 TIREDNESS: ICD-10-CM

## 2020-06-15 DIAGNOSIS — N39.0 URINARY TRACT INFECTION WITHOUT HEMATURIA, SITE UNSPECIFIED: Primary | ICD-10-CM

## 2020-06-15 LAB
ALBUMIN SERPL-MCNC: 3.95 G/DL (ref 3.5–5.2)
ALBUMIN/GLOB SERPL: 1.5 G/DL
ALP SERPL-CCNC: 77 U/L (ref 39–117)
ALT SERPL W P-5'-P-CCNC: 11 U/L (ref 1–33)
ANION GAP SERPL CALCULATED.3IONS-SCNC: 11.3 MMOL/L (ref 5–15)
AST SERPL-CCNC: 19 U/L (ref 1–32)
BACTERIA UR QL AUTO: NORMAL /HPF
BASOPHILS # BLD AUTO: 0.04 10*3/MM3 (ref 0–0.2)
BASOPHILS NFR BLD AUTO: 0.5 % (ref 0–1.5)
BILIRUB SERPL-MCNC: 0.3 MG/DL (ref 0.2–1.2)
BILIRUB UR QL STRIP: NEGATIVE
BUN BLD-MCNC: 13 MG/DL (ref 8–23)
BUN/CREAT SERPL: 14.9 (ref 7–25)
CALCIUM SPEC-SCNC: 8.9 MG/DL (ref 8.6–10.5)
CHLORIDE SERPL-SCNC: 108 MMOL/L (ref 98–107)
CLARITY UR: CLEAR
CO2 SERPL-SCNC: 22.7 MMOL/L (ref 22–29)
COLOR UR: ABNORMAL
CREAT BLD-MCNC: 0.87 MG/DL (ref 0.57–1)
D-LACTATE SERPL-SCNC: 1 MMOL/L (ref 0.5–2)
DEPRECATED RDW RBC AUTO: 46.3 FL (ref 37–54)
EOSINOPHIL # BLD AUTO: 1.58 10*3/MM3 (ref 0–0.4)
EOSINOPHIL NFR BLD AUTO: 21.4 % (ref 0.3–6.2)
ERYTHROCYTE [DISTWIDTH] IN BLOOD BY AUTOMATED COUNT: 13.5 % (ref 12.3–15.4)
GFR SERPL CREATININE-BSD FRML MDRD: 63 ML/MIN/1.73
GLOBULIN UR ELPH-MCNC: 2.6 GM/DL
GLUCOSE BLD-MCNC: 78 MG/DL (ref 65–99)
GLUCOSE UR STRIP-MCNC: NEGATIVE MG/DL
HCT VFR BLD AUTO: 35.5 % (ref 34–46.6)
HGB BLD-MCNC: 11.8 G/DL (ref 12–15.9)
HGB UR QL STRIP.AUTO: NEGATIVE
HYALINE CASTS UR QL AUTO: NORMAL /LPF
IMM GRANULOCYTES # BLD AUTO: 0.02 10*3/MM3 (ref 0–0.05)
IMM GRANULOCYTES NFR BLD AUTO: 0.3 % (ref 0–0.5)
KETONES UR QL STRIP: NEGATIVE
LEUKOCYTE ESTERASE UR QL STRIP.AUTO: ABNORMAL
LYMPHOCYTES # BLD AUTO: 0.52 10*3/MM3 (ref 0.7–3.1)
LYMPHOCYTES NFR BLD AUTO: 7 % (ref 19.6–45.3)
MCH RBC QN AUTO: 31.1 PG (ref 26.6–33)
MCHC RBC AUTO-ENTMCNC: 33.2 G/DL (ref 31.5–35.7)
MCV RBC AUTO: 93.7 FL (ref 79–97)
MONOCYTES # BLD AUTO: 0.9 10*3/MM3 (ref 0.1–0.9)
MONOCYTES NFR BLD AUTO: 12.2 % (ref 5–12)
NEUTROPHILS # BLD AUTO: 4.33 10*3/MM3 (ref 1.7–7)
NEUTROPHILS NFR BLD AUTO: 58.6 % (ref 42.7–76)
NITRITE UR QL STRIP: NEGATIVE
NRBC BLD AUTO-RTO: 0 /100 WBC (ref 0–0.2)
PH UR STRIP.AUTO: 5.5 [PH] (ref 5–8)
PLATELET # BLD AUTO: 218 10*3/MM3 (ref 140–450)
PMV BLD AUTO: 12.9 FL (ref 6–12)
POTASSIUM BLD-SCNC: 4.2 MMOL/L (ref 3.5–5.2)
PROT SERPL-MCNC: 6.5 G/DL (ref 6–8.5)
PROT UR QL STRIP: NEGATIVE
RBC # BLD AUTO: 3.79 10*6/MM3 (ref 3.77–5.28)
RBC # UR: NORMAL /HPF
REF LAB TEST METHOD: NORMAL
SODIUM BLD-SCNC: 142 MMOL/L (ref 136–145)
SP GR UR STRIP: 1.02 (ref 1–1.03)
SQUAMOUS #/AREA URNS HPF: NORMAL /HPF
UROBILINOGEN UR QL STRIP: ABNORMAL
WBC NRBC COR # BLD: 7.39 10*3/MM3 (ref 3.4–10.8)
WBC UR QL AUTO: NORMAL /HPF

## 2020-06-15 PROCEDURE — 85025 COMPLETE CBC W/AUTO DIFF WBC: CPT | Performed by: NURSE PRACTITIONER

## 2020-06-15 PROCEDURE — 36591 DRAW BLOOD OFF VENOUS DEVICE: CPT

## 2020-06-15 PROCEDURE — 83605 ASSAY OF LACTIC ACID: CPT | Performed by: NURSE PRACTITIONER

## 2020-06-15 PROCEDURE — 25010000003 HEPARIN LOCK FLUSH PER 10 UNITS: Performed by: NURSE PRACTITIONER

## 2020-06-15 PROCEDURE — 80053 COMPREHEN METABOLIC PANEL: CPT | Performed by: NURSE PRACTITIONER

## 2020-06-15 PROCEDURE — 81001 URINALYSIS AUTO W/SCOPE: CPT | Performed by: NURSE PRACTITIONER

## 2020-06-15 RX ORDER — SODIUM CHLORIDE 0.9 % (FLUSH) 0.9 %
10 SYRINGE (ML) INJECTION AS NEEDED
Status: DISCONTINUED | OUTPATIENT
Start: 2020-06-15 | End: 2020-06-17 | Stop reason: HOSPADM

## 2020-06-15 RX ORDER — HEPARIN SODIUM (PORCINE) LOCK FLUSH IV SOLN 100 UNIT/ML 100 UNIT/ML
5 SOLUTION INTRAVENOUS AS NEEDED
Status: DISCONTINUED | OUTPATIENT
Start: 2020-06-15 | End: 2020-06-17 | Stop reason: HOSPADM

## 2020-06-15 RX ORDER — SODIUM CHLORIDE 0.9 % (FLUSH) 0.9 %
10 SYRINGE (ML) INJECTION EVERY 12 HOURS SCHEDULED
Status: DISCONTINUED | OUTPATIENT
Start: 2020-06-15 | End: 2020-06-17 | Stop reason: HOSPADM

## 2020-06-15 RX ORDER — SODIUM CHLORIDE 0.9 % (FLUSH) 0.9 %
20 SYRINGE (ML) INJECTION AS NEEDED
Status: DISCONTINUED | OUTPATIENT
Start: 2020-06-15 | End: 2020-06-17 | Stop reason: HOSPADM

## 2020-06-15 RX ADMIN — Medication 500 UNITS: at 16:02

## 2020-06-15 RX ADMIN — SODIUM CHLORIDE, PRESERVATIVE FREE 20 ML: 5 INJECTION INTRAVENOUS at 16:02

## 2020-06-25 ENCOUNTER — TRANSCRIBE ORDERS (OUTPATIENT)
Dept: ADMINISTRATIVE | Facility: HOSPITAL | Age: 75
End: 2020-06-25

## 2020-06-25 DIAGNOSIS — Z85.72 PERSONAL HISTORY OF LYMPHOSARCOMA AND RETICULOSARCOMA: Primary | ICD-10-CM

## 2020-06-29 ENCOUNTER — LAB (OUTPATIENT)
Dept: LAB | Facility: HOSPITAL | Age: 75
End: 2020-06-29

## 2020-06-29 ENCOUNTER — TRANSCRIBE ORDERS (OUTPATIENT)
Dept: ADMINISTRATIVE | Facility: HOSPITAL | Age: 75
End: 2020-06-29

## 2020-06-29 ENCOUNTER — HOSPITAL ENCOUNTER (OUTPATIENT)
Dept: GENERAL RADIOLOGY | Facility: HOSPITAL | Age: 75
Discharge: HOME | End: 2020-06-29
Admitting: NURSE PRACTITIONER

## 2020-06-29 DIAGNOSIS — R05.9 COUGH: ICD-10-CM

## 2020-06-29 DIAGNOSIS — R05.9 COUGH: Primary | ICD-10-CM

## 2020-06-29 DIAGNOSIS — R53.83 FATIGUE, UNSPECIFIED TYPE: Primary | ICD-10-CM

## 2020-06-29 DIAGNOSIS — R53.83 FATIGUE, UNSPECIFIED TYPE: ICD-10-CM

## 2020-06-29 PROCEDURE — 71046 X-RAY EXAM CHEST 2 VIEWS: CPT

## 2020-06-29 PROCEDURE — 71046 X-RAY EXAM CHEST 2 VIEWS: CPT | Performed by: RADIOLOGY

## 2020-06-29 PROCEDURE — 36415 COLL VENOUS BLD VENIPUNCTURE: CPT

## 2020-06-29 PROCEDURE — 86769 SARS-COV-2 COVID-19 ANTIBODY: CPT

## 2020-06-29 PROCEDURE — 85025 COMPLETE CBC W/AUTO DIFF WBC: CPT

## 2020-06-30 LAB
ABBOTT SARS-COV-2 AB, IGG: NEGATIVE
BASOPHILS # BLD AUTO: 0.07 10*3/MM3 (ref 0–0.2)
BASOPHILS NFR BLD AUTO: 1.1 % (ref 0–1.5)
DEPRECATED RDW RBC AUTO: 45.6 FL (ref 37–54)
EOSINOPHIL # BLD AUTO: 0.95 10*3/MM3 (ref 0–0.4)
EOSINOPHIL NFR BLD AUTO: 14.5 % (ref 0.3–6.2)
ERYTHROCYTE [DISTWIDTH] IN BLOOD BY AUTOMATED COUNT: 13.1 % (ref 12.3–15.4)
HCT VFR BLD AUTO: 35.3 % (ref 34–46.6)
HGB BLD-MCNC: 11.7 G/DL (ref 12–15.9)
IMM GRANULOCYTES # BLD AUTO: 0.02 10*3/MM3 (ref 0–0.05)
IMM GRANULOCYTES NFR BLD AUTO: 0.3 % (ref 0–0.5)
LYMPHOCYTES # BLD AUTO: 0.47 10*3/MM3 (ref 0.7–3.1)
LYMPHOCYTES NFR BLD AUTO: 7.2 % (ref 19.6–45.3)
MCH RBC QN AUTO: 31.5 PG (ref 26.6–33)
MCHC RBC AUTO-ENTMCNC: 33.1 G/DL (ref 31.5–35.7)
MCV RBC AUTO: 94.9 FL (ref 79–97)
MONOCYTES # BLD AUTO: 0.59 10*3/MM3 (ref 0.1–0.9)
MONOCYTES NFR BLD AUTO: 9 % (ref 5–12)
NEUTROPHILS NFR BLD AUTO: 4.45 10*3/MM3 (ref 1.7–7)
NEUTROPHILS NFR BLD AUTO: 67.9 % (ref 42.7–76)
NRBC BLD AUTO-RTO: 0 /100 WBC (ref 0–0.2)
PLATELET # BLD AUTO: 224 10*3/MM3 (ref 140–450)
PMV BLD AUTO: 12.6 FL (ref 6–12)
RBC # BLD AUTO: 3.72 10*6/MM3 (ref 3.77–5.28)
SARS-COV-2 IGA SERPL QL IA: NEGATIVE
SARS-COV-2 IGM SERPL QL IA: NEGATIVE
WBC # BLD AUTO: 6.55 10*3/MM3 (ref 3.4–10.8)

## 2020-07-07 ENCOUNTER — APPOINTMENT (OUTPATIENT)
Dept: MRI IMAGING | Facility: HOSPITAL | Age: 75
End: 2020-07-07

## 2020-07-14 ENCOUNTER — APPOINTMENT (OUTPATIENT)
Dept: MRI IMAGING | Facility: HOSPITAL | Age: 75
End: 2020-07-14

## 2020-07-15 ENCOUNTER — APPOINTMENT (OUTPATIENT)
Dept: CT IMAGING | Facility: HOSPITAL | Age: 75
End: 2020-07-15

## 2020-07-15 ENCOUNTER — HOSPITAL ENCOUNTER (EMERGENCY)
Facility: HOSPITAL | Age: 75
Discharge: HOME | End: 2020-07-15
Attending: EMERGENCY MEDICINE | Admitting: EMERGENCY MEDICINE

## 2020-07-15 ENCOUNTER — APPOINTMENT (OUTPATIENT)
Dept: GENERAL RADIOLOGY | Facility: HOSPITAL | Age: 75
End: 2020-07-15

## 2020-07-15 VITALS
BODY MASS INDEX: 22.08 KG/M2 | DIASTOLIC BLOOD PRESSURE: 66 MMHG | RESPIRATION RATE: 17 BRPM | WEIGHT: 120 LBS | HEIGHT: 62 IN | SYSTOLIC BLOOD PRESSURE: 143 MMHG | HEART RATE: 65 BPM | OXYGEN SATURATION: 98 % | TEMPERATURE: 98.2 F

## 2020-07-15 DIAGNOSIS — R55 COUGH SYNCOPE: Primary | ICD-10-CM

## 2020-07-15 DIAGNOSIS — R05.4 COUGH SYNCOPE: Primary | ICD-10-CM

## 2020-07-15 LAB
ALBUMIN SERPL-MCNC: 3.66 G/DL (ref 3.5–5.2)
ALBUMIN/GLOB SERPL: 1.6 G/DL
ALP SERPL-CCNC: 86 U/L (ref 39–117)
ALT SERPL W P-5'-P-CCNC: 33 U/L (ref 1–33)
ANION GAP SERPL CALCULATED.3IONS-SCNC: 12.3 MMOL/L (ref 5–15)
AST SERPL-CCNC: 25 U/L (ref 1–32)
BACTERIA UR QL AUTO: ABNORMAL /HPF
BASOPHILS # BLD AUTO: 0.04 10*3/MM3 (ref 0–0.2)
BASOPHILS NFR BLD AUTO: 0.6 % (ref 0–1.5)
BILIRUB SERPL-MCNC: 0.3 MG/DL (ref 0–1.2)
BILIRUB UR QL STRIP: NEGATIVE
BUN SERPL-MCNC: 14 MG/DL (ref 8–23)
BUN/CREAT SERPL: 13.2 (ref 7–25)
CALCIUM SPEC-SCNC: 8.5 MG/DL (ref 8.6–10.5)
CHLORIDE SERPL-SCNC: 104 MMOL/L (ref 98–107)
CLARITY UR: CLEAR
CO2 SERPL-SCNC: 22.7 MMOL/L (ref 22–29)
COLOR UR: ABNORMAL
CREAT SERPL-MCNC: 1.06 MG/DL (ref 0.57–1)
DEPRECATED RDW RBC AUTO: 47.4 FL (ref 37–54)
EOSINOPHIL # BLD AUTO: 0.72 10*3/MM3 (ref 0–0.4)
EOSINOPHIL NFR BLD AUTO: 11.3 % (ref 0.3–6.2)
ERYTHROCYTE [DISTWIDTH] IN BLOOD BY AUTOMATED COUNT: 13.6 % (ref 12.3–15.4)
GFR SERPL CREATININE-BSD FRML MDRD: 50 ML/MIN/1.73
GLOBULIN UR ELPH-MCNC: 2.3 GM/DL
GLUCOSE SERPL-MCNC: 85 MG/DL (ref 65–99)
GLUCOSE UR STRIP-MCNC: NEGATIVE MG/DL
HCT VFR BLD AUTO: 38 % (ref 34–46.6)
HGB BLD-MCNC: 12.5 G/DL (ref 12–15.9)
HGB UR QL STRIP.AUTO: NEGATIVE
HOLD SPECIMEN: NORMAL
HOLD SPECIMEN: NORMAL
HYALINE CASTS UR QL AUTO: ABNORMAL /LPF
IMM GRANULOCYTES # BLD AUTO: 0.04 10*3/MM3 (ref 0–0.05)
IMM GRANULOCYTES NFR BLD AUTO: 0.6 % (ref 0–0.5)
KETONES UR QL STRIP: NEGATIVE
LEUKOCYTE ESTERASE UR QL STRIP.AUTO: ABNORMAL
LYMPHOCYTES # BLD AUTO: 0.54 10*3/MM3 (ref 0.7–3.1)
LYMPHOCYTES NFR BLD AUTO: 8.4 % (ref 19.6–45.3)
MCH RBC QN AUTO: 31.1 PG (ref 26.6–33)
MCHC RBC AUTO-ENTMCNC: 32.9 G/DL (ref 31.5–35.7)
MCV RBC AUTO: 94.5 FL (ref 79–97)
MONOCYTES # BLD AUTO: 0.82 10*3/MM3 (ref 0.1–0.9)
MONOCYTES NFR BLD AUTO: 12.8 % (ref 5–12)
NEUTROPHILS NFR BLD AUTO: 4.24 10*3/MM3 (ref 1.7–7)
NEUTROPHILS NFR BLD AUTO: 66.3 % (ref 42.7–76)
NITRITE UR QL STRIP: NEGATIVE
NRBC BLD AUTO-RTO: 0 /100 WBC (ref 0–0.2)
PH UR STRIP.AUTO: 5.5 [PH] (ref 5–8)
PLATELET # BLD AUTO: 229 10*3/MM3 (ref 140–450)
PMV BLD AUTO: 12.6 FL (ref 6–12)
POTASSIUM SERPL-SCNC: 3.6 MMOL/L (ref 3.5–5.2)
PROT SERPL-MCNC: 6 G/DL (ref 6–8.5)
PROT UR QL STRIP: NEGATIVE
RBC # BLD AUTO: 4.02 10*6/MM3 (ref 3.77–5.28)
RBC # UR: ABNORMAL /HPF
REF LAB TEST METHOD: ABNORMAL
SODIUM SERPL-SCNC: 139 MMOL/L (ref 136–145)
SP GR UR STRIP: 1.02 (ref 1–1.03)
SQUAMOUS #/AREA URNS HPF: ABNORMAL /HPF
TROPONIN T SERPL-MCNC: <0.01 NG/ML (ref 0–0.03)
UROBILINOGEN UR QL STRIP: ABNORMAL
WBC # BLD AUTO: 6.4 10*3/MM3 (ref 3.4–10.8)
WBC UR QL AUTO: ABNORMAL /HPF
WHOLE BLOOD HOLD SPECIMEN: NORMAL
WHOLE BLOOD HOLD SPECIMEN: NORMAL

## 2020-07-15 PROCEDURE — 84484 ASSAY OF TROPONIN QUANT: CPT

## 2020-07-15 PROCEDURE — 99284 EMERGENCY DEPT VISIT MOD MDM: CPT

## 2020-07-15 PROCEDURE — 71045 X-RAY EXAM CHEST 1 VIEW: CPT

## 2020-07-15 PROCEDURE — 70450 CT HEAD/BRAIN W/O DYE: CPT | Performed by: RADIOLOGY

## 2020-07-15 PROCEDURE — 93010 ELECTROCARDIOGRAM REPORT: CPT | Performed by: INTERNAL MEDICINE

## 2020-07-15 PROCEDURE — 71250 CT THORAX DX C-: CPT

## 2020-07-15 PROCEDURE — 70450 CT HEAD/BRAIN W/O DYE: CPT

## 2020-07-15 PROCEDURE — 85025 COMPLETE CBC W/AUTO DIFF WBC: CPT

## 2020-07-15 PROCEDURE — 71250 CT THORAX DX C-: CPT | Performed by: RADIOLOGY

## 2020-07-15 PROCEDURE — 80053 COMPREHEN METABOLIC PANEL: CPT

## 2020-07-15 PROCEDURE — 93005 ELECTROCARDIOGRAM TRACING: CPT

## 2020-07-15 PROCEDURE — 81001 URINALYSIS AUTO W/SCOPE: CPT | Performed by: EMERGENCY MEDICINE

## 2020-07-15 PROCEDURE — 71045 X-RAY EXAM CHEST 1 VIEW: CPT | Performed by: RADIOLOGY

## 2020-07-15 RX ORDER — AZITHROMYCIN 250 MG/1
TABLET, FILM COATED ORAL
Qty: 6 TABLET | Refills: 0 | Status: SHIPPED | OUTPATIENT
Start: 2020-07-15 | End: 2021-01-06

## 2020-07-15 RX ORDER — SODIUM CHLORIDE 0.9 % (FLUSH) 0.9 %
10 SYRINGE (ML) INJECTION AS NEEDED
Status: DISCONTINUED | OUTPATIENT
Start: 2020-07-15 | End: 2020-07-15 | Stop reason: HOSPADM

## 2020-07-15 RX ORDER — PROMETHAZINE HYDROCHLORIDE AND CODEINE PHOSPHATE 6.25; 1 MG/5ML; MG/5ML
5 SYRUP ORAL EVERY 4 HOURS PRN
Qty: 100 ML | Refills: 0 | Status: SHIPPED | OUTPATIENT
Start: 2020-07-15 | End: 2021-01-06

## 2020-07-15 NOTE — ED NOTES
Orthostatics    sitting 116 72 heart rate 72  Standing 112 62 heart 75  No dizzy stated, nor unsteady observed     Avtar Lockwood, RN  07/15/20 9352

## 2020-07-15 NOTE — ED PROVIDER NOTES
"Subjective   74-year-old female presents to the ER with complaints of syncope and cough.  Patient's past medical history is positive for dementia.  Patient's daughter is present in the room and is source of history.  Patient's daughter states that patient has had a cough for the last several days.  Patient was seen by her primary care provider was given a shot of Rocephin as well as oral antibiotics and steroid pack.  Daughter verbalizes that she has had little relief with this treatment plan.  Patient currently is in remission from follicular lymphoma.  Daughter verbalizes \"they thought it could be allergies.\"  Patient is currently on 2 separate second-generation antihistamines.  Patient denies any abdominal pain, or issues with bowel or bladder habits.  Patient has been in quarantine over the last several weeks secondary to the COVID virus and currently denies any known encounter.          Review of Systems   Constitutional: Negative.  Negative for fever.   HENT: Negative.    Respiratory: Positive for cough.    Cardiovascular: Negative.  Negative for chest pain.   Gastrointestinal: Negative.  Negative for abdominal pain.   Endocrine: Negative.    Genitourinary: Negative.  Negative for dysuria.   Skin: Negative.    Neurological: Positive for syncope.   Psychiatric/Behavioral: Negative.    All other systems reviewed and are negative.      Past Medical History:   Diagnosis Date   • Arthritis    • Cardiac disorder    • Diverticulitis    • Dysphagia    • Fibromyalgia    • Follicular lymphoma (CMS/HCC)     Remisson    • GERD (gastroesophageal reflux disease)    • History of rheumatic fever    • Hypertension    • Hypokalemia due to inadequate potassium intake 03/03/2014   • Lipoma    • Malignant neoplasm (CMS/HCC)    • Migraine    • Neutropenia (CMS/HCC) 11/7/2019   • Obstructive sleep apnea 7/14/2016   • Prolonged Q-T interval on ECG 11/7/2019   • Recurrent UTI    • Rheumatoid arthritis (CMS/HCC)    • Sleep apnea    • " Thyroid cancer (CMS/HCC)        No Known Allergies    Past Surgical History:   Procedure Laterality Date   • BLADDER SURGERY     • FINGER SURGERY      reattached    • HYSTERECTOMY     • THYROID SURGERY         Family History   Problem Relation Age of Onset   • Diabetes Mother    • Hypertension Mother    • Other Other         cardiac disorder,cardiovascular disease, malignant neoplasm of brain   • Breast cancer Paternal Aunt        Social History     Socioeconomic History   • Marital status:      Spouse name: Not on file   • Number of children: Not on file   • Years of education: Not on file   • Highest education level: Not on file   Tobacco Use   • Smoking status: Never Smoker   • Smokeless tobacco: Never Used   Substance and Sexual Activity   • Alcohol use: No   • Drug use: No   • Sexual activity: Defer           Objective   Physical Exam   Constitutional: She is oriented to person, place, and time. She appears well-developed and well-nourished. No distress.   HENT:   Head: Normocephalic and atraumatic.   Right Ear: External ear normal.   Left Ear: External ear normal.   Nose: Nose normal.   Eyes: Conjunctivae are normal.   Neck: Normal range of motion. Neck supple. No JVD present. No tracheal deviation present.   Cardiovascular: Normal rate, regular rhythm and normal heart sounds.   No murmur heard.  Pulmonary/Chest: Effort normal and breath sounds normal. No respiratory distress. She has no wheezes.   Abdominal: Soft. There is no tenderness.   Musculoskeletal: Normal range of motion. She exhibits no edema or deformity.   Neurological: She is alert and oriented to person, place, and time. No cranial nerve deficit.   Skin: Skin is warm and dry. No rash noted. She is not diaphoretic. No erythema. No pallor.   Psychiatric: She has a normal mood and affect. Her behavior is normal. Thought content normal.   Nursing note and vitals reviewed.      Procedures           ED Course  ED Course as of Jul 15 1642   Wed  Jul 15, 2020   1523 CXR rad interpreted:  No evidence of active or acute cardiopulmonary disease on today's chest  radiograph.       [RB]   1615 CT chest rad interpreted: 1. No parenchymal soft tissue nodules or masses.  2. No pericardial or pleural effusions.  3. No adenopathy  4. No definitive source for the patient's symptoms.    [RB]   1615 CT head rad interpreted:  1. Cerebral atrophy  2. No mass, hemorrhage, or midline shift.    [RB]      ED Course User Index  [RB] Rogerio Cloud II, PA                                           MDM  Number of Diagnoses or Management Options  Cough syncope: established and worsening     Amount and/or Complexity of Data Reviewed  Clinical lab tests: ordered and reviewed  Tests in the radiology section of CPT®: ordered and reviewed  Decide to obtain previous medical records or to obtain history from someone other than the patient: yes  Discuss the patient with other providers: yes    Risk of Complications, Morbidity, and/or Mortality  Presenting problems: moderate  Diagnostic procedures: moderate  Management options: low    Patient Progress  Patient progress: stable      Final diagnoses:   Cough syncope            Rogerio Cloud II, PA  07/15/20 5414

## 2020-07-17 ENCOUNTER — HOSPITAL ENCOUNTER (OUTPATIENT)
Dept: MRI IMAGING | Facility: HOSPITAL | Age: 75
Discharge: HOME | End: 2020-07-17
Admitting: PHYSICAL MEDICINE & REHABILITATION

## 2020-07-17 ENCOUNTER — HOSPITAL ENCOUNTER (OUTPATIENT)
Dept: MRI IMAGING | Facility: HOSPITAL | Age: 75
Discharge: HOME | End: 2020-07-17

## 2020-07-17 DIAGNOSIS — Z85.72 PERSONAL HISTORY OF LYMPHOSARCOMA AND RETICULOSARCOMA: ICD-10-CM

## 2020-07-17 PROCEDURE — A9575 INJ GADOTERATE MEGLUMI 0.1ML: HCPCS | Performed by: PHYSICAL MEDICINE & REHABILITATION

## 2020-07-17 PROCEDURE — 70553 MRI BRAIN STEM W/O & W/DYE: CPT

## 2020-07-17 PROCEDURE — 25010000002 GADOTERATE MEGLUMINE 7.5 MMOL/15ML SOLUTION: Performed by: PHYSICAL MEDICINE & REHABILITATION

## 2020-07-17 PROCEDURE — 70553 MRI BRAIN STEM W/O & W/DYE: CPT | Performed by: RADIOLOGY

## 2020-07-17 PROCEDURE — 72158 MRI LUMBAR SPINE W/O & W/DYE: CPT

## 2020-07-17 PROCEDURE — 72158 MRI LUMBAR SPINE W/O & W/DYE: CPT | Performed by: RADIOLOGY

## 2020-07-17 RX ORDER — GADOTERATE MEGLUMINE 376.9 MG/ML
9 INJECTION INTRAVENOUS
Status: COMPLETED | OUTPATIENT
Start: 2020-07-17 | End: 2020-07-17

## 2020-07-17 RX ADMIN — GADOTERATE MEGLUMINE 9 ML: 376.9 INJECTION, SOLUTION INTRAVENOUS at 10:19

## 2020-08-17 ENCOUNTER — TRANSCRIBE ORDERS (OUTPATIENT)
Dept: ADMINISTRATIVE | Facility: HOSPITAL | Age: 75
End: 2020-08-17

## 2020-08-17 ENCOUNTER — LAB (OUTPATIENT)
Dept: LAB | Facility: HOSPITAL | Age: 75
End: 2020-08-17

## 2020-08-17 DIAGNOSIS — R53.83 TIREDNESS: ICD-10-CM

## 2020-08-17 DIAGNOSIS — I10 ESSENTIAL HYPERTENSION, MALIGNANT: Primary | ICD-10-CM

## 2020-08-17 DIAGNOSIS — R06.02 SHORTNESS OF BREATH: ICD-10-CM

## 2020-08-17 DIAGNOSIS — E55.9 VITAMIN D INSUFFICIENCY: ICD-10-CM

## 2020-08-17 DIAGNOSIS — E78.2 MIXED HYPERLIPIDEMIA: ICD-10-CM

## 2020-08-17 DIAGNOSIS — I10 ESSENTIAL HYPERTENSION, MALIGNANT: ICD-10-CM

## 2020-08-17 PROCEDURE — 82607 VITAMIN B-12: CPT

## 2020-08-17 PROCEDURE — 80061 LIPID PANEL: CPT

## 2020-08-17 PROCEDURE — 84443 ASSAY THYROID STIM HORMONE: CPT

## 2020-08-17 PROCEDURE — 82306 VITAMIN D 25 HYDROXY: CPT

## 2020-08-17 PROCEDURE — 36415 COLL VENOUS BLD VENIPUNCTURE: CPT

## 2020-08-17 PROCEDURE — 85025 COMPLETE CBC W/AUTO DIFF WBC: CPT

## 2020-08-18 LAB
25(OH)D3 SERPL-MCNC: 16.9 NG/ML (ref 30–100)
BASOPHILS # BLD AUTO: 0.05 10*3/MM3 (ref 0–0.2)
BASOPHILS NFR BLD AUTO: 0.9 % (ref 0–1.5)
CHOLEST SERPL-MCNC: 146 MG/DL (ref 0–200)
DEPRECATED RDW RBC AUTO: 46.7 FL (ref 37–54)
EOSINOPHIL # BLD AUTO: 0.91 10*3/MM3 (ref 0–0.4)
EOSINOPHIL NFR BLD AUTO: 17.1 % (ref 0.3–6.2)
ERYTHROCYTE [DISTWIDTH] IN BLOOD BY AUTOMATED COUNT: 13.3 % (ref 12.3–15.4)
HCT VFR BLD AUTO: 36.7 % (ref 34–46.6)
HDLC SERPL-MCNC: 25 MG/DL (ref 40–60)
HGB BLD-MCNC: 12.1 G/DL (ref 12–15.9)
IMM GRANULOCYTES # BLD AUTO: 0.01 10*3/MM3 (ref 0–0.05)
IMM GRANULOCYTES NFR BLD AUTO: 0.2 % (ref 0–0.5)
LDLC SERPL CALC-MCNC: 78 MG/DL (ref 0–100)
LDLC/HDLC SERPL: 3.14 {RATIO}
LYMPHOCYTES # BLD AUTO: 0.58 10*3/MM3 (ref 0.7–3.1)
LYMPHOCYTES NFR BLD AUTO: 10.9 % (ref 19.6–45.3)
MCH RBC QN AUTO: 31.6 PG (ref 26.6–33)
MCHC RBC AUTO-ENTMCNC: 33 G/DL (ref 31.5–35.7)
MCV RBC AUTO: 95.8 FL (ref 79–97)
MONOCYTES # BLD AUTO: 0.61 10*3/MM3 (ref 0.1–0.9)
MONOCYTES NFR BLD AUTO: 11.5 % (ref 5–12)
NEUTROPHILS NFR BLD AUTO: 3.16 10*3/MM3 (ref 1.7–7)
NEUTROPHILS NFR BLD AUTO: 59.4 % (ref 42.7–76)
NRBC BLD AUTO-RTO: 0 /100 WBC (ref 0–0.2)
PLATELET # BLD AUTO: 233 10*3/MM3 (ref 140–450)
PMV BLD AUTO: 12.6 FL (ref 6–12)
RBC # BLD AUTO: 3.83 10*6/MM3 (ref 3.77–5.28)
TRIGL SERPL-MCNC: 213 MG/DL (ref 0–150)
TSH SERPL DL<=0.05 MIU/L-ACNC: 1.87 UIU/ML (ref 0.27–4.2)
VIT B12 BLD-MCNC: <150 PG/ML (ref 211–946)
VLDLC SERPL-MCNC: 42.6 MG/DL (ref 5–40)
WBC # BLD AUTO: 5.32 10*3/MM3 (ref 3.4–10.8)

## 2020-09-14 ENCOUNTER — LAB (OUTPATIENT)
Dept: LAB | Facility: HOSPITAL | Age: 75
End: 2020-09-14

## 2020-09-14 ENCOUNTER — TRANSCRIBE ORDERS (OUTPATIENT)
Dept: ADMINISTRATIVE | Facility: HOSPITAL | Age: 75
End: 2020-09-14

## 2020-09-14 DIAGNOSIS — E55.9 VITAMIN D DEFICIENCY: ICD-10-CM

## 2020-09-14 DIAGNOSIS — I10 ESSENTIAL HYPERTENSION, MALIGNANT: ICD-10-CM

## 2020-09-14 DIAGNOSIS — R53.83 TIREDNESS: ICD-10-CM

## 2020-09-14 DIAGNOSIS — I10 ESSENTIAL HYPERTENSION, MALIGNANT: Primary | ICD-10-CM

## 2020-09-14 DIAGNOSIS — R06.02 SHORTNESS OF BREATH: ICD-10-CM

## 2020-09-14 DIAGNOSIS — E78.5 DYSLIPIDEMIA (HIGH LDL; LOW HDL): ICD-10-CM

## 2020-09-14 PROCEDURE — 85025 COMPLETE CBC W/AUTO DIFF WBC: CPT

## 2020-09-14 PROCEDURE — 82306 VITAMIN D 25 HYDROXY: CPT

## 2020-09-14 PROCEDURE — 84443 ASSAY THYROID STIM HORMONE: CPT

## 2020-09-14 PROCEDURE — 82607 VITAMIN B-12: CPT

## 2020-09-14 PROCEDURE — 36415 COLL VENOUS BLD VENIPUNCTURE: CPT

## 2020-09-14 PROCEDURE — 80053 COMPREHEN METABOLIC PANEL: CPT

## 2020-09-14 RX ORDER — ATORVASTATIN CALCIUM 10 MG/1
10 TABLET, FILM COATED ORAL DAILY
Qty: 30 TABLET | Refills: 11 | OUTPATIENT
Start: 2020-09-14

## 2020-09-15 LAB
25(OH)D3 SERPL-MCNC: 26.6 NG/ML (ref 30–100)
ALBUMIN SERPL-MCNC: 3.7 G/DL (ref 3.5–5.2)
ALBUMIN/GLOB SERPL: 1.7 G/DL
ALP SERPL-CCNC: 76 U/L (ref 39–117)
ALT SERPL W P-5'-P-CCNC: 13 U/L (ref 1–33)
ANION GAP SERPL CALCULATED.3IONS-SCNC: 12.3 MMOL/L (ref 5–15)
AST SERPL-CCNC: 15 U/L (ref 1–32)
BASOPHILS # BLD AUTO: 0.05 10*3/MM3 (ref 0–0.2)
BASOPHILS NFR BLD AUTO: 0.6 % (ref 0–1.5)
BILIRUB SERPL-MCNC: 0.3 MG/DL (ref 0–1.2)
BUN SERPL-MCNC: 9 MG/DL (ref 8–23)
BUN/CREAT SERPL: 9.7 (ref 7–25)
CALCIUM SPEC-SCNC: 8.5 MG/DL (ref 8.6–10.5)
CHLORIDE SERPL-SCNC: 105 MMOL/L (ref 98–107)
CO2 SERPL-SCNC: 23.7 MMOL/L (ref 22–29)
CREAT SERPL-MCNC: 0.93 MG/DL (ref 0.57–1)
DEPRECATED RDW RBC AUTO: 48 FL (ref 37–54)
EOSINOPHIL # BLD AUTO: 0.47 10*3/MM3 (ref 0–0.4)
EOSINOPHIL NFR BLD AUTO: 5.6 % (ref 0.3–6.2)
ERYTHROCYTE [DISTWIDTH] IN BLOOD BY AUTOMATED COUNT: 13.2 % (ref 12.3–15.4)
GFR SERPL CREATININE-BSD FRML MDRD: 58 ML/MIN/1.73
GLOBULIN UR ELPH-MCNC: 2.2 GM/DL
GLUCOSE SERPL-MCNC: 104 MG/DL (ref 65–99)
HCT VFR BLD AUTO: 37.5 % (ref 34–46.6)
HGB BLD-MCNC: 12 G/DL (ref 12–15.9)
LYMPHOCYTES # BLD AUTO: 0.62 10*3/MM3 (ref 0.7–3.1)
LYMPHOCYTES NFR BLD AUTO: 7.3 % (ref 19.6–45.3)
MCH RBC QN AUTO: 31.1 PG (ref 26.6–33)
MCHC RBC AUTO-ENTMCNC: 32 G/DL (ref 31.5–35.7)
MCV RBC AUTO: 97.2 FL (ref 79–97)
MONOCYTES # BLD AUTO: 0.64 10*3/MM3 (ref 0.1–0.9)
MONOCYTES NFR BLD AUTO: 7.6 % (ref 5–12)
NEUTROPHILS NFR BLD AUTO: 6.64 10*3/MM3 (ref 1.7–7)
NEUTROPHILS NFR BLD AUTO: 78.7 % (ref 42.7–76)
PLATELET # BLD AUTO: 238 10*3/MM3 (ref 140–450)
PMV BLD AUTO: 12.9 FL (ref 6–12)
POTASSIUM SERPL-SCNC: 3.5 MMOL/L (ref 3.5–5.2)
PROT SERPL-MCNC: 5.9 G/DL (ref 6–8.5)
RBC # BLD AUTO: 3.86 10*6/MM3 (ref 3.77–5.28)
SODIUM SERPL-SCNC: 141 MMOL/L (ref 136–145)
TSH SERPL DL<=0.05 MIU/L-ACNC: 1.26 UIU/ML (ref 0.27–4.2)
VIT B12 BLD-MCNC: 668 PG/ML (ref 211–946)
WBC # BLD AUTO: 8.44 10*3/MM3 (ref 3.4–10.8)

## 2020-09-21 ENCOUNTER — HOSPITAL ENCOUNTER (EMERGENCY)
Facility: HOSPITAL | Age: 75
Discharge: HOME | End: 2020-09-21
Attending: FAMILY MEDICINE | Admitting: EMERGENCY MEDICINE

## 2020-09-21 ENCOUNTER — APPOINTMENT (OUTPATIENT)
Dept: CT IMAGING | Facility: HOSPITAL | Age: 75
End: 2020-09-21

## 2020-09-21 VITALS
SYSTOLIC BLOOD PRESSURE: 157 MMHG | RESPIRATION RATE: 18 BRPM | HEART RATE: 77 BPM | TEMPERATURE: 97.8 F | DIASTOLIC BLOOD PRESSURE: 82 MMHG | WEIGHT: 120 LBS | BODY MASS INDEX: 22.08 KG/M2 | HEIGHT: 62 IN | OXYGEN SATURATION: 98 %

## 2020-09-21 DIAGNOSIS — R51.9 NONINTRACTABLE HEADACHE, UNSPECIFIED CHRONICITY PATTERN, UNSPECIFIED HEADACHE TYPE: ICD-10-CM

## 2020-09-21 DIAGNOSIS — I10 ESSENTIAL HYPERTENSION: Primary | ICD-10-CM

## 2020-09-21 LAB
ALBUMIN SERPL-MCNC: 3.86 G/DL (ref 3.5–5.2)
ALBUMIN/GLOB SERPL: 1.7 G/DL
ALP SERPL-CCNC: 82 U/L (ref 39–117)
ALT SERPL W P-5'-P-CCNC: 10 U/L (ref 1–33)
ANION GAP SERPL CALCULATED.3IONS-SCNC: 11.8 MMOL/L (ref 5–15)
APTT PPP: 28.1 SECONDS (ref 25.6–35.3)
AST SERPL-CCNC: 17 U/L (ref 1–32)
BASOPHILS # BLD AUTO: 0.05 10*3/MM3 (ref 0–0.2)
BASOPHILS NFR BLD AUTO: 0.7 % (ref 0–1.5)
BILIRUB SERPL-MCNC: 0.3 MG/DL (ref 0–1.2)
BILIRUB UR QL STRIP: NEGATIVE
BUN SERPL-MCNC: 12 MG/DL (ref 8–23)
BUN/CREAT SERPL: 14.1 (ref 7–25)
CALCIUM SPEC-SCNC: 8.9 MG/DL (ref 8.6–10.5)
CHLORIDE SERPL-SCNC: 104 MMOL/L (ref 98–107)
CLARITY UR: CLEAR
CO2 SERPL-SCNC: 23.2 MMOL/L (ref 22–29)
COLOR UR: YELLOW
CREAT SERPL-MCNC: 0.85 MG/DL (ref 0.57–1)
DEPRECATED RDW RBC AUTO: 45.7 FL (ref 37–54)
EOSINOPHIL # BLD AUTO: 0.78 10*3/MM3 (ref 0–0.4)
EOSINOPHIL NFR BLD AUTO: 11.6 % (ref 0.3–6.2)
ERYTHROCYTE [DISTWIDTH] IN BLOOD BY AUTOMATED COUNT: 13.2 % (ref 12.3–15.4)
GFR SERPL CREATININE-BSD FRML MDRD: 65 ML/MIN/1.73
GLOBULIN UR ELPH-MCNC: 2.2 GM/DL
GLUCOSE SERPL-MCNC: 125 MG/DL (ref 65–99)
GLUCOSE UR STRIP-MCNC: NEGATIVE MG/DL
HCT VFR BLD AUTO: 35.2 % (ref 34–46.6)
HGB BLD-MCNC: 11.5 G/DL (ref 12–15.9)
HGB UR QL STRIP.AUTO: NEGATIVE
HOLD SPECIMEN: NORMAL
HOLD SPECIMEN: NORMAL
IMM GRANULOCYTES # BLD AUTO: 0.01 10*3/MM3 (ref 0–0.05)
IMM GRANULOCYTES NFR BLD AUTO: 0.1 % (ref 0–0.5)
INR PPP: 0.99 (ref 0.9–1.1)
KETONES UR QL STRIP: NEGATIVE
LEUKOCYTE ESTERASE UR QL STRIP.AUTO: NEGATIVE
LYMPHOCYTES # BLD AUTO: 0.81 10*3/MM3 (ref 0.7–3.1)
LYMPHOCYTES NFR BLD AUTO: 12 % (ref 19.6–45.3)
MCH RBC QN AUTO: 30.9 PG (ref 26.6–33)
MCHC RBC AUTO-ENTMCNC: 32.7 G/DL (ref 31.5–35.7)
MCV RBC AUTO: 94.6 FL (ref 79–97)
MONOCYTES # BLD AUTO: 0.73 10*3/MM3 (ref 0.1–0.9)
MONOCYTES NFR BLD AUTO: 10.8 % (ref 5–12)
NEUTROPHILS NFR BLD AUTO: 4.36 10*3/MM3 (ref 1.7–7)
NEUTROPHILS NFR BLD AUTO: 64.8 % (ref 42.7–76)
NITRITE UR QL STRIP: NEGATIVE
NRBC BLD AUTO-RTO: 0 /100 WBC (ref 0–0.2)
PH UR STRIP.AUTO: 7.5 [PH] (ref 5–8)
PLATELET # BLD AUTO: 249 10*3/MM3 (ref 140–450)
PMV BLD AUTO: 12.3 FL (ref 6–12)
POTASSIUM SERPL-SCNC: 3.8 MMOL/L (ref 3.5–5.2)
PROT SERPL-MCNC: 6.1 G/DL (ref 6–8.5)
PROT UR QL STRIP: NEGATIVE
PROTHROMBIN TIME: 12.9 SECONDS (ref 11.9–14.1)
RBC # BLD AUTO: 3.72 10*6/MM3 (ref 3.77–5.28)
SODIUM SERPL-SCNC: 139 MMOL/L (ref 136–145)
SP GR UR STRIP: 1.01 (ref 1–1.03)
UROBILINOGEN UR QL STRIP: NORMAL
WBC # BLD AUTO: 6.74 10*3/MM3 (ref 3.4–10.8)
WHOLE BLOOD HOLD SPECIMEN: NORMAL
WHOLE BLOOD HOLD SPECIMEN: NORMAL

## 2020-09-21 PROCEDURE — 25010000002 METHYLPREDNISOLONE PER 125 MG: Performed by: PHYSICIAN ASSISTANT

## 2020-09-21 PROCEDURE — 85025 COMPLETE CBC W/AUTO DIFF WBC: CPT | Performed by: PHYSICIAN ASSISTANT

## 2020-09-21 PROCEDURE — 85730 THROMBOPLASTIN TIME PARTIAL: CPT | Performed by: PHYSICIAN ASSISTANT

## 2020-09-21 PROCEDURE — 99284 EMERGENCY DEPT VISIT MOD MDM: CPT

## 2020-09-21 PROCEDURE — 25010000003 HEPARIN LOCK FLUSH PER 10 UNITS: Performed by: PHYSICIAN ASSISTANT

## 2020-09-21 PROCEDURE — 96374 THER/PROPH/DIAG INJ IV PUSH: CPT

## 2020-09-21 PROCEDURE — 70450 CT HEAD/BRAIN W/O DYE: CPT | Performed by: RADIOLOGY

## 2020-09-21 PROCEDURE — 81003 URINALYSIS AUTO W/O SCOPE: CPT | Performed by: PHYSICIAN ASSISTANT

## 2020-09-21 PROCEDURE — 85610 PROTHROMBIN TIME: CPT | Performed by: PHYSICIAN ASSISTANT

## 2020-09-21 PROCEDURE — 25010000002 KETOROLAC TROMETHAMINE PER 15 MG: Performed by: PHYSICIAN ASSISTANT

## 2020-09-21 PROCEDURE — 99283 EMERGENCY DEPT VISIT LOW MDM: CPT

## 2020-09-21 PROCEDURE — 80053 COMPREHEN METABOLIC PANEL: CPT | Performed by: PHYSICIAN ASSISTANT

## 2020-09-21 PROCEDURE — 70450 CT HEAD/BRAIN W/O DYE: CPT

## 2020-09-21 PROCEDURE — 96375 TX/PRO/DX INJ NEW DRUG ADDON: CPT

## 2020-09-21 PROCEDURE — 96372 THER/PROPH/DIAG INJ SC/IM: CPT

## 2020-09-21 RX ORDER — HEPARIN SODIUM (PORCINE) LOCK FLUSH IV SOLN 100 UNIT/ML 100 UNIT/ML
300 SOLUTION INTRAVENOUS ONCE
Status: COMPLETED | OUTPATIENT
Start: 2020-09-21 | End: 2020-09-21

## 2020-09-21 RX ORDER — KETOROLAC TROMETHAMINE 30 MG/ML
15 INJECTION, SOLUTION INTRAMUSCULAR; INTRAVENOUS ONCE
Status: COMPLETED | OUTPATIENT
Start: 2020-09-21 | End: 2020-09-21

## 2020-09-21 RX ORDER — SUMATRIPTAN 6 MG/.5ML
6 INJECTION, SOLUTION SUBCUTANEOUS ONCE
Status: COMPLETED | OUTPATIENT
Start: 2020-09-21 | End: 2020-09-21

## 2020-09-21 RX ORDER — METHYLPREDNISOLONE SODIUM SUCCINATE 125 MG/2ML
80 INJECTION, POWDER, LYOPHILIZED, FOR SOLUTION INTRAMUSCULAR; INTRAVENOUS ONCE
Status: COMPLETED | OUTPATIENT
Start: 2020-09-21 | End: 2020-09-21

## 2020-09-21 RX ORDER — SODIUM CHLORIDE 0.9 % (FLUSH) 0.9 %
10 SYRINGE (ML) INJECTION AS NEEDED
Status: DISCONTINUED | OUTPATIENT
Start: 2020-09-21 | End: 2020-09-21 | Stop reason: HOSPADM

## 2020-09-21 RX ADMIN — METHYLPREDNISOLONE SODIUM SUCCINATE 80 MG: 125 INJECTION, POWDER, FOR SOLUTION INTRAMUSCULAR; INTRAVENOUS at 17:45

## 2020-09-21 RX ADMIN — Medication 300 UNITS: at 17:46

## 2020-09-21 RX ADMIN — SUMATRIPTAN 6 MG: 6 INJECTION SUBCUTANEOUS at 17:47

## 2020-09-21 RX ADMIN — SODIUM CHLORIDE 500 ML: 9 INJECTION, SOLUTION INTRAVENOUS at 15:29

## 2020-09-21 RX ADMIN — KETOROLAC TROMETHAMINE 15 MG: 30 INJECTION, SOLUTION INTRAMUSCULAR; INTRAVENOUS at 17:46

## 2020-09-21 NOTE — ED PROVIDER NOTES
Subjective   75-year-old female with past medical history of hypertension, GERD, cancer, arthritis, diverticulitis, and migraines presents to the ED with complaints of headache and hypertension x 1 week. Pt states every morning when she wakes up her BP has been elevated with highest being 220/111. States she takes Metoprolol 50mg BID, but hasn't been helping. Daughter at bedside and states BP seems to go down throughout the day. Pt does report headache that doesn't seem to be getting any better, states similar to previous migraines but has never lasted this long. Denies chest pain, shortness of breath, back pain, visual changes, or hematuria. Denies any alleviating or aggravating factors despite above-mentioned.  Also reports a fall the day before yesterday but denies loss of consciousness or dizziness -unsure of the mechanism of the fall, but just states that she fell.      History provided by:  Patient   used: No        Review of Systems   Constitutional: Negative.  Negative for fever.   Respiratory: Negative.    Cardiovascular: Negative.  Negative for chest pain.   Gastrointestinal: Negative.  Negative for abdominal pain.   Endocrine: Negative.    Genitourinary: Negative.  Negative for dysuria.   Skin: Negative.    Neurological: Positive for headaches.   Psychiatric/Behavioral: Negative.    All other systems reviewed and are negative.      Past Medical History:   Diagnosis Date   • Arthritis    • Cardiac disorder    • Diverticulitis    • Dysphagia    • Fibromyalgia    • Follicular lymphoma (CMS/HCC)     Remisson    • GERD (gastroesophageal reflux disease)    • History of rheumatic fever    • Hypertension    • Hypokalemia due to inadequate potassium intake 03/03/2014   • Lipoma    • Malignant neoplasm (CMS/HCC)    • Migraine    • Neutropenia (CMS/HCC) 11/7/2019   • Obstructive sleep apnea 7/14/2016   • Prolonged Q-T interval on ECG 11/7/2019   • Recurrent UTI    • Rheumatoid arthritis (CMS/HCC)     • Sleep apnea    • Thyroid cancer (CMS/HCC)        No Known Allergies    Past Surgical History:   Procedure Laterality Date   • BLADDER SURGERY     • FINGER SURGERY      reattached    • HYSTERECTOMY     • THYROID SURGERY         Family History   Problem Relation Age of Onset   • Diabetes Mother    • Hypertension Mother    • Other Other         cardiac disorder,cardiovascular disease, malignant neoplasm of brain   • Breast cancer Paternal Aunt        Social History     Socioeconomic History   • Marital status:      Spouse name: Not on file   • Number of children: Not on file   • Years of education: Not on file   • Highest education level: Not on file   Tobacco Use   • Smoking status: Never Smoker   • Smokeless tobacco: Never Used   Substance and Sexual Activity   • Alcohol use: No   • Drug use: No   • Sexual activity: Defer           Objective   Physical Exam  Vitals signs and nursing note reviewed.   Constitutional:       General: She is not in acute distress.     Appearance: She is well-developed. She is not diaphoretic.   HENT:      Head: Normocephalic and atraumatic.      Right Ear: External ear normal.      Left Ear: External ear normal.      Nose: Nose normal.   Eyes:      Conjunctiva/sclera: Conjunctivae normal.      Pupils: Pupils are equal, round, and reactive to light.   Neck:      Musculoskeletal: Normal range of motion and neck supple.      Vascular: No JVD.      Trachea: No tracheal deviation.   Cardiovascular:      Rate and Rhythm: Normal rate and regular rhythm.      Heart sounds: Normal heart sounds. No murmur.   Pulmonary:      Effort: Pulmonary effort is normal. No respiratory distress.      Breath sounds: Normal breath sounds. No wheezing.   Abdominal:      General: Bowel sounds are normal.      Palpations: Abdomen is soft.      Tenderness: There is no abdominal tenderness.   Musculoskeletal: Normal range of motion.         General: No deformity.      Cervical back: Normal.   Skin:      General: Skin is warm and dry.      Coloration: Skin is not pale.      Findings: No erythema or rash.          Neurological:      General: No focal deficit present.      Mental Status: She is alert and oriented to person, place, and time.      GCS: GCS eye subscore is 4. GCS verbal subscore is 5. GCS motor subscore is 6.      Cranial Nerves: No cranial nerve deficit.      Sensory: Sensation is intact.      Motor: Motor function is intact.      Coordination: Coordination is intact.   Psychiatric:         Behavior: Behavior normal.         Thought Content: Thought content normal.         Procedures           ED Course  ED Course as of Sep 21 1752   Mon Sep 21, 2020   1648 IMPRESSION:     1. Stable exam again demonstrating encephalomalacia right occipital lobe  compatible with sequela of remote infarct.  2. Mild generalized atrophy and chronic small vessel ischemic disease, stable.  3. No CT evidence of acute intracranial abnormality.    This report was finalized on 9/21/2020 4:41 PM by Dr. Jonah Salgado MD.    CT Head Without Contrast [TK]   1702 Discussed    [TK]   1745 Awaiting pt medications to be given, then will prepare for discharge.    [TK]      ED Course User Index  [TK] Smita Cortes, ISAIAS                                           MDM  Number of Diagnoses or Management Options  Essential hypertension: new and does not require workup  Nonintractable headache, unspecified chronicity pattern, unspecified headache type: new and requires workup     Amount and/or Complexity of Data Reviewed  Clinical lab tests: reviewed and ordered  Tests in the radiology section of CPT®: reviewed and ordered    Risk of Complications, Morbidity, and/or Mortality  Presenting problems: moderate  Diagnostic procedures: moderate  Management options: moderate    Patient Progress  Patient progress: stable      Final diagnoses:   Essential hypertension   Nonintractable headache, unspecified chronicity pattern, unspecified headache  type            Smita Cortes PA-C  09/21/20 5823

## 2020-10-13 DIAGNOSIS — N30.00 ACUTE CYSTITIS WITHOUT HEMATURIA: Primary | ICD-10-CM

## 2020-10-14 RX ORDER — NITROFURANTOIN 25; 75 MG/1; MG/1
CAPSULE ORAL
Qty: 56 CAPSULE | Refills: 3 | Status: SHIPPED | OUTPATIENT
Start: 2020-10-14 | End: 2021-01-06

## 2020-10-21 ENCOUNTER — LAB (OUTPATIENT)
Dept: LAB | Facility: HOSPITAL | Age: 75
End: 2020-10-21

## 2020-10-21 ENCOUNTER — HOSPITAL ENCOUNTER (OUTPATIENT)
Dept: INFUSION THERAPY | Facility: HOSPITAL | Age: 75
Discharge: HOME | End: 2020-10-21

## 2020-10-21 ENCOUNTER — TRANSCRIBE ORDERS (OUTPATIENT)
Dept: ADMINISTRATIVE | Facility: HOSPITAL | Age: 75
End: 2020-10-21

## 2020-10-21 DIAGNOSIS — E78.2 MIXED HYPERLIPIDEMIA: ICD-10-CM

## 2020-10-21 DIAGNOSIS — I10 HYPERTENSION, UNSPECIFIED TYPE: Primary | ICD-10-CM

## 2020-10-21 DIAGNOSIS — I10 HYPERTENSION, UNSPECIFIED TYPE: ICD-10-CM

## 2020-10-21 LAB
ALBUMIN SERPL-MCNC: 4.44 G/DL (ref 3.5–5.2)
ALBUMIN/GLOB SERPL: 1.8 G/DL
ALP SERPL-CCNC: 82 U/L (ref 39–117)
ALT SERPL W P-5'-P-CCNC: 10 U/L (ref 1–33)
ANION GAP SERPL CALCULATED.3IONS-SCNC: 12.8 MMOL/L (ref 5–15)
AST SERPL-CCNC: 16 U/L (ref 1–32)
BASOPHILS # BLD AUTO: 0.02 10*3/MM3 (ref 0–0.2)
BASOPHILS NFR BLD AUTO: 0.2 % (ref 0–1.5)
BILIRUB SERPL-MCNC: 0.4 MG/DL (ref 0–1.2)
BUN SERPL-MCNC: 20 MG/DL (ref 8–23)
BUN/CREAT SERPL: 18.7 (ref 7–25)
CALCIUM SPEC-SCNC: 8.9 MG/DL (ref 8.6–10.5)
CHLORIDE SERPL-SCNC: 104 MMOL/L (ref 98–107)
CHOLEST SERPL-MCNC: 180 MG/DL (ref 0–200)
CO2 SERPL-SCNC: 25.2 MMOL/L (ref 22–29)
CREAT SERPL-MCNC: 1.07 MG/DL (ref 0.57–1)
DEPRECATED RDW RBC AUTO: 45.6 FL (ref 37–54)
EOSINOPHIL # BLD AUTO: 0 10*3/MM3 (ref 0–0.4)
EOSINOPHIL NFR BLD AUTO: 0 % (ref 0.3–6.2)
ERYTHROCYTE [DISTWIDTH] IN BLOOD BY AUTOMATED COUNT: 13.2 % (ref 12.3–15.4)
GFR SERPL CREATININE-BSD FRML MDRD: 50 ML/MIN/1.73
GLOBULIN UR ELPH-MCNC: 2.5 GM/DL
GLUCOSE SERPL-MCNC: 130 MG/DL (ref 65–99)
HCT VFR BLD AUTO: 40.8 % (ref 34–46.6)
HDLC SERPL-MCNC: 34 MG/DL (ref 40–60)
HGB BLD-MCNC: 13 G/DL (ref 12–15.9)
IMM GRANULOCYTES # BLD AUTO: 0.04 10*3/MM3 (ref 0–0.05)
IMM GRANULOCYTES NFR BLD AUTO: 0.4 % (ref 0–0.5)
LDLC SERPL CALC-MCNC: 108 MG/DL (ref 0–100)
LDLC/HDLC SERPL: 3.02 {RATIO}
LYMPHOCYTES # BLD AUTO: 0.86 10*3/MM3 (ref 0.7–3.1)
LYMPHOCYTES NFR BLD AUTO: 7.9 % (ref 19.6–45.3)
MAGNESIUM SERPL-MCNC: 2.1 MG/DL (ref 1.6–2.4)
MCH RBC QN AUTO: 30.3 PG (ref 26.6–33)
MCHC RBC AUTO-ENTMCNC: 31.9 G/DL (ref 31.5–35.7)
MCV RBC AUTO: 95.1 FL (ref 79–97)
MONOCYTES # BLD AUTO: 0.99 10*3/MM3 (ref 0.1–0.9)
MONOCYTES NFR BLD AUTO: 9 % (ref 5–12)
NEUTROPHILS NFR BLD AUTO: 82.5 % (ref 42.7–76)
NEUTROPHILS NFR BLD AUTO: 9.04 10*3/MM3 (ref 1.7–7)
NRBC BLD AUTO-RTO: 0 /100 WBC (ref 0–0.2)
PLATELET # BLD AUTO: 342 10*3/MM3 (ref 140–450)
PMV BLD AUTO: 12.3 FL (ref 6–12)
POTASSIUM SERPL-SCNC: 4 MMOL/L (ref 3.5–5.2)
PROT SERPL-MCNC: 6.9 G/DL (ref 6–8.5)
RBC # BLD AUTO: 4.29 10*6/MM3 (ref 3.77–5.28)
SODIUM SERPL-SCNC: 142 MMOL/L (ref 136–145)
TRIGL SERPL-MCNC: 216 MG/DL (ref 0–150)
TSH SERPL DL<=0.05 MIU/L-ACNC: 0.7 UIU/ML (ref 0.27–4.2)
VLDLC SERPL-MCNC: 38 MG/DL (ref 5–40)
WBC # BLD AUTO: 10.95 10*3/MM3 (ref 3.4–10.8)

## 2020-10-21 PROCEDURE — 83735 ASSAY OF MAGNESIUM: CPT | Performed by: NURSE PRACTITIONER

## 2020-10-21 PROCEDURE — 36591 DRAW BLOOD OFF VENOUS DEVICE: CPT

## 2020-10-21 PROCEDURE — 25010000003 HEPARIN LOCK FLUSH PER 10 UNITS: Performed by: NURSE PRACTITIONER

## 2020-10-21 PROCEDURE — 82607 VITAMIN B-12: CPT | Performed by: NURSE PRACTITIONER

## 2020-10-21 PROCEDURE — 80053 COMPREHEN METABOLIC PANEL: CPT | Performed by: NURSE PRACTITIONER

## 2020-10-21 PROCEDURE — 85025 COMPLETE CBC W/AUTO DIFF WBC: CPT | Performed by: NURSE PRACTITIONER

## 2020-10-21 PROCEDURE — 84443 ASSAY THYROID STIM HORMONE: CPT | Performed by: NURSE PRACTITIONER

## 2020-10-21 PROCEDURE — 80061 LIPID PANEL: CPT | Performed by: NURSE PRACTITIONER

## 2020-10-21 RX ORDER — HEPARIN SODIUM (PORCINE) LOCK FLUSH IV SOLN 100 UNIT/ML 100 UNIT/ML
5 SOLUTION INTRAVENOUS AS NEEDED
Status: DISCONTINUED | OUTPATIENT
Start: 2020-10-21 | End: 2020-10-23 | Stop reason: HOSPADM

## 2020-10-21 RX ORDER — SODIUM CHLORIDE 0.9 % (FLUSH) 0.9 %
10 SYRINGE (ML) INJECTION EVERY 12 HOURS SCHEDULED
Status: DISCONTINUED | OUTPATIENT
Start: 2020-10-21 | End: 2020-10-23 | Stop reason: HOSPADM

## 2020-10-21 RX ORDER — SODIUM CHLORIDE 0.9 % (FLUSH) 0.9 %
20 SYRINGE (ML) INJECTION AS NEEDED
Status: DISCONTINUED | OUTPATIENT
Start: 2020-10-21 | End: 2020-10-23 | Stop reason: HOSPADM

## 2020-10-21 RX ORDER — SODIUM CHLORIDE 0.9 % (FLUSH) 0.9 %
10 SYRINGE (ML) INJECTION AS NEEDED
Status: DISCONTINUED | OUTPATIENT
Start: 2020-10-21 | End: 2020-10-23 | Stop reason: HOSPADM

## 2020-10-21 RX ADMIN — Medication 500 UNITS: at 13:13

## 2020-10-21 RX ADMIN — SODIUM CHLORIDE, PRESERVATIVE FREE 10 ML: 5 INJECTION INTRAVENOUS at 13:13

## 2020-10-22 LAB — VIT B12 BLD-MCNC: 572 PG/ML (ref 211–946)

## 2020-12-14 DIAGNOSIS — E78.5 DYSLIPIDEMIA (HIGH LDL; LOW HDL): ICD-10-CM

## 2020-12-15 RX ORDER — ATORVASTATIN CALCIUM 10 MG/1
10 TABLET, FILM COATED ORAL DAILY
Qty: 30 TABLET | Refills: 11 | OUTPATIENT
Start: 2020-12-15

## 2020-12-15 NOTE — TELEPHONE ENCOUNTER
It has been over 1 year since we have seen her. She will need to schedule an appointment or get additional refills from PCP.

## 2020-12-15 NOTE — TELEPHONE ENCOUNTER
Spoke with daughter- she refused to schedule in office visit-was informed we can not do a telephone visit because its been over a year since we have seen patient.  Medication refused and patient daughter informed to contact PCP for refills

## 2020-12-30 ENCOUNTER — TELEPHONE (OUTPATIENT)
Dept: CARDIOLOGY | Facility: CLINIC | Age: 75
End: 2020-12-30

## 2020-12-30 NOTE — TELEPHONE ENCOUNTER
Called the patients daughter back and the patient has been having some high blood pressure readings todayt at 105 170/103 pulse 86  But after patient having hydrocodone at noon and at 1220 the blood pressure was 156/96 pulse 85.  But yesterday it was 118/76   Patients daughter feels that the hydrocodone and an extra tylenol arthritis  Can bring down the blood pressure and the arthritis doctor said that was ok since they think the increase blood pressure could be due to pain.   Patient has been having headaches all the time and has tests and they cant find out what is causing them.  Patient is having no diff breathing, no shortness of breath.  Patient is non ambulatory and when the blood pressure comes down and becomes low they really can not tell if she is dizzy since she does not walk but she feels funny in the head.  Patient has an extensive history history with cancer and medical problems and many doctors.  Patients daughter just wants to know what to do about the blood pressure when it gets high?  Do we want to do a phone visit? Since she has not been seen since 9/19?

## 2020-12-30 NOTE — TELEPHONE ENCOUNTER
Called and explained that we would like to have her keep a chart on the blood pressures and to keep track of the pain that goes with the blood pressures.  Then to bring in the list of the medications when she has the appt .  Since she has permission to take the tylenol arthritis from the arthritis doctor to take that medication as his direction.  Only take the blood pressure 3 times daily and come to the jan 6th appt.  Daughter lacho will come and bring list of med

## 2020-12-30 NOTE — TELEPHONE ENCOUNTER
Ask her to continue to monitor blood pressure regularly and lets see what the average blood pressure is over a couple weeks. Pain medicaitons can reduce blood pressure but they are not indicated to take for blood pressure. In terms of her headache she will need to speeak with her PCP regarding this although, elevated blood pressure can also cause a headache.

## 2021-01-01 ENCOUNTER — LAB REQUISITION (OUTPATIENT)
Dept: LAB | Facility: HOSPITAL | Age: 76
End: 2021-01-01

## 2021-01-01 ENCOUNTER — LAB (OUTPATIENT)
Dept: LAB | Facility: HOSPITAL | Age: 76
End: 2021-01-01

## 2021-01-01 ENCOUNTER — APPOINTMENT (OUTPATIENT)
Dept: CT IMAGING | Facility: HOSPITAL | Age: 76
End: 2021-01-01

## 2021-01-01 ENCOUNTER — APPOINTMENT (OUTPATIENT)
Dept: CARDIOLOGY | Facility: HOSPITAL | Age: 76
End: 2021-01-01

## 2021-01-01 ENCOUNTER — OFFICE VISIT (OUTPATIENT)
Dept: SURGERY | Facility: CLINIC | Age: 76
End: 2021-01-01

## 2021-01-01 ENCOUNTER — TRANSCRIBE ORDERS (OUTPATIENT)
Dept: ADMINISTRATIVE | Facility: HOSPITAL | Age: 76
End: 2021-01-01

## 2021-01-01 ENCOUNTER — HOSPITAL ENCOUNTER (EMERGENCY)
Facility: HOSPITAL | Age: 76
Discharge: HOME | End: 2021-11-16
Attending: EMERGENCY MEDICINE | Admitting: EMERGENCY MEDICINE

## 2021-01-01 ENCOUNTER — APPOINTMENT (OUTPATIENT)
Dept: GENERAL RADIOLOGY | Facility: HOSPITAL | Age: 76
End: 2021-01-01

## 2021-01-01 ENCOUNTER — HOSPITAL ENCOUNTER (OUTPATIENT)
Dept: GENERAL RADIOLOGY | Facility: HOSPITAL | Age: 76
Discharge: HOME | End: 2021-04-08

## 2021-01-01 ENCOUNTER — OFFICE VISIT (OUTPATIENT)
Dept: CARDIOLOGY | Facility: CLINIC | Age: 76
End: 2021-01-01

## 2021-01-01 ENCOUNTER — READMISSION MANAGEMENT (OUTPATIENT)
Dept: CALL CENTER | Facility: HOSPITAL | Age: 76
End: 2021-01-01

## 2021-01-01 ENCOUNTER — ANESTHESIA (OUTPATIENT)
Dept: PERIOP | Facility: HOSPITAL | Age: 76
End: 2021-01-01

## 2021-01-01 ENCOUNTER — HOSPITAL ENCOUNTER (OUTPATIENT)
Dept: GENERAL RADIOLOGY | Facility: HOSPITAL | Age: 76
Discharge: HOME | End: 2021-06-25
Admitting: NURSE PRACTITIONER

## 2021-01-01 ENCOUNTER — APPOINTMENT (OUTPATIENT)
Dept: ULTRASOUND IMAGING | Facility: HOSPITAL | Age: 76
End: 2021-01-01

## 2021-01-01 ENCOUNTER — HOSPITAL ENCOUNTER (INPATIENT)
Facility: HOSPITAL | Age: 76
LOS: 6 days | Discharge: HOME HEALTH | End: 2021-04-15
Attending: EMERGENCY MEDICINE | Admitting: STUDENT IN AN ORGANIZED HEALTH CARE EDUCATION/TRAINING PROGRAM

## 2021-01-01 ENCOUNTER — HOSPITAL ENCOUNTER (INPATIENT)
Facility: HOSPITAL | Age: 76
LOS: 15 days | Discharge: HOME HEALTH | End: 2021-10-20
Attending: STUDENT IN AN ORGANIZED HEALTH CARE EDUCATION/TRAINING PROGRAM | Admitting: INTERNAL MEDICINE

## 2021-01-01 ENCOUNTER — HOSPITAL ENCOUNTER (OUTPATIENT)
Dept: INFUSION THERAPY | Facility: HOSPITAL | Age: 76
Discharge: HOME | End: 2021-11-02
Admitting: NURSE PRACTITIONER

## 2021-01-01 ENCOUNTER — HOSPITAL ENCOUNTER (EMERGENCY)
Facility: HOSPITAL | Age: 76
Discharge: HOME | End: 2021-05-15
Attending: EMERGENCY MEDICINE | Admitting: EMERGENCY MEDICINE

## 2021-01-01 ENCOUNTER — HOSPITAL ENCOUNTER (OUTPATIENT)
Dept: GENERAL RADIOLOGY | Facility: HOSPITAL | Age: 76
Discharge: HOME | End: 2021-10-05
Admitting: NURSE PRACTITIONER

## 2021-01-01 ENCOUNTER — HOSPITAL ENCOUNTER (OUTPATIENT)
Dept: GENERAL RADIOLOGY | Facility: HOSPITAL | Age: 76
Discharge: HOME | End: 2021-09-14
Admitting: NURSE PRACTITIONER

## 2021-01-01 ENCOUNTER — TRANSCRIBE ORDERS (OUTPATIENT)
Dept: INFUSION THERAPY | Facility: HOSPITAL | Age: 76
End: 2021-01-01

## 2021-01-01 ENCOUNTER — APPOINTMENT (OUTPATIENT)
Dept: MRI IMAGING | Facility: HOSPITAL | Age: 76
End: 2021-01-01

## 2021-01-01 ENCOUNTER — HOSPITAL ENCOUNTER (OUTPATIENT)
Dept: GENERAL RADIOLOGY | Facility: HOSPITAL | Age: 76
Discharge: HOME | End: 2021-11-24
Admitting: SURGERY

## 2021-01-01 ENCOUNTER — ANESTHESIA EVENT (OUTPATIENT)
Dept: PERIOP | Facility: HOSPITAL | Age: 76
End: 2021-01-01

## 2021-01-01 ENCOUNTER — HOSPITAL ENCOUNTER (OUTPATIENT)
Dept: GENERAL RADIOLOGY | Facility: HOSPITAL | Age: 76
Discharge: HOME | End: 2021-06-28
Admitting: NURSE PRACTITIONER

## 2021-01-01 VITALS
HEIGHT: 63 IN | WEIGHT: 118 LBS | DIASTOLIC BLOOD PRESSURE: 70 MMHG | BODY MASS INDEX: 20.91 KG/M2 | HEART RATE: 77 BPM | SYSTOLIC BLOOD PRESSURE: 110 MMHG

## 2021-01-01 VITALS
HEART RATE: 76 BPM | TEMPERATURE: 98.1 F | SYSTOLIC BLOOD PRESSURE: 146 MMHG | BODY MASS INDEX: 23.4 KG/M2 | HEIGHT: 60 IN | DIASTOLIC BLOOD PRESSURE: 60 MMHG | OXYGEN SATURATION: 96 % | WEIGHT: 119.2 LBS | RESPIRATION RATE: 20 BRPM

## 2021-01-01 VITALS — DIASTOLIC BLOOD PRESSURE: 81 MMHG | SYSTOLIC BLOOD PRESSURE: 175 MMHG | HEART RATE: 81 BPM

## 2021-01-01 VITALS
HEART RATE: 89 BPM | RESPIRATION RATE: 18 BRPM | TEMPERATURE: 98.3 F | BODY MASS INDEX: 20.91 KG/M2 | WEIGHT: 118 LBS | DIASTOLIC BLOOD PRESSURE: 78 MMHG | SYSTOLIC BLOOD PRESSURE: 161 MMHG | HEIGHT: 63 IN | OXYGEN SATURATION: 98 %

## 2021-01-01 VITALS
HEART RATE: 79 BPM | SYSTOLIC BLOOD PRESSURE: 119 MMHG | DIASTOLIC BLOOD PRESSURE: 53 MMHG | BODY MASS INDEX: 22.47 KG/M2 | OXYGEN SATURATION: 96 % | RESPIRATION RATE: 20 BRPM | TEMPERATURE: 97.3 F | HEIGHT: 61 IN | WEIGHT: 119 LBS

## 2021-01-01 VITALS
DIASTOLIC BLOOD PRESSURE: 74 MMHG | HEIGHT: 60 IN | HEART RATE: 84 BPM | OXYGEN SATURATION: 97 % | TEMPERATURE: 97.3 F | RESPIRATION RATE: 18 BRPM | WEIGHT: 126 LBS | SYSTOLIC BLOOD PRESSURE: 158 MMHG | BODY MASS INDEX: 24.74 KG/M2

## 2021-01-01 DIAGNOSIS — Z01.818 OTHER SPECIFIED PRE-OPERATIVE EXAMINATION: ICD-10-CM

## 2021-01-01 DIAGNOSIS — D61.818 ACQUIRED PANCYTOPENIA (HCC): ICD-10-CM

## 2021-01-01 DIAGNOSIS — Z01.818 PRE-OPERATIVE CLEARANCE: ICD-10-CM

## 2021-01-01 DIAGNOSIS — M19.90 ARTHRITIS: ICD-10-CM

## 2021-01-01 DIAGNOSIS — R11.0 NAUSEA: ICD-10-CM

## 2021-01-01 DIAGNOSIS — R19.7 DIARRHEA, UNSPECIFIED TYPE: Primary | ICD-10-CM

## 2021-01-01 DIAGNOSIS — E61.2 MAGNESIUM DEFICIENCY: ICD-10-CM

## 2021-01-01 DIAGNOSIS — F03.90 SENILE DEMENTIA, UNCOMPLICATED (HCC): ICD-10-CM

## 2021-01-01 DIAGNOSIS — R68.89 MECHANICAL AND MOTOR PROBLEMS WITH INTERNAL ORGANS: ICD-10-CM

## 2021-01-01 DIAGNOSIS — M25.532 LEFT WRIST PAIN: Primary | ICD-10-CM

## 2021-01-01 DIAGNOSIS — D51.9 ANEMIA DUE TO VITAMIN B12 DEFICIENCY, UNSPECIFIED B12 DEFICIENCY TYPE: ICD-10-CM

## 2021-01-01 DIAGNOSIS — R33.9 RETENTION OF URINE, UNSPECIFIED: ICD-10-CM

## 2021-01-01 DIAGNOSIS — R33.8 ACUTE URINARY RETENTION: ICD-10-CM

## 2021-01-01 DIAGNOSIS — D50.9 IRON DEFICIENCY ANEMIA, UNSPECIFIED IRON DEFICIENCY ANEMIA TYPE: ICD-10-CM

## 2021-01-01 DIAGNOSIS — R53.83 TIREDNESS: ICD-10-CM

## 2021-01-01 DIAGNOSIS — I10 ESSENTIAL HYPERTENSION: Primary | ICD-10-CM

## 2021-01-01 DIAGNOSIS — R10.84 GENERALIZED ABDOMINAL PAIN: ICD-10-CM

## 2021-01-01 DIAGNOSIS — D64.9 ANEMIA, UNSPECIFIED TYPE: Primary | ICD-10-CM

## 2021-01-01 DIAGNOSIS — N39.0 UTI (URINARY TRACT INFECTION), BACTERIAL: Primary | ICD-10-CM

## 2021-01-01 DIAGNOSIS — I10 ESSENTIAL HYPERTENSION, MALIGNANT: ICD-10-CM

## 2021-01-01 DIAGNOSIS — N39.0 COMPLICATED UTI (URINARY TRACT INFECTION): Primary | ICD-10-CM

## 2021-01-01 DIAGNOSIS — E55.9 AVITAMINOSIS D: ICD-10-CM

## 2021-01-01 DIAGNOSIS — M25.552 LEFT HIP PAIN: ICD-10-CM

## 2021-01-01 DIAGNOSIS — R41.82 ALTERED MENTAL STATUS, UNSPECIFIED: ICD-10-CM

## 2021-01-01 DIAGNOSIS — R30.9 URINARY PAIN: ICD-10-CM

## 2021-01-01 DIAGNOSIS — M79.7 FIBROMYALGIA: ICD-10-CM

## 2021-01-01 DIAGNOSIS — E78.5 HYPERLIPIDEMIA, UNSPECIFIED HYPERLIPIDEMIA TYPE: ICD-10-CM

## 2021-01-01 DIAGNOSIS — D64.9 ANEMIA, UNSPECIFIED TYPE: ICD-10-CM

## 2021-01-01 DIAGNOSIS — R79.89 HYPOURICEMIA: ICD-10-CM

## 2021-01-01 DIAGNOSIS — I45.81 LONG QT SYNDROME: ICD-10-CM

## 2021-01-01 DIAGNOSIS — S72.002S CLOSED FRACTURE OF LEFT HIP, SEQUELA: ICD-10-CM

## 2021-01-01 DIAGNOSIS — M25.432 EDEMA OF BOTH WRISTS: ICD-10-CM

## 2021-01-01 DIAGNOSIS — M25.431 EDEMA OF BOTH WRISTS: Primary | ICD-10-CM

## 2021-01-01 DIAGNOSIS — R73.09 IMPAIRED GLUCOSE TOLERANCE TEST: ICD-10-CM

## 2021-01-01 DIAGNOSIS — A49.9 UTI (URINARY TRACT INFECTION), BACTERIAL: Primary | ICD-10-CM

## 2021-01-01 DIAGNOSIS — R29.6 FALLS FREQUENTLY: Primary | ICD-10-CM

## 2021-01-01 DIAGNOSIS — G93.40 ACUTE ENCEPHALOPATHY: Primary | ICD-10-CM

## 2021-01-01 DIAGNOSIS — R30.9 URINARY PAIN: Primary | ICD-10-CM

## 2021-01-01 DIAGNOSIS — R19.7 DIARRHEA, UNSPECIFIED TYPE: ICD-10-CM

## 2021-01-01 DIAGNOSIS — E86.0 DEHYDRATION: ICD-10-CM

## 2021-01-01 DIAGNOSIS — M25.552 LEFT HIP PAIN: Primary | ICD-10-CM

## 2021-01-01 DIAGNOSIS — R29.6 FALLS FREQUENTLY: ICD-10-CM

## 2021-01-01 DIAGNOSIS — E55.9 VITAMIN D DEFICIENCY: ICD-10-CM

## 2021-01-01 DIAGNOSIS — N39.0 URINARY TRACT INFECTION WITHOUT HEMATURIA, SITE UNSPECIFIED: ICD-10-CM

## 2021-01-01 DIAGNOSIS — R68.89 MECHANICAL AND MOTOR PROBLEMS WITH INTERNAL ORGANS: Primary | ICD-10-CM

## 2021-01-01 DIAGNOSIS — M25.532 LEFT WRIST PAIN: ICD-10-CM

## 2021-01-01 DIAGNOSIS — R53.1 ASTHENIA: ICD-10-CM

## 2021-01-01 DIAGNOSIS — I47.1 PAROXYSMAL SVT (SUPRAVENTRICULAR TACHYCARDIA) (HCC): ICD-10-CM

## 2021-01-01 DIAGNOSIS — R13.19 ESOPHAGEAL DYSPHAGIA: Primary | ICD-10-CM

## 2021-01-01 DIAGNOSIS — Z01.818 PRE-OPERATIVE CLEARANCE: Primary | ICD-10-CM

## 2021-01-01 DIAGNOSIS — R13.19 ESOPHAGEAL DYSPHAGIA: ICD-10-CM

## 2021-01-01 DIAGNOSIS — M25.432 EDEMA OF BOTH WRISTS: Primary | ICD-10-CM

## 2021-01-01 DIAGNOSIS — M25.431 EDEMA OF BOTH WRISTS: ICD-10-CM

## 2021-01-01 DIAGNOSIS — M79.605 LEFT LEG PAIN: ICD-10-CM

## 2021-01-01 DIAGNOSIS — R53.1 WEAKNESS: ICD-10-CM

## 2021-01-01 DIAGNOSIS — S72.002A CLOSED FRACTURE OF LEFT HIP, INITIAL ENCOUNTER (HCC): Primary | ICD-10-CM

## 2021-01-01 DIAGNOSIS — E78.5 DYSLIPIDEMIA (HIGH LDL; LOW HDL): ICD-10-CM

## 2021-01-01 DIAGNOSIS — Z01.818 OTHER SPECIFIED PRE-OPERATIVE EXAMINATION: Primary | ICD-10-CM

## 2021-01-01 DIAGNOSIS — N39.0 URINARY TRACT INFECTION, SITE NOT SPECIFIED: ICD-10-CM

## 2021-01-01 LAB
027 TOXIN: NORMAL
1,25(OH)2D SERPL-MCNC: 17.1 PG/ML (ref 19.9–79.3)
25(OH)D3 SERPL-MCNC: 26.6 NG/ML (ref 30–100)
25(OH)D3 SERPL-MCNC: 29.5 NG/ML (ref 30–100)
6-ACETYL MORPHINE: NEGATIVE
A-A DO2: 36.7 MMHG (ref 0–300)
A-A DO2: 60.8 MMHG (ref 0–300)
ABO GROUP BLD: NORMAL
ABO GROUP BLD: NORMAL
ACETONE BLD QL: ABNORMAL
ALBUMIN SERPL-MCNC: 2.36 G/DL (ref 3.5–5.2)
ALBUMIN SERPL-MCNC: 2.48 G/DL (ref 3.5–5.2)
ALBUMIN SERPL-MCNC: 2.48 G/DL (ref 3.5–5.2)
ALBUMIN SERPL-MCNC: 2.69 G/DL (ref 3.5–5.2)
ALBUMIN SERPL-MCNC: 2.82 G/DL (ref 3.5–5.2)
ALBUMIN SERPL-MCNC: 2.84 G/DL (ref 3.5–5.2)
ALBUMIN SERPL-MCNC: 3.22 G/DL (ref 3.5–5.2)
ALBUMIN SERPL-MCNC: 3.22 G/DL (ref 3.5–5.2)
ALBUMIN SERPL-MCNC: 3.4 G/DL (ref 3.5–5.2)
ALBUMIN SERPL-MCNC: 3.44 G/DL (ref 3.5–5.2)
ALBUMIN SERPL-MCNC: 3.51 G/DL (ref 3.5–5.2)
ALBUMIN SERPL-MCNC: 3.61 G/DL (ref 3.5–5.2)
ALBUMIN SERPL-MCNC: 3.64 G/DL (ref 3.5–5.2)
ALBUMIN SERPL-MCNC: 3.64 G/DL (ref 3.5–5.2)
ALBUMIN SERPL-MCNC: 3.69 G/DL (ref 3.5–5.2)
ALBUMIN SERPL-MCNC: 3.98 G/DL (ref 3.5–5.2)
ALBUMIN SERPL-MCNC: 4.12 G/DL (ref 3.5–5.2)
ALBUMIN SERPL-MCNC: 4.13 G/DL (ref 3.5–5.2)
ALBUMIN SERPL-MCNC: 4.38 G/DL (ref 3.5–5.2)
ALBUMIN SERPL-MCNC: 4.45 G/DL (ref 3.5–5.2)
ALBUMIN/GLOB SERPL: 0.8 G/DL
ALBUMIN/GLOB SERPL: 0.9 G/DL
ALBUMIN/GLOB SERPL: 1 G/DL
ALBUMIN/GLOB SERPL: 1.1 G/DL
ALBUMIN/GLOB SERPL: 1.3 G/DL
ALBUMIN/GLOB SERPL: 1.3 G/DL
ALBUMIN/GLOB SERPL: 1.4 G/DL
ALBUMIN/GLOB SERPL: 1.5 G/DL
ALBUMIN/GLOB SERPL: 1.6 G/DL
ALBUMIN/GLOB SERPL: 1.7 G/DL
ALBUMIN/GLOB SERPL: 1.7 G/DL
ALBUMIN/GLOB SERPL: 1.8 G/DL
ALP SERPL-CCNC: 104 U/L (ref 39–117)
ALP SERPL-CCNC: 105 U/L (ref 39–117)
ALP SERPL-CCNC: 117 U/L (ref 39–117)
ALP SERPL-CCNC: 123 U/L (ref 39–117)
ALP SERPL-CCNC: 128 U/L (ref 39–117)
ALP SERPL-CCNC: 50 U/L (ref 39–117)
ALP SERPL-CCNC: 55 U/L (ref 39–117)
ALP SERPL-CCNC: 56 U/L (ref 39–117)
ALP SERPL-CCNC: 58 U/L (ref 39–117)
ALP SERPL-CCNC: 59 U/L (ref 39–117)
ALP SERPL-CCNC: 69 U/L (ref 39–117)
ALP SERPL-CCNC: 70 U/L (ref 39–117)
ALP SERPL-CCNC: 72 U/L (ref 39–117)
ALP SERPL-CCNC: 76 U/L (ref 39–117)
ALP SERPL-CCNC: 81 U/L (ref 39–117)
ALP SERPL-CCNC: 92 U/L (ref 39–117)
ALP SERPL-CCNC: 93 U/L (ref 39–117)
ALP SERPL-CCNC: 96 U/L (ref 39–117)
ALP SERPL-CCNC: 97 U/L (ref 39–117)
ALP SERPL-CCNC: 99 U/L (ref 39–117)
ALT SERPL W P-5'-P-CCNC: 10 U/L (ref 1–33)
ALT SERPL W P-5'-P-CCNC: 10 U/L (ref 1–33)
ALT SERPL W P-5'-P-CCNC: 11 U/L (ref 1–33)
ALT SERPL W P-5'-P-CCNC: 11 U/L (ref 1–33)
ALT SERPL W P-5'-P-CCNC: 12 U/L (ref 1–33)
ALT SERPL W P-5'-P-CCNC: 12 U/L (ref 1–33)
ALT SERPL W P-5'-P-CCNC: 13 U/L (ref 1–33)
ALT SERPL W P-5'-P-CCNC: 13 U/L (ref 1–33)
ALT SERPL W P-5'-P-CCNC: 14 U/L (ref 1–33)
ALT SERPL W P-5'-P-CCNC: 16 U/L (ref 1–33)
ALT SERPL W P-5'-P-CCNC: 21 U/L (ref 1–33)
ALT SERPL W P-5'-P-CCNC: 6 U/L (ref 1–33)
ALT SERPL W P-5'-P-CCNC: 7 U/L (ref 1–33)
ALT SERPL W P-5'-P-CCNC: 7 U/L (ref 1–33)
ALT SERPL W P-5'-P-CCNC: 8 U/L (ref 1–33)
ALT SERPL W P-5'-P-CCNC: 9 U/L (ref 1–33)
ALT SERPL W P-5'-P-CCNC: 9 U/L (ref 1–33)
ALT SERPL W P-5'-P-CCNC: <5 U/L (ref 1–33)
AMMONIA BLD-SCNC: 20 UMOL/L (ref 11–51)
AMPHET+METHAMPHET UR QL: NEGATIVE
AMYLASE SERPL-CCNC: 43 U/L (ref 28–100)
ANION GAP SERPL CALCULATED.3IONS-SCNC: 12 MMOL/L (ref 5–15)
ANION GAP SERPL CALCULATED.3IONS-SCNC: 12.2 MMOL/L (ref 5–15)
ANION GAP SERPL CALCULATED.3IONS-SCNC: 12.3 MMOL/L (ref 5–15)
ANION GAP SERPL CALCULATED.3IONS-SCNC: 12.5 MMOL/L (ref 5–15)
ANION GAP SERPL CALCULATED.3IONS-SCNC: 12.7 MMOL/L (ref 5–15)
ANION GAP SERPL CALCULATED.3IONS-SCNC: 12.9 MMOL/L (ref 5–15)
ANION GAP SERPL CALCULATED.3IONS-SCNC: 13.2 MMOL/L (ref 5–15)
ANION GAP SERPL CALCULATED.3IONS-SCNC: 13.3 MMOL/L (ref 5–15)
ANION GAP SERPL CALCULATED.3IONS-SCNC: 13.7 MMOL/L (ref 5–15)
ANION GAP SERPL CALCULATED.3IONS-SCNC: 13.9 MMOL/L (ref 5–15)
ANION GAP SERPL CALCULATED.3IONS-SCNC: 13.9 MMOL/L (ref 5–15)
ANION GAP SERPL CALCULATED.3IONS-SCNC: 14 MMOL/L (ref 5–15)
ANION GAP SERPL CALCULATED.3IONS-SCNC: 14 MMOL/L (ref 5–15)
ANION GAP SERPL CALCULATED.3IONS-SCNC: 14.4 MMOL/L (ref 5–15)
ANION GAP SERPL CALCULATED.3IONS-SCNC: 14.4 MMOL/L (ref 5–15)
ANION GAP SERPL CALCULATED.3IONS-SCNC: 14.6 MMOL/L (ref 5–15)
ANION GAP SERPL CALCULATED.3IONS-SCNC: 14.7 MMOL/L (ref 5–15)
ANION GAP SERPL CALCULATED.3IONS-SCNC: 14.8 MMOL/L (ref 5–15)
ANION GAP SERPL CALCULATED.3IONS-SCNC: 14.8 MMOL/L (ref 5–15)
ANION GAP SERPL CALCULATED.3IONS-SCNC: 15 MMOL/L (ref 5–15)
ANION GAP SERPL CALCULATED.3IONS-SCNC: 16 MMOL/L (ref 5–15)
ANION GAP SERPL CALCULATED.3IONS-SCNC: 16 MMOL/L (ref 5–15)
ANION GAP SERPL CALCULATED.3IONS-SCNC: 16.3 MMOL/L (ref 5–15)
ANION GAP SERPL CALCULATED.3IONS-SCNC: 16.8 MMOL/L (ref 5–15)
ANION GAP SERPL CALCULATED.3IONS-SCNC: 17.2 MMOL/L (ref 5–15)
ANION GAP SERPL CALCULATED.3IONS-SCNC: 17.4 MMOL/L (ref 5–15)
ANION GAP SERPL CALCULATED.3IONS-SCNC: 17.7 MMOL/L (ref 5–15)
ANION GAP SERPL CALCULATED.3IONS-SCNC: 17.9 MMOL/L (ref 5–15)
ANION GAP SERPL CALCULATED.3IONS-SCNC: 19.2 MMOL/L (ref 5–15)
APTT PPP: 23.8 SECONDS (ref 25.5–35.4)
APTT PPP: 32.3 SECONDS (ref 25.5–35.4)
ARTERIAL PATENCY WRIST A: ABNORMAL
ARTERIAL PATENCY WRIST A: ABNORMAL
AST SERPL-CCNC: 11 U/L (ref 1–32)
AST SERPL-CCNC: 16 U/L (ref 1–32)
AST SERPL-CCNC: 16 U/L (ref 1–32)
AST SERPL-CCNC: 17 U/L (ref 1–32)
AST SERPL-CCNC: 18 U/L (ref 1–32)
AST SERPL-CCNC: 19 U/L (ref 1–32)
AST SERPL-CCNC: 20 U/L (ref 1–32)
AST SERPL-CCNC: 20 U/L (ref 1–32)
AST SERPL-CCNC: 22 U/L (ref 1–32)
AST SERPL-CCNC: 22 U/L (ref 1–32)
AST SERPL-CCNC: 23 U/L (ref 1–32)
ATMOSPHERIC PRESS: 724 MMHG
ATMOSPHERIC PRESS: 729 MMHG
ATMOSPHERIC PRESS: 730 MMHG
BACTERIA SPEC AEROBE CULT: ABNORMAL
BACTERIA SPEC AEROBE CULT: NO GROWTH
BACTERIA SPEC AEROBE CULT: NORMAL
BACTERIA SPEC CULT: NORMAL
BACTERIA SPEC CULT: NORMAL
BACTERIA UR CULT: ABNORMAL
BACTERIA UR CULT: ABNORMAL
BACTERIA UR QL AUTO: ABNORMAL /HPF
BARBITURATES UR QL SCN: NEGATIVE
BASE EXCESS BLDA CALC-SCNC: -1.9 MMOL/L (ref 0–2)
BASE EXCESS BLDA CALC-SCNC: 1.1 MMOL/L (ref 0–2)
BASE EXCESS BLDV CALC-SCNC: -4.6 MMOL/L (ref 0–2)
BASOPHILS # BLD AUTO: 0.02 10*3/MM3 (ref 0–0.2)
BASOPHILS # BLD AUTO: 0.03 10*3/MM3 (ref 0–0.2)
BASOPHILS # BLD AUTO: 0.04 10*3/MM3 (ref 0–0.2)
BASOPHILS # BLD AUTO: 0.05 10*3/MM3 (ref 0–0.2)
BASOPHILS # BLD AUTO: 0.06 10*3/MM3 (ref 0–0.2)
BASOPHILS # BLD AUTO: 0.07 10*3/MM3 (ref 0–0.2)
BASOPHILS NFR BLD AUTO: 0.2 % (ref 0–1.5)
BASOPHILS NFR BLD AUTO: 0.3 % (ref 0–1.5)
BASOPHILS NFR BLD AUTO: 0.5 % (ref 0–1.5)
BASOPHILS NFR BLD AUTO: 0.6 % (ref 0–1.5)
BASOPHILS NFR BLD AUTO: 0.7 % (ref 0–1.5)
BASOPHILS NFR BLD AUTO: 0.7 % (ref 0–1.5)
BASOPHILS NFR BLD AUTO: 1.2 % (ref 0–1.5)
BASOPHILS NFR BLD AUTO: 1.5 % (ref 0–1.5)
BASOPHILS NFR BLD AUTO: 1.6 % (ref 0–1.5)
BDY SITE: ABNORMAL
BENZODIAZ UR QL SCN: NEGATIVE
BH CV ECHO MEAS - AO ROOT AREA (BSA CORRECTED): 1.7
BH CV ECHO MEAS - AO ROOT AREA: 5.3 CM^2
BH CV ECHO MEAS - AO ROOT DIAM: 2.6 CM
BH CV ECHO MEAS - BSA(HAYCOCK): 1.5 M^2
BH CV ECHO MEAS - BSA: 1.5 M^2
BH CV ECHO MEAS - BZI_BMI: 23.2 KILOGRAMS/M^2
BH CV ECHO MEAS - BZI_METRIC_HEIGHT: 152.4 CM
BH CV ECHO MEAS - BZI_METRIC_WEIGHT: 54 KG
BH CV ECHO MEAS - EDV(CUBED): 35.9 ML
BH CV ECHO MEAS - EDV(MOD-SP4): 17.3 ML
BH CV ECHO MEAS - EDV(TEICH): 44.1 ML
BH CV ECHO MEAS - EF(CUBED): 74.2 %
BH CV ECHO MEAS - EF(MOD-SP4): 60 %
BH CV ECHO MEAS - EF(TEICH): 67.4 %
BH CV ECHO MEAS - ESV(CUBED): 9.3 ML
BH CV ECHO MEAS - ESV(MOD-SP4): 6.9 ML
BH CV ECHO MEAS - ESV(TEICH): 14.4 ML
BH CV ECHO MEAS - FS: 36.4 %
BH CV ECHO MEAS - IVS/LVPW: 1
BH CV ECHO MEAS - IVSD: 1.7 CM
BH CV ECHO MEAS - LA DIMENSION: 2.6 CM
BH CV ECHO MEAS - LA/AO: 1
BH CV ECHO MEAS - LV DIASTOLIC VOL/BSA (35-75): 11.6 ML/M^2
BH CV ECHO MEAS - LV MASS(C)D: 220.9 GRAMS
BH CV ECHO MEAS - LV MASS(C)DI: 147.6 GRAMS/M^2
BH CV ECHO MEAS - LV SYSTOLIC VOL/BSA (12-30): 4.6 ML/M^2
BH CV ECHO MEAS - LVIDD: 3.3 CM
BH CV ECHO MEAS - LVIDS: 2.1 CM
BH CV ECHO MEAS - LVLD AP4: 4.7 CM
BH CV ECHO MEAS - LVLS AP4: 4.3 CM
BH CV ECHO MEAS - LVOT AREA (M): 2 CM^2
BH CV ECHO MEAS - LVOT AREA: 2 CM^2
BH CV ECHO MEAS - LVOT DIAM: 1.6 CM
BH CV ECHO MEAS - LVPWD: 1.7 CM
BH CV ECHO MEAS - MV A MAX VEL: 149 CM/SEC
BH CV ECHO MEAS - MV E MAX VEL: 94 CM/SEC
BH CV ECHO MEAS - MV E/A: 0.63
BH CV ECHO MEAS - PA ACC TIME: 0.1 SEC
BH CV ECHO MEAS - PA PR(ACCEL): 34.5 MMHG
BH CV ECHO MEAS - RAP SYSTOLE: 10 MMHG
BH CV ECHO MEAS - RVSP: 52.5 MMHG
BH CV ECHO MEAS - SI(CUBED): 17.8 ML/M^2
BH CV ECHO MEAS - SI(MOD-SP4): 6.9 ML/M^2
BH CV ECHO MEAS - SI(TEICH): 19.9 ML/M^2
BH CV ECHO MEAS - SV(CUBED): 26.7 ML
BH CV ECHO MEAS - SV(MOD-SP4): 10.4 ML
BH CV ECHO MEAS - SV(TEICH): 29.7 ML
BH CV ECHO MEAS - TR MAX VEL: 326 CM/SEC
BILIRUB SERPL-MCNC: 0.2 MG/DL (ref 0–1.2)
BILIRUB SERPL-MCNC: 0.3 MG/DL (ref 0–1.2)
BILIRUB SERPL-MCNC: 0.4 MG/DL (ref 0–1.2)
BILIRUB SERPL-MCNC: 0.5 MG/DL (ref 0–1.2)
BILIRUB SERPL-MCNC: 0.6 MG/DL (ref 0–1.2)
BILIRUB SERPL-MCNC: 0.6 MG/DL (ref 0–1.2)
BILIRUB UR QL STRIP: ABNORMAL
BILIRUB UR QL STRIP: NEGATIVE
BLD GP AB SCN SERPL QL: NEGATIVE
BODY TEMPERATURE: 0 C
BODY TEMPERATURE: 0 C
BODY TEMPERATURE: 37 C
BUN SERPL-MCNC: 10 MG/DL (ref 8–23)
BUN SERPL-MCNC: 11 MG/DL (ref 8–23)
BUN SERPL-MCNC: 12 MG/DL (ref 8–23)
BUN SERPL-MCNC: 14 MG/DL (ref 8–23)
BUN SERPL-MCNC: 15 MG/DL (ref 8–23)
BUN SERPL-MCNC: 15 MG/DL (ref 8–23)
BUN SERPL-MCNC: 16 MG/DL (ref 8–23)
BUN SERPL-MCNC: 18 MG/DL (ref 8–23)
BUN SERPL-MCNC: 3 MG/DL (ref 8–23)
BUN SERPL-MCNC: 4 MG/DL (ref 8–23)
BUN SERPL-MCNC: 5 MG/DL (ref 8–23)
BUN SERPL-MCNC: 6 MG/DL (ref 8–23)
BUN SERPL-MCNC: 7 MG/DL (ref 8–23)
BUN SERPL-MCNC: 8 MG/DL (ref 8–23)
BUN SERPL-MCNC: 9 MG/DL (ref 8–23)
BUN/CREAT SERPL: 10.7 (ref 7–25)
BUN/CREAT SERPL: 10.9 (ref 7–25)
BUN/CREAT SERPL: 11.1 (ref 7–25)
BUN/CREAT SERPL: 11.6 (ref 7–25)
BUN/CREAT SERPL: 11.9 (ref 7–25)
BUN/CREAT SERPL: 11.9 (ref 7–25)
BUN/CREAT SERPL: 12.1 (ref 7–25)
BUN/CREAT SERPL: 12.3 (ref 7–25)
BUN/CREAT SERPL: 12.5 (ref 7–25)
BUN/CREAT SERPL: 12.5 (ref 7–25)
BUN/CREAT SERPL: 13.8 (ref 7–25)
BUN/CREAT SERPL: 14 (ref 7–25)
BUN/CREAT SERPL: 14.3 (ref 7–25)
BUN/CREAT SERPL: 15.2 (ref 7–25)
BUN/CREAT SERPL: 15.9 (ref 7–25)
BUN/CREAT SERPL: 16.3 (ref 7–25)
BUN/CREAT SERPL: 16.7 (ref 7–25)
BUN/CREAT SERPL: 17.1 (ref 7–25)
BUN/CREAT SERPL: 17.2 (ref 7–25)
BUN/CREAT SERPL: 17.3 (ref 7–25)
BUN/CREAT SERPL: 17.4 (ref 7–25)
BUN/CREAT SERPL: 17.9 (ref 7–25)
BUN/CREAT SERPL: 18.1 (ref 7–25)
BUN/CREAT SERPL: 22.2 (ref 7–25)
BUN/CREAT SERPL: 23.7 (ref 7–25)
BUN/CREAT SERPL: 5 (ref 7–25)
BUN/CREAT SERPL: 7 (ref 7–25)
BUN/CREAT SERPL: 7.8 (ref 7–25)
BUN/CREAT SERPL: 9.2 (ref 7–25)
BUPRENORPHINE SERPL-MCNC: NEGATIVE NG/ML
C DIFF TOX GENS STL QL NAA+PROBE: NEGATIVE
CALCIUM 24H UR-MCNC: 12.9 MG/DL
CALCIUM SPEC-SCNC: 7.7 MG/DL (ref 8.6–10.5)
CALCIUM SPEC-SCNC: 7.9 MG/DL (ref 8.6–10.5)
CALCIUM SPEC-SCNC: 7.9 MG/DL (ref 8.6–10.5)
CALCIUM SPEC-SCNC: 8 MG/DL (ref 8.6–10.5)
CALCIUM SPEC-SCNC: 8.1 MG/DL (ref 8.6–10.5)
CALCIUM SPEC-SCNC: 8.1 MG/DL (ref 8.6–10.5)
CALCIUM SPEC-SCNC: 8.2 MG/DL (ref 8.6–10.5)
CALCIUM SPEC-SCNC: 8.2 MG/DL (ref 8.6–10.5)
CALCIUM SPEC-SCNC: 8.3 MG/DL (ref 8.6–10.5)
CALCIUM SPEC-SCNC: 8.3 MG/DL (ref 8.6–10.5)
CALCIUM SPEC-SCNC: 8.4 MG/DL (ref 8.6–10.5)
CALCIUM SPEC-SCNC: 8.5 MG/DL (ref 8.6–10.5)
CALCIUM SPEC-SCNC: 8.5 MG/DL (ref 8.6–10.5)
CALCIUM SPEC-SCNC: 8.7 MG/DL (ref 8.6–10.5)
CALCIUM SPEC-SCNC: 8.8 MG/DL (ref 8.6–10.5)
CALCIUM SPEC-SCNC: 8.9 MG/DL (ref 8.6–10.5)
CALCIUM SPEC-SCNC: 8.9 MG/DL (ref 8.6–10.5)
CALCIUM SPEC-SCNC: 9.3 MG/DL (ref 8.6–10.5)
CALCIUM SPEC-SCNC: 9.3 MG/DL (ref 8.6–10.5)
CALCIUM SPEC-SCNC: 9.4 MG/DL (ref 8.6–10.5)
CALCIUM SPEC-SCNC: 9.5 MG/DL (ref 8.6–10.5)
CALCIUM SPEC-SCNC: 9.6 MG/DL (ref 8.6–10.5)
CALCIUM SPEC-SCNC: 9.7 MG/DL (ref 8.6–10.5)
CALCIUM SPEC-SCNC: 9.7 MG/DL (ref 8.6–10.5)
CAMPYLOBACTER STL CULT: NORMAL
CANNABINOIDS SERPL QL: NEGATIVE
CHLORIDE SERPL-SCNC: 101 MMOL/L (ref 98–107)
CHLORIDE SERPL-SCNC: 102 MMOL/L (ref 98–107)
CHLORIDE SERPL-SCNC: 103 MMOL/L (ref 98–107)
CHLORIDE SERPL-SCNC: 104 MMOL/L (ref 98–107)
CHLORIDE SERPL-SCNC: 105 MMOL/L (ref 98–107)
CHLORIDE SERPL-SCNC: 106 MMOL/L (ref 98–107)
CHLORIDE SERPL-SCNC: 107 MMOL/L (ref 98–107)
CHLORIDE SERPL-SCNC: 108 MMOL/L (ref 98–107)
CHLORIDE SERPL-SCNC: 109 MMOL/L (ref 98–107)
CHLORIDE SERPL-SCNC: 109 MMOL/L (ref 98–107)
CHLORIDE SERPL-SCNC: 110 MMOL/L (ref 98–107)
CHLORIDE SERPL-SCNC: 111 MMOL/L (ref 98–107)
CHLORIDE SERPL-SCNC: 99 MMOL/L (ref 98–107)
CHLORIDE UR-SCNC: 92 MMOL/L
CHOLEST SERPL-MCNC: 154 MG/DL (ref 0–200)
CHOLEST SERPL-MCNC: 92 MG/DL (ref 0–200)
CHOLEST SERPL-MCNC: 99 MG/DL (ref 0–200)
CLARITY UR: ABNORMAL
CLARITY UR: CLEAR
CO2 BLDA-SCNC: 19.2 MMOL/L (ref 22–33)
CO2 BLDA-SCNC: 21.7 MMOL/L (ref 22–33)
CO2 BLDA-SCNC: 25.9 MMOL/L (ref 22–33)
CO2 SERPL-SCNC: 12.6 MMOL/L (ref 22–29)
CO2 SERPL-SCNC: 13.1 MMOL/L (ref 22–29)
CO2 SERPL-SCNC: 14.2 MMOL/L (ref 22–29)
CO2 SERPL-SCNC: 14.8 MMOL/L (ref 22–29)
CO2 SERPL-SCNC: 15.1 MMOL/L (ref 22–29)
CO2 SERPL-SCNC: 15.3 MMOL/L (ref 22–29)
CO2 SERPL-SCNC: 15.8 MMOL/L (ref 22–29)
CO2 SERPL-SCNC: 16.3 MMOL/L (ref 22–29)
CO2 SERPL-SCNC: 16.8 MMOL/L (ref 22–29)
CO2 SERPL-SCNC: 16.8 MMOL/L (ref 22–29)
CO2 SERPL-SCNC: 17.1 MMOL/L (ref 22–29)
CO2 SERPL-SCNC: 17.6 MMOL/L (ref 22–29)
CO2 SERPL-SCNC: 18 MMOL/L (ref 22–29)
CO2 SERPL-SCNC: 18 MMOL/L (ref 22–29)
CO2 SERPL-SCNC: 18.4 MMOL/L (ref 22–29)
CO2 SERPL-SCNC: 19.1 MMOL/L (ref 22–29)
CO2 SERPL-SCNC: 19.2 MMOL/L (ref 22–29)
CO2 SERPL-SCNC: 19.3 MMOL/L (ref 22–29)
CO2 SERPL-SCNC: 19.7 MMOL/L (ref 22–29)
CO2 SERPL-SCNC: 20 MMOL/L (ref 22–29)
CO2 SERPL-SCNC: 21.5 MMOL/L (ref 22–29)
CO2 SERPL-SCNC: 22 MMOL/L (ref 22–29)
CO2 SERPL-SCNC: 22.2 MMOL/L (ref 22–29)
CO2 SERPL-SCNC: 22.7 MMOL/L (ref 22–29)
CO2 SERPL-SCNC: 23.6 MMOL/L (ref 22–29)
CO2 SERPL-SCNC: 24.3 MMOL/L (ref 22–29)
CO2 SERPL-SCNC: 24.7 MMOL/L (ref 22–29)
COCAINE UR QL: NEGATIVE
COHGB MFR BLD: 1.2 % (ref 0–5)
COHGB MFR BLD: 1.4 % (ref 0–5)
COHGB MFR BLD: <1 % (ref 0–5)
COLOR UR: ABNORMAL
COLOR UR: ABNORMAL
COLOR UR: YELLOW
CREAT SERPL-MCNC: 0.52 MG/DL (ref 0.57–1)
CREAT SERPL-MCNC: 0.56 MG/DL (ref 0.57–1)
CREAT SERPL-MCNC: 0.57 MG/DL (ref 0.57–1)
CREAT SERPL-MCNC: 0.6 MG/DL (ref 0.57–1)
CREAT SERPL-MCNC: 0.63 MG/DL (ref 0.57–1)
CREAT SERPL-MCNC: 0.64 MG/DL (ref 0.57–1)
CREAT SERPL-MCNC: 0.65 MG/DL (ref 0.57–1)
CREAT SERPL-MCNC: 0.65 MG/DL (ref 0.57–1)
CREAT SERPL-MCNC: 0.66 MG/DL (ref 0.57–1)
CREAT SERPL-MCNC: 0.66 MG/DL (ref 0.57–1)
CREAT SERPL-MCNC: 0.67 MG/DL (ref 0.57–1)
CREAT SERPL-MCNC: 0.67 MG/DL (ref 0.57–1)
CREAT SERPL-MCNC: 0.69 MG/DL (ref 0.57–1)
CREAT SERPL-MCNC: 0.72 MG/DL (ref 0.57–1)
CREAT SERPL-MCNC: 0.72 MG/DL (ref 0.57–1)
CREAT SERPL-MCNC: 0.75 MG/DL (ref 0.57–1)
CREAT SERPL-MCNC: 0.76 MG/DL (ref 0.57–1)
CREAT SERPL-MCNC: 0.77 MG/DL (ref 0.57–1)
CREAT SERPL-MCNC: 0.82 MG/DL (ref 0.57–1)
CREAT SERPL-MCNC: 0.83 MG/DL (ref 0.57–1)
CREAT SERPL-MCNC: 0.84 MG/DL (ref 0.57–1)
CREAT SERPL-MCNC: 0.84 MG/DL (ref 0.57–1)
CREAT SERPL-MCNC: 0.86 MG/DL (ref 0.57–1)
CRP SERPL-MCNC: 0.5 MG/DL (ref 0–0.5)
CRP SERPL-MCNC: 8.69 MG/DL (ref 0–0.5)
D-LACTATE SERPL-SCNC: 1.5 MMOL/L (ref 0.5–2)
DEPRECATED RDW RBC AUTO: 46 FL (ref 37–54)
DEPRECATED RDW RBC AUTO: 46.2 FL (ref 37–54)
DEPRECATED RDW RBC AUTO: 46.4 FL (ref 37–54)
DEPRECATED RDW RBC AUTO: 46.5 FL (ref 37–54)
DEPRECATED RDW RBC AUTO: 47 FL (ref 37–54)
DEPRECATED RDW RBC AUTO: 47.8 FL (ref 37–54)
DEPRECATED RDW RBC AUTO: 48.1 FL (ref 37–54)
DEPRECATED RDW RBC AUTO: 48.4 FL (ref 37–54)
DEPRECATED RDW RBC AUTO: 48.6 FL (ref 37–54)
DEPRECATED RDW RBC AUTO: 48.8 FL (ref 37–54)
DEPRECATED RDW RBC AUTO: 48.8 FL (ref 37–54)
DEPRECATED RDW RBC AUTO: 49 FL (ref 37–54)
DEPRECATED RDW RBC AUTO: 49.2 FL (ref 37–54)
DEPRECATED RDW RBC AUTO: 49.8 FL (ref 37–54)
DEPRECATED RDW RBC AUTO: 49.8 FL (ref 37–54)
DEPRECATED RDW RBC AUTO: 49.9 FL (ref 37–54)
DEPRECATED RDW RBC AUTO: 50 FL (ref 37–54)
DEPRECATED RDW RBC AUTO: 50.6 FL (ref 37–54)
DEPRECATED RDW RBC AUTO: 50.8 FL (ref 37–54)
DEPRECATED RDW RBC AUTO: 51.2 FL (ref 37–54)
DEPRECATED RDW RBC AUTO: 51.3 FL (ref 37–54)
DEPRECATED RDW RBC AUTO: 51.6 FL (ref 37–54)
DEPRECATED RDW RBC AUTO: 51.7 FL (ref 37–54)
DEPRECATED RDW RBC AUTO: 51.7 FL (ref 37–54)
DEPRECATED RDW RBC AUTO: 52.1 FL (ref 37–54)
DEPRECATED RDW RBC AUTO: 52.8 FL (ref 37–54)
DEPRECATED RDW RBC AUTO: 54.4 FL (ref 37–54)
DEPRECATED RDW RBC AUTO: 55.3 FL (ref 37–54)
E COLI SXT STL QL IA: NEGATIVE
EOSINOPHIL # BLD AUTO: 0.05 10*3/MM3 (ref 0–0.4)
EOSINOPHIL # BLD AUTO: 0.06 10*3/MM3 (ref 0–0.4)
EOSINOPHIL # BLD AUTO: 0.07 10*3/MM3 (ref 0–0.4)
EOSINOPHIL # BLD AUTO: 0.07 10*3/MM3 (ref 0–0.4)
EOSINOPHIL # BLD AUTO: 0.1 10*3/MM3 (ref 0–0.4)
EOSINOPHIL # BLD AUTO: 0.1 10*3/MM3 (ref 0–0.4)
EOSINOPHIL # BLD AUTO: 0.14 10*3/MM3 (ref 0–0.4)
EOSINOPHIL # BLD AUTO: 0.15 10*3/MM3 (ref 0–0.4)
EOSINOPHIL # BLD AUTO: 0.15 10*3/MM3 (ref 0–0.4)
EOSINOPHIL # BLD AUTO: 0.17 10*3/MM3 (ref 0–0.4)
EOSINOPHIL # BLD AUTO: 0.19 10*3/MM3 (ref 0–0.4)
EOSINOPHIL # BLD AUTO: 0.21 10*3/MM3 (ref 0–0.4)
EOSINOPHIL # BLD AUTO: 0.21 10*3/MM3 (ref 0–0.4)
EOSINOPHIL # BLD AUTO: 0.22 10*3/MM3 (ref 0–0.4)
EOSINOPHIL # BLD AUTO: 0.24 10*3/MM3 (ref 0–0.4)
EOSINOPHIL # BLD AUTO: 0.41 10*3/MM3 (ref 0–0.4)
EOSINOPHIL # BLD AUTO: 0.44 10*3/MM3 (ref 0–0.4)
EOSINOPHIL # BLD AUTO: 0.45 10*3/MM3 (ref 0–0.4)
EOSINOPHIL # BLD AUTO: 1.1 10*3/MM3 (ref 0–0.4)
EOSINOPHIL NFR BLD AUTO: 0.6 % (ref 0.3–6.2)
EOSINOPHIL NFR BLD AUTO: 0.6 % (ref 0.3–6.2)
EOSINOPHIL NFR BLD AUTO: 0.7 % (ref 0.3–6.2)
EOSINOPHIL NFR BLD AUTO: 0.8 % (ref 0.3–6.2)
EOSINOPHIL NFR BLD AUTO: 1.3 % (ref 0.3–6.2)
EOSINOPHIL NFR BLD AUTO: 1.5 % (ref 0.3–6.2)
EOSINOPHIL NFR BLD AUTO: 1.6 % (ref 0.3–6.2)
EOSINOPHIL NFR BLD AUTO: 1.8 % (ref 0.3–6.2)
EOSINOPHIL NFR BLD AUTO: 1.8 % (ref 0.3–6.2)
EOSINOPHIL NFR BLD AUTO: 14.2 % (ref 0.3–6.2)
EOSINOPHIL NFR BLD AUTO: 2 % (ref 0.3–6.2)
EOSINOPHIL NFR BLD AUTO: 2.4 % (ref 0.3–6.2)
EOSINOPHIL NFR BLD AUTO: 2.5 % (ref 0.3–6.2)
EOSINOPHIL NFR BLD AUTO: 2.7 % (ref 0.3–6.2)
EOSINOPHIL NFR BLD AUTO: 3 % (ref 0.3–6.2)
EOSINOPHIL NFR BLD AUTO: 3 % (ref 0.3–6.2)
EOSINOPHIL NFR BLD AUTO: 3.4 % (ref 0.3–6.2)
EOSINOPHIL NFR BLD AUTO: 4.4 % (ref 0.3–6.2)
EOSINOPHIL NFR BLD AUTO: 8.6 % (ref 0.3–6.2)
ERYTHROCYTE [DISTWIDTH] IN BLOOD BY AUTOMATED COUNT: 13.4 % (ref 12.3–15.4)
ERYTHROCYTE [DISTWIDTH] IN BLOOD BY AUTOMATED COUNT: 13.5 % (ref 12.3–15.4)
ERYTHROCYTE [DISTWIDTH] IN BLOOD BY AUTOMATED COUNT: 13.6 % (ref 12.3–15.4)
ERYTHROCYTE [DISTWIDTH] IN BLOOD BY AUTOMATED COUNT: 13.7 % (ref 12.3–15.4)
ERYTHROCYTE [DISTWIDTH] IN BLOOD BY AUTOMATED COUNT: 13.8 % (ref 12.3–15.4)
ERYTHROCYTE [DISTWIDTH] IN BLOOD BY AUTOMATED COUNT: 13.8 % (ref 12.3–15.4)
ERYTHROCYTE [DISTWIDTH] IN BLOOD BY AUTOMATED COUNT: 13.9 % (ref 12.3–15.4)
ERYTHROCYTE [DISTWIDTH] IN BLOOD BY AUTOMATED COUNT: 13.9 % (ref 12.3–15.4)
ERYTHROCYTE [DISTWIDTH] IN BLOOD BY AUTOMATED COUNT: 14.2 % (ref 12.3–15.4)
ERYTHROCYTE [DISTWIDTH] IN BLOOD BY AUTOMATED COUNT: 14.2 % (ref 12.3–15.4)
ERYTHROCYTE [DISTWIDTH] IN BLOOD BY AUTOMATED COUNT: 14.4 % (ref 12.3–15.4)
ERYTHROCYTE [DISTWIDTH] IN BLOOD BY AUTOMATED COUNT: 14.4 % (ref 12.3–15.4)
ERYTHROCYTE [DISTWIDTH] IN BLOOD BY AUTOMATED COUNT: 14.5 % (ref 12.3–15.4)
ERYTHROCYTE [DISTWIDTH] IN BLOOD BY AUTOMATED COUNT: 14.6 % (ref 12.3–15.4)
ERYTHROCYTE [DISTWIDTH] IN BLOOD BY AUTOMATED COUNT: 14.7 % (ref 12.3–15.4)
ERYTHROCYTE [DISTWIDTH] IN BLOOD BY AUTOMATED COUNT: 14.9 % (ref 12.3–15.4)
ERYTHROCYTE [DISTWIDTH] IN BLOOD BY AUTOMATED COUNT: 14.9 % (ref 12.3–15.4)
ERYTHROCYTE [DISTWIDTH] IN BLOOD BY AUTOMATED COUNT: 15.1 % (ref 12.3–15.4)
ERYTHROCYTE [DISTWIDTH] IN BLOOD BY AUTOMATED COUNT: 15.1 % (ref 12.3–15.4)
ERYTHROCYTE [DISTWIDTH] IN BLOOD BY AUTOMATED COUNT: 16 % (ref 12.3–15.4)
ETHANOL BLD-MCNC: <10 MG/DL (ref 0–10)
ETHANOL UR QL: <0.01 %
FERRITIN SERPL-MCNC: 140.5 NG/ML (ref 13–150)
FERRITIN SERPL-MCNC: 71.4 NG/ML (ref 13–150)
FLUAV RNA RESP QL NAA+PROBE: NOT DETECTED
FLUAV SUBTYP SPEC NAA+PROBE: NOT DETECTED
FLUBV RNA ISLT QL NAA+PROBE: NOT DETECTED
FLUBV RNA RESP QL NAA+PROBE: NOT DETECTED
FOLATE SERPL-MCNC: 5.17 NG/ML (ref 4.78–24.2)
FOLATE SERPL-MCNC: 5.76 NG/ML (ref 4.78–24.2)
GFR SERPL CREATININE-BSD FRML MDRD: 103 ML/MIN/1.73
GFR SERPL CREATININE-BSD FRML MDRD: 105 ML/MIN/1.73
GFR SERPL CREATININE-BSD FRML MDRD: 114 ML/MIN/1.73
GFR SERPL CREATININE-BSD FRML MDRD: 64 ML/MIN/1.73
GFR SERPL CREATININE-BSD FRML MDRD: 66 ML/MIN/1.73
GFR SERPL CREATININE-BSD FRML MDRD: 68 ML/MIN/1.73
GFR SERPL CREATININE-BSD FRML MDRD: 73 ML/MIN/1.73
GFR SERPL CREATININE-BSD FRML MDRD: 74 ML/MIN/1.73
GFR SERPL CREATININE-BSD FRML MDRD: 75 ML/MIN/1.73
GFR SERPL CREATININE-BSD FRML MDRD: 78 ML/MIN/1.73
GFR SERPL CREATININE-BSD FRML MDRD: 78 ML/MIN/1.73
GFR SERPL CREATININE-BSD FRML MDRD: 82 ML/MIN/1.73
GFR SERPL CREATININE-BSD FRML MDRD: 85 ML/MIN/1.73
GFR SERPL CREATININE-BSD FRML MDRD: 85 ML/MIN/1.73
GFR SERPL CREATININE-BSD FRML MDRD: 87 ML/MIN/1.73
GFR SERPL CREATININE-BSD FRML MDRD: 87 ML/MIN/1.73
GFR SERPL CREATININE-BSD FRML MDRD: 88 ML/MIN/1.73
GFR SERPL CREATININE-BSD FRML MDRD: 88 ML/MIN/1.73
GFR SERPL CREATININE-BSD FRML MDRD: 90 ML/MIN/1.73
GFR SERPL CREATININE-BSD FRML MDRD: 91 ML/MIN/1.73
GFR SERPL CREATININE-BSD FRML MDRD: 97 ML/MIN/1.73
GLOBULIN UR ELPH-MCNC: 2.1 GM/DL
GLOBULIN UR ELPH-MCNC: 2.1 GM/DL
GLOBULIN UR ELPH-MCNC: 2.3 GM/DL
GLOBULIN UR ELPH-MCNC: 2.4 GM/DL
GLOBULIN UR ELPH-MCNC: 2.5 GM/DL
GLOBULIN UR ELPH-MCNC: 2.7 GM/DL
GLOBULIN UR ELPH-MCNC: 2.8 GM/DL
GLOBULIN UR ELPH-MCNC: 3 GM/DL
GLUCOSE BLDC GLUCOMTR-MCNC: 106 MG/DL (ref 70–130)
GLUCOSE BLDC GLUCOMTR-MCNC: 107 MG/DL (ref 70–130)
GLUCOSE BLDC GLUCOMTR-MCNC: 61 MG/DL (ref 70–130)
GLUCOSE BLDC GLUCOMTR-MCNC: 67 MG/DL (ref 70–130)
GLUCOSE BLDC GLUCOMTR-MCNC: 70 MG/DL (ref 70–130)
GLUCOSE BLDC GLUCOMTR-MCNC: 70 MG/DL (ref 70–130)
GLUCOSE BLDC GLUCOMTR-MCNC: 71 MG/DL (ref 70–130)
GLUCOSE BLDC GLUCOMTR-MCNC: 73 MG/DL (ref 70–130)
GLUCOSE BLDC GLUCOMTR-MCNC: 74 MG/DL (ref 70–130)
GLUCOSE BLDC GLUCOMTR-MCNC: 75 MG/DL (ref 70–130)
GLUCOSE BLDC GLUCOMTR-MCNC: 75 MG/DL (ref 70–130)
GLUCOSE BLDC GLUCOMTR-MCNC: 76 MG/DL (ref 70–130)
GLUCOSE BLDC GLUCOMTR-MCNC: 78 MG/DL (ref 70–130)
GLUCOSE BLDC GLUCOMTR-MCNC: 79 MG/DL (ref 70–130)
GLUCOSE BLDC GLUCOMTR-MCNC: 81 MG/DL (ref 70–130)
GLUCOSE BLDC GLUCOMTR-MCNC: 81 MG/DL (ref 70–130)
GLUCOSE BLDC GLUCOMTR-MCNC: 84 MG/DL (ref 70–130)
GLUCOSE BLDC GLUCOMTR-MCNC: 85 MG/DL (ref 70–130)
GLUCOSE BLDC GLUCOMTR-MCNC: 86 MG/DL (ref 70–130)
GLUCOSE BLDC GLUCOMTR-MCNC: 86 MG/DL (ref 70–130)
GLUCOSE BLDC GLUCOMTR-MCNC: 87 MG/DL (ref 70–130)
GLUCOSE BLDC GLUCOMTR-MCNC: 93 MG/DL (ref 70–130)
GLUCOSE BLDC GLUCOMTR-MCNC: 93 MG/DL (ref 70–130)
GLUCOSE BLDC GLUCOMTR-MCNC: 94 MG/DL (ref 70–130)
GLUCOSE BLDC GLUCOMTR-MCNC: 94 MG/DL (ref 70–130)
GLUCOSE BLDC GLUCOMTR-MCNC: 95 MG/DL (ref 70–130)
GLUCOSE BLDC GLUCOMTR-MCNC: 96 MG/DL (ref 70–130)
GLUCOSE SERPL-MCNC: 102 MG/DL (ref 65–99)
GLUCOSE SERPL-MCNC: 103 MG/DL (ref 65–99)
GLUCOSE SERPL-MCNC: 103 MG/DL (ref 65–99)
GLUCOSE SERPL-MCNC: 105 MG/DL (ref 65–99)
GLUCOSE SERPL-MCNC: 115 MG/DL (ref 65–99)
GLUCOSE SERPL-MCNC: 117 MG/DL (ref 65–99)
GLUCOSE SERPL-MCNC: 122 MG/DL (ref 65–99)
GLUCOSE SERPL-MCNC: 128 MG/DL (ref 65–99)
GLUCOSE SERPL-MCNC: 133 MG/DL (ref 65–99)
GLUCOSE SERPL-MCNC: 136 MG/DL (ref 65–99)
GLUCOSE SERPL-MCNC: 62 MG/DL (ref 65–99)
GLUCOSE SERPL-MCNC: 72 MG/DL (ref 65–99)
GLUCOSE SERPL-MCNC: 77 MG/DL (ref 65–99)
GLUCOSE SERPL-MCNC: 79 MG/DL (ref 65–99)
GLUCOSE SERPL-MCNC: 79 MG/DL (ref 65–99)
GLUCOSE SERPL-MCNC: 82 MG/DL (ref 65–99)
GLUCOSE SERPL-MCNC: 83 MG/DL (ref 65–99)
GLUCOSE SERPL-MCNC: 84 MG/DL (ref 65–99)
GLUCOSE SERPL-MCNC: 85 MG/DL (ref 65–99)
GLUCOSE SERPL-MCNC: 91 MG/DL (ref 65–99)
GLUCOSE SERPL-MCNC: 93 MG/DL (ref 65–99)
GLUCOSE SERPL-MCNC: 94 MG/DL (ref 65–99)
GLUCOSE SERPL-MCNC: 95 MG/DL (ref 65–99)
GLUCOSE SERPL-MCNC: 96 MG/DL (ref 65–99)
GLUCOSE SERPL-MCNC: 99 MG/DL (ref 65–99)
GLUCOSE UR STRIP-MCNC: NEGATIVE MG/DL
H PYLORI IGA SER-ACNC: <9 UNITS (ref 0–8.9)
H PYLORI IGG SER IA-ACNC: 0.19 INDEX VALUE (ref 0–0.79)
H PYLORI IGM SER-ACNC: <9 UNITS (ref 0–8.9)
HBA1C MFR BLD: 4.8 % (ref 4.8–5.6)
HBA1C MFR BLD: 5.04 % (ref 4.8–5.6)
HCO3 BLDA-SCNC: 20.8 MMOL/L (ref 20–26)
HCO3 BLDA-SCNC: 24.8 MMOL/L (ref 20–26)
HCO3 BLDV-SCNC: 18.3 MMOL/L (ref 22–28)
HCT VFR BLD AUTO: 26.2 % (ref 34–46.6)
HCT VFR BLD AUTO: 27.4 % (ref 34–46.6)
HCT VFR BLD AUTO: 27.7 % (ref 34–46.6)
HCT VFR BLD AUTO: 28.5 % (ref 34–46.6)
HCT VFR BLD AUTO: 28.6 % (ref 34–46.6)
HCT VFR BLD AUTO: 28.6 % (ref 34–46.6)
HCT VFR BLD AUTO: 28.7 % (ref 34–46.6)
HCT VFR BLD AUTO: 28.8 % (ref 34–46.6)
HCT VFR BLD AUTO: 29 % (ref 34–46.6)
HCT VFR BLD AUTO: 30 % (ref 34–46.6)
HCT VFR BLD AUTO: 31.4 % (ref 34–46.6)
HCT VFR BLD AUTO: 31.5 % (ref 34–46.6)
HCT VFR BLD AUTO: 32 % (ref 34–46.6)
HCT VFR BLD AUTO: 33.1 % (ref 34–46.6)
HCT VFR BLD AUTO: 33.8 % (ref 34–46.6)
HCT VFR BLD AUTO: 34.1 % (ref 34–46.6)
HCT VFR BLD AUTO: 34.4 % (ref 34–46.6)
HCT VFR BLD AUTO: 35 % (ref 34–46.6)
HCT VFR BLD AUTO: 35.3 % (ref 34–46.6)
HCT VFR BLD AUTO: 35.5 % (ref 34–46.6)
HCT VFR BLD AUTO: 35.9 % (ref 34–46.6)
HCT VFR BLD AUTO: 36.7 % (ref 34–46.6)
HCT VFR BLD AUTO: 36.8 % (ref 34–46.6)
HCT VFR BLD AUTO: 38.3 % (ref 34–46.6)
HCT VFR BLD AUTO: 39 % (ref 34–46.6)
HCT VFR BLD AUTO: 39.5 % (ref 34–46.6)
HCT VFR BLD AUTO: 40.6 % (ref 34–46.6)
HCT VFR BLD AUTO: 40.8 % (ref 34–46.6)
HCT VFR BLD AUTO: 45.9 % (ref 34–46.6)
HCT VFR BLD CALC: 29.7 % (ref 38–51)
HCT VFR BLD CALC: 37.5 % (ref 38–51)
HDLC SERPL-MCNC: 25 MG/DL (ref 40–60)
HDLC SERPL-MCNC: 28 MG/DL (ref 40–60)
HDLC SERPL-MCNC: 30 MG/DL (ref 40–60)
HGB BLD-MCNC: 10.5 G/DL (ref 12–15.9)
HGB BLD-MCNC: 11 G/DL (ref 12–15.9)
HGB BLD-MCNC: 11 G/DL (ref 12–15.9)
HGB BLD-MCNC: 11.1 G/DL (ref 12–15.9)
HGB BLD-MCNC: 11.4 G/DL (ref 12–15.9)
HGB BLD-MCNC: 11.5 G/DL (ref 12–15.9)
HGB BLD-MCNC: 11.5 G/DL (ref 12–15.9)
HGB BLD-MCNC: 11.8 G/DL (ref 12–15.9)
HGB BLD-MCNC: 11.8 G/DL (ref 12–15.9)
HGB BLD-MCNC: 12.1 G/DL (ref 12–15.9)
HGB BLD-MCNC: 12.5 G/DL (ref 12–15.9)
HGB BLD-MCNC: 12.8 G/DL (ref 12–15.9)
HGB BLD-MCNC: 13.3 G/DL (ref 12–15.9)
HGB BLD-MCNC: 13.5 G/DL (ref 12–15.9)
HGB BLD-MCNC: 14.2 G/DL (ref 12–15.9)
HGB BLD-MCNC: 8 G/DL (ref 12–15.9)
HGB BLD-MCNC: 8.5 G/DL (ref 12–15.9)
HGB BLD-MCNC: 8.6 G/DL (ref 12–15.9)
HGB BLD-MCNC: 8.7 G/DL (ref 12–15.9)
HGB BLD-MCNC: 8.7 G/DL (ref 12–15.9)
HGB BLD-MCNC: 8.8 G/DL (ref 12–15.9)
HGB BLD-MCNC: 8.9 G/DL (ref 12–15.9)
HGB BLD-MCNC: 8.9 G/DL (ref 12–15.9)
HGB BLD-MCNC: 9 G/DL (ref 12–15.9)
HGB BLD-MCNC: 9.3 G/DL (ref 12–15.9)
HGB BLD-MCNC: 9.6 G/DL (ref 12–15.9)
HGB BLD-MCNC: 9.7 G/DL (ref 12–15.9)
HGB BLD-MCNC: 9.7 G/DL (ref 12–15.9)
HGB BLD-MCNC: 9.8 G/DL (ref 12–15.9)
HGB BLDA-MCNC: 12.2 G/DL (ref 13.5–17.5)
HGB BLDA-MCNC: 12.3 G/DL (ref 13.5–17.5)
HGB BLDA-MCNC: 9.7 G/DL (ref 13.5–17.5)
HGB UR QL STRIP.AUTO: ABNORMAL
HGB UR QL STRIP.AUTO: NEGATIVE
HOLD SPECIMEN: NORMAL
HYALINE CASTS UR QL AUTO: ABNORMAL /LPF
HYPOCHROMIA BLD QL: NORMAL
IMM GRANULOCYTES # BLD AUTO: 0 10*3/MM3 (ref 0–0.05)
IMM GRANULOCYTES # BLD AUTO: 0 10*3/MM3 (ref 0–0.05)
IMM GRANULOCYTES # BLD AUTO: 0.01 10*3/MM3 (ref 0–0.05)
IMM GRANULOCYTES # BLD AUTO: 0.01 10*3/MM3 (ref 0–0.05)
IMM GRANULOCYTES # BLD AUTO: 0.02 10*3/MM3 (ref 0–0.05)
IMM GRANULOCYTES # BLD AUTO: 0.03 10*3/MM3 (ref 0–0.05)
IMM GRANULOCYTES # BLD AUTO: 0.04 10*3/MM3 (ref 0–0.05)
IMM GRANULOCYTES # BLD AUTO: 0.05 10*3/MM3 (ref 0–0.05)
IMM GRANULOCYTES # BLD AUTO: 0.06 10*3/MM3 (ref 0–0.05)
IMM GRANULOCYTES # BLD AUTO: 0.08 10*3/MM3 (ref 0–0.05)
IMM GRANULOCYTES # BLD AUTO: 0.09 10*3/MM3 (ref 0–0.05)
IMM GRANULOCYTES # BLD AUTO: 0.1 10*3/MM3 (ref 0–0.05)
IMM GRANULOCYTES # BLD AUTO: 0.11 10*3/MM3 (ref 0–0.05)
IMM GRANULOCYTES # BLD AUTO: 0.12 10*3/MM3 (ref 0–0.05)
IMM GRANULOCYTES NFR BLD AUTO: 0 % (ref 0–0.5)
IMM GRANULOCYTES NFR BLD AUTO: 0 % (ref 0–0.5)
IMM GRANULOCYTES NFR BLD AUTO: 0.1 % (ref 0–0.5)
IMM GRANULOCYTES NFR BLD AUTO: 0.2 % (ref 0–0.5)
IMM GRANULOCYTES NFR BLD AUTO: 0.3 % (ref 0–0.5)
IMM GRANULOCYTES NFR BLD AUTO: 0.4 % (ref 0–0.5)
IMM GRANULOCYTES NFR BLD AUTO: 0.5 % (ref 0–0.5)
IMM GRANULOCYTES NFR BLD AUTO: 0.6 % (ref 0–0.5)
IMM GRANULOCYTES NFR BLD AUTO: 0.6 % (ref 0–0.5)
IMM GRANULOCYTES NFR BLD AUTO: 0.8 % (ref 0–0.5)
IMM GRANULOCYTES NFR BLD AUTO: 1 % (ref 0–0.5)
IMM GRANULOCYTES NFR BLD AUTO: 1.1 % (ref 0–0.5)
INHALED O2 CONCENTRATION: 21 %
INR PPP: 1 (ref 0.9–1.1)
INR PPP: 1.1 (ref 0.9–1.1)
INR PPP: 1.19 (ref 0.9–1.1)
IRON 24H UR-MRATE: 27 MCG/DL (ref 37–145)
IRON 24H UR-MRATE: 29 MCG/DL (ref 37–145)
IRON SATN MFR SERPL: 11 % (ref 20–50)
KETONES UR QL STRIP: ABNORMAL
KETONES UR QL STRIP: NEGATIVE
LDLC SERPL CALC-MCNC: 40 MG/DL (ref 0–100)
LDLC SERPL CALC-MCNC: 47 MG/DL (ref 0–100)
LDLC SERPL CALC-MCNC: 97 MG/DL (ref 0–100)
LDLC/HDLC SERPL: 1.44 {RATIO}
LDLC/HDLC SERPL: 1.54 {RATIO}
LDLC/HDLC SERPL: 3.13 {RATIO}
LEUKOCYTE ESTERASE UR QL STRIP.AUTO: ABNORMAL
LEUKOCYTE ESTERASE UR QL STRIP.AUTO: NEGATIVE
LIPASE SERPL-CCNC: 18 U/L (ref 13–60)
LIPASE SERPL-CCNC: 182 U/L (ref 13–60)
LIPASE SERPL-CCNC: 200 U/L (ref 13–60)
LIPASE SERPL-CCNC: 62 U/L (ref 13–60)
LYMPHOCYTES # BLD AUTO: 0.64 10*3/MM3 (ref 0.7–3.1)
LYMPHOCYTES # BLD AUTO: 0.68 10*3/MM3 (ref 0.7–3.1)
LYMPHOCYTES # BLD AUTO: 0.81 10*3/MM3 (ref 0.7–3.1)
LYMPHOCYTES # BLD AUTO: 0.81 10*3/MM3 (ref 0.7–3.1)
LYMPHOCYTES # BLD AUTO: 0.85 10*3/MM3 (ref 0.7–3.1)
LYMPHOCYTES # BLD AUTO: 0.91 10*3/MM3 (ref 0.7–3.1)
LYMPHOCYTES # BLD AUTO: 0.94 10*3/MM3 (ref 0.7–3.1)
LYMPHOCYTES # BLD AUTO: 0.95 10*3/MM3 (ref 0.7–3.1)
LYMPHOCYTES # BLD AUTO: 0.98 10*3/MM3 (ref 0.7–3.1)
LYMPHOCYTES # BLD AUTO: 1.03 10*3/MM3 (ref 0.7–3.1)
LYMPHOCYTES # BLD AUTO: 1.14 10*3/MM3 (ref 0.7–3.1)
LYMPHOCYTES # BLD AUTO: 1.17 10*3/MM3 (ref 0.7–3.1)
LYMPHOCYTES # BLD AUTO: 1.22 10*3/MM3 (ref 0.7–3.1)
LYMPHOCYTES # BLD AUTO: 1.31 10*3/MM3 (ref 0.7–3.1)
LYMPHOCYTES # BLD AUTO: 1.39 10*3/MM3 (ref 0.7–3.1)
LYMPHOCYTES # BLD AUTO: 1.45 10*3/MM3 (ref 0.7–3.1)
LYMPHOCYTES # BLD AUTO: 1.53 10*3/MM3 (ref 0.7–3.1)
LYMPHOCYTES # BLD AUTO: 1.57 10*3/MM3 (ref 0.7–3.1)
LYMPHOCYTES # BLD AUTO: 1.65 10*3/MM3 (ref 0.7–3.1)
LYMPHOCYTES # BLD AUTO: 1.65 10*3/MM3 (ref 0.7–3.1)
LYMPHOCYTES # BLD AUTO: 1.98 10*3/MM3 (ref 0.7–3.1)
LYMPHOCYTES NFR BLD AUTO: 10.6 % (ref 19.6–45.3)
LYMPHOCYTES NFR BLD AUTO: 10.7 % (ref 19.6–45.3)
LYMPHOCYTES NFR BLD AUTO: 12 % (ref 19.6–45.3)
LYMPHOCYTES NFR BLD AUTO: 12.4 % (ref 19.6–45.3)
LYMPHOCYTES NFR BLD AUTO: 12.6 % (ref 19.6–45.3)
LYMPHOCYTES NFR BLD AUTO: 12.7 % (ref 19.6–45.3)
LYMPHOCYTES NFR BLD AUTO: 12.8 % (ref 19.6–45.3)
LYMPHOCYTES NFR BLD AUTO: 12.9 % (ref 19.6–45.3)
LYMPHOCYTES NFR BLD AUTO: 13.2 % (ref 19.6–45.3)
LYMPHOCYTES NFR BLD AUTO: 13.8 % (ref 19.6–45.3)
LYMPHOCYTES NFR BLD AUTO: 16.9 % (ref 19.6–45.3)
LYMPHOCYTES NFR BLD AUTO: 17 % (ref 19.6–45.3)
LYMPHOCYTES NFR BLD AUTO: 20.3 % (ref 19.6–45.3)
LYMPHOCYTES NFR BLD AUTO: 22.8 % (ref 19.6–45.3)
LYMPHOCYTES NFR BLD AUTO: 23 % (ref 19.6–45.3)
LYMPHOCYTES NFR BLD AUTO: 5.5 % (ref 19.6–45.3)
LYMPHOCYTES NFR BLD AUTO: 53.4 % (ref 19.6–45.3)
LYMPHOCYTES NFR BLD AUTO: 8.1 % (ref 19.6–45.3)
LYMPHOCYTES NFR BLD AUTO: 8.5 % (ref 19.6–45.3)
LYMPHOCYTES NFR BLD AUTO: 8.8 % (ref 19.6–45.3)
LYMPHOCYTES NFR BLD AUTO: 9.9 % (ref 19.6–45.3)
Lab: ABNORMAL
MAGNESIUM SERPL-MCNC: 1.4 MG/DL (ref 1.6–2.4)
MAGNESIUM SERPL-MCNC: 1.7 MG/DL (ref 1.6–2.4)
MAGNESIUM SERPL-MCNC: 1.8 MG/DL (ref 1.6–2.4)
MAGNESIUM SERPL-MCNC: 1.9 MG/DL (ref 1.6–2.4)
MAGNESIUM SERPL-MCNC: 2 MG/DL (ref 1.6–2.4)
MAGNESIUM SERPL-MCNC: 2 MG/DL (ref 1.6–2.4)
MAGNESIUM SERPL-MCNC: 2.1 MG/DL (ref 1.6–2.4)
MAGNESIUM SERPL-MCNC: 2.2 MG/DL (ref 1.6–2.4)
MAGNESIUM SERPL-MCNC: 2.2 MG/DL (ref 1.6–2.4)
MAGNESIUM SERPL-MCNC: 2.5 MG/DL (ref 1.6–2.4)
MAXIMAL PREDICTED HEART RATE: 142 BPM
MCH RBC QN AUTO: 28.7 PG (ref 26.6–33)
MCH RBC QN AUTO: 28.8 PG (ref 26.6–33)
MCH RBC QN AUTO: 29 PG (ref 26.6–33)
MCH RBC QN AUTO: 29.1 PG (ref 26.6–33)
MCH RBC QN AUTO: 29.1 PG (ref 26.6–33)
MCH RBC QN AUTO: 29.2 PG (ref 26.6–33)
MCH RBC QN AUTO: 29.3 PG (ref 26.6–33)
MCH RBC QN AUTO: 29.4 PG (ref 26.6–33)
MCH RBC QN AUTO: 29.5 PG (ref 26.6–33)
MCH RBC QN AUTO: 29.5 PG (ref 26.6–33)
MCH RBC QN AUTO: 29.6 PG (ref 26.6–33)
MCH RBC QN AUTO: 29.8 PG (ref 26.6–33)
MCH RBC QN AUTO: 29.9 PG (ref 26.6–33)
MCH RBC QN AUTO: 30 PG (ref 26.6–33)
MCH RBC QN AUTO: 30.3 PG (ref 26.6–33)
MCH RBC QN AUTO: 30.7 PG (ref 26.6–33)
MCH RBC QN AUTO: 30.9 PG (ref 26.6–33)
MCH RBC QN AUTO: 30.9 PG (ref 26.6–33)
MCH RBC QN AUTO: 31.1 PG (ref 26.6–33)
MCH RBC QN AUTO: 31.1 PG (ref 26.6–33)
MCH RBC QN AUTO: 31.2 PG (ref 26.6–33)
MCH RBC QN AUTO: 31.2 PG (ref 26.6–33)
MCH RBC QN AUTO: 31.3 PG (ref 26.6–33)
MCH RBC QN AUTO: 31.3 PG (ref 26.6–33)
MCHC RBC AUTO-ENTMCNC: 28.7 G/DL (ref 31.5–35.7)
MCHC RBC AUTO-ENTMCNC: 30 G/DL (ref 31.5–35.7)
MCHC RBC AUTO-ENTMCNC: 30.5 G/DL (ref 31.5–35.7)
MCHC RBC AUTO-ENTMCNC: 30.6 G/DL (ref 31.5–35.7)
MCHC RBC AUTO-ENTMCNC: 30.8 G/DL (ref 31.5–35.7)
MCHC RBC AUTO-ENTMCNC: 30.8 G/DL (ref 31.5–35.7)
MCHC RBC AUTO-ENTMCNC: 30.9 G/DL (ref 31.5–35.7)
MCHC RBC AUTO-ENTMCNC: 31 G/DL (ref 31.5–35.7)
MCHC RBC AUTO-ENTMCNC: 31.1 G/DL (ref 31.5–35.7)
MCHC RBC AUTO-ENTMCNC: 31.4 G/DL (ref 31.5–35.7)
MCHC RBC AUTO-ENTMCNC: 31.7 G/DL (ref 31.5–35.7)
MCHC RBC AUTO-ENTMCNC: 32 G/DL (ref 31.5–35.7)
MCHC RBC AUTO-ENTMCNC: 32 G/DL (ref 31.5–35.7)
MCHC RBC AUTO-ENTMCNC: 32.1 G/DL (ref 31.5–35.7)
MCHC RBC AUTO-ENTMCNC: 32.1 G/DL (ref 31.5–35.7)
MCHC RBC AUTO-ENTMCNC: 32.2 G/DL (ref 31.5–35.7)
MCHC RBC AUTO-ENTMCNC: 32.4 G/DL (ref 31.5–35.7)
MCHC RBC AUTO-ENTMCNC: 32.6 G/DL (ref 31.5–35.7)
MCHC RBC AUTO-ENTMCNC: 32.6 G/DL (ref 31.5–35.7)
MCHC RBC AUTO-ENTMCNC: 32.8 G/DL (ref 31.5–35.7)
MCHC RBC AUTO-ENTMCNC: 32.9 G/DL (ref 31.5–35.7)
MCHC RBC AUTO-ENTMCNC: 33.1 G/DL (ref 31.5–35.7)
MCV RBC AUTO: 100.9 FL (ref 79–97)
MCV RBC AUTO: 101.2 FL (ref 79–97)
MCV RBC AUTO: 101.3 FL (ref 79–97)
MCV RBC AUTO: 91.5 FL (ref 79–97)
MCV RBC AUTO: 91.6 FL (ref 79–97)
MCV RBC AUTO: 91.7 FL (ref 79–97)
MCV RBC AUTO: 93.2 FL (ref 79–97)
MCV RBC AUTO: 93.4 FL (ref 79–97)
MCV RBC AUTO: 93.6 FL (ref 79–97)
MCV RBC AUTO: 93.8 FL (ref 79–97)
MCV RBC AUTO: 94.4 FL (ref 79–97)
MCV RBC AUTO: 94.7 FL (ref 79–97)
MCV RBC AUTO: 94.9 FL (ref 79–97)
MCV RBC AUTO: 95.1 FL (ref 79–97)
MCV RBC AUTO: 95.1 FL (ref 79–97)
MCV RBC AUTO: 95.4 FL (ref 79–97)
MCV RBC AUTO: 95.5 FL (ref 79–97)
MCV RBC AUTO: 95.5 FL (ref 79–97)
MCV RBC AUTO: 95.6 FL (ref 79–97)
MCV RBC AUTO: 95.7 FL (ref 79–97)
MCV RBC AUTO: 95.7 FL (ref 79–97)
MCV RBC AUTO: 95.8 FL (ref 79–97)
MCV RBC AUTO: 96 FL (ref 79–97)
MCV RBC AUTO: 96.8 FL (ref 79–97)
MCV RBC AUTO: 96.9 FL (ref 79–97)
MCV RBC AUTO: 97 FL (ref 79–97)
MCV RBC AUTO: 97 FL (ref 79–97)
MCV RBC AUTO: 97.2 FL (ref 79–97)
METHADONE UR QL SCN: NEGATIVE
METHGB BLD QL: 0.2 % (ref 0–3)
METHGB BLD QL: 0.4 % (ref 0–3)
METHGB BLD QL: <1 % (ref 0–3)
MODALITY: ABNORMAL
MONOCYTES # BLD AUTO: 0.6 10*3/MM3 (ref 0.1–0.9)
MONOCYTES # BLD AUTO: 0.61 10*3/MM3 (ref 0.1–0.9)
MONOCYTES # BLD AUTO: 0.61 10*3/MM3 (ref 0.1–0.9)
MONOCYTES # BLD AUTO: 0.64 10*3/MM3 (ref 0.1–0.9)
MONOCYTES # BLD AUTO: 0.74 10*3/MM3 (ref 0.1–0.9)
MONOCYTES # BLD AUTO: 0.77 10*3/MM3 (ref 0.1–0.9)
MONOCYTES # BLD AUTO: 0.79 10*3/MM3 (ref 0.1–0.9)
MONOCYTES # BLD AUTO: 0.82 10*3/MM3 (ref 0.1–0.9)
MONOCYTES # BLD AUTO: 0.83 10*3/MM3 (ref 0.1–0.9)
MONOCYTES # BLD AUTO: 0.83 10*3/MM3 (ref 0.1–0.9)
MONOCYTES # BLD AUTO: 0.84 10*3/MM3 (ref 0.1–0.9)
MONOCYTES # BLD AUTO: 0.91 10*3/MM3 (ref 0.1–0.9)
MONOCYTES # BLD AUTO: 0.93 10*3/MM3 (ref 0.1–0.9)
MONOCYTES # BLD AUTO: 0.94 10*3/MM3 (ref 0.1–0.9)
MONOCYTES # BLD AUTO: 0.98 10*3/MM3 (ref 0.1–0.9)
MONOCYTES # BLD AUTO: 0.99 10*3/MM3 (ref 0.1–0.9)
MONOCYTES # BLD AUTO: 1.01 10*3/MM3 (ref 0.1–0.9)
MONOCYTES # BLD AUTO: 1.03 10*3/MM3 (ref 0.1–0.9)
MONOCYTES # BLD AUTO: 1.21 10*3/MM3 (ref 0.1–0.9)
MONOCYTES # BLD AUTO: 1.28 10*3/MM3 (ref 0.1–0.9)
MONOCYTES # BLD AUTO: 1.31 10*3/MM3 (ref 0.1–0.9)
MONOCYTES NFR BLD AUTO: 10 % (ref 5–12)
MONOCYTES NFR BLD AUTO: 10.1 % (ref 5–12)
MONOCYTES NFR BLD AUTO: 10.2 % (ref 5–12)
MONOCYTES NFR BLD AUTO: 10.2 % (ref 5–12)
MONOCYTES NFR BLD AUTO: 10.3 % (ref 5–12)
MONOCYTES NFR BLD AUTO: 11.3 % (ref 5–12)
MONOCYTES NFR BLD AUTO: 11.8 % (ref 5–12)
MONOCYTES NFR BLD AUTO: 11.9 % (ref 5–12)
MONOCYTES NFR BLD AUTO: 12.7 % (ref 5–12)
MONOCYTES NFR BLD AUTO: 13 % (ref 5–12)
MONOCYTES NFR BLD AUTO: 14.6 % (ref 5–12)
MONOCYTES NFR BLD AUTO: 15.3 % (ref 5–12)
MONOCYTES NFR BLD AUTO: 19.4 % (ref 5–12)
MONOCYTES NFR BLD AUTO: 6.3 % (ref 5–12)
MONOCYTES NFR BLD AUTO: 7.1 % (ref 5–12)
MONOCYTES NFR BLD AUTO: 7.3 % (ref 5–12)
MONOCYTES NFR BLD AUTO: 7.4 % (ref 5–12)
MONOCYTES NFR BLD AUTO: 7.8 % (ref 5–12)
MONOCYTES NFR BLD AUTO: 9.2 % (ref 5–12)
MONOCYTES NFR BLD AUTO: 9.5 % (ref 5–12)
MONOCYTES NFR BLD AUTO: 9.8 % (ref 5–12)
NEUTROPHILS NFR BLD AUTO: 0.35 10*3/MM3 (ref 1.7–7)
NEUTROPHILS NFR BLD AUTO: 10.49 10*3/MM3 (ref 1.7–7)
NEUTROPHILS NFR BLD AUTO: 11.4 % (ref 42.7–76)
NEUTROPHILS NFR BLD AUTO: 12.35 10*3/MM3 (ref 1.7–7)
NEUTROPHILS NFR BLD AUTO: 2.32 10*3/MM3 (ref 1.7–7)
NEUTROPHILS NFR BLD AUTO: 3.3 10*3/MM3 (ref 1.7–7)
NEUTROPHILS NFR BLD AUTO: 3.38 10*3/MM3 (ref 1.7–7)
NEUTROPHILS NFR BLD AUTO: 4.67 10*3/MM3 (ref 1.7–7)
NEUTROPHILS NFR BLD AUTO: 4.74 10*3/MM3 (ref 1.7–7)
NEUTROPHILS NFR BLD AUTO: 5.15 10*3/MM3 (ref 1.7–7)
NEUTROPHILS NFR BLD AUTO: 5.71 10*3/MM3 (ref 1.7–7)
NEUTROPHILS NFR BLD AUTO: 5.84 10*3/MM3 (ref 1.7–7)
NEUTROPHILS NFR BLD AUTO: 5.95 10*3/MM3 (ref 1.7–7)
NEUTROPHILS NFR BLD AUTO: 57.9 % (ref 42.7–76)
NEUTROPHILS NFR BLD AUTO: 59.2 % (ref 42.7–76)
NEUTROPHILS NFR BLD AUTO: 6.81 10*3/MM3 (ref 1.7–7)
NEUTROPHILS NFR BLD AUTO: 65.8 % (ref 42.7–76)
NEUTROPHILS NFR BLD AUTO: 66.5 % (ref 42.7–76)
NEUTROPHILS NFR BLD AUTO: 67.5 % (ref 42.7–76)
NEUTROPHILS NFR BLD AUTO: 68.5 % (ref 42.7–76)
NEUTROPHILS NFR BLD AUTO: 7.47 10*3/MM3 (ref 1.7–7)
NEUTROPHILS NFR BLD AUTO: 7.6 10*3/MM3 (ref 1.7–7)
NEUTROPHILS NFR BLD AUTO: 7.68 10*3/MM3 (ref 1.7–7)
NEUTROPHILS NFR BLD AUTO: 73.2 % (ref 42.7–76)
NEUTROPHILS NFR BLD AUTO: 73.8 % (ref 42.7–76)
NEUTROPHILS NFR BLD AUTO: 73.9 % (ref 42.7–76)
NEUTROPHILS NFR BLD AUTO: 73.9 % (ref 42.7–76)
NEUTROPHILS NFR BLD AUTO: 74 % (ref 42.7–76)
NEUTROPHILS NFR BLD AUTO: 74.3 % (ref 42.7–76)
NEUTROPHILS NFR BLD AUTO: 75.3 % (ref 42.7–76)
NEUTROPHILS NFR BLD AUTO: 75.4 % (ref 42.7–76)
NEUTROPHILS NFR BLD AUTO: 78.9 % (ref 42.7–76)
NEUTROPHILS NFR BLD AUTO: 79.3 % (ref 42.7–76)
NEUTROPHILS NFR BLD AUTO: 79.8 % (ref 42.7–76)
NEUTROPHILS NFR BLD AUTO: 79.9 % (ref 42.7–76)
NEUTROPHILS NFR BLD AUTO: 8.48 10*3/MM3 (ref 1.7–7)
NEUTROPHILS NFR BLD AUTO: 8.65 10*3/MM3 (ref 1.7–7)
NEUTROPHILS NFR BLD AUTO: 81.7 % (ref 42.7–76)
NEUTROPHILS NFR BLD AUTO: 82.3 % (ref 42.7–76)
NEUTROPHILS NFR BLD AUTO: 9.17 10*3/MM3 (ref 1.7–7)
NEUTROPHILS NFR BLD AUTO: 9.6 10*3/MM3 (ref 1.7–7)
NEUTROPHILS NFR BLD AUTO: 9.64 10*3/MM3 (ref 1.7–7)
NITRITE UR QL STRIP: NEGATIVE
NITRITE UR QL STRIP: POSITIVE
NITRITE UR QL STRIP: POSITIVE
NOTE: ABNORMAL
NOTE: ABNORMAL
NOTIFIED BY: ABNORMAL
NOTIFIED BY: ABNORMAL
NOTIFIED WHO: ABNORMAL
NOTIFIED WHO: ABNORMAL
NRBC BLD AUTO-RTO: 0 /100 WBC (ref 0–0.2)
NT-PROBNP SERPL-MCNC: 607.2 PG/ML (ref 0–1800)
OPIATES UR QL: POSITIVE
OSMOLALITY UR: 486 MOSM/KG
OTHER ANTIBIOTIC SUSC ISLT: ABNORMAL
OXYCODONE UR QL SCN: NEGATIVE
OXYHGB MFR BLDV: 85.8 % (ref 94–99)
OXYHGB MFR BLDV: 87.7 % (ref 45–75)
OXYHGB MFR BLDV: 92.3 % (ref 94–99)
PCO2 BLDA: 28.8 MM HG (ref 35–45)
PCO2 BLDA: 35.1 MM HG (ref 35–45)
PCO2 BLDV: 27.2 MM HG (ref 41–51)
PCO2 TEMP ADJ BLD: ABNORMAL MM[HG]
PCO2 TEMP ADJ BLD: ABNORMAL MM[HG]
PCP UR QL SCN: NEGATIVE
PH BLDA: 7.46 PH UNITS (ref 7.35–7.45)
PH BLDA: 7.47 PH UNITS (ref 7.35–7.45)
PH BLDV: 7.44 PH UNITS (ref 7.32–7.42)
PH UR STRIP.AUTO: 5.5 [PH] (ref 5–8)
PH UR STRIP.AUTO: 7 [PH] (ref 5–8)
PH UR STRIP.AUTO: 8 [PH] (ref 5–8)
PH UR STRIP.AUTO: <=5 [PH] (ref 5–8)
PH UR STRIP.AUTO: <=5 [PH] (ref 5–8)
PH, TEMP CORRECTED: ABNORMAL
PH, TEMP CORRECTED: ABNORMAL
PHOSPHATE SERPL-MCNC: 2.7 MG/DL (ref 2.5–4.5)
PHOSPHATE SERPL-MCNC: 3.3 MG/DL (ref 2.5–4.5)
PHOSPHATE SERPL-MCNC: 3.4 MG/DL (ref 2.5–4.5)
PHOSPHATE SERPL-MCNC: 3.5 MG/DL (ref 2.5–4.5)
PHOSPHATE SERPL-MCNC: 3.5 MG/DL (ref 2.5–4.5)
PHOSPHATE SERPL-MCNC: 4.3 MG/DL (ref 2.5–4.5)
PLAT MORPH BLD: NORMAL
PLATELET # BLD AUTO: 185 10*3/MM3 (ref 140–450)
PLATELET # BLD AUTO: 191 10*3/MM3 (ref 140–450)
PLATELET # BLD AUTO: 214 10*3/MM3 (ref 140–450)
PLATELET # BLD AUTO: 245 10*3/MM3 (ref 140–450)
PLATELET # BLD AUTO: 248 10*3/MM3 (ref 140–450)
PLATELET # BLD AUTO: 254 10*3/MM3 (ref 140–450)
PLATELET # BLD AUTO: 261 10*3/MM3 (ref 140–450)
PLATELET # BLD AUTO: 262 10*3/MM3 (ref 140–450)
PLATELET # BLD AUTO: 264 10*3/MM3 (ref 140–450)
PLATELET # BLD AUTO: 265 10*3/MM3 (ref 140–450)
PLATELET # BLD AUTO: 269 10*3/MM3 (ref 140–450)
PLATELET # BLD AUTO: 284 10*3/MM3 (ref 140–450)
PLATELET # BLD AUTO: 289 10*3/MM3 (ref 140–450)
PLATELET # BLD AUTO: 290 10*3/MM3 (ref 140–450)
PLATELET # BLD AUTO: 298 10*3/MM3 (ref 140–450)
PLATELET # BLD AUTO: 304 10*3/MM3 (ref 140–450)
PLATELET # BLD AUTO: 312 10*3/MM3 (ref 140–450)
PLATELET # BLD AUTO: 325 10*3/MM3 (ref 140–450)
PLATELET # BLD AUTO: 331 10*3/MM3 (ref 140–450)
PLATELET # BLD AUTO: 331 10*3/MM3 (ref 140–450)
PLATELET # BLD AUTO: 349 10*3/MM3 (ref 140–450)
PLATELET # BLD AUTO: 355 10*3/MM3 (ref 140–450)
PLATELET # BLD AUTO: 385 10*3/MM3 (ref 140–450)
PLATELET # BLD AUTO: 402 10*3/MM3 (ref 140–450)
PLATELET # BLD AUTO: 525 10*3/MM3 (ref 140–450)
PLATELET # BLD AUTO: 554 10*3/MM3 (ref 140–450)
PLATELET # BLD AUTO: 605 10*3/MM3 (ref 140–450)
PLATELET # BLD AUTO: 627 10*3/MM3 (ref 140–450)
PMV BLD AUTO: 10.6 FL (ref 6–12)
PMV BLD AUTO: 10.6 FL (ref 6–12)
PMV BLD AUTO: 10.7 FL (ref 6–12)
PMV BLD AUTO: 11 FL (ref 6–12)
PMV BLD AUTO: 11 FL (ref 6–12)
PMV BLD AUTO: 11.1 FL (ref 6–12)
PMV BLD AUTO: 11.2 FL (ref 6–12)
PMV BLD AUTO: 11.3 FL (ref 6–12)
PMV BLD AUTO: 11.4 FL (ref 6–12)
PMV BLD AUTO: 11.5 FL (ref 6–12)
PMV BLD AUTO: 11.6 FL (ref 6–12)
PMV BLD AUTO: 11.7 FL (ref 6–12)
PMV BLD AUTO: 11.8 FL (ref 6–12)
PMV BLD AUTO: 11.9 FL (ref 6–12)
PMV BLD AUTO: 12.1 FL (ref 6–12)
PMV BLD AUTO: 12.2 FL (ref 6–12)
PMV BLD AUTO: 12.2 FL (ref 6–12)
PMV BLD AUTO: 12.3 FL (ref 6–12)
PMV BLD AUTO: 12.5 FL (ref 6–12)
PMV BLD AUTO: 12.5 FL (ref 6–12)
PO2 BLDA: 49.8 MM HG (ref 83–108)
PO2 BLDA: 67.1 MM HG (ref 83–108)
PO2 BLDV: 54.1 MM HG (ref 27–53)
PO2 TEMP ADJ BLD: ABNORMAL MM[HG]
PO2 TEMP ADJ BLD: ABNORMAL MM[HG]
POIKILOCYTOSIS BLD QL SMEAR: NORMAL
POTASSIUM SERPL-SCNC: 2.6 MMOL/L (ref 3.5–5.2)
POTASSIUM SERPL-SCNC: 2.9 MMOL/L (ref 3.5–5.2)
POTASSIUM SERPL-SCNC: 3 MMOL/L (ref 3.5–5.2)
POTASSIUM SERPL-SCNC: 3 MMOL/L (ref 3.5–5.2)
POTASSIUM SERPL-SCNC: 3.1 MMOL/L (ref 3.5–5.2)
POTASSIUM SERPL-SCNC: 3.2 MMOL/L (ref 3.5–5.2)
POTASSIUM SERPL-SCNC: 3.3 MMOL/L (ref 3.5–5.2)
POTASSIUM SERPL-SCNC: 3.3 MMOL/L (ref 3.5–5.2)
POTASSIUM SERPL-SCNC: 3.4 MMOL/L (ref 3.5–5.2)
POTASSIUM SERPL-SCNC: 3.4 MMOL/L (ref 3.5–5.2)
POTASSIUM SERPL-SCNC: 3.6 MMOL/L (ref 3.5–5.2)
POTASSIUM SERPL-SCNC: 3.7 MMOL/L (ref 3.5–5.2)
POTASSIUM SERPL-SCNC: 3.8 MMOL/L (ref 3.5–5.2)
POTASSIUM SERPL-SCNC: 3.9 MMOL/L (ref 3.5–5.2)
POTASSIUM SERPL-SCNC: 4 MMOL/L (ref 3.5–5.2)
POTASSIUM SERPL-SCNC: 4.1 MMOL/L (ref 3.5–5.2)
POTASSIUM SERPL-SCNC: 4.2 MMOL/L (ref 3.5–5.2)
POTASSIUM SERPL-SCNC: 4.3 MMOL/L (ref 3.5–5.2)
POTASSIUM SERPL-SCNC: 4.3 MMOL/L (ref 3.5–5.2)
POTASSIUM SERPL-SCNC: 4.9 MMOL/L (ref 3.5–5.2)
POTASSIUM UR-SCNC: 31.4 MMOL/L
PREALB SERPL-MCNC: 11.6 MG/DL (ref 20–40)
PROT SERPL-MCNC: 4.8 G/DL (ref 6–8.5)
PROT SERPL-MCNC: 5.2 G/DL (ref 6–8.5)
PROT SERPL-MCNC: 5.2 G/DL (ref 6–8.5)
PROT SERPL-MCNC: 5.3 G/DL (ref 6–8.5)
PROT SERPL-MCNC: 5.3 G/DL (ref 6–8.5)
PROT SERPL-MCNC: 5.4 G/DL (ref 6–8.5)
PROT SERPL-MCNC: 5.5 G/DL (ref 6–8.5)
PROT SERPL-MCNC: 5.7 G/DL (ref 6–8.5)
PROT SERPL-MCNC: 5.8 G/DL (ref 6–8.5)
PROT SERPL-MCNC: 5.9 G/DL (ref 6–8.5)
PROT SERPL-MCNC: 6 G/DL (ref 6–8.5)
PROT SERPL-MCNC: 6.4 G/DL (ref 6–8.5)
PROT SERPL-MCNC: 6.5 G/DL (ref 6–8.5)
PROT SERPL-MCNC: 6.5 G/DL (ref 6–8.5)
PROT SERPL-MCNC: 6.7 G/DL (ref 6–8.5)
PROT SERPL-MCNC: 6.9 G/DL (ref 6–8.5)
PROT SERPL-MCNC: 7.1 G/DL (ref 6–8.5)
PROT SERPL-MCNC: 7.1 G/DL (ref 6–8.5)
PROT UR QL STRIP: ABNORMAL
PROT UR QL STRIP: NEGATIVE
PROTHROMBIN TIME: 13.6 SECONDS (ref 12.8–14.5)
PROTHROMBIN TIME: 14.7 SECONDS (ref 12.8–14.5)
PROTHROMBIN TIME: 15.5 SECONDS (ref 12.8–14.5)
QT INTERVAL: 312 MS
QT INTERVAL: 376 MS
QT INTERVAL: 388 MS
QT INTERVAL: 392 MS
QT INTERVAL: 404 MS
QT INTERVAL: 420 MS
QT INTERVAL: 426 MS
QT INTERVAL: 434 MS
QTC INTERVAL: 436 MS
QTC INTERVAL: 459 MS
QTC INTERVAL: 469 MS
QTC INTERVAL: 469 MS
QTC INTERVAL: 477 MS
QTC INTERVAL: 482 MS
QTC INTERVAL: 500 MS
QTC INTERVAL: 510 MS
RBC # BLD AUTO: 2.7 10*6/MM3 (ref 3.77–5.28)
RBC # BLD AUTO: 2.88 10*6/MM3 (ref 3.77–5.28)
RBC # BLD AUTO: 2.89 10*6/MM3 (ref 3.77–5.28)
RBC # BLD AUTO: 2.98 10*6/MM3 (ref 3.77–5.28)
RBC # BLD AUTO: 2.99 10*6/MM3 (ref 3.77–5.28)
RBC # BLD AUTO: 3.01 10*6/MM3 (ref 3.77–5.28)
RBC # BLD AUTO: 3.02 10*6/MM3 (ref 3.77–5.28)
RBC # BLD AUTO: 3.03 10*6/MM3 (ref 3.77–5.28)
RBC # BLD AUTO: 3.14 10*6/MM3 (ref 3.77–5.28)
RBC # BLD AUTO: 3.25 10*6/MM3 (ref 3.77–5.28)
RBC # BLD AUTO: 3.31 10*6/MM3 (ref 3.77–5.28)
RBC # BLD AUTO: 3.34 10*6/MM3 (ref 3.77–5.28)
RBC # BLD AUTO: 3.42 10*6/MM3 (ref 3.77–5.28)
RBC # BLD AUTO: 3.46 10*6/MM3 (ref 3.77–5.28)
RBC # BLD AUTO: 3.54 10*6/MM3 (ref 3.77–5.28)
RBC # BLD AUTO: 3.72 10*6/MM3 (ref 3.77–5.28)
RBC # BLD AUTO: 3.74 10*6/MM3 (ref 3.77–5.28)
RBC # BLD AUTO: 3.78 10*6/MM3 (ref 3.77–5.28)
RBC # BLD AUTO: 3.79 10*6/MM3 (ref 3.77–5.28)
RBC # BLD AUTO: 3.81 10*6/MM3 (ref 3.77–5.28)
RBC # BLD AUTO: 3.82 10*6/MM3 (ref 3.77–5.28)
RBC # BLD AUTO: 3.86 10*6/MM3 (ref 3.77–5.28)
RBC # BLD AUTO: 3.87 10*6/MM3 (ref 3.77–5.28)
RBC # BLD AUTO: 4.14 10*6/MM3 (ref 3.77–5.28)
RBC # BLD AUTO: 4.16 10*6/MM3 (ref 3.77–5.28)
RBC # BLD AUTO: 4.25 10*6/MM3 (ref 3.77–5.28)
RBC # BLD AUTO: 4.37 10*6/MM3 (ref 3.77–5.28)
RBC # BLD AUTO: 4.55 10*6/MM3 (ref 3.77–5.28)
RBC # UR STRIP: ABNORMAL /HPF
RBC # UR: ABNORMAL /HPF
REF LAB TEST METHOD: ABNORMAL
RH BLD: POSITIVE
RH BLD: POSITIVE
SALM + SHIG STL CULT: NORMAL
SAO2 % BLDCOA: 87 % (ref 94–99)
SAO2 % BLDCOA: 94 % (ref 94–99)
SAO2 % BLDCOV: 88.2 % (ref 45–75)
SARS-COV-2 RNA NOSE QL NAA+PROBE: NOT DETECTED
SARS-COV-2 RNA PNL SPEC NAA+PROBE: NOT DETECTED
SARS-COV-2 RNA PNL SPEC NAA+PROBE: NOT DETECTED
SARS-COV-2 RNA RESP QL NAA+PROBE: NOT DETECTED
SODIUM SERPL-SCNC: 133 MMOL/L (ref 136–145)
SODIUM SERPL-SCNC: 133 MMOL/L (ref 136–145)
SODIUM SERPL-SCNC: 135 MMOL/L (ref 136–145)
SODIUM SERPL-SCNC: 136 MMOL/L (ref 136–145)
SODIUM SERPL-SCNC: 137 MMOL/L (ref 136–145)
SODIUM SERPL-SCNC: 137 MMOL/L (ref 136–145)
SODIUM SERPL-SCNC: 138 MMOL/L (ref 136–145)
SODIUM SERPL-SCNC: 139 MMOL/L (ref 136–145)
SODIUM SERPL-SCNC: 140 MMOL/L (ref 136–145)
SODIUM SERPL-SCNC: 141 MMOL/L (ref 136–145)
SODIUM SERPL-SCNC: 142 MMOL/L (ref 136–145)
SODIUM SERPL-SCNC: 143 MMOL/L (ref 136–145)
SODIUM UR-SCNC: 114 MMOL/L
SP GR UR STRIP: 1.01 (ref 1–1.03)
SP GR UR STRIP: 1.02 (ref 1–1.03)
SP GR UR STRIP: 1.03 (ref 1–1.03)
SQUAMOUS #/AREA URNS HPF: ABNORMAL /HPF
STRESS TARGET HR: 121 BPM
T&S EXPIRATION DATE: NORMAL
TIBC SERPL-MCNC: 250 MCG/DL (ref 298–536)
TRANSFERRIN SERPL-MCNC: 168 MG/DL (ref 200–360)
TRIGL SERPL-MCNC: 139 MG/DL (ref 0–150)
TRIGL SERPL-MCNC: 150 MG/DL (ref 0–150)
TRIGL SERPL-MCNC: 155 MG/DL (ref 0–150)
TROPONIN T SERPL-MCNC: <0.01 NG/ML (ref 0–0.03)
TSH SERPL DL<=0.05 MIU/L-ACNC: 1.17 UIU/ML (ref 0.27–4.2)
TSH SERPL DL<=0.05 MIU/L-ACNC: 1.74 UIU/ML (ref 0.27–4.2)
TSH SERPL DL<=0.05 MIU/L-ACNC: 3.3 UIU/ML (ref 0.27–4.2)
TSH SERPL DL<=0.05 MIU/L-ACNC: 4.41 UIU/ML (ref 0.27–4.2)
UROBILINOGEN UR QL STRIP: ABNORMAL
UROBILINOGEN UR QL STRIP: NORMAL
VENTILATOR MODE: ABNORMAL
VIT B12 BLD-MCNC: 264 PG/ML (ref 211–946)
VIT B12 BLD-MCNC: 357 PG/ML (ref 211–946)
VIT B12 BLD-MCNC: 413 PG/ML (ref 211–946)
VIT B12 BLD-MCNC: 764 PG/ML (ref 211–946)
VLDLC SERPL-MCNC: 24 MG/DL (ref 5–40)
VLDLC SERPL-MCNC: 27 MG/DL (ref 5–40)
VLDLC SERPL-MCNC: 27 MG/DL (ref 5–40)
WBC # BLD AUTO: 10.06 10*3/MM3 (ref 3.4–10.8)
WBC # BLD AUTO: 10.19 10*3/MM3 (ref 3.4–10.8)
WBC # BLD AUTO: 11.15 10*3/MM3 (ref 3.4–10.8)
WBC # BLD AUTO: 11.49 10*3/MM3 (ref 3.4–10.8)
WBC # BLD AUTO: 11.58 10*3/MM3 (ref 3.4–10.8)
WBC # BLD AUTO: 11.72 10*3/MM3 (ref 3.4–10.8)
WBC # BLD AUTO: 11.74 10*3/MM3 (ref 3.4–10.8)
WBC # BLD AUTO: 12.81 10*3/MM3 (ref 3.4–10.8)
WBC # BLD AUTO: 12.94 10*3/MM3 (ref 3.4–10.8)
WBC # BLD AUTO: 13.24 10*3/MM3 (ref 3.4–10.8)
WBC # BLD AUTO: 13.71 10*3/MM3 (ref 3.4–10.8)
WBC # BLD AUTO: 15.04 10*3/MM3 (ref 3.4–10.8)
WBC # BLD AUTO: 15.67 10*3/MM3 (ref 3.4–10.8)
WBC # BLD AUTO: 4 10*3/MM3 (ref 3.4–10.8)
WBC # BLD AUTO: 4.75 10*3/MM3 (ref 3.4–10.8)
WBC # BLD AUTO: 5.02 10*3/MM3 (ref 3.4–10.8)
WBC # BLD AUTO: 5.7 10*3/MM3 (ref 3.4–10.8)
WBC # BLD AUTO: 6.33 10*3/MM3 (ref 3.4–10.8)
WBC # BLD AUTO: 6.4 10*3/MM3 (ref 3.4–10.8)
WBC # BLD AUTO: 7.57 10*3/MM3 (ref 3.4–10.8)
WBC # BLD AUTO: 7.74 10*3/MM3 (ref 3.4–10.8)
WBC # BLD AUTO: 8.06 10*3/MM3 (ref 3.4–10.8)
WBC # BLD AUTO: 8.52 10*3/MM3 (ref 3.4–10.8)
WBC # BLD AUTO: 9.23 10*3/MM3 (ref 3.4–10.8)
WBC # BLD AUTO: 9.48 10*3/MM3 (ref 3.4–10.8)
WBC # BLD AUTO: 9.52 10*3/MM3 (ref 3.4–10.8)
WBC # BLD AUTO: 9.52 10*3/MM3 (ref 3.4–10.8)
WBC # UR STRIP: ABNORMAL /HPF
WBC NRBC COR # BLD: 3.09 10*3/MM3 (ref 3.4–10.8)
WBC UR QL AUTO: ABNORMAL /HPF
WHOLE BLOOD HOLD SPECIMEN: NORMAL
YEAST URNS QL MICRO: ABNORMAL /HPF

## 2021-01-01 PROCEDURE — 97110 THERAPEUTIC EXERCISES: CPT

## 2021-01-01 PROCEDURE — 93005 ELECTROCARDIOGRAM TRACING: CPT | Performed by: NURSE PRACTITIONER

## 2021-01-01 PROCEDURE — 25010000002 PROCHLORPERAZINE 10 MG/2ML SOLUTION: Performed by: PHYSICIAN ASSISTANT

## 2021-01-01 PROCEDURE — 25010000003 POTASSIUM CHLORIDE 10 MEQ/100ML SOLUTION: Performed by: INTERNAL MEDICINE

## 2021-01-01 PROCEDURE — 82962 GLUCOSE BLOOD TEST: CPT

## 2021-01-01 PROCEDURE — 96365 THER/PROPH/DIAG IV INF INIT: CPT

## 2021-01-01 PROCEDURE — 25010000002 IOPAMIDOL 61 % SOLUTION: Performed by: INTERNAL MEDICINE

## 2021-01-01 PROCEDURE — G0378 HOSPITAL OBSERVATION PER HR: HCPCS

## 2021-01-01 PROCEDURE — 83050 HGB METHEMOGLOBIN QUAN: CPT

## 2021-01-01 PROCEDURE — 84132 ASSAY OF SERUM POTASSIUM: CPT | Performed by: HOSPITALIST

## 2021-01-01 PROCEDURE — 99239 HOSP IP/OBS DSCHRG MGMT >30: CPT | Performed by: PHYSICIAN ASSISTANT

## 2021-01-01 PROCEDURE — 99232 SBSQ HOSP IP/OBS MODERATE 35: CPT | Performed by: INTERNAL MEDICINE

## 2021-01-01 PROCEDURE — 99441 PR PHYS/QHP TELEPHONE EVALUATION 5-10 MIN: CPT | Performed by: NURSE PRACTITIONER

## 2021-01-01 PROCEDURE — 36415 COLL VENOUS BLD VENIPUNCTURE: CPT

## 2021-01-01 PROCEDURE — 83690 ASSAY OF LIPASE: CPT | Performed by: NURSE PRACTITIONER

## 2021-01-01 PROCEDURE — 25010000002 CEFTRIAXONE PER 250 MG: Performed by: NURSE PRACTITIONER

## 2021-01-01 PROCEDURE — 25010000002 MORPHINE PER 10 MG: Performed by: INTERNAL MEDICINE

## 2021-01-01 PROCEDURE — 99223 1ST HOSP IP/OBS HIGH 75: CPT | Performed by: PHYSICIAN ASSISTANT

## 2021-01-01 PROCEDURE — 25010000002 ENOXAPARIN PER 10 MG: Performed by: STUDENT IN AN ORGANIZED HEALTH CARE EDUCATION/TRAINING PROGRAM

## 2021-01-01 PROCEDURE — 25010000003 MAGNESIUM SULFATE 4 GM/100ML SOLUTION: Performed by: INTERNAL MEDICINE

## 2021-01-01 PROCEDURE — 94799 UNLISTED PULMONARY SVC/PX: CPT

## 2021-01-01 PROCEDURE — 25010000002 MORPHINE PER 10 MG: Performed by: GENERAL PRACTICE

## 2021-01-01 PROCEDURE — 85025 COMPLETE CBC W/AUTO DIFF WBC: CPT | Performed by: INTERNAL MEDICINE

## 2021-01-01 PROCEDURE — 82077 ASSAY SPEC XCP UR&BREATH IA: CPT | Performed by: EMERGENCY MEDICINE

## 2021-01-01 PROCEDURE — 80307 DRUG TEST PRSMV CHEM ANLYZR: CPT | Performed by: EMERGENCY MEDICINE

## 2021-01-01 PROCEDURE — 96361 HYDRATE IV INFUSION ADD-ON: CPT

## 2021-01-01 PROCEDURE — 25010000002 PIPERACILLIN SOD-TAZOBACTAM PER 1 G: Performed by: INTERNAL MEDICINE

## 2021-01-01 PROCEDURE — 0SRS039 REPLACEMENT OF LEFT HIP JOINT, FEMORAL SURFACE WITH CERAMIC SYNTHETIC SUBSTITUTE, CEMENTED, OPEN APPROACH: ICD-10-PCS | Performed by: GENERAL PRACTICE

## 2021-01-01 PROCEDURE — 36600 WITHDRAWAL OF ARTERIAL BLOOD: CPT

## 2021-01-01 PROCEDURE — 84443 ASSAY THYROID STIM HORMONE: CPT | Performed by: NURSE PRACTITIONER

## 2021-01-01 PROCEDURE — 92610 EVALUATE SWALLOWING FUNCTION: CPT

## 2021-01-01 PROCEDURE — 97167 OT EVAL HIGH COMPLEX 60 MIN: CPT

## 2021-01-01 PROCEDURE — 25010000002 ENOXAPARIN PER 10 MG: Performed by: INTERNAL MEDICINE

## 2021-01-01 PROCEDURE — 25010000002 HEPARIN LOCK FLUSH PER 10 UNITS: Performed by: NURSE PRACTITIONER

## 2021-01-01 PROCEDURE — 86677 HELICOBACTER PYLORI ANTIBODY: CPT | Performed by: NURSE PRACTITIONER

## 2021-01-01 PROCEDURE — 80053 COMPREHEN METABOLIC PANEL: CPT | Performed by: NURSE PRACTITIONER

## 2021-01-01 PROCEDURE — 99233 SBSQ HOSP IP/OBS HIGH 50: CPT | Performed by: NURSE PRACTITIONER

## 2021-01-01 PROCEDURE — 84100 ASSAY OF PHOSPHORUS: CPT | Performed by: INTERNAL MEDICINE

## 2021-01-01 PROCEDURE — 73590 X-RAY EXAM OF LOWER LEG: CPT | Performed by: RADIOLOGY

## 2021-01-01 PROCEDURE — 83735 ASSAY OF MAGNESIUM: CPT | Performed by: INTERNAL MEDICINE

## 2021-01-01 PROCEDURE — 74018 RADEX ABDOMEN 1 VIEW: CPT | Performed by: RADIOLOGY

## 2021-01-01 PROCEDURE — 25010000002 PROPOFOL 10 MG/ML EMULSION: Performed by: NURSE ANESTHETIST, CERTIFIED REGISTERED

## 2021-01-01 PROCEDURE — 71045 X-RAY EXAM CHEST 1 VIEW: CPT

## 2021-01-01 PROCEDURE — 82746 ASSAY OF FOLIC ACID SERUM: CPT | Performed by: NURSE PRACTITIONER

## 2021-01-01 PROCEDURE — 83605 ASSAY OF LACTIC ACID: CPT | Performed by: PHYSICIAN ASSISTANT

## 2021-01-01 PROCEDURE — 85610 PROTHROMBIN TIME: CPT | Performed by: NURSE PRACTITIONER

## 2021-01-01 PROCEDURE — 63710000001 PROMETHAZINE PER 12.5 MG: Performed by: GENERAL PRACTICE

## 2021-01-01 PROCEDURE — 93010 ELECTROCARDIOGRAM REPORT: CPT | Performed by: SPECIALIST

## 2021-01-01 PROCEDURE — 86901 BLOOD TYPING SEROLOGIC RH(D): CPT | Performed by: PHYSICIAN ASSISTANT

## 2021-01-01 PROCEDURE — 99222 1ST HOSP IP/OBS MODERATE 55: CPT | Performed by: SURGERY

## 2021-01-01 PROCEDURE — 80061 LIPID PANEL: CPT

## 2021-01-01 PROCEDURE — 71045 X-RAY EXAM CHEST 1 VIEW: CPT | Performed by: RADIOLOGY

## 2021-01-01 PROCEDURE — 83735 ASSAY OF MAGNESIUM: CPT | Performed by: PHYSICIAN ASSISTANT

## 2021-01-01 PROCEDURE — 85025 COMPLETE CBC W/AUTO DIFF WBC: CPT | Performed by: PHYSICIAN ASSISTANT

## 2021-01-01 PROCEDURE — 97530 THERAPEUTIC ACTIVITIES: CPT

## 2021-01-01 PROCEDURE — 73564 X-RAY EXAM KNEE 4 OR MORE: CPT | Performed by: RADIOLOGY

## 2021-01-01 PROCEDURE — 85025 COMPLETE CBC W/AUTO DIFF WBC: CPT | Performed by: EMERGENCY MEDICINE

## 2021-01-01 PROCEDURE — 73502 X-RAY EXAM HIP UNI 2-3 VIEWS: CPT | Performed by: RADIOLOGY

## 2021-01-01 PROCEDURE — 25010000002 PHENYLEPHRINE 10 MG/ML SOLUTION: Performed by: NURSE ANESTHETIST, CERTIFIED REGISTERED

## 2021-01-01 PROCEDURE — 82607 VITAMIN B-12: CPT | Performed by: NURSE PRACTITIONER

## 2021-01-01 PROCEDURE — 80048 BASIC METABOLIC PNL TOTAL CA: CPT | Performed by: INTERNAL MEDICINE

## 2021-01-01 PROCEDURE — 94760 N-INVAS EAR/PLS OXIMETRY 1: CPT

## 2021-01-01 PROCEDURE — 83540 ASSAY OF IRON: CPT | Performed by: NURSE PRACTITIONER

## 2021-01-01 PROCEDURE — 25010000002 HYDRALAZINE PER 20 MG: Performed by: INTERNAL MEDICINE

## 2021-01-01 PROCEDURE — 83540 ASSAY OF IRON: CPT

## 2021-01-01 PROCEDURE — 84484 ASSAY OF TROPONIN QUANT: CPT | Performed by: EMERGENCY MEDICINE

## 2021-01-01 PROCEDURE — 84466 ASSAY OF TRANSFERRIN: CPT | Performed by: NURSE PRACTITIONER

## 2021-01-01 PROCEDURE — 73502 X-RAY EXAM HIP UNI 2-3 VIEWS: CPT

## 2021-01-01 PROCEDURE — C9803 HOPD COVID-19 SPEC COLLECT: HCPCS

## 2021-01-01 PROCEDURE — 25010000002 ENOXAPARIN PER 10 MG: Performed by: GENERAL PRACTICE

## 2021-01-01 PROCEDURE — 99285 EMERGENCY DEPT VISIT HI MDM: CPT

## 2021-01-01 PROCEDURE — 80053 COMPREHEN METABOLIC PANEL: CPT | Performed by: INTERNAL MEDICINE

## 2021-01-01 PROCEDURE — 84100 ASSAY OF PHOSPHORUS: CPT | Performed by: PHYSICIAN ASSISTANT

## 2021-01-01 PROCEDURE — 87186 SC STD MICRODIL/AGAR DIL: CPT

## 2021-01-01 PROCEDURE — C1776 JOINT DEVICE (IMPLANTABLE): HCPCS | Performed by: GENERAL PRACTICE

## 2021-01-01 PROCEDURE — 97116 GAIT TRAINING THERAPY: CPT

## 2021-01-01 PROCEDURE — 25010000002 CYANOCOBALAMIN PER 1000 MCG: Performed by: INTERNAL MEDICINE

## 2021-01-01 PROCEDURE — 82607 VITAMIN B-12: CPT

## 2021-01-01 PROCEDURE — 86140 C-REACTIVE PROTEIN: CPT | Performed by: INTERNAL MEDICINE

## 2021-01-01 PROCEDURE — 87040 BLOOD CULTURE FOR BACTERIA: CPT | Performed by: PHYSICIAN ASSISTANT

## 2021-01-01 PROCEDURE — 25010000002 MORPHINE PER 10 MG: Performed by: STUDENT IN AN ORGANIZED HEALTH CARE EDUCATION/TRAINING PROGRAM

## 2021-01-01 PROCEDURE — 83036 HEMOGLOBIN GLYCOSYLATED A1C: CPT

## 2021-01-01 PROCEDURE — 85027 COMPLETE CBC AUTOMATED: CPT | Performed by: INTERNAL MEDICINE

## 2021-01-01 PROCEDURE — 99233 SBSQ HOSP IP/OBS HIGH 50: CPT | Performed by: INTERNAL MEDICINE

## 2021-01-01 PROCEDURE — 25010000002 NEOSTIGMINE 10 MG/10ML SOLUTION: Performed by: NURSE ANESTHETIST, CERTIFIED REGISTERED

## 2021-01-01 PROCEDURE — 93010 ELECTROCARDIOGRAM REPORT: CPT | Performed by: INTERNAL MEDICINE

## 2021-01-01 PROCEDURE — 73110 X-RAY EXAM OF WRIST: CPT | Performed by: RADIOLOGY

## 2021-01-01 PROCEDURE — 73552 X-RAY EXAM OF FEMUR 2/>: CPT

## 2021-01-01 PROCEDURE — 73552 X-RAY EXAM OF FEMUR 2/>: CPT | Performed by: RADIOLOGY

## 2021-01-01 PROCEDURE — 84484 ASSAY OF TROPONIN QUANT: CPT | Performed by: PHYSICIAN ASSISTANT

## 2021-01-01 PROCEDURE — 87086 URINE CULTURE/COLONY COUNT: CPT

## 2021-01-01 PROCEDURE — 85730 THROMBOPLASTIN TIME PARTIAL: CPT | Performed by: NURSE PRACTITIONER

## 2021-01-01 PROCEDURE — 25010000002 MAGNESIUM SULFATE 2 GM/50ML SOLUTION: Performed by: INTERNAL MEDICINE

## 2021-01-01 PROCEDURE — 92611 MOTION FLUOROSCOPY/SWALLOW: CPT

## 2021-01-01 PROCEDURE — 81001 URINALYSIS AUTO W/SCOPE: CPT | Performed by: EMERGENCY MEDICINE

## 2021-01-01 PROCEDURE — 96360 HYDRATION IV INFUSION INIT: CPT

## 2021-01-01 PROCEDURE — 80053 COMPREHEN METABOLIC PANEL: CPT | Performed by: PHYSICIAN ASSISTANT

## 2021-01-01 PROCEDURE — 70551 MRI BRAIN STEM W/O DYE: CPT | Performed by: RADIOLOGY

## 2021-01-01 PROCEDURE — 82728 ASSAY OF FERRITIN: CPT

## 2021-01-01 PROCEDURE — 80053 COMPREHEN METABOLIC PANEL: CPT | Performed by: EMERGENCY MEDICINE

## 2021-01-01 PROCEDURE — 84132 ASSAY OF SERUM POTASSIUM: CPT | Performed by: INTERNAL MEDICINE

## 2021-01-01 PROCEDURE — 97166 OT EVAL MOD COMPLEX 45 MIN: CPT

## 2021-01-01 PROCEDURE — 82375 ASSAY CARBOXYHB QUANT: CPT

## 2021-01-01 PROCEDURE — 85025 COMPLETE CBC W/AUTO DIFF WBC: CPT | Performed by: NURSE PRACTITIONER

## 2021-01-01 PROCEDURE — 25010000002 ONDANSETRON PER 1 MG: Performed by: PHYSICIAN ASSISTANT

## 2021-01-01 PROCEDURE — 83735 ASSAY OF MAGNESIUM: CPT | Performed by: EMERGENCY MEDICINE

## 2021-01-01 PROCEDURE — 70450 CT HEAD/BRAIN W/O DYE: CPT | Performed by: RADIOLOGY

## 2021-01-01 PROCEDURE — 87046 STOOL CULTR AEROBIC BACT EA: CPT

## 2021-01-01 PROCEDURE — 87045 FECES CULTURE AEROBIC BACT: CPT

## 2021-01-01 PROCEDURE — 87086 URINE CULTURE/COLONY COUNT: CPT | Performed by: NURSE PRACTITIONER

## 2021-01-01 PROCEDURE — 87086 URINE CULTURE/COLONY COUNT: CPT | Performed by: EMERGENCY MEDICINE

## 2021-01-01 PROCEDURE — 82009 KETONE BODYS QUAL: CPT | Performed by: STUDENT IN AN ORGANIZED HEALTH CARE EDUCATION/TRAINING PROGRAM

## 2021-01-01 PROCEDURE — 81003 URINALYSIS AUTO W/O SCOPE: CPT | Performed by: PHYSICIAN ASSISTANT

## 2021-01-01 PROCEDURE — 85025 COMPLETE CBC W/AUTO DIFF WBC: CPT

## 2021-01-01 PROCEDURE — 72170 X-RAY EXAM OF PELVIS: CPT | Performed by: RADIOLOGY

## 2021-01-01 PROCEDURE — 85007 BL SMEAR W/DIFF WBC COUNT: CPT | Performed by: INTERNAL MEDICINE

## 2021-01-01 PROCEDURE — 93970 EXTREMITY STUDY: CPT | Performed by: RADIOLOGY

## 2021-01-01 PROCEDURE — 83690 ASSAY OF LIPASE: CPT | Performed by: PHYSICIAN ASSISTANT

## 2021-01-01 PROCEDURE — 82150 ASSAY OF AMYLASE: CPT | Performed by: EMERGENCY MEDICINE

## 2021-01-01 PROCEDURE — 83036 HEMOGLOBIN GLYCOSYLATED A1C: CPT | Performed by: NURSE PRACTITIONER

## 2021-01-01 PROCEDURE — 85027 COMPLETE CBC AUTOMATED: CPT | Performed by: NURSE PRACTITIONER

## 2021-01-01 PROCEDURE — 70450 CT HEAD/BRAIN W/O DYE: CPT

## 2021-01-01 PROCEDURE — 93005 ELECTROCARDIOGRAM TRACING: CPT | Performed by: EMERGENCY MEDICINE

## 2021-01-01 PROCEDURE — 93005 ELECTROCARDIOGRAM TRACING: CPT | Performed by: INTERNAL MEDICINE

## 2021-01-01 PROCEDURE — 87493 C DIFF AMPLIFIED PROBE: CPT | Performed by: SURGERY

## 2021-01-01 PROCEDURE — 86140 C-REACTIVE PROTEIN: CPT | Performed by: PHYSICIAN ASSISTANT

## 2021-01-01 PROCEDURE — 85730 THROMBOPLASTIN TIME PARTIAL: CPT | Performed by: PHYSICIAN ASSISTANT

## 2021-01-01 PROCEDURE — 81001 URINALYSIS AUTO W/SCOPE: CPT

## 2021-01-01 PROCEDURE — 99223 1ST HOSP IP/OBS HIGH 75: CPT | Performed by: INTERNAL MEDICINE

## 2021-01-01 PROCEDURE — 80053 COMPREHEN METABOLIC PANEL: CPT

## 2021-01-01 PROCEDURE — 87077 CULTURE AEROBIC IDENTIFY: CPT | Performed by: PHYSICIAN ASSISTANT

## 2021-01-01 PROCEDURE — 25010000002 CEFTRIAXONE PER 250 MG: Performed by: PHYSICIAN ASSISTANT

## 2021-01-01 PROCEDURE — 81001 URINALYSIS AUTO W/SCOPE: CPT | Performed by: NURSE PRACTITIONER

## 2021-01-01 PROCEDURE — 84443 ASSAY THYROID STIM HORMONE: CPT

## 2021-01-01 PROCEDURE — 84484 ASSAY OF TROPONIN QUANT: CPT | Performed by: INTERNAL MEDICINE

## 2021-01-01 PROCEDURE — 74177 CT ABD & PELVIS W/CONTRAST: CPT

## 2021-01-01 PROCEDURE — 82652 VIT D 1 25-DIHYDROXY: CPT | Performed by: NURSE PRACTITIONER

## 2021-01-01 PROCEDURE — 80048 BASIC METABOLIC PNL TOTAL CA: CPT | Performed by: NURSE PRACTITIONER

## 2021-01-01 PROCEDURE — 99232 SBSQ HOSP IP/OBS MODERATE 35: CPT | Performed by: SPECIALIST

## 2021-01-01 PROCEDURE — 80048 BASIC METABOLIC PNL TOTAL CA: CPT | Performed by: GENERAL PRACTICE

## 2021-01-01 PROCEDURE — 87636 SARSCOV2 & INF A&B AMP PRB: CPT | Performed by: EMERGENCY MEDICINE

## 2021-01-01 PROCEDURE — 25010000003 CEFAZOLIN PER 500 MG: Performed by: GENERAL PRACTICE

## 2021-01-01 PROCEDURE — 86901 BLOOD TYPING SEROLOGIC RH(D): CPT

## 2021-01-01 PROCEDURE — 87086 URINE CULTURE/COLONY COUNT: CPT | Performed by: PSYCHIATRY & NEUROLOGY

## 2021-01-01 PROCEDURE — 82310 ASSAY OF CALCIUM: CPT | Performed by: STUDENT IN AN ORGANIZED HEALTH CARE EDUCATION/TRAINING PROGRAM

## 2021-01-01 PROCEDURE — 74018 RADEX ABDOMEN 1 VIEW: CPT

## 2021-01-01 PROCEDURE — 99232 SBSQ HOSP IP/OBS MODERATE 35: CPT | Performed by: PHYSICIAN ASSISTANT

## 2021-01-01 PROCEDURE — 87086 URINE CULTURE/COLONY COUNT: CPT | Performed by: PHYSICIAN ASSISTANT

## 2021-01-01 PROCEDURE — 80048 BASIC METABOLIC PNL TOTAL CA: CPT | Performed by: STUDENT IN AN ORGANIZED HEALTH CARE EDUCATION/TRAINING PROGRAM

## 2021-01-01 PROCEDURE — 99239 HOSP IP/OBS DSCHRG MGMT >30: CPT | Performed by: INTERNAL MEDICINE

## 2021-01-01 PROCEDURE — 96372 THER/PROPH/DIAG INJ SC/IM: CPT

## 2021-01-01 PROCEDURE — 87427 SHIGA-LIKE TOXIN AG IA: CPT

## 2021-01-01 PROCEDURE — 93970 EXTREMITY STUDY: CPT

## 2021-01-01 PROCEDURE — 85018 HEMOGLOBIN: CPT | Performed by: NURSE PRACTITIONER

## 2021-01-01 PROCEDURE — 36591 DRAW BLOOD OFF VENOUS DEVICE: CPT

## 2021-01-01 PROCEDURE — 99284 EMERGENCY DEPT VISIT MOD MDM: CPT

## 2021-01-01 PROCEDURE — 82820 HEMOGLOBIN-OXYGEN AFFINITY: CPT

## 2021-01-01 PROCEDURE — 25010000002 ONDANSETRON PER 1 MG: Performed by: NURSE ANESTHETIST, CERTIFIED REGISTERED

## 2021-01-01 PROCEDURE — 73590 X-RAY EXAM OF LOWER LEG: CPT

## 2021-01-01 PROCEDURE — 97162 PT EVAL MOD COMPLEX 30 MIN: CPT

## 2021-01-01 PROCEDURE — 87636 SARSCOV2 & INF A&B AMP PRB: CPT | Performed by: PHYSICIAN ASSISTANT

## 2021-01-01 PROCEDURE — 76705 ECHO EXAM OF ABDOMEN: CPT

## 2021-01-01 PROCEDURE — 93306 TTE W/DOPPLER COMPLETE: CPT | Performed by: INTERNAL MEDICINE

## 2021-01-01 PROCEDURE — 76705 ECHO EXAM OF ABDOMEN: CPT | Performed by: RADIOLOGY

## 2021-01-01 PROCEDURE — 83935 ASSAY OF URINE OSMOLALITY: CPT | Performed by: STUDENT IN AN ORGANIZED HEALTH CARE EDUCATION/TRAINING PROGRAM

## 2021-01-01 PROCEDURE — 71275 CT ANGIOGRAPHY CHEST: CPT

## 2021-01-01 PROCEDURE — 97163 PT EVAL HIGH COMPLEX 45 MIN: CPT

## 2021-01-01 PROCEDURE — 25010000002 DICYCLOMINE PER 20 MG: Performed by: PHYSICIAN ASSISTANT

## 2021-01-01 PROCEDURE — 96375 TX/PRO/DX INJ NEW DRUG ADDON: CPT

## 2021-01-01 PROCEDURE — 74230 X-RAY XM SWLNG FUNCJ C+: CPT | Performed by: RADIOLOGY

## 2021-01-01 PROCEDURE — 74177 CT ABD & PELVIS W/CONTRAST: CPT | Performed by: RADIOLOGY

## 2021-01-01 PROCEDURE — 25010000002 FENTANYL CITRATE (PF) 50 MCG/ML SOLUTION: Performed by: NURSE ANESTHETIST, CERTIFIED REGISTERED

## 2021-01-01 PROCEDURE — 25010000002 MORPHINE PER 10 MG: Performed by: EMERGENCY MEDICINE

## 2021-01-01 PROCEDURE — 73110 X-RAY EXAM OF WRIST: CPT

## 2021-01-01 PROCEDURE — 85027 COMPLETE CBC AUTOMATED: CPT | Performed by: GENERAL PRACTICE

## 2021-01-01 PROCEDURE — P9612 CATHETERIZE FOR URINE SPEC: HCPCS

## 2021-01-01 PROCEDURE — 25010000002 IOPAMIDOL 61 % SOLUTION: Performed by: EMERGENCY MEDICINE

## 2021-01-01 PROCEDURE — 86900 BLOOD TYPING SEROLOGIC ABO: CPT | Performed by: PHYSICIAN ASSISTANT

## 2021-01-01 PROCEDURE — 84132 ASSAY OF SERUM POTASSIUM: CPT | Performed by: GENERAL PRACTICE

## 2021-01-01 PROCEDURE — 80061 LIPID PANEL: CPT | Performed by: NURSE PRACTITIONER

## 2021-01-01 PROCEDURE — 82805 BLOOD GASES W/O2 SATURATION: CPT

## 2021-01-01 PROCEDURE — 87186 SC STD MICRODIL/AGAR DIL: CPT | Performed by: PHYSICIAN ASSISTANT

## 2021-01-01 PROCEDURE — 84134 ASSAY OF PREALBUMIN: CPT | Performed by: NURSE PRACTITIONER

## 2021-01-01 PROCEDURE — 84484 ASSAY OF TROPONIN QUANT: CPT | Performed by: NURSE PRACTITIONER

## 2021-01-01 PROCEDURE — C1713 ANCHOR/SCREW BN/BN,TIS/BN: HCPCS | Performed by: GENERAL PRACTICE

## 2021-01-01 PROCEDURE — 81001 URINALYSIS AUTO W/SCOPE: CPT | Performed by: PHYSICIAN ASSISTANT

## 2021-01-01 PROCEDURE — 82436 ASSAY OF URINE CHLORIDE: CPT | Performed by: STUDENT IN AN ORGANIZED HEALTH CARE EDUCATION/TRAINING PROGRAM

## 2021-01-01 PROCEDURE — 83880 ASSAY OF NATRIURETIC PEPTIDE: CPT | Performed by: PHYSICIAN ASSISTANT

## 2021-01-01 PROCEDURE — 87636 SARSCOV2 & INF A&B AMP PRB: CPT | Performed by: NURSE PRACTITIONER

## 2021-01-01 PROCEDURE — 81001 URINALYSIS AUTO W/SCOPE: CPT | Performed by: PSYCHIATRY & NEUROLOGY

## 2021-01-01 PROCEDURE — 25010000002 HYDRALAZINE PER 20 MG: Performed by: GENERAL PRACTICE

## 2021-01-01 PROCEDURE — 83690 ASSAY OF LIPASE: CPT | Performed by: EMERGENCY MEDICINE

## 2021-01-01 PROCEDURE — 82728 ASSAY OF FERRITIN: CPT | Performed by: NURSE PRACTITIONER

## 2021-01-01 PROCEDURE — 25010000002 KETOROLAC TROMETHAMINE PER 15 MG: Performed by: PHYSICIAN ASSISTANT

## 2021-01-01 PROCEDURE — 71275 CT ANGIOGRAPHY CHEST: CPT | Performed by: RADIOLOGY

## 2021-01-01 PROCEDURE — 82140 ASSAY OF AMMONIA: CPT | Performed by: PHYSICIAN ASSISTANT

## 2021-01-01 PROCEDURE — 86850 RBC ANTIBODY SCREEN: CPT | Performed by: PHYSICIAN ASSISTANT

## 2021-01-01 PROCEDURE — 93306 TTE W/DOPPLER COMPLETE: CPT

## 2021-01-01 PROCEDURE — 74176 CT ABD & PELVIS W/O CONTRAST: CPT

## 2021-01-01 PROCEDURE — 72170 X-RAY EXAM OF PELVIS: CPT

## 2021-01-01 PROCEDURE — U0004 COV-19 TEST NON-CDC HGH THRU: HCPCS

## 2021-01-01 PROCEDURE — 99213 OFFICE O/P EST LOW 20 MIN: CPT | Performed by: SURGERY

## 2021-01-01 PROCEDURE — 93005 ELECTROCARDIOGRAM TRACING: CPT | Performed by: PHYSICIAN ASSISTANT

## 2021-01-01 PROCEDURE — 82306 VITAMIN D 25 HYDROXY: CPT

## 2021-01-01 PROCEDURE — 99222 1ST HOSP IP/OBS MODERATE 55: CPT | Performed by: SPECIALIST

## 2021-01-01 PROCEDURE — 85610 PROTHROMBIN TIME: CPT | Performed by: INTERNAL MEDICINE

## 2021-01-01 PROCEDURE — 74176 CT ABD & PELVIS W/O CONTRAST: CPT | Performed by: RADIOLOGY

## 2021-01-01 PROCEDURE — 87186 SC STD MICRODIL/AGAR DIL: CPT | Performed by: NURSE PRACTITIONER

## 2021-01-01 PROCEDURE — 85610 PROTHROMBIN TIME: CPT | Performed by: PHYSICIAN ASSISTANT

## 2021-01-01 PROCEDURE — 83735 ASSAY OF MAGNESIUM: CPT | Performed by: NURSE PRACTITIONER

## 2021-01-01 PROCEDURE — 84443 ASSAY THYROID STIM HORMONE: CPT | Performed by: PHYSICIAN ASSISTANT

## 2021-01-01 PROCEDURE — 25010000002 ONDANSETRON PER 1 MG: Performed by: GENERAL PRACTICE

## 2021-01-01 PROCEDURE — 25010000002 HYDRALAZINE PER 20 MG: Performed by: PHYSICIAN ASSISTANT

## 2021-01-01 PROCEDURE — 81003 URINALYSIS AUTO W/O SCOPE: CPT | Performed by: INTERNAL MEDICINE

## 2021-01-01 PROCEDURE — 73564 X-RAY EXAM KNEE 4 OR MORE: CPT

## 2021-01-01 PROCEDURE — 99232 SBSQ HOSP IP/OBS MODERATE 35: CPT | Performed by: NURSE PRACTITIONER

## 2021-01-01 PROCEDURE — 86900 BLOOD TYPING SEROLOGIC ABO: CPT

## 2021-01-01 PROCEDURE — 70551 MRI BRAIN STEM W/O DYE: CPT

## 2021-01-01 PROCEDURE — 85014 HEMATOCRIT: CPT | Performed by: NURSE PRACTITIONER

## 2021-01-01 PROCEDURE — 25010000002 CEFTRIAXONE PER 250 MG: Performed by: INTERNAL MEDICINE

## 2021-01-01 PROCEDURE — 74230 X-RAY XM SWLNG FUNCJ C+: CPT

## 2021-01-01 PROCEDURE — 84133 ASSAY OF URINE POTASSIUM: CPT | Performed by: STUDENT IN AN ORGANIZED HEALTH CARE EDUCATION/TRAINING PROGRAM

## 2021-01-01 PROCEDURE — U0004 COV-19 TEST NON-CDC HGH THRU: HCPCS | Performed by: INTERNAL MEDICINE

## 2021-01-01 PROCEDURE — 84300 ASSAY OF URINE SODIUM: CPT | Performed by: STUDENT IN AN ORGANIZED HEALTH CARE EDUCATION/TRAINING PROGRAM

## 2021-01-01 DEVICE — BONE PREPARATION KIT
Type: IMPLANTABLE DEVICE | Site: HIP | Status: FUNCTIONAL
Brand: BIOPREP

## 2021-01-01 DEVICE — IMPLANTABLE DEVICE
Type: IMPLANTABLE DEVICE | Site: HIP | Status: FUNCTIONAL
Brand: ECHO FX™ HIP SYSTEM

## 2021-01-01 DEVICE — CAP PRT HIP UNIPOLAR: Type: IMPLANTABLE DEVICE | Status: FUNCTIONAL

## 2021-01-01 DEVICE — IMPLANTABLE DEVICE
Type: IMPLANTABLE DEVICE | Site: HIP | Status: FUNCTIONAL
Brand: BIO-MOORE ENDO II HIP SYSTEM

## 2021-01-01 DEVICE — SMARTSET HIGH PERFORMANCE MV MEDIUM VISCOSITY BONE CEMENT 40G
Type: IMPLANTABLE DEVICE | Site: HIP | Status: FUNCTIONAL
Brand: SMARTSET

## 2021-01-01 RX ORDER — POTASSIUM CHLORIDE 7.45 MG/ML
10 INJECTION INTRAVENOUS
Status: COMPLETED | OUTPATIENT
Start: 2021-01-01 | End: 2021-01-01

## 2021-01-01 RX ORDER — FLUTICASONE PROPIONATE 50 MCG
2 SPRAY, SUSPENSION (ML) NASAL DAILY
Status: DISCONTINUED | OUTPATIENT
Start: 2021-01-01 | End: 2021-01-01 | Stop reason: HOSPADM

## 2021-01-01 RX ORDER — NITROFURANTOIN 25; 75 MG/1; MG/1
100 CAPSULE ORAL ONCE
Status: COMPLETED | OUTPATIENT
Start: 2021-01-01 | End: 2021-01-01

## 2021-01-01 RX ORDER — POTASSIUM CHLORIDE 750 MG/1
10 TABLET, FILM COATED, EXTENDED RELEASE ORAL DAILY
COMMUNITY
Start: 2021-01-01

## 2021-01-01 RX ORDER — MAGNESIUM SULFATE HEPTAHYDRATE 40 MG/ML
4 INJECTION, SOLUTION INTRAVENOUS ONCE
Status: COMPLETED | OUTPATIENT
Start: 2021-01-01 | End: 2021-01-01

## 2021-01-01 RX ORDER — POTASSIUM CHLORIDE 1.5 G/1.77G
40 POWDER, FOR SOLUTION ORAL EVERY 4 HOURS
Status: DISCONTINUED | OUTPATIENT
Start: 2021-01-01 | End: 2021-01-01 | Stop reason: ALTCHOICE

## 2021-01-01 RX ORDER — PROMETHAZINE HYDROCHLORIDE 12.5 MG/1
12.5 TABLET ORAL 2 TIMES DAILY PRN
Status: DISCONTINUED | OUTPATIENT
Start: 2021-01-01 | End: 2021-01-01 | Stop reason: HOSPADM

## 2021-01-01 RX ORDER — GLYCOPYRROLATE 0.2 MG/ML
INJECTION INTRAMUSCULAR; INTRAVENOUS AS NEEDED
Status: DISCONTINUED | OUTPATIENT
Start: 2021-01-01 | End: 2021-01-01 | Stop reason: SURG

## 2021-01-01 RX ORDER — NICOTINE POLACRILEX 4 MG
15 LOZENGE BUCCAL
Status: DISCONTINUED | OUTPATIENT
Start: 2021-01-01 | End: 2021-01-01 | Stop reason: HOSPADM

## 2021-01-01 RX ORDER — POTASSIUM CHLORIDE 20 MEQ/1
40 TABLET, EXTENDED RELEASE ORAL AS NEEDED
Status: DISCONTINUED | OUTPATIENT
Start: 2021-01-01 | End: 2021-01-01 | Stop reason: HOSPADM

## 2021-01-01 RX ORDER — MEMANTINE HYDROCHLORIDE 5 MG/1
5 TABLET ORAL DAILY
Status: DISCONTINUED | OUTPATIENT
Start: 2021-01-01 | End: 2021-01-01 | Stop reason: HOSPADM

## 2021-01-01 RX ORDER — POTASSIUM CHLORIDE 20 MEQ/1
40 TABLET, EXTENDED RELEASE ORAL EVERY 4 HOURS
Status: COMPLETED | OUTPATIENT
Start: 2021-01-01 | End: 2021-01-01

## 2021-01-01 RX ORDER — SODIUM CHLORIDE 9 MG/ML
75 INJECTION, SOLUTION INTRAVENOUS CONTINUOUS
Status: DISCONTINUED | OUTPATIENT
Start: 2021-01-01 | End: 2021-01-01

## 2021-01-01 RX ORDER — SODIUM CHLORIDE, SODIUM LACTATE, POTASSIUM CHLORIDE, CALCIUM CHLORIDE 600; 310; 30; 20 MG/100ML; MG/100ML; MG/100ML; MG/100ML
75 INJECTION, SOLUTION INTRAVENOUS CONTINUOUS
Status: DISCONTINUED | OUTPATIENT
Start: 2021-01-01 | End: 2021-01-01 | Stop reason: HOSPADM

## 2021-01-01 RX ORDER — PHENYLEPHRINE HCL 10 MG/1
20 TABLET, FILM COATED ORAL 2 TIMES DAILY
COMMUNITY
End: 2021-01-01 | Stop reason: HOSPADM

## 2021-01-01 RX ORDER — HYDRALAZINE HYDROCHLORIDE 20 MG/ML
10 INJECTION INTRAMUSCULAR; INTRAVENOUS EVERY 4 HOURS PRN
Status: DISCONTINUED | OUTPATIENT
Start: 2021-01-01 | End: 2021-01-01 | Stop reason: HOSPADM

## 2021-01-01 RX ORDER — OXYCODONE HYDROCHLORIDE AND ACETAMINOPHEN 5; 325 MG/1; MG/1
1 TABLET ORAL ONCE AS NEEDED
Status: DISCONTINUED | OUTPATIENT
Start: 2021-01-01 | End: 2021-01-01 | Stop reason: HOSPADM

## 2021-01-01 RX ORDER — NITROFURANTOIN MACROCRYSTALS 50 MG/1
50 CAPSULE ORAL DAILY
Status: CANCELLED | OUTPATIENT
Start: 2021-01-01 | End: 2022-10-06

## 2021-01-01 RX ORDER — DEXLANSOPRAZOLE 60 MG/1
60 CAPSULE, DELAYED RELEASE ORAL DAILY
Status: CANCELLED | OUTPATIENT
Start: 2021-01-01

## 2021-01-01 RX ORDER — POTASSIUM CHLORIDE 1.5 G/1.77G
40 POWDER, FOR SOLUTION ORAL AS NEEDED
Status: DISCONTINUED | OUTPATIENT
Start: 2021-01-01 | End: 2021-01-01 | Stop reason: HOSPADM

## 2021-01-01 RX ORDER — PROCHLORPERAZINE EDISYLATE 5 MG/ML
5 INJECTION INTRAMUSCULAR; INTRAVENOUS EVERY 6 HOURS PRN
Status: DISCONTINUED | OUTPATIENT
Start: 2021-01-01 | End: 2021-01-01 | Stop reason: HOSPADM

## 2021-01-01 RX ORDER — ONDANSETRON 2 MG/ML
4 INJECTION INTRAMUSCULAR; INTRAVENOUS ONCE
Status: COMPLETED | OUTPATIENT
Start: 2021-01-01 | End: 2021-01-01

## 2021-01-01 RX ORDER — PANTOPRAZOLE SODIUM 40 MG/1
40 TABLET, DELAYED RELEASE ORAL
Status: DISCONTINUED | OUTPATIENT
Start: 2021-01-01 | End: 2021-01-01 | Stop reason: HOSPADM

## 2021-01-01 RX ORDER — AMLODIPINE BESYLATE 5 MG/1
2.5 TABLET ORAL
Status: DISCONTINUED | OUTPATIENT
Start: 2021-01-01 | End: 2021-01-01 | Stop reason: HOSPADM

## 2021-01-01 RX ORDER — MAGNESIUM SULFATE 1 G/100ML
1 INJECTION INTRAVENOUS AS NEEDED
Status: DISCONTINUED | OUTPATIENT
Start: 2021-01-01 | End: 2021-01-01 | Stop reason: HOSPADM

## 2021-01-01 RX ORDER — MAGNESIUM SULFATE HEPTAHYDRATE 40 MG/ML
2 INJECTION, SOLUTION INTRAVENOUS ONCE
Status: COMPLETED | OUTPATIENT
Start: 2021-01-01 | End: 2021-01-01

## 2021-01-01 RX ORDER — CYPROHEPTADINE HYDROCHLORIDE 4 MG/1
4 TABLET ORAL 3 TIMES DAILY PRN
COMMUNITY

## 2021-01-01 RX ORDER — SODIUM CHLORIDE 0.9 % (FLUSH) 0.9 %
10 SYRINGE (ML) INJECTION AS NEEDED
Status: DISCONTINUED | OUTPATIENT
Start: 2021-01-01 | End: 2021-01-01 | Stop reason: HOSPADM

## 2021-01-01 RX ORDER — NITROFURANTOIN MACROCRYSTALS 50 MG/1
50 CAPSULE ORAL DAILY
Status: CANCELLED | OUTPATIENT
Start: 2021-01-01 | End: 2021-01-01

## 2021-01-01 RX ORDER — HYDRALAZINE HYDROCHLORIDE 10 MG/1
10 TABLET, FILM COATED ORAL EVERY 8 HOURS SCHEDULED
Status: DISCONTINUED | OUTPATIENT
Start: 2021-01-01 | End: 2021-01-01 | Stop reason: HOSPADM

## 2021-01-01 RX ORDER — HEPARIN SODIUM (PORCINE) LOCK FLUSH IV SOLN 100 UNIT/ML 100 UNIT/ML
5 SOLUTION INTRAVENOUS AS NEEDED
Status: DISCONTINUED | OUTPATIENT
Start: 2021-01-01 | End: 2021-01-01 | Stop reason: HOSPADM

## 2021-01-01 RX ORDER — CEFAZOLIN SODIUM 2 G/50ML
2 SOLUTION INTRAVENOUS ONCE
Status: DISCONTINUED | OUTPATIENT
Start: 2021-01-01 | End: 2021-01-01 | Stop reason: HOSPADM

## 2021-01-01 RX ORDER — METOPROLOL TARTRATE 50 MG/1
50 TABLET, FILM COATED ORAL EVERY 12 HOURS SCHEDULED
Status: CANCELLED | OUTPATIENT
Start: 2021-01-01

## 2021-01-01 RX ORDER — MORPHINE SULFATE 2 MG/ML
2 INJECTION, SOLUTION INTRAMUSCULAR; INTRAVENOUS ONCE
Status: COMPLETED | OUTPATIENT
Start: 2021-01-01 | End: 2021-01-01

## 2021-01-01 RX ORDER — DICYCLOMINE HYDROCHLORIDE 10 MG/ML
10 INJECTION INTRAMUSCULAR ONCE
Status: COMPLETED | OUTPATIENT
Start: 2021-01-01 | End: 2021-01-01

## 2021-01-01 RX ORDER — PANTOPRAZOLE SODIUM 40 MG/1
40 TABLET, DELAYED RELEASE ORAL EVERY MORNING
Status: CANCELLED | OUTPATIENT
Start: 2021-01-01

## 2021-01-01 RX ORDER — SODIUM BICARBONATE 650 MG/1
650 TABLET ORAL 2 TIMES DAILY
Status: DISCONTINUED | OUTPATIENT
Start: 2021-01-01 | End: 2021-01-01

## 2021-01-01 RX ORDER — HYDROCODONE BITARTRATE AND ACETAMINOPHEN 7.5; 325 MG/1; MG/1
1 TABLET ORAL 3 TIMES DAILY
Status: DISCONTINUED | OUTPATIENT
Start: 2021-01-01 | End: 2021-01-01 | Stop reason: HOSPADM

## 2021-01-01 RX ORDER — PROMETHAZINE HYDROCHLORIDE 12.5 MG/1
12.5 TABLET ORAL 2 TIMES DAILY PRN
COMMUNITY

## 2021-01-01 RX ORDER — PROMETHAZINE HYDROCHLORIDE 25 MG/1
12.5 TABLET ORAL 2 TIMES DAILY PRN
Status: CANCELLED | OUTPATIENT
Start: 2021-01-01

## 2021-01-01 RX ORDER — ONDANSETRON 2 MG/ML
4 INJECTION INTRAMUSCULAR; INTRAVENOUS EVERY 6 HOURS PRN
Status: DISCONTINUED | OUTPATIENT
Start: 2021-01-01 | End: 2021-01-01 | Stop reason: HOSPADM

## 2021-01-01 RX ORDER — ATORVASTATIN CALCIUM 10 MG/1
10 TABLET, FILM COATED ORAL DAILY
Status: CANCELLED | OUTPATIENT
Start: 2021-01-01

## 2021-01-01 RX ORDER — MAGNESIUM SULFATE HEPTAHYDRATE 40 MG/ML
4 INJECTION, SOLUTION INTRAVENOUS AS NEEDED
Status: DISCONTINUED | OUTPATIENT
Start: 2021-01-01 | End: 2021-01-01 | Stop reason: HOSPADM

## 2021-01-01 RX ORDER — METOPROLOL TARTRATE 50 MG/1
50 TABLET, FILM COATED ORAL EVERY 12 HOURS SCHEDULED
Status: DISCONTINUED | OUTPATIENT
Start: 2021-01-01 | End: 2021-01-01

## 2021-01-01 RX ORDER — PSEUDOEPHEDRINE HCL 30 MG
30 TABLET ORAL 2 TIMES DAILY
Status: CANCELLED | OUTPATIENT
Start: 2021-01-01

## 2021-01-01 RX ORDER — KETOROLAC TROMETHAMINE 30 MG/ML
15 INJECTION, SOLUTION INTRAMUSCULAR; INTRAVENOUS EVERY 6 HOURS PRN
Status: DISCONTINUED | OUTPATIENT
Start: 2021-01-01 | End: 2021-01-01 | Stop reason: HOSPADM

## 2021-01-01 RX ORDER — CETIRIZINE HYDROCHLORIDE 10 MG/1
5 TABLET ORAL DAILY
Status: DISCONTINUED | OUTPATIENT
Start: 2021-01-01 | End: 2021-01-01 | Stop reason: HOSPADM

## 2021-01-01 RX ORDER — METOPROLOL TARTRATE 50 MG/1
50 TABLET, FILM COATED ORAL EVERY 12 HOURS SCHEDULED
Status: DISCONTINUED | OUTPATIENT
Start: 2021-01-01 | End: 2021-01-01 | Stop reason: HOSPADM

## 2021-01-01 RX ORDER — SODIUM BICARBONATE 650 MG/1
650 TABLET ORAL DAILY
Status: DISCONTINUED | OUTPATIENT
Start: 2021-01-01 | End: 2021-01-01

## 2021-01-01 RX ORDER — LEVOTHYROXINE SODIUM 0.03 MG/1
25 TABLET ORAL DAILY
COMMUNITY
Start: 2021-01-01

## 2021-01-01 RX ORDER — METOPROLOL TARTRATE 5 MG/5ML
5 INJECTION INTRAVENOUS ONCE
Status: COMPLETED | OUTPATIENT
Start: 2021-01-01 | End: 2021-01-01

## 2021-01-01 RX ORDER — HYDROCHLOROTHIAZIDE 12.5 MG/1
6.25 TABLET ORAL 2 TIMES DAILY PRN
Status: CANCELLED | OUTPATIENT
Start: 2021-01-01

## 2021-01-01 RX ORDER — HYDROCODONE BITARTRATE AND ACETAMINOPHEN 10; 325 MG/1; MG/1
1 TABLET ORAL 3 TIMES DAILY PRN
Status: CANCELLED | OUTPATIENT
Start: 2021-01-01

## 2021-01-01 RX ORDER — PREGABALIN 25 MG/1
25 CAPSULE ORAL 2 TIMES DAILY
Status: CANCELLED | OUTPATIENT
Start: 2021-01-01

## 2021-01-01 RX ORDER — HYDRALAZINE HYDROCHLORIDE 20 MG/ML
10 INJECTION INTRAMUSCULAR; INTRAVENOUS ONCE
Status: COMPLETED | OUTPATIENT
Start: 2021-01-01 | End: 2021-01-01

## 2021-01-01 RX ORDER — OXYCODONE HYDROCHLORIDE 5 MG/1
5 TABLET ORAL EVERY 6 HOURS PRN
Status: DISPENSED | OUTPATIENT
Start: 2021-01-01 | End: 2021-01-01

## 2021-01-01 RX ORDER — PROMETHAZINE HYDROCHLORIDE 12.5 MG/1
12.5 TABLET ORAL EVERY 6 HOURS PRN
Qty: 15 TABLET | Refills: 0 | Status: SHIPPED | OUTPATIENT
Start: 2021-01-01 | End: 2021-01-01

## 2021-01-01 RX ORDER — ATORVASTATIN CALCIUM 10 MG/1
10 TABLET, FILM COATED ORAL NIGHTLY
Status: DISCONTINUED | OUTPATIENT
Start: 2021-01-01 | End: 2021-01-01 | Stop reason: HOSPADM

## 2021-01-01 RX ORDER — ACETAMINOPHEN 500 MG
1000 TABLET ORAL EVERY 8 HOURS
Status: DISCONTINUED | OUTPATIENT
Start: 2021-01-01 | End: 2021-01-01 | Stop reason: HOSPADM

## 2021-01-01 RX ORDER — SODIUM CHLORIDE 9 MG/ML
125 INJECTION, SOLUTION INTRAVENOUS CONTINUOUS
Status: DISCONTINUED | OUTPATIENT
Start: 2021-01-01 | End: 2021-01-01 | Stop reason: HOSPADM

## 2021-01-01 RX ORDER — CEFDINIR 300 MG/1
300 CAPSULE ORAL 2 TIMES DAILY
Qty: 20 CAPSULE | Refills: 0 | Status: SHIPPED | OUTPATIENT
Start: 2021-01-01 | End: 2022-01-01

## 2021-01-01 RX ORDER — ACETAMINOPHEN 325 MG/1
650 TABLET ORAL EVERY 6 HOURS PRN
Status: DISCONTINUED | OUTPATIENT
Start: 2021-01-01 | End: 2021-01-01 | Stop reason: HOSPADM

## 2021-01-01 RX ORDER — PREGABALIN 25 MG/1
25 CAPSULE ORAL 2 TIMES DAILY
Qty: 30 CAPSULE | Refills: 0 | Status: SHIPPED | OUTPATIENT
Start: 2021-01-01

## 2021-01-01 RX ORDER — POTASSIUM CHLORIDE 7.45 MG/ML
10 INJECTION INTRAVENOUS
Status: DISCONTINUED | OUTPATIENT
Start: 2021-01-01 | End: 2021-01-01 | Stop reason: HOSPADM

## 2021-01-01 RX ORDER — HYDRALAZINE HYDROCHLORIDE 20 MG/ML
10 INJECTION INTRAMUSCULAR; INTRAVENOUS EVERY 6 HOURS PRN
Status: DISCONTINUED | OUTPATIENT
Start: 2021-01-01 | End: 2021-01-01 | Stop reason: HOSPADM

## 2021-01-01 RX ORDER — ONDANSETRON 2 MG/ML
INJECTION INTRAMUSCULAR; INTRAVENOUS AS NEEDED
Status: DISCONTINUED | OUTPATIENT
Start: 2021-01-01 | End: 2021-01-01 | Stop reason: SURG

## 2021-01-01 RX ORDER — CYANOCOBALAMIN 1000 UG/ML
1000 INJECTION, SOLUTION INTRAMUSCULAR; SUBCUTANEOUS DAILY
Status: DISCONTINUED | OUTPATIENT
Start: 2021-01-01 | End: 2021-01-01 | Stop reason: HOSPADM

## 2021-01-01 RX ORDER — SODIUM CHLORIDE, SODIUM LACTATE, POTASSIUM CHLORIDE, CALCIUM CHLORIDE 600; 310; 30; 20 MG/100ML; MG/100ML; MG/100ML; MG/100ML
75 INJECTION, SOLUTION INTRAVENOUS CONTINUOUS
Status: DISCONTINUED | OUTPATIENT
Start: 2021-01-01 | End: 2021-01-01

## 2021-01-01 RX ORDER — NITROGLYCERIN 0.4 MG/1
0.4 TABLET SUBLINGUAL
Status: DISCONTINUED | OUTPATIENT
Start: 2021-01-01 | End: 2021-01-01 | Stop reason: HOSPADM

## 2021-01-01 RX ORDER — SUMATRIPTAN 6 MG/.5ML
6 INJECTION, SOLUTION SUBCUTANEOUS ONCE
Status: COMPLETED | OUTPATIENT
Start: 2021-01-01 | End: 2021-01-01

## 2021-01-01 RX ORDER — HYDRALAZINE HYDROCHLORIDE 10 MG/1
10 TABLET, FILM COATED ORAL EVERY 8 HOURS SCHEDULED
Qty: 90 TABLET | Refills: 0 | Status: SHIPPED | OUTPATIENT
Start: 2021-01-01 | End: 2021-01-01 | Stop reason: HOSPADM

## 2021-01-01 RX ORDER — MEGESTROL ACETATE 40 MG/ML
200 SUSPENSION ORAL DAILY
Status: DISCONTINUED | OUTPATIENT
Start: 2021-01-01 | End: 2021-01-01 | Stop reason: HOSPADM

## 2021-01-01 RX ORDER — DEXTROSE MONOHYDRATE 25 G/50ML
25 INJECTION, SOLUTION INTRAVENOUS
Status: DISCONTINUED | OUTPATIENT
Start: 2021-01-01 | End: 2021-01-01 | Stop reason: SDUPTHER

## 2021-01-01 RX ORDER — IPRATROPIUM BROMIDE AND ALBUTEROL SULFATE 2.5; .5 MG/3ML; MG/3ML
3 SOLUTION RESPIRATORY (INHALATION) ONCE AS NEEDED
Status: DISCONTINUED | OUTPATIENT
Start: 2021-01-01 | End: 2021-01-01 | Stop reason: HOSPADM

## 2021-01-01 RX ORDER — FLUOXETINE HYDROCHLORIDE 20 MG/1
20 CAPSULE ORAL DAILY
Status: CANCELLED | OUTPATIENT
Start: 2021-01-01

## 2021-01-01 RX ORDER — METOPROLOL TARTRATE 100 MG/1
150 TABLET ORAL EVERY 12 HOURS SCHEDULED
Qty: 90 TABLET | Refills: 0 | Status: SHIPPED | OUTPATIENT
Start: 2021-01-01

## 2021-01-01 RX ORDER — METOPROLOL TARTRATE 50 MG/1
50 TABLET, FILM COATED ORAL ONCE
Status: COMPLETED | OUTPATIENT
Start: 2021-01-01 | End: 2021-01-01

## 2021-01-01 RX ORDER — DROPERIDOL 2.5 MG/ML
0.62 INJECTION, SOLUTION INTRAMUSCULAR; INTRAVENOUS ONCE AS NEEDED
Status: DISCONTINUED | OUTPATIENT
Start: 2021-01-01 | End: 2021-01-01 | Stop reason: HOSPADM

## 2021-01-01 RX ORDER — ONDANSETRON 2 MG/ML
4 INJECTION INTRAMUSCULAR; INTRAVENOUS AS NEEDED
Status: DISCONTINUED | OUTPATIENT
Start: 2021-01-01 | End: 2021-01-01 | Stop reason: HOSPADM

## 2021-01-01 RX ORDER — DEXLANSOPRAZOLE 60 MG/1
60 CAPSULE, DELAYED RELEASE ORAL DAILY
COMMUNITY

## 2021-01-01 RX ORDER — HYDRALAZINE HYDROCHLORIDE 10 MG/1
10 TABLET, FILM COATED ORAL EVERY 8 HOURS SCHEDULED
Status: CANCELLED | OUTPATIENT
Start: 2021-01-01

## 2021-01-01 RX ORDER — HYDRALAZINE HYDROCHLORIDE 25 MG/1
25 TABLET, FILM COATED ORAL EVERY 8 HOURS SCHEDULED
Status: DISCONTINUED | OUTPATIENT
Start: 2021-01-01 | End: 2021-01-01 | Stop reason: HOSPADM

## 2021-01-01 RX ORDER — FAMOTIDINE 10 MG/ML
INJECTION, SOLUTION INTRAVENOUS AS NEEDED
Status: DISCONTINUED | OUTPATIENT
Start: 2021-01-01 | End: 2021-01-01 | Stop reason: SURG

## 2021-01-01 RX ORDER — BUSPIRONE HYDROCHLORIDE 5 MG/1
2.5 TABLET ORAL 2 TIMES DAILY
Status: DISCONTINUED | OUTPATIENT
Start: 2021-01-01 | End: 2021-01-01 | Stop reason: HOSPADM

## 2021-01-01 RX ORDER — POTASSIUM CHLORIDE 20 MEQ/1
40 TABLET, EXTENDED RELEASE ORAL EVERY 4 HOURS
Status: DISCONTINUED | OUTPATIENT
Start: 2021-01-01 | End: 2021-01-01

## 2021-01-01 RX ORDER — SODIUM CHLORIDE 9 MG/ML
100 INJECTION, SOLUTION INTRAVENOUS CONTINUOUS
Status: DISCONTINUED | OUTPATIENT
Start: 2021-01-01 | End: 2021-01-01

## 2021-01-01 RX ORDER — MORPHINE SULFATE 2 MG/ML
1 INJECTION, SOLUTION INTRAMUSCULAR; INTRAVENOUS EVERY 4 HOURS PRN
Status: DISPENSED | OUTPATIENT
Start: 2021-01-01 | End: 2021-01-01

## 2021-01-01 RX ORDER — CALCIUM POLYCARBOPHIL 625 MG 625 MG/1
625 TABLET ORAL DAILY
Status: CANCELLED | OUTPATIENT
Start: 2021-01-01

## 2021-01-01 RX ORDER — DEXTROSE MONOHYDRATE 25 G/50ML
25 INJECTION, SOLUTION INTRAVENOUS
Status: DISCONTINUED | OUTPATIENT
Start: 2021-01-01 | End: 2021-01-01 | Stop reason: HOSPADM

## 2021-01-01 RX ORDER — NEOSTIGMINE METHYLSULFATE 1 MG/ML
INJECTION, SOLUTION INTRAVENOUS AS NEEDED
Status: DISCONTINUED | OUTPATIENT
Start: 2021-01-01 | End: 2021-01-01 | Stop reason: SURG

## 2021-01-01 RX ORDER — HYDROCODONE BITARTRATE AND ACETAMINOPHEN 7.5; 325 MG/1; MG/1
1 TABLET ORAL 3 TIMES DAILY
Status: CANCELLED | OUTPATIENT
Start: 2021-01-01

## 2021-01-01 RX ORDER — PREDNISONE 10 MG/1
10 TABLET ORAL DAILY PRN
COMMUNITY
End: 2021-01-01 | Stop reason: HOSPADM

## 2021-01-01 RX ORDER — POTASSIUM CHLORIDE 20 MEQ/1
40 TABLET, EXTENDED RELEASE ORAL EVERY 4 HOURS
Status: DISPENSED | OUTPATIENT
Start: 2021-01-01 | End: 2021-01-01

## 2021-01-01 RX ORDER — AMLODIPINE BESYLATE 5 MG/1
5 TABLET ORAL ONCE
Status: COMPLETED | OUTPATIENT
Start: 2021-01-01 | End: 2021-01-01

## 2021-01-01 RX ORDER — AMLODIPINE BESYLATE 5 MG/1
2.5 TABLET ORAL
Status: CANCELLED | OUTPATIENT
Start: 2021-01-01

## 2021-01-01 RX ORDER — MEGESTROL ACETATE 40 MG/1
40 TABLET ORAL 2 TIMES DAILY
Status: DISCONTINUED | OUTPATIENT
Start: 2021-01-01 | End: 2021-01-01

## 2021-01-01 RX ORDER — FLUOXETINE HYDROCHLORIDE 20 MG/1
20 CAPSULE ORAL DAILY
Status: DISCONTINUED | OUTPATIENT
Start: 2021-01-01 | End: 2021-01-01

## 2021-01-01 RX ORDER — FENTANYL CITRATE 50 UG/ML
INJECTION, SOLUTION INTRAMUSCULAR; INTRAVENOUS AS NEEDED
Status: DISCONTINUED | OUTPATIENT
Start: 2021-01-01 | End: 2021-01-01 | Stop reason: SURG

## 2021-01-01 RX ORDER — NICOTINE POLACRILEX 4 MG
15 LOZENGE BUCCAL
Status: DISCONTINUED | OUTPATIENT
Start: 2021-01-01 | End: 2021-01-01 | Stop reason: SDUPTHER

## 2021-01-01 RX ORDER — POTASSIUM CHLORIDE 750 MG/1
40 CAPSULE, EXTENDED RELEASE ORAL AS NEEDED
Status: DISCONTINUED | OUTPATIENT
Start: 2021-01-01 | End: 2021-01-01 | Stop reason: HOSPADM

## 2021-01-01 RX ORDER — FLUTICASONE PROPIONATE 50 MCG
2 SPRAY, SUSPENSION (ML) NASAL DAILY
Start: 2021-01-01

## 2021-01-01 RX ORDER — HYDRALAZINE HYDROCHLORIDE 10 MG/1
10 TABLET, FILM COATED ORAL ONCE
Status: COMPLETED | OUTPATIENT
Start: 2021-01-01 | End: 2021-01-01

## 2021-01-01 RX ORDER — FLUOXETINE HYDROCHLORIDE 20 MG/1
20 CAPSULE ORAL DAILY
Status: DISCONTINUED | OUTPATIENT
Start: 2021-01-01 | End: 2021-01-01 | Stop reason: HOSPADM

## 2021-01-01 RX ORDER — TRANEXAMIC ACID 100 MG/ML
INJECTION, SOLUTION INTRAVENOUS AS NEEDED
Status: DISCONTINUED | OUTPATIENT
Start: 2021-01-01 | End: 2021-01-01 | Stop reason: SURG

## 2021-01-01 RX ORDER — POTASSIUM CHLORIDE 20 MEQ/1
40 TABLET, EXTENDED RELEASE ORAL EVERY 4 HOURS
Status: DISCONTINUED | OUTPATIENT
Start: 2021-01-01 | End: 2021-01-01 | Stop reason: ALTCHOICE

## 2021-01-01 RX ORDER — PREGABALIN 100 MG/1
300 CAPSULE ORAL 2 TIMES DAILY
Status: CANCELLED | OUTPATIENT
Start: 2021-01-01

## 2021-01-01 RX ORDER — NITROFURANTOIN MACROCRYSTALS 50 MG/1
50 CAPSULE ORAL DAILY
COMMUNITY
Start: 2021-01-01 | End: 2021-01-01 | Stop reason: HOSPADM

## 2021-01-01 RX ORDER — MEGESTROL ACETATE 40 MG/1
40 TABLET ORAL 2 TIMES DAILY
Status: CANCELLED | OUTPATIENT
Start: 2021-01-01

## 2021-01-01 RX ORDER — ALUMINUM ZIRCONIUM OCTACHLOROHYDREX GLY 16 G/100G
1 GEL TOPICAL DAILY
Status: CANCELLED | OUTPATIENT
Start: 2021-01-01

## 2021-01-01 RX ORDER — ROCURONIUM BROMIDE 10 MG/ML
INJECTION, SOLUTION INTRAVENOUS AS NEEDED
Status: DISCONTINUED | OUTPATIENT
Start: 2021-01-01 | End: 2021-01-01 | Stop reason: SURG

## 2021-01-01 RX ORDER — BUSPIRONE HYDROCHLORIDE 5 MG/1
2.5 TABLET ORAL 2 TIMES DAILY
Status: CANCELLED | OUTPATIENT
Start: 2021-01-01

## 2021-01-01 RX ORDER — AMLODIPINE BESYLATE 2.5 MG/1
2.5 TABLET ORAL
Qty: 30 TABLET | Refills: 0 | Status: SHIPPED | OUTPATIENT
Start: 2021-01-01 | End: 2021-01-01 | Stop reason: SDDI

## 2021-01-01 RX ORDER — MEMANTINE HYDROCHLORIDE 5 MG/1
5 TABLET ORAL DAILY
COMMUNITY
End: 2021-01-01

## 2021-01-01 RX ORDER — POTASSIUM CHLORIDE 1500 MG/1
40 TABLET, FILM COATED, EXTENDED RELEASE ORAL EVERY 4 HOURS
Status: DISPENSED | OUTPATIENT
Start: 2021-01-01 | End: 2021-01-01

## 2021-01-01 RX ORDER — MAGNESIUM SULFATE HEPTAHYDRATE 40 MG/ML
2 INJECTION, SOLUTION INTRAVENOUS AS NEEDED
Status: DISCONTINUED | OUTPATIENT
Start: 2021-01-01 | End: 2021-01-01 | Stop reason: HOSPADM

## 2021-01-01 RX ORDER — DIPHENHYDRAMINE HYDROCHLORIDE AND LIDOCAINE HYDROCHLORIDE AND ALUMINUM HYDROXIDE AND MAGNESIUM HYDRO
5 KIT EVERY 6 HOURS
Status: DISCONTINUED | OUTPATIENT
Start: 2021-01-01 | End: 2021-01-01 | Stop reason: HOSPADM

## 2021-01-01 RX ORDER — ALUMINUM ZIRCONIUM OCTACHLOROHYDREX GLY 16 G/100G
1 GEL TOPICAL DAILY
COMMUNITY

## 2021-01-01 RX ORDER — NITROFURANTOIN 25; 75 MG/1; MG/1
100 CAPSULE ORAL 2 TIMES DAILY
Qty: 14 CAPSULE | Refills: 0 | Status: SHIPPED | OUTPATIENT
Start: 2021-01-01 | End: 2021-01-01

## 2021-01-01 RX ORDER — KETOROLAC TROMETHAMINE 30 MG/ML
15 INJECTION, SOLUTION INTRAMUSCULAR; INTRAVENOUS ONCE
Status: COMPLETED | OUTPATIENT
Start: 2021-01-01 | End: 2021-01-01

## 2021-01-01 RX ORDER — METOPROLOL TARTRATE 50 MG/1
50 TABLET, FILM COATED ORAL 2 TIMES DAILY
COMMUNITY
End: 2021-01-01 | Stop reason: HOSPADM

## 2021-01-01 RX ORDER — MAGNESIUM OXIDE 400 MG/1
1 TABLET ORAL DAILY
COMMUNITY
Start: 2021-01-01

## 2021-01-01 RX ORDER — ATORVASTATIN CALCIUM 10 MG/1
10 TABLET, FILM COATED ORAL DAILY
Status: DISCONTINUED | OUTPATIENT
Start: 2021-01-01 | End: 2021-01-01 | Stop reason: HOSPADM

## 2021-01-01 RX ORDER — ASPIRIN 81 MG/1
81 TABLET ORAL DAILY
Status: CANCELLED | OUTPATIENT
Start: 2021-01-01

## 2021-01-01 RX ORDER — HYDRALAZINE HYDROCHLORIDE 10 MG/1
10 TABLET, FILM COATED ORAL EVERY 8 HOURS SCHEDULED
Status: DISCONTINUED | OUTPATIENT
Start: 2021-01-01 | End: 2021-01-01

## 2021-01-01 RX ORDER — CETIRIZINE HYDROCHLORIDE 10 MG/1
5 TABLET ORAL DAILY
Status: CANCELLED | OUTPATIENT
Start: 2021-01-01

## 2021-01-01 RX ORDER — SODIUM CHLORIDE 0.9 % (FLUSH) 0.9 %
10 SYRINGE (ML) INJECTION EVERY 12 HOURS SCHEDULED
Status: DISCONTINUED | OUTPATIENT
Start: 2021-01-01 | End: 2021-01-01 | Stop reason: HOSPADM

## 2021-01-01 RX ORDER — SODIUM CHLORIDE, SODIUM LACTATE, POTASSIUM CHLORIDE, CALCIUM CHLORIDE 600; 310; 30; 20 MG/100ML; MG/100ML; MG/100ML; MG/100ML
INJECTION, SOLUTION INTRAVENOUS CONTINUOUS PRN
Status: DISCONTINUED | OUTPATIENT
Start: 2021-01-01 | End: 2021-01-01 | Stop reason: SURG

## 2021-01-01 RX ORDER — DICYCLOMINE HCL 20 MG
20 TABLET ORAL EVERY 8 HOURS PRN
Qty: 30 TABLET | Refills: 0 | Status: SHIPPED | OUTPATIENT
Start: 2021-01-01

## 2021-01-01 RX ORDER — CEFDINIR 300 MG/1
CAPSULE ORAL
COMMUNITY
Start: 2021-01-01 | End: 2022-01-01

## 2021-01-01 RX ORDER — PHENYLEPHRINE HYDROCHLORIDE 10 MG/ML
INJECTION INTRAVENOUS AS NEEDED
Status: DISCONTINUED | OUTPATIENT
Start: 2021-01-01 | End: 2021-01-01 | Stop reason: SURG

## 2021-01-01 RX ORDER — APIXABAN 5 MG/1
10 TABLET, FILM COATED ORAL EVERY 12 HOURS SCHEDULED
Qty: 78 TABLET | Refills: 0 | Status: SHIPPED | OUTPATIENT
Start: 2021-01-01 | End: 2021-01-01

## 2021-01-01 RX ORDER — METOPROLOL TARTRATE 100 MG/1
100 TABLET ORAL EVERY 12 HOURS SCHEDULED
Status: DISCONTINUED | OUTPATIENT
Start: 2021-01-01 | End: 2021-01-01

## 2021-01-01 RX ORDER — SODIUM CHLORIDE 0.9 % (FLUSH) 0.9 %
20 SYRINGE (ML) INJECTION AS NEEDED
Status: DISCONTINUED | OUTPATIENT
Start: 2021-01-01 | End: 2021-01-01 | Stop reason: HOSPADM

## 2021-01-01 RX ORDER — PROPOFOL 10 MG/ML
VIAL (ML) INTRAVENOUS AS NEEDED
Status: DISCONTINUED | OUTPATIENT
Start: 2021-01-01 | End: 2021-01-01 | Stop reason: SURG

## 2021-01-01 RX ORDER — POTASSIUM CHLORIDE 750 MG/1
40 CAPSULE, EXTENDED RELEASE ORAL EVERY 4 HOURS
Status: DISCONTINUED | OUTPATIENT
Start: 2021-01-01 | End: 2021-01-01

## 2021-01-01 RX ORDER — PREGABALIN 25 MG/1
25 CAPSULE ORAL 2 TIMES DAILY
Status: DISCONTINUED | OUTPATIENT
Start: 2021-01-01 | End: 2021-01-01 | Stop reason: HOSPADM

## 2021-01-01 RX ORDER — HYDROCODONE BITARTRATE AND ACETAMINOPHEN 7.5; 325 MG/1; MG/1
1 TABLET ORAL 3 TIMES DAILY PRN
Status: CANCELLED | OUTPATIENT
Start: 2021-01-01

## 2021-01-01 RX ORDER — HYDROCODONE BITARTRATE AND ACETAMINOPHEN 10; 325 MG/1; MG/1
1 TABLET ORAL 3 TIMES DAILY PRN
Status: DISCONTINUED | OUTPATIENT
Start: 2021-01-01 | End: 2021-01-01 | Stop reason: HOSPADM

## 2021-01-01 RX ORDER — MAGNESIUM HYDROXIDE 1200 MG/15ML
LIQUID ORAL AS NEEDED
Status: DISCONTINUED | OUTPATIENT
Start: 2021-01-01 | End: 2021-01-01 | Stop reason: HOSPADM

## 2021-01-01 RX ORDER — HYDRALAZINE HYDROCHLORIDE 25 MG/1
25 TABLET, FILM COATED ORAL EVERY 8 HOURS SCHEDULED
Qty: 90 TABLET | Refills: 0 | Status: SHIPPED | OUTPATIENT
Start: 2021-01-01

## 2021-01-01 RX ORDER — ASPIRIN 81 MG/1
81 TABLET ORAL DAILY
Status: DISCONTINUED | OUTPATIENT
Start: 2021-01-01 | End: 2021-01-01 | Stop reason: HOSPADM

## 2021-01-01 RX ORDER — FENTANYL CITRATE 50 UG/ML
50 INJECTION, SOLUTION INTRAMUSCULAR; INTRAVENOUS
Status: DISCONTINUED | OUTPATIENT
Start: 2021-01-01 | End: 2021-01-01 | Stop reason: HOSPADM

## 2021-01-01 RX ORDER — SODIUM CHLORIDE, SODIUM LACTATE, POTASSIUM CHLORIDE, CALCIUM CHLORIDE 600; 310; 30; 20 MG/100ML; MG/100ML; MG/100ML; MG/100ML
100 INJECTION, SOLUTION INTRAVENOUS ONCE AS NEEDED
Status: DISCONTINUED | OUTPATIENT
Start: 2021-01-01 | End: 2021-01-01 | Stop reason: HOSPADM

## 2021-01-01 RX ORDER — HYDROCHLOROTHIAZIDE 12.5 MG/1
6.25 TABLET ORAL 2 TIMES DAILY PRN
COMMUNITY
End: 2021-01-01 | Stop reason: HOSPADM

## 2021-01-01 RX ORDER — DEXTROSE MONOHYDRATE 25 G/50ML
INJECTION, SOLUTION INTRAVENOUS
Status: COMPLETED
Start: 2021-01-01 | End: 2021-01-01

## 2021-01-01 RX ADMIN — PANTOPRAZOLE SODIUM 40 MG: 40 TABLET, DELAYED RELEASE ORAL at 08:37

## 2021-01-01 RX ADMIN — NYSTATIN 500000 UNITS: 100000 SUSPENSION ORAL at 08:12

## 2021-01-01 RX ADMIN — POTASSIUM CHLORIDE 10 MEQ: 7.46 INJECTION, SOLUTION INTRAVENOUS at 10:18

## 2021-01-01 RX ADMIN — BUSPIRONE HYDROCHLORIDE 2.5 MG: 5 TABLET ORAL at 20:23

## 2021-01-01 RX ADMIN — HYDROCODONE BITARTRATE AND ACETAMINOPHEN 1 TABLET: 10; 325 TABLET ORAL at 01:34

## 2021-01-01 RX ADMIN — PREGABALIN 25 MG: 25 CAPSULE ORAL at 20:23

## 2021-01-01 RX ADMIN — BUSPIRONE HYDROCHLORIDE 2.5 MG: 5 TABLET ORAL at 08:22

## 2021-01-01 RX ADMIN — HYDROCODONE BITARTRATE AND ACETAMINOPHEN 1 TABLET: 7.5; 325 TABLET ORAL at 15:37

## 2021-01-01 RX ADMIN — HYDRALAZINE HYDROCHLORIDE 25 MG: 25 TABLET, FILM COATED ORAL at 05:36

## 2021-01-01 RX ADMIN — METOPROLOL TARTRATE 50 MG: 50 TABLET, FILM COATED ORAL at 09:41

## 2021-01-01 RX ADMIN — PROCHLORPERAZINE EDISYLATE 5 MG: 5 INJECTION INTRAMUSCULAR; INTRAVENOUS at 10:41

## 2021-01-01 RX ADMIN — FLUTICASONE PROPIONATE 2 SPRAY: 50 SPRAY, METERED NASAL at 09:32

## 2021-01-01 RX ADMIN — POTASSIUM CHLORIDE 10 MEQ: 7.46 INJECTION, SOLUTION INTRAVENOUS at 12:09

## 2021-01-01 RX ADMIN — POTASSIUM CHLORIDE 40 MEQ: 20 TABLET, EXTENDED RELEASE ORAL at 15:30

## 2021-01-01 RX ADMIN — PREGABALIN 25 MG: 25 CAPSULE ORAL at 21:57

## 2021-01-01 RX ADMIN — ONDANSETRON 4 MG: 2 INJECTION INTRAMUSCULAR; INTRAVENOUS at 11:06

## 2021-01-01 RX ADMIN — ACETAMINOPHEN 1000 MG: 500 TABLET ORAL at 14:09

## 2021-01-01 RX ADMIN — CETIRIZINE HYDROCHLORIDE 5 MG: 10 TABLET, FILM COATED ORAL at 08:53

## 2021-01-01 RX ADMIN — CYANOCOBALAMIN 1000 MCG: 1000 INJECTION, SOLUTION INTRAMUSCULAR; SUBCUTANEOUS at 08:33

## 2021-01-01 RX ADMIN — ACETAMINOPHEN 650 MG: 325 TABLET ORAL at 21:03

## 2021-01-01 RX ADMIN — ACETAMINOPHEN 1000 MG: 500 TABLET ORAL at 21:27

## 2021-01-01 RX ADMIN — ENOXAPARIN SODIUM 50 MG: 60 INJECTION SUBCUTANEOUS at 16:51

## 2021-01-01 RX ADMIN — MEGESTROL ACETATE 40 MG: 40 TABLET ORAL at 09:18

## 2021-01-01 RX ADMIN — MAGNESIUM SULFATE HEPTAHYDRATE 4 G: 40 INJECTION, SOLUTION INTRAVENOUS at 10:38

## 2021-01-01 RX ADMIN — CETIRIZINE HYDROCHLORIDE 5 MG: 10 TABLET, FILM COATED ORAL at 09:41

## 2021-01-01 RX ADMIN — ENOXAPARIN SODIUM 50 MG: 60 INJECTION SUBCUTANEOUS at 15:54

## 2021-01-01 RX ADMIN — PREGABALIN 25 MG: 25 CAPSULE ORAL at 20:18

## 2021-01-01 RX ADMIN — NYSTATIN 500000 UNITS: 100000 SUSPENSION ORAL at 20:33

## 2021-01-01 RX ADMIN — METOPROLOL TARTRATE 100 MG: 100 TABLET, FILM COATED ORAL at 22:00

## 2021-01-01 RX ADMIN — PSYLLIUM HUSK 1 PACKET: 3.4 POWDER ORAL at 08:41

## 2021-01-01 RX ADMIN — PREGABALIN 25 MG: 25 CAPSULE ORAL at 08:46

## 2021-01-01 RX ADMIN — SODIUM CHLORIDE 75 ML/HR: 9 INJECTION, SOLUTION INTRAVENOUS at 09:55

## 2021-01-01 RX ADMIN — AMLODIPINE BESYLATE 2.5 MG: 5 TABLET ORAL at 08:39

## 2021-01-01 RX ADMIN — APIXABAN 10 MG: 5 TABLET, FILM COATED ORAL at 07:19

## 2021-01-01 RX ADMIN — CEFTRIAXONE 1 G: 1 INJECTION, POWDER, FOR SOLUTION INTRAMUSCULAR; INTRAVENOUS at 10:33

## 2021-01-01 RX ADMIN — HYDRALAZINE HYDROCHLORIDE 10 MG: 10 TABLET, FILM COATED ORAL at 14:48

## 2021-01-01 RX ADMIN — BUSPIRONE HYDROCHLORIDE 2.5 MG: 5 TABLET ORAL at 09:11

## 2021-01-01 RX ADMIN — METOPROLOL TARTRATE 50 MG: 50 TABLET, FILM COATED ORAL at 08:33

## 2021-01-01 RX ADMIN — MAGNESIUM SULFATE HEPTAHYDRATE 4 G: 40 INJECTION, SOLUTION INTRAVENOUS at 10:52

## 2021-01-01 RX ADMIN — SODIUM BICARBONATE TAB 650 MG 650 MG: 650 TAB at 15:21

## 2021-01-01 RX ADMIN — ONDANSETRON 4 MG: 2 INJECTION INTRAMUSCULAR; INTRAVENOUS at 15:37

## 2021-01-01 RX ADMIN — CEFAZOLIN 1 G: 1 INJECTION, POWDER, FOR SOLUTION INTRAMUSCULAR; INTRAVENOUS; PARENTERAL at 21:04

## 2021-01-01 RX ADMIN — HYDRALAZINE HYDROCHLORIDE 10 MG: 10 TABLET, FILM COATED ORAL at 22:18

## 2021-01-01 RX ADMIN — SODIUM CHLORIDE, PRESERVATIVE FREE 10 ML: 5 INJECTION INTRAVENOUS at 21:00

## 2021-01-01 RX ADMIN — ONDANSETRON 4 MG: 2 INJECTION INTRAMUSCULAR; INTRAVENOUS at 06:23

## 2021-01-01 RX ADMIN — IOPAMIDOL 85 ML: 612 INJECTION, SOLUTION INTRAVENOUS at 13:28

## 2021-01-01 RX ADMIN — AMLODIPINE BESYLATE 2.5 MG: 5 TABLET ORAL at 18:05

## 2021-01-01 RX ADMIN — POTASSIUM CHLORIDE 10 MEQ: 7.46 INJECTION, SOLUTION INTRAVENOUS at 11:10

## 2021-01-01 RX ADMIN — FLUOXETINE HYDROCHLORIDE 20 MG: 20 CAPSULE ORAL at 09:10

## 2021-01-01 RX ADMIN — SODIUM CHLORIDE 75 ML/HR: 9 INJECTION, SOLUTION INTRAVENOUS at 05:33

## 2021-01-01 RX ADMIN — ATORVASTATIN CALCIUM 10 MG: 10 TABLET, FILM COATED ORAL at 09:25

## 2021-01-01 RX ADMIN — HYDROCODONE BITARTRATE AND ACETAMINOPHEN 1 TABLET: 10; 325 TABLET ORAL at 17:32

## 2021-01-01 RX ADMIN — FLUTICASONE PROPIONATE 2 SPRAY: 50 SPRAY, METERED NASAL at 08:12

## 2021-01-01 RX ADMIN — ATORVASTATIN CALCIUM 10 MG: 10 TABLET, FILM COATED ORAL at 21:50

## 2021-01-01 RX ADMIN — METOPROLOL TARTRATE 50 MG: 50 TABLET, FILM COATED ORAL at 08:25

## 2021-01-01 RX ADMIN — METOPROLOL TARTRATE 50 MG: 50 TABLET, FILM COATED ORAL at 22:18

## 2021-01-01 RX ADMIN — ACETAMINOPHEN 1000 MG: 500 TABLET ORAL at 14:03

## 2021-01-01 RX ADMIN — FLUOXETINE HYDROCHLORIDE 20 MG: 20 CAPSULE ORAL at 09:24

## 2021-01-01 RX ADMIN — NYSTATIN 500000 UNITS: 100000 SUSPENSION ORAL at 17:15

## 2021-01-01 RX ADMIN — BUSPIRONE HYDROCHLORIDE 2.5 MG: 5 TABLET ORAL at 08:38

## 2021-01-01 RX ADMIN — DIPHENHYDRAMINE HYDROCHLORIDE AND LIDOCAINE HYDROCHLORIDE AND ALUMINUM HYDROXIDE AND MAGNESIUM HYDRO 5 ML: KIT at 17:00

## 2021-01-01 RX ADMIN — PREGABALIN 25 MG: 25 CAPSULE ORAL at 09:26

## 2021-01-01 RX ADMIN — FLUOXETINE HYDROCHLORIDE 20 MG: 20 CAPSULE ORAL at 09:47

## 2021-01-01 RX ADMIN — NYSTATIN 500000 UNITS: 100000 SUSPENSION ORAL at 17:11

## 2021-01-01 RX ADMIN — HYDROCODONE BITARTRATE AND ACETAMINOPHEN 1 TABLET: 10; 325 TABLET ORAL at 01:54

## 2021-01-01 RX ADMIN — CETIRIZINE HYDROCHLORIDE 5 MG: 10 TABLET, FILM COATED ORAL at 09:01

## 2021-01-01 RX ADMIN — MEGESTROL ACETATE 40 MG: 40 TABLET ORAL at 20:23

## 2021-01-01 RX ADMIN — HYDRALAZINE HYDROCHLORIDE 10 MG: 10 TABLET, FILM COATED ORAL at 13:35

## 2021-01-01 RX ADMIN — ACETAMINOPHEN 650 MG: 325 TABLET ORAL at 05:41

## 2021-01-01 RX ADMIN — HYDROCODONE BITARTRATE AND ACETAMINOPHEN 1 TABLET: 10; 325 TABLET ORAL at 15:54

## 2021-01-01 RX ADMIN — NYSTATIN 500000 UNITS: 100000 SUSPENSION ORAL at 21:08

## 2021-01-01 RX ADMIN — PROCHLORPERAZINE EDISYLATE 5 MG: 5 INJECTION INTRAMUSCULAR; INTRAVENOUS at 21:38

## 2021-01-01 RX ADMIN — ACETAMINOPHEN 650 MG: 325 TABLET ORAL at 05:35

## 2021-01-01 RX ADMIN — NYSTATIN 600000 UNITS: 100000 SUSPENSION ORAL at 17:26

## 2021-01-01 RX ADMIN — CETIRIZINE HYDROCHLORIDE 5 MG: 10 TABLET, FILM COATED ORAL at 08:38

## 2021-01-01 RX ADMIN — NYSTATIN 600000 UNITS: 100000 SUSPENSION ORAL at 17:15

## 2021-01-01 RX ADMIN — PANTOPRAZOLE SODIUM 40 MG: 40 TABLET, DELAYED RELEASE ORAL at 17:24

## 2021-01-01 RX ADMIN — PANTOPRAZOLE SODIUM 40 MG: 40 TABLET, DELAYED RELEASE ORAL at 16:51

## 2021-01-01 RX ADMIN — ACETAMINOPHEN 1000 MG: 500 TABLET ORAL at 14:15

## 2021-01-01 RX ADMIN — PROMETHAZINE HYDROCHLORIDE 12.5 MG: 12.5 TABLET ORAL at 02:47

## 2021-01-01 RX ADMIN — FENTANYL CITRATE 50 MCG: 50 INJECTION INTRAMUSCULAR; INTRAVENOUS at 15:38

## 2021-01-01 RX ADMIN — FLUOXETINE HYDROCHLORIDE 20 MG: 20 CAPSULE ORAL at 08:33

## 2021-01-01 RX ADMIN — SODIUM BICARBONATE TAB 650 MG 650 MG: 650 TAB at 20:31

## 2021-01-01 RX ADMIN — HYDRALAZINE HYDROCHLORIDE 10 MG: 10 TABLET, FILM COATED ORAL at 20:23

## 2021-01-01 RX ADMIN — ROCURONIUM BROMIDE 20 MG: 10 SOLUTION INTRAVENOUS at 14:21

## 2021-01-01 RX ADMIN — HYDROCODONE BITARTRATE AND ACETAMINOPHEN 1 TABLET: 7.5; 325 TABLET ORAL at 09:18

## 2021-01-01 RX ADMIN — SODIUM CHLORIDE, PRESERVATIVE FREE 10 ML: 5 INJECTION INTRAVENOUS at 20:34

## 2021-01-01 RX ADMIN — APIXABAN 10 MG: 5 TABLET, FILM COATED ORAL at 20:31

## 2021-01-01 RX ADMIN — POTASSIUM CHLORIDE 10 MEQ: 7.46 INJECTION, SOLUTION INTRAVENOUS at 12:43

## 2021-01-01 RX ADMIN — AMLODIPINE BESYLATE 2.5 MG: 5 TABLET ORAL at 09:42

## 2021-01-01 RX ADMIN — HYDRALAZINE HYDROCHLORIDE 10 MG: 20 INJECTION INTRAMUSCULAR; INTRAVENOUS at 14:33

## 2021-01-01 RX ADMIN — BUSPIRONE HYDROCHLORIDE 2.5 MG: 5 TABLET ORAL at 20:31

## 2021-01-01 RX ADMIN — HYDRALAZINE HYDROCHLORIDE 25 MG: 25 TABLET, FILM COATED ORAL at 21:57

## 2021-01-01 RX ADMIN — METOPROLOL TARTRATE 50 MG: 50 TABLET, FILM COATED ORAL at 20:15

## 2021-01-01 RX ADMIN — HYDROCODONE BITARTRATE AND ACETAMINOPHEN 1 TABLET: 7.5; 325 TABLET ORAL at 17:29

## 2021-01-01 RX ADMIN — METOPROLOL TARTRATE 50 MG: 50 TABLET, FILM COATED ORAL at 08:37

## 2021-01-01 RX ADMIN — POTASSIUM CHLORIDE 10 MEQ: 7.46 INJECTION, SOLUTION INTRAVENOUS at 14:35

## 2021-01-01 RX ADMIN — APIXABAN 10 MG: 5 TABLET, FILM COATED ORAL at 08:40

## 2021-01-01 RX ADMIN — FLUTICASONE PROPIONATE 2 SPRAY: 50 SPRAY, METERED NASAL at 09:52

## 2021-01-01 RX ADMIN — AMLODIPINE BESYLATE 2.5 MG: 5 TABLET ORAL at 09:01

## 2021-01-01 RX ADMIN — HYDRALAZINE HYDROCHLORIDE 10 MG: 10 TABLET, FILM COATED ORAL at 14:06

## 2021-01-01 RX ADMIN — ONDANSETRON 4 MG: 2 INJECTION INTRAMUSCULAR; INTRAVENOUS at 03:49

## 2021-01-01 RX ADMIN — NYSTATIN 500000 UNITS: 100000 SUSPENSION ORAL at 20:29

## 2021-01-01 RX ADMIN — SODIUM BICARBONATE TAB 650 MG 650 MG: 650 TAB at 21:57

## 2021-01-01 RX ADMIN — PREGABALIN 25 MG: 25 CAPSULE ORAL at 12:35

## 2021-01-01 RX ADMIN — NYSTATIN 500000 UNITS: 100000 SUSPENSION ORAL at 11:31

## 2021-01-01 RX ADMIN — HYDRALAZINE HYDROCHLORIDE 10 MG: 10 TABLET, FILM COATED ORAL at 05:35

## 2021-01-01 RX ADMIN — FLUTICASONE PROPIONATE 2 SPRAY: 50 SPRAY, METERED NASAL at 08:41

## 2021-01-01 RX ADMIN — NYSTATIN 500000 UNITS: 100000 SUSPENSION ORAL at 11:36

## 2021-01-01 RX ADMIN — MORPHINE SULFATE 1 MG: 2 INJECTION, SOLUTION INTRAMUSCULAR; INTRAVENOUS at 12:42

## 2021-01-01 RX ADMIN — METOPROLOL TARTRATE 75 MG: 50 TABLET, FILM COATED ORAL at 21:38

## 2021-01-01 RX ADMIN — SODIUM CHLORIDE, PRESERVATIVE FREE 10 ML: 5 INJECTION INTRAVENOUS at 19:51

## 2021-01-01 RX ADMIN — MEGESTROL ACETATE 40 MG: 40 TABLET ORAL at 08:54

## 2021-01-01 RX ADMIN — DIPHENHYDRAMINE HYDROCHLORIDE AND LIDOCAINE HYDROCHLORIDE AND ALUMINUM HYDROXIDE AND MAGNESIUM HYDRO 5 ML: KIT at 11:54

## 2021-01-01 RX ADMIN — BUSPIRONE HYDROCHLORIDE 2.5 MG: 5 TABLET ORAL at 20:36

## 2021-01-01 RX ADMIN — BUSPIRONE HYDROCHLORIDE 2.5 MG: 5 TABLET ORAL at 20:52

## 2021-01-01 RX ADMIN — NYSTATIN 500000 UNITS: 100000 SUSPENSION ORAL at 20:30

## 2021-01-01 RX ADMIN — ENOXAPARIN SODIUM 50 MG: 60 INJECTION SUBCUTANEOUS at 08:25

## 2021-01-01 RX ADMIN — CETIRIZINE HYDROCHLORIDE 5 MG: 10 TABLET, FILM COATED ORAL at 09:11

## 2021-01-01 RX ADMIN — PANTOPRAZOLE SODIUM 40 MG: 40 TABLET, DELAYED RELEASE ORAL at 09:38

## 2021-01-01 RX ADMIN — HYDRALAZINE HYDROCHLORIDE 10 MG: 10 TABLET, FILM COATED ORAL at 14:27

## 2021-01-01 RX ADMIN — CETIRIZINE HYDROCHLORIDE 5 MG: 10 TABLET, FILM COATED ORAL at 07:20

## 2021-01-01 RX ADMIN — HYDROCODONE BITARTRATE AND ACETAMINOPHEN 1 TABLET: 7.5; 325 TABLET ORAL at 21:41

## 2021-01-01 RX ADMIN — MORPHINE SULFATE 1 MG: 2 INJECTION, SOLUTION INTRAMUSCULAR; INTRAVENOUS at 10:33

## 2021-01-01 RX ADMIN — PIPERACILLIN SODIUM AND TAZOBACTAM SODIUM 3.38 G: 3; .375 INJECTION, POWDER, LYOPHILIZED, FOR SOLUTION INTRAVENOUS at 02:27

## 2021-01-01 RX ADMIN — ATORVASTATIN CALCIUM 10 MG: 10 TABLET, FILM COATED ORAL at 09:37

## 2021-01-01 RX ADMIN — MEGESTROL ACETATE 40 MG: 40 TABLET ORAL at 09:39

## 2021-01-01 RX ADMIN — POTASSIUM CHLORIDE 40 MEQ: 20 TABLET, EXTENDED RELEASE ORAL at 05:51

## 2021-01-01 RX ADMIN — FLUOXETINE HYDROCHLORIDE 20 MG: 20 CAPSULE ORAL at 09:18

## 2021-01-01 RX ADMIN — SODIUM CHLORIDE 100 ML/HR: 9 INJECTION, SOLUTION INTRAVENOUS at 11:39

## 2021-01-01 RX ADMIN — CETIRIZINE HYDROCHLORIDE 5 MG: 10 TABLET, FILM COATED ORAL at 09:26

## 2021-01-01 RX ADMIN — SODIUM CHLORIDE, PRESERVATIVE FREE 10 ML: 5 INJECTION INTRAVENOUS at 21:06

## 2021-01-01 RX ADMIN — FLUOXETINE HYDROCHLORIDE 20 MG: 20 CAPSULE ORAL at 08:36

## 2021-01-01 RX ADMIN — SODIUM CHLORIDE 1000 ML: 9 INJECTION, SOLUTION INTRAVENOUS at 17:44

## 2021-01-01 RX ADMIN — DICYCLOMINE HYDROCHLORIDE 10 MG: 20 INJECTION, SOLUTION INTRAMUSCULAR at 05:49

## 2021-01-01 RX ADMIN — PREGABALIN 25 MG: 25 CAPSULE ORAL at 08:41

## 2021-01-01 RX ADMIN — BUSPIRONE HYDROCHLORIDE 2.5 MG: 5 TABLET ORAL at 09:25

## 2021-01-01 RX ADMIN — BUSPIRONE HYDROCHLORIDE 2.5 MG: 5 TABLET ORAL at 20:43

## 2021-01-01 RX ADMIN — METOPROLOL TARTRATE 50 MG: 50 TABLET, FILM COATED ORAL at 08:13

## 2021-01-01 RX ADMIN — ONDANSETRON 4 MG: 2 INJECTION INTRAMUSCULAR; INTRAVENOUS at 13:48

## 2021-01-01 RX ADMIN — PANTOPRAZOLE SODIUM 40 MG: 40 TABLET, DELAYED RELEASE ORAL at 05:32

## 2021-01-01 RX ADMIN — FLUOXETINE HYDROCHLORIDE 20 MG: 20 CAPSULE ORAL at 08:24

## 2021-01-01 RX ADMIN — MORPHINE SULFATE 1 MG: 2 INJECTION, SOLUTION INTRAMUSCULAR; INTRAVENOUS at 17:51

## 2021-01-01 RX ADMIN — ATORVASTATIN CALCIUM 10 MG: 10 TABLET, FILM COATED ORAL at 09:41

## 2021-01-01 RX ADMIN — METOPROLOL TARTRATE 50 MG: 50 TABLET, FILM COATED ORAL at 09:25

## 2021-01-01 RX ADMIN — PREGABALIN 25 MG: 25 CAPSULE ORAL at 08:12

## 2021-01-01 RX ADMIN — SODIUM CHLORIDE, POTASSIUM CHLORIDE, SODIUM LACTATE AND CALCIUM CHLORIDE: 600; 310; 30; 20 INJECTION, SOLUTION INTRAVENOUS at 14:16

## 2021-01-01 RX ADMIN — ONDANSETRON 4 MG: 2 INJECTION INTRAMUSCULAR; INTRAVENOUS at 02:46

## 2021-01-01 RX ADMIN — CETIRIZINE HYDROCHLORIDE 5 MG: 10 TABLET, FILM COATED ORAL at 08:12

## 2021-01-01 RX ADMIN — DEXTROSE MONOHYDRATE 50 ML: 25 INJECTION, SOLUTION INTRAVENOUS at 08:00

## 2021-01-01 RX ADMIN — HYDRALAZINE HYDROCHLORIDE 10 MG: 10 TABLET, FILM COATED ORAL at 06:39

## 2021-01-01 RX ADMIN — MEGESTROL ACETATE 40 MG: 40 TABLET ORAL at 20:31

## 2021-01-01 RX ADMIN — HYDRALAZINE HYDROCHLORIDE 25 MG: 25 TABLET, FILM COATED ORAL at 15:01

## 2021-01-01 RX ADMIN — POTASSIUM CHLORIDE 10 MEQ: 7.46 INJECTION, SOLUTION INTRAVENOUS at 13:39

## 2021-01-01 RX ADMIN — SODIUM CHLORIDE, PRESERVATIVE FREE 10 ML: 5 INJECTION INTRAVENOUS at 09:25

## 2021-01-01 RX ADMIN — BUSPIRONE HYDROCHLORIDE 2.5 MG: 5 TABLET ORAL at 07:20

## 2021-01-01 RX ADMIN — HYDROCODONE BITARTRATE AND ACETAMINOPHEN 1 TABLET: 10; 325 TABLET ORAL at 04:39

## 2021-01-01 RX ADMIN — MEGESTROL ACETATE 40 MG: 40 TABLET ORAL at 20:29

## 2021-01-01 RX ADMIN — POTASSIUM CHLORIDE 10 MEQ: 7.46 INJECTION, SOLUTION INTRAVENOUS at 02:02

## 2021-01-01 RX ADMIN — ACETAMINOPHEN 1000 MG: 500 TABLET ORAL at 06:04

## 2021-01-01 RX ADMIN — NYSTATIN 500000 UNITS: 100000 SUSPENSION ORAL at 12:09

## 2021-01-01 RX ADMIN — POTASSIUM CHLORIDE 40 MEQ: 20 TABLET, EXTENDED RELEASE ORAL at 13:53

## 2021-01-01 RX ADMIN — BUSPIRONE HYDROCHLORIDE 2.5 MG: 5 TABLET ORAL at 21:50

## 2021-01-01 RX ADMIN — MEGESTROL ACETATE 40 MG: 40 TABLET ORAL at 21:38

## 2021-01-01 RX ADMIN — HYDRALAZINE HYDROCHLORIDE 10 MG: 20 INJECTION INTRAMUSCULAR; INTRAVENOUS at 23:35

## 2021-01-01 RX ADMIN — CETIRIZINE HYDROCHLORIDE 5 MG: 10 TABLET, FILM COATED ORAL at 12:30

## 2021-01-01 RX ADMIN — CEFTRIAXONE 1 G: 1 INJECTION, POWDER, FOR SOLUTION INTRAMUSCULAR; INTRAVENOUS at 17:32

## 2021-01-01 RX ADMIN — HYDROCODONE BITARTRATE AND ACETAMINOPHEN 1 TABLET: 7.5; 325 TABLET ORAL at 21:59

## 2021-01-01 RX ADMIN — PREGABALIN 25 MG: 25 CAPSULE ORAL at 20:28

## 2021-01-01 RX ADMIN — NYSTATIN 500000 UNITS: 100000 SUSPENSION ORAL at 15:54

## 2021-01-01 RX ADMIN — HYDRALAZINE HYDROCHLORIDE 10 MG: 10 TABLET, FILM COATED ORAL at 20:29

## 2021-01-01 RX ADMIN — FLUOXETINE HYDROCHLORIDE 20 MG: 20 CAPSULE ORAL at 08:22

## 2021-01-01 RX ADMIN — MEGESTROL ACETATE 40 MG: 40 TABLET ORAL at 20:39

## 2021-01-01 RX ADMIN — PREGABALIN 25 MG: 25 CAPSULE ORAL at 22:03

## 2021-01-01 RX ADMIN — HYDROCODONE BITARTRATE AND ACETAMINOPHEN 1 TABLET: 10; 325 TABLET ORAL at 12:35

## 2021-01-01 RX ADMIN — SODIUM CHLORIDE, POTASSIUM CHLORIDE, SODIUM LACTATE AND CALCIUM CHLORIDE 75 ML/HR: 600; 310; 30; 20 INJECTION, SOLUTION INTRAVENOUS at 01:54

## 2021-01-01 RX ADMIN — ONDANSETRON 4 MG: 2 INJECTION INTRAMUSCULAR; INTRAVENOUS at 05:36

## 2021-01-01 RX ADMIN — METOPROLOL TARTRATE 100 MG: 100 TABLET, FILM COATED ORAL at 21:06

## 2021-01-01 RX ADMIN — AMLODIPINE BESYLATE 2.5 MG: 5 TABLET ORAL at 08:25

## 2021-01-01 RX ADMIN — METOPROLOL TARTRATE 5 MG: 1 INJECTION, SOLUTION INTRAVENOUS at 09:53

## 2021-01-01 RX ADMIN — SODIUM CHLORIDE, PRESERVATIVE FREE 10 ML: 5 INJECTION INTRAVENOUS at 08:22

## 2021-01-01 RX ADMIN — PREGABALIN 25 MG: 25 CAPSULE ORAL at 08:37

## 2021-01-01 RX ADMIN — METOPROLOL TARTRATE 75 MG: 50 TABLET, FILM COATED ORAL at 20:23

## 2021-01-01 RX ADMIN — SODIUM CHLORIDE 75 ML/HR: 9 INJECTION, SOLUTION INTRAVENOUS at 06:17

## 2021-01-01 RX ADMIN — PHENYLEPHRINE HYDROCHLORIDE 100 MCG: 10 INJECTION INTRAVENOUS at 14:35

## 2021-01-01 RX ADMIN — DIPHENHYDRAMINE HYDROCHLORIDE AND LIDOCAINE HYDROCHLORIDE AND ALUMINUM HYDROXIDE AND MAGNESIUM HYDRO 5 ML: KIT at 17:15

## 2021-01-01 RX ADMIN — HYDRALAZINE HYDROCHLORIDE 10 MG: 10 TABLET, FILM COATED ORAL at 05:28

## 2021-01-01 RX ADMIN — HYDRALAZINE HYDROCHLORIDE 10 MG: 10 TABLET, FILM COATED ORAL at 15:30

## 2021-01-01 RX ADMIN — SODIUM CHLORIDE, PRESERVATIVE FREE 10 ML: 5 INJECTION INTRAVENOUS at 21:07

## 2021-01-01 RX ADMIN — HYDRALAZINE HYDROCHLORIDE 10 MG: 10 TABLET, FILM COATED ORAL at 14:24

## 2021-01-01 RX ADMIN — PANTOPRAZOLE SODIUM 40 MG: 40 TABLET, DELAYED RELEASE ORAL at 06:23

## 2021-01-01 RX ADMIN — POTASSIUM CHLORIDE 10 MEQ: 7.46 INJECTION, SOLUTION INTRAVENOUS at 14:28

## 2021-01-01 RX ADMIN — HYDROCODONE BITARTRATE AND ACETAMINOPHEN 1 TABLET: 10; 325 TABLET ORAL at 08:21

## 2021-01-01 RX ADMIN — BUSPIRONE HYDROCHLORIDE 2.5 MG: 5 TABLET ORAL at 12:30

## 2021-01-01 RX ADMIN — PREGABALIN 25 MG: 25 CAPSULE ORAL at 08:54

## 2021-01-01 RX ADMIN — SODIUM CHLORIDE 125 ML/HR: 9 INJECTION, SOLUTION INTRAVENOUS at 01:35

## 2021-01-01 RX ADMIN — BUSPIRONE HYDROCHLORIDE 2.5 MG: 5 TABLET ORAL at 08:32

## 2021-01-01 RX ADMIN — SODIUM BICARBONATE TAB 650 MG 650 MG: 650 TAB at 09:27

## 2021-01-01 RX ADMIN — NYSTATIN 600000 UNITS: 100000 SUSPENSION ORAL at 11:29

## 2021-01-01 RX ADMIN — NYSTATIN 600000 UNITS: 100000 SUSPENSION ORAL at 17:00

## 2021-01-01 RX ADMIN — HYDRALAZINE HYDROCHLORIDE 10 MG: 10 TABLET, FILM COATED ORAL at 14:15

## 2021-01-01 RX ADMIN — NYSTATIN 500000 UNITS: 100000 SUSPENSION ORAL at 20:31

## 2021-01-01 RX ADMIN — NYSTATIN 600000 UNITS: 100000 SUSPENSION ORAL at 20:20

## 2021-01-01 RX ADMIN — BUSPIRONE HYDROCHLORIDE 2.5 MG: 5 TABLET ORAL at 08:41

## 2021-01-01 RX ADMIN — PANTOPRAZOLE SODIUM 40 MG: 40 TABLET, DELAYED RELEASE ORAL at 05:42

## 2021-01-01 RX ADMIN — ACETAMINOPHEN 650 MG: 325 TABLET ORAL at 03:39

## 2021-01-01 RX ADMIN — NYSTATIN 500000 UNITS: 100000 SUSPENSION ORAL at 08:22

## 2021-01-01 RX ADMIN — BUSPIRONE HYDROCHLORIDE 2.5 MG: 5 TABLET ORAL at 20:30

## 2021-01-01 RX ADMIN — AMLODIPINE BESYLATE 2.5 MG: 5 TABLET ORAL at 08:21

## 2021-01-01 RX ADMIN — SODIUM CHLORIDE, PRESERVATIVE FREE 10 ML: 5 INJECTION INTRAVENOUS at 23:20

## 2021-01-01 RX ADMIN — METOPROLOL TARTRATE 50 MG: 50 TABLET, FILM COATED ORAL at 09:11

## 2021-01-01 RX ADMIN — CETIRIZINE HYDROCHLORIDE 5 MG: 10 TABLET, FILM COATED ORAL at 20:40

## 2021-01-01 RX ADMIN — FLUOXETINE HYDROCHLORIDE 20 MG: 20 CAPSULE ORAL at 09:40

## 2021-01-01 RX ADMIN — PREGABALIN 25 MG: 25 CAPSULE ORAL at 21:02

## 2021-01-01 RX ADMIN — ACETAMINOPHEN 1000 MG: 500 TABLET ORAL at 05:28

## 2021-01-01 RX ADMIN — SODIUM CHLORIDE, PRESERVATIVE FREE 10 ML: 5 INJECTION INTRAVENOUS at 20:17

## 2021-01-01 RX ADMIN — CETIRIZINE HYDROCHLORIDE 5 MG: 10 TABLET, FILM COATED ORAL at 08:25

## 2021-01-01 RX ADMIN — ACETAMINOPHEN 1000 MG: 500 TABLET ORAL at 15:00

## 2021-01-01 RX ADMIN — MEGESTROL ACETATE 40 MG: 40 TABLET ORAL at 20:30

## 2021-01-01 RX ADMIN — METOPROLOL TARTRATE 75 MG: 50 TABLET, FILM COATED ORAL at 20:31

## 2021-01-01 RX ADMIN — CETIRIZINE HYDROCHLORIDE 5 MG: 10 TABLET, FILM COATED ORAL at 08:32

## 2021-01-01 RX ADMIN — HYDROCODONE BITARTRATE AND ACETAMINOPHEN 1 TABLET: 7.5; 325 TABLET ORAL at 08:12

## 2021-01-01 RX ADMIN — ENOXAPARIN SODIUM 40 MG: 40 INJECTION SUBCUTANEOUS at 08:12

## 2021-01-01 RX ADMIN — ENOXAPARIN SODIUM 40 MG: 40 INJECTION SUBCUTANEOUS at 21:00

## 2021-01-01 RX ADMIN — CETIRIZINE HYDROCHLORIDE 5 MG: 10 TABLET, FILM COATED ORAL at 09:25

## 2021-01-01 RX ADMIN — PREGABALIN 25 MG: 25 CAPSULE ORAL at 09:01

## 2021-01-01 RX ADMIN — MEMANTINE HYDROCHLORIDE 5 MG: 5 TABLET, FILM COATED ORAL at 09:26

## 2021-01-01 RX ADMIN — HYDRALAZINE HYDROCHLORIDE 10 MG: 10 TABLET, FILM COATED ORAL at 05:29

## 2021-01-01 RX ADMIN — NYSTATIN 600000 UNITS: 100000 SUSPENSION ORAL at 21:51

## 2021-01-01 RX ADMIN — POTASSIUM CHLORIDE 40 MEQ: 20 TABLET, EXTENDED RELEASE ORAL at 21:50

## 2021-01-01 RX ADMIN — NYSTATIN 500000 UNITS: 100000 SUSPENSION ORAL at 08:41

## 2021-01-01 RX ADMIN — OXYCODONE 5 MG: 5 TABLET ORAL at 01:41

## 2021-01-01 RX ADMIN — METOPROLOL TARTRATE 50 MG: 50 TABLET, FILM COATED ORAL at 20:18

## 2021-01-01 RX ADMIN — SODIUM CHLORIDE, PRESERVATIVE FREE 10 ML: 5 INJECTION INTRAVENOUS at 21:51

## 2021-01-01 RX ADMIN — SODIUM CHLORIDE, PRESERVATIVE FREE 10 ML: 5 INJECTION INTRAVENOUS at 20:31

## 2021-01-01 RX ADMIN — ACETAMINOPHEN 1000 MG: 500 TABLET ORAL at 21:57

## 2021-01-01 RX ADMIN — PHENYLEPHRINE HYDROCHLORIDE 100 MCG: 10 INJECTION INTRAVENOUS at 14:27

## 2021-01-01 RX ADMIN — CYANOCOBALAMIN 1000 MCG: 1000 INJECTION, SOLUTION INTRAMUSCULAR; SUBCUTANEOUS at 10:55

## 2021-01-01 RX ADMIN — ACETAMINOPHEN 1000 MG: 500 TABLET ORAL at 16:30

## 2021-01-01 RX ADMIN — ONDANSETRON 4 MG: 2 INJECTION INTRAMUSCULAR; INTRAVENOUS at 23:20

## 2021-01-01 RX ADMIN — SODIUM CHLORIDE, PRESERVATIVE FREE 10 ML: 5 INJECTION INTRAVENOUS at 21:38

## 2021-01-01 RX ADMIN — POTASSIUM CHLORIDE 10 MEQ: 7.46 INJECTION, SOLUTION INTRAVENOUS at 11:36

## 2021-01-01 RX ADMIN — HYDRALAZINE HYDROCHLORIDE 25 MG: 25 TABLET, FILM COATED ORAL at 20:31

## 2021-01-01 RX ADMIN — PANTOPRAZOLE SODIUM 40 MG: 40 TABLET, DELAYED RELEASE ORAL at 05:34

## 2021-01-01 RX ADMIN — ATORVASTATIN CALCIUM 10 MG: 10 TABLET, FILM COATED ORAL at 08:24

## 2021-01-01 RX ADMIN — ATORVASTATIN CALCIUM 10 MG: 10 TABLET, FILM COATED ORAL at 08:40

## 2021-01-01 RX ADMIN — PANTOPRAZOLE SODIUM 40 MG: 40 TABLET, DELAYED RELEASE ORAL at 06:53

## 2021-01-01 RX ADMIN — TRANEXAMIC ACID 1000 MG: 100 INJECTION, SOLUTION INTRAVENOUS at 16:03

## 2021-01-01 RX ADMIN — MORPHINE SULFATE 1 MG: 2 INJECTION, SOLUTION INTRAMUSCULAR; INTRAVENOUS at 03:47

## 2021-01-01 RX ADMIN — MEGESTROL ACETATE 40 MG: 40 TABLET ORAL at 12:30

## 2021-01-01 RX ADMIN — HYDRALAZINE HYDROCHLORIDE 10 MG: 20 INJECTION INTRAMUSCULAR; INTRAVENOUS at 21:33

## 2021-01-01 RX ADMIN — METOPROLOL TARTRATE 50 MG: 50 TABLET, FILM COATED ORAL at 20:30

## 2021-01-01 RX ADMIN — KETOROLAC TROMETHAMINE 15 MG: 30 INJECTION, SOLUTION INTRAMUSCULAR; INTRAVENOUS at 03:06

## 2021-01-01 RX ADMIN — CETIRIZINE HYDROCHLORIDE 5 MG: 10 TABLET, FILM COATED ORAL at 09:17

## 2021-01-01 RX ADMIN — ACETAMINOPHEN 650 MG: 325 TABLET ORAL at 05:42

## 2021-01-01 RX ADMIN — PREGABALIN 25 MG: 25 CAPSULE ORAL at 09:13

## 2021-01-01 RX ADMIN — HYDROCODONE BITARTRATE AND ACETAMINOPHEN 1 TABLET: 7.5; 325 TABLET ORAL at 09:24

## 2021-01-01 RX ADMIN — SODIUM CHLORIDE, PRESERVATIVE FREE 10 ML: 5 INJECTION INTRAVENOUS at 08:54

## 2021-01-01 RX ADMIN — DIPHENHYDRAMINE HYDROCHLORIDE AND LIDOCAINE HYDROCHLORIDE AND ALUMINUM HYDROXIDE AND MAGNESIUM HYDRO 5 ML: KIT at 01:14

## 2021-01-01 RX ADMIN — SODIUM CHLORIDE 75 ML/HR: 9 INJECTION, SOLUTION INTRAVENOUS at 13:53

## 2021-01-01 RX ADMIN — DIPHENHYDRAMINE HYDROCHLORIDE AND LIDOCAINE HYDROCHLORIDE AND ALUMINUM HYDROXIDE AND MAGNESIUM HYDRO 5 ML: KIT at 11:16

## 2021-01-01 RX ADMIN — HYDRALAZINE HYDROCHLORIDE 10 MG: 20 INJECTION INTRAMUSCULAR; INTRAVENOUS at 00:32

## 2021-01-01 RX ADMIN — PANTOPRAZOLE SODIUM 40 MG: 40 TABLET, DELAYED RELEASE ORAL at 17:08

## 2021-01-01 RX ADMIN — ENOXAPARIN SODIUM 40 MG: 40 INJECTION SUBCUTANEOUS at 09:19

## 2021-01-01 RX ADMIN — PROMETHAZINE HYDROCHLORIDE 12.5 MG: 12.5 TABLET ORAL at 21:03

## 2021-01-01 RX ADMIN — MEGESTROL ACETATE 40 MG: 40 TABLET ORAL at 09:26

## 2021-01-01 RX ADMIN — HYDRALAZINE HYDROCHLORIDE 10 MG: 10 TABLET, FILM COATED ORAL at 05:33

## 2021-01-01 RX ADMIN — METOPROLOL TARTRATE 50 MG: 50 TABLET, FILM COATED ORAL at 08:22

## 2021-01-01 RX ADMIN — MAGNESIUM SULFATE 4 G: 4 INJECTION INTRAVENOUS at 05:12

## 2021-01-01 RX ADMIN — HYDRALAZINE HYDROCHLORIDE 10 MG: 10 TABLET, FILM COATED ORAL at 22:26

## 2021-01-01 RX ADMIN — DIPHENHYDRAMINE HYDROCHLORIDE AND LIDOCAINE HYDROCHLORIDE AND ALUMINUM HYDROXIDE AND MAGNESIUM HYDRO 5 ML: KIT at 17:50

## 2021-01-01 RX ADMIN — AMLODIPINE BESYLATE 2.5 MG: 5 TABLET ORAL at 08:54

## 2021-01-01 RX ADMIN — PANTOPRAZOLE SODIUM 40 MG: 40 TABLET, DELAYED RELEASE ORAL at 20:33

## 2021-01-01 RX ADMIN — HYDRALAZINE HYDROCHLORIDE 10 MG: 10 TABLET, FILM COATED ORAL at 06:53

## 2021-01-01 RX ADMIN — METOPROLOL TARTRATE 50 MG: 50 TABLET, FILM COATED ORAL at 09:18

## 2021-01-01 RX ADMIN — MEMANTINE HYDROCHLORIDE 5 MG: 5 TABLET, FILM COATED ORAL at 08:33

## 2021-01-01 RX ADMIN — SODIUM CHLORIDE, PRESERVATIVE FREE 10 ML: 5 INJECTION INTRAVENOUS at 09:53

## 2021-01-01 RX ADMIN — METOPROLOL TARTRATE 50 MG: 50 TABLET, FILM COATED ORAL at 21:02

## 2021-01-01 RX ADMIN — HYDROCODONE BITARTRATE AND ACETAMINOPHEN 1 TABLET: 7.5; 325 TABLET ORAL at 20:35

## 2021-01-01 RX ADMIN — DIPHENHYDRAMINE HYDROCHLORIDE AND LIDOCAINE HYDROCHLORIDE AND ALUMINUM HYDROXIDE AND MAGNESIUM HYDRO 5 ML: KIT at 05:37

## 2021-01-01 RX ADMIN — FENTANYL CITRATE 50 MCG: 50 INJECTION INTRAMUSCULAR; INTRAVENOUS at 14:16

## 2021-01-01 RX ADMIN — SODIUM CHLORIDE 75 ML/HR: 9 INJECTION, SOLUTION INTRAVENOUS at 23:20

## 2021-01-01 RX ADMIN — GLYCOPYRROLATE 0.4 MG: 0.2 INJECTION, SOLUTION INTRAMUSCULAR; INTRAVENOUS at 15:38

## 2021-01-01 RX ADMIN — BUSPIRONE HYDROCHLORIDE 2.5 MG: 5 TABLET ORAL at 08:23

## 2021-01-01 RX ADMIN — HYDRALAZINE HYDROCHLORIDE 10 MG: 10 TABLET, FILM COATED ORAL at 13:39

## 2021-01-01 RX ADMIN — PANTOPRAZOLE SODIUM 40 MG: 40 TABLET, DELAYED RELEASE ORAL at 05:33

## 2021-01-01 RX ADMIN — MEGESTROL ACETATE 40 MG: 40 TABLET ORAL at 08:32

## 2021-01-01 RX ADMIN — ENOXAPARIN SODIUM 50 MG: 60 INJECTION SUBCUTANEOUS at 05:51

## 2021-01-01 RX ADMIN — DIPHENHYDRAMINE HYDROCHLORIDE AND LIDOCAINE HYDROCHLORIDE AND ALUMINUM HYDROXIDE AND MAGNESIUM HYDRO 5 ML: KIT at 06:08

## 2021-01-01 RX ADMIN — ACETAMINOPHEN 1000 MG: 500 TABLET ORAL at 21:06

## 2021-01-01 RX ADMIN — HYDROCODONE BITARTRATE AND ACETAMINOPHEN 1 TABLET: 7.5; 325 TABLET ORAL at 09:10

## 2021-01-01 RX ADMIN — PREGABALIN 25 MG: 25 CAPSULE ORAL at 21:49

## 2021-01-01 RX ADMIN — ASPIRIN 81 MG: 81 TABLET, COATED ORAL at 08:33

## 2021-01-01 RX ADMIN — HYDRALAZINE HYDROCHLORIDE 10 MG: 10 TABLET, FILM COATED ORAL at 14:31

## 2021-01-01 RX ADMIN — HYDROCODONE BITARTRATE AND ACETAMINOPHEN 1 TABLET: 7.5; 325 TABLET ORAL at 15:21

## 2021-01-01 RX ADMIN — ENOXAPARIN SODIUM 50 MG: 60 INJECTION SUBCUTANEOUS at 17:24

## 2021-01-01 RX ADMIN — MEGESTROL ACETATE 200 MG: 40 SUSPENSION ORAL at 08:25

## 2021-01-01 RX ADMIN — HYDROCODONE BITARTRATE AND ACETAMINOPHEN 1 TABLET: 10; 325 TABLET ORAL at 20:13

## 2021-01-01 RX ADMIN — ACETAMINOPHEN 650 MG: 325 TABLET ORAL at 13:53

## 2021-01-01 RX ADMIN — ACETAMINOPHEN 1000 MG: 500 TABLET ORAL at 21:02

## 2021-01-01 RX ADMIN — TRANEXAMIC ACID 1000 MG: 100 INJECTION, SOLUTION INTRAVENOUS at 14:16

## 2021-01-01 RX ADMIN — SODIUM CHLORIDE 100 ML/HR: 9 INJECTION, SOLUTION INTRAVENOUS at 15:50

## 2021-01-01 RX ADMIN — PIPERACILLIN SODIUM AND TAZOBACTAM SODIUM 3.38 G: 3; .375 INJECTION, POWDER, LYOPHILIZED, FOR SOLUTION INTRAVENOUS at 11:54

## 2021-01-01 RX ADMIN — SODIUM CHLORIDE 75 ML/HR: 9 INJECTION, SOLUTION INTRAVENOUS at 20:58

## 2021-01-01 RX ADMIN — ACETAMINOPHEN 1000 MG: 500 TABLET ORAL at 05:12

## 2021-01-01 RX ADMIN — PREGABALIN 25 MG: 25 CAPSULE ORAL at 08:22

## 2021-01-01 RX ADMIN — AMLODIPINE BESYLATE 2.5 MG: 5 TABLET ORAL at 08:32

## 2021-01-01 RX ADMIN — ENOXAPARIN SODIUM 40 MG: 40 INJECTION SUBCUTANEOUS at 09:24

## 2021-01-01 RX ADMIN — HYDROCODONE BITARTRATE AND ACETAMINOPHEN 1 TABLET: 10; 325 TABLET ORAL at 23:21

## 2021-01-01 RX ADMIN — AMLODIPINE BESYLATE 2.5 MG: 5 TABLET ORAL at 08:24

## 2021-01-01 RX ADMIN — MORPHINE SULFATE 2 MG: 2 INJECTION, SOLUTION INTRAMUSCULAR; INTRAVENOUS at 18:03

## 2021-01-01 RX ADMIN — ENOXAPARIN SODIUM 40 MG: 40 INJECTION SUBCUTANEOUS at 10:11

## 2021-01-01 RX ADMIN — ACETAMINOPHEN 1000 MG: 500 TABLET ORAL at 14:32

## 2021-01-01 RX ADMIN — HYDRALAZINE HYDROCHLORIDE 10 MG: 10 TABLET, FILM COATED ORAL at 13:53

## 2021-01-01 RX ADMIN — PREGABALIN 25 MG: 25 CAPSULE ORAL at 09:47

## 2021-01-01 RX ADMIN — SODIUM CHLORIDE, PRESERVATIVE FREE 10 ML: 5 INJECTION INTRAVENOUS at 08:41

## 2021-01-01 RX ADMIN — HYDRALAZINE HYDROCHLORIDE 25 MG: 25 TABLET, FILM COATED ORAL at 13:12

## 2021-01-01 RX ADMIN — HYDRALAZINE HYDROCHLORIDE 10 MG: 20 INJECTION INTRAMUSCULAR; INTRAVENOUS at 12:15

## 2021-01-01 RX ADMIN — ASPIRIN 81 MG: 81 TABLET, COATED ORAL at 08:25

## 2021-01-01 RX ADMIN — METOPROLOL TARTRATE 100 MG: 100 TABLET, FILM COATED ORAL at 08:13

## 2021-01-01 RX ADMIN — CYANOCOBALAMIN 1000 MCG: 1000 INJECTION, SOLUTION INTRAMUSCULAR; SUBCUTANEOUS at 09:18

## 2021-01-01 RX ADMIN — ACETAMINOPHEN 1000 MG: 500 TABLET ORAL at 05:42

## 2021-01-01 RX ADMIN — POTASSIUM CHLORIDE 40 MEQ: 20 TABLET, EXTENDED RELEASE ORAL at 21:02

## 2021-01-01 RX ADMIN — HYDROCODONE BITARTRATE AND ACETAMINOPHEN 1 TABLET: 10; 325 TABLET ORAL at 23:50

## 2021-01-01 RX ADMIN — HYDRALAZINE HYDROCHLORIDE 10 MG: 10 TABLET, FILM COATED ORAL at 05:51

## 2021-01-01 RX ADMIN — ASPIRIN 81 MG: 81 TABLET, COATED ORAL at 09:17

## 2021-01-01 RX ADMIN — BUSPIRONE HYDROCHLORIDE 2.5 MG: 5 TABLET ORAL at 09:01

## 2021-01-01 RX ADMIN — POTASSIUM CHLORIDE 10 MEQ: 7.46 INJECTION, SOLUTION INTRAVENOUS at 12:07

## 2021-01-01 RX ADMIN — MEMANTINE HYDROCHLORIDE 5 MG: 5 TABLET, FILM COATED ORAL at 09:18

## 2021-01-01 RX ADMIN — NYSTATIN 500000 UNITS: 100000 SUSPENSION ORAL at 11:37

## 2021-01-01 RX ADMIN — POTASSIUM CHLORIDE 10 MEQ: 7.46 INJECTION, SOLUTION INTRAVENOUS at 00:30

## 2021-01-01 RX ADMIN — NYSTATIN 500000 UNITS: 100000 SUSPENSION ORAL at 08:23

## 2021-01-01 RX ADMIN — MEGESTROL ACETATE 40 MG: 40 TABLET ORAL at 08:13

## 2021-01-01 RX ADMIN — ONDANSETRON 4 MG: 2 INJECTION INTRAMUSCULAR; INTRAVENOUS at 14:21

## 2021-01-01 RX ADMIN — DIPHENHYDRAMINE HYDROCHLORIDE AND LIDOCAINE HYDROCHLORIDE AND ALUMINUM HYDROXIDE AND MAGNESIUM HYDRO 5 ML: KIT at 05:36

## 2021-01-01 RX ADMIN — SODIUM BICARBONATE TAB 650 MG 650 MG: 650 TAB at 21:06

## 2021-01-01 RX ADMIN — BUSPIRONE HYDROCHLORIDE 2.5 MG: 5 TABLET ORAL at 08:25

## 2021-01-01 RX ADMIN — MEGESTROL ACETATE 40 MG: 40 TABLET ORAL at 20:35

## 2021-01-01 RX ADMIN — BUSPIRONE HYDROCHLORIDE 2.5 MG: 5 TABLET ORAL at 09:42

## 2021-01-01 RX ADMIN — HYDRALAZINE HYDROCHLORIDE 10 MG: 10 TABLET, FILM COATED ORAL at 18:03

## 2021-01-01 RX ADMIN — SODIUM CHLORIDE 500 ML: 9 INJECTION, SOLUTION INTRAVENOUS at 01:35

## 2021-01-01 RX ADMIN — BUSPIRONE HYDROCHLORIDE 2.5 MG: 5 TABLET ORAL at 22:18

## 2021-01-01 RX ADMIN — SODIUM CHLORIDE, PRESERVATIVE FREE 10 ML: 5 INJECTION INTRAVENOUS at 20:43

## 2021-01-01 RX ADMIN — HYDROCODONE BITARTRATE AND ACETAMINOPHEN 1 TABLET: 7.5; 325 TABLET ORAL at 15:13

## 2021-01-01 RX ADMIN — PHENYLEPHRINE HYDROCHLORIDE 100 MCG: 10 INJECTION INTRAVENOUS at 14:56

## 2021-01-01 RX ADMIN — POTASSIUM CHLORIDE 40 MEQ: 20 TABLET, EXTENDED RELEASE ORAL at 09:10

## 2021-01-01 RX ADMIN — NYSTATIN 600000 UNITS: 100000 SUSPENSION ORAL at 09:12

## 2021-01-01 RX ADMIN — MORPHINE SULFATE 1 MG: 2 INJECTION, SOLUTION INTRAMUSCULAR; INTRAVENOUS at 22:24

## 2021-01-01 RX ADMIN — SODIUM CHLORIDE, POTASSIUM CHLORIDE, SODIUM LACTATE AND CALCIUM CHLORIDE 75 ML/HR: 600; 310; 30; 20 INJECTION, SOLUTION INTRAVENOUS at 18:51

## 2021-01-01 RX ADMIN — PANTOPRAZOLE SODIUM 40 MG: 40 TABLET, DELAYED RELEASE ORAL at 08:54

## 2021-01-01 RX ADMIN — ATORVASTATIN CALCIUM 10 MG: 10 TABLET, FILM COATED ORAL at 09:39

## 2021-01-01 RX ADMIN — PREGABALIN 25 MG: 25 CAPSULE ORAL at 20:31

## 2021-01-01 RX ADMIN — FLUOXETINE HYDROCHLORIDE 20 MG: 20 CAPSULE ORAL at 08:53

## 2021-01-01 RX ADMIN — ONDANSETRON 4 MG: 2 INJECTION INTRAMUSCULAR; INTRAVENOUS at 05:42

## 2021-01-01 RX ADMIN — MEGESTROL ACETATE 40 MG: 40 TABLET ORAL at 08:38

## 2021-01-01 RX ADMIN — POTASSIUM CHLORIDE 10 MEQ: 7.46 INJECTION, SOLUTION INTRAVENOUS at 13:09

## 2021-01-01 RX ADMIN — METOPROLOL TARTRATE 75 MG: 50 TABLET, FILM COATED ORAL at 08:54

## 2021-01-01 RX ADMIN — ACETAMINOPHEN 1000 MG: 500 TABLET ORAL at 05:50

## 2021-01-01 RX ADMIN — CEFTRIAXONE 1 G: 1 INJECTION, POWDER, FOR SOLUTION INTRAMUSCULAR; INTRAVENOUS at 10:55

## 2021-01-01 RX ADMIN — ACETAMINOPHEN 1000 MG: 500 TABLET ORAL at 20:29

## 2021-01-01 RX ADMIN — METOPROLOL TARTRATE 50 MG: 50 TABLET, FILM COATED ORAL at 20:44

## 2021-01-01 RX ADMIN — HYDROCODONE BITARTRATE AND ACETAMINOPHEN 1 TABLET: 7.5; 325 TABLET ORAL at 15:34

## 2021-01-01 RX ADMIN — PREGABALIN 25 MG: 25 CAPSULE ORAL at 07:20

## 2021-01-01 RX ADMIN — AMLODIPINE BESYLATE 2.5 MG: 5 TABLET ORAL at 07:20

## 2021-01-01 RX ADMIN — PANTOPRAZOLE SODIUM 40 MG: 40 TABLET, DELAYED RELEASE ORAL at 09:26

## 2021-01-01 RX ADMIN — PANTOPRAZOLE SODIUM 40 MG: 40 TABLET, DELAYED RELEASE ORAL at 17:32

## 2021-01-01 RX ADMIN — MEGESTROL ACETATE 40 MG: 40 TABLET ORAL at 22:44

## 2021-01-01 RX ADMIN — ACETAMINOPHEN 1000 MG: 500 TABLET ORAL at 05:31

## 2021-01-01 RX ADMIN — ENOXAPARIN SODIUM 50 MG: 60 INJECTION SUBCUTANEOUS at 03:21

## 2021-01-01 RX ADMIN — PREGABALIN 25 MG: 25 CAPSULE ORAL at 09:37

## 2021-01-01 RX ADMIN — PANTOPRAZOLE SODIUM 40 MG: 40 TABLET, DELAYED RELEASE ORAL at 08:12

## 2021-01-01 RX ADMIN — HYDROCODONE BITARTRATE AND ACETAMINOPHEN 1 TABLET: 10; 325 TABLET ORAL at 03:24

## 2021-01-01 RX ADMIN — PREGABALIN 25 MG: 25 CAPSULE ORAL at 20:33

## 2021-01-01 RX ADMIN — ONDANSETRON 4 MG: 2 INJECTION INTRAMUSCULAR; INTRAVENOUS at 21:02

## 2021-01-01 RX ADMIN — BUSPIRONE HYDROCHLORIDE 2.5 MG: 5 TABLET ORAL at 09:18

## 2021-01-01 RX ADMIN — METOPROLOL TARTRATE 75 MG: 50 TABLET, FILM COATED ORAL at 08:32

## 2021-01-01 RX ADMIN — MORPHINE SULFATE 1 MG: 2 INJECTION, SOLUTION INTRAMUSCULAR; INTRAVENOUS at 11:22

## 2021-01-01 RX ADMIN — POTASSIUM CHLORIDE 40 MEQ: 20 TABLET, EXTENDED RELEASE ORAL at 09:49

## 2021-01-01 RX ADMIN — MEGESTROL ACETATE 40 MG: 40 TABLET ORAL at 09:01

## 2021-01-01 RX ADMIN — SODIUM BICARBONATE TAB 650 MG 650 MG: 650 TAB at 08:40

## 2021-01-01 RX ADMIN — ACETAMINOPHEN 1000 MG: 500 TABLET ORAL at 06:54

## 2021-01-01 RX ADMIN — POTASSIUM CHLORIDE 10 MEQ: 7.46 INJECTION, SOLUTION INTRAVENOUS at 01:03

## 2021-01-01 RX ADMIN — HYDRALAZINE HYDROCHLORIDE 10 MG: 10 TABLET, FILM COATED ORAL at 16:30

## 2021-01-01 RX ADMIN — ATORVASTATIN CALCIUM 10 MG: 10 TABLET, FILM COATED ORAL at 08:32

## 2021-01-01 RX ADMIN — SODIUM CHLORIDE, PRESERVATIVE FREE 10 ML: 5 INJECTION INTRAVENOUS at 08:34

## 2021-01-01 RX ADMIN — ATORVASTATIN CALCIUM 10 MG: 10 TABLET, FILM COATED ORAL at 08:39

## 2021-01-01 RX ADMIN — ENOXAPARIN SODIUM 40 MG: 40 INJECTION SUBCUTANEOUS at 23:08

## 2021-01-01 RX ADMIN — CETIRIZINE HYDROCHLORIDE 5 MG: 10 TABLET, FILM COATED ORAL at 08:23

## 2021-01-01 RX ADMIN — CETIRIZINE HYDROCHLORIDE 5 MG: 10 TABLET, FILM COATED ORAL at 08:36

## 2021-01-01 RX ADMIN — POTASSIUM CHLORIDE 40 MEQ: 20 TABLET, EXTENDED RELEASE ORAL at 01:34

## 2021-01-01 RX ADMIN — ATORVASTATIN CALCIUM 10 MG: 10 TABLET, FILM COATED ORAL at 20:39

## 2021-01-01 RX ADMIN — MEGESTROL ACETATE 40 MG: 40 TABLET ORAL at 21:06

## 2021-01-01 RX ADMIN — SODIUM CHLORIDE 500 ML: 9 INJECTION, SOLUTION INTRAVENOUS at 03:49

## 2021-01-01 RX ADMIN — MORPHINE SULFATE 1 MG: 2 INJECTION, SOLUTION INTRAMUSCULAR; INTRAVENOUS at 03:52

## 2021-01-01 RX ADMIN — DIPHENHYDRAMINE HYDROCHLORIDE AND LIDOCAINE HYDROCHLORIDE AND ALUMINUM HYDROXIDE AND MAGNESIUM HYDRO 5 ML: KIT at 05:33

## 2021-01-01 RX ADMIN — MEGESTROL ACETATE 40 MG: 40 TABLET ORAL at 08:12

## 2021-01-01 RX ADMIN — SODIUM BICARBONATE TAB 650 MG 650 MG: 650 TAB at 08:12

## 2021-01-01 RX ADMIN — PANTOPRAZOLE SODIUM 40 MG: 40 TABLET, DELAYED RELEASE ORAL at 18:07

## 2021-01-01 RX ADMIN — HYDRALAZINE HYDROCHLORIDE 10 MG: 10 TABLET, FILM COATED ORAL at 15:13

## 2021-01-01 RX ADMIN — SODIUM CHLORIDE, PRESERVATIVE FREE 10 ML: 5 INJECTION INTRAVENOUS at 08:13

## 2021-01-01 RX ADMIN — POTASSIUM CHLORIDE 10 MEQ: 7.46 INJECTION, SOLUTION INTRAVENOUS at 10:52

## 2021-01-01 RX ADMIN — ASPIRIN 81 MG: 81 TABLET, COATED ORAL at 09:25

## 2021-01-01 RX ADMIN — DIPHENHYDRAMINE HYDROCHLORIDE AND LIDOCAINE HYDROCHLORIDE AND ALUMINUM HYDROXIDE AND MAGNESIUM HYDRO 5 ML: KIT at 11:20

## 2021-01-01 RX ADMIN — CETIRIZINE HYDROCHLORIDE 5 MG: 10 TABLET, FILM COATED ORAL at 08:41

## 2021-01-01 RX ADMIN — POTASSIUM CHLORIDE 10 MEQ: 7.46 INJECTION, SOLUTION INTRAVENOUS at 17:41

## 2021-01-01 RX ADMIN — MEMANTINE HYDROCHLORIDE 5 MG: 5 TABLET, FILM COATED ORAL at 08:26

## 2021-01-01 RX ADMIN — SODIUM CHLORIDE 75 ML/HR: 9 INJECTION, SOLUTION INTRAVENOUS at 10:39

## 2021-01-01 RX ADMIN — MEGESTROL ACETATE 40 MG: 40 TABLET ORAL at 20:14

## 2021-01-01 RX ADMIN — ENOXAPARIN SODIUM 40 MG: 40 INJECTION SUBCUTANEOUS at 08:25

## 2021-01-01 RX ADMIN — ACETAMINOPHEN 1000 MG: 500 TABLET ORAL at 20:33

## 2021-01-01 RX ADMIN — PROCHLORPERAZINE EDISYLATE 5 MG: 5 INJECTION INTRAMUSCULAR; INTRAVENOUS at 08:42

## 2021-01-01 RX ADMIN — NYSTATIN 600000 UNITS: 100000 SUSPENSION ORAL at 11:16

## 2021-01-01 RX ADMIN — ACETAMINOPHEN 1000 MG: 500 TABLET ORAL at 14:48

## 2021-01-01 RX ADMIN — SODIUM CHLORIDE, PRESERVATIVE FREE 10 ML: 5 INJECTION INTRAVENOUS at 08:40

## 2021-01-01 RX ADMIN — SODIUM BICARBONATE TAB 650 MG 650 MG: 650 TAB at 08:54

## 2021-01-01 RX ADMIN — ACETAMINOPHEN 1000 MG: 500 TABLET ORAL at 20:24

## 2021-01-01 RX ADMIN — FLUTICASONE PROPIONATE 2 SPRAY: 50 SPRAY, METERED NASAL at 07:20

## 2021-01-01 RX ADMIN — HYDROCODONE BITARTRATE AND ACETAMINOPHEN 1 TABLET: 10; 325 TABLET ORAL at 10:18

## 2021-01-01 RX ADMIN — NITROFURANTOIN MONOHYDRATE/MACROCRYSTALLINE 100 MG: 25; 75 CAPSULE ORAL at 03:45

## 2021-01-01 RX ADMIN — ONDANSETRON 4 MG: 2 INJECTION INTRAMUSCULAR; INTRAVENOUS at 19:50

## 2021-01-01 RX ADMIN — BUSPIRONE HYDROCHLORIDE 2.5 MG: 5 TABLET ORAL at 21:37

## 2021-01-01 RX ADMIN — SODIUM CHLORIDE, PRESERVATIVE FREE 10 ML: 5 INJECTION INTRAVENOUS at 20:32

## 2021-01-01 RX ADMIN — ACETAMINOPHEN 1000 MG: 500 TABLET ORAL at 06:39

## 2021-01-01 RX ADMIN — SODIUM BICARBONATE TAB 650 MG 650 MG: 650 TAB at 20:29

## 2021-01-01 RX ADMIN — POTASSIUM CHLORIDE 10 MEQ: 7.46 INJECTION, SOLUTION INTRAVENOUS at 14:47

## 2021-01-01 RX ADMIN — ONDANSETRON 4 MG: 2 INJECTION INTRAMUSCULAR; INTRAVENOUS at 22:46

## 2021-01-01 RX ADMIN — MORPHINE SULFATE 1 MG: 2 INJECTION, SOLUTION INTRAMUSCULAR; INTRAVENOUS at 15:36

## 2021-01-01 RX ADMIN — ATORVASTATIN CALCIUM 10 MG: 10 TABLET, FILM COATED ORAL at 08:54

## 2021-01-01 RX ADMIN — FAMOTIDINE 20 MG: 10 INJECTION INTRAVENOUS at 14:21

## 2021-01-01 RX ADMIN — ONDANSETRON 4 MG: 2 INJECTION INTRAMUSCULAR; INTRAVENOUS at 21:41

## 2021-01-01 RX ADMIN — BUSPIRONE HYDROCHLORIDE 2.5 MG: 5 TABLET ORAL at 20:14

## 2021-01-01 RX ADMIN — NYSTATIN 600000 UNITS: 100000 SUSPENSION ORAL at 21:24

## 2021-01-01 RX ADMIN — AMLODIPINE BESYLATE 2.5 MG: 5 TABLET ORAL at 08:13

## 2021-01-01 RX ADMIN — CEFTRIAXONE 1 G: 1 INJECTION, POWDER, FOR SOLUTION INTRAMUSCULAR; INTRAVENOUS at 17:48

## 2021-01-01 RX ADMIN — AMLODIPINE BESYLATE 2.5 MG: 5 TABLET ORAL at 09:18

## 2021-01-01 RX ADMIN — NYSTATIN 500000 UNITS: 100000 SUSPENSION ORAL at 14:06

## 2021-01-01 RX ADMIN — PREGABALIN 25 MG: 25 CAPSULE ORAL at 20:29

## 2021-01-01 RX ADMIN — ACETAMINOPHEN 1000 MG: 500 TABLET ORAL at 13:39

## 2021-01-01 RX ADMIN — NYSTATIN 500000 UNITS: 100000 SUSPENSION ORAL at 16:57

## 2021-01-01 RX ADMIN — POTASSIUM CHLORIDE 10 MEQ: 7.46 INJECTION, SOLUTION INTRAVENOUS at 12:14

## 2021-01-01 RX ADMIN — PREGABALIN 25 MG: 25 CAPSULE ORAL at 21:13

## 2021-01-01 RX ADMIN — PROMETHAZINE HYDROCHLORIDE 12.5 MG: 12.5 TABLET ORAL at 22:04

## 2021-01-01 RX ADMIN — NYSTATIN 500000 UNITS: 100000 SUSPENSION ORAL at 08:36

## 2021-01-01 RX ADMIN — NYSTATIN 500000 UNITS: 100000 SUSPENSION ORAL at 17:48

## 2021-01-01 RX ADMIN — PANTOPRAZOLE SODIUM 40 MG: 40 TABLET, DELAYED RELEASE ORAL at 05:35

## 2021-01-01 RX ADMIN — MEGESTROL ACETATE 40 MG: 40 TABLET ORAL at 08:37

## 2021-01-01 RX ADMIN — NYSTATIN 500000 UNITS: 100000 SUSPENSION ORAL at 12:15

## 2021-01-01 RX ADMIN — MEGESTROL ACETATE 40 MG: 40 TABLET ORAL at 21:57

## 2021-01-01 RX ADMIN — BUSPIRONE HYDROCHLORIDE 2.5 MG: 5 TABLET ORAL at 21:06

## 2021-01-01 RX ADMIN — MEGESTROL ACETATE 200 MG: 40 SUSPENSION ORAL at 08:44

## 2021-01-01 RX ADMIN — MEGESTROL ACETATE 40 MG: 40 TABLET ORAL at 20:47

## 2021-01-01 RX ADMIN — POTASSIUM CHLORIDE 10 MEQ: 7.46 INJECTION, SOLUTION INTRAVENOUS at 16:54

## 2021-01-01 RX ADMIN — NYSTATIN 500000 UNITS: 100000 SUSPENSION ORAL at 17:10

## 2021-01-01 RX ADMIN — POTASSIUM CHLORIDE 40 MEQ: 20 TABLET, EXTENDED RELEASE ORAL at 08:44

## 2021-01-01 RX ADMIN — ACETAMINOPHEN 1000 MG: 500 TABLET ORAL at 13:13

## 2021-01-01 RX ADMIN — BUSPIRONE HYDROCHLORIDE 2.5 MG: 5 TABLET ORAL at 20:32

## 2021-01-01 RX ADMIN — SODIUM CHLORIDE, PRESERVATIVE FREE 10 ML: 5 INJECTION INTRAVENOUS at 21:24

## 2021-01-01 RX ADMIN — MEGESTROL ACETATE 40 MG: 40 TABLET ORAL at 08:23

## 2021-01-01 RX ADMIN — SODIUM CHLORIDE, POTASSIUM CHLORIDE, SODIUM LACTATE AND CALCIUM CHLORIDE 75 ML/HR: 600; 310; 30; 20 INJECTION, SOLUTION INTRAVENOUS at 22:00

## 2021-01-01 RX ADMIN — METOPROLOL TARTRATE 150 MG: 100 TABLET, FILM COATED ORAL at 07:20

## 2021-01-01 RX ADMIN — BUSPIRONE HYDROCHLORIDE 2.5 MG: 5 TABLET ORAL at 22:43

## 2021-01-01 RX ADMIN — PHENYLEPHRINE HYDROCHLORIDE 100 MCG: 10 INJECTION INTRAVENOUS at 14:38

## 2021-01-01 RX ADMIN — SODIUM CHLORIDE, PRESERVATIVE FREE 10 ML: 5 INJECTION INTRAVENOUS at 08:38

## 2021-01-01 RX ADMIN — DIPHENHYDRAMINE HYDROCHLORIDE AND LIDOCAINE HYDROCHLORIDE AND ALUMINUM HYDROXIDE AND MAGNESIUM HYDRO 5 ML: KIT at 17:20

## 2021-01-01 RX ADMIN — SODIUM CHLORIDE, PRESERVATIVE FREE 10 ML: 5 INJECTION INTRAVENOUS at 11:39

## 2021-01-01 RX ADMIN — MAGNESIUM SULFATE 2 G: 2 INJECTION INTRAVENOUS at 05:34

## 2021-01-01 RX ADMIN — SODIUM BICARBONATE TAB 650 MG 650 MG: 650 TAB at 08:24

## 2021-01-01 RX ADMIN — FLUOXETINE HYDROCHLORIDE 20 MG: 20 CAPSULE ORAL at 08:32

## 2021-01-01 RX ADMIN — HYDRALAZINE HYDROCHLORIDE 25 MG: 25 TABLET, FILM COATED ORAL at 21:06

## 2021-01-01 RX ADMIN — ASPIRIN 81 MG: 81 TABLET, COATED ORAL at 09:11

## 2021-01-01 RX ADMIN — HYDROCODONE BITARTRATE AND ACETAMINOPHEN 1 TABLET: 7.5; 325 TABLET ORAL at 15:50

## 2021-01-01 RX ADMIN — BUSPIRONE HYDROCHLORIDE 2.5 MG: 5 TABLET ORAL at 08:13

## 2021-01-01 RX ADMIN — BUSPIRONE HYDROCHLORIDE 2.5 MG: 5 TABLET ORAL at 08:53

## 2021-01-01 RX ADMIN — ACETAMINOPHEN 650 MG: 325 TABLET ORAL at 22:22

## 2021-01-01 RX ADMIN — ATORVASTATIN CALCIUM 10 MG: 10 TABLET, FILM COATED ORAL at 09:01

## 2021-01-01 RX ADMIN — POTASSIUM CHLORIDE 10 MEQ: 7.46 INJECTION, SOLUTION INTRAVENOUS at 20:52

## 2021-01-01 RX ADMIN — CYANOCOBALAMIN 1000 MCG: 1000 INJECTION, SOLUTION INTRAMUSCULAR; SUBCUTANEOUS at 08:25

## 2021-01-01 RX ADMIN — HYDRALAZINE HYDROCHLORIDE 25 MG: 25 TABLET, FILM COATED ORAL at 05:12

## 2021-01-01 RX ADMIN — METOPROLOL TARTRATE 50 MG: 50 TABLET, FILM COATED ORAL at 09:01

## 2021-01-01 RX ADMIN — PANTOPRAZOLE SODIUM 40 MG: 40 TABLET, DELAYED RELEASE ORAL at 17:09

## 2021-01-01 RX ADMIN — METOPROLOL TARTRATE 100 MG: 100 TABLET, FILM COATED ORAL at 08:24

## 2021-01-01 RX ADMIN — PANTOPRAZOLE SODIUM 40 MG: 40 TABLET, DELAYED RELEASE ORAL at 07:20

## 2021-01-01 RX ADMIN — HYDRALAZINE HYDROCHLORIDE 25 MG: 25 TABLET, FILM COATED ORAL at 05:43

## 2021-01-01 RX ADMIN — POTASSIUM CHLORIDE 10 MEQ: 7.46 INJECTION, SOLUTION INTRAVENOUS at 15:33

## 2021-01-01 RX ADMIN — FLUTICASONE PROPIONATE 2 SPRAY: 50 SPRAY, METERED NASAL at 09:13

## 2021-01-01 RX ADMIN — ONDANSETRON 4 MG: 2 INJECTION INTRAMUSCULAR; INTRAVENOUS at 09:06

## 2021-01-01 RX ADMIN — BUSPIRONE HYDROCHLORIDE 2.5 MG: 5 TABLET ORAL at 20:29

## 2021-01-01 RX ADMIN — SODIUM CHLORIDE 75 ML/HR: 9 INJECTION, SOLUTION INTRAVENOUS at 05:36

## 2021-01-01 RX ADMIN — FLUOXETINE HYDROCHLORIDE 20 MG: 20 CAPSULE ORAL at 08:38

## 2021-01-01 RX ADMIN — POTASSIUM CHLORIDE 40 MEQ: 20 TABLET, EXTENDED RELEASE ORAL at 17:00

## 2021-01-01 RX ADMIN — AMLODIPINE BESYLATE 2.5 MG: 5 TABLET ORAL at 08:37

## 2021-01-01 RX ADMIN — NYSTATIN 500000 UNITS: 100000 SUSPENSION ORAL at 20:15

## 2021-01-01 RX ADMIN — METOPROLOL TARTRATE 75 MG: 50 TABLET, FILM COATED ORAL at 09:41

## 2021-01-01 RX ADMIN — BUSPIRONE HYDROCHLORIDE 2.5 MG: 5 TABLET ORAL at 09:26

## 2021-01-01 RX ADMIN — PANTOPRAZOLE SODIUM 40 MG: 40 TABLET, DELAYED RELEASE ORAL at 09:01

## 2021-01-01 RX ADMIN — POTASSIUM CHLORIDE 40 MEQ: 20 TABLET, EXTENDED RELEASE ORAL at 18:05

## 2021-01-01 RX ADMIN — METOPROLOL TARTRATE 150 MG: 100 TABLET, FILM COATED ORAL at 20:30

## 2021-01-01 RX ADMIN — ENOXAPARIN SODIUM 40 MG: 40 INJECTION SUBCUTANEOUS at 09:14

## 2021-01-01 RX ADMIN — HYDRALAZINE HYDROCHLORIDE 25 MG: 25 TABLET, FILM COATED ORAL at 14:32

## 2021-01-01 RX ADMIN — AMLODIPINE BESYLATE 2.5 MG: 5 TABLET ORAL at 12:29

## 2021-01-01 RX ADMIN — POTASSIUM CHLORIDE 10 MEQ: 7.46 INJECTION, SOLUTION INTRAVENOUS at 03:34

## 2021-01-01 RX ADMIN — PANTOPRAZOLE SODIUM 40 MG: 40 TABLET, DELAYED RELEASE ORAL at 08:32

## 2021-01-01 RX ADMIN — METOPROLOL TARTRATE 50 MG: 50 TABLET, FILM COATED ORAL at 20:35

## 2021-01-01 RX ADMIN — HYDROCODONE BITARTRATE AND ACETAMINOPHEN 1 TABLET: 10; 325 TABLET ORAL at 02:44

## 2021-01-01 RX ADMIN — NYSTATIN 600000 UNITS: 100000 SUSPENSION ORAL at 11:54

## 2021-01-01 RX ADMIN — PREGABALIN 25 MG: 25 CAPSULE ORAL at 08:38

## 2021-01-01 RX ADMIN — SODIUM CHLORIDE 2 G: 9 INJECTION, SOLUTION INTRAVENOUS at 01:59

## 2021-01-01 RX ADMIN — HYDRALAZINE HYDROCHLORIDE 10 MG: 10 TABLET, FILM COATED ORAL at 08:17

## 2021-01-01 RX ADMIN — METOPROLOL TARTRATE 5 MG: 1 INJECTION, SOLUTION INTRAVENOUS at 01:33

## 2021-01-01 RX ADMIN — ENOXAPARIN SODIUM 40 MG: 40 INJECTION SUBCUTANEOUS at 08:33

## 2021-01-01 RX ADMIN — SUMATRIPTAN 6 MG: 6 INJECTION, SOLUTION SUBCUTANEOUS at 01:45

## 2021-01-01 RX ADMIN — BUSPIRONE HYDROCHLORIDE 2.5 MG: 5 TABLET ORAL at 20:19

## 2021-01-01 RX ADMIN — ATORVASTATIN CALCIUM 10 MG: 10 TABLET, FILM COATED ORAL at 20:20

## 2021-01-01 RX ADMIN — METOPROLOL TARTRATE 150 MG: 100 TABLET, FILM COATED ORAL at 08:39

## 2021-01-01 RX ADMIN — SODIUM CHLORIDE, PRESERVATIVE FREE 10 ML: 5 INJECTION INTRAVENOUS at 21:58

## 2021-01-01 RX ADMIN — ENOXAPARIN SODIUM 40 MG: 40 INJECTION SUBCUTANEOUS at 00:30

## 2021-01-01 RX ADMIN — SODIUM CHLORIDE, PRESERVATIVE FREE 10 ML: 5 INJECTION INTRAVENOUS at 08:26

## 2021-01-01 RX ADMIN — IOPAMIDOL 100 ML: 612 INJECTION, SOLUTION INTRAVENOUS at 02:18

## 2021-01-01 RX ADMIN — CEFAZOLIN 1 G: 1 INJECTION, POWDER, FOR SOLUTION INTRAMUSCULAR; INTRAVENOUS; PARENTERAL at 14:48

## 2021-01-01 RX ADMIN — NYSTATIN 500000 UNITS: 100000 SUSPENSION ORAL at 07:20

## 2021-01-01 RX ADMIN — SODIUM CHLORIDE 75 ML/HR: 9 INJECTION, SOLUTION INTRAVENOUS at 17:50

## 2021-01-01 RX ADMIN — MEMANTINE HYDROCHLORIDE 5 MG: 5 TABLET, FILM COATED ORAL at 09:12

## 2021-01-01 RX ADMIN — ACETAMINOPHEN 1000 MG: 500 TABLET ORAL at 21:39

## 2021-01-01 RX ADMIN — MORPHINE SULFATE 1 MG: 2 INJECTION, SOLUTION INTRAMUSCULAR; INTRAVENOUS at 06:22

## 2021-01-01 RX ADMIN — CYANOCOBALAMIN 1000 MCG: 1000 INJECTION, SOLUTION INTRAMUSCULAR; SUBCUTANEOUS at 09:10

## 2021-01-01 RX ADMIN — NYSTATIN 500000 UNITS: 100000 SUSPENSION ORAL at 21:58

## 2021-01-01 RX ADMIN — CEFTRIAXONE 1 G: 1 INJECTION, POWDER, FOR SOLUTION INTRAMUSCULAR; INTRAVENOUS at 17:09

## 2021-01-01 RX ADMIN — POTASSIUM CHLORIDE 10 MEQ: 7.46 INJECTION, SOLUTION INTRAVENOUS at 01:33

## 2021-01-01 RX ADMIN — PROMETHAZINE HYDROCHLORIDE 12.5 MG: 12.5 TABLET ORAL at 14:33

## 2021-01-01 RX ADMIN — BUSPIRONE HYDROCHLORIDE 2.5 MG: 5 TABLET ORAL at 08:33

## 2021-01-01 RX ADMIN — METOPROLOL TARTRATE 50 MG: 50 TABLET, FILM COATED ORAL at 21:23

## 2021-01-01 RX ADMIN — BUSPIRONE HYDROCHLORIDE 2.5 MG: 5 TABLET ORAL at 09:43

## 2021-01-01 RX ADMIN — SODIUM CHLORIDE, PRESERVATIVE FREE 10 ML: 5 INJECTION INTRAVENOUS at 08:33

## 2021-01-01 RX ADMIN — CETIRIZINE HYDROCHLORIDE 5 MG: 10 TABLET, FILM COATED ORAL at 08:40

## 2021-01-01 RX ADMIN — MEGESTROL ACETATE 40 MG: 40 TABLET ORAL at 08:22

## 2021-01-01 RX ADMIN — MORPHINE SULFATE 2 MG: 2 INJECTION, SOLUTION INTRAMUSCULAR; INTRAVENOUS at 14:50

## 2021-01-01 RX ADMIN — SODIUM CHLORIDE, PRESERVATIVE FREE 10 ML: 5 INJECTION INTRAVENOUS at 20:18

## 2021-01-01 RX ADMIN — METOPROLOL TARTRATE 150 MG: 100 TABLET, FILM COATED ORAL at 20:27

## 2021-01-01 RX ADMIN — HYDROCODONE BITARTRATE AND ACETAMINOPHEN 1 TABLET: 7.5; 325 TABLET ORAL at 20:18

## 2021-01-01 RX ADMIN — CEFTRIAXONE 1 G: 1 INJECTION, POWDER, FOR SOLUTION INTRAMUSCULAR; INTRAVENOUS at 16:57

## 2021-01-01 RX ADMIN — ACETAMINOPHEN 1000 MG: 500 TABLET ORAL at 20:30

## 2021-01-01 RX ADMIN — ACETAMINOPHEN 1000 MG: 500 TABLET ORAL at 05:51

## 2021-01-01 RX ADMIN — FLUOXETINE HYDROCHLORIDE 20 MG: 20 CAPSULE ORAL at 09:01

## 2021-01-01 RX ADMIN — METOPROLOL TARTRATE 50 MG: 50 TABLET, FILM COATED ORAL at 12:30

## 2021-01-01 RX ADMIN — ATORVASTATIN CALCIUM 10 MG: 10 TABLET, FILM COATED ORAL at 08:13

## 2021-01-01 RX ADMIN — BUSPIRONE HYDROCHLORIDE 2.5 MG: 5 TABLET ORAL at 08:37

## 2021-01-01 RX ADMIN — HYDRALAZINE HYDROCHLORIDE 10 MG: 10 TABLET, FILM COATED ORAL at 05:32

## 2021-01-01 RX ADMIN — BUSPIRONE HYDROCHLORIDE 2.5 MG: 5 TABLET ORAL at 20:28

## 2021-01-01 RX ADMIN — ATORVASTATIN CALCIUM 10 MG: 10 TABLET, FILM COATED ORAL at 07:20

## 2021-01-01 RX ADMIN — SODIUM CHLORIDE 1000 ML: 9 INJECTION, SOLUTION INTRAVENOUS at 08:04

## 2021-01-01 RX ADMIN — PREGABALIN 25 MG: 25 CAPSULE ORAL at 21:06

## 2021-01-01 RX ADMIN — PHENYLEPHRINE HYDROCHLORIDE 100 MCG: 10 INJECTION INTRAVENOUS at 14:23

## 2021-01-01 RX ADMIN — SODIUM CHLORIDE 75 ML/HR: 9 INJECTION, SOLUTION INTRAVENOUS at 20:00

## 2021-01-01 RX ADMIN — AMLODIPINE BESYLATE 2.5 MG: 5 TABLET ORAL at 11:29

## 2021-01-01 RX ADMIN — NYSTATIN 500000 UNITS: 100000 SUSPENSION ORAL at 17:32

## 2021-01-01 RX ADMIN — METOPROLOL TARTRATE 50 MG: 50 TABLET, FILM COATED ORAL at 20:39

## 2021-01-01 RX ADMIN — SODIUM CHLORIDE, PRESERVATIVE FREE 20 ML: 5 INJECTION INTRAVENOUS at 16:16

## 2021-01-01 RX ADMIN — ATORVASTATIN CALCIUM 10 MG: 10 TABLET, FILM COATED ORAL at 12:30

## 2021-01-01 RX ADMIN — ENOXAPARIN SODIUM 50 MG: 60 INJECTION SUBCUTANEOUS at 17:09

## 2021-01-01 RX ADMIN — ONDANSETRON 4 MG: 2 INJECTION INTRAMUSCULAR; INTRAVENOUS at 14:49

## 2021-01-01 RX ADMIN — AMLODIPINE BESYLATE 5 MG: 5 TABLET ORAL at 09:26

## 2021-01-01 RX ADMIN — SODIUM CHLORIDE, PRESERVATIVE FREE 10 ML: 5 INJECTION INTRAVENOUS at 10:10

## 2021-01-01 RX ADMIN — AMLODIPINE BESYLATE 2.5 MG: 5 TABLET ORAL at 09:46

## 2021-01-01 RX ADMIN — NYSTATIN 500000 UNITS: 100000 SUSPENSION ORAL at 09:32

## 2021-01-01 RX ADMIN — ATORVASTATIN CALCIUM 10 MG: 10 TABLET, FILM COATED ORAL at 21:23

## 2021-01-01 RX ADMIN — SODIUM BICARBONATE TAB 650 MG 650 MG: 650 TAB at 09:41

## 2021-01-01 RX ADMIN — PROPOFOL 80 MG: 10 INJECTION, EMULSION INTRAVENOUS at 14:21

## 2021-01-01 RX ADMIN — HYDRALAZINE HYDROCHLORIDE 10 MG: 10 TABLET, FILM COATED ORAL at 20:35

## 2021-01-01 RX ADMIN — NEOSTIGMINE 3 MG: 1 INJECTION INTRAVENOUS at 15:38

## 2021-01-01 RX ADMIN — SODIUM CHLORIDE, PRESERVATIVE FREE 10 ML: 5 INJECTION INTRAVENOUS at 20:30

## 2021-01-01 RX ADMIN — NYSTATIN 600000 UNITS: 100000 SUSPENSION ORAL at 08:26

## 2021-01-01 RX ADMIN — MEGESTROL ACETATE 200 MG: 40 SUSPENSION ORAL at 11:29

## 2021-01-01 RX ADMIN — POTASSIUM CHLORIDE 10 MEQ: 7.46 INJECTION, SOLUTION INTRAVENOUS at 23:17

## 2021-01-01 RX ADMIN — NYSTATIN 600000 UNITS: 100000 SUSPENSION ORAL at 08:32

## 2021-01-01 RX ADMIN — METOPROLOL TARTRATE 50 MG: 50 TABLET, FILM COATED ORAL at 08:38

## 2021-01-01 RX ADMIN — BUSPIRONE HYDROCHLORIDE 2.5 MG: 5 TABLET ORAL at 21:02

## 2021-01-01 RX ADMIN — HYDRALAZINE HYDROCHLORIDE 25 MG: 25 TABLET, FILM COATED ORAL at 20:29

## 2021-01-01 RX ADMIN — METOPROLOL TARTRATE 150 MG: 100 TABLET, FILM COATED ORAL at 20:32

## 2021-01-01 RX ADMIN — ONDANSETRON 4 MG: 2 INJECTION INTRAMUSCULAR; INTRAVENOUS at 20:17

## 2021-01-01 RX ADMIN — AMLODIPINE BESYLATE 2.5 MG: 5 TABLET ORAL at 09:26

## 2021-01-01 RX ADMIN — ONDANSETRON 4 MG: 2 INJECTION INTRAMUSCULAR; INTRAVENOUS at 13:18

## 2021-01-01 RX ADMIN — HEPARIN SODIUM (PORCINE) LOCK FLUSH IV SOLN 100 UNIT/ML 500 UNITS: 100 SOLUTION at 16:15

## 2021-01-01 RX ADMIN — SODIUM CHLORIDE 75 ML/HR: 9 INJECTION, SOLUTION INTRAVENOUS at 11:06

## 2021-01-01 RX ADMIN — SODIUM CHLORIDE, PRESERVATIVE FREE 10 ML: 5 INJECTION INTRAVENOUS at 09:49

## 2021-01-01 RX ADMIN — ONDANSETRON 4 MG: 2 INJECTION INTRAMUSCULAR; INTRAVENOUS at 02:44

## 2021-01-01 RX ADMIN — PIPERACILLIN SODIUM AND TAZOBACTAM SODIUM 3.38 G: 3; .375 INJECTION, POWDER, LYOPHILIZED, FOR SOLUTION INTRAVENOUS at 17:43

## 2021-01-01 RX ADMIN — HYDRALAZINE HYDROCHLORIDE 10 MG: 20 INJECTION INTRAMUSCULAR; INTRAVENOUS at 23:07

## 2021-01-01 RX ADMIN — SODIUM CHLORIDE, PRESERVATIVE FREE 10 ML: 5 INJECTION INTRAVENOUS at 20:13

## 2021-01-01 RX ADMIN — METOPROLOL TARTRATE 150 MG: 100 TABLET, FILM COATED ORAL at 09:26

## 2021-01-01 RX ADMIN — BUSPIRONE HYDROCHLORIDE 2.5 MG: 5 TABLET ORAL at 08:12

## 2021-01-01 RX ADMIN — SODIUM CHLORIDE, POTASSIUM CHLORIDE, SODIUM LACTATE AND CALCIUM CHLORIDE 75 ML/HR: 600; 310; 30; 20 INJECTION, SOLUTION INTRAVENOUS at 07:27

## 2021-01-01 RX ADMIN — MEGESTROL ACETATE 40 MG: 40 TABLET ORAL at 21:02

## 2021-01-01 RX ADMIN — FLUOXETINE HYDROCHLORIDE 20 MG: 20 CAPSULE ORAL at 12:30

## 2021-01-01 RX ADMIN — OXYCODONE 5 MG: 5 TABLET ORAL at 22:04

## 2021-01-01 RX ADMIN — ACETAMINOPHEN 1000 MG: 500 TABLET ORAL at 14:06

## 2021-01-01 RX ADMIN — POTASSIUM CHLORIDE 10 MEQ: 7.46 INJECTION, SOLUTION INTRAVENOUS at 08:25

## 2021-01-01 RX ADMIN — ACETAMINOPHEN 650 MG: 325 TABLET ORAL at 14:27

## 2021-01-01 RX ADMIN — DIPHENHYDRAMINE HYDROCHLORIDE AND LIDOCAINE HYDROCHLORIDE AND ALUMINUM HYDROXIDE AND MAGNESIUM HYDRO 5 ML: KIT at 12:38

## 2021-01-01 RX ADMIN — SODIUM CHLORIDE, PRESERVATIVE FREE 10 ML: 5 INJECTION INTRAVENOUS at 09:13

## 2021-01-01 RX ADMIN — NYSTATIN 500000 UNITS: 100000 SUSPENSION ORAL at 13:14

## 2021-01-01 RX ADMIN — BUSPIRONE HYDROCHLORIDE 2.5 MG: 5 TABLET ORAL at 21:57

## 2021-01-01 RX ADMIN — SODIUM BICARBONATE TAB 650 MG 650 MG: 650 TAB at 08:32

## 2021-01-01 RX ADMIN — PANTOPRAZOLE SODIUM 40 MG: 40 TABLET, DELAYED RELEASE ORAL at 06:39

## 2021-01-01 RX ADMIN — POTASSIUM CHLORIDE 10 MEQ: 7.46 INJECTION, SOLUTION INTRAVENOUS at 04:58

## 2021-01-01 RX ADMIN — DIPHENHYDRAMINE HYDROCHLORIDE AND LIDOCAINE HYDROCHLORIDE AND ALUMINUM HYDROXIDE AND MAGNESIUM HYDRO 5 ML: KIT at 11:29

## 2021-01-01 RX ADMIN — HYDROCODONE BITARTRATE AND ACETAMINOPHEN 1 TABLET: 7.5; 325 TABLET ORAL at 08:34

## 2021-01-01 RX ADMIN — HYDRALAZINE HYDROCHLORIDE 25 MG: 25 TABLET, FILM COATED ORAL at 20:28

## 2021-01-01 RX ADMIN — PANTOPRAZOLE SODIUM 40 MG: 40 TABLET, DELAYED RELEASE ORAL at 15:54

## 2021-01-01 RX ADMIN — PANTOPRAZOLE SODIUM 40 MG: 40 TABLET, DELAYED RELEASE ORAL at 14:27

## 2021-01-01 RX ADMIN — METOPROLOL TARTRATE 50 MG: 50 TABLET, FILM COATED ORAL at 09:19

## 2021-01-01 RX ADMIN — ACETAMINOPHEN 1000 MG: 500 TABLET ORAL at 21:37

## 2021-01-01 RX ADMIN — PANTOPRAZOLE SODIUM 40 MG: 40 TABLET, DELAYED RELEASE ORAL at 08:40

## 2021-01-01 RX ADMIN — PANTOPRAZOLE SODIUM 40 MG: 40 TABLET, DELAYED RELEASE ORAL at 17:11

## 2021-01-01 RX ADMIN — APIXABAN 10 MG: 5 TABLET, FILM COATED ORAL at 09:25

## 2021-01-01 RX ADMIN — MORPHINE SULFATE 1 MG: 2 INJECTION, SOLUTION INTRAMUSCULAR; INTRAVENOUS at 23:20

## 2021-01-01 RX ADMIN — ACETAMINOPHEN 1000 MG: 500 TABLET ORAL at 15:01

## 2021-01-01 RX ADMIN — POTASSIUM CHLORIDE 10 MEQ: 7.46 INJECTION, SOLUTION INTRAVENOUS at 19:54

## 2021-01-01 RX ADMIN — PHENYLEPHRINE HYDROCHLORIDE 100 MCG: 10 INJECTION INTRAVENOUS at 14:32

## 2021-01-01 RX ADMIN — POTASSIUM CHLORIDE 10 MEQ: 7.46 INJECTION, SOLUTION INTRAVENOUS at 21:57

## 2021-01-01 RX ADMIN — PANTOPRAZOLE SODIUM 40 MG: 40 TABLET, DELAYED RELEASE ORAL at 16:30

## 2021-01-01 RX ADMIN — POTASSIUM CHLORIDE 40 MEQ: 20 TABLET, EXTENDED RELEASE ORAL at 17:08

## 2021-01-01 RX ADMIN — HYDRALAZINE HYDROCHLORIDE 10 MG: 10 TABLET, FILM COATED ORAL at 05:36

## 2021-01-01 RX ADMIN — MORPHINE SULFATE 2 MG: 2 INJECTION, SOLUTION INTRAMUSCULAR; INTRAVENOUS at 19:18

## 2021-01-01 RX ADMIN — ATORVASTATIN CALCIUM 10 MG: 10 TABLET, FILM COATED ORAL at 08:37

## 2021-01-01 RX ADMIN — HYDROCODONE BITARTRATE AND ACETAMINOPHEN 1 TABLET: 7.5; 325 TABLET ORAL at 08:29

## 2021-01-01 RX ADMIN — SODIUM CHLORIDE, PRESERVATIVE FREE 10 ML: 5 INJECTION INTRAVENOUS at 09:02

## 2021-01-01 RX ADMIN — CEFAZOLIN 1 G: 1 INJECTION, POWDER, FOR SOLUTION INTRAMUSCULAR; INTRAVENOUS; PARENTERAL at 05:33

## 2021-01-01 RX ADMIN — NYSTATIN 500000 UNITS: 100000 SUSPENSION ORAL at 09:50

## 2021-01-01 RX ADMIN — HYDRALAZINE HYDROCHLORIDE 10 MG: 10 TABLET, FILM COATED ORAL at 13:36

## 2021-01-01 RX ADMIN — HYDRALAZINE HYDROCHLORIDE 10 MG: 10 TABLET, FILM COATED ORAL at 21:23

## 2021-01-01 RX ADMIN — CEFAZOLIN 2 G: 1 INJECTION, POWDER, FOR SOLUTION INTRAMUSCULAR; INTRAVENOUS; PARENTERAL at 14:16

## 2021-01-01 RX ADMIN — HYDRALAZINE HYDROCHLORIDE 10 MG: 10 TABLET, FILM COATED ORAL at 05:42

## 2021-01-01 RX ADMIN — MAGNESIUM SULFATE 2 G: 2 INJECTION INTRAVENOUS at 18:06

## 2021-01-01 RX ADMIN — AMLODIPINE BESYLATE 2.5 MG: 5 TABLET ORAL at 08:40

## 2021-01-01 RX ADMIN — SODIUM CHLORIDE 75 ML/HR: 9 INJECTION, SOLUTION INTRAVENOUS at 16:04

## 2021-01-06 ENCOUNTER — OFFICE VISIT (OUTPATIENT)
Dept: CARDIOLOGY | Facility: CLINIC | Age: 76
End: 2021-01-06

## 2021-01-06 VITALS — TEMPERATURE: 97.3 F | DIASTOLIC BLOOD PRESSURE: 94 MMHG | HEART RATE: 90 BPM | SYSTOLIC BLOOD PRESSURE: 173 MMHG

## 2021-01-06 DIAGNOSIS — C85.90 LYMPHOMA, UNSPECIFIED BODY REGION, UNSPECIFIED LYMPHOMA TYPE (HCC): ICD-10-CM

## 2021-01-06 DIAGNOSIS — I47.1 PAROXYSMAL SVT (SUPRAVENTRICULAR TACHYCARDIA) (HCC): ICD-10-CM

## 2021-01-06 DIAGNOSIS — I10 ESSENTIAL HYPERTENSION: Primary | ICD-10-CM

## 2021-01-06 PROCEDURE — 93000 ELECTROCARDIOGRAM COMPLETE: CPT | Performed by: NURSE PRACTITIONER

## 2021-01-06 PROCEDURE — 99214 OFFICE O/P EST MOD 30 MIN: CPT | Performed by: NURSE PRACTITIONER

## 2021-01-06 RX ORDER — HYDRALAZINE HYDROCHLORIDE 10 MG/1
10 TABLET, FILM COATED ORAL 3 TIMES DAILY
Qty: 90 TABLET | Refills: 3 | Status: SHIPPED | OUTPATIENT
Start: 2021-01-06 | End: 2021-01-08 | Stop reason: SINTOL

## 2021-01-06 NOTE — PROGRESS NOTES
Chip Manzano APRN (Inactive)  Kiah Conway  1945 01/06/2021    Patient Active Problem List   Diagnosis   • Essential hypertension   • Atrophic urethritis   • Hyperglycemia   • GERD without esophagitis   • Fibromyalgia   • Arthritis   • Dysphagia   • History of rheumatic fever   • Migraines   • History of thyroid cancer   • Hypokalemia   • Hypocarbia   • Hyperchloremia   • Hypocalcemia   • Normocytic anemia   • Urinary tract infection without hematuria       Dear Chip Manzano APRN (Inactive):    Subjective     Chief Complaint   Patient presents with   • Follow-up     BP HAS BEEN RUNNING HIGH   • Med Management     UPDATED OVER THE PHONE LAST WEEK           History of Present Illness:    Kiah Conway is a 75 y.o. female with a history of hypertension, paroxysmal SVT, and lymphoma.  She presents today for routine cardiology follow-up.  She has not been evaluated in this office since August 2019.  She reports she is been try to stay home due to the COVID-19 pandemic.  She denies any recent cardiac symptoms such as compressive chest pains, shortness of breath, or palpitations.  She denies any leg edema or weight gain.  Her main issue is hypertension.  Her daughter is with her today and is her primary caregiver.  She reports her blood pressure has been markedly elevated recently with readings up to 200 systolic and 120 diastolic.  She also reports she is very sensitive medication and has not had issues with hypotension in the past.  At one point she was taking amlodipine but this did cause hypotension and ultimately pedal edema so this was discontinued.  She is not on ACE or ARB because she has history of acute renal failure multiple times in the past.  Her most recent creatinine was elevated at 1.07.  She also has a history of hypomagnesemia and hypokalemia so has been unable to tolerate thiazide diuretics as well.          No Known Allergies:      Current Outpatient Medications:   •  aspirin  81 MG EC tablet, Take 81 mg by mouth Daily., Disp: , Rfl:   •  atorvastatin (LIPITOR) 10 MG tablet, Take 1 tablet by mouth Daily., Disp: 30 tablet, Rfl: 11  •  busPIRone (BUSPAR) 5 MG tablet, Take 5 mg by mouth 2 (Two) Times a Day. 1/2 AM AND 1/2 PM, Disp: , Rfl:   •  Calcium Polycarbophil (FIBER-CAPS PO), Take 1 capsule by mouth Daily., Disp: , Rfl:   •  conjugated estrogens (PREMARIN) 0.625 MG/GM vaginal cream, Insert 1 g into the vagina Daily., Disp: , Rfl:   •  FLUoxetine (PROzac) 20 MG capsule, Take 20 mg by mouth Daily., Disp: , Rfl:   •  HYDROcodone-acetaminophen (NORCO) 7.5-325 MG per tablet, Take 1 tablet by mouth 3 (Three) Times a Day As Needed for Moderate Pain ., Disp: , Rfl:   •  lactobacillus acidophilus (RISAQUAD) capsule capsule, Take 1 capsule by mouth Daily., Disp: 7 capsule, Rfl: 0  •  loratadine (CLARITIN) 10 MG tablet, Take 10 mg by mouth Daily., Disp: , Rfl:   •  megestrol (MEGACE) 40 MG tablet, Take 40 mg by mouth 2 (Two) Times a Day., Disp: , Rfl:   •  metoprolol tartrate (LOPRESSOR) 50 MG tablet, Take 1 tablet by mouth Every 12 (Twelve) Hours., Disp: 60 tablet, Rfl: 5  •  omeprazole (priLOSEC) 40 MG capsule, Take 40 mg by mouth Daily., Disp: , Rfl:   •  pregabalin (LYRICA) 300 MG capsule, Take 300 mg by mouth 2 (Two) Times a Day., Disp: , Rfl:   •  hydrALAZINE (APRESOLINE) 10 MG tablet, Take 1 tablet by mouth 3 (Three) Times a Day., Disp: 90 tablet, Rfl: 3      The following portions of the patient's history were reviewed and updated as appropriate: allergies, current medications, past family history, past medical history, past social history, past surgical history and problem list.    Social History     Tobacco Use   • Smoking status: Never Smoker   • Smokeless tobacco: Never Used   Substance Use Topics   • Alcohol use: No   • Drug use: No       Review of Systems   Constitution: Negative for decreased appetite and malaise/fatigue.   Cardiovascular: Negative for chest pain, dyspnea on  exertion, irregular heartbeat, leg swelling, near-syncope, orthopnea, palpitations, paroxysmal nocturnal dyspnea and syncope.   Respiratory: Negative for cough, shortness of breath and wheezing.    Neurological: Negative for dizziness, light-headedness and weakness.       Objective   Vitals:    01/06/21 1112 01/06/21 1114   BP: (!) 188/84 173/94   BP Location: Right arm    Patient Position: Sitting    Pulse: 90 90   Temp: 97.3 °F (36.3 °C)      There is no height or weight on file to calculate BMI.        Vitals signs reviewed.   Constitutional:       Appearance: Well-developed and not in distress. Frail.      Comments: Sitting in wheelchair   HENT:      Head: Normocephalic and atraumatic.   Neck:      Vascular: No JVD.   Pulmonary:      Effort: Pulmonary effort is normal.      Breath sounds: Normal breath sounds. No wheezing. No rales.   Cardiovascular:      Normal rate. Regular rhythm.      Murmurs: There is no murmur.      . No S3 and S4 gallop.   Edema:     Peripheral edema absent.   Abdominal:      General: Bowel sounds are normal.      Palpations: Abdomen is soft.   Skin:     General: Skin is warm and dry.   Neurological:      Mental Status: Alert, oriented to person, place, and time and oriented to person, place and time.   Psychiatric:         Mood and Affect: Mood normal.         Behavior: Behavior normal.         Lab Results   Component Value Date     10/21/2020    K 4.0 10/21/2020     10/21/2020    CO2 25.2 10/21/2020    BUN 20 10/21/2020    CREATININE 1.07 (H) 10/21/2020    GLUCOSE 130 (H) 10/21/2020    CALCIUM 8.9 10/21/2020    AST 16 10/21/2020    ALT 10 10/21/2020    ALKPHOS 82 10/21/2020    LABIL2 1.4 (L) 05/06/2016     Lab Results   Component Value Date    CKTOTAL 80 10/08/2018     Lab Results   Component Value Date    WBC 10.95 (H) 10/21/2020    HGB 13.0 10/21/2020    HCT 40.8 10/21/2020     10/21/2020     Lab Results   Component Value Date    INR 0.99 09/21/2020    INR <0.90  05/06/2016     Lab Results   Component Value Date    MG 2.1 10/21/2020     Lab Results   Component Value Date    TSH 0.705 10/21/2020    TRIG 216 (H) 10/21/2020    HDL 34 (L) 10/21/2020     (H) 10/21/2020      Lab Results   Component Value Date    .0 (H) 03/07/2019             ECG 12 Lead    Date/Time: 1/6/2021 11:53 AM  Performed by: Maranda Montoya APRN  Authorized by: Maranda Montoya APRN   Comparison: compared with previous ECG   Rhythm: sinus rhythm  BPM: 87                Assessment/Plan    Diagnosis Plan   1. Essential hypertension  ECG 12 Lead    hydrALAZINE (APRESOLINE) 10 MG tablet   2. Paroxysmal SVT (supraventricular tachycardia) (CMS/HCC)  ECG 12 Lead   3. Lymphoma, unspecified body region, unspecified lymphoma type (CMS/HCC)  ECG 12 Lead                Recommendations:    1.  For her uncontrolled hypertension, will initiate hydralazine 10 mg every 8 hours.  Her daughter will be monitoring her blood pressure very closely.  We have discussed acceptable parameters today.  She may ultimately need higher dose of this, however, her daughter is very concerned about starting medication slowly due to history of hypotension.    2.  She will continue to follow with her PCP for lipid management.    3.  Follow-up here in 4 weeks or sooner if needed.         Return in about 4 weeks (around 2/3/2021) for Recheck.    As always, I appreciate very much the opportunity to participate in the cardiovascular care of your patients.      With Best Regards,    MERRY Land

## 2021-01-08 ENCOUNTER — TELEPHONE (OUTPATIENT)
Dept: CARDIOLOGY | Facility: CLINIC | Age: 76
End: 2021-01-08

## 2021-01-08 DIAGNOSIS — I10 ESSENTIAL HYPERTENSION: Primary | ICD-10-CM

## 2021-01-08 RX ORDER — AMLODIPINE BESYLATE 5 MG/1
5 TABLET ORAL DAILY
Qty: 30 TABLET | Refills: 11 | Status: ON HOLD | OUTPATIENT
Start: 2021-01-08 | End: 2021-01-01

## 2021-02-03 ENCOUNTER — OFFICE VISIT (OUTPATIENT)
Dept: CARDIOLOGY | Facility: CLINIC | Age: 76
End: 2021-02-03

## 2021-02-03 VITALS — DIASTOLIC BLOOD PRESSURE: 92 MMHG | HEART RATE: 68 BPM | SYSTOLIC BLOOD PRESSURE: 202 MMHG

## 2021-02-03 DIAGNOSIS — E78.5 DYSLIPIDEMIA (HIGH LDL; LOW HDL): ICD-10-CM

## 2021-02-03 DIAGNOSIS — I10 ESSENTIAL HYPERTENSION: Primary | ICD-10-CM

## 2021-02-03 DIAGNOSIS — I47.1 PAROXYSMAL SVT (SUPRAVENTRICULAR TACHYCARDIA) (HCC): ICD-10-CM

## 2021-02-03 PROCEDURE — 99442 PR PHYS/QHP TELEPHONE EVALUATION 11-20 MIN: CPT | Performed by: NURSE PRACTITIONER

## 2021-02-03 RX ORDER — ATORVASTATIN CALCIUM 10 MG/1
10 TABLET, FILM COATED ORAL DAILY
Qty: 30 TABLET | Refills: 11 | Status: SHIPPED | OUTPATIENT
Start: 2021-02-03

## 2021-02-03 RX ORDER — HYDRALAZINE HYDROCHLORIDE 10 MG/1
10 TABLET, FILM COATED ORAL 3 TIMES DAILY
COMMUNITY
End: 2021-02-03

## 2021-02-03 NOTE — PROGRESS NOTES
You have chosen to receive care through a telephone visit. Do you consent to use a telephone visit for your medical care today? Yes  Chip Manzano APRN  Kiah Conway  1945 02/03/2021    Patient Active Problem List   Diagnosis   • Essential hypertension   • Atrophic urethritis   • Hyperglycemia   • GERD without esophagitis   • Fibromyalgia   • Arthritis   • Dysphagia   • History of rheumatic fever   • Migraines   • History of thyroid cancer   • Hypokalemia   • Hypocarbia   • Hyperchloremia   • Hypocalcemia   • Normocytic anemia   • Urinary tract infection without hematuria       Dear Chip Manzano APRN:    Subjective     Chief Complaint   Patient presents with   • Follow-up     4 weeks    • Med Management     actual           History of Present Illness:    Kiah Conway is a 75 y.o. female with a past medical history significant for hypertension, paroxysmal SVT, and lymphoma.  She presents today for cardiology follow-up via telephone visit.  Previously, hydralazine was ordered for uncontrolled hypertension.  The patient's daughter informed us this was causing headache.  She was then prescribed amlodipine.  Upon questioning today, patient's daughter who also takes care of her medications states that she was confused and gave the patient the hydralazine once daily which did improve blood pressure for a short period of time.  The patient is not taking any of her medications this morning and blood pressure is 202/98 after recheck.  She denies any chest pain, shortness of breath, palpitations, dizziness, or lightheadedness.          No Known Allergies:      Current Outpatient Medications:   •  aspirin 81 MG EC tablet, Take 81 mg by mouth Daily., Disp: , Rfl:   •  atorvastatin (LIPITOR) 10 MG tablet, Take 1 tablet by mouth Daily., Disp: 30 tablet, Rfl: 11  •  busPIRone (BUSPAR) 5 MG tablet, Take 5 mg by mouth 2 (Two) Times a Day. 1/2 AM AND 1/2 PM, Disp: , Rfl:   •  Calcium Polycarbophil  (FIBER-CAPS PO), Take 1 capsule by mouth Daily., Disp: , Rfl:   •  conjugated estrogens (PREMARIN) 0.625 MG/GM vaginal cream, Insert 1 g into the vagina Daily., Disp: , Rfl:   •  FLUoxetine (PROzac) 20 MG capsule, Take 20 mg by mouth Daily., Disp: , Rfl:   •  HYDROcodone-acetaminophen (NORCO) 7.5-325 MG per tablet, Take 1 tablet by mouth 3 (Three) Times a Day As Needed for Moderate Pain ., Disp: , Rfl:   •  lactobacillus acidophilus (RISAQUAD) capsule capsule, Take 1 capsule by mouth Daily., Disp: 7 capsule, Rfl: 0  •  loratadine (CLARITIN) 10 MG tablet, Take 10 mg by mouth Daily., Disp: , Rfl:   •  megestrol (MEGACE) 40 MG tablet, Take 40 mg by mouth 2 (Two) Times a Day., Disp: , Rfl:   •  metoprolol tartrate (LOPRESSOR) 50 MG tablet, Take 1 tablet by mouth Every 12 (Twelve) Hours., Disp: 60 tablet, Rfl: 5  •  omeprazole (priLOSEC) 40 MG capsule, Take 40 mg by mouth Daily., Disp: , Rfl:   •  pregabalin (LYRICA) 300 MG capsule, Take 300 mg by mouth 2 (Two) Times a Day., Disp: , Rfl:   •  amLODIPine (NORVASC) 5 MG tablet, Take 1 tablet by mouth Daily., Disp: 30 tablet, Rfl: 11      The following portions of the patient's history were reviewed and updated as appropriate: allergies, current medications, past family history, past medical history, past social history, past surgical history and problem list.    Social History     Tobacco Use   • Smoking status: Never Smoker   • Smokeless tobacco: Never Used   Substance Use Topics   • Alcohol use: No   • Drug use: No       Review of Systems   Constitution: Negative for decreased appetite and malaise/fatigue.   Cardiovascular: Negative for chest pain, dyspnea on exertion, irregular heartbeat, leg swelling, near-syncope, orthopnea, palpitations, paroxysmal nocturnal dyspnea and syncope.   Respiratory: Negative for cough, shortness of breath and wheezing.    Neurological: Negative for dizziness, light-headedness and weakness.       Objective   Vitals:    02/03/21 0908   BP:  (!) 202/92   BP Location: Left arm   Patient Position: Sitting   Cuff Size: Adult   Pulse: 68     There is no height or weight on file to calculate BMI.        Physical Exam    Lab Results   Component Value Date     10/21/2020    K 4.0 10/21/2020     10/21/2020    CO2 25.2 10/21/2020    BUN 20 10/21/2020    CREATININE 1.07 (H) 10/21/2020    GLUCOSE 130 (H) 10/21/2020    CALCIUM 8.9 10/21/2020    AST 16 10/21/2020    ALT 10 10/21/2020    ALKPHOS 82 10/21/2020    LABIL2 1.4 (L) 05/06/2016     Lab Results   Component Value Date    CKTOTAL 80 10/08/2018     Lab Results   Component Value Date    WBC 10.95 (H) 10/21/2020    HGB 13.0 10/21/2020    HCT 40.8 10/21/2020     10/21/2020     Lab Results   Component Value Date    INR 0.99 09/21/2020    INR <0.90 05/06/2016     Lab Results   Component Value Date    MG 2.1 10/21/2020     Lab Results   Component Value Date    TSH 0.705 10/21/2020    TRIG 216 (H) 10/21/2020    HDL 34 (L) 10/21/2020     (H) 10/21/2020      Lab Results   Component Value Date    .0 (H) 03/07/2019           Procedures      Assessment/Plan    Diagnosis Plan   1. Essential hypertension     2. Dyslipidemia (high LDL; low HDL)  atorvastatin (LIPITOR) 10 MG tablet                Recommendations:    1.  I have encouraged her to take the medication on a regular schedule.  Since hydralazine has not been effective she will start the amlodipine 5 mg this morning and monitor blood pressure closely.    2.  I have asked her to let us know blood pressure is staying above 140/90.  She is agreeable.    3.  Follow-up in 4 weeks or sooner if needed.    This visit has been rescheduled as a phone visit to comply with patient safety concerns in accordance with CDC recommendations. Total time of discussion was 11 minutes.          Return in about 4 weeks (around 3/3/2021) for Recheck telephone visit.    As always, I appreciate very much the opportunity to participate in the cardiovascular care  of your patients.      With Best Regards,    MERRY Land

## 2021-03-04 NOTE — PROGRESS NOTES
You have chosen to receive care through a telephone visit. Do you consent to use a telephone visit for your medical care today? Yes      Chip Manzano APRN  Kiah Conway  1945 03/04/2021    Patient Active Problem List   Diagnosis   • Essential hypertension   • Atrophic urethritis   • Hyperglycemia   • GERD without esophagitis   • Fibromyalgia   • Arthritis   • Dysphagia   • History of rheumatic fever   • Migraines   • History of thyroid cancer   • Hypokalemia   • Hypocarbia   • Hyperchloremia   • Hypocalcemia   • Normocytic anemia   • Urinary tract infection without hematuria       Dear Chip Manzano APRN:    Subjective     Chief Complaint   Patient presents with   • Palpitations           History of Present Illness:    Kiah Conway is a 75 y.o. female with a past medical history of hypertension, paroxysmal SVT, and lymphoma.  She presents today for routine cardiology follow-up via telephone visit per her quest.  Her daughter is helping facilitate the visit.  She reports she has been feeling fairly well lately.  She denies any chest pain shortness of breath, or palpitations.  Her blood pressure is elevated today.  However, her daughter reports she has not yet taken amlodipine.  She has only been taking amlodipine as needed.  Most the time blood pressure has been in the 130s over 70s. She has had back pain today and contributes BP elevation to this.          No Known Allergies:      Current Outpatient Medications:   •  amLODIPine (NORVASC) 5 MG tablet, Take 1 tablet by mouth Daily., Disp: 30 tablet, Rfl: 11  •  aspirin 81 MG EC tablet, Take 81 mg by mouth Daily., Disp: , Rfl:   •  atorvastatin (LIPITOR) 10 MG tablet, Take 1 tablet by mouth Daily., Disp: 30 tablet, Rfl: 11  •  busPIRone (BUSPAR) 5 MG tablet, Take 5 mg by mouth 2 (Two) Times a Day. 1/2 AM AND 1/2 PM, Disp: , Rfl:   •  Calcium Polycarbophil (FIBER-CAPS PO), Take 1 capsule by mouth Daily., Disp: , Rfl:   •  conjugated estrogens  (PREMARIN) 0.625 MG/GM vaginal cream, Insert 1 g into the vagina Daily., Disp: , Rfl:   •  FLUoxetine (PROzac) 20 MG capsule, Take 20 mg by mouth Daily., Disp: , Rfl:   •  HYDROcodone-acetaminophen (NORCO) 7.5-325 MG per tablet, Take 1 tablet by mouth 3 (Three) Times a Day As Needed for Moderate Pain ., Disp: , Rfl:   •  lactobacillus acidophilus (RISAQUAD) capsule capsule, Take 1 capsule by mouth Daily., Disp: 7 capsule, Rfl: 0  •  loratadine (CLARITIN) 10 MG tablet, Take 10 mg by mouth Daily., Disp: , Rfl:   •  megestrol (MEGACE) 40 MG tablet, Take 40 mg by mouth 2 (Two) Times a Day., Disp: , Rfl:   •  metoprolol tartrate (LOPRESSOR) 50 MG tablet, Take 1 tablet by mouth Every 12 (Twelve) Hours., Disp: 60 tablet, Rfl: 5  •  omeprazole (priLOSEC) 40 MG capsule, Take 40 mg by mouth Daily., Disp: , Rfl:   •  pregabalin (LYRICA) 300 MG capsule, Take 300 mg by mouth 2 (Two) Times a Day., Disp: , Rfl:       The following portions of the patient's history were reviewed and updated as appropriate: allergies, current medications, past family history, past medical history, past social history, past surgical history and problem list.    Social History     Tobacco Use   • Smoking status: Never Smoker   • Smokeless tobacco: Never Used   Substance Use Topics   • Alcohol use: No   • Drug use: No       Review of Systems   Constitution: Negative for decreased appetite and malaise/fatigue.   Cardiovascular: Negative for chest pain, dyspnea on exertion, irregular heartbeat, leg swelling, near-syncope, orthopnea, palpitations, paroxysmal nocturnal dyspnea and syncope.   Respiratory: Negative for cough, shortness of breath and wheezing.    Neurological: Positive for headaches. Negative for dizziness, light-headedness and weakness.       Objective   Vitals:    03/04/21 1606   BP: 175/81   BP Location: Left arm   Patient Position: Sitting   Cuff Size: Adult   Pulse: 81     There is no height or weight on file to calculate  BMI.        Physical Exam    Lab Results   Component Value Date     10/21/2020    K 4.0 10/21/2020     10/21/2020    CO2 25.2 10/21/2020    BUN 20 10/21/2020    CREATININE 1.07 (H) 10/21/2020    GLUCOSE 130 (H) 10/21/2020    CALCIUM 8.9 10/21/2020    AST 16 10/21/2020    ALT 10 10/21/2020    ALKPHOS 82 10/21/2020    LABIL2 1.4 (L) 05/06/2016     Lab Results   Component Value Date    CKTOTAL 80 10/08/2018     Lab Results   Component Value Date    WBC 10.95 (H) 10/21/2020    HGB 13.0 10/21/2020    HCT 40.8 10/21/2020     10/21/2020     Lab Results   Component Value Date    INR 0.99 09/21/2020    INR <0.90 05/06/2016     Lab Results   Component Value Date    MG 2.1 10/21/2020     Lab Results   Component Value Date    TSH 0.705 10/21/2020    TRIG 216 (H) 10/21/2020    HDL 34 (L) 10/21/2020     (H) 10/21/2020      Lab Results   Component Value Date    .0 (H) 03/07/2019           Procedures      Assessment/Plan    Diagnosis Plan   1. Essential hypertension     2. Dyslipidemia (high LDL; low HDL)     3. Paroxysmal SVT (supraventricular tachycardia) (CMS/[facility])                  Recommendations:    1.  She has been taking amlodipine 2.5 mg as needed. I have advised her to take amlodipine 2.5 mg daily scheduled. Continue to monitor BP and contact us if it is running >140/90.  2. Follow up in 2 months or sooner if needed.    This visit has been rescheduled as a phone visit to comply with patient safety concerns in accordance with CDC recommendations. Total time of discussion was 8 minutes.       Return in about 2 months (around 5/4/2021) for Recheck.    As always, I appreciate very much the opportunity to participate in the cardiovascular care of your patients.      With Best Regards,    MERRY Land

## 2021-04-08 NOTE — MBS/VFSS/FEES
OUTPATIENT MODIFIED BARIUM SWALLOW STUDY - Speech Language Pathology   Swallow Initial Evaluation [facility]     Patient Name: Kiah Conway  : 1945  MRN: [id number]  Today's Date: 2021             Admit Date: 2021     Kiah Conway  presents to the radiology suite this am from her private residence to participate in an instrumental MBS to evaluate safety/efficacy of swallowing fnx, determine safest/least restrictive diet, r/o aspiration. She is accompanied by her daughter-in-law.      PMH is significant for dysplasia and follicular lymphoma, for which she is followed by the [facility]. H/o pancreatic ca, anemia, chronic back pain, CVA, OA, HTN, s/p thyroidectomy approximately 10 y/a. She is referred from the [facility] to r/o dysphagia, aspiration. She reports intermittent dysphagia w/ thin liquids at home.     Risks and benefits of the procedure are explained w/ verbalizing understanding/agreement to participate. Proceed per protocol.     Ms. Conway is positioned upright and centered in a chair to accept multiple po presentations of solid cracker, puree, honey thick, nectar thick, and thin liquids via spoon, cup and straw, along w/ whole placebo pill in puree. She is able to self feed.     All views are from the lateral plane.     Facial/oral structures are symmetrical upon observation w/o lingual deviation upon protrusion. Oral mucosa are moist, pink and clean. Secretions are clear, thin and well controlled. OROM/AMY is wfl to imitate oral postures. Gag is not assessed. Volitional cough is adequate in intensity, clear in quality, nonproductive. Vocal quality is adequate in intensity, clear in quality w/ intelligible speech. She is a/a and cooperative to participate,  oriented to person, follows simple directives, and participates in simple conversational exchanges.     Upon po presentations, adequate bolus anticipation w/ good labial seal for bolus clearance  via spoon bowl, cup rim stability and suction via straw.  Bolus formation, manipulation,  and control are wfl w/ rotary mastication pattern. A-p transit is timely w/o oral residue. Tongue base retraction and linguavelar seal are adequate w/o premature spillage. No laryngeal penetration or aspiration is evidenced before the swallow.     Pharyngeal swallow is timely w/ adequate hyolaryngeal elevation and epiglottic inversion. Pharyngeal contraction is adequate w/o significant residue. Transient laryngeal penetration of thin liquids via straw presentation only, w/o significant vesituble residue.  No other laryngeal penetration or aspiration evidenced during or after the swallow.     Full esophageal sweep reveals no mucosal abnormalities. Motility is wfl w/o retrograde flow noted.      Visit Dx:     ICD-10-CM ICD-9-CM   1. Esophageal dysphagia  R13.10 787.20     Patient Active Problem List   Diagnosis   • Essential hypertension   • Atrophic urethritis   • Hyperglycemia   • GERD without esophagitis   • Fibromyalgia   • Arthritis   • Dysphagia   • History of rheumatic fever   • Migraines   • History of thyroid cancer   • Hypokalemia   • Hypocarbia   • Hyperchloremia   • Hypocalcemia   • Normocytic anemia   • Urinary tract infection without hematuria     Past Medical History:   Diagnosis Date   • Arthritis    • Cardiac disorder    • Diverticulitis    • Dysphagia    • Fibromyalgia    • Follicular lymphoma (CMS/HCC)     Remisson    • GERD (gastroesophageal reflux disease)    • History of rheumatic fever    • Hypertension    • Hypokalemia due to inadequate potassium intake 03/03/2014   • Lipoma    • Malignant neoplasm (CMS/HCC)    • Migraine    • Neutropenia (CMS/HCC) 11/7/2019   • Obstructive sleep apnea 7/14/2016   • Prolonged Q-T interval on ECG 11/7/2019   • Recurrent UTI    • Rheumatoid arthritis (CMS/HCC)    • Sleep apnea    • Thyroid cancer (CMS/HCC)      Past Surgical History:   Procedure Laterality Date   • BLADDER  SURGERY     • FINGER SURGERY      reattached    • HYSTERECTOMY     • THYROID SURGERY       EDUCATION  The patient has been educated in the following areas:   Dysphagia (Swallowing Impairment) Oral Care/Hydration Controlled straw presentations.     Impression: Ms. Conway presents w/wfl oropharyngeal swallow w/o aspiration across this evaluation.  Transient laryngeal penetration of thin liquids via straw presentation only, w/o significant vesituble residue.  No other laryngeal penetration or aspiration evidenced during or after the swallow.     She is felt to most benefit from continued least restrictive regular consistency po diet, thin liquids. Medications whole in puree, single pill presentations only. No further formal SLP f/u recommended. SLP did d/w pt and family controlled straw drinking to monitor bolus size and rate.     SLP Recommendation and Plan    1. Continue least restrictive regular consistency diet, thin liquids.   2. Medications whole in puree/thins.   3. STEFANY precautions.   4. Controlled straw sips for bolus rate and size control.     No further SLP f/u warranted at this time.     D/w Ms. Conway and her family member results and recommendations w/ verbal understanding and agreement.     Thank you for allowing me to participate in the care of your patient-  rSi Lee M.S., CCC/SLP                                                                        Time Calculation:       Therapy Charges for Today     Code Description Service Date Service Provider Modifiers Qty    [id number] HC ST MOTION FLUORO EVAL SWALLOW 8 4/8/2021 Sri Lee, MS CCC-SLP GN 1               Sri Lee MS CCC-SLP  4/8/2021

## 2021-04-09 PROBLEM — G93.40 ACUTE ENCEPHALOPATHY: Status: ACTIVE | Noted: 2021-01-01

## 2021-04-09 NOTE — H&P
[facility] Medicine Services  HISTORY & PHYSICAL    Patient Identification:  Name:  Kiah Conway  Age:  75 y.o.  Sex:  female  :  1945  MRN:  [id number]   Visit Number:  [id number]  Admit Date: 2021   Primary Care Physician:  Chip Manzano APRN     Subjective     Chief complaint:   Chief Complaint   Patient presents with   • Drug Overdose   • Altered Mental Status     History of presenting illness:   Patient is a 75 y.o. female with past medical history significant for essential hypertension, hyperlipidemia, paroxysmal SVT, fibromyalgia, chronic pain, follicular lymphoma of thyroid in remission, GERD, dementia, and anxiety that presented to the [facility] emergency department for evaluation of altered mental status. Patient is awake and alert, but disoriented. She will answer simple yes or no questions and follows simple commands, but will otherwise not speak. Patient's daughter, Eunice, is present at bedside, aiding in history of presenting illness. Per the daughter, she picked up the patient's medication refills from their pharmacy on 2021. She states that the patient's Lyrica looked different, but she was not sure if the pill had changed. However, after 4-5 doses of the medication after refill, the patient became altered. That is when she realized that the patient's Lyrica had been filled wrong and the medication inside the Lyrica bottle from the pharmacy was in actuality Neurontin. She states she knew this because she herself takes Neurontin and the pills matched hers. Daughter states that the patient has been confused. She states at baseline the patient will have times when she cannot recall info, but more often than not her memory is intact. She states that the patient has not been ambulatory in approximately one year after chemo for follicular lymphoma. She states that the patient has been slightly agitated, not sleeping, and with decreased PO  intake. She states that the patient has not missed any doses of her other home meds, but she has not given the patient anymore of the Neurontin and has not resumed the Lyrica due to confusion. Daughter states that the patient is very sensitive to medications, she cannot take muscle relaxers as she responds the same way. She does report noticing that the patient has had increased urinary frequency over the past several days. She states that despite not having Neurontin/Lyrica dose in the past two days, the confusion and AMS has remained constant. Daughter denies any other issues. Patient is unable to confirm nor deny any complaints at my time of evaluation.     Upon arrival to the ED, vitals were temperature 98, heart rate 66, respiratory rate 16, blood pressure 195/83, and oxygen saturation 97% on room air.  Troponin T negative.  CMP grossly unremarkable.  CBC with white blood cell count 11.58, absolute neutrophil count 8.48, otherwise unremarkable.  Urinalysis with trace leukocytes, 3-5 WBC, trace bacteria.  UDS positive for opiates.  EtOH negative.  Chest x-ray with stable appearance, coarsened interstitial markings with right Port-A-Cath tip in SVC.  CT head without contrast with no evidence of acute intracranial abnormality. COVID-19/influenza screening negative. While in the ED, patient was administered 50 mg p.o. Lopressor and 5 mg p.o. Norvasc.     Patient has been admitted in observation status for further evaluation and treatment.     Present during exam: Patient's daughter Eunice   ---------------------------------------------------------------------------------------------------------------------   Review of Systems   Unable to perform ROS: Mental status change      ---------------------------------------------------------------------------------------------------------------------   Past Medical History:   Diagnosis Date   • Arthritis    • Cardiac disorder    • Diverticulitis    • Dysphagia    •  Fibromyalgia    • Follicular lymphoma (CMS/HCC)     Remisson    • GERD (gastroesophageal reflux disease)    • History of rheumatic fever    • Hypertension    • Hypokalemia due to inadequate potassium intake 03/03/2014   • Lipoma    • Malignant neoplasm (CMS/HCC)    • Migraine    • Neutropenia (CMS/HCC) 11/7/2019   • Obstructive sleep apnea 7/14/2016   • Prolonged Q-T interval on ECG 11/7/2019   • Recurrent UTI    • Rheumatoid arthritis (CMS/HCC)    • Sleep apnea    • Thyroid cancer (CMS/HCC)      Past Surgical History:   Procedure Laterality Date   • BLADDER SURGERY     • FINGER SURGERY      reattached    • HYSTERECTOMY     • THYROID SURGERY       Family History   Problem Relation Age of Onset   • Diabetes Mother    • Hypertension Mother    • Other Other         cardiac disorder,cardiovascular disease, malignant neoplasm of brain   • Breast cancer Paternal Aunt      Social History     Socioeconomic History   • Marital status:      Spouse name: Not on file   • Number of children: Not on file   • Years of education: Not on file   • Highest education level: Not on file   Tobacco Use   • Smoking status: Never Smoker   • Smokeless tobacco: Never Used   Substance and Sexual Activity   • Alcohol use: No   • Drug use: No   • Sexual activity: Defer     ---------------------------------------------------------------------------------------------------------------------   Allergies:  Patient has no known allergies.  ---------------------------------------------------------------------------------------------------------------------   Medications below are reported home medications pulling from within the system; at this time, these medications have not been reconciled unless otherwise specified and are in the verification process for further verifcation as current home medications.    Prior to Admission Medications     Prescriptions Last Dose Informant Patient Reported? Taking?    amLODIPine (NORVASC) 5 MG tablet   No  No    Take 1 tablet by mouth Daily.    aspirin 81 MG EC tablet  Child Yes No    Take 81 mg by mouth Daily.    atorvastatin (LIPITOR) 10 MG tablet   No No    Take 1 tablet by mouth Daily.    busPIRone (BUSPAR) 5 MG tablet  Child Yes No    Take 5 mg by mouth 2 (Two) Times a Day. 1/2 AM AND 1/2 PM    Calcium Polycarbophil (FIBER-CAPS PO)  Self Yes No    Take 1 capsule by mouth Daily.    conjugated estrogens (PREMARIN) 0.625 MG/GM vaginal cream  Child Yes No    Insert 1 g into the vagina Daily.    FLUoxetine (PROzac) 20 MG capsule  Child Yes No    Take 20 mg by mouth Daily.    HYDROcodone-acetaminophen (NORCO) 7.5-325 MG per tablet  Child Yes No    Take 1 tablet by mouth 3 (Three) Times a Day As Needed for Moderate Pain .    lactobacillus acidophilus (RISAQUAD) capsule capsule  Child No No    Take 1 capsule by mouth Daily.    loratadine (CLARITIN) 10 MG tablet  Child Yes No    Take 10 mg by mouth Daily.    megestrol (MEGACE) 40 MG tablet  Child Yes No    Take 40 mg by mouth 2 (Two) Times a Day.    metoprolol tartrate (LOPRESSOR) 50 MG tablet  Child No No    Take 1 tablet by mouth Every 12 (Twelve) Hours.    omeprazole (priLOSEC) 40 MG capsule  Child Yes No    Take 40 mg by mouth Daily.    pregabalin (LYRICA) 300 MG capsule  Child Yes No    Take 300 mg by mouth 2 (Two) Times a Day.        ---------------------------------------------------------------------------------------------------------------------    Objective     Hospital Scheduled Meds:  enoxaparin, 40 mg, Subcutaneous, Q24H  sodium chloride, 10 mL, Intravenous, Q12H      Pharmacy to Dose enoxaparin (LOVENOX),     Current listed hospital scheduled medications may not yet reflect those currently placed in orders that are signed and held, awaiting patient's arrival to floor/unit.    ---------------------------------------------------------------------------------------------------------------------   Vital Signs:  Temp:  [98 °F (36.7 °C)] 98 °F (36.7 °C)  Heart  Rate:  [65-78] 75  Resp:  [16] 16  BP: (166-198)/() 166/88  Mean Arterial Pressure (Non-Invasive) for the past 24 hrs (Last 3 readings):   Noninvasive MAP (mmHg)   04/09/21 1012 119   04/09/21 0953 132   04/09/21 0943 109     SpO2 Percentage    04/09/21 0942 04/09/21 0953 04/09/21 1012   SpO2: 96% 96%  Comment: provider aware 97%     SpO2:  [93 %-98 %] 97 %  on   ;   Device (Oxygen Therapy): room air    Body mass index is 23.62 kg/m².  Wt Readings from Last 3 Encounters:   04/09/21 56.7 kg (125 lb)   09/21/20 54.4 kg (120 lb)   07/15/20 54.4 kg (120 lb)       ---------------------------------------------------------------------------------------------------------------------   Physical Exam:  Physical Exam  Nursing note reviewed.   Constitutional:       General: She is awake. She is not in acute distress.     Appearance: She is well-developed. She is not toxic-appearing.      Comments: Patient is awake and alert, disoriented. She will follow simple commands, but not answer questions. Daughter Eunice at bedside. Patient is on room air.    HENT:      Head: Normocephalic and atraumatic.      Right Ear: External ear normal.      Left Ear: External ear normal.      Nose: Nose normal.      Mouth/Throat:      Mouth: Mucous membranes are moist.      Pharynx: Oropharynx is clear.   Eyes:      Extraocular Movements: Extraocular movements intact.      Conjunctiva/sclera: Conjunctivae normal.      Pupils: Pupils are equal, round, and reactive to light.   Cardiovascular:      Rate and Rhythm: Normal rate and regular rhythm.      Pulses:           Dorsalis pedis pulses are 2+ on the right side and 2+ on the left side.        Posterior tibial pulses are 2+ on the right side and 2+ on the left side.      Heart sounds: Normal heart sounds. No murmur heard.   No friction rub. No gallop.    Pulmonary:      Effort: Pulmonary effort is normal. No tachypnea, accessory muscle usage or respiratory distress.      Breath sounds: Normal  breath sounds and air entry. No wheezing, rhonchi or rales.   Chest:      Chest wall: No tenderness.   Abdominal:      General: Bowel sounds are normal. There is no distension.      Palpations: Abdomen is soft.      Tenderness: There is no abdominal tenderness. There is no guarding or rebound.      Hernia: No hernia is present.   Genitourinary:     Comments: No azevedo catheter in place.  Musculoskeletal:      Cervical back: Normal range of motion and neck supple.      Right lower leg: No edema.      Left lower leg: No edema.      Comments: Patient with 5/5 strength bilateral upper and lower extremities    Skin:     General: Skin is warm and dry.      Capillary Refill: Capillary refill takes less than 2 seconds.      Findings: No abscess, erythema, lesion or wound.   Neurological:      General: No focal deficit present.      Mental Status: She is alert. She is disoriented.      Cranial Nerves: Cranial nerves are intact. No facial asymmetry.      Sensory: Sensation is intact.      Motor: Motor function is intact.      Comments: Awake and alert. Follows simple commands. She does not answer questions for me. Moves all extremities equally. Strength and sensation intact. No focal neuro deficit on exam. No facial droop.    Psychiatric:      Comments: Difficult to assess due to AMS      ---------------------------------------------------------------------------------------------------------------------  EKG:      Telemetry:    Patient not currently on telemetry, will review once available    I have personally reviewed the EKG/Telemetry strip  ---------------------------------------------------------------------------------------------------------------------   Results from last 7 days   Lab Units 04/09/21  0744   TROPONIN T ng/mL <0.010       Results from last 7 days   Lab Units 04/08/21  1013   CHOLESTEROL mg/dL 154   TRIGLYCERIDES mg/dL 150   HDL CHOL mg/dL 30*   LDL CHOL mg/dL 97       Results from last 7 days   Lab Units  04/09/21  0744 04/08/21  1013   WBC 10*3/mm3 11.58* 10.06   HEMOGLOBIN g/dL 13.3 12.5   HEMATOCRIT % 40.6 39.0   MCV fL 95.5 93.8   MCHC g/dL 32.8 32.1   PLATELETS 10*3/mm3 304 289     Results from last 7 days   Lab Units 04/09/21  0744 04/08/21  1013   SODIUM mmol/L 143 143   POTASSIUM mmol/L 4.3 3.7   CHLORIDE mmol/L 105 107   CO2 mmol/L 24.7 22.0   BUN mg/dL 14 15   CREATININE mg/dL 0.86 0.84   EGFR IF NONAFRICN AM mL/min/1.73 64 66   CALCIUM mg/dL 9.7 9.6   GLUCOSE mg/dL 94 103*   ALBUMIN g/dL 4.38 4.13   BILIRUBIN mg/dL 0.5 0.5   ALK PHOS U/L 72 70   AST (SGOT) U/L 18 16   ALT (SGPT) U/L 16 14   Estimated Creatinine Clearance: 49 mL/min (by C-G formula based on SCr of 0.86 mg/dL).    Lab Results   Component Value Date    HGBA1C 4.30 (L) 11/07/2019     Lab Results   Component Value Date    TSH 1.170 04/08/2021    FREET4 1.26 05/27/2020     Microbiology Results (last 10 days)     Procedure Component Value - Date/Time    COVID-19 and FLU A/B PCR - Swab, Nasopharynx [[id number]]  (Normal) Collected: 04/09/21 0924    Lab Status: Final result Specimen: Swab from Nasopharynx Updated: 04/09/21 1003     COVID19 Not Detected     Influenza A PCR Not Detected     Influenza B PCR Not Detected    Narrative:      Fact sheet for providers: https://www.fda.gov/media/360501/download    Fact sheet for patients: https://www.fda.gov/media/423632/download    Test performed by PCR.    COVID PRE-OP / PRE-PROCEDURE SCREENING ORDER (NO ISOLATION) - Swab, Nasopharynx [[id number]] Collected: 04/06/21 1115    Lab Status: Final result Specimen: Swab from Nasopharynx Updated: 04/07/21 1336    Narrative:      The following orders were created for panel order COVID PRE-OP / PRE-PROCEDURE SCREENING ORDER (NO ISOLATION) - Swab, Nasopharynx.  Procedure                               Abnormality         Status                     ---------                               -----------         ------                     COVID-19 [facility]E [facility]  LABS...[[id number]]                      Final result                 Please view results for these tests on the individual orders.    COVID-19 PCR, [facility], NP SWAB IN [facility] VIRAL TRANSPORT MEDIA 24-30 HR TAT - Swab, Nasopharynx [[id number]] Collected: 04/06/21 1115    Lab Status: Final result Specimen: Swab from Nasopharynx Updated: 04/07/21 1336     SARS-CoV-2 JAMIN Not Detected         Pain Management Panel     Pain Management Panel Latest Ref Rng & Units 4/9/2021    AMPHETAMINES SCREEN, URINE Negative Negative    BARBITURATES SCREEN Negative Negative    BENZODIAZEPINE SCREEN, URINE Negative Negative    BUPRENORPHINEUR Negative Negative    COCAINE SCREEN, URINE Negative Negative    METHADONE SCREEN, URINE Negative Negative        I have personally reviewed the above laboratory results.   ---------------------------------------------------------------------------------------------------------------------  Imaging Results (Last 7 Days)     Procedure Component Value Units Date/Time    CT Head Without Contrast [[id number]] Collected: 04/09/21 0847     Updated: 04/09/21 0850    Narrative:      FINDINGS:    BRAIN:  Unremarkable.  No hemorrhage.  No significant white matter  disease.  No edema.    VENTRICLES:  Unremarkable.  No ventriculomegaly.    BONES/JOINTS:  Unremarkable.  No acute fracture.    SOFT TISSUES:  Unremarkable.    SINUSES:  Unremarkable as visualized.  No acute sinusitis.    MASTOID AIR CELLS:  Unremarkable as visualized.  No mastoid effusion.    Impression:        Unremarkable exam demonstrating no CT evidence of acute intracranial  findings.     This report was finalized on 4/9/2021 8:48 AM by Dr. Santosh Andrade MD.    XR Chest 1 View [[id number]] Collected: 04/09/21 0835     Updated: 04/09/21 0838    Narrative:      FINDINGS:    LUNGS:  Coarsened interstitial markings are noted.    PLEURAL SPACE:  Unremarkable.  No pneumothorax.    HEART:  Unremarkable.  No cardiomegaly.    MEDIASTINUM:  Unremarkable.     BONES/JOINTS:  Unremarkable.    TUBES, LINES AND DEVICES:  Right Port-A-Cath with tip in SVC.    Impression:        Stable appearance of the chest.     This report was finalized on 4/9/2021 8:36 AM by Dr. Santosh Andrade MD.      I have personally reviewed the above radiology results.     Last Echocardiogram:  Results for orders placed during the hospital encounter of 11/07/19    Adult Transthoracic Echo Complete W/ Cont if Necessary Per Protocol    Interpretation Summary  · Normal left ventricular cavity size and wall thickness noted. All left ventricular wall segments contract normally.  · Estimated EF appears to be in the range of 66 - 70%.  · The aortic valve is structurally normal. Mild aortic valve regurgitation is present. No aortic valve stenosis is present.  · The mitral valve is normal in structure. Trace mitral valve regurgitation is present. No significant mitral valve stenosis is present.  · The tricuspid valve is normal. Mild tricuspid valve regurgitation is present. Estimated right ventricular systolic pressure from tricuspid regurgitation is mildly elevated (35-45 mmHg).  · There is no evidence of pericardial effusion.    ---------------------------------------------------------------------------------------------------------------------    Assessment & Plan      -Encephalopathy, toxic vs hypertensive, superimposed on suspected underlying early dementia   • Head CT unremarkable   • Per report, patient's home lyrica was accidentally filled with Neurontin at patient's pharmacy, could be cause of AMS.   • Patient with hypertensive crisis on admit as well, could be contributing   • Labs grossly unremarkable, TSH WNL.   • UDS positive for opiates, home medication   • UA not grossly remarkable but due to history of frequent UTI in past and sxs of increased urinary frequency will send for urine culture   • Obtain ammonia level  • Neuro checks   • Supportive care, allow time for Neurontin to wear off   • Tx  hypertensive crisis as outlined below     -Essential hypertension with hypertensive urgency present on admission   -Hyperlipidemia   -History of PSVT  ·  on presentation   · Metoprolol 50 mg and 5 mg Norvasc administered in ED with improvement of SBP to 166.   · However, on arrival to floor SBP back into 180s.   Plan to resume home antihypertensive regimen once reconciled per pharmacy with appropriate holding parameters to prevent hypotension and/or bradycardia   Monitor BP Q30 minutes until stabilized, then continue per protocol  Add 10 mg PO hydralazine TID to begin as soon as possible   · Lipid panel recently obtained reviewed with adequate control, continue home statin     -History of follicular lymphoma of thyroid in remission s/p surgery   · Cont outpatient monitoring   · TSH adequate     -GERD  • PPI    -Anxiety   · Supportive care   · Continue home medication once reconciled per pharmacy     -F/E/N  • No IV fluids. Replace electrolytes per protocol. NPO diet.     ---------------------------------------------------  DVT Prophylaxis: Lovenox   GI Prophylaxis: PPI   Activity: Up with assistance as tolerated, fall precautions     The patient is considered to be a high risk patient due to: AMS/Encephalopathy, HTN crisis, possible effect of Neurontin, dementia, history of thyroid cancer, anxiety, advancedage    OBSERVATION status, however if further evaluation or treatment plans warrant, status may change.  Based upon current information, I predict patient's care encounter to be less than or equal to 2 midnights.    Code Status: FULL CODE     I have discussed the patient's assessment and plan with daughter at bedside, Naida ALBERTO, and attending physician DO Ayaka Dong PA-C  Hospitalist Service -- [facility]   Pager: [phone]    04/09/21  10:36 EDT    Attending Physician: Dr. Reese,  DO      ---------------------------------------------------------------------------------------------------------------------

## 2021-04-09 NOTE — PLAN OF CARE
Goal Outcome Evaluation:        Outcome Summary: Patient arrived to room 344B from ER, confused, daughter at bedside. Bed alarm on, will monitor.

## 2021-04-09 NOTE — ED PROVIDER NOTES
Subjective   Patient presents with her daughter who notes that the patient has been confused and drowsy for the past 2 days.  Patient takes Lyrica but according to the daughter the pharmacy accidentally put gabapentin and the Lyrica bottle and the patient has been taking gabapentin for the past 2 days.  Daughter denies any trauma.  History obtained from daughter as patient is altered and unable to give history.      History provided by:  Relative  History limited by:  Mental status change   used: No        Review of Systems   Unable to perform ROS: Mental status change   Constitutional: Positive for fatigue.   Psychiatric/Behavioral: Positive for confusion and decreased concentration.       Past Medical History:   Diagnosis Date   • Arthritis    • Cardiac disorder    • Diverticulitis    • Dysphagia    • Fibromyalgia    • Follicular lymphoma (CMS/HCC)     Remisson    • GERD (gastroesophageal reflux disease)    • History of rheumatic fever    • Hypertension    • Hypokalemia due to inadequate potassium intake 03/03/2014   • Lipoma    • Malignant neoplasm (CMS/HCC)    • Migraine    • Neutropenia (CMS/HCC) 11/7/2019   • Obstructive sleep apnea 7/14/2016   • Prolonged Q-T interval on ECG 11/7/2019   • Recurrent UTI    • Rheumatoid arthritis (CMS/HCC)    • Sleep apnea    • Thyroid cancer (CMS/HCC)        No Known Allergies    Past Surgical History:   Procedure Laterality Date   • BLADDER SURGERY     • FINGER SURGERY      reattached    • HYSTERECTOMY     • THYROID SURGERY         Family History   Problem Relation Age of Onset   • Diabetes Mother    • Hypertension Mother    • Other Other         cardiac disorder,cardiovascular disease, malignant neoplasm of brain   • Breast cancer Paternal Aunt        Social History     Socioeconomic History   • Marital status:      Spouse name: Not on file   • Number of children: Not on file   • Years of education: Not on file   • Highest education level: Not on  file   Tobacco Use   • Smoking status: Never Smoker   • Smokeless tobacco: Never Used   Substance and Sexual Activity   • Alcohol use: No   • Drug use: No   • Sexual activity: Defer           Objective   Physical Exam  Vitals reviewed.   Constitutional:       Appearance: She is well-developed. She is not diaphoretic.      Comments: Obtunded   HENT:      Head: Normocephalic and atraumatic.      Mouth/Throat:      Mouth: Mucous membranes are moist.      Pharynx: Oropharynx is clear.   Eyes:      Extraocular Movements: Extraocular movements intact.      Pupils: Pupils are equal, round, and reactive to light.   Cardiovascular:      Rate and Rhythm: Normal rate and regular rhythm.      Heart sounds: Normal heart sounds.   Pulmonary:      Effort: Pulmonary effort is normal.      Breath sounds: Normal breath sounds.   Abdominal:      General: Bowel sounds are normal.      Palpations: Abdomen is soft.   Musculoskeletal:         General: No swelling or tenderness.      Cervical back: Normal range of motion and neck supple.   Skin:     General: Skin is warm and dry.      Capillary Refill: Capillary refill takes less than 2 seconds.   Neurological:      Mental Status: She is disoriented and confused.      GCS: GCS eye subscore is 4. GCS verbal subscore is 4. GCS motor subscore is 6.      Cranial Nerves: No cranial nerve deficit.      Sensory: No sensory deficit.      Motor: No weakness.      Comments: Oriented to self and place but not to time   Psychiatric:         Cognition and Memory: Memory is not impaired.      Comments: Flat affect         Procedures           ED Course  ED Course as of Apr 09 1439 Fri Apr 09, 2021   1020 EKG performed at 0746 shows sinus rhythm with a left axis deviation with a rate of 65 bpm, short KS interval of 114 ms, normal QT interval, normal ST segments and T waves, and PACs noted    [JG]      ED Course User Index  [JG] Tj Whittaker DO                                            MDM  Number of Diagnoses or Management Options  Acute encephalopathy  Diagnosis management comments: CT brain and chest x-ray are unremarkable labs show opiates in the urine.  Patient takes Norco at home.  It appears that her symptoms are likely from polypharmacy, likely due to recently starting gabapentin by accident.  I spoke to the hospitalist who agrees to admit the patient.  I anticipate that her condition will improve as she metabolizes gabapentin.  She is stable at time of admission.  Patient and daughter are agreeable with plan.      Final diagnoses:   Acute encephalopathy       ED Disposition  ED Disposition     ED Disposition Condition Comment    Decision to Admit  Level of Care: Med/Surg [1]   Diagnosis: Acute encephalopathy [005677]            No follow-up provider specified.       Medication List      ASK your doctor about these medications    metoprolol tartrate 50 MG tablet  Commonly known as: LOPRESSOR  Ask about: Which instructions should I use?             Tj Whittaker,   04/09/21 3139

## 2021-04-10 NOTE — PLAN OF CARE
Goal Outcome Evaluation:  Plan of Care Reviewed With: patient  Progress: no change  Outcome Summary: Pt restless tonight. pt reports headache. no relief after norco and tylenol. Imitrex given. blood pressure trending down. Pt refused some PO medications. continues to be confused. family at bedside.

## 2021-04-10 NOTE — PLAN OF CARE
Goal Outcome Evaluation:        Outcome Summary: Patient still confused but more alert today, Slow to take meds, refuses at times. Family at bedside.

## 2021-04-10 NOTE — SIGNIFICANT NOTE
04/10/21 1244   OTHER   Discipline speech language pathologist   Rehab Time/Intention   Session Not Performed other (see comments)   Recommendations   SLP - Next Appointment 04/12/21     Consult received. Chart reviewed. Ms. Conway is seen at bedside this pm on 3N for dysphagia assessment. She is familiar to SLP Department for recent outpatient modified barium swallow study 4/8/21 w/ no evidence of aspiration, recommendation to continue regular consistency, thin liquids.     She is seen at bedside this pm, multiple family members are present. Ms. Conway is refusing all po intake/trials at this time. Present family report this is consistent across today as she has refused all po intake and oral meds.     SLP to f/u for clinical dysphagia assessment 4/12/21 pending improvement in pt status/acceptance to participate.     Thank you-  Omaira Bean M.A., CCC-SLP

## 2021-04-10 NOTE — PROGRESS NOTES
[facility] HOSPITALIST    PROGRESS NOTE    Name:  Kiah Conway   Age:  75 y.o.  Sex:  female  :  1945  MRN:  [id number]   Visit Number:  [id number]  Admission Date:  2021  Date Of Service:  04/10/21  Primary Care Physician:  Chip Manzano APRN     LOS: 0 days :  Patient Care Team:  Chip Manzano APRN as PCP - General (Family Medicine):    Chief Complaint:    Altered mental status    Subjective / Interval History:   I have seen and evaluated the patient this morning.  Chart reviewed and events noted.  Her daughter was at bedside and contributed to the history.  The patient herself was confused and could not contribute much to the history.  She was admitted for altered mental status that started couple days ago particularly after the patient was administered gabapentin 300 mg instead of Lyrica 300 mg because of pharmacy error who fill substituted Lyrica with gabapentin by mistake.  Patient is able to recognize her daughter at bedside and will respond to simple verbal commands as well as painful stimuli appropriately but slow to talk and seems confused.  Hemodynamics are stable.  No other acute events overnight.    Vital Signs:  Temp:  [97.9 °F (36.6 °C)-98.8 °F (37.1 °C)] 98.8 °F (37.1 °C)  Heart Rate:  [57-88] 84  Resp:  [18-20] 20  BP: (150-178)/(53-92) 165/68    Intake and output:  I/O last 3 completed shifts:  In: 1240 [P.O.:240; IV Piggyback:1000]  Out: -   I/O this shift:  In: 600 [P.O.:600]  Out: -     Physical Examination:  General: Pleasantly confused.  Appears stated age.  Minimally conversant   Head: Atraumatic and normocephalic, without obvious abnormality  Eyes:   Conjunctivae and sclerae normal, no Icterus. No pallor  Ears:  Ears appear intact with no abnormalities noted  Throat: No oral lesions, no thrush, oral mucosa moist  Neck: Supple, trachea midline, no thyromegaly  Back:   No kyphoscoliosis present. No tenderness to palpation,   no sacral edema  Lungs: Clear to  auscultation bilaterally, equal air entry, no wheezing or crackles  Heart:  Normal S1 and S2, no murmur, no gallop, No JVD, no lower extremity swelling  Abdomen:  Soft, slight tenderness to palpation, no organomegaly, normal bowel sounds  Normal bowel sounds, no masses, no organomegaly. Soft, nontender, nondistended, no guarding, no rebound tenderness.  Extremities: No gross abnormalities, no clubbing, pulses palpable and equal bilaterally  Skin: No bleeding, bruising or rash, normal skin turgor and elasticity  Neurologic: Cranial nerves appear intact with no evidence of facial asymmetry, moving 4 extremities appropriately to painful stimuli.  No obvious weakness appreciated.  Cannot assess sensation.    Pleasantly confused psych:     Laboratory results:    Results from last 7 days   Lab Units 04/10/21  0956 04/09/21  0744 04/08/21  1013   SODIUM mmol/L 141 143 143   POTASSIUM mmol/L 4.1 4.3 3.7   CHLORIDE mmol/L 105 105 107   CO2 mmol/L 19.7* 24.7 22.0   BUN mg/dL 14 14 15   CREATININE mg/dL 0.82 0.86 0.84   CALCIUM mg/dL 9.7 9.7 9.6   BILIRUBIN mg/dL 0.6 0.5 0.5   ALK PHOS U/L 69 72 70   ALT (SGPT) U/L 13 16 14   AST (SGOT) U/L 17 18 16   GLUCOSE mg/dL 122* 94 103*     Results from last 7 days   Lab Units 04/10/21  0725 04/09/21  0744 04/08/21  1013   WBC 10*3/mm3 12.81* 11.58* 10.06   HEMOGLOBIN g/dL 14.2 13.3 12.5   HEMATOCRIT % 45.9 40.6 39.0   PLATELETS 10*3/mm3 290 304 289         Results from last 7 days   Lab Units 04/09/21  0744   TROPONIN T ng/mL <0.010     Results from last 7 days   Lab Units 04/09/21  0803   URINECX  No growth           I have reviewed the patient's laboratory results.    Radiology results:    Imaging Results (Last 24 Hours)     Procedure Component Value Units Date/Time    CT Abdomen Pelvis Without Contrast [[id number]] Collected: 04/10/21 1437     Updated: 04/10/21 1445    Narrative:      EXAM: CT ABDOMEN PELVIS WO CONTRAST-         TECHNIQUE: Multiple axial CT images were obtained from  lung bases  through pubic symphysis WITHOUT administration of IV contrast.  Reformatted images in the coronal and/or sagittal plane(s) were  generated from the axial data set to facilitate diagnostic accuracy  and/or surgical planning.  Oral Contrast:NONE.     Radiation dose reduction techniques were utilized per ALARA protocol.  Automated exposure control was initiated through either or MyMusic or  DoseRight software packages by  protocol.    DOSE:     Clinical information Pyelonephritis, uncomplicated;  G93.40-Encephalopathy, unspecified      Comparison 01/26/2018     FINDINGS:     Lower thorax: Clear. No effusions.     Abdomen:     Liver: Homogeneous. No focal hepatic mass or ductal dilatation.     Gallbladder: No dilation or stone identified.     Pancreas: Unremarkable. No mass or ductal dilatation.     Spleen: Homogeneous. No splenomegaly.     Adrenals: No mass.     Kidneys/ureters: No mass. No obstructive uropathy.  No evidence of  urolithiasis.     GI tract: Non-dilated. No definite wall thickening..     MESENTERY: No free fluid, walled off fluid collections, mesenteric  stranding, or enlarged lymph nodes         Vasculature: There is evidence of atherosclerotic vascular disease     Abdominal wall: No focal hernia or mass.        Pelvis:     Bladder: No focal mass or significant wall thickening     Reproductive: Unremarkable as visualized     Bones: No acute bony abnormality.       Impression:         1. No definitive source for the patient's symptoms identified on today's  exam.        2. Other findings as discussed above.        3. moderate to large volume stool              This report was finalized on 4/10/2021 2:42 PM by Dr. Chente Choi MD.             I have reviewed the patient's radiology reports.    Medication Review:   I have reviewed the patient's active and prn medications.     Problem List:      Acute encephalopathy        Summary and Hospital course:  75 years old female patient  with multiple comorbidities including essential hypertension, dyslipidemia, paroxysmal SVT, fibromyalgia and chronic pain syndrome presented to the hospital with altered mental status that is multifactorial likely secondary drug-induced encephalopathy (to unintentional administration of gabapentin instead of Lyrica) and a component of hypertensive encephalopathy and possible UTI that was present on admission.  Her consciousness is slowly improving yet she remains confused.  CT head was negative for acute stroke.  Urinalysis was positive for UTI and culture is currently pending    Assessment:  • Acute metabolic encephalopathy, multifactorial  • Hypertensive crisis, improved  • UTI, present on admission  • Volume depletion and dehydration  • Essential hypertension  • Dyslipidemia  • Fibromyalgia  • Supraventricular tachycardia  • Thyroid gland follicular lymphoma in remission  • GERD  • Anxiety disorder    Plan:  · Patient is slightly improving in terms of her encephalopathy.  More awake and responding to simple verbal commands today.  Able to recognize her daughter at bedside but slow to talk and according to her daughter at bedside, she is not back to her baseline yet.  There are many factors contributing to her encephalopathy including the gabapentin that she received instead of Lyrica because of pharmacy error (her pharmacy filled her prescription for Lyrica as gabapentin), in addition to urinary tract infection that was present on admission as well as severe dehydration and volume depletion.  Continue neurochecks.  If she is not improving by tomorrow particularly after initiating IV antibiotic therapy for the UTI, will consider repeating CT head versus proceeding with MRI  · The patient daughter reported that the patient has been having a diagnosis of diverticulitis in the past and has been having diarrhea over the last few days with subjective fever.  Patient has been mild abdominal tenderness upon exam.  We  will proceed with CT abdomen without contrast to rule out acute diverticulitis.  Meanwhile we will start the patient on Zosyn empirically which will cover for both UTI and diverticulitis of present.    · Continue to follow urine culture and sensitivity  · IV fluids for hydration  · Continue neurochecks  · The patient blood pressure still elevated, likely because she is refusing to take her oral medications.  We will start IV hydralazine as needed  · Case discussed with the treatment team in details about the goals of care and management plans    Checklist:  · Risk assessment: Moderate to high risk  · DVT prophylaxis: Lovenox  · Diet: Regular  · Steroids: None  · Antibiotics: Zosyn  · CODE STATUS: Full code  · IV access: Peripheral IV  · Discharge disposition: To be determined    Jimena Cain MD  04/10/21  16:24 EDT    Dictated utilizing Dragon dictation.

## 2021-04-11 NOTE — PROGRESS NOTES
[facility] HOSPITALIST    PROGRESS NOTE    Name:  Kiah Conway   Age:  75 y.o.  Sex:  female  :  1945  MRN:  [id number]   Visit Number:  [id number]  Admission Date:  2021  Date Of Service:  21  Primary Care Physician:  Chip Manzano APRN     LOS: 0 days :  Patient Care Team:  Chip Manzano APRN as PCP - General (Family Medicine):    Chief Complaint:    Altered mental status    Subjective / Interval History:   I have seen and evaluated the patient this morning.  Chart reviewed and events noted.  She was admitted and currently being treated for acute encephalopathy.  Today she looks much better compared to yesterday at least she is responding to verbal commands.  She will try to talk but she will have some difficulty finding the proper words but unable to to tell that she is comprehending well.  Daughter at bedside and agrees that she improved compared to yesterday but not yet at baseline.  I updated the daughter about the results of the CT abdomen that did not show any evidence of diverticulitis and she was happy about it.  The patient reported that she is having pain in her mouth and throat.  Probably that is why she refused to eat or drink anything yesterday.  Otherwise, the patient denied any chest pain, SOB,  weakness in any of her extremities, numbness,    Vital Signs:  Temp:  [98.3 °F (36.8 °C)-98.8 °F (37.1 °C)] 98.5 °F (36.9 °C)  Heart Rate:  [84-92] 84  Resp:  [20] 20  BP: (163-175)/(68-86) 163/68    Intake and output:  I/O last 3 completed shifts:  In: 1340 [P.O.:480; I.V.:860]  Out: 200 [Urine:200]  No intake/output data recorded.    Physical Examination:  General: Remains somewhat confused but much improved compared to yesterday, pleasant, minimally conversant  Head: Atraumatic and normocephalic, without obvious abnormality  Eyes:   Conjunctivae and sclerae normal, no Icterus. No pallor  Ears:  Ears appear intact with no abnormalities noted  Throat: Thrush  stomatitis   Neck: Supple, trachea midline, no thyromegaly  Back:   No kyphoscoliosis present. No tenderness to palpation,   no sacral edema  Lungs: Clear to auscultation bilaterally, equal air entry, no wheezing or crackles  Heart:  Normal S1 and S2, no murmur, no gallop, No JVD, no lower extremity swelling  Abdomen:  Soft, slight tenderness to palpation, no organomegaly, normal bowel sounds  Normal bowel sounds, no masses, no organomegaly. Soft, nontender, nondistended, no guarding, no rebound tenderness.  Extremities: No gross abnormalities, no clubbing, pulses palpable and equal bilaterally  Skin: No bleeding, bruising or rash, normal skin turgor and elasticity  Neurologic: Cranial nerves appear intact with no evidence of facial asymmetry, moving 4 extremities appropriately to painful stimuli.  No obvious weakness appreciated.  Cannot assess sensation.    Psych: Alert x1     Laboratory results:    Results from last 7 days   Lab Units 04/11/21  0355 04/10/21  0956 04/09/21  0744   SODIUM mmol/L 143 141 143   POTASSIUM mmol/L 3.2* 4.1 4.3   CHLORIDE mmol/L 111* 105 105   CO2 mmol/L 18.0* 19.7* 24.7   BUN mg/dL 11 14 14   CREATININE mg/dL 0.77 0.82 0.86   CALCIUM mg/dL 8.8 9.7 9.7   BILIRUBIN mg/dL 0.6 0.6 0.5   ALK PHOS U/L 58 69 72   ALT (SGPT) U/L 11 13 16   AST (SGOT) U/L 22 17 18   GLUCOSE mg/dL 95 122* 94     Results from last 7 days   Lab Units 04/11/21  0355 04/10/21  0725 04/09/21  0744   WBC 10*3/mm3 7.74 12.81* 11.58*   HEMOGLOBIN g/dL 11.5* 14.2 13.3   HEMATOCRIT % 35.9 45.9 40.6   PLATELETS 10*3/mm3 284 290 304         Results from last 7 days   Lab Units 04/09/21  0744   TROPONIN T ng/mL <0.010     Results from last 7 days   Lab Units 04/09/21  0803   URINECX  No growth           I have reviewed the patient's laboratory results.    Radiology results:    Imaging Results (Last 24 Hours)     ** No results found for the last 24 hours. **          I have reviewed the patient's radiology  reports.    Medication Review:   I have reviewed the patient's active and prn medications.     Problem List:      Acute encephalopathy        Summary and Hospital course:  75 years old female patient with multiple comorbidities including essential hypertension, dyslipidemia, paroxysmal SVT, fibromyalgia and chronic pain syndrome presented to the hospital with altered mental status that is multifactorial likely secondary drug-induced encephalopathy (due to unintentional administration of gabapentin instead of Lyrica -per  patient daughter report, pharmacy by mistake substituted Lyrica with gabapentin ) and a component of hypertensive encephalopathy and possible UTI that was present on admission.  She was also having  symptoms consistent with viral gastroenteritis prior to discharge that resulted in severe dehydration which is also contributing to her acute encephalopathy.  CT head was negative for acute stroke.  Urinalysis was positive for UTI culture is negative for any growth thus far, currently on Rocephin empirically.  CT abdomen did not show any acute intra-abdominal pathology.  MRI brain is ordered and pending.  After initiating IV antibiotic therapy on 4/10/2021 and fluid IV fluids for hydration, patient mental status started to improve significantly but not yet fully at baseline.    Assessment:  • Acute metabolic encephalopathy, multifactorial, slowly improving  • Volume depletion and dehydration  • UTI, present on admission  • Hypertensive crisis, improved  • Dysphagia secondary to thrush stomatitis  • Severe vitamin B12 deficiency  • Hypokalemia replace potassium  • Essential hypertension  • Dyslipidemia  • Fibromyalgia  • Supraventricular tachycardia  • Thyroid gland follicular lymphoma in remission  • GERD  • Anxiety disorder    Plan:  · The patient mental status markedly improved today.  Obeying verbal commands.  Attempts to talk but  she is having difficulty finding words.  I think she is able to  comprehend better than yesterday.  It is not fully clear what is causing her altered mental status but likely is multifactorial.  I can tell there is a big difference between today and yesterday after initiating empiric antibiotic therapy and IV fluids.  Plan to continue IV fluids for today particularly with her poor oral intake but I will switch her Zosyn to Rocephin after diverticulitis was ruled out.  Continue to follow urine cultures.  · MRI brain ordered and currently pending  · No evidence of nuchal rigidity, fever or any other signs or symptoms to indicate that she might have encephalitis, will hold off any invasive diagnostic measures like lumbar puncture for now.  · Vitamin B12 level was critically low.  Start vitamin B12 replacement therapy with IM injections daily while hospitalized.  · The patient did not eat or drink anything since yesterday because of dysphagia, oral exam is consistent with thrush stomatitis.  I ordered nystatin mouthwash and Magic mouthwash, both swish and swallow to help with her dysphagia.  Continue with IV fluids for today.  · Replace potassium  · The patient blood pressure remains elevated, likely because she is refusing to take her oral medications.  Continue IV hydralazine as needed  · Case discussed with the treatment team in details about the goals of care and management plans    Checklist:  · Risk assessment: Moderate to high risk  · DVT prophylaxis: Lovenox  · Diet: Regular  · Steroids: None  · Antibiotics: Rocephin, start date 4/10/2021  · CODE STATUS: Full code  · IV access: Peripheral IV  · Discharge disposition: To be determined    Jimena Cain MD  04/11/21  11:58 EDT    Dictated utilizing Dragon dictation.

## 2021-04-11 NOTE — PLAN OF CARE
Goal Outcome Evaluation:        Outcome Summary: Patient more alert and oriented today, family at bedside, will monitor.

## 2021-04-11 NOTE — PLAN OF CARE
Goal Outcome Evaluation:  Plan of Care Reviewed With: patient  Progress: no change  Outcome Summary: Pt continues to be confused but improving. Pt is verbal tonight. Continues to attempt to refuse po medications and intake. VS stable. hydralazine given x1 r/t po refused. family at bedside.

## 2021-04-12 NOTE — PLAN OF CARE
Goal Outcome Evaluation:         Patient more alert today. Was able to take medications whole today.  MRI brain completed today.  Blood pressure elevated, scheduled medications given.  No other issues noted.

## 2021-04-12 NOTE — THERAPY EVALUATION
Acute Care - Speech Language Pathology   Swallow Initial Evaluation [facility]   CLINICAL DYSPHAGIA ASSESSMENT     Patient Name: Kiah Conway  : 1945  MRN: [id number]  Today's Date: 2021             Admit Date: 2021    Visit Dx:     ICD-10-CM ICD-9-CM   1. Acute encephalopathy  G93.40 348.30     Patient Active Problem List   Diagnosis   • Essential hypertension   • Atrophic urethritis   • Hyperglycemia   • GERD without esophagitis   • Fibromyalgia   • Arthritis   • Dysphagia   • History of rheumatic fever   • Migraines   • History of thyroid cancer   • Hypokalemia   • Hypocarbia   • Hyperchloremia   • Hypocalcemia   • Normocytic anemia   • Urinary tract infection without hematuria   • Acute encephalopathy     Past Medical History:   Diagnosis Date   • Arthritis    • Cardiac disorder    • Diverticulitis    • Dysphagia    • Fibromyalgia    • Follicular lymphoma (CMS/HCC)     Remisson    • GERD (gastroesophageal reflux disease)    • History of rheumatic fever    • Hypertension    • Hypokalemia due to inadequate potassium intake 2014   • Lipoma    • Malignant neoplasm (CMS/HCC)    • Migraine    • Neutropenia (CMS/HCC) 2019   • Obstructive sleep apnea 2016   • Prolonged Q-T interval on ECG 2019   • Recurrent UTI    • Rheumatoid arthritis (CMS/HCC)    • Sleep apnea    • Thyroid cancer (CMS/HCC)      Past Surgical History:   Procedure Laterality Date   • BLADDER SURGERY     • FINGER SURGERY      reattached    • HYSTERECTOMY     • THYROID SURGERY       Kiah Conway  is seen at bedside this am on 3N to assess safety/efficacy of swallowing fnx, determine safest/least restrictive diet tolerance. Family members x2 are present during this assessment, they report pt continues w/ minimal po intake.     Ms. Conway is familiar to SLP Department for Outpatient [facility] w/ no evidence of aspiration, recommendation for regular consistency, thin liquids. She presents to [facility] w/ ams.       Social History     Socioeconomic History   • Marital status:      Spouse name: Not on file   • Number of children: Not on file   • Years of education: Not on file   • Highest education level: Not on file   Tobacco Use   • Smoking status: Never Smoker   • Smokeless tobacco: Never Used   Substance and Sexual Activity   • Alcohol use: No   • Drug use: No   • Sexual activity: Defer      EXAM:    XR Chest, 1 View     EXAM DATE:    4/9/2021 8:06 AM     CLINICAL HISTORY:    AMS protocol     TECHNIQUE:    Frontal view of the chest.     COMPARISON:    07/15/2020     FINDINGS:    LUNGS:  Coarsened interstitial markings are noted.    PLEURAL SPACE:  Unremarkable.  No pneumothorax.    HEART:  Unremarkable.  No cardiomegaly.    MEDIASTINUM:  Unremarkable.    BONES/JOINTS:  Unremarkable.    TUBES, LINES AND DEVICES:  Right Port-A-Cath with tip in SVC.     IMPRESSION:    Stable appearance of the chest.     This report was finalized on 4/9/2021 8:36 AM by Dr. Santosh Andrade MD.    Diet Orders (active) (From admission, onward)     Start     Ordered    04/09/21 1725  Diet Regular  Diet Effective Now      04/09/21 1724              She is observed on ra w/o complications.     Pt is positioned upright and centered in bed to accept limited po presentations of solid cracker x1and thin water via straw x2. She does not self feed.     Facial/oral structures are symmetrical upon observation. Lingual protrusion reveals no deviation. Oral mucosa reveal patchy white lingual surface coating, she is currently being treated for oral candidiasis. Mucosa are otherwise moist, pink, and clean. Secretions are clear, thin, and well controlled. OROM/AMY is wfl to imitate oral postures. Gag is not assessed. Volitional cough is intact w/ adequate intensity, clear in quality, non-productive. Voice is adequate in intensity, clear in quality w/ intelligible speech. She is oriented to person, present family and place, she follows simple directives  and responds to simple oe questions appropriately. She is pleasantly confused.     Upon po presentations, adequate bolus anticipation and acceptance w/ good labial seal for bolus clearance via suction via straw. Bolus formation, manipulation and control are wfl w/ rotary mastication pattern. A-p transit is timely w/o oral residue. No overt s/s aspiration evidenced before the swallow.     Pharyngeal swallow is timely w/ adequate hyolaryngeal elevation per palpation. No overt s/s aspiration evidenced across this assessment. No silent aspiration suspected as pt is w/o changes in vocal quality, respirations or secretions post po presentations.    Impression: Per this assessment, Ms. Conway presents w/ wfl oropharyngeal swallow w/o s/s aspiration. No s/s indicative of silent aspiration. She does continue w/ limited po intake. Recommend continuing least restrictive regular consistency, thin liquids as tolerated.     EDUCATION  The patient has been educated in the following areas:   Dysphagia (Swallowing Impairment).    SLP Recommendation and Plan  1. Regular consistency, thin liquids.    2. Meds whole in puree/thins.   3. Upright and centered for all po intake.  4. STEFANY precautions.  5. Oral care protocol.  6. Assist w/ po intake.   No further formal SLP f/u warranted at this time.    D/w pt and family results and recommendations w/ verbal agreement.    D/w RN results and recommendations w/ verbal agreement. RN denies pt w/ overt s/s aspiration w/ po meds whole this am.     Thank you for allowing me to participate in the care of your patient-  Omaira Bean M.A., CCC-SLP       Time Calculation:       Therapy Charges for Today     Code Description Service Date Service Provider Modifiers Qty    [id number]  ST EVAL ORAL PHARYNG SWALLOW 4 4/12/2021 Omaira Bean MA,CCC-SLP GN 1        Patient was not wearing a face mask during this therapy encounter. Therapist used appropriate personal protective equipment  including mask, eye protection and gloves.  Mask used was standard procedure mask. Appropriate PPE was worn during the entire therapy session. The patient coughed during this evaluation. Therapist was within 6 feet for 15 minutes or more during the evaluation. Hand hygiene was completed before and after therapy session. Patient is not in enhanced droplet precautions.       Omaira Bean MA,CCC-SLP  4/12/2021

## 2021-04-12 NOTE — PROGRESS NOTES
Discharge Planning Assessment   [facility]     Patient Name: Kiah Conway  MRN: [id number]  Today's Date: 4/12/2021    Admit Date: 4/9/2021    Discharge Needs Assessment     Row Name 04/12/21 1600       Living Environment    Lives With  alone    Current Living Arrangements  home/apartment/condo    Primary Care Provided by  self    Provides Primary Care For  no one    Family Caregiver if Needed  child(wendy), adult    Quality of Family Relationships  unable to assess    Able to Return to Prior Arrangements  yes       Transition Planning    Patient/Family Anticipates Transition to  home    Transportation Anticipated  family or friend will provide       Discharge Needs Assessment    Equipment Currently Used at Home  wheelchair Pt has a wheelchair from unknown provider.    Equipment Needed After Discharge  -- Pt may need a hospital bed. HB to be arranged if needed.    Discharge Facility/Level of Care Needs  -- Pt does not utilize home health services.        Discharge Plan     Row Name 04/12/21 1602       Plan    Plan Pt lives alone and pt plans to return home at discharge. Pt does not utilize home health services. Pt has a wheelchair at home. PCP is Chip Manzano. Pt utilizes [facility] and she does not have issues affording medication copays. SS to follow and assist as needed with discharge planning.    Patient/Family in Agreement with Plan  yes            Demographic Summary     Row Name 04/12/21 9957       General Information    Referral Source  nursing    Reason for Consult  -- SS received consult for discharge planning. SS spoke to pt.        ZOHREH Dillon

## 2021-04-12 NOTE — PROGRESS NOTES
"Assisted By: Sadia ALBERTO    CC: Follow-up on metabolic encephalopathy    Interview History/HPI: Patient states that she is feeling okay but still not \"good\".  Most of history is from the daughter, allegedly the patient instead of Lyrica got gabapentin from the pharmacy and took 300 mg twice daily for several days when the daughter noticed a difference on her with more lethargy.  She had no fever or other symptoms.  She is obviously been kept off this here and daughter thinks she certainly has improved but appetite is still poor.  Patient apparently has not been walking even prior to this event but has been able to at least help with transfer to a wheelchair.      Vitals:    04/12/21 1626   BP: 170/71   Pulse: 82   Resp:    Temp:    SpO2:          Intake/Output Summary (Last 24 hours) at 4/12/2021 1723  Last data filed at 4/12/2021 1421  Gross per 24 hour   Intake 2700 ml   Output --   Net 2700 ml       EXAM: Patient is alert, she could not come up with the name of the president but then recognized it when I mentioned it, she knew she was in the hospital, she knew she lived in [place] although she had trouble coming up with Jered she really certainly recognized it when I said it, she said the year with 2002, she denied any acute pain she states she is breathing okay she does not feel like she is hungry.  Lungs are clear heart regular rate and rhythm strength is symmetric sensation is intact skin warm and dry face symmetric      EKG: Sinus rhythm with some nonspecific findings    Tele: Sinus rhythm    LABS:   Lab Results (last 48 hours)     Procedure Component Value Units Date/Time    Comprehensive Metabolic Panel [[id number]]  (Abnormal) Collected: 04/12/21 0415    Specimen: Blood Updated: 04/12/21 0522     Glucose 72 mg/dL      BUN 6 mg/dL      Creatinine 0.65 mg/dL      Sodium 139 mmol/L      Potassium 3.0 mmol/L      Chloride 106 mmol/L      CO2 15.8 mmol/L      Calcium 8.7 mg/dL      Total Protein 6.0 g/dL  "     Albumin 3.61 g/dL      ALT (SGPT) 12 U/L      AST (SGOT) 19 U/L      Alkaline Phosphatase 55 U/L      Total Bilirubin 0.4 mg/dL      eGFR Non African Amer 88 mL/min/1.73      Globulin 2.4 gm/dL      A/G Ratio 1.5 g/dL      BUN/Creatinine Ratio 9.2     Anion Gap 17.2 mmol/L     Narrative:      GFR Normal >60  Chronic Kidney Disease <60  Kidney Failure <15      Phosphorus [[id number]]  (Normal) Collected: 04/12/21 0415    Specimen: Blood Updated: 04/12/21 0522     Phosphorus 2.7 mg/dL     CBC & Differential [[id number]]  (Abnormal) Collected: 04/12/21 0415    Specimen: Blood Updated: 04/12/21 0457    Narrative:      The following orders were created for panel order CBC & Differential.  Procedure                               Abnormality         Status                     ---------                               -----------         ------                     CBC Auto Differential[[id number]]        Abnormal            Final result                 Please view results for these tests on the individual orders.    CBC Auto Differential [[id number]]  (Abnormal) Collected: 04/12/21 0415    Specimen: Blood Updated: 04/12/21 0457     WBC 7.57 10*3/mm3      RBC 3.79 10*6/mm3      Hemoglobin 11.8 g/dL      Hematocrit 36.7 %      MCV 96.8 fL      MCH 31.1 pg      MCHC 32.2 g/dL      RDW 13.7 %      RDW-SD 48.6 fl      MPV 11.9 fL      Platelets 248 10*3/mm3      Neutrophil % 75.4 %      Lymphocyte % 12.4 %      Monocyte % 10.2 %      Eosinophil % 0.8 %      Basophil % 0.7 %      Immature Grans % 0.5 %      Neutrophils, Absolute 5.71 10*3/mm3      Lymphocytes, Absolute 0.94 10*3/mm3      Monocytes, Absolute 0.77 10*3/mm3      Eosinophils, Absolute 0.06 10*3/mm3      Basophils, Absolute 0.05 10*3/mm3      Immature Grans, Absolute 0.04 10*3/mm3      nRBC 0.0 /100 WBC     Comprehensive Metabolic Panel [[id number]]  (Abnormal) Collected: 04/11/21 0355    Specimen: Blood Updated: 04/11/21 0532     Glucose 95 mg/dL      BUN 11 mg/dL       Creatinine 0.77 mg/dL      Sodium 143 mmol/L      Potassium 3.2 mmol/L      Chloride 111 mmol/L      CO2 18.0 mmol/L      Calcium 8.8 mg/dL      Total Protein 6.0 g/dL      Albumin 3.64 g/dL      ALT (SGPT) 11 U/L      AST (SGOT) 22 U/L      Alkaline Phosphatase 58 U/L      Total Bilirubin 0.6 mg/dL      eGFR Non African Amer 73 mL/min/1.73      Globulin 2.4 gm/dL      A/G Ratio 1.5 g/dL      BUN/Creatinine Ratio 14.3     Anion Gap 14.0 mmol/L     Narrative:      GFR Normal >60  Chronic Kidney Disease <60  Kidney Failure <15      CBC & Differential [[id number]]  (Abnormal) Collected: 04/11/21 0355    Specimen: Blood Updated: 04/11/21 0527    Narrative:      The following orders were created for panel order CBC & Differential.  Procedure                               Abnormality         Status                     ---------                               -----------         ------                     CBC Auto Differential[[id number]]        Abnormal            Final result                 Please view results for these tests on the individual orders.    CBC Auto Differential [[id number]]  (Abnormal) Collected: 04/11/21 0355    Specimen: Blood Updated: 04/11/21 0527     WBC 7.74 10*3/mm3      RBC 3.74 10*6/mm3      Hemoglobin 11.5 g/dL      Hematocrit 35.9 %      MCV 96.0 fL      MCH 30.7 pg      MCHC 32.0 g/dL      RDW 13.6 %      RDW-SD 48.4 fl      MPV 12.2 fL      Platelets 284 10*3/mm3      Neutrophil % 66.5 %      Lymphocyte % 20.3 %      Monocyte % 11.8 %      Eosinophil % 0.6 %      Basophil % 0.5 %      Immature Grans % 0.3 %      Neutrophils, Absolute 5.15 10*3/mm3      Lymphocytes, Absolute 1.57 10*3/mm3      Monocytes, Absolute 0.91 10*3/mm3      Eosinophils, Absolute 0.05 10*3/mm3      Basophils, Absolute 0.04 10*3/mm3      Immature Grans, Absolute 0.02 10*3/mm3      nRBC 0.0 /100 WBC           Radiology:    Imaging Results (Last 72 Hours)     Procedure Component Value Units Date/Time    MRI Brain Without  Contrast [[id number]] Collected: 04/12/21 1257     Updated: 04/12/21 1414    Narrative:      MRI BRAIN WITHOUT CONTRAST     CLINICAL INDICATION: Neurological deficit, acute, stroke suspected.      COMPARISON: 07/17/2020     PROCEDURE:  Multiple planar images of the brain were acquired in a high field  strength magnet. Several pulse sequences were used to acquire the study.  Gadolinium  was not  administered.     FINDINGS:  The diffusion weighted images show no focal signal abnormality. There is  no evidence of acute ischemia.       The remaining pulse sequences show no mass, hemorrhage, or midline  shift.       The ventricles, cisterns, and sulci are unremarkable. There is no  hydrocephalus.      Increased T2 and FLAIR signal in the periventricular and parietal white  matter most compatible with chronic microangiopathic change.     The sagittal view show no sellar or parasellar mass.      There is no tectal or pineal lesion.     Coronal imaging demonstrates a symmetric appearance of the visualized  portions of the optic nerves and extraocular muscles.       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  2. No parenchymal mass, hemorrhage, or midline shift.     This report was finalized on 4/12/2021 2:12 PM by Dr. Chente Choi MD.       CT Abdomen Pelvis Without Contrast [[id number]] Collected: 04/10/21 1437     Updated: 04/10/21 1445    Narrative:      EXAM: CT ABDOMEN PELVIS WO CONTRAST-         TECHNIQUE: Multiple axial CT images were obtained from lung bases  through pubic symphysis WITHOUT administration of IV contrast.  Reformatted images in the coronal and/or sagittal plane(s) were  generated from the axial data set to facilitate diagnostic accuracy  and/or surgical planning.  Oral Contrast:NONE.     Radiation dose reduction techniques were utilized per ALARA protocol.  Automated exposure control was initiated through either or CareDose or  DoseRigAutobase software packages by   protocol.    DOSE:     Clinical information Pyelonephritis, uncomplicated;  G93.40-Encephalopathy, unspecified      Comparison 01/26/2018     FINDINGS:     Lower thorax: Clear. No effusions.     Abdomen:     Liver: Homogeneous. No focal hepatic mass or ductal dilatation.     Gallbladder: No dilation or stone identified.     Pancreas: Unremarkable. No mass or ductal dilatation.     Spleen: Homogeneous. No splenomegaly.     Adrenals: No mass.     Kidneys/ureters: No mass. No obstructive uropathy.  No evidence of  urolithiasis.     GI tract: Non-dilated. No definite wall thickening..     MESENTERY: No free fluid, walled off fluid collections, mesenteric  stranding, or enlarged lymph nodes         Vasculature: There is evidence of atherosclerotic vascular disease     Abdominal wall: No focal hernia or mass.        Pelvis:     Bladder: No focal mass or significant wall thickening     Reproductive: Unremarkable as visualized     Bones: No acute bony abnormality.       Impression:         1. No definitive source for the patient's symptoms identified on today's  exam.        2. Other findings as discussed above.        3. moderate to large volume stool              This report was finalized on 4/10/2021 2:42 PM by Dr. Chente Choi MD.             Results for orders placed during the hospital encounter of 11/07/19    Adult Transthoracic Echo Complete W/ Cont if Necessary Per Protocol    Interpretation Summary  · Normal left ventricular cavity size and wall thickness noted. All left ventricular wall segments contract normally.  · Estimated EF appears to be in the range of 66 - 70%.  · The aortic valve is structurally normal. Mild aortic valve regurgitation is present. No aortic valve stenosis is present.  · The mitral valve is normal in structure. Trace mitral valve regurgitation is present. No significant mitral valve stenosis is present.  · The tricuspid valve is normal. Mild tricuspid valve regurgitation is present.  Estimated right ventricular systolic pressure from tricuspid regurgitation is mildly elevated (35-45 mmHg).  · There is no evidence of pericardial effusion.      Assessment/Plan:   Altered mental status/metabolic encephalopathy I feel medication related.  Continue to hold gabapentin and Lyrica, should continue to improve.  CT head was negative, certainly no evidence of meningeal signs.    Poor appetite, Megace increased to 200 mg a day, follow, Crystal is going to continue IV fluids at this time.  May consider discontinuing this tomorrow if appetite increases.    Hypokalemia, supplement, check magnesium in the a.m.    Functional weakness in that she does not walk although she seemed to have acceptable strength in her lower extremity, I have consulted therapy for further evaluation.    Elevated anion gap and decrease in bicarb, she may be alkalotic but venous gas will be checked.  Most likely the hypokalemia is reflective also of the alkalosis.    Hypertension, and review of Maranda Montoya's note from the office, they wanted her on Norvasc 2.5 mg daily, this has been added.    DVT prophylaxis, subcu Lovenox    Possible UTI, interview of the UA did not really suggest a UTI, cultures negative, I am stopping Rocephin.        Disposition Prob home    Chaitanya Martin MD

## 2021-04-12 NOTE — PLAN OF CARE
Goal Outcome Evaluation:  Patient appears to be resting in bed. Vital signs are stable with HTN. One dose of Hydralazine was given during the shift. Patient refused all PO medications. Patient is afebrile and confused at times. No complaints or distress is noted at this time. Will continue to monitor.

## 2021-04-13 NOTE — THERAPY EVALUATION
Patient Name: Kiah Conway  : 1945    MRN: [id number]                              Today's Date: 2021       Admit Date: 2021    Visit Dx:     ICD-10-CM ICD-9-CM   1. Acute encephalopathy  G93.40 348.30     Patient Active Problem List   Diagnosis   • Essential hypertension   • Atrophic urethritis   • Hyperglycemia   • GERD without esophagitis   • Fibromyalgia   • Arthritis   • Dysphagia   • History of rheumatic fever   • Migraines   • History of thyroid cancer   • Hypokalemia   • Hypocarbia   • Hyperchloremia   • Hypocalcemia   • Normocytic anemia   • Urinary tract infection without hematuria   • Acute encephalopathy     Past Medical History:   Diagnosis Date   • Arthritis    • Cardiac disorder    • Diverticulitis    • Dysphagia    • Fibromyalgia    • Follicular lymphoma (CMS/HCC)     Remisson    • GERD (gastroesophageal reflux disease)    • History of rheumatic fever    • Hypertension    • Hypokalemia due to inadequate potassium intake 2014   • Lipoma    • Malignant neoplasm (CMS/HCC)    • Migraine    • Neutropenia (CMS/HCC) 2019   • Obstructive sleep apnea 2016   • Prolonged Q-T interval on ECG 2019   • Recurrent UTI    • Rheumatoid arthritis (CMS/HCC)    • Sleep apnea    • Thyroid cancer (CMS/HCC)      Past Surgical History:   Procedure Laterality Date   • BLADDER SURGERY     • FINGER SURGERY      reattached    • HYSTERECTOMY     • THYROID SURGERY       General Information     Row Name 21 1041          OT Time and Intention    Document Type  evaluation  -LM     Mode of Treatment  occupational therapy  -     Row Name 21 1048 21 1041       General Information    Patient Profile Reviewed  --  yes  -LM    Prior Level of Function  -- Patient [facility] mod[facility]max [facility] BADL [facility]  -  ADL's;dependent:;max assist:;transfer  -    Existing Precautions/Restrictions  --  fall  -LM    Barriers to Rehab  --  previous  functional deficit;medically complex  -[facility] Name 04/13/21 1041          Living Environment    Lives With  child(wendy), adult;grandchild(wendy)  -[facility] Name 04/13/21 1041          Cognition    Orientation Status (Cognition)  oriented to;person;place  -[facility] Name 04/13/21 1041          Safety Issues, Functional Mobility    Safety Issues Affecting Function (Mobility)  judgment;insight into deficits/self-awareness;problem-solving  -     Impairments Affecting Function (Mobility)  balance;coordination;strength;endurance/activity tolerance  -       User Key  (r) = Recorded By, (t) = Taken By, (c) = Cosigned By    Initials Name Provider Type     Alejandra Perez, OT Occupational Therapist          Mobility/ADL's     [facility] 04/13/21 1043          Transfers    Transfers  toilet transfer  -     Sit-Stand [place] (Transfers)  maximum assist (25% patient effort);2 person assist  -LM     Row Name 04/13/21 1043          Toilet Transfer    Type (Toilet Transfer)  stand pivot/stand step  -LM     Row Name 04/13/21 1043          Activities of Daily Living    BADL Assessment/Intervention  bathing;lower body dressing;upper body dressing;grooming;feeding;toileting  -[facility] Name 04/13/21 1043          Bathing Assessment/Intervention    [place] Level (Bathing)  maximum assist (25% patient effort);dependent (less than 25% patient effort)  -[facility] Name 04/13/21 1043          Lower Body Dressing Assessment/Training    [place] Level (Lower Body Dressing)  dependent (less than 25% patient effort)  -LM     Row Name 04/13/21 1043          Upper Body Dressing Assessment/Training    [place] Level (Upper Body Dressing)  maximum assist (25% patient effort)  -[facility] Name 04/13/21 1043          Grooming Assessment/Training    [place] Level (Grooming)  maximum assist (25% patient effort)  -[facility] Name 04/13/21 1043          Self-Feeding Assessment/Training    [place] Level (Feeding)  set  up  -LM     [facility] Name 04/13/21 1043          Toileting Assessment/Training    [place] Level (Toileting)  dependent (less than 25% patient effort)  -LM       User Key  (r) = Recorded By, (t) = Taken By, (c) = Cosigned By    Initials Name Provider Type    LM Alejandra Perez, OT Occupational Therapist        Obj/Interventions     [facility] Name 04/13/21 1045          Sensory Assessment (Somatosensory)    Sensory Assessment (Somatosensory)  sensation intact  -[facility] Name 04/13/21 1045          Vision Assessment/Intervention    Visual Impairment/Limitations  WNL  -[facility] Name 04/13/21 1045          Range of Motion Comprehensive    General Range of Motion  no range of motion deficits identified  -LM     Row Name 04/13/21 1045          Strength Comprehensive (MMT)    General Manual Muscle Testing (MMT) Assessment  upper extremity strength deficits identified  -LM     Comment, General Manual Muscle Testing (MMT) Assessment  3/5  -       User Key  (r) = Recorded By, (t) = Taken By, (c) = Cosigned By    Initials Name Provider Type     Alejandar Perez, OT Occupational Therapist        Goals/Plan     Row Name 04/13/21 1047          Transfer Goal 1 (OT)    Activity/Assistive Device (Transfer Goal 1, OT)  transfers, all;commode, 3-in-1  -LM     [place] Level/Cues Needed (Transfer Goal 1, OT)  moderate assist (50-74% patient effort)  -     Time Frame (Transfer Goal 1, OT)  by discharge  -LM     Row Name 04/13/21 1047          Bathing Goal 1 (OT)    [place] Level/Cues Needed (Bathing Goal 1, OT)  moderate assist (50-74% patient effort);maximum assist (25-49% patient effort)  -     Time Frame (Bathing Goal 1, OT)  by discharge  -LM     Row Name 04/13/21 1047          Therapy Assessment/Plan (OT)    Planned Therapy Interventions (OT)  activity tolerance training;BADL retraining;patient/caregiver education/training;ROM/therapeutic exercise;strengthening exercise;neuromuscular control/coordination retraining  -        User Key  (r) = Recorded By, (t) = Taken By, (c) = Cosigned By    Initials Name Provider Type    Alejandra Davis, OT Occupational Therapist        Clinical Impression     Row Name 04/13/21 1046          Plan of Care Review    Plan of Care Reviewed With  patient;daughter  -     Row Name 04/13/21 1046          Therapy Assessment/Plan (OT)    Patient/Family Therapy Goal Statement (OT)  return to previous level.  -LM     Rehab Potential (OT)  fair, will monitor progress closely  -LM     Criteria for Skilled Therapeutic Interventions Met (OT)  yes;meets criteria;skilled treatment is necessary  -LM     Row Name 04/13/21 1046          Therapy Plan Review/Discharge Plan (OT)    Anticipated Discharge Disposition (OT)  home with 24/7 care  -LM     Row Name 04/13/21 1046          Positioning and Restraints    Post Treatment Position  bed  -LM     In Chair  call light within reach;with family/caregiver;encouraged to call for assist  -LM       User Key  (r) = Recorded By, (t) = Taken By, (c) = Cosigned By    Initials Name Provider Type    Alejandra Davis, OT Occupational Therapist        Outcome Measures    No documentation.         OT Recommendation and Plan  Planned Therapy Interventions (OT): activity tolerance training, BADL retraining, patient/caregiver education/training, ROM/therapeutic exercise, strengthening exercise, neuromuscular control/coordination retraining  Plan of Care Review  Plan of Care Reviewed With: patient, daughter     Time Calculation:     Therapy Charges for Today     Code Description Service Date Service Provider Modifiers Qty    [id number]  OT EVAL MOD COMPLEXITY 4 4/13/2021 Alejandra Perez OT GO 1               Alejandra Perez OT  4/13/2021

## 2021-04-13 NOTE — PLAN OF CARE
Goal Outcome Evaluation:     Progress: improving  Outcome Summary: Pt has no complaints, no s/s of distress noted, she is more A&O, appetite did not restore yet. Con't IV fluids, pain control. Will con't to monitor.

## 2021-04-13 NOTE — PLAN OF CARE
Goal Outcome Evaluation:  Plan of Care Reviewed With: patient  Progress: no change  Outcome Summary: pt c/o pain, PRN pain meds given. PRN nausea medication given. pt c/o chest pain; STAT EKG and troponin ordered per protocol. Notified AYDEN Gibbs. troponin and EKG WDL.

## 2021-04-13 NOTE — THERAPY EVALUATION
Acute Care - Physical Therapy Initial Evaluation   Jered     Patient Name: Kiah Conway  : 1945  MRN: [id number]  Today's Date: 2021   Onset of Illness/Injury or Date of Surgery: 21       PT Assessment (last 12 hours)      PT Evaluation and Treatment     Row Name 21          Physical Therapy Time and Intention    Subjective Information  no complaints  -BC     Document Type  evaluation  -BC     Mode of Treatment  physical therapy  -BC     Patient Effort  fair  -BC     Row Name 21          General Information    Patient Profile Reviewed  yes  -BC     Onset of Illness/Injury or Date of Surgery  21  -BC     Referring Physician  Dr. Martin  -BC     Patient Observations  alert;cooperative;agree to therapy  -BC     Risks Reviewed  patient:;LOB;nausea/vomiting;dizziness;increased discomfort;change in vital signs;increased drainage;lines disloged  -BC     Benefits Reviewed  patient:;improve function;increase independence;increase strength;increase balance;decrease pain;decrease risk of DVT;improve skin integrity;increase knowledge  -BC     Row Name 21          Living Environment    Current Living Arrangements  home/apartment/condo  -BC     Lives With  child(wendy), adult  -BC     Row Name 21          Home Use of Assistive/Adaptive Equipment    Equipment Currently Used at Home  wheelchair  -BC     Row Name 21          Cognition    Affect/Mental Status (Cognitive)  WFL  -BC     Orientation Status (Cognition)  oriented x 3  -BC     Follows Commands (Cognition)  follows one-step commands  -BC     Cognitive Function (Cognitive)  WFL  -BC     Row Name 21          Bed Mobility    Bed Mobility  bed mobility (all) activities  -BC     All Activities, [place] (Bed Mobility)  moderate assist (50% patient effort)  -BC     Bed Mobility, Safety Issues  cognitive deficits limit understanding;decreased use of legs for bridging/pushing  -BC      Assistive Device (Bed Mobility)  bed rails  -BC     Row Name 04/13/21 0900          Transfers    Transfers  sit-stand transfer;stand-sit transfer  -BC     Maintains Weight-bearing Status (Transfers)  able to maintain  -BC     Sit-Stand [place] (Transfers)  minimum assist (75% patient effort)  -BC     Stand-Sit [place] (Transfers)  minimum assist (75% patient effort)  -BC     Row Name 04/13/21 0900          Sit-Stand Transfer    Assistive Device (Sit-Stand Transfers)  walker, front-wheeled  -BC     Row Name 04/13/21 0900          Stand-Sit Transfer    Assistive Device (Stand-Sit Transfers)  walker, front-wheeled  -BC     Row Name 04/13/21 0900          Gait/Stairs (Locomotion)    Gait/Stairs Locomotion  gait/ambulation independence  -BC     [place] Level (Gait)  minimum assist (75% patient effort)  -BC     Assistive Device (Gait)  walker, front-wheeled  -BC     Distance in Feet (Gait)  3 ft  -BC     Row Name 04/13/21 1000 04/13/21 0900       Coping    Observed Emotional State  calm;cooperative  -BC  calm;cooperative  -BC    Verbalized Emotional State  acceptance  -BC  acceptance  -BC    Trust Relationship/Rapport  care explained;choices provided  -BC  care explained;choices provided;emotional support provided  -BC    Family/Support Persons  daughter;family  -BC  daughter;family  -BC    Involvement in Care  at bedside;not participating in care  -BC  at bedside;participating in care;supportive of patient  -BC    Family/Support System Care  self-care encouraged;support provided;caregiver stress acknowledged  -BC  caregiver stress acknowledged;support provided;self-care encouraged  -BC    Row Name 04/13/21 1000 04/13/21 0900       Plan of Care Review    Plan of Care Reviewed With  patient  -BC  patient  -BC    Progress  --  no change  -BC    Row Name 04/13/21 0900          Physical Therapy Goals    Bed Mobility Goal Selection (PT)  bed mobility, PT goal 1  -BC     Transfer Goal Selection (PT)  transfer,  PT goal 1  -BC     Gait Training Goal Selection (PT)  --  -BC     Row Name 04/13/21 0900          Bed Mobility Goal 1 (PT)    Activity/Assistive Device (Bed Mobility Goal 1, PT)  bed mobility activities, all  -BC     [place] Level/Cues Needed (Bed Mobility Goal 1, PT)  contact guard assist  -BC     Time Frame (Bed Mobility Goal 1, PT)  by discharge  -BC     Row Name 04/13/21 0900          Transfer Goal 1 (PT)    Activity/Assistive Device (Transfer Goal 1, PT)  transfers, all  -BC     [place] Level/Cues Needed (Transfer Goal 1, PT)  contact guard assist  -BC     Time Frame (Transfer Goal 1, PT)  by discharge  -BC     Row Name 04/13/21 1000 04/13/21 0900       Positioning and Restraints    Pre-Treatment Position  sitting in chair/recliner  -BC  sitting in chair/recliner  -BC    Post Treatment Position  chair  -BC  chair  -BC    In Chair  notified nsg;sitting;call light within reach;encouraged to call for assist;exit alarm on  -BC  notified nsg;sitting;call light within reach;encouraged to call for assist  -BC      User Key  (r) = Recorded By, (t) = Taken By, (c) = Cosigned By    Initials Name Provider Type    Jessica Gaffney PT Physical Therapist          PT Recommendation and Plan     Plan of Care Reviewed With: patient  Progress: no change       Time Calculation:   PT Charges     Row Name 04/13/21 1019             Time Calculation    PT Received On  04/13/21  -BC         Time Calculation- PT    Total Timed Code Minutes- PT  60 minute(s)  -BC        User Key  (r) = Recorded By, (t) = Taken By, (c) = Cosigned By    Initials Name Provider Type    Jessica Gaffney PT Physical Therapist        Therapy Charges for Today     Code Description Service Date Service Provider Modifiers Qty    [id number] HC PT EVAL HIGH COMPLEXITY 4 4/13/2021 Jessica Bennett PT GP 1               Jessica Bennett PT  4/13/2021

## 2021-04-13 NOTE — PROGRESS NOTES
Assisted By: Daughter    CC: Follow-up on metabolic encephalopathy    Interview History/HPI: Patient is doing better today awake alert she did eat some but she states she will eat more when she goes home, daughter agrees patient is better.  She is complaining of a headache, this is more all over.  She normally drinks some caffeine in the morning has not been drinking this here.  She received some Tylenol but this was just recently so she is unsure whether this is going to help or not.  She had a negative MRI besides microangiopathic changes yesterday.  Bowels have moved, no chest pain or shortness of breath.      Vitals:    04/13/21 1500   BP: 162/72   Pulse: 78   Resp: 16   Temp: 99.1 °F (37.3 °C)   SpO2: 94%         Intake/Output Summary (Last 24 hours) at 4/13/2021 1715  Last data filed at 4/13/2021 1358  Gross per 24 hour   Intake 1061.8 ml   Output --   Net 1061.8 ml       EXAM: Neck is supple, pupils equal hearing intact mood is good she knew she was in [facility] and knew the year, once again she knew who the president was but could not come up with the name but remembered it when I said it.  Lungs have bilateral breath sounds are clear no rhonchi rales or wheezing, heart regular rate and rhythm without murmur rub or gallop abdomen soft benign, follows command strength is symmetric no skin rashes      Tele: Sinus rhythm    LABS:   Lab Results (last 48 hours)     Procedure Component Value Units Date/Time    Troponin [[id number]]  (Normal) Collected: 04/13/21 0332    Specimen: Blood Updated: 04/13/21 0410     Troponin T <0.010 ng/mL     Narrative:      Troponin T Reference Range:  <= 0.03 ng/mL-   Negative for AMI  >0.03 ng/mL-     Abnormal for myocardial necrosis.  Clinicians would have to utilize clinical acumen, EKG, Troponin and serial changes to determine if it is an Acute Myocardial Infarction or myocardial injury due to an underlying chronic condition.       Results may be falsely decreased if  patient taking Biotin.      Comprehensive Metabolic Panel [[id number]]  (Abnormal) Collected: 04/13/21 0116    Specimen: Blood Updated: 04/13/21 0218     Glucose 77 mg/dL      BUN 5 mg/dL      Creatinine 0.64 mg/dL      Sodium 139 mmol/L      Potassium 3.7 mmol/L      Chloride 110 mmol/L      CO2 16.8 mmol/L      Calcium 8.4 mg/dL      Total Protein 5.5 g/dL      Albumin 3.44 g/dL      ALT (SGPT) 9 U/L      AST (SGOT) 16 U/L      Alkaline Phosphatase 50 U/L      Total Bilirubin 0.3 mg/dL      eGFR Non African Amer 90 mL/min/1.73      Globulin 2.1 gm/dL      A/G Ratio 1.7 g/dL      BUN/Creatinine Ratio 7.8     Anion Gap 12.2 mmol/L     Narrative:      GFR Normal >60  Chronic Kidney Disease <60  Kidney Failure <15      Magnesium [[id number]]  (Normal) Collected: 04/13/21 0116    Specimen: Blood Updated: 04/13/21 0218     Magnesium 2.2 mg/dL     CBC & Differential [[id number]]  (Abnormal) Collected: 04/13/21 0116    Specimen: Blood Updated: 04/13/21 0200    Narrative:      The following orders were created for panel order CBC & Differential.  Procedure                               Abnormality         Status                     ---------                               -----------         ------                     CBC Auto Differential[[id number]]        Abnormal            Final result                 Please view results for these tests on the individual orders.    CBC Auto Differential [[id number]]  (Abnormal) Collected: 04/13/21 0116    Specimen: Blood Updated: 04/13/21 0200     WBC 6.33 10*3/mm3      RBC 3.54 10*6/mm3      Hemoglobin 11.0 g/dL      Hematocrit 34.4 %      MCV 97.2 fL      MCH 31.1 pg      MCHC 32.0 g/dL      RDW 13.7 %      RDW-SD 49.0 fl      MPV 11.7 fL      Platelets 254 10*3/mm3      Neutrophil % 73.8 %      Lymphocyte % 10.7 %      Monocyte % 13.0 %      Eosinophil % 1.6 %      Basophil % 0.6 %      Immature Grans % 0.3 %      Neutrophils, Absolute 4.67 10*3/mm3      Lymphocytes, Absolute 0.68  10*3/mm3      Monocytes, Absolute 0.82 10*3/mm3      Eosinophils, Absolute 0.10 10*3/mm3      Basophils, Absolute 0.04 10*3/mm3      Immature Grans, Absolute 0.02 10*3/mm3      nRBC 0.0 /100 WBC     Potassium [[id number]]  (Normal) Collected: 04/12/21 2313    Specimen: Blood Updated: 04/12/21 2344     Potassium 3.6 mmol/L     Blood Gas, Venous With Co-Ox [[id number]]  (Abnormal) Collected: 04/12/21 1748    Specimen: Venous Blood Updated: 04/12/21 1751     Site Lab     pH, Venous 7.437 pH Units      pCO2, Venous 27.2 mm Hg      Comment: 84 Value below reference range        pO2, Venous 54.1 mm Hg      Comment: 83 Value above reference range        HCO3, Venous 18.3 mmol/L      Comment: 84 Value below reference range        Base Excess, Venous -4.6 mmol/L      O2 Saturation, Venous 88.2 %      Comment: 83 Value above reference range        Hemoglobin, Blood Gas 12.3 g/dL      Comment: 84 Value below reference range        CO2 Content 19.2 mmol/L      Temperature 37.0 C      Barometric Pressure for Blood Gas 724 mmHg      Modality Room Air     FIO2 21 %      Ventilator Mode NA     Collected by [id number]     Comment: Meter: [id number]     :  [id number]        Oxyhemoglobin Venous 87.7 %      Comment: 83 Value above reference range        Carboxyhemoglobin Venous <1.0 %      Comment: 94 Value below reportable range < 1.0        Methemoglobin Venous <1.0 %      Comment: 94 Value below reportable range < 1.0       Comprehensive Metabolic Panel [[id number]]  (Abnormal) Collected: 04/12/21 0415    Specimen: Blood Updated: 04/12/21 0522     Glucose 72 mg/dL      BUN 6 mg/dL      Creatinine 0.65 mg/dL      Sodium 139 mmol/L      Potassium 3.0 mmol/L      Chloride 106 mmol/L      CO2 15.8 mmol/L      Calcium 8.7 mg/dL      Total Protein 6.0 g/dL      Albumin 3.61 g/dL      ALT (SGPT) 12 U/L      AST (SGOT) 19 U/L      Alkaline Phosphatase 55 U/L      Total Bilirubin 0.4 mg/dL      eGFR Non African Amer 88 mL/min/1.73       Globulin 2.4 gm/dL      A/G Ratio 1.5 g/dL      BUN/Creatinine Ratio 9.2     Anion Gap 17.2 mmol/L     Narrative:      GFR Normal >60  Chronic Kidney Disease <60  Kidney Failure <15      Phosphorus [[id number]]  (Normal) Collected: 04/12/21 0415    Specimen: Blood Updated: 04/12/21 0522     Phosphorus 2.7 mg/dL     CBC & Differential [[id number]]  (Abnormal) Collected: 04/12/21 0415    Specimen: Blood Updated: 04/12/21 0457    Narrative:      The following orders were created for panel order CBC & Differential.  Procedure                               Abnormality         Status                     ---------                               -----------         ------                     CBC Auto Differential[[id number]]        Abnormal            Final result                 Please view results for these tests on the individual orders.    CBC Auto Differential [[id number]]  (Abnormal) Collected: 04/12/21 0415    Specimen: Blood Updated: 04/12/21 0457     WBC 7.57 10*3/mm3      RBC 3.79 10*6/mm3      Hemoglobin 11.8 g/dL      Hematocrit 36.7 %      MCV 96.8 fL      MCH 31.1 pg      MCHC 32.2 g/dL      RDW 13.7 %      RDW-SD 48.6 fl      MPV 11.9 fL      Platelets 248 10*3/mm3      Neutrophil % 75.4 %      Lymphocyte % 12.4 %      Monocyte % 10.2 %      Eosinophil % 0.8 %      Basophil % 0.7 %      Immature Grans % 0.5 %      Neutrophils, Absolute 5.71 10*3/mm3      Lymphocytes, Absolute 0.94 10*3/mm3      Monocytes, Absolute 0.77 10*3/mm3      Eosinophils, Absolute 0.06 10*3/mm3      Basophils, Absolute 0.05 10*3/mm3      Immature Grans, Absolute 0.04 10*3/mm3      nRBC 0.0 /100 WBC           Radiology:    Imaging Results (Last 72 Hours)     Procedure Component Value Units Date/Time    MRI Brain Without Contrast [[id number]] Collected: 04/12/21 1257     Updated: 04/12/21 1414    Narrative:      MRI BRAIN WITHOUT CONTRAST     CLINICAL INDICATION: Neurological deficit, acute, stroke suspected.      COMPARISON: 07/17/2020      PROCEDURE:  Multiple planar images of the brain were acquired in a high field  strength magnet. Several pulse sequences were used to acquire the study.  Gadolinium  was not  administered.     FINDINGS:  The diffusion weighted images show no focal signal abnormality. There is  no evidence of acute ischemia.       The remaining pulse sequences show no mass, hemorrhage, or midline  shift.       The ventricles, cisterns, and sulci are unremarkable. There is no  hydrocephalus.      Increased T2 and FLAIR signal in the periventricular and parietal white  matter most compatible with chronic microangiopathic change.     The sagittal view show no sellar or parasellar mass.      There is no tectal or pineal lesion.     Coronal imaging demonstrates a symmetric appearance of the visualized  portions of the optic nerves and extraocular muscles.       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  2. No parenchymal mass, hemorrhage, or midline shift.     This report was finalized on 4/12/2021 2:12 PM by Dr. Chente Choi MD.             Results for orders placed during the hospital encounter of 11/07/19    Adult Transthoracic Echo Complete W/ Cont if Necessary Per Protocol    Interpretation Summary  · Normal left ventricular cavity size and wall thickness noted. All left ventricular wall segments contract normally.  · Estimated EF appears to be in the range of 66 - 70%.  · The aortic valve is structurally normal. Mild aortic valve regurgitation is present. No aortic valve stenosis is present.  · The mitral valve is normal in structure. Trace mitral valve regurgitation is present. No significant mitral valve stenosis is present.  · The tricuspid valve is normal. Mild tricuspid valve regurgitation is present. Estimated right ventricular systolic pressure from tricuspid regurgitation is mildly elevated (35-45 mmHg).  · There is no evidence of pericardial effusion.      Assessment/Plan:   Patient's  daughter after I saw the patient came out and was concerned because she thought the patient's face was a little flushed and her temperature is 99.1.  I am going to recheck a chest x-ray UA, I have asked nursing if temperature gets above 100.3 to get 2 sets of blood cultures and to call MD as well.  White count was normal this morning, will recheck with CRP in the a.m.    Metabolic encephalopathy most likely related to gabapentin.  Her last dose was Friday morning, patient is significantly improved although still weak, continue therapy and monitoring, hopefully in the next 24 to 48 hours if otherwise stable could be considered for discharge.    DVT prophylaxis, subcu Lovenox    Borderline low B12, she is getting daily injections, she has had 3 injections, will give a total of 5 and then consider going weekly for 2 to 3 weeks.    Hypertension, improving control but not to goal.  Continue Norvasc at the current dose.    Headache, possibly caffeine withdrawal versus Norvasc, Tylenol as needed, MRI negative yesterday, follow    Elevated anion gap, improved, she was alkalotic by venous gas    Weakness, continue therapies as available        Disposition Home    Chaitanya Martin MD

## 2021-04-14 NOTE — PLAN OF CARE
Goal Outcome Evaluation:     Progress: no change  Outcome Summary: Pt's more alert, still no appetite, had tylenol/zofran PRN, K+2.9 this am, replacing now. She worked with PT, also nurse student walked her in the room, was up in chair. Will con't to monitor.

## 2021-04-14 NOTE — PAYOR COMM NOTE
"CONTACT:  SERG LADD MSN, APRN  UTILIZATION MANAGEMENT DEPT.  [facility]  [address], [address]  PHONE:  [phone]  FAX: [phone]    INPATIENT AUTHORIZATION REQUEST.    PATIENT WENT TO INPATIENT STATUS ON 4/13/21.    Kiah Conway (75 y.o. Female)     Date of Birth Social Security Number Address Home Phone MRN    1945  [address] [address] [phone] [id number]    [place]ats[place] Marital Status          [facility]        Admission Date Admission Type Admitting Provider Attending Provider Department, Room/Bed    4/13/21 Emergency Gus Reese DO Heinss, Karl F, MD [address], [address]328/    Discharge Date Discharge Disposition Discharge Destination                       Attending Provider: Chaitanya Martin MD    Allergies: No Known Allergies    Isolation: None   Infection: None   Code Status: CPR    Ht: 154.9 cm (61\")   Wt: 54 kg (119 lb)    Admission Cmt: None   Principal Problem: None                Active Insurance as of 4/9/2021     Primary Coverage     Payor Plan Insurance Group Employer/Plan Group    WELLCARE [facility] MEDICARE REPLACEMENT WELLCARE MEDICARE REPLACEMENT      Payor Plan Address Payor Plan Phone Number Payor Plan Fax Number Effective Dates    [address] [address] [phone]  4/1/2021 - None Entered    [facility] [address]       Subscriber Name Subscriber Birth Date Member ID       KIAH CONWAY 1945 [id number]           Secondary Coverage     Payor Plan Insurance Group Employer/Plan Group    [id number]      Payor Plan Address Payor Plan Phone Number Payor Plan Fax Number Effective Dates    [address]   3/1/2021 - None Entered    [place] [address]       Subscriber Name Subscriber Birth Date Member ID       KIAH CONWAY 1945 [id number]                 Emergency Contacts      (Rel.) Home Phone Work Phone Mobile Phone    Eunice Sharif (Daughter) [phone] " -- [phone]    Corrie Pool (Grandchild) [phone] -- [phone]               History & Physical      OAyaka Villegas PA at 21 0984     Attestation signed by Gus Reese DO at 21 7706    Patient with increased confusion from baseline was discovered to have had prescription from pharmacy incorrectly filled as gabapentin instead of lyrica.  Last dose taken yesterday with and with increasing altered mental status since began taking 4-5 days ago.  Daughter also reports dysuria and hx of UTI however UA not very convincing.  Checking culture and holding on abx for now and will monitor for improvement. Hypertensive crisis present as well likely contributing have cautiously started adding antihypertensives as spoke with patients cardiology MERRY who saw her last that reports very labile blood pressure with episodes of hypotension on minimal medications.                        [facility]  HISTORY & PHYSICAL    Patient Identification:  Name:  Kiah Conway  Age:  75 y.o.  Sex:  female  :  1945  MRN:  [id number]   Visit Number:  [id number]  Admit Date: 2021   Primary Care Physician:  Chip Manzano APRN     Subjective     Chief complaint:   Chief Complaint   Patient presents with   • Drug Overdose   • Altered Mental Status     History of presenting illness:   Patient is a 75 y.o. female with past medical history significant for essential hypertension, hyperlipidemia, paroxysmal SVT, fibromyalgia, chronic pain, follicular lymphoma of thyroid in remission, GERD, dementia, and anxiety that presented to the [facility] emergency department for evaluation of altered mental status. Patient is awake and alert, but disoriented. She will answer simple yes or no questions and follows simple commands, but will otherwise not speak. Patient's daughter, Eunice, is present at bedside, aiding in history of presenting illness. Per the daughter,  she picked up the patient's medication refills from their pharmacy on 4/5/2021. She states that the patient's Lyrica looked different, but she was not sure if the pill had changed. However, after 4-5 doses of the medication after refill, the patient became altered. That is when she realized that the patient's Lyrica had been filled wrong and the medication inside the Lyrica bottle from the pharmacy was in actuality Neurontin. She states she knew this because she herself takes Neurontin and the pills matched hers. Daughter states that the patient has been confused. She states at baseline the patient will have times when she cannot recall info, but more often than not her memory is intact. She states that the patient has not been ambulatory in approximately one year after chemo for follicular lymphoma. She states that the patient has been slightly agitated, not sleeping, and with decreased PO intake. She states that the patient has not missed any doses of her other home meds, but she has not given the patient anymore of the Neurontin and has not resumed the Lyrica due to confusion. Daughter states that the patient is very sensitive to medications, she cannot take muscle relaxers as she responds the same way. She does report noticing that the patient has had increased urinary frequency over the past several days. She states that despite not having Neurontin/Lyrica dose in the past two days, the confusion and AMS has remained constant. Daughter denies any other issues. Patient is unable to confirm nor deny any complaints at my time of evaluation.     Upon arrival to the ED, vitals were temperature 98, heart rate 66, respiratory rate 16, blood pressure 195/83, and oxygen saturation 97% on room air.  Troponin T negative.  CMP grossly unremarkable.  CBC with white blood cell count 11.58, absolute neutrophil count 8.48, otherwise unremarkable.  Urinalysis with trace leukocytes, 3-5 WBC, trace bacteria.  UDS positive for  opiates.  EtOH negative.  Chest x-ray with stable appearance, coarsened interstitial markings with right Port-A-Cath tip in SVC.  CT head without contrast with no evidence of acute intracranial abnormality. COVID-19/influenza screening negative. While in the ED, patient was administered 50 mg p.o. Lopressor and 5 mg p.o. Norvasc.     Patient has been admitted in observation status for further evaluation and treatment.     Present during exam: Patient's daughter Eunice   ---------------------------------------------------------------------------------------------------------------------   Review of Systems   Unable to perform ROS: Mental status change      ---------------------------------------------------------------------------------------------------------------------   Past Medical History:   Diagnosis Date   • Arthritis    • Cardiac disorder    • Diverticulitis    • Dysphagia    • Fibromyalgia    • Follicular lymphoma (CMS/HCC)     Remisson    • GERD (gastroesophageal reflux disease)    • History of rheumatic fever    • Hypertension    • Hypokalemia due to inadequate potassium intake 03/03/2014   • Lipoma    • Malignant neoplasm (CMS/HCC)    • Migraine    • Neutropenia (CMS/HCC) 11/7/2019   • Obstructive sleep apnea 7/14/2016   • Prolonged Q-T interval on ECG 11/7/2019   • Recurrent UTI    • Rheumatoid arthritis (CMS/HCC)    • Sleep apnea    • Thyroid cancer (CMS/HCC)      Past Surgical History:   Procedure Laterality Date   • BLADDER SURGERY     • FINGER SURGERY      reattached    • HYSTERECTOMY     • THYROID SURGERY       Family History   Problem Relation Age of Onset   • Diabetes Mother    • Hypertension Mother    • Other Other         cardiac disorder,cardiovascular disease, malignant neoplasm of brain   • Breast cancer Paternal Aunt      Social History     Socioeconomic History   • Marital status:      Spouse name: Not on file   • Number of children: Not on file   • Years of education: Not on file    • Highest education level: Not on file   Tobacco Use   • Smoking status: Never Smoker   • Smokeless tobacco: Never Used   Substance and Sexual Activity   • Alcohol use: No   • Drug use: No   • Sexual activity: Defer     ---------------------------------------------------------------------------------------------------------------------   Allergies:  Patient has no known allergies.  ---------------------------------------------------------------------------------------------------------------------   Medications below are reported home medications pulling from within the system; at this time, these medications have not been reconciled unless otherwise specified and are in the verification process for further verifcation as current home medications.    Prior to Admission Medications     Prescriptions Last Dose Informant Patient Reported? Taking?    amLODIPine (NORVASC) 5 MG tablet   No No    Take 1 tablet by mouth Daily.    aspirin 81 MG EC tablet  Child Yes No    Take 81 mg by mouth Daily.    atorvastatin (LIPITOR) 10 MG tablet   No No    Take 1 tablet by mouth Daily.    busPIRone (BUSPAR) 5 MG tablet  Child Yes No    Take 5 mg by mouth 2 (Two) Times a Day. 1/2 AM AND 1/2 PM    Calcium Polycarbophil (FIBER-CAPS PO)  Self Yes No    Take 1 capsule by mouth Daily.    conjugated estrogens (PREMARIN) 0.625 MG/GM vaginal cream  Child Yes No    Insert 1 g into the vagina Daily.    FLUoxetine (PROzac) 20 MG capsule  Child Yes No    Take 20 mg by mouth Daily.    HYDROcodone-acetaminophen (NORCO) 7.5-325 MG per tablet  Child Yes No    Take 1 tablet by mouth 3 (Three) Times a Day As Needed for Moderate Pain .    lactobacillus acidophilus (RISAQUAD) capsule capsule  Child No No    Take 1 capsule by mouth Daily.    loratadine (CLARITIN) 10 MG tablet  Child Yes No    Take 10 mg by mouth Daily.    megestrol (MEGACE) 40 MG tablet  Child Yes No    Take 40 mg by mouth 2 (Two) Times a Day.    metoprolol tartrate (LOPRESSOR) 50 MG  tablet  Child No No    Take 1 tablet by mouth Every 12 (Twelve) Hours.    omeprazole (priLOSEC) 40 MG capsule  Child Yes No    Take 40 mg by mouth Daily.    pregabalin (LYRICA) 300 MG capsule  Child Yes No    Take 300 mg by mouth 2 (Two) Times a Day.        ---------------------------------------------------------------------------------------------------------------------    Objective       Current listed hospital scheduled medications may not yet reflect those currently placed in orders that are signed and held, awaiting patient's arrival to floor/unit.    ---------------------------------------------------------------------------------------------------------------------   Vital Signs:  Temp:  [98 °F (36.7 °C)] 98 °F (36.7 °C)  Heart Rate:  [65-78] 75  Resp:  [16] 16  BP: (166-198)/() 166/88  Mean Arterial Pressure (Non-Invasive) for the past 24 hrs (Last 3 readings):   Noninvasive MAP (mmHg)   04/09/21 1012 119   04/09/21 0953 132   04/09/21 0943 109     SpO2 Percentage    04/09/21 0942 04/09/21 0953 04/09/21 1012   SpO2: 96% 96%  Comment: provider aware 97%     SpO2:  [93 %-98 %] 97 %  on   ;   Device (Oxygen Therapy): room air    Body mass index is 23.62 kg/m².  Wt Readings from Last 3 Encounters:   04/09/21 56.7 kg (125 lb)   09/21/20 54.4 kg (120 lb)   07/15/20 54.4 kg (120 lb)       ---------------------------------------------------------------------------------------------------------------------   Physical Exam:  Physical Exam  Nursing note reviewed.   Constitutional:       General: She is awake. She is not in acute distress.     Appearance: She is well-developed. She is not toxic-appearing.      Comments: Patient is awake and alert, disoriented. She will follow simple commands, but not answer questions. Daughter Eunice at bedside. Patient is on room air.    HENT:      Head: Normocephalic and atraumatic.      Right Ear: External ear normal.      Left Ear: External ear normal.      Nose: Nose normal.       Mouth/Throat:      Mouth: Mucous membranes are moist.      Pharynx: Oropharynx is clear.   Eyes:      Extraocular Movements: Extraocular movements intact.      Conjunctiva/sclera: Conjunctivae normal.      Pupils: Pupils are equal, round, and reactive to light.   Cardiovascular:      Rate and Rhythm: Normal rate and regular rhythm.      Pulses:           Dorsalis pedis pulses are 2+ on the right side and 2+ on the left side.        Posterior tibial pulses are 2+ on the right side and 2+ on the left side.      Heart sounds: Normal heart sounds. No murmur heard.   No friction rub. No gallop.    Pulmonary:      Effort: Pulmonary effort is normal. No tachypnea, accessory muscle usage or respiratory distress.      Breath sounds: Normal breath sounds and air entry. No wheezing, rhonchi or rales.   Chest:      Chest wall: No tenderness.   Abdominal:      General: Bowel sounds are normal. There is no distension.      Palpations: Abdomen is soft.      Tenderness: There is no abdominal tenderness. There is no guarding or rebound.      Hernia: No hernia is present.   Genitourinary:     Comments: No azevedo catheter in place.  Musculoskeletal:      Cervical back: Normal range of motion and neck supple.      Right lower leg: No edema.      Left lower leg: No edema.      Comments: Patient with 5/5 strength bilateral upper and lower extremities    Skin:     General: Skin is warm and dry.      Capillary Refill: Capillary refill takes less than 2 seconds.      Findings: No abscess, erythema, lesion or wound.   Neurological:      General: No focal deficit present.      Mental Status: She is alert. She is disoriented.      Cranial Nerves: Cranial nerves are intact. No facial asymmetry.      Sensory: Sensation is intact.      Motor: Motor function is intact.      Comments: Awake and alert. Follows simple commands. She does not answer questions for me. Moves all extremities equally. Strength and sensation intact. No focal neuro  deficit on exam. No facial droop.    Psychiatric:      Comments: Difficult to assess due to AMS      ---------------------------------------------------------------------------------------------------------------------      Telemetry:    Patient not currently on telemetry, will review once available    I have personally reviewed the EKG/Telemetry strip  ---------------------------------------------------------------------------------------------------------------------     Last Echocardiogram:  Results for orders placed during the hospital encounter of 11/07/19    Adult Transthoracic Echo Complete W/ Cont if Necessary Per Protocol    Interpretation Summary  · Normal left ventricular cavity size and wall thickness noted. All left ventricular wall segments contract normally.  · Estimated EF appears to be in the range of 66 - 70%.  · The aortic valve is structurally normal. Mild aortic valve regurgitation is present. No aortic valve stenosis is present.  · The mitral valve is normal in structure. Trace mitral valve regurgitation is present. No significant mitral valve stenosis is present.  · The tricuspid valve is normal. Mild tricuspid valve regurgitation is present. Estimated right ventricular systolic pressure from tricuspid regurgitation is mildly elevated (35-45 mmHg).  · There is no evidence of pericardial effusion.    ---------------------------------------------------------------------------------------------------------------------    Assessment & Plan      -Encephalopathy, toxic vs hypertensive, superimposed on suspected underlying early dementia   • Head CT unremarkable   • Per report, patient's home lyrica was accidentally filled with Neurontin at patient's pharmacy, could be cause of AMS.   • Patient with hypertensive crisis on admit as well, could be contributing   • Labs grossly unremarkable, TSH WNL.   • UDS positive for opiates, home medication   • UA not grossly remarkable but due to history of  frequent UTI in past and sxs of increased urinary frequency will send for urine culture   • Obtain ammonia level  • Neuro checks   • Supportive care, allow time for Neurontin to wear off   • Tx hypertensive crisis as outlined below     -Essential hypertension with hypertensive urgency present on admission   -Hyperlipidemia   -History of PSVT  ·  on presentation   · Metoprolol 50 mg and 5 mg Norvasc administered in ED with improvement of SBP to 166.   · However, on arrival to floor SBP back into 180s.   Plan to resume home antihypertensive regimen once reconciled per pharmacy with appropriate holding parameters to prevent hypotension and/or bradycardia   Monitor BP Q30 minutes until stabilized, then continue per protocol  Add 10 mg PO hydralazine TID to begin as soon as possible   · Lipid panel recently obtained reviewed with adequate control, continue home statin     -History of follicular lymphoma of thyroid in remission s/p surgery   · Cont outpatient monitoring   · TSH adequate     -GERD  • PPI    -Anxiety   · Supportive care   · Continue home medication once reconciled per pharmacy     -F/E/N  • No IV fluids. Replace electrolytes per protocol. NPO diet.     ---------------------------------------------------  DVT Prophylaxis: Lovenox   GI Prophylaxis: PPI   Activity: Up with assistance as tolerated, fall precautions     The patient is considered to be a high risk patient due to: AMS/Encephalopathy, HTN crisis, possible effect of Neurontin, dementia, history of thyroid cancer, anxiety, advancedage    OBSERVATION status, however if further evaluation or treatment plans warrant, status may change.  Based upon current information, I predict patient's care encounter to be less than or equal to 2 midnights.    Code Status: FULL CODE     I have discussed the patient's assessment and plan with daughter at bedside, Naida ALBERTO, and attending physician Dr. Reese, DO Ayaka Gamino PA-C  Hospitalist Service --  [facility]   Pager: [phone]    04/09/21  10:36 EDT    Attending Physician: Dr. Hugh DO      ---------------------------------------------------------------------------------------------------------------------          Electronically signed by Gus Reese DO at 04/09/21 0122          Emergency Department Notes      Tj Whittaker DO at 04/09/21 8991          Subjective   Patient presents with her daughter who notes that the patient has been confused and drowsy for the past 2 days.  Patient takes Lyrica but according to the daughter the pharmacy accidentally put gabapentin and the Lyrica bottle and the patient has been taking gabapentin for the past 2 days.  Daughter denies any trauma.  History obtained from daughter as patient is altered and unable to give history.      History provided by:  Relative  History limited by:  Mental status change   used: No        Review of Systems   Unable to perform ROS: Mental status change   Constitutional: Positive for fatigue.   Psychiatric/Behavioral: Positive for confusion and decreased concentration.           Objective   Physical Exam  Vitals reviewed.   Constitutional:       Appearance: She is well-developed. She is not diaphoretic.      Comments: Obtunded   HENT:      Head: Normocephalic and atraumatic.      Mouth/Throat:      Mouth: Mucous membranes are moist.      Pharynx: Oropharynx is clear.   Eyes:      Extraocular Movements: Extraocular movements intact.      Pupils: Pupils are equal, round, and reactive to light.   Cardiovascular:      Rate and Rhythm: Normal rate and regular rhythm.      Heart sounds: Normal heart sounds.   Pulmonary:      Effort: Pulmonary effort is normal.      Breath sounds: Normal breath sounds.   Abdominal:      General: Bowel sounds are normal.      Palpations: Abdomen is soft.   Musculoskeletal:         General: No swelling or tenderness.      Cervical back: Normal range of motion and neck supple.    Skin:     General: Skin is warm and dry.      Capillary Refill: Capillary refill takes less than 2 seconds.   Neurological:      Mental Status: She is disoriented and confused.      GCS: GCS eye subscore is 4. GCS verbal subscore is 4. GCS motor subscore is 6.      Cranial Nerves: No cranial nerve deficit.      Sensory: No sensory deficit.      Motor: No weakness.      Comments: Oriented to self and place but not to time   Psychiatric:         Cognition and Memory: Memory is not impaired.      Comments: Flat affect         Procedures          ED Course  ED Course as of Apr 09 1439 Fri Apr 09, 2021   1020 EKG performed at 0746 shows sinus rhythm with a left axis deviation with a rate of 65 bpm, short MI interval of 114 ms, normal QT interval, normal ST segments and T waves, and PACs noted    [JG]      ED Course User Index  [JG] Tj Whittaker DO                                           MDM  Number of Diagnoses or Management Options  Acute encephalopathy  Diagnosis management comments: CT brain and chest x-ray are unremarkable labs show opiates in the urine.  Patient takes Norco at home.  It appears that her symptoms are likely from polypharmacy, likely due to recently starting gabapentin by accident.  I spoke to the hospitalist who agrees to admit the patient.  I anticipate that her condition will improve as she metabolizes gabapentin.  She is stable at time of admission.  Patient and daughter are agreeable with plan.      Final diagnoses:   Acute encephalopathy       ED Disposition  ED Disposition     ED Disposition Condition Comment    Decision to Admit  Level of Care: Med/Surg [1]   Diagnosis: Acute encephalopathy [648755]            No follow-up provider specified.       Medication List      ASK your doctor about these medications    metoprolol tartrate 50 MG tablet  Commonly known as: LOPRESSOR  Ask about: Which instructions should I use?             Tj Whittaker DO  04/09/21  1452      Electronically signed by Tj Whittaker DO at 04/09/21 1452              Scheduled Meds Sorted by Name  for Kiah Conway as of 4/8/21 through 4/14/21      Medications 04/09/21 04/10/21 04/11/21 04/12/21 04/13/21 04/14/21   amLODIPine (NORVASC) tablet 2.5 mg   Dose: 2.5 mg  Freq: Every 24 Hours Scheduled Route: PO  Start: 04/12/21 1845    Admin Instructions:   Caution: Look alike/sound alike drug alert. Avoid grapefruit juice.       1805 0825 0900        amLODIPine (NORVASC) tablet 5 mg   Dose: 5 mg  Freq: Once Route: PO  Start: 04/09/21 0915 End: 04/09/21 0926    Admin Instructions:   Caution: Look alike/sound alike drug alert. Avoid grapefruit juice.    0926             aspirin EC tablet 81 mg   Dose: 81 mg  Freq: Daily Route: PO  Start: 04/09/21 2100    Admin Instructions:   Herbal/drug interaction: Avoid use with ginkgo biloba. Do not crush or chew.  Do not exceed 4 grams of aspirin in a 24 hr period.    If given for pain, use the following pain scale:   Mild Pain = Pain Score of 1-3, CPOT 1-2  Moderate Pain = Pain Score of 4-6, CPOT 3-4  Severe Pain = Pain Score of 7-10, CPOT 5-8    (2039)       0981      0917      0825      0911        atorvastatin (LIPITOR) tablet 10 mg   Dose: 10 mg  Freq: Nightly Route: PO  Start: 04/09/21 2100    Admin Instructions:   Avoid grapefruit juice.    2039      (2036)      2108) 3679      2020 2100        busPIRone (BUSPAR) tablet 2.5 mg   Dose: 2.5 mg  Freq: 2 Times Daily Route: PO  Start: 04/09/21 2100    Admin Instructions:   Caution: Look alike/sound alike drug alert. Take with food. Avoid grapefruit juice.    2052 0813 2036 0946 [phone] [id number]      0825     2019 0911 2100        cefTRIAXone (ROCEPHIN) 1 g/100 mL 0.9% NS (MBP)   Dose: 1 g  Freq: Every 24 Hours Route: IV  Indications of Use: URINARY TRACT INFECTION  Last Dose: Stopped (04/12/21 1105)  Start: 04/11/21 1000 End: 04/12/21 6925     Admin Instructions:   Caution: Look alike/sound alike drug alert. Break seal and mix to activiate vial before use.      1055     1125      1033     1105     1735-D/C'd        cetirizine (zyrTEC) tablet 5 mg   Dose: 5 mg  Freq: Daily Route: PO  Start: 04/09/21 2100    2040       0925      0917      0825      0911        cyanocobalamin injection 1,000 mcg   Dose: 1,000 mcg  Freq: Daily Route: IM  Start: 04/11/21 1000      1055      0918      0825      0910        enoxaparin (LOVENOX) syringe 40 mg   Dose: 40 mg  Freq: Every 24 Hours Scheduled Route: SC  Indications of Use: PROPHYLAXIS OF VENOUS THROMBOEMBOLISM  Start: 04/09/21 1000    Admin Instructions:   Give subcutaneous in abdomen only. Do not massage site after injection.    1011      0812      0924      0919      0825      0914        First Mouthwash (Magic Mouthwash) 5 mL   Dose: 5 mL  Freq: Every 6 Hours Route: SWISH & SPIT  Start: 04/11/21 1200    Admin Instructions:   Shake Well Before Each Use. Stable 180 days at room temp after mixing.      1116     1720      (9308)     0612     1238     1750      (0074) 1457     1128     1712      0113     0533     1200     1800        FLUoxetine (PROzac) capsule 20 mg   Dose: 20 mg  Freq: Daily Route: PO  Start: 04/09/21 2100    Admin Instructions:   Caution: Look alike/sound alike drug alert    2040       0924      0918      0824      0910        hydrALAZINE (APRESOLINE) injection 10 mg   Dose: 10 mg  Freq: Once Route: IV  Start: 04/10/21 0015 End: 04/09/21 2335    Admin Instructions:   As needed for SBP greater than 170  Caution: Look alike/sound alike drug alert    2335             hydrALAZINE (APRESOLINE) tablet 10 mg   Dose: 10 mg  Freq: Every 8 Hours Scheduled Route: PO  Start: 04/09/21 1200    Admin Instructions:   Hold for SBP < 110  Caution: Look alike/sound alike drug alert    1427     (2148)      1617     1513     2035     (2127)      0532     1424     (2105)      0503 [id number] [id number]      133     2222      0533     1400     2200        HYDROcodone-acetaminophen (NORCO) 7.5-325 MG per tablet 1 tablet   Dose: 1 tablet  Freq: 3 Times Daily Route: PO  Start: 04/09/21 2100    Admin Instructions:   Hold for oversedation, respiratory depression, and/or SBP < 110  [ANNE-MARIE]    Do not exceed 4 grams of acetaminophen in a 24 hr period. Max dose of 2gm for AST/ALT greater than 120 units/L        If given for pain, use the following pain scale:   Mild Pain = Pain Score of 1-3, CPOT 1-2  Moderate Pain = Pain Score of 4-6, CPOT 3-4  Severe Pain = Pain Score of 7-10, CPOT 5-8    1729 2100 [C]      0812     1513     2035      0924     1550     2104) 1608     1521     2157      0829     1534     2018      0910     1600     2100        ketorolac (TORADOL) injection 15 mg   Dose: 15 mg  Freq: Once Route: IV  Start: 04/12/21 0330 End: 04/12/21 0306    Admin Instructions:       If given for pain, use the following pain scale:  Mild Pain = Pain Score of 1-3, CPOT 1-2  Moderate Pain = Pain Score of 4-6, CPOT 3-4  Severe Pain = Pain Score of 7-10, CPOT 5-8       0306          Magnesium Sulfate 2 gram infusion- Mg 1.6 - 1.9 mg/dL   Dose: 2 g  Freq: Once Route: IV  Last Dose: 2 g (04/12/21 1806)  Start: 04/12/21 1830 End: 04/12/21 2006    Admin Instructions:   Recheck Mg level in the AM.       1806          megestrol (MEGACE) 40 MG/ML suspension 200 mg   Dose: 200 mg  Freq: Daily Route: PO  Start: 04/13/21 0900    Admin Instructions:   Shake well before use.  HAZARDOUS - Handle with care        0825      0900        megestrol (MEGACE) tablet 40 mg   Dose: 40 mg  Freq: 2 Times Daily Route: PO  Start: 04/09/21 2100 End: 04/12/21 1725    Admin Instructions:   HAZARDOUS - Handle with care    2039      0813     2035      0924 (9895) 7588 7982-D/C'd        memantine (NAMENDA) tablet 5 mg   Dose: 5 mg  Freq: Daily Route: PO  Start: 04/09/21 2100    (2040)       0926      0918      0826      0912         metoprolol tartrate (LOPRESSOR) tablet 50 mg   Dose: 50 mg  Freq: Every 12 Hours Scheduled Route: PO  Start: 04/09/21 2100    Admin Instructions:   Hold for SBP < 110 and/or HR < 60    2039      0813 2035      0985 (9144)      0930     2214      0825 2018 0911 2100        metoprolol tartrate (LOPRESSOR) tablet 50 mg   Dose: 50 mg  Freq: Once Route: PO  Start: 04/09/21 0915 End: 04/09/21 0925 0925             nystatin (MYCOSTATIN) 333247 UNIT/ML suspension 600,000 Units   Dose: 6 mL  Freq: 4 Times Daily Route: SWISH & SWAL  Start: 04/11/21 1200 End: 04/21/21 1159    Admin Instructions:   Choose administration instruction: swish and spit or swish and swallow      1118     1720 (7084) (4914)     (7419)     (6703)     2155      0823     1116     1715     2020 0912 1200 1800 2100        pantoprazole (PROTONIX) EC tablet 40 mg   Dose: 40 mg  Freq: Every Early Morning Route: PO  Start: 04/09/21 1300    Admin Instructions:   Swallow whole; do not crush, split, or chew.    2423 (9325) [id number] 0287        piperacillin-tazobactam (ZOSYN) 3.375 g/100 mL 0.9% NS IVPB (mbp)   Dose: 3.375 g  Freq: Every 8 Hours Route: IV  Indications of Use: INTRA-ABDOMINAL INFECTION  Last Dose: Stopped (04/11/21 0436)  Start: 04/10/21 1800 End: 04/11/21 0743    Admin Instructions:   Break seal and mix to activate vial before use     7087 9701 7014     0438     0743-D/C'd         piperacillin-tazobactam (ZOSYN) 3.375 g/100 mL 0.9% NS IVPB (mbp)   Dose: 3.375 g  Freq: Once Route: IV  Last Dose: Stopped (04/10/21 1230)  Start: 04/10/21 1200 End: 04/10/21 1230    Admin Instructions:   Break seal and mix to activate vial before use     3055 5503            potassium chloride (K-DUR,KLOR-CON) CR tablet 40 mEq   Dose: 40 mEq  Freq: Every 4 Hours Route: PO  Start: 04/14/21 1000 End: 04/14/21 2159         0910     1400     1800     2159-D/C'd      potassium  chloride (K-DUR,KLOR-CON) CR tablet 40 mEq   Dose: 40 mEq  Freq: Every 4 Hours Route: PO  Start: 04/12/21 1830 End: 04/12/21 2150    Admin Instructions:   Potassium 3.1 or Less Give KCl 40 mEq q4h x3 Doses   Potassium 3.2 - 3.6 Give KCl 40 mEq q4h x2 Doses     Check Potassium 4 Hours After Last Dose Given   Check Magnesium if Potassium Level Remains Low After Replacement   DO NOT GIVE if CrCl is Less Than 30 mL/minute or Urine Output Less Than 30 mL/hr       1805     2150          potassium chloride 10 mEq in 100 mL IVPB   Dose: 10 mEq  Freq: Every 2 Hours Route: IV  Last Dose: Stopped (04/11/21 1500)  Start: 04/11/21 1000 End: 04/11/21 1500    Admin Instructions:   OUTPATIENT/NON-MONITORED UNITS: Potassium Chloride standard bolus infusion rate is a maximum of 10 mEq/hr on unmonitored patients    MONITORED UNITS: Potassium Chloride standard bolus infusion rate is a maximum of 20 mEq/hr on ECG monitored patients ONLY      1110     1207     1240     1447     1500           sodium chloride 0.9 % bolus 1,000 mL   Dose: 1,000 mL  Freq: Once Route: IV  Last Dose: Stopped (04/10/21 2128)  Start: 04/10/21 1730 End: 04/10/21 2128     1744 2128            sodium chloride 0.9 % bolus 1,000 mL   Dose: 1,000 mL  Freq: Once Route: IV  Last Dose: Stopped (04/09/21 0927)  Start: 04/09/21 0741 End: 04/09/21 0927    0804     0927             sodium chloride 0.9 % flush 10 mL   Dose: 10 mL  Freq: Every 12 Hours Scheduled Route: IV  Start: 04/09/21 0938    1010         1139 (6968) 6222 (7826) 6157 2056 1369 1410 (6668) 9839        SUMAtriptan (IMITREX) injection 6 mg   Dose: 6 mg  Freq: Once Route: SC  Start: 04/10/21 0200 End: 04/10/21 0145    Admin Instructions:   May repeat dose in 1 hours if no relief.  Do not exceed 12 mg in 24 hours.  Caution: Look alike/sound alike drug alert     0145            Medications 04/09/21 04/10/21 04/11/21 04/12/21 04/13/21 04/14/21       Continuous Meds  Sorted by Name  for Kiah Conway as of 4/8/21 through 4/14/21      Medications 04/09/21 04/10/21 04/11/21 04/12/21 04/13/21 04/14/21   sodium chloride 0.9 % infusion   Rate: 75 mL/hr Dose: 75 mL/hr  Freq: Continuous Route: IV  Last Dose: 75 mL/hr (04/13/21 2058)  Start: 04/12/21 1815       1750     1951      0536     0759     0959     1208     1438     1715     1810     2058         sodium chloride 0.9 % infusion   Rate: 100 mL/hr Dose: 100 mL/hr  Freq: Continuous Route: IV  Last Dose: Stopped (04/12/21 1215)  Start: 04/10/21 1145 End: 04/12/21 1138     1139     1412     1642     1922     2126      0059     0310     0436     1211     1453     1550     1752     2106     2312      0116     0328     0546     0748     0949     1138-D/C'd  1215              PRN Meds Sorted by Name  for Kiah Conway as of 4/8/21 through 4/14/21      Medications 04/09/21 04/10/21 04/11/21 04/12/21 04/13/21 04/14/21   acetaminophen (TYLENOL) tablet 650 mg   Dose: 650 mg  Freq: Every 6 Hours PRN Route: PO  PRN Reason: Mild Pain   Start: 04/09/21 1118    Admin Instructions:   Do not exceed 4 grams of acetaminophen in a 24 hr period. Max dose of 2gm for AST/ALT greater than 120 units/L      If given for pain, use the following pain scale:   Mild Pain = Pain Score of 1-3, CPOT 1-2  Moderate Pain = Pain Score of 4-6, CPOT 3-4  Severe Pain = Pain Score of 7-10, CPOT 5-8    1427     2103       0535      0542      0339     2222      0517        hydrALAZINE (APRESOLINE) injection 10 mg   Dose: 10 mg  Freq: Every 4 Hours PRN Route: IV  PRN Reason: High Blood Pressure  Start: 04/10/21 1637    Admin Instructions:   As needed for SBP greater than 170/105  Caution: Look alike/sound alike drug alert     2133       0032          ondansetron (ZOFRAN) injection 4 mg   Dose: 4 mg  Freq: Every 6 Hours PRN Route: IV  PRN Reasons: Nausea,Vomiting  Start: 04/10/21 0410     0536       1950      0246      0542        Medications 04/09/21 04/10/21  04/11/21 04/12/21 04/13/21 04/14/21           Lab Results (all)     Procedure Component Value Units Date/Time    Comprehensive Metabolic Panel [[id number]]  (Abnormal) Collected: 04/14/21 0331    Specimen: Blood Updated: 04/14/21 0411     Glucose 83 mg/dL      BUN 4 mg/dL      Creatinine 0.57 mg/dL      Sodium 140 mmol/L      Potassium 2.9 mmol/L      Comment: Slight hemolysis detected by analyzer. Results may be affected.        Chloride 110 mmol/L      CO2 16.8 mmol/L      Calcium 8.5 mg/dL      Total Protein 5.9 g/dL      Albumin 3.51 g/dL      ALT (SGPT) 12 U/L      AST (SGOT) 19 U/L      Alkaline Phosphatase 56 U/L      Total Bilirubin 0.4 mg/dL      eGFR Non African Amer 103 mL/min/1.73      Globulin 2.4 gm/dL      A/G Ratio 1.5 g/dL      BUN/Creatinine Ratio 7.0     Anion Gap 13.2 mmol/L     C-reactive Protein [[id number]]  (Normal) Collected: 04/14/21 0331    Specimen: Blood Updated: 04/14/21 0411     C-Reactive Protein 0.50 mg/dL     CBC Auto Differential [[id number]]  (Abnormal) Collected: 04/14/21 0331    Specimen: Blood Updated: 04/14/21 0350     WBC 5.02 10*3/mm3      RBC 3.87 10*6/mm3      Hemoglobin 12.1 g/dL      Hematocrit 36.8 %      MCV 95.1 fL      MCH 31.3 pg      MCHC 32.9 g/dL      RDW 13.8 %      RDW-SD 48.1 fl      MPV 11.4 fL      Platelets 261 10*3/mm3      Neutrophil % 65.8 %      Lymphocyte % 16.9 %      Monocyte % 12.7 %      Eosinophil % 3.0 %      Basophil % 1.2 %      Immature Grans % 0.4 %      Neutrophils, Absolute 3.30 10*3/mm3      Lymphocytes, Absolute 0.85 10*3/mm3      Monocytes, Absolute 0.64 10*3/mm3      Eosinophils, Absolute 0.15 10*3/mm3      Basophils, Absolute 0.06 10*3/mm3      Immature Grans, Absolute 0.02 10*3/mm3      nRBC 0.0 /100 WBC     Urinalysis With Culture If Indicated - Urine, Random Void [[id number]]  (Abnormal) Collected: 04/14/21 0114    Specimen: Urine, Random Void Updated: 04/14/21 0125     Color, UA Yellow     Appearance, UA Clear     pH, UA 5.5      Specific Gravity, UA 1.013     Glucose, UA Negative     Ketones, UA 40 mg/dL (2+)     Bilirubin, UA Negative     Blood, UA Negative     Protein, UA Negative     Leuk Esterase, UA Negative     Nitrite, UA Negative     Urobilinogen, UA 0.2 E.U./dL    Narrative:      Urine microscopic not indicated.    Troponin [[id number]]  (Normal) Collected: 04/13/21 0332    Specimen: Blood Updated: 04/13/21 0410     Troponin T <0.010 ng/mL     Comprehensive Metabolic Panel [[id number]]  (Abnormal) Collected: 04/13/21 0116    Specimen: Blood Updated: 04/13/21 0218     Glucose 77 mg/dL      BUN 5 mg/dL      Creatinine 0.64 mg/dL      Sodium 139 mmol/L      Potassium 3.7 mmol/L      Chloride 110 mmol/L      CO2 16.8 mmol/L      Calcium 8.4 mg/dL      Total Protein 5.5 g/dL      Albumin 3.44 g/dL      ALT (SGPT) 9 U/L      AST (SGOT) 16 U/L      Alkaline Phosphatase 50 U/L      Total Bilirubin 0.3 mg/dL      eGFR Non African Amer 90 mL/min/1.73      Globulin 2.1 gm/dL      A/G Ratio 1.7 g/dL      BUN/Creatinine Ratio 7.8     Anion Gap 12.2 mmol/L     Magnesium [[id number]]  (Normal) Collected: 04/13/21 0116    Specimen: Blood Updated: 04/13/21 0218     Magnesium 2.2 mg/dL     CBC Auto Differential [[id number]]  (Abnormal) Collected: 04/13/21 0116    Specimen: Blood Updated: 04/13/21 0200     WBC 6.33 10*3/mm3      RBC 3.54 10*6/mm3      Hemoglobin 11.0 g/dL      Hematocrit 34.4 %      MCV 97.2 fL      MCH 31.1 pg      MCHC 32.0 g/dL      RDW 13.7 %      RDW-SD 49.0 fl      MPV 11.7 fL      Platelets 254 10*3/mm3      Neutrophil % 73.8 %      Lymphocyte % 10.7 %      Monocyte % 13.0 %      Eosinophil % 1.6 %      Basophil % 0.6 %      Immature Grans % 0.3 %      Neutrophils, Absolute 4.67 10*3/mm3      Lymphocytes, Absolute 0.68 10*3/mm3      Monocytes, Absolute 0.82 10*3/mm3      Eosinophils, Absolute 0.10 10*3/mm3      Basophils, Absolute 0.04 10*3/mm3      Immature Grans, Absolute 0.02 10*3/mm3      nRBC 0.0 /100 WBC     Potassium  [[id number]]  (Normal) Collected: 04/12/21 2313    Specimen: Blood Updated: 04/12/21 2344     Potassium 3.6 mmol/L     Blood Gas, Venous With Co-Ox [[id number]]  (Abnormal) Collected: 04/12/21 1748    Specimen: Venous Blood Updated: 04/12/21 1751     Site Lab     pH, Venous 7.437 pH Units      pCO2, Venous 27.2 mm Hg      Comment: 84 Value below reference range        pO2, Venous 54.1 mm Hg      Comment: 83 Value above reference range        HCO3, Venous 18.3 mmol/L      Comment: 84 Value below reference range        Base Excess, Venous -4.6 mmol/L      O2 Saturation, Venous 88.2 %      Comment: 83 Value above reference range        Hemoglobin, Blood Gas 12.3 g/dL      Comment: 84 Value below reference range        CO2 Content 19.2 mmol/L      Temperature 37.0 C      Barometric Pressure for Blood Gas 724 mmHg      Modality Room Air     FIO2 21 %      Ventilator Mode NA     Collected by [id number]     Comment: Meter: [id number]     :  [id number]        Oxyhemoglobin Venous 87.7 %      Comment: 83 Value above reference range        Carboxyhemoglobin Venous <1.0 %      Comment: 94 Value below reportable range < 1.0        Methemoglobin Venous <1.0 %      Comment: 94 Value below reportable range < 1.0       Comprehensive Metabolic Panel [[id number]]  (Abnormal) Collected: 04/12/21 0415    Specimen: Blood Updated: 04/12/21 0522     Glucose 72 mg/dL      BUN 6 mg/dL      Creatinine 0.65 mg/dL      Sodium 139 mmol/L      Potassium 3.0 mmol/L      Chloride 106 mmol/L      CO2 15.8 mmol/L      Calcium 8.7 mg/dL      Total Protein 6.0 g/dL      Albumin 3.61 g/dL      ALT (SGPT) 12 U/L      AST (SGOT) 19 U/L      Alkaline Phosphatase 55 U/L      Total Bilirubin 0.4 mg/dL      eGFR Non African Amer 88 mL/min/1.73      Globulin 2.4 gm/dL      A/G Ratio 1.5 g/dL      BUN/Creatinine Ratio 9.2     Anion Gap 17.2 mmol/L     Phosphorus [[id number]]  (Normal) Collected: 04/12/21 0415    Specimen: Blood Updated: 04/12/21 0522      Phosphorus 2.7 mg/dL     CBC Auto Differential [[id number]]  (Abnormal) Collected: 04/12/21 0415    Specimen: Blood Updated: 04/12/21 0457     WBC 7.57 10*3/mm3      RBC 3.79 10*6/mm3      Hemoglobin 11.8 g/dL      Hematocrit 36.7 %      MCV 96.8 fL      MCH 31.1 pg      MCHC 32.2 g/dL      RDW 13.7 %      RDW-SD 48.6 fl      MPV 11.9 fL      Platelets 248 10*3/mm3      Neutrophil % 75.4 %      Lymphocyte % 12.4 %      Monocyte % 10.2 %      Eosinophil % 0.8 %      Basophil % 0.7 %      Immature Grans % 0.5 %      Neutrophils, Absolute 5.71 10*3/mm3      Lymphocytes, Absolute 0.94 10*3/mm3      Monocytes, Absolute 0.77 10*3/mm3      Eosinophils, Absolute 0.06 10*3/mm3      Basophils, Absolute 0.05 10*3/mm3      Immature Grans, Absolute 0.04 10*3/mm3      nRBC 0.0 /100 WBC     Comprehensive Metabolic Panel [[id number]]  (Abnormal) Collected: 04/11/21 0355    Specimen: Blood Updated: 04/11/21 0532     Glucose 95 mg/dL      BUN 11 mg/dL      Creatinine 0.77 mg/dL      Sodium 143 mmol/L      Potassium 3.2 mmol/L      Chloride 111 mmol/L      CO2 18.0 mmol/L      Calcium 8.8 mg/dL      Total Protein 6.0 g/dL      Albumin 3.64 g/dL      ALT (SGPT) 11 U/L      AST (SGOT) 22 U/L      Alkaline Phosphatase 58 U/L      Total Bilirubin 0.6 mg/dL      eGFR Non African Amer 73 mL/min/1.73      Globulin 2.4 gm/dL      A/G Ratio 1.5 g/dL      BUN/Creatinine Ratio 14.3     Anion Gap 14.0 mmol/L     Narrative:      GFR Normal >60  Chronic Kidney Disease <60  Kidney Failure <15      CBC Auto Differential [[id number]]  (Abnormal) Collected: 04/11/21 0355    Specimen: Blood Updated: 04/11/21 0527     WBC 7.74 10*3/mm3      RBC 3.74 10*6/mm3      Hemoglobin 11.5 g/dL      Hematocrit 35.9 %      MCV 96.0 fL      MCH 30.7 pg      MCHC 32.0 g/dL      RDW 13.6 %      RDW-SD 48.4 fl      MPV 12.2 fL      Platelets 284 10*3/mm3      Neutrophil % 66.5 %      Lymphocyte % 20.3 %      Monocyte % 11.8 %      Eosinophil % 0.6 %      Basophil % 0.5 %       Immature Grans % 0.3 %      Neutrophils, Absolute 5.15 10*3/mm3      Lymphocytes, Absolute 1.57 10*3/mm3      Monocytes, Absolute 0.91 10*3/mm3      Eosinophils, Absolute 0.05 10*3/mm3      Basophils, Absolute 0.04 10*3/mm3      Immature Grans, Absolute 0.02 10*3/mm3      nRBC 0.0 /100 WBC     Urine Culture - Urine, Urine, Catheter [[id number]]  (Normal) Collected: 04/09/21 0803    Specimen: Urine, Catheter Updated: 04/10/21 1037     Urine Culture No growth    Comprehensive Metabolic Panel [[id number]]  (Abnormal) Collected: 04/10/21 0956    Specimen: Blood Updated: 04/10/21 1032     Glucose 122 mg/dL      BUN 14 mg/dL      Creatinine 0.82 mg/dL      Sodium 141 mmol/L      Potassium 4.1 mmol/L      Chloride 105 mmol/L      CO2 19.7 mmol/L      Calcium 9.7 mg/dL      Total Protein 6.7 g/dL      Albumin 3.98 g/dL      ALT (SGPT) 13 U/L      AST (SGOT) 17 U/L      Alkaline Phosphatase 69 U/L      Total Bilirubin 0.6 mg/dL      eGFR Non African Amer 68 mL/min/1.73      Globulin 2.7 gm/dL      A/G Ratio 1.5 g/dL      BUN/Creatinine Ratio 17.1     Anion Gap 16.3 mmol/L     Magnesium [[id number]]  (Normal) Collected: 04/10/21 0956    Specimen: Blood Updated: 04/10/21 1032     Magnesium 1.8 mg/dL     Phosphorus [[id number]]  (Normal) Collected: 04/10/21 0956    Specimen: Blood Updated: 04/10/21 1032     Phosphorus 3.4 mg/dL     CBC Auto Differential [[id number]]  (Abnormal) Collected: 04/10/21 0725    Specimen: Blood Updated: 04/10/21 0801     WBC 12.81 10*3/mm3      RBC 4.55 10*6/mm3      Hemoglobin 14.2 g/dL      Hematocrit 45.9 %      .9 fL      MCH 31.2 pg      MCHC 30.9 g/dL      RDW 13.4 %      RDW-SD 49.9 fl      MPV 11.7 fL      Platelets 290 10*3/mm3      Neutrophil % 67.5 %      Lymphocyte % 12.9 %      Monocyte % 10.0 %      Eosinophil % 8.6 %      Basophil % 0.5 %      Immature Grans % 0.5 %      Neutrophils, Absolute 8.65 10*3/mm3      Lymphocytes, Absolute 1.65 10*3/mm3      Monocytes, Absolute 1.28  10*3/mm3      Eosinophils, Absolute 1.10 10*3/mm3      Basophils, Absolute 0.07 10*3/mm3      Immature Grans, Absolute 0.06 10*3/mm3      nRBC 0.0 /100 WBC     POC Glucose Once [[id number]]  (Normal) Collected: 04/10/21 0005    Specimen: Blood Updated: 04/10/21 0011     Glucose 96 mg/dL     Ammonia [[id number]]  (Normal) Collected: 04/09/21 1150    Specimen: Blood Updated: 04/09/21 1214     Ammonia 20 umol/L     Magnesium [[id number]]  (Normal) Collected: 04/09/21 0744    Specimen: Blood from Arm, Left Updated: 04/09/21 1134     Magnesium 1.9 mg/dL     COVID-19 and FLU A/B PCR - Swab, Nasopharynx [[id number]]  (Normal) Collected: 04/09/21 0924    Specimen: Swab from Nasopharynx Updated: 04/09/21 1003     COVID19 Not Detected     Influenza A PCR Not Detected     Influenza B PCR Not Detected    Urinalysis, Microscopic Only - Urine, Catheter [[id number]]  (Abnormal) Collected: 04/09/21 0803    Specimen: Urine, Catheter Updated: 04/09/21 0827     RBC, UA 0-2 /HPF      WBC, UA 3-5 /HPF      Bacteria, UA Trace /HPF      Squamous Epithelial Cells, UA 0-2 /HPF      Hyaline Casts, UA None Seen /LPF      Methodology Manual Light Microscopy    Urinalysis With Microscopic If Indicated (No Culture) - Urine, Catheter [[id number]]  (Abnormal) Collected: 04/09/21 0803    Specimen: Urine, Catheter Updated: 04/09/21 0817     Color, UA Yellow     Appearance, UA Clear     pH, UA 7.0     Specific Gravity, UA 1.018     Glucose, UA Negative     Ketones, UA Negative     Bilirubin, UA Negative     Blood, UA Negative     Protein, UA Negative     Leuk Esterase, UA Trace     Nitrite, UA Negative     Urobilinogen, UA 0.2 E.U./dL    Comprehensive Metabolic Panel [[id number]] Collected: 04/09/21 0744    Specimen: Blood from Arm, Left Updated: 04/09/21 0812     Glucose 94 mg/dL      BUN 14 mg/dL      Creatinine 0.86 mg/dL      Sodium 143 mmol/L      Potassium 4.3 mmol/L      Chloride 105 mmol/L      CO2 24.7 mmol/L      Calcium 9.7 mg/dL      Total  Protein 7.1 g/dL      Albumin 4.38 g/dL      ALT (SGPT) 16 U/L      AST (SGOT) 18 U/L      Alkaline Phosphatase 72 U/L      Total Bilirubin 0.5 mg/dL      eGFR Non African Amer 64 mL/min/1.73      Globulin 2.7 gm/dL      A/G Ratio 1.6 g/dL      BUN/Creatinine Ratio 16.3     Anion Gap 13.3 mmol/L     Narrative:      GFR Normal >60  Chronic Kidney Disease <60  Kidney Failure <15      Ethanol [[id number]] Collected: 04/09/21 0744    Specimen: Blood from Arm, Left Updated: 04/09/21 0812     Ethanol <10 mg/dL      Ethanol % <0.010 %     Narrative:      >/= 80.0 legally intoxicated    Troponin [[id number]]  (Normal) Collected: 04/09/21 0744    Specimen: Blood from Arm, Left Updated: 04/09/21 0809     Troponin T <0.010 ng/mL     Narrative:      CBC & Differential [[id number]]  (Abnormal) Collected: 04/09/21 0744    Specimen: Blood from Arm, Left Updated: 04/09/21 0756    CBC Auto Differential [[id number]]  (Abnormal) Collected: 04/09/21 0744    Specimen: Blood from Arm, Left Updated: 04/09/21 0756     WBC 11.58 10*3/mm3      RBC 4.25 10*6/mm3      Hemoglobin 13.3 g/dL      Hematocrit 40.6 %      MCV 95.5 fL      MCH 31.3 pg      MCHC 32.8 g/dL      RDW 13.5 %      RDW-SD 47.0 fl      MPV 12.1 fL      Platelets 304 10*3/mm3      Neutrophil % 73.2 %      Lymphocyte % 13.2 %      Monocyte % 11.3 %      Eosinophil % 1.5 %      Basophil % 0.5 %      Immature Grans % 0.3 %      Neutrophils, Absolute 8.48 10*3/mm3      Lymphocytes, Absolute 1.53 10*3/mm3      Monocytes, Absolute 1.31 10*3/mm3      Eosinophils, Absolute 0.17 10*3/mm3      Basophils, Absolute 0.06 10*3/mm3      Immature Grans, Absolute 0.03 10*3/mm3      nRBC 0.0 /100 WBC           Imaging Results (All)     Procedure Component Value Units Date/Time    XR Chest AP [[id number]] Resulted: 04/14/21 0938     Updated: 04/14/21 0938    MRI Brain Without Contrast [[id number]] Collected: 04/12/21 1257     Updated: 04/12/21 1414    Narrative:      MRI BRAIN WITHOUT CONTRAST      CLINICAL INDICATION: Neurological deficit, acute, stroke suspected.      COMPARISON: 07/17/2020     PROCEDURE:  Multiple planar images of the brain were acquired in a high field  strength magnet. Several pulse sequences were used to acquire the study.  Gadolinium  was not  administered.     FINDINGS:  The diffusion weighted images show no focal signal abnormality. There is  no evidence of acute ischemia.       The remaining pulse sequences show no mass, hemorrhage, or midline  shift.       The ventricles, cisterns, and sulci are unremarkable. There is no  hydrocephalus.      Increased T2 and FLAIR signal in the periventricular and parietal white  matter most compatible with chronic microangiopathic change.     The sagittal view show no sellar or parasellar mass.      There is no tectal or pineal lesion.     Coronal imaging demonstrates a symmetric appearance of the visualized  portions of the optic nerves and extraocular muscles.       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  2. No parenchymal mass, hemorrhage, or midline shift.     This report was finalized on 4/12/2021 2:12 PM by Dr. Chente Choi MD.       CT Abdomen Pelvis Without Contrast [[id number]] Collected: 04/10/21 1437     Updated: 04/10/21 1445    Narrative:      EXAM: CT ABDOMEN PELVIS WO CONTRAST-         TECHNIQUE: Multiple axial CT images were obtained from lung bases  through pubic symphysis WITHOUT administration of IV contrast.  Reformatted images in the coronal and/or sagittal plane(s) were  generated from the axial data set to facilitate diagnostic accuracy  and/or surgical planning.  Oral Contrast:NONE.     Radiation dose reduction techniques were utilized per ALARA protocol.  Automated exposure control was initiated through either or [employer] or  DoseRigWEMS software packages by  protocol.    DOSE:     Clinical information Pyelonephritis, uncomplicated;  G93.40-Encephalopathy, unspecified       Comparison 01/26/2018     FINDINGS:     Lower thorax: Clear. No effusions.     Abdomen:     Liver: Homogeneous. No focal hepatic mass or ductal dilatation.     Gallbladder: No dilation or stone identified.     Pancreas: Unremarkable. No mass or ductal dilatation.     Spleen: Homogeneous. No splenomegaly.     Adrenals: No mass.     Kidneys/ureters: No mass. No obstructive uropathy.  No evidence of  urolithiasis.     GI tract: Non-dilated. No definite wall thickening..     MESENTERY: No free fluid, walled off fluid collections, mesenteric  stranding, or enlarged lymph nodes         Vasculature: There is evidence of atherosclerotic vascular disease     Abdominal wall: No focal hernia or mass.        Pelvis:     Bladder: No focal mass or significant wall thickening     Reproductive: Unremarkable as visualized     Bones: No acute bony abnormality.       Impression:         1. No definitive source for the patient's symptoms identified on today's  exam.        2. Other findings as discussed above.        3. moderate to large volume stool              This report was finalized on 4/10/2021 2:42 PM by Dr. Chente Choi MD.       CT Head Without Contrast [[id number]] Collected: 04/09/21 0847     Updated: 04/09/21 0850    Narrative:      EXAM:    CT Head Without Intravenous Contrast     EXAM DATE:    4/9/2021 8:32 AM     CLINICAL HISTORY:    Delirium     TECHNIQUE:    Axial computed tomography images of the head/brain without intravenous  contrast.  Sagittal and coronal reformatted images were created and  reviewed.  This CT exam was performed using one or more of the following  dose reduction techniques:  automated exposure control, adjustment of  the mA and/or kV according to patient size, and/or use of iterative  reconstruction technique.     COMPARISON:    No relevant prior studies available.     FINDINGS:    BRAIN:  Unremarkable.  No hemorrhage.  No significant white matter  disease.  No edema.    VENTRICLES:   Unremarkable.  No ventriculomegaly.    BONES/JOINTS:  Unremarkable.  No acute fracture.    SOFT TISSUES:  Unremarkable.    SINUSES:  Unremarkable as visualized.  No acute sinusitis.    MASTOID AIR CELLS:  Unremarkable as visualized.  No mastoid effusion.       Impression:        Unremarkable exam demonstrating no CT evidence of acute intracranial  findings.     This report was finalized on 4/9/2021 8:48 AM by Dr. Santosh Andrade MD.       XR Chest 1 View [[id number]] Collected: 04/09/21 0835     Updated: 04/09/21 0838    Narrative:      EXAM:    XR Chest, 1 View     EXAM DATE:    4/9/2021 8:06 AM     CLINICAL HISTORY:    AMS protocol     TECHNIQUE:    Frontal view of the chest.     COMPARISON:    07/15/2020     FINDINGS:    LUNGS:  Coarsened interstitial markings are noted.    PLEURAL SPACE:  Unremarkable.  No pneumothorax.    HEART:  Unremarkable.  No cardiomegaly.    MEDIASTINUM:  Unremarkable.    BONES/JOINTS:  Unremarkable.    TUBES, LINES AND DEVICES:  Right Port-A-Cath with tip in SVC.       Impression:        Stable appearance of the chest.     This report was finalized on 4/9/2021 8:36 AM by Dr. Santosh Andrade MD.             Orders (all)      Start     Ordered    04/13/21 1715  Inpatient Admission  Once      04/13/21 1715    04/12/21 1725  PT Consult: Eval & Treat Discharge Placement Assessment  Once      04/12/21 1724    04/12/21 1725  OT Consult: Eval & Treat  Once      04/12/21 1724    04/09/21 2023  Inpatient Case Management  Consult  Once     Provider:  (Not yet assigned)    04/09/21 2022    04/09/21 2000  Neuro Checks  Every 4 Hours      04/09/21 1833    04/09/21 1725  Diet Regular  Diet Effective Now      04/09/21 1724    04/09/21 1616  SLP Consult: Eval & Treat Swallow Disorder; Regular - No Dietary Restrictions  Once      04/09/21 1615    04/09/21 1300  Bed Alarm On  Continuous      04/09/21 1259    04/09/21 1119  Telemetry - Maintain IV Access  Continuous      04/09/21 1119    04/09/21  1119  Continuous Cardiac Monitoring  Continuous      04/09/21 1119    04/09/21 1119  May Be Off Telemetry for Tests  Continuous      04/09/21 1119    04/09/21 1119  ACLS Protocol For Life Threatening Dysrhythmias (Unless Code Status Indicates Otherwise)  Continuous      04/09/21 1119    04/09/21 0933  Pulse Oximetry, Continuous  Continuous      04/09/21 0936    04/09/21 0933  Cardiac Monitoring  Continuous,   Status:  Canceled      04/09/21 0936    04/09/21 0933  NPO Diet  Diet Effective Now,   Status:  Canceled      04/09/21 0936    04/09/21 0932  Code Status and Medical Interventions:  Continuous      04/09/21 0936    04/09/21 0932  Intake & Output  Every Shift      04/09/21 0936 04/09/21 0932  Weigh Patient  Once      04/09/21 0936 04/09/21 0932  Oxygen Therapy- Nasal Cannula; Titrate for SPO2: 90% - 95%  Continuous      04/09/21 0936    04/09/21 0932  Initiate Observation Status  Once      04/09/21 0936                       Physician Progress Notes (all)      Chaitanya Martin MD at 04/13/21 1714          Assisted By: Daughter    CC: Follow-up on metabolic encephalopathy    Interview History/HPI: Patient is doing better today awake alert she did eat some but she states she will eat more when she goes home, daughter agrees patient is better.  She is complaining of a headache, this is more all over.  She normally drinks some caffeine in the morning has not been drinking this here.  She received some Tylenol but this was just recently so she is unsure whether this is going to help or not.  She had a negative MRI besides microangiopathic changes yesterday.  Bowels have moved, no chest pain or shortness of breath.      Vitals:    04/13/21 1500   BP: 162/72   Pulse: 78   Resp: 16   Temp: 99.1 °F (37.3 °C)   SpO2: 94%         Intake/Output Summary (Last 24 hours) at 4/13/2021 1715  Last data filed at 4/13/2021 1358  Gross per 24 hour   Intake 1061.8 ml   Output --   Net 1061.8 ml       EXAM: Neck is supple, pupils  equal hearing intact mood is good she knew she was in [facility] and knew the year, once again she knew who the president was but could not come up with the name but remembered it when I said it.  Lungs have bilateral breath sounds are clear no rhonchi rales or wheezing, heart regular rate and rhythm without murmur rub or gallop abdomen soft benign, follows command strength is symmetric no skin rashes      Tele: Sinus rhythm        Results for orders placed during the hospital encounter of 11/07/19    Adult Transthoracic Echo Complete W/ Cont if Necessary Per Protocol    Interpretation Summary  · Normal left ventricular cavity size and wall thickness noted. All left ventricular wall segments contract normally.  · Estimated EF appears to be in the range of 66 - 70%.  · The aortic valve is structurally normal. Mild aortic valve regurgitation is present. No aortic valve stenosis is present.  · The mitral valve is normal in structure. Trace mitral valve regurgitation is present. No significant mitral valve stenosis is present.  · The tricuspid valve is normal. Mild tricuspid valve regurgitation is present. Estimated right ventricular systolic pressure from tricuspid regurgitation is mildly elevated (35-45 mmHg).  · There is no evidence of pericardial effusion.      Assessment/Plan:   Patient's daughter after I saw the patient came out and was concerned because she thought the patient's face was a little flushed and her temperature is 99.1.  I am going to recheck a chest x-ray UA, I have asked nursing if temperature gets above 100.3 to get 2 sets of blood cultures and to call MD as well.  White count was normal this morning, will recheck with CRP in the a.m.    Metabolic encephalopathy most likely related to gabapentin.  Her last dose was Friday morning, patient is significantly improved although still weak, continue therapy and monitoring, hopefully in the next 24 to 48 hours if otherwise stable could be considered  for discharge.    DVT prophylaxis, subcu Lovenox    Borderline low B12, she is getting daily injections, she has had 3 injections, will give a total of 5 and then consider going weekly for 2 to 3 weeks.    Hypertension, improving control but not to goal.  Continue Norvasc at the current dose.    Headache, possibly caffeine withdrawal versus Norvasc, Tylenol as needed, MRI negative yesterday, follow    Elevated anion gap, improved, she was alkalotic by venous gas    Weakness, continue therapies as available        Disposition Home    Chaitanya Martin MD       Electronically signed by Chaitanya Martin MD at 21 [id number]       Jimena Cain MD at 21 1156                [facility]    [facility]ESS [facility]    Name:  Kiah Conway   Age:  75 y.o.  Sex:  female  :  1945  MRN:  [id number]   Visit Number:  [id number]  Admission Date:  2021  Date Of Service:  21  Primary Care Physician:  Chip Manzano APRN     LOS: 0 days :  Patient Care Team:  Chip Manzano APRN as PCP - General (Family Medicine):    Chief Complaint:    Altered mental status    Subjective / Interval History:   I have seen and evaluated the patient this morning.  Chart reviewed and events noted.  She was admitted and currently being treated for acute encephalopathy.  Today she looks much better compared to yesterday at least she is responding to verbal commands.  She will try to talk but she will have some difficulty finding the proper words but unable to to tell that she is comprehending well.  Daughter at bedside and agrees that she improved compared to yesterday but not yet at baseline.  I updated the daughter about the results of the CT abdomen that did not show any evidence of diverticulitis and she was happy about it.  The patient reported that she is having pain in her mouth and throat.  Probably that is why she refused to eat or drink anything yesterday.  Otherwise, the patient denied any chest  pain, SOB,  weakness in any of her extremities, numbness,    Vital Signs:  Temp:  [98.3 °F (36.8 °C)-98.8 °F (37.1 °C)] 98.5 °F (36.9 °C)  Heart Rate:  [84-92] 84  Resp:  [20] 20  BP: (163-175)/(68-86) 163/68    Intake and output:  I/O last 3 completed shifts:  In: 1340 [P.O.:480; I.V.:860]  Out: 200 [Urine:200]  No intake/output data recorded.    Physical Examination:  General: Remains somewhat confused but much improved compared to yesterday, pleasant, minimally conversant  Head: Atraumatic and normocephalic, without obvious abnormality  Eyes:   Conjunctivae and sclerae normal, no Icterus. No pallor  Ears:  Ears appear intact with no abnormalities noted  Throat: Thrush stomatitis   Neck: Supple, trachea midline, no thyromegaly  Back:   No kyphoscoliosis present. No tenderness to palpation,   no sacral edema  Lungs: Clear to auscultation bilaterally, equal air entry, no wheezing or crackles  Heart:  Normal S1 and S2, no murmur, no gallop, No JVD, no lower extremity swelling  Abdomen:  Soft, slight tenderness to palpation, no organomegaly, normal bowel sounds  Normal bowel sounds, no masses, no organomegaly. Soft, nontender, nondistended, no guarding, no rebound tenderness.  Extremities: No gross abnormalities, no clubbing, pulses palpable and equal bilaterally  Skin: No bleeding, bruising or rash, normal skin turgor and elasticity  Neurologic: Cranial nerves appear intact with no evidence of facial asymmetry, moving 4 extremities appropriately to painful stimuli.  No obvious weakness appreciated.  Cannot assess sensation.    Psych: Alert x1       I have reviewed the patient's radiology reports.    Medication Review:   I have reviewed the patient's active and prn medications.     Problem List:      Acute encephalopathy        Summary and Hospital course:  75 years old female patient with multiple comorbidities including essential hypertension, dyslipidemia, paroxysmal SVT, fibromyalgia and chronic pain syndrome  presented to the hospital with altered mental status that is multifactorial likely secondary drug-induced encephalopathy (due to unintentional administration of gabapentin instead of Lyrica -per  patient daughter report, pharmacy by mistake substituted Lyrica with gabapentin ) and a component of hypertensive encephalopathy and possible UTI that was present on admission.  She was also having  symptoms consistent with viral gastroenteritis prior to discharge that resulted in severe dehydration which is also contributing to her acute encephalopathy.  CT head was negative for acute stroke.  Urinalysis was positive for UTI culture is negative for any growth thus far, currently on Rocephin empirically.  CT abdomen did not show any acute intra-abdominal pathology.  MRI brain is ordered and pending.  After initiating IV antibiotic therapy on 4/10/2021 and fluid IV fluids for hydration, patient mental status started to improve significantly but not yet fully at baseline.    Assessment:  • Acute metabolic encephalopathy, multifactorial, slowly improving  • Volume depletion and dehydration  • UTI, present on admission  • Hypertensive crisis, improved  • Dysphagia secondary to thrush stomatitis  • Severe vitamin B12 deficiency  • Hypokalemia replace potassium  • Essential hypertension  • Dyslipidemia  • Fibromyalgia  • Supraventricular tachycardia  • Thyroid gland follicular lymphoma in remission  • GERD  • Anxiety disorder    Plan:  · The patient mental status markedly improved today.  Obeying verbal commands.  Attempts to talk but  she is having difficulty finding words.  I think she is able to comprehend better than yesterday.  It is not fully clear what is causing her altered mental status but likely is multifactorial.  I can tell there is a big difference between today and yesterday after initiating empiric antibiotic therapy and IV fluids.  Plan to continue IV fluids for today particularly with her poor oral intake but I  will switch her Zosyn to Rocephin after diverticulitis was ruled out.  Continue to follow urine cultures.  · MRI brain ordered and currently pending  · No evidence of nuchal rigidity, fever or any other signs or symptoms to indicate that she might have encephalitis, will hold off any invasive diagnostic measures like lumbar puncture for now.  · Vitamin B12 level was critically low.  Start vitamin B12 replacement therapy with IM injections daily while hospitalized.  · The patient did not eat or drink anything since yesterday because of dysphagia, oral exam is consistent with thrush stomatitis.  I ordered nystatin mouthwash and Magic mouthwash, both swish and swallow to help with her dysphagia.  Continue with IV fluids for today.  · Replace potassium  · The patient blood pressure remains elevated, likely because she is refusing to take her oral medications.  Continue IV hydralazine as needed  · Case discussed with the treatment team in details about the goals of care and management plans    Checklist:  · Risk assessment: Moderate to high risk  · DVT prophylaxis: Lovenox  · Diet: Regular  · Steroids: None  · Antibiotics: Rocephin, start date 4/10/2021  · CODE STATUS: Full code  · IV access: Peripheral IV  · Discharge disposition: To be determined    Jimena Cain MD  04/11/21  11:58 EDT    Dictated utilizing Dragon dictation.        Electronically signed by Jimena Cain MD at 04/11/21 0860

## 2021-04-14 NOTE — PROGRESS NOTES
Discharge Planning Assessment   Jered     Patient Name: Kiah Conway  MRN: [id number]  Today's Date: 4/14/2021    Admit Date: 4/9/2021      Discharge Plan     Row Name 04/14/21 1050       Plan    Plan Pt lives alone and she plans to return home at discharge. Pt does not utilize home health services. Pt has a wheelchair at home. SS to follow.        ZOHREH Dillon

## 2021-04-14 NOTE — PROGRESS NOTES
Assisted By: Her double first cousin as well as Raegan ALBERTO    CC: Follow-up on metabolic encephalopathy    Interview History/HPI: Patient states she is doing okay, no acute complaints, she did not eat much but states she will eat when she goes home.  She required some assistance getting to the bedside chair but she is currently in a recliner.  She appears no distress mood is good    ROS:     Vitals:    04/14/21 1423   BP: 158/67   Pulse: 81   Resp:    Temp: 98.3 °F (36.8 °C)   SpO2: 96%         Intake/Output Summary (Last 24 hours) at 4/14/2021 1553  Last data filed at 4/14/2021 1423  Gross per 24 hour   Intake 1120 ml   Output 525 ml   Net 595 ml       EXAM: She is in recliner, neurologically she is the exact same as yesterday, same as far as orientation as well.  Lungs are clear heart regular rate and rhythm abdomen is soft benign neck is supple cranial nerves II through XII intact      Tele: Sinus rhythm    LABS:   Lab Results (last 48 hours)     Procedure Component Value Units Date/Time    Comprehensive Metabolic Panel [[id number]]  (Abnormal) Collected: 04/14/21 0331    Specimen: Blood Updated: 04/14/21 0411     Glucose 83 mg/dL      BUN 4 mg/dL      Creatinine 0.57 mg/dL      Sodium 140 mmol/L      Potassium 2.9 mmol/L      Comment: Slight hemolysis detected by analyzer. Results may be affected.        Chloride 110 mmol/L      CO2 16.8 mmol/L      Calcium 8.5 mg/dL      Total Protein 5.9 g/dL      Albumin 3.51 g/dL      ALT (SGPT) 12 U/L      AST (SGOT) 19 U/L      Alkaline Phosphatase 56 U/L      Total Bilirubin 0.4 mg/dL      eGFR Non African Amer 103 mL/min/1.73      Globulin 2.4 gm/dL      A/G Ratio 1.5 g/dL      BUN/Creatinine Ratio 7.0     Anion Gap 13.2 mmol/L     Narrative:      GFR Normal >60  Chronic Kidney Disease <60  Kidney Failure <15      C-reactive Protein [[id number]]  (Normal) Collected: 04/14/21 0331    Specimen: Blood Updated: 04/14/21 0411     C-Reactive Protein 0.50 mg/dL     CBC &  Differential [[id number]]  (Abnormal) Collected: 04/14/21 0331    Specimen: Blood Updated: 04/14/21 0350    Narrative:      The following orders were created for panel order CBC & Differential.  Procedure                               Abnormality         Status                     ---------                               -----------         ------                     CBC Auto Differential[[id number]]        Abnormal            Final result                 Please view results for these tests on the individual orders.    CBC Auto Differential [[id number]]  (Abnormal) Collected: 04/14/21 0331    Specimen: Blood Updated: 04/14/21 0350     WBC 5.02 10*3/mm3      RBC 3.87 10*6/mm3      Hemoglobin 12.1 g/dL      Hematocrit 36.8 %      MCV 95.1 fL      MCH 31.3 pg      MCHC 32.9 g/dL      RDW 13.8 %      RDW-SD 48.1 fl      MPV 11.4 fL      Platelets 261 10*3/mm3      Neutrophil % 65.8 %      Lymphocyte % 16.9 %      Monocyte % 12.7 %      Eosinophil % 3.0 %      Basophil % 1.2 %      Immature Grans % 0.4 %      Neutrophils, Absolute 3.30 10*3/mm3      Lymphocytes, Absolute 0.85 10*3/mm3      Monocytes, Absolute 0.64 10*3/mm3      Eosinophils, Absolute 0.15 10*3/mm3      Basophils, Absolute 0.06 10*3/mm3      Immature Grans, Absolute 0.02 10*3/mm3      nRBC 0.0 /100 WBC     Urinalysis With Culture If Indicated - Urine, Random Void [[id number]]  (Abnormal) Collected: 04/14/21 0114    Specimen: Urine, Random Void Updated: 04/14/21 0125     Color, UA Yellow     Appearance, UA Clear     pH, UA 5.5     Specific Gravity, UA 1.013     Glucose, UA Negative     Ketones, UA 40 mg/dL (2+)     Bilirubin, UA Negative     Blood, UA Negative     Protein, UA Negative     Leuk Esterase, UA Negative     Nitrite, UA Negative     Urobilinogen, UA 0.2 E.U./dL    Narrative:      Urine microscopic not indicated.    Troponin [[id number]]  (Normal) Collected: 04/13/21 0332    Specimen: Blood Updated: 04/13/21 0410     Troponin T <0.010 ng/mL      Narrative:      Troponin T Reference Range:  <= 0.03 ng/mL-   Negative for AMI  >0.03 ng/mL-     Abnormal for myocardial necrosis.  Clinicians would have to utilize clinical acumen, EKG, Troponin and serial changes to determine if it is an Acute Myocardial Infarction or myocardial injury due to an underlying chronic condition.       Results may be falsely decreased if patient taking Biotin.      Comprehensive Metabolic Panel [[id number]]  (Abnormal) Collected: 04/13/21 0116    Specimen: Blood Updated: 04/13/21 0218     Glucose 77 mg/dL      BUN 5 mg/dL      Creatinine 0.64 mg/dL      Sodium 139 mmol/L      Potassium 3.7 mmol/L      Chloride 110 mmol/L      CO2 16.8 mmol/L      Calcium 8.4 mg/dL      Total Protein 5.5 g/dL      Albumin 3.44 g/dL      ALT (SGPT) 9 U/L      AST (SGOT) 16 U/L      Alkaline Phosphatase 50 U/L      Total Bilirubin 0.3 mg/dL      eGFR Non African Amer 90 mL/min/1.73      Globulin 2.1 gm/dL      A/G Ratio 1.7 g/dL      BUN/Creatinine Ratio 7.8     Anion Gap 12.2 mmol/L     Narrative:      GFR Normal >60  Chronic Kidney Disease <60  Kidney Failure <15      Magnesium [[id number]]  (Normal) Collected: 04/13/21 0116    Specimen: Blood Updated: 04/13/21 0218     Magnesium 2.2 mg/dL     CBC & Differential [[id number]]  (Abnormal) Collected: 04/13/21 0116    Specimen: Blood Updated: 04/13/21 0200    Narrative:      The following orders were created for panel order CBC & Differential.  Procedure                               Abnormality         Status                     ---------                               -----------         ------                     CBC Auto Differential[[id number]]        Abnormal            Final result                 Please view results for these tests on the individual orders.    CBC Auto Differential [[id number]]  (Abnormal) Collected: 04/13/21 0116    Specimen: Blood Updated: 04/13/21 0200     WBC 6.33 10*3/mm3      RBC 3.54 10*6/mm3      Hemoglobin 11.0 g/dL       Hematocrit 34.4 %      MCV 97.2 fL      MCH 31.1 pg      MCHC 32.0 g/dL      RDW 13.7 %      RDW-SD 49.0 fl      MPV 11.7 fL      Platelets 254 10*3/mm3      Neutrophil % 73.8 %      Lymphocyte % 10.7 %      Monocyte % 13.0 %      Eosinophil % 1.6 %      Basophil % 0.6 %      Immature Grans % 0.3 %      Neutrophils, Absolute 4.67 10*3/mm3      Lymphocytes, Absolute 0.68 10*3/mm3      Monocytes, Absolute 0.82 10*3/mm3      Eosinophils, Absolute 0.10 10*3/mm3      Basophils, Absolute 0.04 10*3/mm3      Immature Grans, Absolute 0.02 10*3/mm3      nRBC 0.0 /100 WBC     Potassium [[id number]]  (Normal) Collected: 04/12/21 2313    Specimen: Blood Updated: 04/12/21 2344     Potassium 3.6 mmol/L           Radiology:    Imaging Results (Last 72 Hours)     Procedure Component Value Units Date/Time    XR Chest AP [[id number]] Collected: 04/14/21 1057     Updated: 04/14/21 1142    Narrative:      XR CHEST AP-     CLINICAL INDICATION: fever; G93.40-Encephalopathy, unspecified        COMPARISON: 04/09/2021      TECHNIQUE: Single frontal view of the chest.     FINDINGS:     There is no focal alveolar infiltrate or effusion.  No interval line change  There is no evidence of an acute osseous abnormality.   There are no suspicious-appearing parenchymal soft tissue nodules.          Impression:         1. COPD  2. No focal alveolar infiltrate or effusion     This report was finalized on 4/14/2021 10:57 AM by Dr. Chente Choi MD.       MRI Brain Without Contrast [[id number]] Collected: 04/12/21 1257     Updated: 04/12/21 1414    Narrative:      MRI BRAIN WITHOUT CONTRAST     CLINICAL INDICATION: Neurological deficit, acute, stroke suspected.      COMPARISON: 07/17/2020     PROCEDURE:  Multiple planar images of the brain were acquired in a high field  strength magnet. Several pulse sequences were used to acquire the study.  Gadolinium  was not  administered.     FINDINGS:  The diffusion weighted images show no focal signal abnormality.  There is  no evidence of acute ischemia.       The remaining pulse sequences show no mass, hemorrhage, or midline  shift.       The ventricles, cisterns, and sulci are unremarkable. There is no  hydrocephalus.      Increased T2 and FLAIR signal in the periventricular and parietal white  matter most compatible with chronic microangiopathic change.     The sagittal view show no sellar or parasellar mass.      There is no tectal or pineal lesion.     Coronal imaging demonstrates a symmetric appearance of the visualized  portions of the optic nerves and extraocular muscles.       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  2. No parenchymal mass, hemorrhage, or midline shift.     This report was finalized on 4/12/2021 2:12 PM by Dr. Chente Choi MD.             Results for orders placed during the hospital encounter of 11/07/19    Adult Transthoracic Echo Complete W/ Cont if Necessary Per Protocol    Interpretation Summary  · Normal left ventricular cavity size and wall thickness noted. All left ventricular wall segments contract normally.  · Estimated EF appears to be in the range of 66 - 70%.  · The aortic valve is structurally normal. Mild aortic valve regurgitation is present. No aortic valve stenosis is present.  · The mitral valve is normal in structure. Trace mitral valve regurgitation is present. No significant mitral valve stenosis is present.  · The tricuspid valve is normal. Mild tricuspid valve regurgitation is present. Estimated right ventricular systolic pressure from tricuspid regurgitation is mildly elevated (35-45 mmHg).  · There is no evidence of pericardial effusion.      Assessment/Plan:   Patient never developed any significant elevation in temperature, sepsis work-up thus far is negative.  No blood cultures were ever drawn secondary to patient never had an elevated temperature.  Noted CRP was normal as his white count.    Metabolic encephalopathy, improved, most likely  medication induced.    Borderline low B12, she has had now for injections, after tomorrow she could go to probable monthly injections since she actually was not low but on the low normal and.    Hypertension, not adequately controlled at this time but Norvasc added on April 12, will continue to follow.  Apparently she is very sensitive with some labile blood pressure and want to avoid hypotension.    DVT prophylaxis, continue subcu Lovenox    Hypokalemia, being supplemented magnesium normal yesterday, recheck in a.m.    Headache appears to be improved at this time, follow, negative MRI here.    Disposition Home    Chaitanya Martin MD

## 2021-04-14 NOTE — PLAN OF CARE
Goal Outcome Evaluation:  Plan of Care Reviewed With: patient  Progress: no change  Outcome Summary: pt c/o pain, scheduled pain medicaiton given. PRN tylenol given for headache and temperature of 99.2; temperature now 98.5.

## 2021-04-14 NOTE — THERAPY TREATMENT NOTE
Acute Care - Physical Therapy Treatment Note   [place]     Patient Name: Kiah Conway  : 1945  MRN: [id number]  Today's Date: 2021   Onset of Illness/Injury or Date of Surgery: 21       PT Assessment (last 12 hours)      PT Evaluation and Treatment     Row Name 21 1000          Physical Therapy Time and Intention    Subjective Information  complains of;weakness cold  -BC     Document Type  therapy note (daily note)  -BC     Mode of Treatment  physical therapy  -BC     Patient Effort  fair  -BC     Symptoms Noted During/After Treatment  dizziness  -BC     Row Name 21 1000          General Information    Patient Profile Reviewed  yes  -BC     Onset of Illness/Injury or Date of Surgery  21  -BC     Referring Physician  Dr. Martin  -BC     Patient Observations  alert;cooperative;agree to therapy  -BC     Prior Level of Function  mod assist:;gait  -BC     Equipment Currently Used at Home  wheelchair  -BC     Risks Reviewed  patient:;LOB;nausea/vomiting;dizziness;increased discomfort;change in vital signs;increased drainage;lines disloged  -BC     Benefits Reviewed  patient:;improve function;increase independence;increase strength;increase balance;decrease pain;decrease risk of DVT;improve skin integrity;increase knowledge  -BC     Row Name 21 1000          Living Environment    Current Living Arrangements  home/apartment/condo  -BC     Lives With  child(wendy), adult;grandchild(wendy)  -BC     Row Name 21 1000          Home Use of Assistive/Adaptive Equipment    Equipment Currently Used at Home  wheelchair  -BC     Row Name 21 1000          Cognition    Affect/Mental Status (Cognitive)  WNL  -BC     Orientation Status (Cognition)  oriented x 4  -BC     Follows Commands (Cognition)  follows one-step commands  -BC     Cognitive Function (Cognitive)  WFL  -BC     Row Name 21 1000          Mobility    Extremity Weight-bearing Status  left lower extremity;right  lower extremity  -BC     Left Lower Extremity (Weight-bearing Status)  weight-bearing as tolerated (WBAT)  -BC     Right Lower Extremity (Weight-bearing Status)  partial weight-bearing (PWB)  -BC     Row Name 04/14/21 1000          Bed Mobility    Bed Mobility  bed mobility (all) activities  -BC     All Activities, [place] (Bed Mobility)  minimum assist (75% patient effort)  -BC     Bed Mobility, Safety Issues  decreased use of arms for pushing/pulling;decreased use of legs for bridging/pushing;impaired trunk control for bed mobility  -BC     Assistive Device (Bed Mobility)  bed rails  -BC     Row Name 04/14/21 1000          Transfers    Transfers  sit-stand transfer;stand-sit transfer  -BC     Maintains Weight-bearing Status (Transfers)  verbal cues to maintain;physical assist to maintain  -BC     Sit-Stand [place] (Transfers)  minimum assist (75% patient effort)  -BC     Stand-Sit [place] (Transfers)  minimum assist (75% patient effort)  -BC     Row Name 04/14/21 1000          Sit-Stand Transfer    Assistive Device (Sit-Stand Transfers)  walker, front-wheeled  -BC     Row Name 04/14/21 1000          Stand-Sit Transfer    Assistive Device (Stand-Sit Transfers)  walker, front-wheeled  -BC     Row Name 04/14/21 1000          Gait/Stairs (Locomotion)    Gait/Stairs Locomotion  gait/ambulation independence  -BC     [place] Level (Gait)  minimum assist (75% patient effort)  -BC     Assistive Device (Gait)  walker, front-wheeled  -BC     Distance in Feet (Gait)  6 ft  -BC     Row Name 04/14/21 1000          Coping    Observed Emotional State  calm;cooperative  -BC     Verbalized Emotional State  acceptance  -BC     Trust Relationship/Rapport  care explained;choices provided  -BC     Family/Support Persons  daughter  -BC     Involvement in Care  at bedside  -BC     Family/Support System Care  self-care encouraged;support provided;involvement promoted  -BC     Row Name 04/14/21 1000          Plan of  Care Review    Plan of Care Reviewed With  patient  -BC     Progress  no change  -BC     Row Name 04/14/21 1000          Positioning and Restraints    Pre-Treatment Position  in bed  -BC     Post Treatment Position  chair  -BC     In Chair  notified nsg;reclined;sitting;call light within reach;encouraged to call for assist  -BC       User Key  (r) = Recorded By, (t) = Taken By, (c) = Cosigned By    Initials Name Provider Type    BC Jessica Bennett, PT Physical Therapist          PT Recommendation and Plan     Plan of Care Reviewed With: patient  Progress: no change       Time Calculation:   PT Charges     Row Name 04/14/21 1027             Time Calculation    PT Received On  04/14/21  -BC         Time Calculation- PT    Total Timed Code Minutes- PT  45 minute(s)  -BC         Timed Charges    61419 - Gait Training Minutes   30  -BC      37511 - PT Therapeutic Activity Minutes  15  -BC        User Key  (r) = Recorded By, (t) = Taken By, (c) = Cosigned By    Initials Name Provider Type    BC Jessica Bennett PT Physical Therapist        Therapy Charges for Today     Code Description Service Date Service Provider Modifiers Qty    13188387899 HC PT EVAL HIGH COMPLEXITY 4 4/13/2021 Jessica Bennett, PT GP 1    42186952467 HC GAIT TRAINING EA 15 MIN 4/14/2021 Jessica Bennett, PT GP 2    22014149553 HC PT THERAPEUTIC ACT EA 15 MIN 4/14/2021 Jessica Bennett PT GP 1               Jessica Bennett PT  4/14/2021

## 2021-04-15 NOTE — PLAN OF CARE
Goal Outcome Evaluation:  Plan of Care Reviewed With: patient  Progress: declining  Outcome Summary: pt states she feels weaker tonight. c/o pain and nausea, PRN medications given. mag 1.8, replacement requested.

## 2021-04-15 NOTE — DISCHARGE INSTR - APPOINTMENTS
Please call as soon as possible to schedule an appointment to see MERRY Lee within one week.  You can reach the office at .

## 2021-04-15 NOTE — DISCHARGE PLACEMENT REQUEST
"Kiah Conway (75 y.o. Female)     Date of Birth Social Security Number Address Home Phone MRN    1945 [id number] [address] [address] [phone] [id number]    [employer]ar[employer] Marital Status          Roman Catholic        Admission Date Admission Type Admitting Provider Attending Provider Department, Room/Bed    4/9/21 Emergency Gus Reese DO Heinss, Karl F, MD [address], 3328/1P    Discharge Date Discharge Disposition Discharge Destination         Home or Self Care              Attending Provider: Chaitanya Martin MD    Allergies: No Known Allergies    Isolation: None   Infection: None   Code Status: CPR    Ht: 154.9 cm (61\")   Wt: 54 kg (119 lb)    Admission Cmt: None   Principal Problem: None                Active Insurance as of 4/9/2021     Primary Coverage     Payor Plan Insurance Group Employer/Plan Group    [facility] [employer] MEDICARE REPLACEMENT      Payor Plan Address Payor Plan Phone Number Payor Plan Fax Number Effective Dates    [address] [address] [phone]  4/1/2021 - None Entered    [facility] [id number]       Subscriber Name Subscriber Birth Date Member ID       KIAH CONWAY 1945 [id number]           Secondary Coverage     Payor Plan Insurance Group Employer/Plan Group    [id number]      Payor Plan Address Payor Plan Phone Number Payor Plan Fax Number Effective Dates    [address]   3/1/2021 - None Entered    [facility] [id number]       Subscriber Name Subscriber Birth Date Member ID       KIAH CONWAY 1945 [id number]                 Emergency Contacts      (Rel.) Home Phone Work Phone Mobile Phone    Eunice Sharif (Daughter) [phone] -- [phone]    Corrie Pool (Grandchild) [phone] -- [phone]        [address] [address]  Dept. Phone:  [phone]  Dept. Fax:  [phone] Date Ordered: Apr 15, 2021  " "        Patient:  Kiah Conway MRN:  [id number]   [address] [place] [id number] :  1945  SSN: [id number]   Phone: [phone] Sex:  F     Weight: 54 kg (119 lb)         Ht Readings from Last 1 Encounters:   21 154.9 cm (61\")         Hospital Bed  (Order ID: [id number])    Diagnosis:  Weakness (R53.1 [ICD-10-CM] 780.79 [ICD-9-CM])   Quantity:  1     Equipment:  Hospital Bed, Semi-Electirc w/ Mattress & w/ Rails  Length of Need (99 Months = Lifetime): 12 Months        Authorizing Provider's Phone: [phone]   Authorizing Provider:Chaitanya Martin MD  Authorizing Provider's NPI: [id number]  Order Entered By: Chaitanya Martin MD 4/15/2021  1:44 PM     Electronically signed by: Chaitanya Martin MD 4/15/2021  1:44 PM               History & Physical      OAyaka Villegas PA at 21 0954     Attestation signed by Gus Reese DO at 21 [phone]    Patient with increased confusion from baseline was discovered to have had prescription from pharmacy incorrectly filled as gabapentin instead of lyrica.  Last dose taken yesterday with and with increasing altered mental status since began taking 4-5 days ago.  Daughter also reports dysuria and hx of UTI however UA not very convincing.  Checking culture and holding on abx for now and will monitor for improvement. Hypertensive crisis present as well likely contributing have cautiously started adding antihypertensives as spoke with patients cardiology APRN who saw her last that reports very labile blood pressure with episodes of hypotension on minimal medications.                        [facility]  HISTORY & PHYSICAL    Patient Identification:  Name:  Kiah Conway  Age:  75 y.o.  Sex:  female  :  1945  MRN:  [id number]   Visit Number:  [id number]  Admit Date: 2021   Primary Care Physician:  Chip Manzano APRN     Subjective     Chief complaint:   Chief Complaint   Patient presents " with   • Drug Overdose   • Altered Mental Status     History of presenting illness:   Patient is a 75 y.o. female with past medical history significant for essential hypertension, hyperlipidemia, paroxysmal SVT, fibromyalgia, chronic pain, follicular lymphoma of thyroid in remission, GERD, dementia, and anxiety that presented to the  emergency department for evaluation of altered mental status. Patient is awake and alert, but disoriented. She will answer simple yes or no questions and follows simple commands, but will otherwise not speak. Patient's daughter, Eunice, is present at bedside, aiding in history of presenting illness. Per the daughter, she picked up the patient's medication refills from their pharmacy on 4/5/2021. She states that the patient's Lyrica looked different, but she was not sure if the pill had changed. However, after 4-5 doses of the medication after refill, the patient became altered. That is when she realized that the patient's Lyrica had been filled wrong and the medication inside the Lyrica bottle from the pharmacy was in actuality Neurontin. She states she knew this because she herself takes Neurontin and the pills matched hers. Daughter states that the patient has been confused. She states at baseline the patient will have times when she cannot recall info, but more often than not her memory is intact. She states that the patient has not been ambulatory in approximately one year after chemo for follicular lymphoma. She states that the patient has been slightly agitated, not sleeping, and with decreased PO intake. She states that the patient has not missed any doses of her other home meds, but she has not given the patient anymore of the Neurontin and has not resumed the Lyrica due to confusion. Daughter states that the patient is very sensitive to medications, she cannot take muscle relaxers as she responds the same way. She does report noticing that the patient has  had increased urinary frequency over the past several days. She states that despite not having Neurontin/Lyrica dose in the past two days, the confusion and AMS has remained constant. Daughter denies any other issues. Patient is unable to confirm nor deny any complaints at my time of evaluation.     Upon arrival to the ED, vitals were temperature 98, heart rate 66, respiratory rate 16, blood pressure 195/83, and oxygen saturation 97% on room air.  Troponin T negative.  CMP grossly unremarkable.  CBC with white blood cell count 11.58, absolute neutrophil count 8.48, otherwise unremarkable.  Urinalysis with trace leukocytes, 3-5 WBC, trace bacteria.  UDS positive for opiates.  EtOH negative.  Chest x-ray with stable appearance, coarsened interstitial markings with right Port-A-Cath tip in SVC.  CT head without contrast with no evidence of acute intracranial abnormality. COVID-19/influenza screening negative. While in the ED, patient was administered 50 mg p.o. Lopressor and 5 mg p.o. Norvasc.     Patient has been admitted in observation status for further evaluation and treatment.     Present during exam: Patient's daughter Eunice   ---------------------------------------------------------------------------------------------------------------------   Review of Systems   Unable to perform ROS: Mental status change      ---------------------------------------------------------------------------------------------------------------------   Past Medical History:   Diagnosis Date   • Arthritis    • Cardiac disorder    • Diverticulitis    • Dysphagia    • Fibromyalgia    • Follicular lymphoma (CMS/HCC)     Remisson    • GERD (gastroesophageal reflux disease)    • History of rheumatic fever    • Hypertension    • Hypokalemia due to inadequate potassium intake 03/03/2014   • Lipoma    • Malignant neoplasm (CMS/HCC)    • Migraine    • Neutropenia (CMS/HCC) 11/7/2019   • Obstructive sleep apnea 7/14/2016   • Prolonged Q-T  interval on ECG 11/7/2019   • Recurrent UTI    • Rheumatoid arthritis (CMS/HCC)    • Sleep apnea    • Thyroid cancer (CMS/HCC)      Past Surgical History:   Procedure Laterality Date   • BLADDER SURGERY     • FINGER SURGERY      reattached    • HYSTERECTOMY     • THYROID SURGERY       Family History   Problem Relation Age of Onset   • Diabetes Mother    • Hypertension Mother    • Other Other         cardiac disorder,cardiovascular disease, malignant neoplasm of brain   • Breast cancer Paternal Aunt      Social History     Socioeconomic History   • Marital status:      Spouse name: Not on file   • Number of children: Not on file   • Years of education: Not on file   • Highest education level: Not on file   Tobacco Use   • Smoking status: Never Smoker   • Smokeless tobacco: Never Used   Substance and Sexual Activity   • Alcohol use: No   • Drug use: No   • Sexual activity: Defer     ---------------------------------------------------------------------------------------------------------------------   Allergies:  Patient has no known allergies.  ---------------------------------------------------------------------------------------------------------------------   Medications below are reported home medications pulling from within the system; at this time, these medications have not been reconciled unless otherwise specified and are in the verification process for further verifcation as current home medications.    Prior to Admission Medications     Prescriptions Last Dose Informant Patient Reported? Taking?    amLODIPine (NORVASC) 5 MG tablet   No No    Take 1 tablet by mouth Daily.    aspirin 81 MG EC tablet  Child Yes No    Take 81 mg by mouth Daily.    atorvastatin (LIPITOR) 10 MG tablet   No No    Take 1 tablet by mouth Daily.    busPIRone (BUSPAR) 5 MG tablet  Child Yes No    Take 5 mg by mouth 2 (Two) Times a Day. 1/2 AM AND 1/2 PM    Calcium Polycarbophil (FIBER-CAPS PO)  Self Yes No    Take 1 capsule by  mouth Daily.    conjugated estrogens (PREMARIN) 0.625 MG/GM vaginal cream  Child Yes No    Insert 1 g into the vagina Daily.    FLUoxetine (PROzac) 20 MG capsule  Child Yes No    Take 20 mg by mouth Daily.    HYDROcodone-acetaminophen (NORCO) 7.5-325 MG per tablet  Child Yes No    Take 1 tablet by mouth 3 (Three) Times a Day As Needed for Moderate Pain .    lactobacillus acidophilus (RISAQUAD) capsule capsule  Child No No    Take 1 capsule by mouth Daily.    loratadine (CLARITIN) 10 MG tablet  Child Yes No    Take 10 mg by mouth Daily.    megestrol (MEGACE) 40 MG tablet  Child Yes No    Take 40 mg by mouth 2 (Two) Times a Day.    metoprolol tartrate (LOPRESSOR) 50 MG tablet  Child No No    Take 1 tablet by mouth Every 12 (Twelve) Hours.    omeprazole (priLOSEC) 40 MG capsule  Child Yes No    Take 40 mg by mouth Daily.    pregabalin (LYRICA) 300 MG capsule  Child Yes No    Take 300 mg by mouth 2 (Two) Times a Day.        ---------------------------------------------------------------------------------------------------------------------    Objective     Hospital Scheduled Meds:  enoxaparin, 40 mg, Subcutaneous, Q24H  sodium chloride, 10 mL, Intravenous, Q12H      Pharmacy to Dose enoxaparin (LOVENOX),     Current listed hospital scheduled medications may not yet reflect those currently placed in orders that are signed and held, awaiting patient's arrival to floor/unit.    ---------------------------------------------------------------------------------------------------------------------   Vital Signs:  Temp:  [98 °F (36.7 °C)] 98 °F (36.7 °C)  Heart Rate:  [65-78] 75  Resp:  [16] 16  BP: (166-198)/() 166/88  Mean Arterial Pressure (Non-Invasive) for the past 24 hrs (Last 3 readings):   Noninvasive MAP (mmHg)   04/09/21 1012 119   04/09/21 0953 132   04/09/21 0943 109     SpO2 Percentage    04/09/21 0942 04/09/21 0953 04/09/21 1012   SpO2: 96% 96%  Comment: provider aware 97%     SpO2:  [93 %-98 %] 97 %  on   ;    Device (Oxygen Therapy): room air    Body mass index is 23.62 kg/m².  Wt Readings from Last 3 Encounters:   04/09/21 56.7 kg (125 lb)   09/21/20 54.4 kg (120 lb)   07/15/20 54.4 kg (120 lb)       ---------------------------------------------------------------------------------------------------------------------   Physical Exam:  Physical Exam  Nursing note reviewed.   Constitutional:       General: She is awake. She is not in acute distress.     Appearance: She is well-developed. She is not toxic-appearing.      Comments: Patient is awake and alert, disoriented. She will follow simple commands, but not answer questions. Daughter Eunice at bedside. Patient is on room air.    HENT:      Head: Normocephalic and atraumatic.      Right Ear: External ear normal.      Left Ear: External ear normal.      Nose: Nose normal.      Mouth/Throat:      Mouth: Mucous membranes are moist.      Pharynx: Oropharynx is clear.   Eyes:      Extraocular Movements: Extraocular movements intact.      Conjunctiva/sclera: Conjunctivae normal.      Pupils: Pupils are equal, round, and reactive to light.   Cardiovascular:      Rate and Rhythm: Normal rate and regular rhythm.      Pulses:           Dorsalis pedis pulses are 2+ on the right side and 2+ on the left side.        Posterior tibial pulses are 2+ on the right side and 2+ on the left side.      Heart sounds: Normal heart sounds. No murmur heard.   No friction rub. No gallop.    Pulmonary:      Effort: Pulmonary effort is normal. No tachypnea, accessory muscle usage or respiratory distress.      Breath sounds: Normal breath sounds and air entry. No wheezing, rhonchi or rales.   Chest:      Chest wall: No tenderness.   Abdominal:      General: Bowel sounds are normal. There is no distension.      Palpations: Abdomen is soft.      Tenderness: There is no abdominal tenderness. There is no guarding or rebound.      Hernia: No hernia is present.   Genitourinary:     Comments: No azevedo  catheter in place.  Musculoskeletal:      Cervical back: Normal range of motion and neck supple.      Right lower leg: No edema.      Left lower leg: No edema.      Comments: Patient with 5/5 strength bilateral upper and lower extremities    Skin:     General: Skin is warm and dry.      Capillary Refill: Capillary refill takes less than 2 seconds.      Findings: No abscess, erythema, lesion or wound.   Neurological:      General: No focal deficit present.      Mental Status: She is alert. She is disoriented.      Cranial Nerves: Cranial nerves are intact. No facial asymmetry.      Sensory: Sensation is intact.      Motor: Motor function is intact.      Comments: Awake and alert. Follows simple commands. She does not answer questions for me. Moves all extremities equally. Strength and sensation intact. No focal neuro deficit on exam. No facial droop.    Psychiatric:      Comments: Difficult to assess due to AMS      ---------------------------------------------------------------------------------------------------------------------  EKG:      Telemetry:    Patient not currently on telemetry, will review once available    I have personally reviewed the EKG/Telemetry strip  ---------------------------------------------------------------------------------------------------------------------   Results from last 7 days   Lab Units 04/09/21  0744   TROPONIN T ng/mL <0.010       Results from last 7 days   Lab Units 04/08/21  1013   CHOLESTEROL mg/dL 154   TRIGLYCERIDES mg/dL 150   HDL CHOL mg/dL 30*   LDL CHOL mg/dL 97       Results from last 7 days   Lab Units 04/09/21  0744 04/08/21  1013   WBC 10*3/mm3 11.58* 10.06   HEMOGLOBIN g/dL 13.3 12.5   HEMATOCRIT % 40.6 39.0   MCV fL 95.5 93.8   MCHC g/dL 32.8 32.1   PLATELETS 10*3/mm3 304 289     Results from last 7 days   Lab Units 04/09/21  0744 04/08/21  1013   SODIUM mmol/L 143 143   POTASSIUM mmol/L 4.3 3.7   CHLORIDE mmol/L 105 107   CO2 mmol/L 24.7 22.0   BUN mg/dL 14 15    CREATININE mg/dL 0.86 0.84   EGFR IF NONAFRICN AM mL/min/1.73 64 66   CALCIUM mg/dL 9.7 9.6   GLUCOSE mg/dL 94 103*   ALBUMIN g/dL 4.38 4.13   BILIRUBIN mg/dL 0.5 0.5   ALK PHOS U/L 72 70   AST (SGOT) U/L 18 16   ALT (SGPT) U/L 16 14   Estimated Creatinine Clearance: 49 mL/min (by C-G formula based on SCr of 0.86 mg/dL).    Lab Results   Component Value Date    HGBA1C 4.30 (L) 11/07/2019     Lab Results   Component Value Date    TSH 1.170 04/08/2021    FREET4 1.26 05/27/2020     Microbiology Results (last 10 days)     Procedure Component Value - Date/Time    COVID-19 and FLU A/B PCR - Swab, Nasopharynx [[id number]]  (Normal) Collected: 04/09/21 0924    Lab Status: Final result Specimen: Swab from Nasopharynx Updated: 04/09/21 1003     COVID19 Not Detected     Influenza A PCR Not Detected     Influenza B PCR Not Detected    Narrative:      Fact sheet for providers: https://www.fda.gov/media/347983/download    Fact sheet for patients: https://www.fda.gov/media/870150/download    Test performed by PCR.    COVID PRE-OP / PRE-PROCEDURE SCREENING ORDER (NO ISOLATION) - Swab, Nasopharynx [[id number]] Collected: 04/06/21 1115    Lab Status: Final result Specimen: Swab from Nasopharynx Updated: 04/07/21 1336    Narrative:      The following orders were created for panel order COVID PRE-OP / PRE-PROCEDURE SCREENING ORDER (NO ISOLATION) - Swab, Nasopharynx.  Procedure                               Abnormality         Status                     ---------                               -----------         ------                     COVID-19 PCR, [facility]...[[id number]]                      Final result                 Please view results for these tests on the individual orders.    COVID-19 PCR, [facility], NP SWAB IN [facility] VIRAL TRANSPORT MEDIA 24-30 HR TAT - Swab, Nasopharynx [[id number]] Collected: 04/06/21 1115    Lab Status: Final result Specimen: Swab from Nasopharynx Updated: 04/07/21 1336     SARS-CoV-2 JAMIN Not Detected            Pain Management Panel     Pain Management Panel Latest Ref Rng & Units 4/9/2021    AMPHETAMINES SCREEN, URINE Negative Negative    BARBITURATES SCREEN Negative Negative    BENZODIAZEPINE SCREEN, URINE Negative Negative    BUPRENORPHINEUR Negative Negative    COCAINE SCREEN, URINE Negative Negative    METHADONE SCREEN, URINE Negative Negative        I have personally reviewed the above laboratory results.   ---------------------------------------------------------------------------------------------------------------------  Imaging Results (Last 7 Days)     Procedure Component Value Units Date/Time    CT Head Without Contrast [[id number]] Collected: 04/09/21 0847     Updated: 04/09/21 0850    Narrative:      FINDINGS:    BRAIN:  Unremarkable.  No hemorrhage.  No significant white matter  disease.  No edema.    VENTRICLES:  Unremarkable.  No ventriculomegaly.    BONES/JOINTS:  Unremarkable.  No acute fracture.    SOFT TISSUES:  Unremarkable.    SINUSES:  Unremarkable as visualized.  No acute sinusitis.    MASTOID AIR CELLS:  Unremarkable as visualized.  No mastoid effusion.    Impression:        Unremarkable exam demonstrating no CT evidence of acute intracranial  findings.     This report was finalized on 4/9/2021 8:48 AM by Dr. Santosh Andrade MD.    XR Chest 1 View [[id number]] Collected: 04/09/21 0835     Updated: 04/09/21 0838    Narrative:      FINDINGS:    LUNGS:  Coarsened interstitial markings are noted.    PLEURAL SPACE:  Unremarkable.  No pneumothorax.    HEART:  Unremarkable.  No cardiomegaly.    MEDIASTINUM:  Unremarkable.    BONES/JOINTS:  Unremarkable.    TUBES, LINES AND DEVICES:  Right Port-A-Cath with tip in SVC.    Impression:        Stable appearance of the chest.     This report was finalized on 4/9/2021 8:36 AM by Dr. Santosh Andrade MD.      I have personally reviewed the above radiology results.     Last Echocardiogram:  Results for orders placed during the hospital encounter of  11/07/19    Adult Transthoracic Echo Complete W/ Cont if Necessary Per Protocol    Interpretation Summary  · Normal left ventricular cavity size and wall thickness noted. All left ventricular wall segments contract normally.  · Estimated EF appears to be in the range of 66 - 70%.  · The aortic valve is structurally normal. Mild aortic valve regurgitation is present. No aortic valve stenosis is present.  · The mitral valve is normal in structure. Trace mitral valve regurgitation is present. No significant mitral valve stenosis is present.  · The tricuspid valve is normal. Mild tricuspid valve regurgitation is present. Estimated right ventricular systolic pressure from tricuspid regurgitation is mildly elevated (35-45 mmHg).  · There is no evidence of pericardial effusion.    ---------------------------------------------------------------------------------------------------------------------    Assessment & Plan      -Encephalopathy, toxic vs hypertensive, superimposed on suspected underlying early dementia   • Head CT unremarkable   • Per report, patient's home lyrica was accidentally filled with Neurontin at patient's pharmacy, could be cause of AMS.   • Patient with hypertensive crisis on admit as well, could be contributing   • Labs grossly unremarkable, TSH WNL.   • UDS positive for opiates, home medication   • UA not grossly remarkable but due to history of frequent UTI in past and sxs of increased urinary frequency will send for urine culture   • Obtain ammonia level  • Neuro checks   • Supportive care, allow time for Neurontin to wear off   • Tx hypertensive crisis as outlined below     -Essential hypertension with hypertensive urgency present on admission   -Hyperlipidemia   -History of PSVT  ·  on presentation   · Metoprolol 50 mg and 5 mg Norvasc administered in ED with improvement of SBP to 166.   · However, on arrival to floor SBP back into 180s.   Plan to resume home antihypertensive regimen once  reconciled per pharmacy with appropriate holding parameters to prevent hypotension and/or bradycardia   Monitor BP Q30 minutes until stabilized, then continue per protocol  Add 10 mg PO hydralazine TID to begin as soon as possible   · Lipid panel recently obtained reviewed with adequate control, continue home statin     -History of follicular lymphoma of thyroid in remission s/p surgery   · Cont outpatient monitoring   · TSH adequate     -GERD  • PPI    -Anxiety   · Supportive care   · Continue home medication once reconciled per pharmacy     -F/E/N  • No IV fluids. Replace electrolytes per protocol. NPO diet.     ---------------------------------------------------  DVT Prophylaxis: Lovenox   GI Prophylaxis: PPI   Activity: Up with assistance as tolerated, fall precautions     The patient is considered to be a high risk patient due to: AMS/Encephalopathy, HTN crisis, possible effect of Neurontin, dementia, history of thyroid cancer, anxiety, advancedage    OBSERVATION status, however if further evaluation or treatment plans warrant, status may change.  Based upon current information, I predict patient's care encounter to be less than or equal to 2 midnights.    Code Status: FULL CODE     I have discussed the patient's assessment and plan with daughter at bedside, Naida ALBERTO, and attending physician DO Ayaka Dong PA-C  Hospitalist Service -- [facility]   Pager: [phone]    04/09/21  10:36 EDT    Attending Physician: Dr. Hugh DO      ---------------------------------------------------------------------------------------------------------------------          Electronically signed by Gus Reese DO at 04/09/21 7710       Vital Signs (last day)     Date/Time   Temp   Temp src   Pulse   Resp   BP   Patient Position   SpO2    04/15/21 1313   97.3 (36.3)   Oral   79   20   119/53   Lying   96    04/15/21 1056   98.3 (36.8)   Oral   71   16   146/70   Lying   94    04/15/21 0600   98.1  (36.7)   Oral   --   18   141/63   Sitting   96    04/15/21 0300   97.9 (36.6)   Oral   82   16   156/70   Lying   --    04/14/21 2300   98.6 (37)   Oral   77   16   170/78   Lying   --    04/14/21 1900   98.1 (36.7)   Oral   85   16   154/71   Lying   98    04/14/21 1423   98.3 (36.8)   Oral   81   --   158/67   Lying   96    04/14/21 1100   98.6 (37)   Oral   72   16   164/72   Lying   --    04/14/21 0630   98.4 (36.9)   Oral   85   16   167/79   Sitting   95    04/14/21 0300   98.5 (36.9)   Oral   75   16   152/65   Sitting   --              Intake & Output (last day)       04/14 0701 - 04/15 0700 04/15 0701 - 04/16 0700    P.O. 220 150    I.V. (mL/kg) 675 (12.5) 100 (1.9)    Total Intake(mL/kg) 895 (16.6) 250 (4.6)    Urine (mL/kg/hr) 225 (0.2) 0 (0)    Stool 0 0    Total Output 225 0    Net +670 +250          Urine Unmeasured Occurrence 8 x 2 x    Stool Unmeasured Occurrence 2 x 0 x          Current Facility-Administered Medications   Medication Dose Route Frequency Provider Last Rate Last Admin   • acetaminophen (TYLENOL) tablet 650 mg  650 mg Oral Q6H PRN O'Ayaka Echeverria PA   650 mg at 04/14/21 1353   • amLODIPine (NORVASC) tablet 2.5 mg  2.5 mg Oral Q24H Chaitanya Martin MD   2.5 mg at 04/15/21 0832   • aspirin EC tablet 81 mg  81 mg Oral Daily O'Ayaka Echeverria PA   81 mg at 04/15/21 0833   • atorvastatin (LIPITOR) tablet 10 mg  10 mg Oral Nightly O'Ayaka Echeverria PA   10 mg at 04/14/21 2123   • busPIRone (BUSPAR) tablet 2.5 mg  2.5 mg Oral BID O'Ayaka Echeverria PA   2.5 mg at 04/15/21 0833   • cetirizine (zyrTEC) tablet 5 mg  5 mg Oral Daily Ayaka Gamino PA   5 mg at 04/15/21 0832   • cyanocobalamin injection 1,000 mcg  1,000 mcg Intramuscular Daily Jimena Cain MD   1,000 mcg at 04/15/21 0833   • enoxaparin (LOVENOX) syringe 40 mg  40 mg Subcutaneous Q24H Gus Reese DO   40 mg at 04/15/21 0833   • First Mouthwash (Magic Mouthwash) 5 mL  5 mL Swish & Spit Q6H Jimena Cain  MD Tim   5 mL at 04/15/21 1154   • FLUoxetine (PROzac) capsule 20 mg  20 mg Oral Daily O'Ayaka Echeverria PA   20 mg at 04/15/21 0833   • hydrALAZINE (APRESOLINE) injection 10 mg  10 mg Intravenous Q4H PRN Jimena Cain MD   10 mg at 04/12/21 0032   • hydrALAZINE (APRESOLINE) tablet 10 mg  10 mg Oral Q8H O'Ayaka Echeverria PA   10 mg at 04/15/21 1336   • HYDROcodone-acetaminophen (NORCO) 7.5-325 MG per tablet 1 tablet  1 tablet Oral TID O'Ayaka Echeverria PA   1 tablet at 04/15/21 0834   • magnesium sulfate 4 gram infusion - Mg less than or equal to 1mg/dL  4 g Intravenous PRN Chaitanya Martin MD        Or   • magnesium sulfate 3 gram infusion (1gm x 3) - Mg 1.1 - 1.5 mg/dL  1 g Intravenous PRN Chaitanya Martin MD        Or   • Magnesium Sulfate 2 gram infusion- Mg 1.6 - 1.9 mg/dL  2 g Intravenous PRN Chaitanya Martin MD       • megestrol (MEGACE) 40 MG/ML suspension 200 mg  200 mg Oral Daily Chaitanya Martin MD   200 mg at 04/15/21 0844   • memantine (NAMENDA) tablet 5 mg  5 mg Oral Daily O'Ayaka Echeverria PA   5 mg at 04/15/21 0833   • metoprolol tartrate (LOPRESSOR) tablet 50 mg  50 mg Oral Q12H O'Ayaka Echeverria PA   50 mg at 04/15/21 0833   • nitroglycerin (NITROSTAT) SL tablet 0.4 mg  0.4 mg Sublingual Q5 Min PRN BERKLEY'Ayaka Echeverria PA       • nystatin (MYCOSTATIN) 787163 UNIT/ML suspension 600,000 Units  6 mL Swish & Swallow 4x Daily Jimena Cain MD   600,000 Units at 04/15/21 1154   • ondansetron (ZOFRAN) injection 4 mg  4 mg Intravenous Q6H PRN Ashley Patrick PA-C   4 mg at 04/14/21 8204   • pantoprazole (PROTONIX) EC tablet 40 mg  40 mg Oral Q AM Ayaka Gamino PA   40 mg at 04/15/21 0522   • potassium chloride (K-DUR,KLOR-CON) CR tablet 40 mEq  40 mEq Oral PRN Chaitanya Martin MD        Or   • potassium chloride (KLOR-CON) packet 40 mEq  40 mEq Oral PRN Chaitanya Martin MD        Or   • potassium chloride 10 mEq in 100 mL IVPB  10 mEq Intravenous Q1H PRN Chaitanya Martin MD        • sodium chloride 0.9 % flush 10 mL  10 mL Intravenous PRN Tj Whittaker DO       • sodium chloride 0.9 % flush 10 mL  10 mL Intravenous Q12H Gus Reese DO   10 mL at 04/15/21 0834   • sodium chloride 0.9 % flush 10 mL  10 mL Intravenous PRN Gus Reese DO         Lab Results (most recent)     Procedure Component Value Units Date/Time    Comprehensive Metabolic Panel [[id number]]  (Abnormal) Collected: 04/15/21 0225    Specimen: Blood Updated: 04/15/21 0257     Glucose 84 mg/dL      BUN 3 mg/dL      Creatinine 0.60 mg/dL      Sodium 140 mmol/L      Potassium 4.0 mmol/L      Chloride 109 mmol/L      CO2 17.1 mmol/L      Calcium 8.9 mg/dL      Total Protein 6.0 g/dL      Albumin 3.64 g/dL      ALT (SGPT) 13 U/L      AST (SGOT) 19 U/L      Alkaline Phosphatase 59 U/L      Total Bilirubin 0.4 mg/dL      eGFR Non African Amer 97 mL/min/1.73      Globulin 2.4 gm/dL      A/G Ratio 1.5 g/dL      BUN/Creatinine Ratio 5.0     Anion Gap 13.9 mmol/L     Narrative:      GFR Normal >60  Chronic Kidney Disease <60  Kidney Failure <15      Magnesium [[id number]]  (Normal) Collected: 04/15/21 0225    Specimen: Blood Updated: 04/15/21 0257     Magnesium 1.8 mg/dL     CBC & Differential [[id number]]  (Abnormal) Collected: 04/15/21 0225    Specimen: Blood Updated: 04/15/21 0244    Narrative:      The following orders were created for panel order CBC & Differential.  Procedure                               Abnormality         Status                     ---------                               -----------         ------                     CBC Auto Differential[[id number]]        Abnormal            Final result                 Please view results for these tests on the individual orders.    CBC Auto Differential [[id number]]  (Abnormal) Collected: 04/15/21 0225    Specimen: Blood Updated: 04/15/21 0244     WBC 4.00 10*3/mm3      RBC 3.78 10*6/mm3      Hemoglobin 11.8 g/dL      Hematocrit 38.3 %      .3 fL      MCH 31.2  pg      MCHC 30.8 g/dL      RDW 13.7 %      RDW-SD 51.7 fl      MPV 11.3 fL      Platelets 265 10*3/mm3      Neutrophil % 57.9 %      Lymphocyte % 22.8 %      Monocyte % 15.3 %      Eosinophil % 2.5 %      Basophil % 1.5 %      Immature Grans % 0.0 %      Neutrophils, Absolute 2.32 10*3/mm3      Lymphocytes, Absolute 0.91 10*3/mm3      Monocytes, Absolute 0.61 10*3/mm3      Eosinophils, Absolute 0.10 10*3/mm3      Basophils, Absolute 0.06 10*3/mm3      Immature Grans, Absolute 0.00 10*3/mm3      nRBC 0.0 /100 WBC     Potassium [[id number]]  (Normal) Collected: 04/14/21 2104    Specimen: Blood Updated: 04/14/21 2201     Potassium 4.3 mmol/L      Comment: Slight hemolysis detected by analyzer. Results may be affected.       POC Glucose Once [[id number]]  (Normal) Collected: 04/14/21 2145    Specimen: Blood Updated: 04/14/21 2151     Glucose 86 mg/dL     Comprehensive Metabolic Panel [[id number]]  (Abnormal) Collected: 04/14/21 0331    Specimen: Blood Updated: 04/14/21 0411     Glucose 83 mg/dL      BUN 4 mg/dL      Creatinine 0.57 mg/dL      Sodium 140 mmol/L      Potassium 2.9 mmol/L      Comment: Slight hemolysis detected by analyzer. Results may be affected.        Chloride 110 mmol/L      CO2 16.8 mmol/L      Calcium 8.5 mg/dL      Total Protein 5.9 g/dL      Albumin 3.51 g/dL      ALT (SGPT) 12 U/L      AST (SGOT) 19 U/L      Alkaline Phosphatase 56 U/L      Total Bilirubin 0.4 mg/dL      eGFR Non African Amer 103 mL/min/1.73      Globulin 2.4 gm/dL      A/G Ratio 1.5 g/dL      BUN/Creatinine Ratio 7.0     Anion Gap 13.2 mmol/L     Narrative:      GFR Normal >60  Chronic Kidney Disease <60  Kidney Failure <15      C-reactive Protein [[id number]]  (Normal) Collected: 04/14/21 0331    Specimen: Blood Updated: 04/14/21 0411     C-Reactive Protein 0.50 mg/dL     CBC & Differential [[id number]]  (Abnormal) Collected: 04/14/21 0331    Specimen: Blood Updated: 04/14/21 0350    Narrative:      The following orders were  created for panel order CBC & Differential.  Procedure                               Abnormality         Status                     ---------                               -----------         ------                     CBC Auto Differential[[id number]]        Abnormal            Final result                 Please view results for these tests on the individual orders.    CBC Auto Differential [[id number]]  (Abnormal) Collected: 04/14/21 0331    Specimen: Blood Updated: 04/14/21 0350     WBC 5.02 10*3/mm3      RBC 3.87 10*6/mm3      Hemoglobin 12.1 g/dL      Hematocrit 36.8 %      MCV 95.1 fL      MCH 31.3 pg      MCHC 32.9 g/dL      RDW 13.8 %      RDW-SD 48.1 fl      MPV 11.4 fL      Platelets 261 10*3/mm3      Neutrophil % 65.8 %      Lymphocyte % 16.9 %      Monocyte % 12.7 %      Eosinophil % 3.0 %      Basophil % 1.2 %      Immature Grans % 0.4 %      Neutrophils, Absolute 3.30 10*3/mm3      Lymphocytes, Absolute 0.85 10*3/mm3      Monocytes, Absolute 0.64 10*3/mm3      Eosinophils, Absolute 0.15 10*3/mm3      Basophils, Absolute 0.06 10*3/mm3      Immature Grans, Absolute 0.02 10*3/mm3      nRBC 0.0 /100 WBC     Urinalysis With Culture If Indicated - Urine, Random Void [[id number]]  (Abnormal) Collected: 04/14/21 0114    Specimen: Urine, Random Void Updated: 04/14/21 0125     Color, UA Yellow     Appearance, UA Clear     pH, UA 5.5     Specific Gravity, UA 1.013     Glucose, UA Negative     Ketones, UA 40 mg/dL (2+)     Bilirubin, UA Negative     Blood, UA Negative     Protein, UA Negative     Leuk Esterase, UA Negative     Nitrite, UA Negative     Urobilinogen, UA 0.2 E.U./dL    Narrative:      Urine microscopic not indicated.    Troponin [[id number]]  (Normal) Collected: 04/13/21 0332    Specimen: Blood Updated: 04/13/21 0410     Troponin T <0.010 ng/mL     Narrative:      Troponin T Reference Range:  <= 0.03 ng/mL-   Negative for AMI  >0.03 ng/mL-     Abnormal for myocardial necrosis.  Clinicians would have to  utilize clinical acumen, EKG, Troponin and serial changes to determine if it is an Acute Myocardial Infarction or myocardial injury due to an underlying chronic condition.       Results may be falsely decreased if patient taking Biotin.      Magnesium [[id number]]  (Normal) Collected: 04/13/21 0116    Specimen: Blood Updated: 04/13/21 0218     Magnesium 2.2 mg/dL     Potassium [[id number]]  (Normal) Collected: 04/12/21 2313    Specimen: Blood Updated: 04/12/21 2344     Potassium 3.6 mmol/L     Blood Gas, Venous With Co-Ox [[id number]]  (Abnormal) Collected: 04/12/21 1748    Specimen: Venous Blood Updated: 04/12/21 1751     Site Lab     pH, Venous 7.437 pH Units      pCO2, Venous 27.2 mm Hg      Comment: 84 Value below reference range        pO2, Venous 54.1 mm Hg      Comment: 83 Value above reference range        HCO3, Venous 18.3 mmol/L      Comment: 84 Value below reference range        Base Excess, Venous -4.6 mmol/L      O2 Saturation, Venous 88.2 %      Comment: 83 Value above reference range        Hemoglobin, Blood Gas 12.3 g/dL      Comment: 84 Value below reference range        CO2 Content 19.2 mmol/L      Temperature 37.0 C      Barometric Pressure for Blood Gas 724 mmHg      Modality Room Air     FIO2 21 %      Ventilator Mode NA     Collected by [id number]     Comment: Meter: [id number]     :  [id number]        Oxyhemoglobin Venous 87.7 %      Comment: 83 Value above reference range        Carboxyhemoglobin Venous <1.0 %      Comment: 94 Value below reportable range < 1.0        Methemoglobin Venous <1.0 %      Comment: 94 Value below reportable range < 1.0       Phosphorus [[id number]]  (Normal) Collected: 04/12/21 0415    Specimen: Blood Updated: 04/12/21 0522     Phosphorus 2.7 mg/dL     Urine Culture - Urine, Urine, Catheter [[id number]]  (Normal) Collected: 04/09/21 0803    Specimen: Urine, Catheter Updated: 04/10/21 1037     Urine Culture No growth    Phosphorus [[id number]]  (Normal) Collected:  04/10/21 0956    Specimen: Blood Updated: 04/10/21 1032     Phosphorus 3.4 mg/dL     POC Glucose Once [[id number]]  (Normal) Collected: 04/10/21 0005    Specimen: Blood Updated: 04/10/21 0011     Glucose 96 mg/dL     Ammonia [[id number]]  (Normal) Collected: 04/09/21 1150    Specimen: Blood Updated: 04/09/21 1214     Ammonia 20 umol/L     COVID-19 and FLU A/B PCR - Swab, Nasopharynx [[id number]]  (Normal) Collected: 04/09/21 0924    Specimen: Swab from Nasopharynx Updated: 04/09/21 1003     COVID19 Not Detected     Influenza A PCR Not Detected     Influenza B PCR Not Detected    Narrative:      Fact sheet for providers: https://www.fda.gov/media/040994/download    Fact sheet for patients: https://www.fda.gov/media/510678/download    Test performed by PCR.    [facility] Draw [[id number]] Collected: 04/09/21 0744    Specimen: Blood from Arm, Left Updated: 04/09/21 0846    Narrative:      The following orders were created for panel order [facility].  Procedure                               Abnormality         Status                     ---------                               -----------         ------                     Light Blue Top[[id number]]                                                              Green Top (Gel)[[id number]]                                  Final result               Lavender Top[[id number]]                                     Final result               Gold Top - SST[[id number]]                                                                Please view results for these tests on the individual orders.    Green Top (Gel) [[id number]] Collected: 04/09/21 0744    Specimen: Blood from Arm, Left Updated: 04/09/21 0846     Extra Tube Hold for add-ons.     Comment: Auto resulted.       Lavender Top [[id number]] Collected: 04/09/21 0744    Specimen: Blood from Arm, Left Updated: 04/09/21 0846     Extra Tube hold for add-on     Comment: Auto resulted       [facility] Draw [[id number]] Collected: 04/09/21 0737     Specimen: Blood Updated: 04/09/21 0846    Narrative:      The following orders were created for panel order [facility] Draw.  Procedure                               Abnormality         Status                     ---------                               -----------         ------                     Light Blue Top[[id number]]                                   Final result               Green Top (Gel)[[id number]]                                  Final result               Lavender Top[[id number]]                                     Final result               Gold Top - SST[[id number]]                                   Final result                 Please view results for these tests on the individual orders.    Light Blue Top [[id number]] Collected: 04/09/21 0737    Specimen: Blood Updated: 04/09/21 0846     Extra Tube hold for add-on     Comment: Auto resulted       Green Top (Gel) [[id number]] Collected: 04/09/21 0737    Specimen: Blood Updated: 04/09/21 0846     Extra Tube Hold for add-ons.     Comment: Auto resulted.       Lavender Top [[id number]] Collected: 04/09/21 0737    Specimen: Blood Updated: 04/09/21 0846     Extra Tube hold for add-on     Comment: Auto resulted       Gold Top - SST [[id number]] Collected: 04/09/21 0737    Specimen: Blood Updated: 04/09/21 0846     Extra Tube Hold for add-ons.     Comment: Auto resulted.       Urinalysis, Microscopic Only - Urine, Catheter [[id number]]  (Abnormal) Collected: 04/09/21 0803    Specimen: Urine, Catheter Updated: 04/09/21 0827     RBC, UA 0-2 /HPF      WBC, UA 3-5 /HPF      Bacteria, UA Trace /HPF      Squamous Epithelial Cells, UA 0-2 /HPF      Hyaline Casts, UA None Seen /LPF      Methodology Manual Light Microscopy    Urine Drug Screen - Urine, Clean Catch [[id number]]  (Abnormal) Collected: 04/09/21 0803    Specimen: Urine, Clean Catch Updated: 04/09/21 0825     Amphetamine Screen, Urine Negative     Barbiturates Screen, Urine Negative     Benzodiazepine Screen, Urine  Negative     Cocaine Screen, Urine Negative     Methadone Screen, Urine Negative     Opiate Screen Positive     Phencyclidine (PCP), Urine Negative     THC, Screen, Urine Negative     6-ACETYL MORPHINE Negative     Buprenorphine, Screen, Urine Negative     Oxycodone Screen, Urine Negative    Narrative:      Negative Thresholds For Drugs Screened:                  Amphetamines              1000 ng/ml               Barbiturates               200 ng/ml               Benzodiazepines            200 ng/ml              Cocaine                    300 ng/ml              Methadone                  300 ng/ml              Opiates                    300 ng/ml               Phencyclidine               25 ng/ml               THC                         50 ng/ml              6-Acetyl Morphine           10 ng/ml              Buprenorphine                5 ng/ml              Oxycodone                  300 ng/ml    The reference range for all drugs tested is negative. This report includes final unconfirmed qualitative results to be used for medical treatment purposes only. Unconfirmed results must not be used for non-medical purposes such as employment or legal testing. Clinical consideration should be applied to any drug of abuse test, especially when unconfirmed quantitative results are used.        Urinalysis With Microscopic If Indicated (No Culture) - Urine, Catheter [[id number]]  (Abnormal) Collected: 04/09/21 0803    Specimen: Urine, Catheter Updated: 04/09/21 0817     Color, UA Yellow     Appearance, UA Clear     pH, UA 7.0     Specific Gravity, UA 1.018     Glucose, UA Negative     Ketones, UA Negative     Bilirubin, UA Negative     Blood, UA Negative     Protein, UA Negative     Leuk Esterase, UA Trace     Nitrite, UA Negative     Urobilinogen, UA 0.2 E.U./dL    Ethanol [[id number]] Collected: 04/09/21 0744    Specimen: Blood from Arm, Left Updated: 04/09/21 0812     Ethanol <10 mg/dL      Ethanol % <0.010 %     Narrative:       >/= 80.0 legally intoxicated    Troponin [[id number]]  (Normal) Collected: 04/09/21 0744    Specimen: Blood from Arm, Left Updated: 04/09/21 0809     Troponin T <0.010 ng/mL     Narrative:      Troponin T Reference Range:  <= 0.03 ng/mL-   Negative for AMI  >0.03 ng/mL-     Abnormal for myocardial necrosis.  Clinicians would have to utilize clinical acumen, EKG, Troponin and serial changes to determine if it is an Acute Myocardial Infarction or myocardial injury due to an underlying chronic condition.       Results may be falsely decreased if patient taking Biotin.            Imaging Results (Most Recent)     Procedure Component Value Units Date/Time    XR Chest AP [[id number]] Collected: 04/14/21 1057     Updated: 04/14/21 1142    Narrative:      XR CHEST AP-     CLINICAL INDICATION: fever; G93.40-Encephalopathy, unspecified        COMPARISON: 04/09/2021      TECHNIQUE: Single frontal view of the chest.     FINDINGS:     There is no focal alveolar infiltrate or effusion.  No interval line change  There is no evidence of an acute osseous abnormality.   There are no suspicious-appearing parenchymal soft tissue nodules.          Impression:         1. COPD  2. No focal alveolar infiltrate or effusion     This report was finalized on 4/14/2021 10:57 AM by Dr. Chente Choi MD.       MRI Brain Without Contrast [[id number]] Collected: 04/12/21 1257     Updated: 04/12/21 1414    Narrative:      MRI BRAIN WITHOUT CONTRAST     CLINICAL INDICATION: Neurological deficit, acute, stroke suspected.      COMPARISON: 07/17/2020     PROCEDURE:  Multiple planar images of the brain were acquired in a high field  strength magnet. Several pulse sequences were used to acquire the study.  Gadolinium  was not  administered.     FINDINGS:  The diffusion weighted images show no focal signal abnormality. There is  no evidence of acute ischemia.       The remaining pulse sequences show no mass, hemorrhage, or midline  shift.       The  ventricles, cisterns, and sulci are unremarkable. There is no  hydrocephalus.      Increased T2 and FLAIR signal in the periventricular and parietal white  matter most compatible with chronic microangiopathic change.     The sagittal view show no sellar or parasellar mass.      There is no tectal or pineal lesion.     Coronal imaging demonstrates a symmetric appearance of the visualized  portions of the optic nerves and extraocular muscles.       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  2. No parenchymal mass, hemorrhage, or midline shift.     This report was finalized on 4/12/2021 2:12 PM by Dr. Chente Choi MD.       CT Abdomen Pelvis Without Contrast [[id number]] Collected: 04/10/21 1437     Updated: 04/10/21 1445    Narrative:      EXAM: CT ABDOMEN PELVIS WO CONTRAST-         TECHNIQUE: Multiple axial CT images were obtained from lung bases  through pubic symphysis WITHOUT administration of IV contrast.  Reformatted images in the coronal and/or sagittal plane(s) were  generated from the axial data set to facilitate diagnostic accuracy  and/or surgical planning.  Oral Contrast:NONE.     Radiation dose reduction techniques were utilized per ALARA protocol.  Automated exposure control was initiated through either or [employer]Libr[employer] or  DoseRight software packages by  protocol.    DOSE:     Clinical information Pyelonephritis, uncomplicated;  G93.40-Encephalopathy, unspecified      Comparison 01/26/2018     FINDINGS:     Lower thorax: Clear. No effusions.     Abdomen:     Liver: Homogeneous. No focal hepatic mass or ductal dilatation.     Gallbladder: No dilation or stone identified.     Pancreas: Unremarkable. No mass or ductal dilatation.     Spleen: Homogeneous. No splenomegaly.     Adrenals: No mass.     Kidneys/ureters: No mass. No obstructive uropathy.  No evidence of  urolithiasis.     GI tract: Non-dilated. No definite wall thickening..     MESENTERY: No free fluid,  walled off fluid collections, mesenteric  stranding, or enlarged lymph nodes         Vasculature: There is evidence of atherosclerotic vascular disease     Abdominal wall: No focal hernia or mass.        Pelvis:     Bladder: No focal mass or significant wall thickening     Reproductive: Unremarkable as visualized     Bones: No acute bony abnormality.       Impression:         1. No definitive source for the patient's symptoms identified on today's  exam.        2. Other findings as discussed above.        3. moderate to large volume stool              This report was finalized on 4/10/2021 2:42 PM by Dr. Chente Choi MD.       CT Head Without Contrast [[id number]] Collected: 04/09/21 0847     Updated: 04/09/21 0850    Narrative:      EXAM:    CT Head Without Intravenous Contrast     EXAM DATE:    4/9/2021 8:32 AM     CLINICAL HISTORY:    Delirium     TECHNIQUE:    Axial computed tomography images of the head/brain without intravenous  contrast.  Sagittal and coronal reformatted images were created and  reviewed.  This CT exam was performed using one or more of the following  dose reduction techniques:  automated exposure control, adjustment of  the mA and/or kV according to patient size, and/or use of iterative  reconstruction technique.     COMPARISON:    No relevant prior studies available.     FINDINGS:    BRAIN:  Unremarkable.  No hemorrhage.  No significant white matter  disease.  No edema.    VENTRICLES:  Unremarkable.  No ventriculomegaly.    BONES/JOINTS:  Unremarkable.  No acute fracture.    SOFT TISSUES:  Unremarkable.    SINUSES:  Unremarkable as visualized.  No acute sinusitis.    MASTOID AIR CELLS:  Unremarkable as visualized.  No mastoid effusion.       Impression:        Unremarkable exam demonstrating no CT evidence of acute intracranial  findings.     This report was finalized on 4/9/2021 8:48 AM by Dr. Santosh Andrade MD.       XR Chest 1 View [[id number]] Collected: 04/09/21 0835     Updated:  04/09/21 0838    Narrative:      EXAM:    XR Chest, 1 View     EXAM DATE:    4/9/2021 8:06 AM     CLINICAL HISTORY:    AMS protocol     TECHNIQUE:    Frontal view of the chest.     COMPARISON:    07/15/2020     FINDINGS:    LUNGS:  Coarsened interstitial markings are noted.    PLEURAL SPACE:  Unremarkable.  No pneumothorax.    HEART:  Unremarkable.  No cardiomegaly.    MEDIASTINUM:  Unremarkable.    BONES/JOINTS:  Unremarkable.    TUBES, LINES AND DEVICES:  Right Port-A-Cath with tip in SVC.       Impression:        Stable appearance of the chest.     This report was finalized on 4/9/2021 8:36 AM by Dr. Santosh Andrade MD.           Operative/Procedure Notes (most recent note)    No notes of this type exist for this encounter.            Physician Progress Notes (most recent note)      Chaitanya Martin MD at 04/14/21 1553          Assisted By: Her double first cousin as well as Raegan ALBERTO    CC: Follow-up on metabolic encephalopathy    Interview History/HPI: Patient states she is doing okay, no acute complaints, she did not eat much but states she will eat when she goes home.  She required some assistance getting to the bedside chair but she is currently in a recliner.  She appears no distress mood is good    ROS:     Vitals:    04/14/21 1423   BP: 158/67   Pulse: 81   Resp:    Temp: 98.3 °F (36.8 °C)   SpO2: 96%         Intake/Output Summary (Last 24 hours) at 4/14/2021 1553  Last data filed at 4/14/2021 1423  Gross per 24 hour   Intake 1120 ml   Output 525 ml   Net 595 ml       EXAM: She is in recliner, neurologically she is the exact same as yesterday, same as far as orientation as well.  Lungs are clear heart regular rate and rhythm abdomen is soft benign neck is supple cranial nerves II through XII intact      Tele: Sinus rhythm    LABS:   Lab Results (last 48 hours)     Procedure Component Value Units Date/Time    Comprehensive Metabolic Panel [[id number]]  (Abnormal) Collected: 04/14/21 0331    Specimen: Blood  Updated: 04/14/21 0411     Glucose 83 mg/dL      BUN 4 mg/dL      Creatinine 0.57 mg/dL      Sodium 140 mmol/L      Potassium 2.9 mmol/L      Comment: Slight hemolysis detected by analyzer. Results may be affected.        Chloride 110 mmol/L      CO2 16.8 mmol/L      Calcium 8.5 mg/dL      Total Protein 5.9 g/dL      Albumin 3.51 g/dL      ALT (SGPT) 12 U/L      AST (SGOT) 19 U/L      Alkaline Phosphatase 56 U/L      Total Bilirubin 0.4 mg/dL      eGFR Non African Amer 103 mL/min/1.73      Globulin 2.4 gm/dL      A/G Ratio 1.5 g/dL      BUN/Creatinine Ratio 7.0     Anion Gap 13.2 mmol/L     Narrative:      GFR Normal >60  Chronic Kidney Disease <60  Kidney Failure <15      C-reactive Protein [[id number]]  (Normal) Collected: 04/14/21 0331    Specimen: Blood Updated: 04/14/21 0411     C-Reactive Protein 0.50 mg/dL     CBC & Differential [[id number]]  (Abnormal) Collected: 04/14/21 0331    Specimen: Blood Updated: 04/14/21 0350    Narrative:      The following orders were created for panel order CBC & Differential.  Procedure                               Abnormality         Status                     ---------                               -----------         ------                     CBC Auto Differential[[id number]]        Abnormal            Final result                 Please view results for these tests on the individual orders.    CBC Auto Differential [[id number]]  (Abnormal) Collected: 04/14/21 0331    Specimen: Blood Updated: 04/14/21 0350     WBC 5.02 10*3/mm3      RBC 3.87 10*6/mm3      Hemoglobin 12.1 g/dL      Hematocrit 36.8 %      MCV 95.1 fL      MCH 31.3 pg      MCHC 32.9 g/dL      RDW 13.8 %      RDW-SD 48.1 fl      MPV 11.4 fL      Platelets 261 10*3/mm3      Neutrophil % 65.8 %      Lymphocyte % 16.9 %      Monocyte % 12.7 %      Eosinophil % 3.0 %      Basophil % 1.2 %      Immature Grans % 0.4 %      Neutrophils, Absolute 3.30 10*3/mm3      Lymphocytes, Absolute 0.85 10*3/mm3      Monocytes,  Absolute 0.64 10*3/mm3      Eosinophils, Absolute 0.15 10*3/mm3      Basophils, Absolute 0.06 10*3/mm3      Immature Grans, Absolute 0.02 10*3/mm3      nRBC 0.0 /100 WBC     Urinalysis With Culture If Indicated - Urine, Random Void [[id number]]  (Abnormal) Collected: 04/14/21 0114    Specimen: Urine, Random Void Updated: 04/14/21 0125     Color, UA Yellow     Appearance, UA Clear     pH, UA 5.5     Specific Gravity, UA 1.013     Glucose, UA Negative     Ketones, UA 40 mg/dL (2+)     Bilirubin, UA Negative     Blood, UA Negative     Protein, UA Negative     Leuk Esterase, UA Negative     Nitrite, UA Negative     Urobilinogen, UA 0.2 E.U./dL    Narrative:      Urine microscopic not indicated.    Troponin [[id number]]  (Normal) Collected: 04/13/21 0332    Specimen: Blood Updated: 04/13/21 0410     Troponin T <0.010 ng/mL     Narrative:      Troponin T Reference Range:  <= 0.03 ng/mL-   Negative for AMI  >0.03 ng/mL-     Abnormal for myocardial necrosis.  Clinicians would have to utilize clinical acumen, EKG, Troponin and serial changes to determine if it is an Acute Myocardial Infarction or myocardial injury due to an underlying chronic condition.       Results may be falsely decreased if patient taking Biotin.      Comprehensive Metabolic Panel [[id number]]  (Abnormal) Collected: 04/13/21 0116    Specimen: Blood Updated: 04/13/21 0218     Glucose 77 mg/dL      BUN 5 mg/dL      Creatinine 0.64 mg/dL      Sodium 139 mmol/L      Potassium 3.7 mmol/L      Chloride 110 mmol/L      CO2 16.8 mmol/L      Calcium 8.4 mg/dL      Total Protein 5.5 g/dL      Albumin 3.44 g/dL      ALT (SGPT) 9 U/L      AST (SGOT) 16 U/L      Alkaline Phosphatase 50 U/L      Total Bilirubin 0.3 mg/dL      eGFR Non African Amer 90 mL/min/1.73      Globulin 2.1 gm/dL      A/G Ratio 1.7 g/dL      BUN/Creatinine Ratio 7.8     Anion Gap 12.2 mmol/L     Narrative:      GFR Normal >60  Chronic Kidney Disease <60  Kidney Failure <15      Magnesium  [[id number]]  (Normal) Collected: 04/13/21 0116    Specimen: Blood Updated: 04/13/21 0218     Magnesium 2.2 mg/dL     CBC & Differential [[id number]]  (Abnormal) Collected: 04/13/21 0116    Specimen: Blood Updated: 04/13/21 0200    Narrative:      The following orders were created for panel order CBC & Differential.  Procedure                               Abnormality         Status                     ---------                               -----------         ------                     CBC Auto Differential[[id number]]        Abnormal            Final result                 Please view results for these tests on the individual orders.    CBC Auto Differential [[id number]]  (Abnormal) Collected: 04/13/21 0116    Specimen: Blood Updated: 04/13/21 0200     WBC 6.33 10*3/mm3      RBC 3.54 10*6/mm3      Hemoglobin 11.0 g/dL      Hematocrit 34.4 %      MCV 97.2 fL      MCH 31.1 pg      MCHC 32.0 g/dL      RDW 13.7 %      RDW-SD 49.0 fl      MPV 11.7 fL      Platelets 254 10*3/mm3      Neutrophil % 73.8 %      Lymphocyte % 10.7 %      Monocyte % 13.0 %      Eosinophil % 1.6 %      Basophil % 0.6 %      Immature Grans % 0.3 %      Neutrophils, Absolute 4.67 10*3/mm3      Lymphocytes, Absolute 0.68 10*3/mm3      Monocytes, Absolute 0.82 10*3/mm3      Eosinophils, Absolute 0.10 10*3/mm3      Basophils, Absolute 0.04 10*3/mm3      Immature Grans, Absolute 0.02 10*3/mm3      nRBC 0.0 /100 WBC     Potassium [[id number]]  (Normal) Collected: 04/12/21 2313    Specimen: Blood Updated: 04/12/21 2344     Potassium 3.6 mmol/L           Radiology:    Imaging Results (Last 72 Hours)     Procedure Component Value Units Date/Time    XR Chest AP [[id number]] Collected: 04/14/21 1057     Updated: 04/14/21 1142    Narrative:      XR CHEST AP-     CLINICAL INDICATION: fever; G93.40-Encephalopathy, unspecified        COMPARISON: 04/09/2021      TECHNIQUE: Single frontal view of the chest.     FINDINGS:     There is no focal alveolar infiltrate or  effusion.  No interval line change  There is no evidence of an acute osseous abnormality.   There are no suspicious-appearing parenchymal soft tissue nodules.          Impression:         1. COPD  2. No focal alveolar infiltrate or effusion     This report was finalized on 4/14/2021 10:57 AM by Dr. Chente Choi MD.       MRI Brain Without Contrast [[id number]] Collected: 04/12/21 1257     Updated: 04/12/21 1414    Narrative:      MRI BRAIN WITHOUT CONTRAST     CLINICAL INDICATION: Neurological deficit, acute, stroke suspected.      COMPARISON: 07/17/2020     PROCEDURE:  Multiple planar images of the brain were acquired in a high field  strength magnet. Several pulse sequences were used to acquire the study.  Gadolinium  was not  administered.     FINDINGS:  The diffusion weighted images show no focal signal abnormality. There is  no evidence of acute ischemia.       The remaining pulse sequences show no mass, hemorrhage, or midline  shift.       The ventricles, cisterns, and sulci are unremarkable. There is no  hydrocephalus.      Increased T2 and FLAIR signal in the periventricular and parietal white  matter most compatible with chronic microangiopathic change.     The sagittal view show no sellar or parasellar mass.      There is no tectal or pineal lesion.     Coronal imaging demonstrates a symmetric appearance of the visualized  portions of the optic nerves and extraocular muscles.       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  2. No parenchymal mass, hemorrhage, or midline shift.     This report was finalized on 4/12/2021 2:12 PM by Dr. Chente Choi MD.             Results for orders placed during the hospital encounter of 11/07/19    Adult Transthoracic Echo Complete W/ Cont if Necessary Per Protocol    Interpretation Summary  · Normal left ventricular cavity size and wall thickness noted. All left ventricular wall segments contract normally.  · Estimated EF appears to be in  the range of 66 - 70%.  · The aortic valve is structurally normal. Mild aortic valve regurgitation is present. No aortic valve stenosis is present.  · The mitral valve is normal in structure. Trace mitral valve regurgitation is present. No significant mitral valve stenosis is present.  · The tricuspid valve is normal. Mild tricuspid valve regurgitation is present. Estimated right ventricular systolic pressure from tricuspid regurgitation is mildly elevated (35-45 mmHg).  · There is no evidence of pericardial effusion.      Assessment/Plan:   Patient never developed any significant elevation in temperature, sepsis work-up thus far is negative.  No blood cultures were ever drawn secondary to patient never had an elevated temperature.  Noted CRP was normal as his white count.    Metabolic encephalopathy, improved, most likely medication induced.    Borderline low B12, she has had now for injections, after tomorrow she could go to probable monthly injections since she actually was not low but on the low normal and.    Hypertension, not adequately controlled at this time but Norvasc added on , will continue to follow.  Apparently she is very sensitive with some labile blood pressure and want to avoid hypotension.    DVT prophylaxis, continue subcu Lovenox    Hypokalemia, being supplemented magnesium normal yesterday, recheck in a.m.    Headache appears to be improved at this time, follow, negative MRI here.    Disposition Home    Chaitanya Martin MD       Electronically signed by Chaitanya Martin MD at 21 1601       Consult Notes (most recent note)    No notes of this type exist for this encounter.         Nutrition Notes (most recent note)    No notes exist for this encounter.            Physical Therapy Notes (most recent note)      Jessica Bennett, PT at 21 1021  Version 1 of 1         Acute Care - Physical Therapy Treatment Note  [facility] [place]     Patient Name: Kiah Conway  : 1945  MRN:  [id number]  Today's Date: 4/14/2021   Onset of Illness/Injury or Date of Surgery: 04/09/21       PT Assessment (last 12 hours)      PT Evaluation and Treatment     Row Name 04/14/21 1000          Physical Therapy Time and Intention    Subjective Information  complains of;weakness cold  -BC     Document Type  therapy note (daily note)  -BC     Mode of Treatment  physical therapy  -BC     Patient Effort  fair  -BC     Symptoms Noted During/After Treatment  dizziness  -BC     Row Name 04/14/21 1000          General Information    Patient Profile Reviewed  yes  -BC     Onset of Illness/Injury or Date of Surgery  04/09/21  -BC     Referring Physician  Dr. Martin  -BC     Patient Observations  alert;cooperative;agree to therapy  -BC     Prior Level of Function  mod assist:;gait  -BC     Equipment Currently Used at Home  wheelchair  -BC     Risks Reviewed  patient:;LOB;nausea/vomiting;dizziness;increased discomfort;change in vital signs;increased drainage;lines disloged  -BC     Benefits Reviewed  patient:;improve function;increase independence;increase strength;increase balance;decrease pain;decrease risk of DVT;improve skin integrity;increase knowledge  -BC     Row Name 04/14/21 1000          Living Environment    Current Living Arrangements  home/apartment/condo  -BC     Lives With  child(wendy), adult;grandchild(wendy)  -BC     Row Name 04/14/21 1000          Home Use of Assistive/Adaptive Equipment    Equipment Currently Used at Home  wheelchair  -BC     Row Name 04/14/21 1000          Cognition    Affect/Mental Status (Cognitive)  WNL  -BC     Orientation Status (Cognition)  oriented x 4  -BC     Follows Commands (Cognition)  follows one-step commands  -BC     Cognitive Function (Cognitive)  WFL  -BC     Row Name 04/14/21 1000          Mobility    Extremity Weight-bearing Status  left lower extremity;right lower extremity  -BC     Left Lower Extremity (Weight-bearing Status)  weight-bearing as tolerated (WBAT)  -BC      Right Lower Extremity (Weight-bearing Status)  partial weight-bearing (PWB)  -BC     Row Name 04/14/21 1000          Bed Mobility    Bed Mobility  bed mobility (all) activities  -BC     All Activities, [place] (Bed Mobility)  minimum assist (75% patient effort)  -BC     Bed Mobility, Safety Issues  decreased use of arms for pushing/pulling;decreased use of legs for bridging/pushing;impaired trunk control for bed mobility  -BC     Assistive Device (Bed Mobility)  bed rails  -BC     Row Name 04/14/21 1000          Transfers    Transfers  sit-stand transfer;stand-sit transfer  -BC     Maintains Weight-bearing Status (Transfers)  verbal cues to maintain;physical assist to maintain  -BC     Sit-Stand [place] (Transfers)  minimum assist (75% patient effort)  -BC     Stand-Sit [place] (Transfers)  minimum assist (75% patient effort)  -BC     Row Name 04/14/21 1000          Sit-Stand Transfer    Assistive Device (Sit-Stand Transfers)  walker, front-wheeled  -BC     Row Name 04/14/21 1000          Stand-Sit Transfer    Assistive Device (Stand-Sit Transfers)  walker, front-wheeled  -BC     Row Name 04/14/21 1000          Gait/Stairs (Locomotion)    Gait/Stairs Locomotion  gait/ambulation independence  -BC     [place] Level (Gait)  minimum assist (75% patient effort)  -BC     Assistive Device (Gait)  walker, front-wheeled  -BC     Distance in Feet (Gait)  6 ft  -BC     Row Name 04/14/21 1000          Coping    Observed Emotional State  calm;cooperative  -BC     Verbalized Emotional State  acceptance  -BC     Trust Relationship/Rapport  care explained;choices provided  -BC     Family/Support Persons  daughter  -BC     Involvement in Care  at bedside  -BC     Family/Support System Care  self-care encouraged;support provided;involvement promoted  -BC     Row Name 04/14/21 1000          Plan of Care Review    Plan of Care Reviewed With  patient  -BC     Progress  no change  -BC     Row Name 04/14/21 1000           Positioning and Restraints    Pre-Treatment Position  in bed  -BC     Post Treatment Position  chair  -BC     In Chair  notified nsg;reclined;sitting;call light within reach;encouraged to call for assist  -BC       User Key  (r) = Recorded By, (t) = Taken By, (c) = Cosigned By    Initials Name Provider Type    BC Jessica Bennett PT Physical Therapist          PT Recommendation and Plan     Plan of Care Reviewed With: patient  Progress: no change       Time Calculation:   PT Charges     Row Name 21 1027             Time Calculation    PT Received On  21  -BC         Time Calculation- PT    Total Timed Code Minutes- PT  45 minute(s)  -BC         Timed Charges    62962 - Gait Training Minutes   30  -BC      95945 - PT Therapeutic Activity Minutes  15  -BC        User Key  (r) = Recorded By, (t) = Taken By, (c) = Cosigned By    Initials Name Provider Type    Jessica Gaffney PT Physical Therapist        Therapy Charges for Today     Code Description Service Date Service Provider Modifiers Qty    23045975424 HC PT EVAL HIGH COMPLEXITY 4 2021 Jessica Bennett PT GP 1    93891907508 HC GAIT TRAINING EA 15 MIN 2021 Jessica Bennett PT GP 2    22735212912 HC PT THERAPEUTIC ACT EA 15 MIN 2021 Jessica Bennett PT GP 1               Jessica Bennett PT  2021      Electronically signed by Jessica Bennett PT at 21 1030          Occupational Therapy Notes (most recent note)      Alejandra Perez, OT at 21 1049          Patient Name: Kiah Conway  : 1945    MRN: [id number]                              Today's Date: 2021       Admit Date: 2021    Visit Dx:     ICD-10-CM ICD-9-CM   1. Acute encephalopathy  G93.40 348.30     Patient Active Problem List   Diagnosis   • Essential hypertension   • Atrophic urethritis   • Hyperglycemia   • GERD without esophagitis   • Fibromyalgia   • Arthritis   • Dysphagia   • History of rheumatic fever   • Migraines    • History of thyroid cancer   • Hypokalemia   • Hypocarbia   • Hyperchloremia   • Hypocalcemia   • Normocytic anemia   • Urinary tract infection without hematuria   • Acute encephalopathy     Past Medical History:   Diagnosis Date   • Arthritis    • Cardiac disorder    • Diverticulitis    • Dysphagia    • Fibromyalgia    • Follicular lymphoma (CMS/HCC)     Remisson    • GERD (gastroesophageal reflux disease)    • History of rheumatic fever    • Hypertension    • Hypokalemia due to inadequate potassium intake 03/03/2014   • Lipoma    • Malignant neoplasm (CMS/HCC)    • Migraine    • Neutropenia (CMS/HCC) 11/7/2019   • Obstructive sleep apnea 7/14/2016   • Prolonged Q-T interval on ECG 11/7/2019   • Recurrent UTI    • Rheumatoid arthritis (CMS/HCC)    • Sleep apnea    • Thyroid cancer (CMS/HCC)      Past Surgical History:   Procedure Laterality Date   • BLADDER SURGERY     • FINGER SURGERY      reattached    • HYSTERECTOMY     • THYROID SURGERY       General Information     Row Name 04/13/21 1041          OT Time and Intention    Document Type  evaluation  -     Mode of Treatment  occupational therapy  -     Row Name 04/13/21 1048 04/13/21 1041       General Information    Patient Profile Reviewed  --  yes  -LM    Prior Level of Function  -- Patient receives mod/max assist with BADL and fxl mobility from daughter and grandaughter  -  ADL's;dependent:;max assist:;transfer  -    Existing Precautions/Restrictions  --  fall  -    Barriers to Rehab  --  previous functional deficit;medically complex  -    Row Name 04/13/21 1041          Living Environment    Lives With  child(wendy), adult;grandchild(wendy)  -     Row Name 04/13/21 1041          Cognition    Orientation Status (Cognition)  oriented to;person;place  -     Row Name 04/13/21 1041          Safety Issues, Functional Mobility    Safety Issues Affecting Function (Mobility)  judgment;insight into deficits/self-awareness;problem-solving  -      Impairments Affecting Function (Mobility)  balance;coordination;strength;endurance/activity tolerance  -       User Key  (r) = Recorded By, (t) = Taken By, (c) = Cosigned By    Initials Name Provider Type    LM Alejandra Perez, OT Occupational Therapist          Mobility/ADL's     Row Name 04/13/21 1043          Transfers    Transfers  toilet transfer  -     Sit-Stand [place] (Transfers)  maximum assist (25% patient effort);2 person assist  -[facility] Name 04/13/21 1043          Toilet Transfer    Type (Toilet Transfer)  stand pivot/stand step  -LM     Row Name 04/13/21 1043          Activities of Daily Living    BADL Assessment/Intervention  bathing;lower body dressing;upper body dressing;grooming;feeding;toileting  -[facility] 04/13/21 1043          Bathing Assessment/Intervention    [place] Level (Bathing)  maximum assist (25% patient effort);dependent (less than 25% patient effort)  -LM     Row Name 04/13/21 1043          Lower Body Dressing Assessment/Training    [place] Level (Lower Body Dressing)  dependent (less than 25% patient effort)  -[facility] Name 04/13/21 1043          Upper Body Dressing Assessment/Training    [place] Level (Upper Body Dressing)  maximum assist (25% patient effort)  -[facility] Name 04/13/21 1043          Grooming Assessment/Training    [place] Level (Grooming)  maximum assist (25% patient effort)  -[facility] Name 04/13/21 1043          Self-Feeding Assessment/Training    [place] Level (Feeding)  set up  -[facility] Name 04/13/21 1043          Toileting Assessment/Training    [place] Level (Toileting)  dependent (less than 25% patient effort)  -       User Key  (r) = Recorded By, (t) = Taken By, (c) = Cosigned By    Initials Name Provider Type    LM Alejandra Perez, OT Occupational Therapist        Obj/Interventions     Row Name 04/13/21 1045          Sensory Assessment (Somatosensory)    Sensory Assessment (Somatosensory)  sensation  intact  -LM     Row Name 04/13/21 1045          Vision Assessment/Intervention    Visual Impairment/Limitations  WNL  -LM     [facility] Name 04/13/21 1045          Range of Motion Comprehensive    General Range of Motion  no range of motion deficits identified  -LM     [facility] Name 04/13/21 1045          Strength Comprehensive (MMT)    General Manual Muscle Testing (MMT) Assessment  upper extremity strength deficits identified  -LM     Comment, General Manual Muscle Testing (MMT) Assessment  3/5  -LM       User Key  (r) = Recorded By, (t) = Taken By, (c) = Cosigned By    Initials Name Provider Type    LM Alejandra Perez, OT Occupational Therapist        Goals/Plan     [facility] Name 04/13/21 1047          Transfer Goal 1 (OT)    Activity/Assistive Device (Transfer Goal 1, OT)  transfers, all;commode, 3-in-1  -LM     [place] Level/Cues Needed (Transfer Goal 1, OT)  moderate assist (50-74% patient effort)  -LM     Time Frame (Transfer Goal 1, OT)  by discharge  -LM     [facility] Name 04/13/21 1047          Bathing Goal 1 (OT)    [place] Level/Cues Needed (Bathing Goal 1, OT)  moderate assist (50-74% patient effort);maximum assist (25-49% patient effort)  -LM     Time Frame (Bathing Goal 1, OT)  by discharge  -LM     Row Name 04/13/21 1047          Therapy Assessment/Plan (OT)    Planned Therapy Interventions (OT)  activity tolerance training;BADL retraining;patient/caregiver education/training;ROM/therapeutic exercise;strengthening exercise;neuromuscular control/coordination retraining  -LM       User Key  (r) = Recorded By, (t) = Taken By, (c) = Cosigned By    Initials Name Provider Type    Alejandra Davis, OT Occupational Therapist        Clinical Impression     Row Name 04/13/21 1046          Plan of Care Review    Plan of Care Reviewed With  patient;daughter  -LM     [facility] Name 04/13/21 1046          Therapy Assessment/Plan (OT)    Patient/Family Therapy Goal Statement (OT)  return to previous level.  -LM     Rehab Potential  (OT)  fair, will monitor progress closely  -LM     Criteria for Skilled Therapeutic Interventions Met (OT)  yes;meets criteria;skilled treatment is necessary  -LM     Row Name 21 1046          Therapy Plan Review/Discharge Plan (OT)    Anticipated Discharge Disposition (OT)  home with 24/7 care  -LM     Row Name 21 1046          Positioning and Restraints    Post Treatment Position  bed  -LM     In Chair  call light within reach;with family/caregiver;encouraged to call for assist  -LM       User Key  (r) = Recorded By, (t) = Taken By, (c) = Cosigned By    Initials Name Provider Type    LM Alejandra Perez OT Occupational Therapist        Outcome Measures    No documentation.         OT Recommendation and Plan  Planned Therapy Interventions (OT): activity tolerance training, BADL retraining, patient/caregiver education/training, ROM/therapeutic exercise, strengthening exercise, neuromuscular control/coordination retraining  Plan of Care Review  Plan of Care Reviewed With: patient, daughter     Time Calculation:     Therapy Charges for Today     Code Description Service Date Service Provider Modifiers Qty    [id number]  OT EVAL MOD COMPLEXITY 4 2021 Alejandra Perez OT GO 1               Alejandra Perez OT  2021    Electronically signed by Alejandra Perez OT at 21 1049          Speech Language Pathology Notes (most recent note)      Omaira Bean MA,CCC-SLP at 21 1214          Acute [facility] [place] - Speech Language Pathology   Swallow Initial Evaluation  Jered   CLINICAL DYSPHAGIA ASSESSMENT     Patient Name: Kiah Conway  : 1945  MRN: [id number]  Today's Date: 2021             Admit Date: 2021    Visit Dx:     ICD-10-CM ICD-9-CM   1. Acute encephalopathy  G93.40 348.30     Patient Active Problem List   Diagnosis   • Essential hypertension   • Atrophic urethritis   • Hyperglycemia   • GERD without esophagitis   • Fibromyalgia   • Arthritis   • Dysphagia   •  History of rheumatic fever   • Migraines   • History of thyroid cancer   • Hypokalemia   • Hypocarbia   • Hyperchloremia   • Hypocalcemia   • Normocytic anemia   • Urinary tract infection without hematuria   • Acute encephalopathy     Past Medical History:   Diagnosis Date   • Arthritis    • Cardiac disorder    • Diverticulitis    • Dysphagia    • Fibromyalgia    • Follicular lymphoma (CMS/HCC)     Remisson    • GERD (gastroesophageal reflux disease)    • History of rheumatic fever    • Hypertension    • Hypokalemia due to inadequate potassium intake 03/03/2014   • Lipoma    • Malignant neoplasm (CMS/HCC)    • Migraine    • Neutropenia (CMS/HCC) 11/7/2019   • Obstructive sleep apnea 7/14/2016   • Prolonged Q-T interval on ECG 11/7/2019   • Recurrent UTI    • Rheumatoid arthritis (CMS/HCC)    • Sleep apnea    • Thyroid cancer (CMS/HCC)      Past Surgical History:   Procedure Laterality Date   • BLADDER SURGERY     • FINGER SURGERY      reattached    • HYSTERECTOMY     • THYROID SURGERY       Kiah Conway  is seen at bedside this am on 3N to assess safety/efficacy of swallowing fnx, determine safest/least restrictive diet tolerance. Family members x2 are present during this assessment, they report pt continues w/ minimal po intake.     Ms. Conway is familiar to SLP Department for [facility] 4/8/21 w/ no evidence of aspiration, recommendation for regular consistency, thin liquids. She presents to [facility] w/ ams.      Social History     Socioeconomic History   • Marital status:      Spouse name: Not on file   • Number of children: Not on file   • Years of education: Not on file   • Highest education level: Not on file   Tobacco Use   • Smoking status: Never Smoker   • Smokeless tobacco: Never Used   Substance and Sexual Activity   • Alcohol use: No   • Drug use: No   • Sexual activity: Defer      EXAM:    XR Chest, 1 View     EXAM DATE:    4/9/2021 8:06 AM     CLINICAL HISTORY:    AMS protocol      TECHNIQUE:    Frontal view of the chest.     COMPARISON:    07/15/2020     FINDINGS:    LUNGS:  Coarsened interstitial markings are noted.    PLEURAL SPACE:  Unremarkable.  No pneumothorax.    HEART:  Unremarkable.  No cardiomegaly.    MEDIASTINUM:  Unremarkable.    BONES/JOINTS:  Unremarkable.    TUBES, LINES AND DEVICES:  Right Port-A-Cath with tip in SVC.     IMPRESSION:    Stable appearance of the chest.     This report was finalized on 4/9/2021 8:36 AM by Dr. Santosh Andrade MD.    Diet Orders (active) (From admission, onward)     Start     Ordered    04/09/21 1725  Diet Regular  Diet Effective Now      04/09/21 1724              She is observed on ra w/o complications.     Pt is positioned upright and centered in bed to accept limited po presentations of solid cracker x1and thin water via straw x2. She does not self feed.     Facial/oral structures are symmetrical upon observation. Lingual protrusion reveals no deviation. Oral mucosa reveal patchy white lingual surface coating, she is currently being treated for oral candidiasis. Mucosa are otherwise moist, pink, and clean. Secretions are clear, thin, and well controlled. OROM/AMY is wfl to imitate oral postures. Gag is not assessed. Volitional cough is intact w/ adequate intensity, clear in quality, non-productive. Voice is adequate in intensity, clear in quality w/ intelligible speech. She is oriented to person, present family and place, she follows simple directives and responds to simple oe questions appropriately. She is pleasantly confused.     Upon po presentations, adequate bolus anticipation and acceptance w/ good labial seal for bolus clearance via suction via straw. Bolus formation, manipulation and control are wfl w/ rotary mastication pattern. A-p transit is timely w/o oral residue. No overt s/s aspiration evidenced before the swallow.     Pharyngeal swallow is timely w/ adequate hyolaryngeal elevation per palpation. No overt s/s aspiration  evidenced across this assessment. No silent aspiration suspected as pt is w/o changes in vocal quality, respirations or secretions post po presentations.    Impression: Per this assessment, Ms. Conway presents w/ wfl oropharyngeal swallow w/o s/s aspiration. No s/s indicative of silent aspiration. She does continue w/ limited po intake. Recommend continuing least restrictive regular consistency, thin liquids as tolerated.     EDUCATION  The patient has been educated in the following areas:   Dysphagia (Swallowing Impairment).    SLP Recommendation and Plan  1. Regular consistency, thin liquids.    2. Meds whole in puree/thins.   3. Upright and centered for all po intake.  4. STEFANY precautions.  5. Oral care protocol.  6. Assist w/ po intake.   No further formal SLP f/u warranted at this time.    D/w pt and family results and recommendations w/ verbal agreement.    D/w RN results and recommendations w/ verbal agreement. RN denies pt w/ overt s/s aspiration w/ po meds whole this am.     Thank you for allowing me to participate in the care of your patient-  Omaira eBan M.A., CCC-SLP       Time Calculation:       Therapy Charges for Today     Code Description Service Date Service Provider Modifiers Qty    85861961138 HC ST EVAL ORAL PHARYNG SWALLOW 4 4/12/2021 Omaira Bean MA,CCC-SLP GN 1        Patient was not wearing a face mask during this therapy encounter. Therapist used appropriate personal protective equipment including mask, eye protection and gloves.  Mask used was standard procedure mask. Appropriate PPE was worn during the entire therapy session. The patient coughed during this evaluation. Therapist was within 6 feet for 15 minutes or more during the evaluation. Hand hygiene was completed before and after therapy session. Patient is not in enhanced droplet precautions.       Omaira Bean MA,CCC-SLP  4/12/2021    Electronically signed by Omaira Bean MA,CCC-SLP at 04/12/21  1233       Respiratory Therapy Notes (most recent note)    No notes exist for this encounter.         ADL Documentation (most recent)      Most Recent Value   Transferring  2 - assistive person   Toileting  4 - completely dependent   Bathing  4 - completely dependent   Dressing  4 - completely dependent   Eating  4 - completely dependent   Communication  2 - difficulty understanding (not related to language barrier)   Swallowing  0 - swallows foods/liquids without difficulty   Equipment Currently Used at Home  wheelchair          Discharge Summary    No notes of this type exist for this encounter.         Discharge Order (From admission, onward)     Start     Ordered    04/15/21 1328  Discharge patient  Once     Expected Discharge Date: 04/15/21    Discharge Disposition: Home or Self Care    Physician of Record for Attribution - Please select from Treatment Team: IVET BOOGIE [[id number]]    Review needed by CMO to determine Physician of Record: No       Question Answer Comment   Physician of Record for Attribution - Please select from Treatment Team IVET BOOGIE    Review needed by CMO to determine Physician of Record No        04/15/21 [id number]

## 2021-04-15 NOTE — PROGRESS NOTES
Discharge Planning Assessment   Jered     Patient Name: Kiah Conway  MRN: [id number]  Today's Date: 4/15/2021    Admit Date: 4/9/2021    Discharge Plan     Row Name 04/15/21 1418       Plan    Final Discharge Disposition Code  06 - home with home health care  (Pended)     Final Note Pt is being discharged home with [facility]. SS faxed after visit summary to [facility]. SS notified RN to call report to [facility]. No other needs identified.  (Pended)      Sri Montiel

## 2021-04-15 NOTE — DISCHARGE PLACEMENT REQUEST
"Kiah Conway (75 y.o. Female)     Date of Birth Social Security Number Address Home Phone MRN    1945 [id number] [address] [address] [id number]    Synagogue Marital Status          [employer]ehov[employer] witness        Admission Date Admission Type Admitting Provider Attending Provider Department, Room/Bed    4/9/21 Emergency Gus Reese DO Heinss, Karl F, MD [address], [address]/    Discharge Date Discharge Disposition Discharge Destination         Home or Self Care              Attending Provider: Chaitanya Martin MD    Allergies: No Known Allergies    Isolation: None   Infection: None   Code Status: CPR    Ht: 154.9 cm (61\")   Wt: 54 kg (119 lb)    Admission Cmt: None   Principal Problem: None                Active Insurance as of 4/9/2021     Primary Coverage     Payor Plan Insurance Group Employer/Plan Group    [facility] WELL[employer]ARE MEDICARE REPLACEMENT      Payor Plan Address Payor Plan Phone Number Payor Plan Fax Number Effective Dates    [address] [address] [phone]  4/1/2021 - None Entered    [facility] [address]       Subscriber Name Subscriber Birth Date Member ID       KIAH CONWAY 1945 [id number]           Secondary Coverage     Payor Plan Insurance Group Employer/Plan Group    [id number]      Payor Plan Address Payor Plan Phone Number Payor Plan Fax Number Effective Dates    [address]   3/1/2021 - None Entered    [facility] [id number]       Subscriber Name Subscriber Birth Date Member ID       KIAH CONWAY 1945 [id number]                 Emergency Contacts      (Rel.) Home Phone Work Phone Mobile Phone    Eunice Sharif (Daughter) [phone] -- [phone]    [place]den[place]Corrie rodriguez (Grandchild) [phone] -- [phone]        [address] [address]  Phone:  [phone]  Fax:  [phone] Date: Apr 15, 2021      Ambulatory " Referral to Home Health     Patient:  Kiah Conway MRN:  [id number]   [address] [place] [id number] :  1945  SSN: [id number]   Phone: [phone] Sex:  F      INSURANCE PAYOR PLAN GROUP # SUBSCRIBER ID   Primary:  Secondary:    [employer] [place] MEDICARE REPLACEMENT  [place] [id number]  [id number]      [id number]  [id number]      Referring Provider Information:  IVET BOOGIE Phone: [phone] Fax: [phone]      Referral Information:   # Visits:  1 Referral Type: Home Health [42]   Urgency:  Routine Referral Reason: Specialty Services Required   Start Date: Apr 15, 2021 End Date:  To be determined by Insurer   Diagnosis: Arthritis (M19.90 [ICD-10-CM] 716.90 [ICD-9-CM])  Weakness (R53.1 [ICD-10-CM] 780.79 [ICD-9-CM])      Refer to Dept:   Refer to Provider:   Refer to Facility:       Face to Face Visit Date: 4/15/2021  Follow-up provider for Plan of Care? I treated the patient in an acute care facility and will not continue treatment after discharge.  Follow-up provider: GARTH MACARIO [[id number]]  Reason/Clinical Findings: weakness  Describe mobility limitations that make leaving home difficult: weakness  Nursing/Therapeutic Services Requested: Skilled Nursing  Nursing/Therapeutic Services Requested: Physical Therapy  Skilled nursing orders: Cardiopulmonary assessments (CMP/CBC in 3 days and call to Dr Macario)  Skilled nursing orders: Medication education  PT orders: Strengthening  PT orders: Gait Training  PT orders: Home safety assessment  PT orders: Transfer training  Weight Bearing Status: As Tolerated  Frequency: 1 Week 1     This document serves as a request of services and does not constitute Insurance authorization or approval of services.  To determine eligibility, please contact the members Insurance carrier to verify and review coverage.     If you have medical questions regarding this request for services. Please contact [place] [address] [address] at [phone]  during normal business hours.       Authorizing Provider:Chaitanya Martin MD  Authorizing Provider's NPI: [id number]  Order Entered By: Chaitanya Martin MD 4/15/2021  1:33 PM     Electronically signed by: Chaitanya Martin MD 4/15/2021  1:33 PM               History & Physical      OAyaka Villegas PA at 21 0959     Attestation signed by Gus Reese DO at 21 1019    Patient with increased confusion from baseline was discovered to have had prescription from pharmacy incorrectly filled as gabapentin instead of lyrica.  Last dose taken yesterday with and with increasing altered mental status since began taking 4-5 days ago.  Daughter also reports dysuria and hx of UTI however UA not very convincing.  Checking culture and holding on abx for now and will monitor for improvement. Hypertensive crisis present as well likely contributing have cautiously started adding antihypertensives as spoke with patients cardiology APRN who saw her last that reports very labile blood pressure with episodes of hypotension on minimal medications.                        [facility]  HISTORY & PHYSICAL    Patient Identification:  Name:  Kiah Conway  Age:  75 y.o.  Sex:  female  :  1945  MRN:  [id number]   Visit Number:  [id number]  Admit Date: 2021   Primary Care Physician:  Chip Manzano APRN     Subjective     Chief complaint:   Chief Complaint   Patient presents with   • Drug Overdose   • Altered Mental Status     History of presenting illness:   Patient is a 75 y.o. female with past medical history significant for essential hypertension, hyperlipidemia, paroxysmal SVT, fibromyalgia, chronic pain, follicular lymphoma of thyroid in remission, GERD, dementia, and anxiety that presented to the [facility] emergency department for evaluation of altered mental status. Patient is awake and alert, but disoriented. She will answer simple yes or no questions and  follows simple commands, but will otherwise not speak. Patient's daughter, Eunice, is present at bedside, aiding in history of presenting illness. Per the daughter, she picked up the patient's medication refills from their pharmacy on 4/5/2021. She states that the patient's Lyrica looked different, but she was not sure if the pill had changed. However, after 4-5 doses of the medication after refill, the patient became altered. That is when she realized that the patient's Lyrica had been filled wrong and the medication inside the Lyrica bottle from the pharmacy was in actuality Neurontin. She states she knew this because she herself takes Neurontin and the pills matched hers. Daughter states that the patient has been confused. She states at baseline the patient will have times when she cannot recall info, but more often than not her memory is intact. She states that the patient has not been ambulatory in approximately one year after chemo for follicular lymphoma. She states that the patient has been slightly agitated, not sleeping, and with decreased PO intake. She states that the patient has not missed any doses of her other home meds, but she has not given the patient anymore of the Neurontin and has not resumed the Lyrica due to confusion. Daughter states that the patient is very sensitive to medications, she cannot take muscle relaxers as she responds the same way. She does report noticing that the patient has had increased urinary frequency over the past several days. She states that despite not having Neurontin/Lyrica dose in the past two days, the confusion and AMS has remained constant. Daughter denies any other issues. Patient is unable to confirm nor deny any complaints at my time of evaluation.     Upon arrival to the ED, vitals were temperature 98, heart rate 66, respiratory rate 16, blood pressure 195/83, and oxygen saturation 97% on room air.  Troponin T negative.  CMP grossly unremarkable.  CBC with  white blood cell count 11.58, absolute neutrophil count 8.48, otherwise unremarkable.  Urinalysis with trace leukocytes, 3-5 WBC, trace bacteria.  UDS positive for opiates.  EtOH negative.  Chest x-ray with stable appearance, coarsened interstitial markings with right Port-A-Cath tip in SVC.  CT head without contrast with no evidence of acute intracranial abnormality. COVID-19/influenza screening negative. While in the ED, patient was administered 50 mg p.o. Lopressor and 5 mg p.o. Norvasc.     Patient has been admitted in observation status for further evaluation and treatment.     Present during exam: Patient's daughter Eunice   ---------------------------------------------------------------------------------------------------------------------   Review of Systems   Unable to perform ROS: Mental status change      ---------------------------------------------------------------------------------------------------------------------   Past Medical History:   Diagnosis Date   • Arthritis    • Cardiac disorder    • Diverticulitis    • Dysphagia    • Fibromyalgia    • Follicular lymphoma (CMS/HCC)     Remisson    • GERD (gastroesophageal reflux disease)    • History of rheumatic fever    • Hypertension    • Hypokalemia due to inadequate potassium intake 03/03/2014   • Lipoma    • Malignant neoplasm (CMS/HCC)    • Migraine    • Neutropenia (CMS/HCC) 11/7/2019   • Obstructive sleep apnea 7/14/2016   • Prolonged Q-T interval on ECG 11/7/2019   • Recurrent UTI    • Rheumatoid arthritis (CMS/HCC)    • Sleep apnea    • Thyroid cancer (CMS/HCC)      Past Surgical History:   Procedure Laterality Date   • BLADDER SURGERY     • FINGER SURGERY      reattached    • HYSTERECTOMY     • THYROID SURGERY       Family History   Problem Relation Age of Onset   • Diabetes Mother    • Hypertension Mother    • Other Other         cardiac disorder,cardiovascular disease, malignant neoplasm of brain   • Breast cancer Paternal Aunt      Social  History     Socioeconomic History   • Marital status:      Spouse name: Not on file   • Number of children: Not on file   • Years of education: Not on file   • Highest education level: Not on file   Tobacco Use   • Smoking status: Never Smoker   • Smokeless tobacco: Never Used   Substance and Sexual Activity   • Alcohol use: No   • Drug use: No   • Sexual activity: Defer     ---------------------------------------------------------------------------------------------------------------------   Allergies:  Patient has no known allergies.  ---------------------------------------------------------------------------------------------------------------------   Medications below are reported home medications pulling from within the system; at this time, these medications have not been reconciled unless otherwise specified and are in the verification process for further verifcation as current home medications.    Prior to Admission Medications     Prescriptions Last Dose Informant Patient Reported? Taking?    amLODIPine (NORVASC) 5 MG tablet   No No    Take 1 tablet by mouth Daily.    aspirin 81 MG EC tablet  Child Yes No    Take 81 mg by mouth Daily.    atorvastatin (LIPITOR) 10 MG tablet   No No    Take 1 tablet by mouth Daily.    busPIRone (BUSPAR) 5 MG tablet  Child Yes No    Take 5 mg by mouth 2 (Two) Times a Day. 1/2 AM AND 1/2 PM    Calcium Polycarbophil (FIBER-CAPS PO)  Self Yes No    Take 1 capsule by mouth Daily.    conjugated estrogens (PREMARIN) 0.625 MG/GM vaginal cream  Child Yes No    Insert 1 g into the vagina Daily.    FLUoxetine (PROzac) 20 MG capsule  Child Yes No    Take 20 mg by mouth Daily.    HYDROcodone-acetaminophen (NORCO) 7.5-325 MG per tablet  Child Yes No    Take 1 tablet by mouth 3 (Three) Times a Day As Needed for Moderate Pain .    lactobacillus acidophilus (RISAQUAD) capsule capsule  Child No No    Take 1 capsule by mouth Daily.    loratadine (CLARITIN) 10 MG tablet  Child Yes No     Take 10 mg by mouth Daily.    megestrol (MEGACE) 40 MG tablet  Child Yes No    Take 40 mg by mouth 2 (Two) Times a Day.    metoprolol tartrate (LOPRESSOR) 50 MG tablet  Child No No    Take 1 tablet by mouth Every 12 (Twelve) Hours.    omeprazole (priLOSEC) 40 MG capsule  Child Yes No    Take 40 mg by mouth Daily.    pregabalin (LYRICA) 300 MG capsule  Child Yes No    Take 300 mg by mouth 2 (Two) Times a Day.        ---------------------------------------------------------------------------------------------------------------------    Objective     Hospital Scheduled Meds:  enoxaparin, 40 mg, Subcutaneous, Q24H  sodium chloride, 10 mL, Intravenous, Q12H      Pharmacy to Dose enoxaparin (LOVENOX),     Current listed hospital scheduled medications may not yet reflect those currently placed in orders that are signed and held, awaiting patient's arrival to floor/unit.    ---------------------------------------------------------------------------------------------------------------------   Vital Signs:  Temp:  [98 °F (36.7 °C)] 98 °F (36.7 °C)  Heart Rate:  [65-78] 75  Resp:  [16] 16  BP: (166-198)/() 166/88  Mean Arterial Pressure (Non-Invasive) for the past 24 hrs (Last 3 readings):   Noninvasive MAP (mmHg)   04/09/21 1012 119   04/09/21 0953 132   04/09/21 0943 109     SpO2 Percentage    04/09/21 0942 04/09/21 0953 04/09/21 1012   SpO2: 96% 96%  Comment: provider aware 97%     SpO2:  [93 %-98 %] 97 %  on   ;   Device (Oxygen Therapy): room air    Body mass index is 23.62 kg/m².  Wt Readings from Last 3 Encounters:   04/09/21 56.7 kg (125 lb)   09/21/20 54.4 kg (120 lb)   07/15/20 54.4 kg (120 lb)       ---------------------------------------------------------------------------------------------------------------------   Physical Exam:  Physical Exam  Nursing note reviewed.   Constitutional:       General: She is awake. She is not in acute distress.     Appearance: She is well-developed. She is not  toxic-appearing.      Comments: Patient is awake and alert, disoriented. She will follow simple commands, but not answer questions. Daughter Eunice at bedside. Patient is on room air.    HENT:      Head: Normocephalic and atraumatic.      Right Ear: External ear normal.      Left Ear: External ear normal.      Nose: Nose normal.      Mouth/Throat:      Mouth: Mucous membranes are moist.      Pharynx: Oropharynx is clear.   Eyes:      Extraocular Movements: Extraocular movements intact.      Conjunctiva/sclera: Conjunctivae normal.      Pupils: Pupils are equal, round, and reactive to light.   Cardiovascular:      Rate and Rhythm: Normal rate and regular rhythm.      Pulses:           Dorsalis pedis pulses are 2+ on the right side and 2+ on the left side.        Posterior tibial pulses are 2+ on the right side and 2+ on the left side.      Heart sounds: Normal heart sounds. No murmur heard.   No friction rub. No gallop.    Pulmonary:      Effort: Pulmonary effort is normal. No tachypnea, accessory muscle usage or respiratory distress.      Breath sounds: Normal breath sounds and air entry. No wheezing, rhonchi or rales.   Chest:      Chest wall: No tenderness.   Abdominal:      General: Bowel sounds are normal. There is no distension.      Palpations: Abdomen is soft.      Tenderness: There is no abdominal tenderness. There is no guarding or rebound.      Hernia: No hernia is present.   Genitourinary:     Comments: No azevedo catheter in place.  Musculoskeletal:      Cervical back: Normal range of motion and neck supple.      Right lower leg: No edema.      Left lower leg: No edema.      Comments: Patient with 5/5 strength bilateral upper and lower extremities    Skin:     General: Skin is warm and dry.      Capillary Refill: Capillary refill takes less than 2 seconds.      Findings: No abscess, erythema, lesion or wound.   Neurological:      General: No focal deficit present.      Mental Status: She is alert. She is  disoriented.      Cranial Nerves: Cranial nerves are intact. No facial asymmetry.      Sensory: Sensation is intact.      Motor: Motor function is intact.      Comments: Awake and alert. Follows simple commands. She does not answer questions for me. Moves all extremities equally. Strength and sensation intact. No focal neuro deficit on exam. No facial droop.    Psychiatric:      Comments: Difficult to assess due to AMS      ---------------------------------------------------------------------------------------------------------------------  EKG:      Telemetry:    Patient not currently on telemetry, will review once available    I have personally reviewed the EKG/Telemetry strip  ---------------------------------------------------------------------------------------------------------------------   Results from last 7 days   Lab Units 04/09/21  0744   TROPONIN T ng/mL <0.010       Results from last 7 days   Lab Units 04/08/21  1013   CHOLESTEROL mg/dL 154   TRIGLYCERIDES mg/dL 150   HDL CHOL mg/dL 30*   LDL CHOL mg/dL 97       Results from last 7 days   Lab Units 04/09/21  0744 04/08/21  1013   WBC 10*3/mm3 11.58* 10.06   HEMOGLOBIN g/dL 13.3 12.5   HEMATOCRIT % 40.6 39.0   MCV fL 95.5 93.8   MCHC g/dL 32.8 32.1   PLATELETS 10*3/mm3 304 289     Results from last 7 days   Lab Units 04/09/21  0744 04/08/21  1013   SODIUM mmol/L 143 143   POTASSIUM mmol/L 4.3 3.7   CHLORIDE mmol/L 105 107   CO2 mmol/L 24.7 22.0   BUN mg/dL 14 15   CREATININE mg/dL 0.86 0.84   EGFR IF NONAFRICN AM mL/min/1.73 64 66   CALCIUM mg/dL 9.7 9.6   GLUCOSE mg/dL 94 103*   ALBUMIN g/dL 4.38 4.13   BILIRUBIN mg/dL 0.5 0.5   ALK PHOS U/L 72 70   AST (SGOT) U/L 18 16   ALT (SGPT) U/L 16 14   Estimated Creatinine Clearance: 49 mL/min (by C-G formula based on SCr of 0.86 mg/dL).    Lab Results   Component Value Date    HGBA1C 4.30 (L) 11/07/2019     Lab Results   Component Value Date    TSH 1.170 04/08/2021    FREET4 1.26 05/27/2020     Microbiology  Results (last 10 days)     Procedure Component Value - Date/Time    COVID-19 and FLU A/B PCR - Swab, Nasopharynx [[id number]]  (Normal) Collected: 04/09/21 0924    Lab Status: Final result Specimen: Swab from Nasopharynx Updated: 04/09/21 1003     COVID19 Not Detected     Influenza A PCR Not Detected     Influenza B PCR Not Detected    Narrative:      Fact sheet for providers: https://www.fda.gov/media/765596/download    Fact sheet for patients: https://www.fda.gov/media/545634/download    Test performed by PCR.    COVID PRE-OP / PRE-PROCEDURE SCREENING ORDER (NO ISOLATION) - Swab, Nasopharynx [[id number]] Collected: 04/06/21 1115    Lab Status: Final result Specimen: Swab from Nasopharynx Updated: 04/07/21 1336    Narrative:      The following orders were created for panel order COVID PRE-OP / PRE-PROCEDURE SCREENING ORDER (NO ISOLATION) - Swab, Nasopharynx.  Procedure                               Abnormality         Status                     ---------                               -----------         ------                     COVID-19 PCR, [facility]...[[id number]]                      Final result                 Please view results for these tests on the individual orders.    COVID-19 PCR, [facility], NP SWAB IN [facility] VIRAL TRANSPORT MEDIA 24-30 HR TAT - Swab, Nasopharynx [[id number]] Collected: 04/06/21 1115    Lab Status: Final result Specimen: Swab from Nasopharynx Updated: 04/07/21 1336     SARS-CoV-2 JAMIN Not Detected         Pain Management Panel     Pain Management Panel Latest Ref Rng & Units 4/9/2021    AMPHETAMINES SCREEN, URINE Negative Negative    BARBITURATES SCREEN Negative Negative    BENZODIAZEPINE SCREEN, URINE Negative Negative    BUPRENORPHINEUR Negative Negative    COCAINE SCREEN, URINE Negative Negative    METHADONE SCREEN, URINE Negative Negative        I have personally reviewed the above laboratory results.      ---------------------------------------------------------------------------------------------------------------------  Imaging Results (Last 7 Days)     Procedure Component Value Units Date/Time    CT Head Without Contrast [[id number]] Collected: 04/09/21 0847     Updated: 04/09/21 0850    Narrative:      FINDINGS:    BRAIN:  Unremarkable.  No hemorrhage.  No significant white matter  disease.  No edema.    VENTRICLES:  Unremarkable.  No ventriculomegaly.    BONES/JOINTS:  Unremarkable.  No acute fracture.    SOFT TISSUES:  Unremarkable.    SINUSES:  Unremarkable as visualized.  No acute sinusitis.    MASTOID AIR CELLS:  Unremarkable as visualized.  No mastoid effusion.    Impression:        Unremarkable exam demonstrating no CT evidence of acute intracranial  findings.     This report was finalized on 4/9/2021 8:48 AM by Dr. Santosh Andrade MD.    XR Chest 1 View [[id number]] Collected: 04/09/21 0835     Updated: 04/09/21 0838    Narrative:      FINDINGS:    LUNGS:  Coarsened interstitial markings are noted.    PLEURAL SPACE:  Unremarkable.  No pneumothorax.    HEART:  Unremarkable.  No cardiomegaly.    MEDIASTINUM:  Unremarkable.    BONES/JOINTS:  Unremarkable.    TUBES, LINES AND DEVICES:  Right Port-A-Cath with tip in SVC.    Impression:        Stable appearance of the chest.     This report was finalized on 4/9/2021 8:36 AM by Dr. Santosh Andrade MD.      I have personally reviewed the above radiology results.     Last Echocardiogram:  Results for orders placed during the hospital encounter of 11/07/19    Adult Transthoracic Echo Complete W/ Cont if Necessary Per Protocol    Interpretation Summary  · Normal left ventricular cavity size and wall thickness noted. All left ventricular wall segments contract normally.  · Estimated EF appears to be in the range of 66 - 70%.  · The aortic valve is structurally normal. Mild aortic valve regurgitation is present. No aortic valve stenosis is present.  · The mitral valve is  normal in structure. Trace mitral valve regurgitation is present. No significant mitral valve stenosis is present.  · The tricuspid valve is normal. Mild tricuspid valve regurgitation is present. Estimated right ventricular systolic pressure from tricuspid regurgitation is mildly elevated (35-45 mmHg).  · There is no evidence of pericardial effusion.    ---------------------------------------------------------------------------------------------------------------------    Assessment & Plan      -Encephalopathy, toxic vs hypertensive, superimposed on suspected underlying early dementia   • Head CT unremarkable   • Per report, patient's home lyrica was accidentally filled with Neurontin at patient's pharmacy, could be cause of AMS.   • Patient with hypertensive crisis on admit as well, could be contributing   • Labs grossly unremarkable, TSH WNL.   • UDS positive for opiates, home medication   • UA not grossly remarkable but due to history of frequent UTI in past and sxs of increased urinary frequency will send for urine culture   • Obtain ammonia level  • Neuro checks   • Supportive care, allow time for Neurontin to wear off   • Tx hypertensive crisis as outlined below     -Essential hypertension with hypertensive urgency present on admission   -Hyperlipidemia   -History of PSVT  ·  on presentation   · Metoprolol 50 mg and 5 mg Norvasc administered in ED with improvement of SBP to 166.   · However, on arrival to floor SBP back into 180s.   Plan to resume home antihypertensive regimen once reconciled per pharmacy with appropriate holding parameters to prevent hypotension and/or bradycardia   Monitor BP Q30 minutes until stabilized, then continue per protocol  Add 10 mg PO hydralazine TID to begin as soon as possible   · Lipid panel recently obtained reviewed with adequate control, continue home statin     -History of follicular lymphoma of thyroid in remission s/p surgery   · Cont outpatient monitoring   · TSH  adequate     -GERD  • PPI    -Anxiety   · Supportive care   · Continue home medication once reconciled per pharmacy     -F/E/N  • No IV fluids. Replace electrolytes per protocol. NPO diet.     ---------------------------------------------------  DVT Prophylaxis: Lovenox   GI Prophylaxis: PPI   Activity: Up with assistance as tolerated, fall precautions     The patient is considered to be a high risk patient due to: AMS/Encephalopathy, HTN crisis, possible effect of Neurontin, dementia, history of thyroid cancer, anxiety, advancedage    OBSERVATION status, however if further evaluation or treatment plans warrant, status may change.  Based upon current information, I predict patient's care encounter to be less than or equal to 2 midnights.    Code Status: FULL CODE     I have discussed the patient's assessment and plan with daughter at bedside, Naida ALBERTO, and attending physician DO Ayaka Dong PA-C  Hospitalist Service -- [facility]   Pager: [phone]    04/09/21  10:36 EDT    Attending Physician: Dr. Hugh DO      ---------------------------------------------------------------------------------------------------------------------          Electronically signed by Gus Reese DO at 04/09/21 4921       Vital Signs (last day)     Date/Time   Temp   Temp src   Pulse   Resp   BP   Patient Position   SpO2    04/15/21 1313   97.3 (36.3)   Oral   79   20   119/53   Lying   96    04/15/21 1056   98.3 (36.8)   Oral   71   16   146/70   Lying   94    04/15/21 0600   98.1 (36.7)   Oral   --   18   141/63   Sitting   96    04/15/21 0300   97.9 (36.6)   Oral   82   16   156/70   Lying   --    04/14/21 2300   98.6 (37)   Oral   77   16   170/78   Lying   --    04/14/21 1900   98.1 (36.7)   Oral   85   16   154/71   Lying   98    04/14/21 1423   98.3 (36.8)   Oral   81   --   158/67   Lying   96    04/14/21 1100   98.6 (37)   Oral   72   16   164/72   Lying   --    04/14/21 0630   98.4 (36.9)    Oral   85   16   167/79   Sitting   95    04/14/21 0300   98.5 (36.9)   Oral   75   16   152/65   Sitting   --              Intake & Output (last day)       04/14 0701 - 04/15 0700 04/15 0701 - 04/16 0700    P.O. 220 150    I.V. (mL/kg) 675 (12.5) 100 (1.9)    Total Intake(mL/kg) 895 (16.6) 250 (4.6)    Urine (mL/kg/hr) 225 (0.2) 0 (0)    Stool 0 0    Total Output 225 0    Net +670 +250          Urine Unmeasured Occurrence 8 x 2 x    Stool Unmeasured Occurrence 2 x 0 x          Current Facility-Administered Medications   Medication Dose Route Frequency Provider Last Rate Last Admin   • acetaminophen (TYLENOL) tablet 650 mg  650 mg Oral Q6H PRN O'Kuma, Ayaka E, PA   650 mg at 04/14/21 1353   • amLODIPine (NORVASC) tablet 2.5 mg  2.5 mg Oral Q24H Chaitanya Martin MD   2.5 mg at 04/15/21 0832   • aspirin EC tablet 81 mg  81 mg Oral Daily O'Kuma, Ayaka E, PA   81 mg at 04/15/21 0833   • atorvastatin (LIPITOR) tablet 10 mg  10 mg Oral Nightly O'Kuma, Ayaka E, PA   10 mg at 04/14/21 2123   • busPIRone (BUSPAR) tablet 2.5 mg  2.5 mg Oral BID O'Kuma, Ayaka E, PA   2.5 mg at 04/15/21 0833   • cetirizine (zyrTEC) tablet 5 mg  5 mg Oral Daily O'Kuma, Ayaka E, PA   5 mg at 04/15/21 0832   • cyanocobalamin injection 1,000 mcg  1,000 mcg Intramuscular Daily Jimena Cain MD   1,000 mcg at 04/15/21 0833   • enoxaparin (LOVENOX) syringe 40 mg  40 mg Subcutaneous Q24H Gus Reese DO   40 mg at 04/15/21 0833   • First Mouthwash (Magic Mouthwash) 5 mL  5 mL Swish & Spit Q6H Jimena Cain MD   5 mL at 04/15/21 1154   • FLUoxetine (PROzac) capsule 20 mg  20 mg Oral Daily Ayaka Gamino PA   20 mg at 04/15/21 0833   • hydrALAZINE (APRESOLINE) injection 10 mg  10 mg Intravenous Q4H PRN Jimena Cain MD   10 mg at 04/12/21 0032   • hydrALAZINE (APRESOLINE) tablet 10 mg  10 mg Oral Q8H Ayaka Gamino PA   10 mg at 04/15/21 1336   • HYDROcodone-acetaminophen (NORCO) 7.5-325 MG per tablet 1  tablet  1 tablet Oral TID BERKLEY'Ayaka Echeverria PA   1 tablet at 04/15/21 0834   • magnesium sulfate 4 gram infusion - Mg less than or equal to 1mg/dL  4 g Intravenous PRN Chaitanya Martin MD        Or   • magnesium sulfate 3 gram infusion (1gm x 3) - Mg 1.1 - 1.5 mg/dL  1 g Intravenous PRN Chaitanya Martin MD        Or   • Magnesium Sulfate 2 gram infusion- Mg 1.6 - 1.9 mg/dL  2 g Intravenous PRN Chaitanya Martin MD       • megestrol (MEGACE) 40 MG/ML suspension 200 mg  200 mg Oral Daily Chaitanya Martin MD   200 mg at 04/15/21 0844   • memantine (NAMENDA) tablet 5 mg  5 mg Oral Daily Ayaka Gamino PA   5 mg at 04/15/21 0833   • metoprolol tartrate (LOPRESSOR) tablet 50 mg  50 mg Oral Q12H BERKLEY'Ayaka Echeverria PA   50 mg at 04/15/21 0833   • nitroglycerin (NITROSTAT) SL tablet 0.4 mg  0.4 mg Sublingual Q5 Min PRN Ayaka Gamino PA       • nystatin (MYCOSTATIN) 580450 UNIT/ML suspension 600,000 Units  6 mL Swish & Swallow 4x Daily Jimena Cain MD   600,000 Units at 04/15/21 1154   • ondansetron (ZOFRAN) injection 4 mg  4 mg Intravenous Q6H PRN Ashley Patrick PA-C   4 mg at 04/14/21 2141   • pantoprazole (PROTONIX) EC tablet 40 mg  40 mg Oral Q AM BERKLEY'Ayaka Echeverria PA   40 mg at 04/15/21 0534   • potassium chloride (K-DUR,KLOR-CON) CR tablet 40 mEq  40 mEq Oral PRN Chaitanya Martin MD        Or   • potassium chloride (KLOR-CON) packet 40 mEq  40 mEq Oral PRN Chaitanya Martin MD        Or   • potassium chloride 10 mEq in 100 mL IVPB  10 mEq Intravenous Q1H PRN Chaitanya Martin MD       • sodium chloride 0.9 % flush 10 mL  10 mL Intravenous PRN Tj Whittaker DO       • sodium chloride 0.9 % flush 10 mL  10 mL Intravenous Q12H Gus Reese DO   10 mL at 04/15/21 0834   • sodium chloride 0.9 % flush 10 mL  10 mL Intravenous PRN Gus Reese DO         Lab Results (most recent)     Procedure Component Value Units Date/Time    Comprehensive Metabolic Panel [[id number]]  (Abnormal) Collected:  04/15/21 0225    Specimen: Blood Updated: 04/15/21 0257     Glucose 84 mg/dL      BUN 3 mg/dL      Creatinine 0.60 mg/dL      Sodium 140 mmol/L      Potassium 4.0 mmol/L      Chloride 109 mmol/L      CO2 17.1 mmol/L      Calcium 8.9 mg/dL      Total Protein 6.0 g/dL      Albumin 3.64 g/dL      ALT (SGPT) 13 U/L      AST (SGOT) 19 U/L      Alkaline Phosphatase 59 U/L      Total Bilirubin 0.4 mg/dL      eGFR Non African Amer 97 mL/min/1.73      Globulin 2.4 gm/dL      A/G Ratio 1.5 g/dL      BUN/Creatinine Ratio 5.0     Anion Gap 13.9 mmol/L     Narrative:      GFR Normal >60  Chronic Kidney Disease <60  Kidney Failure <15      Magnesium [[id number]]  (Normal) Collected: 04/15/21 0225    Specimen: Blood Updated: 04/15/21 0257     Magnesium 1.8 mg/dL     CBC & Differential [[id number]]  (Abnormal) Collected: 04/15/21 0225    Specimen: Blood Updated: 04/15/21 0244    Narrative:      The following orders were created for panel order CBC & Differential.  Procedure                               Abnormality         Status                     ---------                               -----------         ------                     CBC Auto Differential[[id number]]        Abnormal            Final result                 Please view results for these tests on the individual orders.    CBC Auto Differential [[id number]]  (Abnormal) Collected: 04/15/21 0225    Specimen: Blood Updated: 04/15/21 0244     WBC 4.00 10*3/mm3      RBC 3.78 10*6/mm3      Hemoglobin 11.8 g/dL      Hematocrit 38.3 %      .3 fL      MCH 31.2 pg      MCHC 30.8 g/dL      RDW 13.7 %      RDW-SD 51.7 fl      MPV 11.3 fL      Platelets 265 10*3/mm3      Neutrophil % 57.9 %      Lymphocyte % 22.8 %      Monocyte % 15.3 %      Eosinophil % 2.5 %      Basophil % 1.5 %      Immature Grans % 0.0 %      Neutrophils, Absolute 2.32 10*3/mm3      Lymphocytes, Absolute 0.91 10*3/mm3      Monocytes, Absolute 0.61 10*3/mm3      Eosinophils, Absolute 0.10 10*3/mm3       Basophils, Absolute 0.06 10*3/mm3      Immature Grans, Absolute 0.00 10*3/mm3      nRBC 0.0 /100 WBC     Potassium [[id number]]  (Normal) Collected: 04/14/21 2104    Specimen: Blood Updated: 04/14/21 2201     Potassium 4.3 mmol/L      Comment: Slight hemolysis detected by analyzer. Results may be affected.       POC Glucose Once [[id number]]  (Normal) Collected: 04/14/21 2145    Specimen: Blood Updated: 04/14/21 2151     Glucose 86 mg/dL     Comprehensive Metabolic Panel [[id number]]  (Abnormal) Collected: 04/14/21 0331    Specimen: Blood Updated: 04/14/21 0411     Glucose 83 mg/dL      BUN 4 mg/dL      Creatinine 0.57 mg/dL      Sodium 140 mmol/L      Potassium 2.9 mmol/L      Comment: Slight hemolysis detected by analyzer. Results may be affected.        Chloride 110 mmol/L      CO2 16.8 mmol/L      Calcium 8.5 mg/dL      Total Protein 5.9 g/dL      Albumin 3.51 g/dL      ALT (SGPT) 12 U/L      AST (SGOT) 19 U/L      Alkaline Phosphatase 56 U/L      Total Bilirubin 0.4 mg/dL      eGFR Non African Amer 103 mL/min/1.73      Globulin 2.4 gm/dL      A/G Ratio 1.5 g/dL      BUN/Creatinine Ratio 7.0     Anion Gap 13.2 mmol/L     Narrative:      GFR Normal >60  Chronic Kidney Disease <60  Kidney Failure <15      C-reactive Protein [[id number]]  (Normal) Collected: 04/14/21 0331    Specimen: Blood Updated: 04/14/21 0411     C-Reactive Protein 0.50 mg/dL     CBC & Differential [[id number]]  (Abnormal) Collected: 04/14/21 0331    Specimen: Blood Updated: 04/14/21 0350    Narrative:      The following orders were created for panel order CBC & Differential.  Procedure                               Abnormality         Status                     ---------                               -----------         ------                     CBC Auto Differential[[id number]]        Abnormal            Final result                 Please view results for these tests on the individual orders.    CBC Auto Differential [[id number]]  (Abnormal)  Collected: 04/14/21 0331    Specimen: Blood Updated: 04/14/21 0350     WBC 5.02 10*3/mm3      RBC 3.87 10*6/mm3      Hemoglobin 12.1 g/dL      Hematocrit 36.8 %      MCV 95.1 fL      MCH 31.3 pg      MCHC 32.9 g/dL      RDW 13.8 %      RDW-SD 48.1 fl      MPV 11.4 fL      Platelets 261 10*3/mm3      Neutrophil % 65.8 %      Lymphocyte % 16.9 %      Monocyte % 12.7 %      Eosinophil % 3.0 %      Basophil % 1.2 %      Immature Grans % 0.4 %      Neutrophils, Absolute 3.30 10*3/mm3      Lymphocytes, Absolute 0.85 10*3/mm3      Monocytes, Absolute 0.64 10*3/mm3      Eosinophils, Absolute 0.15 10*3/mm3      Basophils, Absolute 0.06 10*3/mm3      Immature Grans, Absolute 0.02 10*3/mm3      nRBC 0.0 /100 WBC     Urinalysis With Culture If Indicated - Urine, Random Void [[id number]]  (Abnormal) Collected: 04/14/21 0114    Specimen: Urine, Random Void Updated: 04/14/21 0125     Color, UA Yellow     Appearance, UA Clear     pH, UA 5.5     Specific Gravity, UA 1.013     Glucose, UA Negative     Ketones, UA 40 mg/dL (2+)     Bilirubin, UA Negative     Blood, UA Negative     Protein, UA Negative     Leuk Esterase, UA Negative     Nitrite, UA Negative     Urobilinogen, UA 0.2 E.U./dL    Narrative:      Urine microscopic not indicated.    Troponin [[id number]]  (Normal) Collected: 04/13/21 0332    Specimen: Blood Updated: 04/13/21 0410     Troponin T <0.010 ng/mL     Narrative:      Troponin T Reference Range:  <= 0.03 ng/mL-   Negative for AMI  >0.03 ng/mL-     Abnormal for myocardial necrosis.  Clinicians would have to utilize clinical acumen, EKG, Troponin and serial changes to determine if it is an Acute Myocardial Infarction or myocardial injury due to an underlying chronic condition.       Results may be falsely decreased if patient taking Biotin.      Magnesium [[id number]]  (Normal) Collected: 04/13/21 0116    Specimen: Blood Updated: 04/13/21 0218     Magnesium 2.2 mg/dL     Potassium [[id number]]  (Normal) Collected:  04/12/21 2313    Specimen: Blood Updated: 04/12/21 2344     Potassium 3.6 mmol/L     Blood Gas, Venous With Co-Ox [[id number]]  (Abnormal) Collected: 04/12/21 1748    Specimen: Venous Blood Updated: 04/12/21 1751     Site Lab     pH, Venous 7.437 pH Units      pCO2, Venous 27.2 mm Hg      Comment: 84 Value below reference range        pO2, Venous 54.1 mm Hg      Comment: 83 Value above reference range        HCO3, Venous 18.3 mmol/L      Comment: 84 Value below reference range        Base Excess, Venous -4.6 mmol/L      O2 Saturation, Venous 88.2 %      Comment: 83 Value above reference range        Hemoglobin, Blood Gas 12.3 g/dL      Comment: 84 Value below reference range        CO2 Content 19.2 mmol/L      Temperature 37.0 C      Barometric Pressure for Blood Gas 724 mmHg      Modality Room Air     FIO2 21 %      Ventilator Mode NA     Collected by [id number]     Comment: Meter: [id number]     :  [id number]        Oxyhemoglobin Venous 87.7 %      Comment: 83 Value above reference range        Carboxyhemoglobin Venous <1.0 %      Comment: 94 Value below reportable range < 1.0        Methemoglobin Venous <1.0 %      Comment: 94 Value below reportable range < 1.0       Phosphorus [[id number]]  (Normal) Collected: 04/12/21 0415    Specimen: Blood Updated: 04/12/21 0522     Phosphorus 2.7 mg/dL     Urine Culture - Urine, Urine, Catheter [[id number]]  (Normal) Collected: 04/09/21 0803    Specimen: Urine, Catheter Updated: 04/10/21 1037     Urine Culture No growth    Phosphorus [[id number]]  (Normal) Collected: 04/10/21 0956    Specimen: Blood Updated: 04/10/21 1032     Phosphorus 3.4 mg/dL     POC Glucose Once [[id number]]  (Normal) Collected: 04/10/21 0005    Specimen: Blood Updated: 04/10/21 0011     Glucose 96 mg/dL     Ammonia [[id number]]  (Normal) Collected: 04/09/21 1150    Specimen: Blood Updated: 04/09/21 1214     Ammonia 20 umol/L     COVID-19 and FLU A/B PCR - Swab, Nasopharynx [[id number]]  (Normal)  Collected: 04/09/21 0924    Specimen: Swab from Nasopharynx Updated: 04/09/21 1003     COVID19 Not Detected     Influenza A PCR Not Detected     Influenza B PCR Not Detected    Narrative:      Fact sheet for providers: https://www.fda.gov/media/831315/download    Fact sheet for patients: https://www.fda.gov/media/102877/download    Test performed by PCR.    [place] Draw [[id number]] Collected: 04/09/21 0744    Specimen: Blood from Arm, Left Updated: 04/09/21 0846    Narrative:      The following orders were created for panel order [place] Draw.  Procedure                               Abnormality         Status                     ---------                               -----------         ------                     Light Blue Top[[id number]]                                                              Green Top (Gel)[[id number]]                                  Final result               Lavender Top[[id number]]                                     Final result               Gold Top - SST[[id number]]                                                                Please view results for these tests on the individual orders.    Green Top (Gel) [[id number]] Collected: 04/09/21 0744    Specimen: Blood from Arm, Left Updated: 04/09/21 0846     Extra Tube Hold for add-ons.     Comment: Auto resulted.       Lavender Top [[id number]] Collected: 04/09/21 0744    Specimen: Blood from Arm, Left Updated: 04/09/21 0846     Extra Tube hold for add-on     Comment: Auto resulted       [place] Draw [[id number]] Collected: 04/09/21 0737    Specimen: Blood Updated: 04/09/21 0846    Narrative:      The following orders were created for panel order [place] Draw.  Procedure                               Abnormality         Status                     ---------                               -----------         ------                     Light Blue Top[[id number]]                                   Final result               Green Top  (Gel)[[id number]]                                  Final result               Lavender Top[[id number]]                                     Final result               Gold Top - SST[[id number]]                                   Final result                 Please view results for these tests on the individual orders.    Light Blue Top [[id number]] Collected: 04/09/21 0737    Specimen: Blood Updated: 04/09/21 0846     Extra Tube hold for add-on     Comment: Auto resulted       Green Top (Gel) [[id number]] Collected: 04/09/21 0737    Specimen: Blood Updated: 04/09/21 0846     Extra Tube Hold for add-ons.     Comment: Auto resulted.       Lavender Top [[id number]] Collected: 04/09/21 0737    Specimen: Blood Updated: 04/09/21 0846     Extra Tube hold for add-on     Comment: Auto resulted       Gold Top - SST [[id number]] Collected: 04/09/21 0737    Specimen: Blood Updated: 04/09/21 0846     Extra Tube Hold for add-ons.     Comment: Auto resulted.       Urinalysis, Microscopic Only - Urine, Catheter [[id number]]  (Abnormal) Collected: 04/09/21 0803    Specimen: Urine, Catheter Updated: 04/09/21 0827     RBC, UA 0-2 /HPF      WBC, UA 3-5 /HPF      Bacteria, UA Trace /HPF      Squamous Epithelial Cells, UA 0-2 /HPF      Hyaline Casts, UA None Seen /LPF      Methodology Manual Light Microscopy    Urine Drug Screen - Urine, Clean Catch [[id number]]  (Abnormal) Collected: 04/09/21 0803    Specimen: Urine, Clean Catch Updated: 04/09/21 0825     Amphetamine Screen, Urine Negative     Barbiturates Screen, Urine Negative     Benzodiazepine Screen, Urine Negative     Cocaine Screen, Urine Negative     Methadone Screen, Urine Negative     Opiate Screen Positive     Phencyclidine (PCP), Urine Negative     THC, Screen, Urine Negative     6-ACETYL MORPHINE Negative     Buprenorphine, Screen, Urine Negative     Oxycodone Screen, Urine Negative    Narrative:      Negative Thresholds For Drugs Screened:                  Amphetamines               1000 ng/ml               Barbiturates               200 ng/ml               Benzodiazepines            200 ng/ml              Cocaine                    300 ng/ml              Methadone                  300 ng/ml              Opiates                    300 ng/ml               Phencyclidine               25 ng/ml               THC                         50 ng/ml              6-Acetyl Morphine           10 ng/ml              Buprenorphine                5 ng/ml              Oxycodone                  300 ng/ml    The reference range for all drugs tested is negative. This report includes final unconfirmed qualitative results to be used for medical treatment purposes only. Unconfirmed results must not be used for non-medical purposes such as employment or legal testing. Clinical consideration should be applied to any drug of abuse test, especially when unconfirmed quantitative results are used.        Urinalysis With Microscopic If Indicated (No Culture) - Urine, Catheter [[id number]]  (Abnormal) Collected: 04/09/21 0803    Specimen: Urine, Catheter Updated: 04/09/21 0817     Color, UA Yellow     Appearance, UA Clear     pH, UA 7.0     Specific Gravity, UA 1.018     Glucose, UA Negative     Ketones, UA Negative     Bilirubin, UA Negative     Blood, UA Negative     Protein, UA Negative     Leuk Esterase, UA Trace     Nitrite, UA Negative     Urobilinogen, UA 0.2 E.U./dL    Ethanol [[id number]] Collected: 04/09/21 0744    Specimen: Blood from Arm, Left Updated: 04/09/21 0812     Ethanol <10 mg/dL      Ethanol % <0.010 %     Narrative:      >/= 80.0 legally intoxicated    Troponin [[id number]]  (Normal) Collected: 04/09/21 0744    Specimen: Blood from Arm, Left Updated: 04/09/21 0809     Troponin T <0.010 ng/mL     Narrative:      Troponin T Reference Range:  <= 0.03 ng/mL-   Negative for AMI  >0.03 ng/mL-     Abnormal for myocardial necrosis.  Clinicians would have to utilize clinical acumen, EKG, Troponin and serial  changes to determine if it is an Acute Myocardial Infarction or myocardial injury due to an underlying chronic condition.       Results may be falsely decreased if patient taking Biotin.            Imaging Results (Most Recent)     Procedure Component Value Units Date/Time    XR Chest AP [[id number]] Collected: 04/14/21 1057     Updated: 04/14/21 1142    Narrative:      XR CHEST AP-     CLINICAL INDICATION: fever; G93.40-Encephalopathy, unspecified        COMPARISON: 04/09/2021      TECHNIQUE: Single frontal view of the chest.     FINDINGS:     There is no focal alveolar infiltrate or effusion.  No interval line change  There is no evidence of an acute osseous abnormality.   There are no suspicious-appearing parenchymal soft tissue nodules.          Impression:         1. COPD  2. No focal alveolar infiltrate or effusion     This report was finalized on 4/14/2021 10:57 AM by Dr. Chente Choi MD.       MRI Brain Without Contrast [[id number]] Collected: 04/12/21 1257     Updated: 04/12/21 1414    Narrative:      MRI BRAIN WITHOUT CONTRAST     CLINICAL INDICATION: Neurological deficit, acute, stroke suspected.      COMPARISON: 07/17/2020     PROCEDURE:  Multiple planar images of the brain were acquired in a high field  strength magnet. Several pulse sequences were used to acquire the study.  Gadolinium  was not  administered.     FINDINGS:  The diffusion weighted images show no focal signal abnormality. There is  no evidence of acute ischemia.       The remaining pulse sequences show no mass, hemorrhage, or midline  shift.       The ventricles, cisterns, and sulci are unremarkable. There is no  hydrocephalus.      Increased T2 and FLAIR signal in the periventricular and parietal white  matter most compatible with chronic microangiopathic change.     The sagittal view show no sellar or parasellar mass.      There is no tectal or pineal lesion.     Coronal imaging demonstrates a symmetric appearance of the  visualized  portions of the optic nerves and extraocular muscles.       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  2. No parenchymal mass, hemorrhage, or midline shift.     This report was finalized on 4/12/2021 2:12 PM by Dr. Chente Choi MD.       CT Abdomen Pelvis Without Contrast [[id number]] Collected: 04/10/21 1437     Updated: 04/10/21 1445    Narrative:      EXAM: CT ABDOMEN PELVIS WO CONTRAST-         TECHNIQUE: Multiple axial CT images were obtained from lung bases  through pubic symphysis WITHOUT administration of IV contrast.  Reformatted images in the coronal and/or sagittal plane(s) were  generated from the axial data set to facilitate diagnostic accuracy  and/or surgical planning.  Oral Contrast:NONE.     Radiation dose reduction techniques were utilized per ALARA protocol.  Automated exposure control was initiated through either or [employer] or  DoseRight software packages by  protocol.    DOSE:     Clinical information Pyelonephritis, uncomplicated;  G93.40-Encephalopathy, unspecified      Comparison 01/26/2018     FINDINGS:     Lower thorax: Clear. No effusions.     Abdomen:     Liver: Homogeneous. No focal hepatic mass or ductal dilatation.     Gallbladder: No dilation or stone identified.     Pancreas: Unremarkable. No mass or ductal dilatation.     Spleen: Homogeneous. No splenomegaly.     Adrenals: No mass.     Kidneys/ureters: No mass. No obstructive uropathy.  No evidence of  urolithiasis.     GI tract: Non-dilated. No definite wall thickening..     MESENTERY: No free fluid, walled off fluid collections, mesenteric  stranding, or enlarged lymph nodes         Vasculature: There is evidence of atherosclerotic vascular disease     Abdominal wall: No focal hernia or mass.        Pelvis:     Bladder: No focal mass or significant wall thickening     Reproductive: Unremarkable as visualized     Bones: No acute bony abnormality.       Impression:         1. No  definitive source for the patient's symptoms identified on today's  exam.        2. Other findings as discussed above.        3. moderate to large volume stool              This report was finalized on 4/10/2021 2:42 PM by Dr. Chente Choi MD.       CT Head Without Contrast [[id number]] Collected: 04/09/21 0847     Updated: 04/09/21 0850    Narrative:      EXAM:    CT Head Without Intravenous Contrast     EXAM DATE:    4/9/2021 8:32 AM     CLINICAL HISTORY:    Delirium     TECHNIQUE:    Axial computed tomography images of the head/brain without intravenous  contrast.  Sagittal and coronal reformatted images were created and  reviewed.  This CT exam was performed using one or more of the following  dose reduction techniques:  automated exposure control, adjustment of  the mA and/or kV according to patient size, and/or use of iterative  reconstruction technique.     COMPARISON:    No relevant prior studies available.     FINDINGS:    BRAIN:  Unremarkable.  No hemorrhage.  No significant white matter  disease.  No edema.    VENTRICLES:  Unremarkable.  No ventriculomegaly.    BONES/JOINTS:  Unremarkable.  No acute fracture.    SOFT TISSUES:  Unremarkable.    SINUSES:  Unremarkable as visualized.  No acute sinusitis.    MASTOID AIR CELLS:  Unremarkable as visualized.  No mastoid effusion.       Impression:        Unremarkable exam demonstrating no CT evidence of acute intracranial  findings.     This report was finalized on 4/9/2021 8:48 AM by Dr. Santosh Andrade MD.       XR Chest 1 View [[id number]] Collected: 04/09/21 0835     Updated: 04/09/21 0838    Narrative:      EXAM:    XR Chest, 1 View     EXAM DATE:    4/9/2021 8:06 AM     CLINICAL HISTORY:    AMS protocol     TECHNIQUE:    Frontal view of the chest.     COMPARISON:    07/15/2020     FINDINGS:    LUNGS:  Coarsened interstitial markings are noted.    PLEURAL SPACE:  Unremarkable.  No pneumothorax.    HEART:  Unremarkable.  No cardiomegaly.    MEDIASTINUM:   Unremarkable.    BONES/JOINTS:  Unremarkable.    TUBES, LINES AND DEVICES:  Right Port-A-Cath with tip in SVC.       Impression:        Stable appearance of the chest.     This report was finalized on 4/9/2021 8:36 AM by Dr. Santosh Andrade MD.           Operative/Procedure Notes (most recent note)    No notes of this type exist for this encounter.            Physician Progress Notes (most recent note)      Chaitanya Martin MD at 04/14/21 1553          Assisted By: Her double first cousin as well as Raegan ALBERTO    CC: Follow-up on metabolic encephalopathy    Interview History/HPI: Patient states she is doing okay, no acute complaints, she did not eat much but states she will eat when she goes home.  She required some assistance getting to the bedside chair but she is currently in a recliner.  She appears no distress mood is good    ROS:     Vitals:    04/14/21 1423   BP: 158/67   Pulse: 81   Resp:    Temp: 98.3 °F (36.8 °C)   SpO2: 96%         Intake/Output Summary (Last 24 hours) at 4/14/2021 1553  Last data filed at 4/14/2021 1423  Gross per 24 hour   Intake 1120 ml   Output 525 ml   Net 595 ml       EXAM: She is in recliner, neurologically she is the exact same as yesterday, same as far as orientation as well.  Lungs are clear heart regular rate and rhythm abdomen is soft benign neck is supple cranial nerves II through XII intact      Tele: Sinus rhythm    LABS:   Lab Results (last 48 hours)     Procedure Component Value Units Date/Time    Comprehensive Metabolic Panel [[id number]]  (Abnormal) Collected: 04/14/21 0331    Specimen: Blood Updated: 04/14/21 0411     Glucose 83 mg/dL      BUN 4 mg/dL      Creatinine 0.57 mg/dL      Sodium 140 mmol/L      Potassium 2.9 mmol/L      Comment: Slight hemolysis detected by analyzer. Results may be affected.        Chloride 110 mmol/L      CO2 16.8 mmol/L      Calcium 8.5 mg/dL      Total Protein 5.9 g/dL      Albumin 3.51 g/dL      ALT (SGPT) 12 U/L      AST (SGOT) 19 U/L       Alkaline Phosphatase 56 U/L      Total Bilirubin 0.4 mg/dL      eGFR Non African Amer 103 mL/min/1.73      Globulin 2.4 gm/dL      A/G Ratio 1.5 g/dL      BUN/Creatinine Ratio 7.0     Anion Gap 13.2 mmol/L     Narrative:      GFR Normal >60  Chronic Kidney Disease <60  Kidney Failure <15      C-reactive Protein [[id number]]  (Normal) Collected: 04/14/21 0331    Specimen: Blood Updated: 04/14/21 0411     C-Reactive Protein 0.50 mg/dL     CBC & Differential [[id number]]  (Abnormal) Collected: 04/14/21 0331    Specimen: Blood Updated: 04/14/21 0350    Narrative:      The following orders were created for panel order CBC & Differential.  Procedure                               Abnormality         Status                     ---------                               -----------         ------                     CBC Auto Differential[[id number]]        Abnormal            Final result                 Please view results for these tests on the individual orders.    CBC Auto Differential [[id number]]  (Abnormal) Collected: 04/14/21 0331    Specimen: Blood Updated: 04/14/21 0350     WBC 5.02 10*3/mm3      RBC 3.87 10*6/mm3      Hemoglobin 12.1 g/dL      Hematocrit 36.8 %      MCV 95.1 fL      MCH 31.3 pg      MCHC 32.9 g/dL      RDW 13.8 %      RDW-SD 48.1 fl      MPV 11.4 fL      Platelets 261 10*3/mm3      Neutrophil % 65.8 %      Lymphocyte % 16.9 %      Monocyte % 12.7 %      Eosinophil % 3.0 %      Basophil % 1.2 %      Immature Grans % 0.4 %      Neutrophils, Absolute 3.30 10*3/mm3      Lymphocytes, Absolute 0.85 10*3/mm3      Monocytes, Absolute 0.64 10*3/mm3      Eosinophils, Absolute 0.15 10*3/mm3      Basophils, Absolute 0.06 10*3/mm3      Immature Grans, Absolute 0.02 10*3/mm3      nRBC 0.0 /100 WBC     Urinalysis With Culture If Indicated - Urine, Random Void [[id number]]  (Abnormal) Collected: 04/14/21 0114    Specimen: Urine, Random Void Updated: 04/14/21 0125     Color, UA Yellow     Appearance, UA Clear     pH, UA  5.5     Specific Gravity, UA 1.013     Glucose, UA Negative     Ketones, UA 40 mg/dL (2+)     Bilirubin, UA Negative     Blood, UA Negative     Protein, UA Negative     Leuk Esterase, UA Negative     Nitrite, UA Negative     Urobilinogen, UA 0.2 E.U./dL    Narrative:      Urine microscopic not indicated.    Troponin [[id number]]  (Normal) Collected: 04/13/21 0332    Specimen: Blood Updated: 04/13/21 0410     Troponin T <0.010 ng/mL     Narrative:      Troponin T Reference Range:  <= 0.03 ng/mL-   Negative for AMI  >0.03 ng/mL-     Abnormal for myocardial necrosis.  Clinicians would have to utilize clinical acumen, EKG, Troponin and serial changes to determine if it is an Acute Myocardial Infarction or myocardial injury due to an underlying chronic condition.       Results may be falsely decreased if patient taking Biotin.      Comprehensive Metabolic Panel [[id number]]  (Abnormal) Collected: 04/13/21 0116    Specimen: Blood Updated: 04/13/21 0218     Glucose 77 mg/dL      BUN 5 mg/dL      Creatinine 0.64 mg/dL      Sodium 139 mmol/L      Potassium 3.7 mmol/L      Chloride 110 mmol/L      CO2 16.8 mmol/L      Calcium 8.4 mg/dL      Total Protein 5.5 g/dL      Albumin 3.44 g/dL      ALT (SGPT) 9 U/L      AST (SGOT) 16 U/L      Alkaline Phosphatase 50 U/L      Total Bilirubin 0.3 mg/dL      eGFR Non African Amer 90 mL/min/1.73      Globulin 2.1 gm/dL      A/G Ratio 1.7 g/dL      BUN/Creatinine Ratio 7.8     Anion Gap 12.2 mmol/L     Narrative:      GFR Normal >60  Chronic Kidney Disease <60  Kidney Failure <15      Magnesium [[id number]]  (Normal) Collected: 04/13/21 0116    Specimen: Blood Updated: 04/13/21 0218     Magnesium 2.2 mg/dL     CBC & Differential [[id number]]  (Abnormal) Collected: 04/13/21 0116    Specimen: Blood Updated: 04/13/21 0200    Narrative:      The following orders were created for panel order CBC & Differential.  Procedure                               Abnormality         Status                      ---------                               -----------         ------                     CBC Auto Differential[[id number]]        Abnormal            Final result                 Please view results for these tests on the individual orders.    CBC Auto Differential [[id number]]  (Abnormal) Collected: 04/13/21 0116    Specimen: Blood Updated: 04/13/21 0200     WBC 6.33 10*3/mm3      RBC 3.54 10*6/mm3      Hemoglobin 11.0 g/dL      Hematocrit 34.4 %      MCV 97.2 fL      MCH 31.1 pg      MCHC 32.0 g/dL      RDW 13.7 %      RDW-SD 49.0 fl      MPV 11.7 fL      Platelets 254 10*3/mm3      Neutrophil % 73.8 %      Lymphocyte % 10.7 %      Monocyte % 13.0 %      Eosinophil % 1.6 %      Basophil % 0.6 %      Immature Grans % 0.3 %      Neutrophils, Absolute 4.67 10*3/mm3      Lymphocytes, Absolute 0.68 10*3/mm3      Monocytes, Absolute 0.82 10*3/mm3      Eosinophils, Absolute 0.10 10*3/mm3      Basophils, Absolute 0.04 10*3/mm3      Immature Grans, Absolute 0.02 10*3/mm3      nRBC 0.0 /100 WBC     Potassium [[id number]]  (Normal) Collected: 04/12/21 2313    Specimen: Blood Updated: 04/12/21 2344     Potassium 3.6 mmol/L           Radiology:    Imaging Results (Last 72 Hours)     Procedure Component Value Units Date/Time    XR Chest AP [[id number]] Collected: 04/14/21 1057     Updated: 04/14/21 1142    Narrative:      XR CHEST AP-     CLINICAL INDICATION: fever; G93.40-Encephalopathy, unspecified        COMPARISON: 04/09/2021      TECHNIQUE: Single frontal view of the chest.     FINDINGS:     There is no focal alveolar infiltrate or effusion.  No interval line change  There is no evidence of an acute osseous abnormality.   There are no suspicious-appearing parenchymal soft tissue nodules.          Impression:         1. COPD  2. No focal alveolar infiltrate or effusion     This report was finalized on 4/14/2021 10:57 AM by Dr. Chente Choi MD.       MRI Brain Without Contrast [[id number]] Collected: 04/12/21 1257     Updated:  04/12/21 1414    Narrative:      MRI BRAIN WITHOUT CONTRAST     CLINICAL INDICATION: Neurological deficit, acute, stroke suspected.      COMPARISON: 07/17/2020     PROCEDURE:  Multiple planar images of the brain were acquired in a high field  strength magnet. Several pulse sequences were used to acquire the study.  Gadolinium  was not  administered.     FINDINGS:  The diffusion weighted images show no focal signal abnormality. There is  no evidence of acute ischemia.       The remaining pulse sequences show no mass, hemorrhage, or midline  shift.       The ventricles, cisterns, and sulci are unremarkable. There is no  hydrocephalus.      Increased T2 and FLAIR signal in the periventricular and parietal white  matter most compatible with chronic microangiopathic change.     The sagittal view show no sellar or parasellar mass.      There is no tectal or pineal lesion.     Coronal imaging demonstrates a symmetric appearance of the visualized  portions of the optic nerves and extraocular muscles.       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  2. No parenchymal mass, hemorrhage, or midline shift.     This report was finalized on 4/12/2021 2:12 PM by Dr. Chente Choi MD.             Results for orders placed during the hospital encounter of 11/07/19    Adult Transthoracic Echo Complete W/ Cont if Necessary Per Protocol    Interpretation Summary  · Normal left ventricular cavity size and wall thickness noted. All left ventricular wall segments contract normally.  · Estimated EF appears to be in the range of 66 - 70%.  · The aortic valve is structurally normal. Mild aortic valve regurgitation is present. No aortic valve stenosis is present.  · The mitral valve is normal in structure. Trace mitral valve regurgitation is present. No significant mitral valve stenosis is present.  · The tricuspid valve is normal. Mild tricuspid valve regurgitation is present. Estimated right ventricular systolic  pressure from tricuspid regurgitation is mildly elevated (35-45 mmHg).  · There is no evidence of pericardial effusion.      Assessment/Plan:   Patient never developed any significant elevation in temperature, sepsis work-up thus far is negative.  No blood cultures were ever drawn secondary to patient never had an elevated temperature.  Noted CRP was normal as his white count.    Metabolic encephalopathy, improved, most likely medication induced.    Borderline low B12, she has had now for injections, after tomorrow she could go to probable monthly injections since she actually was not low but on the low normal and.    Hypertension, not adequately controlled at this time but Norvasc added on , will continue to follow.  Apparently she is very sensitive with some labile blood pressure and want to avoid hypotension.    DVT prophylaxis, continue subcu Lovenox    Hypokalemia, being supplemented magnesium normal yesterday, recheck in a.m.    Headache appears to be improved at this time, follow, negative MRI here.    Disposition Home    Chaitanya Martin MD       Electronically signed by Chaitanya Martin MD at 21 1601       Consult Notes (most recent note)    No notes of this type exist for this encounter.         Nutrition Notes (most recent note)    No notes exist for this encounter.            Physical Therapy Notes (most recent note)      Jessica Bennett, PT at 21 1029  Version 1 of 1         Acute Care - Physical Therapy Treatment Note  [facility] [place]     Patient Name: Kiah Conway  : 1945  MRN: [id number]  Today's Date: 2021   Onset of Illness/Injury or Date of Surgery: 21       PT Assessment (last 12 hours)      PT Evaluation and Treatment     Row Name 21 1000          Physical Therapy Time and Intention    Subjective Information  complains of;weakness cold  -BC     Document Type  therapy note (daily note)  -BC     Mode of Treatment  physical therapy  -BC     Patient Effort   fair  -BC     Symptoms Noted During/After Treatment  dizziness  -BC     Row Name 04/14/21 1000          General Information    Patient Profile Reviewed  yes  -BC     Onset of Illness/Injury or Date of Surgery  04/09/21  -BC     Referring Physician  Dr. Martin  -BC     Patient Observations  alert;cooperative;agree to therapy  -BC     Prior Level of Function  mod assist:;gait  -BC     Equipment Currently Used at Home  wheelchair  -BC     Risks Reviewed  patient:;LOB;nausea/vomiting;dizziness;increased discomfort;change in vital signs;increased drainage;lines disloged  -BC     Benefits Reviewed  patient:;improve function;increase independence;increase strength;increase balance;decrease pain;decrease risk of DVT;improve skin integrity;increase knowledge  -BC     Row Name 04/14/21 1000          Living Environment    Current Living Arrangements  home/apartment/condo  -BC     Lives With  child(wendy), adult;grandchild(wendy)  -BC     Row Name 04/14/21 1000          Home Use of Assistive/Adaptive Equipment    Equipment Currently Used at Home  wheelchair  -BC     Row Name 04/14/21 1000          Cognition    Affect/Mental Status (Cognitive)  WNL  -BC     Orientation Status (Cognition)  oriented x 4  -BC     Follows Commands (Cognition)  follows one-step commands  -BC     Cognitive Function (Cognitive)  WFL  -BC     Row Name 04/14/21 1000          Mobility    Extremity Weight-bearing Status  left lower extremity;right lower extremity  -BC     Left Lower Extremity (Weight-bearing Status)  weight-bearing as tolerated (WBAT)  -BC     Right Lower Extremity (Weight-bearing Status)  partial weight-bearing (PWB)  -BC     Row Name 04/14/21 1000          Bed Mobility    Bed Mobility  bed mobility (all) activities  -BC     All Activities, [place] (Bed Mobility)  minimum assist (75% patient effort)  -BC     Bed Mobility, Safety Issues  decreased use of arms for pushing/pulling;decreased use of legs for bridging/pushing;impaired trunk  control for bed mobility  -BC     Assistive Device (Bed Mobility)  bed rails  -BC     Row Name 04/14/21 1000          Transfers    Transfers  sit-stand transfer;stand-sit transfer  -BC     Maintains Weight-bearing Status (Transfers)  verbal cues to maintain;physical assist to maintain  -BC     Sit-Stand [place] (Transfers)  minimum assist (75% patient effort)  -BC     Stand-Sit [place] (Transfers)  minimum assist (75% patient effort)  -BC     Row Name 04/14/21 1000          Sit-Stand Transfer    Assistive Device (Sit-Stand Transfers)  walker, front-wheeled  -BC     Row Name 04/14/21 1000          Stand-Sit Transfer    Assistive Device (Stand-Sit Transfers)  walker, front-wheeled  -BC     Row Name 04/14/21 1000          Gait/Stairs (Locomotion)    Gait/Stairs Locomotion  gait/ambulation independence  -BC     [place] Level (Gait)  minimum assist (75% patient effort)  -BC     Assistive Device (Gait)  walker, front-wheeled  -BC     Distance in Feet (Gait)  6 ft  -BC     Row Name 04/14/21 1000          Coping    Observed Emotional State  calm;cooperative  -BC     Verbalized Emotional State  acceptance  -BC     Trust Relationship/Rapport  care explained;choices provided  -BC     Family/Support Persons  daughter  -BC     Involvement in Care  at bedside  -BC     Family/Support System Care  self-care encouraged;support provided;involvement promoted  -BC     Row Name 04/14/21 1000          Plan of Care Review    Plan of Care Reviewed With  patient  -BC     Progress  no change  -BC     Row Name 04/14/21 1000          Positioning and Restraints    Pre-Treatment Position  in bed  -BC     Post Treatment Position  chair  -BC     In Chair  notified nsg;reclined;sitting;call light within reach;encouraged to call for assist  -BC       User Key  (r) = Recorded By, (t) = Taken By, (c) = Cosigned By    Initials Name Provider Type    BC Jessica Bennett PT Physical Therapist          PT Recommendation and Plan     Plan  of Care Reviewed With: patient  Progress: no change       Time Calculation:   PT Charges     Row Name 21 1027             Time Calculation    PT Received On  21  -BC         Time Calculation- PT    Total Timed Code Minutes- PT  45 minute(s)  -BC         Timed Charges    90739 - Gait Training Minutes   30  -BC      27704 - PT Therapeutic Activity Minutes  15  -BC        User Key  (r) = Recorded By, (t) = Taken By, (c) = Cosigned By    Initials Name Provider Type    BC Jessica Bennett, PT Physical Therapist        Therapy Charges for Today     Code Description Service Date Service Provider Modifiers Qty    44988352056 HC PT EVAL HIGH COMPLEXITY 4 2021 Jessica Bennett, PT GP 1    66980779575 HC GAIT TRAINING EA 15 MIN 2021 Jessica Bennett, PT GP 2    49288719768 HC PT THERAPEUTIC ACT EA 15 MIN 2021 Jessica Bennett PT GP 1               Jessica Bennett PT  2021      Electronically signed by Jessica Bennett PT at 21 1030          Occupational Therapy Notes (most recent note)      Alejandra Perez, OT at 21 1049          Patient Name: Kiah Conway  : 1945    MRN: [id number]                              Today's Date: 2021       Admit Date: 2021    Visit Dx:     ICD-10-CM ICD-9-CM   1. Acute encephalopathy  G93.40 348.30     Patient Active Problem List   Diagnosis   • Essential hypertension   • Atrophic urethritis   • Hyperglycemia   • GERD without esophagitis   • Fibromyalgia   • Arthritis   • Dysphagia   • History of rheumatic fever   • Migraines   • History of thyroid cancer   • Hypokalemia   • Hypocarbia   • Hyperchloremia   • Hypocalcemia   • Normocytic anemia   • Urinary tract infection without hematuria   • Acute encephalopathy     Past Medical History:   Diagnosis Date   • Arthritis    • Cardiac disorder    • Diverticulitis    • Dysphagia    • Fibromyalgia    • Follicular lymphoma (CMS/HCC)     [place]    • GERD (gastroesophageal reflux  disease)    • History of rheumatic fever    • Hypertension    • Hypokalemia due to inadequate potassium intake 03/03/2014   • Lipoma    • Malignant neoplasm (CMS/HCC)    • Migraine    • Neutropenia (CMS/HCC) 11/7/2019   • Obstructive sleep apnea 7/14/2016   • Prolonged Q-T interval on ECG 11/7/2019   • Recurrent UTI    • Rheumatoid arthritis (CMS/HCC)    • Sleep apnea    • Thyroid cancer (CMS/HCC)      Past Surgical History:   Procedure Laterality Date   • BLADDER SURGERY     • FINGER SURGERY      reattached    • HYSTERECTOMY     • THYROID SURGERY       General Information     Row Name 04/13/21 1041          OT Time and Intention    Document Type  evaluation  -LM     Mode of Treatment  occupational therapy  -LM     Row Name 04/13/21 1048 04/13/21 1041       General Information    Patient Profile Reviewed  --  yes  -LM    Prior Level of Function  -- Patient receives mod/max assist with BADL and fxl mobility from daughter and grandaughter  -LM  ADL's;dependent:;max assist:;transfer  -LM    Existing Precautions/Restrictions  --  fall  -LM    Barriers to Rehab  --  previous functional deficit;medically complex  -LM    Row Name 04/13/21 1041          Living Environment    Lives With  child(wendy), adult;grandchild(wendy)  -LM     Row Name 04/13/21 1041          Cognition    Orientation Status (Cognition)  oriented to;person;place  -LM     Row Name 04/13/21 1041          Safety Issues, Functional Mobility    Safety Issues Affecting Function (Mobility)  judgment;insight into deficits/self-awareness;problem-solving  -LM     Impairments Affecting Function (Mobility)  balance;coordination;strength;endurance/activity tolerance  -LM       User Key  (r) = Recorded By, (t) = Taken By, (c) = Cosigned By    Initials Name Provider Type    LM Alejandra Perez, OT Occupational Therapist          Mobility/ADL's     Row Name 04/13/21 1043          Transfers    Transfers  toilet transfer  -LM     Sit-Stand [place] (Transfers)  maximum  assist (25% patient effort);2 person assist  -LM     Row Name 04/13/21 1043          Toilet Transfer    Type (Toilet Transfer)  stand pivot/stand step  -LM     Row Name 04/13/21 1043          Activities of Daily Living    BADL Assessment/Intervention  bathing;lower body dressing;upper body dressing;grooming;feeding;toileting  -[facility] 04/13/21 1043          Bathing Assessment/Intervention    [place] (Bathing)  maximum assist (25% patient effort);dependent (less than 25% patient effort)  -LM     Row Name 04/13/21 1043          Lower Body Dressing Assessment/Training    [place] (Lower Body Dressing)  dependent (less than 25% patient effort)  -LM     Row Name 04/13/21 1043          Upper Body Dressing Assessment/Training    [place] (Upper Body Dressing)  maximum assist (25% patient effort)  -LM     Row Name 04/13/21 1043          Grooming Assessment/Training    [place] (Grooming)  maximum assist (25% patient effort)  -LM     Row Name 04/13/21 1043          Self-Feeding Assessment/Training    [place] (Feeding)  set up  -LM     Row Name 04/13/21 1043          Toileting Assessment/Training    [place] (Toileting)  dependent (less than 25% patient effort)  -       User Key  (r) = Recorded By, (t) = Taken By, (c) = Cosigned By    Initials Name Provider Type    LM Alejandra Perez, OT Occupational Therapist        Obj/Interventions     Row Name 04/13/21 1045          Sensory Assessment (Somatosensory)    Sensory Assessment (Somatosensory)  sensation intact  -LM     Row Name 04/13/21 1045          Vision Assessment/Intervention    Visual Impairment/Limitations  WNL  -LM     Row Name 04/13/21 1045          Range of Motion Comprehensive    General Range of Motion  no range of motion deficits identified  -LM     Row Name 04/13/21 1045          Strength Comprehensive (MMT)    General Manual Muscle Testing (MMT) Assessment  upper extremity strength deficits  identified  -LM     Comment, General Manual Muscle Testing (MMT) Assessment  3/5  -LM       User Key  (r) = Recorded By, (t) = Taken By, (c) = Cosigned By    Initials Name Provider Type    LM Alejandra Perez, OT Occupational Therapist        Goals/Plan     Row Name 04/13/21 1047          Transfer Goal 1 (OT)    Activity/Assistive Device (Transfer Goal 1, OT)  transfers, all;commode, 3-in-1  -LM     [place] Level/Cues Needed (Transfer Goal 1, OT)  moderate assist (50-74% patient effort)  -LM     Time Frame (Transfer Goal 1, OT)  by discharge  -LM     Row Name 04/13/21 1047          Bathing Goal 1 (OT)    [place]/Cues Needed (Bathing Goal 1, OT)  moderate assist (50-74% patient effort);maximum assist (25-49% patient effort)  -LM     Time Frame (Bathing Goal 1, OT)  by discharge  -LM     Row Name 04/13/21 1047          Therapy Assessment/Plan (OT)    Planned Therapy Interventions (OT)  activity tolerance training;BADL retraining;patient/caregiver education/training;ROM/therapeutic exercise;strengthening exercise;neuromuscular control/coordination retraining  -LM       User Key  (r) = Recorded By, (t) = Taken By, (c) = Cosigned By    Initials Name Provider Type    Alejandra Davis OT Occupational Therapist        Clinical Impression     Row Name 04/13/21 1046          Plan of Care Review    Plan of Care Reviewed With  patient;daughter  -LM     Row Name 04/13/21 1046          Therapy Assessment/Plan (OT)    Patient/Family Therapy Goal Statement (OT)  return to previous level.  -LM     Rehab Potential (OT)  fair, will monitor progress closely  -LM     Criteria for Skilled Therapeutic Interventions Met (OT)  yes;meets criteria;skilled treatment is necessary  -LM     Row Name 04/13/21 1046          Therapy Plan Review/Discharge Plan (OT)    Anticipated Discharge Disposition (OT)  home with 24/7 care  -LM     Row Name 04/13/21 1046          Positioning and Restraints    Post Treatment Position  bed  -LM      In Chair  call light within reach;with family/caregiver;encouraged to call for assist  -LM       User Key  (r) = Recorded By, (t) = Taken By, (c) = Cosigned By    Initials Name Provider Type    LM Alejandra Perez OT Occupational Therapist        Outcome Measures    No documentation.         OT Recommendation and Plan  Planned Therapy Interventions (OT): activity tolerance training, BADL retraining, patient/caregiver education/training, ROM/therapeutic exercise, strengthening exercise, neuromuscular control/coordination retraining  Plan of Care Review  Plan of Care Reviewed With: patient, daughter     Time Calculation:     Therapy Charges for Today     Code Description Service Date Service Provider Modifiers Qty    [id number]  OT EVAL MOD COMPLEXITY 4 2021 Alejandra Perez OT GO 1               Alejandra Perez OT  2021    Electronically signed by Alejandra Perez OT at 21 1049          Speech Language Pathology Notes (most recent note)      Omaira Bean MA,CCC-SLP at 21 1214          Acute Care - Speech Language Pathology   Swallow Initial Evaluation  [facility]   CLINICAL DYSPHAGIA ASSESSMENT     Patient Name: Kiah Conway  : 1945  MRN: [id number]  Today's Date: 2021             Admit Date: 2021    Visit Dx:     ICD-10-CM ICD-9-CM   1. Acute encephalopathy  G93.40 348.30     Patient Active Problem List   Diagnosis   • Essential hypertension   • Atrophic urethritis   • Hyperglycemia   • GERD without esophagitis   • Fibromyalgia   • Arthritis   • Dysphagia   • History of rheumatic fever   • Migraines   • History of thyroid cancer   • Hypokalemia   • Hypocarbia   • Hyperchloremia   • Hypocalcemia   • Normocytic anemia   • Urinary tract infection without hematuria   • Acute encephalopathy     Past Medical History:   Diagnosis Date   • Arthritis    • Cardiac disorder    • Diverticulitis    • Dysphagia    • Fibromyalgia    • Follicular lymphoma (CMS/HCC)     Remisson     • GERD (gastroesophageal reflux disease)    • History of rheumatic fever    • Hypertension    • Hypokalemia due to inadequate potassium intake 03/03/2014   • Lipoma    • Malignant neoplasm (CMS/HCC)    • Migraine    • Neutropenia (CMS/HCC) 11/7/2019   • Obstructive sleep apnea 7/14/2016   • Prolonged Q-T interval on ECG 11/7/2019   • Recurrent UTI    • Rheumatoid arthritis (CMS/HCC)    • Sleep apnea    • Thyroid cancer (CMS/HCC)      Past Surgical History:   Procedure Laterality Date   • BLADDER SURGERY     • FINGER SURGERY      reattached    • HYSTERECTOMY     • THYROID SURGERY       Kiah Conway  is seen at bedside this am on 3N to assess safety/efficacy of swallowing fnx, determine safest/least restrictive diet tolerance. Family members x2 are present during this assessment, they report pt continues w/ minimal po intake.     Ms. Conway is familiar to SLP Department for [facility] 4/8/21 w/ no evidence of aspiration, recommendation for regular consistency, thin liquids. She presents to [facility] w/ ams.      Social History     Socioeconomic History   • Marital status:      Spouse name: Not on file   • Number of children: Not on file   • Years of education: Not on file   • Highest education level: Not on file   Tobacco Use   • Smoking status: Never Smoker   • Smokeless tobacco: Never Used   Substance and Sexual Activity   • Alcohol use: No   • Drug use: No   • Sexual activity: Defer      EXAM:    XR Chest, 1 View     EXAM DATE:    4/9/2021 8:06 AM     CLINICAL HISTORY:    AMS protocol     TECHNIQUE:    Frontal view of the chest.     COMPARISON:    07/15/2020     FINDINGS:    LUNGS:  Coarsened interstitial markings are noted.    PLEURAL SPACE:  Unremarkable.  No pneumothorax.    HEART:  Unremarkable.  No cardiomegaly.    MEDIASTINUM:  Unremarkable.    BONES/JOINTS:  Unremarkable.    TUBES, LINES AND DEVICES:  Right Port-A-Cath with tip in SVC.     IMPRESSION:    Stable appearance of the chest.      This report was finalized on 4/9/2021 8:36 AM by Dr. Santosh Andrade MD.    Diet Orders (active) (From admission, onward)     Start     Ordered    04/09/21 1725  Diet Regular  Diet Effective Now      04/09/21 1724              She is observed on ra w/o complications.     Pt is positioned upright and centered in bed to accept limited po presentations of solid cracker x1and thin water via straw x2. She does not self feed.     Facial/oral structures are symmetrical upon observation. Lingual protrusion reveals no deviation. Oral mucosa reveal patchy white lingual surface coating, she is currently being treated for oral candidiasis. Mucosa are otherwise moist, pink, and clean. Secretions are clear, thin, and well controlled. OROM/AMY is wfl to imitate oral postures. Gag is not assessed. Volitional cough is intact w/ adequate intensity, clear in quality, non-productive. Voice is adequate in intensity, clear in quality w/ intelligible speech. She is oriented to person, present family and place, she follows simple directives and responds to simple oe questions appropriately. She is pleasantly confused.     Upon po presentations, adequate bolus anticipation and acceptance w/ good labial seal for bolus clearance via suction via straw. Bolus formation, manipulation and control are wfl w/ rotary mastication pattern. A-p transit is timely w/o oral residue. No overt s/s aspiration evidenced before the swallow.     Pharyngeal swallow is timely w/ adequate hyolaryngeal elevation per palpation. No overt s/s aspiration evidenced across this assessment. No silent aspiration suspected as pt is w/o changes in vocal quality, respirations or secretions post po presentations.    Impression: Per this assessment, Ms. Conway presents w/ wfl oropharyngeal swallow w/o s/s aspiration. No s/s indicative of silent aspiration. She does continue w/ limited po intake. Recommend continuing least restrictive regular consistency, thin liquids as  tolerated.     EDUCATION  The patient has been educated in the following areas:   Dysphagia (Swallowing Impairment).    SLP Recommendation and Plan  1. Regular consistency, thin liquids.    2. Meds whole in puree/thins.   3. Upright and centered for all po intake.  4. STEFANY precautions.  5. Oral care protocol.  6. Assist w/ po intake.   No further formal SLP f/u warranted at this time.    D/w pt and family results and recommendations w/ verbal agreement.    D/w RN results and recommendations w/ verbal agreement. RN denies pt w/ overt s/s aspiration w/ po meds whole this am.     Thank you for allowing me to participate in the care of your patient-  Omaira Bean M.A., CCC-SLP       Time Calculation:       Therapy Charges for Today     Code Description Service Date Service Provider Modifiers Qty    [id number]  ST EVAL ORAL PHARYNG SWALLOW 4 4/12/2021 Omaira Bean MA,CCC-SLP GN 1        Patient was not wearing a face mask during this therapy encounter. Therapist used appropriate personal protective equipment including mask, eye protection and gloves.  Mask used was standard procedure mask. Appropriate PPE was worn during the entire therapy session. The patient coughed during this evaluation. Therapist was within 6 feet for 15 minutes or more during the evaluation. Hand hygiene was completed before and after therapy session. Patient is not in enhanced droplet precautions.       Omaira Bean MA,CCC-SLP  4/12/2021    Electronically signed by Omaira Bean MA,CCC-SLP at 04/12/21 1233       Respiratory Therapy Notes (most recent note)    No notes exist for this encounter.         ADL Documentation (most recent)      Most Recent Value   Transferring  2 - assistive person   Toileting  4 - completely dependent   Bathing  4 - completely dependent   Dressing  4 - completely dependent   Eating  4 - completely dependent   Communication  2 - difficulty understanding (not related to language  barrier)   Swallowing  0 - swallows foods/liquids without difficulty   Equipment Currently Used at Home  wheelchair          Discharge Summary    No notes of this type exist for this encounter.         Discharge Order (From admission, onward)     Start     Ordered    04/15/21 1328  Discharge patient  Once     Expected Discharge Date: 04/15/21    Discharge Disposition: Home or Self Care    Physician of Record for Attribution - Please select from Treatment Team: IVET BOOGIE [[id number]]    Review needed by CMO to determine Physician of Record: No       Question Answer Comment   Physician of Record for Attribution - Please select from Treatment Team IVET BOOGIE    Review needed by CMO to determine Physician of Record No        04/15/21 3351

## 2021-04-15 NOTE — DISCHARGE SUMMARY
Date of admission: 4/9/2021  Date of discharge: 4/15/2021    Principal diagnosis: Acute metabolic encephalopathy secondary to inadvertent gabapentin intake  Secondary diagnosis:  Arthritis  Fibromyalgia  Hypokalemia  Borderline low B12, supplemented daily while here, recommend continued monitoring as an outpatient PCP.  Hypertension medications adjusted here for better control  There was a thought of possible UTI but urine culture negative therefore antibiotics were stopped  Functional decline, responding to therapy    Procedures:  -CT head unremarkable  -CT abdomen and pelvis moderate to large volume stool otherwise unremarkable  -MRI brain chronic microangiopathic changes only  -EKG showing LVH    Consultants:  None     Exam: Assisted by daughter as well as Raegan RN, 119/53, room air sat 96% respiratory rate is 20 pulse 79 temperature 97.3.  She is in no distress, neck is supple mood is good she is pleasant neurologic status the same as yesterday, strength is symmetric lungs have bilateral breath sounds are clear no rhonchi rales wheezing heard, heart regular rate and rhythm without murmur rub or gallop, abdomen is soft benign.  Skin warm and dry    Hospital course:  Patient was admitted after apparently the pharmacy had inadvertently given her gabapentin instead of Lyrica for her prescription.  Patient took this for approximately 3 days last week and then got increasing confusion lethargy and presented here.  Gabapentin was held and her neurologic status has improved through time.  She will be restarted on her Lyrica on discharge but I have decreased her to 25 twice daily and this can be titrated up by her primary care provider as needed, she is on this for fibromyalgia as well as her arthritis.  Patient has some generalized weakness, she has been working with therapy and home health will be sent out with therapy as well.  She is requesting a hospital bed which I think would help for positioning for comfort as  well as mobility.  This was written.  Patient's blood pressure was not well controlled on admission, I have adjusted medications and blood pressure is better controlled on discharge.  She still has some fluctuation of her blood pressure but last blood pressure is excellent.  On discharge electrolytes are unremarkable.  White count is 4 hemoglobin 1.8 hematocrit 38 platelet count 265.  Patient had a very brief episode of chest discomfort 1 night reportedly, EKG was checked and unchanged and only showed LVH and troponin was negative, there was no recurrence.  Patient will be following up with her cardiology team Maranda Montoya in 2 weeks.    Disposition: Home with daughter    Condition: Stable/improved    Activity: As tolerated with assistance    Follow-up:  -Home health, of also ordered labs through them in 3 days and physical therapy through home health  -PCP 1 week  -Maranda Montoya 2 weeks    Diet: As tolerated    Medication:  Norvasc 2.5 mg daily  Aspirin 81 mg a day  Atorvastatin 10 mg daily  BuSpar 5 mg twice daily  Fiber Daily  Prozac 20 mg daily  Hydralazine 10 mg every 8 hours  Norco 7.5-3 times a day as needed  Claritin 10 mg a day  Megace 40 mg twice daily  Namenda 5 mg daily  Metoprolol tartrate 50 mg twice daily  Omeprazole 40 mg daily  Lyrica 25 mg twice daily    Greater than 30-minute discharge

## 2021-04-15 NOTE — PROGRESS NOTES
Discharge Planning Assessment   Notrees     Patient Name: Kiah Conway  MRN: [id number]  Today's Date: 4/15/2021    Admit Date: 4/9/2021    Discharge Plan     Row Name 04/15/21 1355       Plan    Plan SS received HH referral. Pt had no preference on HH agency. SS faxed referral to [facility] at [phone]. SS followed up with [facility] per Rachel [phone] who states she would call back if fax was not received. SS also received order for hospital bed, SS faxed order to [facility] [phone]. SS followed up with [facility] per April [phone]. SS following. (Pended)     Patient/Family in Agreement with Plan  yes  (Pended)         Sri Montiel

## 2021-04-16 NOTE — OUTREACH NOTE
Prep Survey      Responses   [facility] discharged from?  [facility]   Is LACE score < 7 ?  No   Emergency Room discharge w/ pulse ox?  No   Eligibility  Readm Mgmt   Discharge diagnosis  acute metabolic encephalopathy secondary to inadvertent gabapentin intake   Does the patient have one of the following disease processes/diagnoses(primary or secondary)?  Other   Does the patient have Home health ordered?  Yes   What is the Home health agency?   [facility]   Is there a DME ordered?  Yes   What DME was ordered?  hospital bed   Comments regarding appointments  see AVS   Medication alerts for this patient  see AVS   Prep survey completed?  Yes          Bibiana Melgar RN

## 2021-04-16 NOTE — PAYOR COMM NOTE
"CONTACT:  SERG LADD MSN, APRN  UTILIZATION MANAGEMENT DEPT.  [facility]  [address] [facility], [address]  PHONE:  [phone]  FAX: [phone]    PATIENT DISCHARGED TO HOME ON 4/15/21    REFER TO AUTH # [id number]    Kiah Conway (75 y.o. Female)     Date of Birth Social Security Number Address Home Phone MRN    1945  [address] [address] [phone] [id number]    [employer] Marital Status          [facility]        Admission Date Admission Type Admitting Provider Attending Provider Department, Room/Bed    4/9/21 Emergency Gus Reese DO  [address] 3[address]/    Discharge Date Discharge Disposition Discharge Destination        4/15/2021 Home or Self Care              Attending Provider: (none)   Allergies: No Known Allergies    Isolation: None   Infection: None   Code Status: Prior    Ht: 154.9 cm (61\")   Wt: 54 kg (119 lb)    Admission Cmt: None   Principal Problem: None                Active Insurance as of 4/9/2021     Primary Coverage     Payor Plan Insurance Group Employer/Plan Group    WELLCARE [facility] MEDICARE REPLACEMENT WELL[employer]ARE MEDICARE REPLACEMENT      Payor Plan Address Payor Plan Phone Number Payor Plan Fax Number Effective Dates    [address] [address] [phone]  4/1/2021 - None Entered    [facility] [address]       Subscriber Name Subscriber Birth Date Member ID       KIAH CONWAY 1945 [id number]           Secondary Coverage     Payor Plan Insurance Group Employer/Plan Group    [id number]      Payor Plan Address Payor Plan Phone Number Payor Plan Fax Number Effective Dates    [address]   3/1/2021 - None Entered    [facility] [id number]       Subscriber Name Subscriber Birth Date Member ID       KIAH CONWAY 1945 [id number]                 Emergency Contacts      (Rel.) Home Phone Work Phone Mobile Phone    Eunice Sharif (Daughter) [phone] -- [phone]    " Corrie Pool (Grandchild) [phone] -- [phone]               Discharge Summary      Chaitanya Martin MD at 04/15/21 1335        Date of admission: 4/9/2021  Date of discharge: 4/15/2021    Principal diagnosis: Acute metabolic encephalopathy secondary to inadvertent gabapentin intake  Secondary diagnosis:  Arthritis  Fibromyalgia  Hypokalemia  Borderline low B12, supplemented daily while here, recommend continued monitoring as an outpatient PCP.  Hypertension medications adjusted here for better control  There was a thought of possible UTI but urine culture negative therefore antibiotics were stopped  Functional decline, responding to therapy    Procedures:  -CT head unremarkable  -CT abdomen and pelvis moderate to large volume stool otherwise unremarkable  -MRI brain chronic microangiopathic changes only  -EKG showing LVH    Consultants:  None     Exam: Assisted by daughter as well as Raegan ALBERTO, 119/53, room air sat 96% respiratory rate is 20 pulse 79 temperature 97.3.  She is in no distress, neck is supple mood is good she is pleasant neurologic status the same as yesterday, strength is symmetric lungs have bilateral breath sounds are clear no rhonchi rales wheezing heard, heart regular rate and rhythm without murmur rub or gallop, abdomen is soft benign.  Skin warm and dry    Hospital course:  Patient was admitted after apparently the pharmacy had inadvertently given her gabapentin instead of Lyrica for her prescription.  Patient took this for approximately 3 days last week and then got increasing confusion lethargy and presented here.  Gabapentin was held and her neurologic status has improved through time.  She will be restarted on her Lyrica on discharge but I have decreased her to 25 twice daily and this can be titrated up by her primary care provider as needed, she is on this for fibromyalgia as well as her arthritis.  Patient has some generalized weakness, she has been working with therapy and home  health will be sent out with therapy as well.  She is requesting a hospital bed which I think would help for positioning for comfort as well as mobility.  This was written.  Patient's blood pressure was not well controlled on admission, I have adjusted medications and blood pressure is better controlled on discharge.  She still has some fluctuation of her blood pressure but last blood pressure is excellent.  On discharge electrolytes are unremarkable.  White count is 4 hemoglobin 1.8 hematocrit 38 platelet count 265.  Patient had a very brief episode of chest discomfort 1 night reportedly, EKG was checked and unchanged and only showed LVH and troponin was negative, there was no recurrence.  Patient will be following up with her cardiology team Maranda Montoya in 2 weeks.    Disposition: Home with daughter    Condition: Stable/improved    Activity: As tolerated with assistance    Follow-up:  -Home health, of also ordered labs through them in 3 days and physical therapy through home health  -PCP 1 week  -Maranda Montoya 2 weeks    Diet: As tolerated    Medication:  Norvasc 2.5 mg daily  Aspirin 81 mg a day  Atorvastatin 10 mg daily  BuSpar 5 mg twice daily  Fiber Daily  Prozac 20 mg daily  Hydralazine 10 mg every 8 hours  Norco 7.5-3 times a day as needed  Claritin 10 mg a day  Megace 40 mg twice daily  Namenda 5 mg daily  Metoprolol tartrate 50 mg twice daily  Omeprazole 40 mg daily  Lyrica 25 mg twice daily    Greater than 30-minute discharge        Electronically signed by Chaitanya Martin MD at 04/15/21 5855
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
## 2021-04-27 NOTE — OUTREACH NOTE
Medical Week 2 Survey      Responses   [facility] patient discharged from?  [facility]   Does the patient have one of the following disease processes/diagnoses(primary or secondary)?  Other   Week 2 attempt successful?  Yes   Call start time  0917   Discharge diagnosis  acute metabolic encephalopathy secondary to inadvertent gabapentin intake   Call end time  0918   Is patient permission given to speak with other caregiver?  Yes   List who call center can speak with  Eunice- Daughter   Person spoke with today (if not patient) and relationship  Eunice- Daughter   Meds reviewed with patient/caregiver?  Yes   Is the patient having any side effects they believe may be caused by any medication additions or changes?  No   Does the patient have all medications ordered at discharge?  Yes   Is the patient taking all medications as directed (includes completed medication regime)?  Yes   Does the patient have a primary care provider?   Yes   Has the patient kept scheduled appointments due by today?  N/A   Psychosocial issues?  No   Did the patient receive a copy of their discharge instructions?  Yes   Nursing interventions  Reviewed instructions with patient, Educated on MyChart   What is the patient's perception of their health status since discharge?  Improving   Is the patient/caregiver able to teach back signs and symptoms related to disease process for when to call PCP?  Yes   Is the patient/caregiver able to teach back signs and symptoms related to disease process for when to call 911?  Yes   Is the patient/caregiver able to teach back the hierarchy of who to call/visit for symptoms/problems? PCP, Specialist, Home health nurse, Urgent Care, ED, 911  Yes   If the patient is a current smoker, are they able to teach back resources for cessation?  Not a smoker   Week 2 Call Completed?  Yes   Wrap up additional comments  Daughter thinks that she is a little better today.           Dee Price RN

## 2021-05-03 NOTE — OUTREACH NOTE
"Medical Week 3 Survey      Responses   [facility] patient discharged from?  [facility]   Does the patient have one of the following disease processes/diagnoses(primary or secondary)?  Other   Week 3 attempt successful?  Yes   Call start time  1657   Call end time  1700   Is patient permission given to speak with other caregiver?  Yes   Person spoke with today (if not patient) and relationship  Eunice-daughter   Meds reviewed with patient/caregiver?  Yes   Is the patient having any side effects they believe may be caused by any medication additions or changes?  No   Comments regarding appointments  Kept appt with PCP today, has cardiology appt tomorrow.   Does the patient have a primary care provider?   Yes   Has the patient kept scheduled appointments due by today?  Yes   Has home health visited the patient within 72 hours of discharge?  Yes   What is the patient's perception of their health status since discharge?  Same   Week 3 Call Completed?  Yes   Wrap up additional comments  Brief call with daughter-states BP improved, \"but still has some problems we're working on\". States kept appt with PCP today-will keep appt with cardiologist tomorrow. Denies any needs today.          Lima Selby RN  "

## 2021-05-04 NOTE — PROGRESS NOTES
.You have chosen to receive care through a telephone visit. Do you consent to use a telephone visit for your medical care today? Yes  Chip Manzano APRN  Kiah Conway  1945 05/04/2021    Patient Active Problem List   Diagnosis   • Essential hypertension   • Atrophic urethritis   • Hyperglycemia   • GERD without esophagitis   • Fibromyalgia   • Arthritis   • Dysphagia   • History of rheumatic fever   • Migraines   • History of thyroid cancer   • Hypokalemia   • Hypocarbia   • Hyperchloremia   • Hypocalcemia   • Normocytic anemia   • Urinary tract infection without hematuria   • Acute encephalopathy       Dear Chip Manzano APRN:    Subjective     Chief Complaint   Patient presents with   • Follow-up   • Med Management           History of Present Illness:    Kiah Conway is a 75 y.o. female with a past medical history of hypertension, sleep apnea, lymphoma, paroxysmal SVT.  She presents today for hospital follow-up via telephone visit.  She is admitted to [facility] on 4/9/2021 due to acute metabolic encephalopathy because her pharmacy had given her gabapentin instead of Lyrica.  Apparently there were no cardiac issues while inpatient.  Her blood pressure did fluctuate but medication regimen was adjusted.  Her caregiver, Eunice reports her blood pressures been much better.  Is typically in the 140 systolic now.  She was unable to check it today.          No Known Allergies:      Current Outpatient Medications:   •  aspirin 81 MG EC tablet, Take 81 mg by mouth Daily., Disp: , Rfl:   •  atorvastatin (LIPITOR) 10 MG tablet, Take 1 tablet by mouth Daily., Disp: 30 tablet, Rfl: 11  •  busPIRone (BUSPAR) 5 MG tablet, Take 2.5 mg by mouth 2 (Two) Times a Day., Disp: , Rfl:   •  Calcium Polycarbophil (FIBER-CAPS PO), Take 1 capsule by mouth Daily., Disp: , Rfl:   •  FLUoxetine (PROzac) 20 MG capsule, Take 20 mg by mouth Daily., Disp: , Rfl:   •  hydrALAZINE (APRESOLINE) 10 MG tablet, Take  1 tablet by mouth Every 8 (Eight) Hours., Disp: 90 tablet, Rfl: 0  •  HYDROcodone-acetaminophen (NORCO) 7.5-325 MG per tablet, Take 1 tablet by mouth 3 (Three) Times a Day., Disp: , Rfl:   •  loratadine (CLARITIN) 10 MG tablet, Take 10 mg by mouth Daily., Disp: , Rfl:   •  megestrol (MEGACE) 40 MG tablet, Take 40 mg by mouth 2 (Two) Times a Day., Disp: , Rfl:   •  metoprolol tartrate (LOPRESSOR) 50 MG tablet, Take 50 mg by mouth 2 (Two) Times a Day., Disp: , Rfl:   •  NON FORMULARY, Apply 1 dose topically to the appropriate area as directed As Needed (Compounded Pain cream: Ingredients: 2.5% ketamine, 2.5% lidocaine, 2.5% prilocaine, 0.09% meloxicam (120 grams total)). Prior to [facility] Admission, Patient was on: Rx wrote to use 2-4 grams to affected area 3-4 times daily. Pt caregiver reports using PRN every 1-2 weeks., Disp: , Rfl:   •  omeprazole (priLOSEC) 40 MG capsule, Take 40 mg by mouth Daily., Disp: , Rfl:   •  pregabalin (LYRICA) 25 MG capsule, Take 1 capsule by mouth 2 (Two) Times a Day., Disp: 30 capsule, Rfl: 0  •  amLODIPine (NORVASC) 2.5 MG tablet, Take 1 tablet by mouth Daily., Disp: 30 tablet, Rfl: 0  •  memantine (NAMENDA) 5 MG tablet, Take 5 mg by mouth Daily., Disp: , Rfl:       The following portions of the patient's history were reviewed and updated as appropriate: allergies, current medications, past family history, past medical history, past social history, past surgical history and problem list.    Social History     Tobacco Use   • Smoking status: Never Smoker   • Smokeless tobacco: Never Used   Substance Use Topics   • Alcohol use: No   • Drug use: No       Review of Systems   Constitutional: Negative for decreased appetite and malaise/fatigue.   Cardiovascular: Negative for chest pain, dyspnea on exertion, irregular heartbeat, leg swelling, near-syncope, orthopnea, palpitations, paroxysmal nocturnal dyspnea and syncope.   Respiratory: Negative for cough, shortness of breath and wheezing.     Neurological: Negative for dizziness, light-headedness and weakness.       Objective   Vitals:     There is no height or weight on file to calculate BMI.        Physical Exam    Lab Results   Component Value Date     04/19/2021    K 3.8 04/19/2021     04/19/2021    CO2 20.0 (L) 04/19/2021    BUN 18 04/19/2021    CREATININE 0.76 04/19/2021    GLUCOSE 85 04/19/2021    CALCIUM 9.5 04/19/2021    AST 22 04/19/2021    ALT 21 04/19/2021    ALKPHOS 76 04/19/2021    LABIL2 1.4 (L) 05/06/2016     Lab Results   Component Value Date    CKTOTAL 80 10/08/2018     Lab Results   Component Value Date    WBC 8.52 04/19/2021    HGB 13.5 04/19/2021    HCT 40.8 04/19/2021     04/19/2021     Lab Results   Component Value Date    INR 0.99 09/21/2020    INR <0.90 05/06/2016     Lab Results   Component Value Date    MG 1.8 04/15/2021     Lab Results   Component Value Date    TSH 1.170 04/08/2021    TRIG 150 04/08/2021    HDL 30 (L) 04/08/2021    LDL 97 04/08/2021      Lab Results   Component Value Date    .0 (H) 03/07/2019           Procedures      Assessment/Plan    Diagnosis Plan   1. Essential hypertension     2. Paroxysmal SVT (supraventricular tachycardia) (CMS/[facility])                  Recommendations:    1. Since her blood pressure is well controlled, will continue amlodipine, hydralazine, and metoprolol.  Her daughter will continue to monitor at home and let us know if it is running above 150/90.  2. Follow-up in 6 months or sooner if needed.    This visit has been rescheduled as a phone visit to comply with patient safety concerns in accordance with CDC recommendations. Total time of discussion was 9 minutes.        Return in about 6 months (around 11/4/2021) for Recheck.    As always, I appreciate very much the opportunity to participate in the cardiovascular care of your patients.      With Best Regards,    MERRY Land

## 2021-05-10 NOTE — OUTREACH NOTE
Medical Week 4 Survey      Responses   [facility] patient discharged from?  [facility]   Does the patient have one of the following disease processes/diagnoses(primary or secondary)?  Other   Week 4 attempt successful?  Yes   Call start time  1617   Call end time  1619   Discharge diagnosis  acute metabolic encephalopathy secondary to inadvertent gabapentin intake   Person spoke with today (if not patient) and relationship  Eunice-daughter   Meds reviewed with patient/caregiver?  Yes   Prescription comments  Pt needs refill on macrobid. RN advised her to f/u with PCP or pharmacy.    Is the patient taking all medications as directed (includes completed medication regime)?  Yes   Has the patient kept scheduled appointments due by today?  Yes   Is the patient still receiving Home Health Services?  Yes   What is the patient's perception of their health status since discharge?  Improving   Week 4 Call Completed?  Yes   Would the patient like one additional call?  No   Graduated  Yes   Is the patient interested in additional calls from an ambulatory ?  NOTE:  applies to high risk patients requiring additional follow-up.  No   Did the patient feel the follow up calls were helpful during their recovery period?  Yes   Was the number of calls appropriate?  Yes          Lisseth Leroy RN

## 2021-05-15 NOTE — ED NOTES
Pt resting quietly on stretcher with complaints of abdominal pain.  Pt AAOx4 with no resp distress noted, respirations even and unlabored.  Pt denies any needs at this time.  Skin PWD.  Pt family at bedside. Will continue to monitor and follow plan of care.  Bed rails up x2, bed in lowest position, call light in reach.           Maranda Sigala, RN  05/15/21 1616

## 2021-05-15 NOTE — DISCHARGE INSTRUCTIONS
Home care family.  Rest and drink plenty of fluids.  Take your medication as prescribed.  See your family doctor in the office in 2 to 3 days.  Return to the emergency department right away if symptoms worsen/any problems.

## 2021-05-15 NOTE — ED PROVIDER NOTES
"Subjective   Patient is 75-year-old female who presents to the ED with family tonight.  Her chief complaint is intermittent abdominal pain, nausea, vomiting, diarrhea alternating with constipation over the past 3 weeks.  Family reports that earlier today she was very weak and shaky, very nauseated, vomited up some \"phlegm\".  She has had no diarrhea recently, she has been constipated the past few days.  She has been having small bowel movements.  She states that she has abdominal pain when she is having a bowel movement, has not otherwise been having abdominal pain.  She denies fever, chills, chest pain, shortness of breath, hematemesis, hematochezia melena, syncope or near syncope, focal numbness or weakness, other symptoms or other complaints.  Family reports that patient has a history of lymphoma that is in remission.  She was admitted to the hospital here last month for acute encephalopathy related to inadvertent gabapentin intake.          Review of Systems   Constitutional: Negative for chills, diaphoresis and fever.   HENT: Negative for ear pain, sore throat and trouble swallowing.    Eyes: Negative for photophobia and pain.   Respiratory: Negative for shortness of breath, wheezing and stridor.    Cardiovascular: Negative for chest pain and palpitations.   Gastrointestinal: Positive for abdominal pain, constipation, diarrhea, nausea and vomiting. Negative for abdominal distention and blood in stool.   Endocrine: Negative for polydipsia and polyphagia.   Genitourinary: Negative for difficulty urinating and flank pain.   Musculoskeletal: Negative for back pain, neck pain and neck stiffness.   Skin: Negative for color change and pallor.   Neurological: Negative for seizures, syncope and speech difficulty.   Psychiatric/Behavioral: Negative for confusion.   All other systems reviewed and are negative.      Past Medical History:   Diagnosis Date   • Arthritis    • Cardiac disorder    • Diverticulitis    • Dysphagia "    • Fibromyalgia    • Follicular lymphoma (CMS/HCC)     Remisson    • GERD (gastroesophageal reflux disease)    • History of rheumatic fever    • Hypertension    • Hypokalemia due to inadequate potassium intake 03/03/2014   • Lipoma    • Malignant neoplasm (CMS/HCC)    • Migraine    • Neutropenia (CMS/HCC) 11/7/2019   • Obstructive sleep apnea 7/14/2016   • Prolonged Q-T interval on ECG 11/7/2019   • Recurrent UTI    • Rheumatoid arthritis (CMS/HCC)    • Sleep apnea    • Thyroid cancer (CMS/HCC)        No Known Allergies    Past Surgical History:   Procedure Laterality Date   • BLADDER SURGERY     • FINGER SURGERY      reattached    • HYSTERECTOMY     • THYROID SURGERY         Family History   Problem Relation Age of Onset   • Diabetes Mother    • Hypertension Mother    • Other Other         cardiac disorder,cardiovascular disease, malignant neoplasm of brain   • Breast cancer Paternal Aunt        Social History     Socioeconomic History   • Marital status:      Spouse name: Not on file   • Number of children: Not on file   • Years of education: Not on file   • Highest education level: Not on file   Tobacco Use   • Smoking status: Never Smoker   • Smokeless tobacco: Never Used   Substance and Sexual Activity   • Alcohol use: No   • Drug use: No   • Sexual activity: Defer           Objective   Physical Exam  Vitals and nursing note reviewed.   Constitutional:       General: She is not in acute distress.     Appearance: Normal appearance. She is well-developed. She is not ill-appearing, toxic-appearing or diaphoretic.      Comments: Pleasant early female, in no apparent acute distress.   HENT:      Head: Normocephalic and atraumatic.   Eyes:      General: No scleral icterus.     Pupils: Pupils are equal, round, and reactive to light.   Neck:      Trachea: No tracheal deviation.   Cardiovascular:      Rate and Rhythm: Normal rate and regular rhythm.   Pulmonary:      Effort: Pulmonary effort is normal. No  respiratory distress.      Breath sounds: Normal breath sounds.   Chest:      Chest wall: No tenderness.   Abdominal:      General: Bowel sounds are normal.      Palpations: Abdomen is soft.      Tenderness: There is abdominal tenderness (Very mild vague, nonfocal tenderness to palpation that is not consistently reproducible.  No focal findings at all.  No acute peritoneal signs.). There is no right CVA tenderness, left CVA tenderness, guarding or rebound.   Musculoskeletal:         General: No tenderness. Normal range of motion.      Cervical back: Normal range of motion and neck supple. No rigidity or tenderness.      Right lower leg: No tenderness.      Left lower leg: No tenderness.   Skin:     General: Skin is warm and dry.      Capillary Refill: Capillary refill takes less than 2 seconds.      Coloration: Skin is not pale.   Neurological:      General: No focal deficit present.      Mental Status: She is alert and oriented to person, place, and time.      GCS: GCS eye subscore is 4. GCS verbal subscore is 5. GCS motor subscore is 6.      Motor: No abnormal muscle tone.   Psychiatric:         Mood and Affect: Mood normal.         Behavior: Behavior normal.         Procedures  EKG shows sinus rhythm, rate 81.  AL interval 106, QRS duration 72, QTc 469 ms.  Baseline artifact.  No apparent acute ischemia.  No evidence for STEMI.  CT Abdomen Pelvis With Contrast   Final Result      1. No clearly acute findings in the abdomen or pelvis.   2. Colonic diverticulosis without acute diverticulitis.   3. Nonvisualization of the appendix, but there is no evidence of acute appendicitis. There is no small bowel obstruction.   4. Hysterectomy.   5. Nonobstructed kidneys.               Signer Name: Diego Walton MD    Signed: 5/15/2021 2:49 AM    Workstation Name: [facility]     [facility]      XR Chest 1 View   Final Result   No acute cardiopulmonary findings.      Signer Name: Diego Walton MD     Signed: 5/15/2021 2:30 AM    Workstation Name: [facility]     [facility]        Results for orders placed or performed during the hospital encounter of 05/15/21   COVID-19 and FLU A/B PCR - Swab, Nasopharynx    Specimen: Nasopharynx; Swab   Result Value Ref Range    COVID19 Not Detected Not Detected - Ref. Range    Influenza A PCR Not Detected Not Detected    Influenza B PCR Not Detected Not Detected   Comprehensive Metabolic Panel    Specimen: Arm, Left; Blood   Result Value Ref Range    Glucose 103 (H) 65 - 99 mg/dL    BUN 10 8 - 23 mg/dL    Creatinine 0.66 0.57 - 1.00 mg/dL    Sodium 140 136 - 145 mmol/L    Potassium 3.6 3.5 - 5.2 mmol/L    Chloride 102 98 - 107 mmol/L    CO2 23.6 22.0 - 29.0 mmol/L    Calcium 9.3 8.6 - 10.5 mg/dL    Total Protein 6.5 6.0 - 8.5 g/dL    Albumin 3.69 3.50 - 5.20 g/dL    ALT (SGPT) 7 1 - 33 U/L    AST (SGOT) 23 1 - 32 U/L    Alkaline Phosphatase 81 39 - 117 U/L    Total Bilirubin 0.4 0.0 - 1.2 mg/dL    eGFR Non African Amer 87 >60 mL/min/1.73    Globulin 2.8 gm/dL    A/G Ratio 1.3 g/dL    BUN/Creatinine Ratio 15.2 7.0 - 25.0    Anion Gap 14.4 5.0 - 15.0 mmol/L   Lipase    Specimen: Arm, Left; Blood   Result Value Ref Range    Lipase 62 (H) 13 - 60 U/L   Amylase    Specimen: Arm, Left; Blood   Result Value Ref Range    Amylase 43 28 - 100 U/L   Urinalysis With Culture If Indicated - Urine, Clean Catch    Specimen: Urine, Clean Catch   Result Value Ref Range    Color, UA Dark Yellow (A) Yellow, Straw    Appearance, UA Clear Clear    pH, UA 5.5 5.0 - 8.0    Specific Gravity, UA 1.028 1.005 - 1.030    Glucose, UA Negative Negative    Ketones, UA Trace (A) Negative    Bilirubin, UA Small (1+) (A) Negative    Blood, UA Negative Negative    Protein, UA Trace (A) Negative    Leuk Esterase, UA Trace (A) Negative    Nitrite, UA Negative Negative    Urobilinogen, UA 1.0 E.U./dL 0.2 - 1.0 E.U./dL   Troponin    Specimen: Arm, Left; Blood   Result Value Ref Range     Troponin T <0.010 0.000 - 0.030 ng/mL   Magnesium    Specimen: Arm, Left; Blood   Result Value Ref Range    Magnesium 1.8 1.6 - 2.4 mg/dL   CBC Auto Differential    Specimen: Arm, Left; Blood   Result Value Ref Range    WBC 5.70 3.40 - 10.80 10*3/mm3    RBC 4.14 3.77 - 5.28 10*6/mm3    Hemoglobin 12.8 12.0 - 15.9 g/dL    Hematocrit 39.5 34.0 - 46.6 %    MCV 95.4 79.0 - 97.0 fL    MCH 30.9 26.6 - 33.0 pg    MCHC 32.4 31.5 - 35.7 g/dL    RDW 13.9 12.3 - 15.4 %    RDW-SD 48.8 37.0 - 54.0 fl    MPV 10.7 6.0 - 12.0 fL    Platelets 355 140 - 450 10*3/mm3    Neutrophil % 59.2 42.7 - 76.0 %    Lymphocyte % 23.0 19.6 - 45.3 %    Monocyte % 14.6 (H) 5.0 - 12.0 %    Eosinophil % 2.5 0.3 - 6.2 %    Basophil % 0.5 0.0 - 1.5 %    Immature Grans % 0.2 0.0 - 0.5 %    Neutrophils, Absolute 3.38 1.70 - 7.00 10*3/mm3    Lymphocytes, Absolute 1.31 0.70 - 3.10 10*3/mm3    Monocytes, Absolute 0.83 0.10 - 0.90 10*3/mm3    Eosinophils, Absolute 0.14 0.00 - 0.40 10*3/mm3    Basophils, Absolute 0.03 0.00 - 0.20 10*3/mm3    Immature Grans, Absolute 0.01 0.00 - 0.05 10*3/mm3    nRBC 0.0 0.0 - 0.2 /100 WBC   Urinalysis, Microscopic Only - Urine, Clean Catch    Specimen: Urine, Clean Catch   Result Value Ref Range    RBC, UA 0-2 None Seen, 0-2 /HPF    WBC, UA 13-20 (A) None Seen, 0-2 /HPF    Bacteria, UA 1+ (A) None Seen /HPF    Squamous Epithelial Cells, UA 3-6 (A) None Seen, 0-2 /HPF    Hyaline Casts, UA 0-2 None Seen /LPF    Methodology Manual Light Microscopy    Light Blue Top   Result Value Ref Range    Extra Tube hold for add-on    Green Top (Gel)   Result Value Ref Range    Extra Tube Hold for add-ons.    Lavender Top   Result Value Ref Range    Extra Tube hold for add-on    Gold Top - SST   Result Value Ref Range    Extra Tube Hold for add-ons.                 ED Course  ED Course as of May 15 0338   Sat May 15, 2021   0329 Patient's emergency department stay has been uneventful.  She showed no signs of acute distress.  She voices that  she is feeling much better.  Patient and her family now advised that prior to her admission to the hospital back in April she was on Macrobid once daily every day due to frequent urinary tract infections.  She would like Macrobid for today's UTI, also a prescription for some Phenergan.  She is very pleased that she gets to go home.  Patient discharged home in care of family.  She will follow-up closely with her PCP.  She will return the emergency department right away if her symptoms worsen or she has any problems.    [CM]      ED Course User Index  [CM] Delroy Gary MD                                           [facility]    Final diagnoses:   UTI (urinary tract infection), bacterial   Generalized abdominal pain       ED Disposition  ED Disposition     ED Disposition Condition Comment    Discharge Stable               Please note that portions of this note were completed with a voice recognition program.            Delroy Gary MD  05/15/21 3592

## 2021-10-05 PROBLEM — S72.90XA FEMUR FRACTURE (HCC): Status: ACTIVE | Noted: 2021-01-01

## 2021-10-05 NOTE — H&P
[facility] HOSPITALIST HISTORY AND PHYSICAL    Patient Identification:  Name:  Kiah Conway  Age:  76 y.o.  Sex:  female  :  1945  MRN:  [id number]   Admit Date: 10/5/2021   Visit Number:  [id number]  Primary Care Physician:  Ronny Acevedo APRN         Chief complaint: Left hip pain    History of presenting illness: Ms. Conway is a 76 y.o. female who presented to [facility] emergency department on 2021 with a chief complaint of left hip pain.  Patient has a past medical history remarkable for diverticulosis, fibromyalgia, follicular lymphoma, GERD and distant history of thyroid cancer.  Patient states that she was being seen in an outpatient surgery center where she had colonoscopy performed today.  After her colonoscopy was performed, patient was taken to the restroom where a nurse assisted her to the toilet.  Patient states the nurse then exited the room and while she attempted to use the restroom she accidentally fell off of the toilet.  She states she fell onto her left side and denies hitting her head.  She denies losing consciousness.  However, patient did report immediate pain in her left hip joint.  Patient does not remember the exact events that occurred after her fall.  According to ER personnel, patient was taken by private vehicle to outpatient imaging center where a x-ray was performed of her left hip joint which did demonstrate a left femoral neck fracture.  As such, patient was brought onto the emergency department for further treatment and evaluation.  Initial evaluation in the emergency department did consist of basic laboratory work as well as physical exam and vital signs.  Initial vital signs found patient hypertensive with blood pressure 192/91, respirations 20, heart rate 86, temperature 99.2 °F and oxygen saturation 90% on room air.  Initial lab work did include CBC and CMP.  CBC demonstrated elevated white blood cell count of 15.6 and  hemoglobin 11.4.  Initial CMP demonstrated very mildly decreased potassium of 3.4 but otherwise was within normal limits.  Again, hip x-ray did demonstrate a left femoral neck fracture.  For this reason, patient will be admitted for further treatment and evaluation.  -------------------------------------------------------------------------------------------------------------------  Past Medical History:   Diagnosis Date   • Arthritis    • Cardiac disorder    • Diverticulitis    • Dysphagia    • Fibromyalgia    • Follicular lymphoma (CMS/HCC)     Remisson    • GERD (gastroesophageal reflux disease)    • History of rheumatic fever    • Hypertension    • Hypokalemia due to inadequate potassium intake 03/03/2014   • Lipoma    • Malignant neoplasm (CMS/HCC)    • Migraine    • Neutropenia (CMS/HCC) 11/7/2019   • Obstructive sleep apnea 7/14/2016   • Prolonged Q-T interval on ECG 11/7/2019   • Recurrent UTI    • Rheumatoid arthritis (CMS/HCC)    • Sleep apnea    • Thyroid cancer (CMS/HCC)      Past Surgical History:   Procedure Laterality Date   • BLADDER SURGERY     • FINGER SURGERY      reattached    • HYSTERECTOMY     • THYROID SURGERY       Family History   Problem Relation Age of Onset   • Diabetes Mother    • Hypertension Mother    • Other Other         cardiac disorder,cardiovascular disease, malignant neoplasm of brain   • Breast cancer Paternal Aunt      Social History     Socioeconomic History   • Marital status:      Spouse name: Not on file   • Number of children: Not on file   • Years of education: Not on file   • Highest education level: Not on file   Tobacco Use   • Smoking status: Never Smoker   • Smokeless tobacco: Never Used   Substance and Sexual Activity   • Alcohol use: No   • Drug use: No   • Sexual activity: Defer     ---------------------------------------------------------------------------------------------------------------------   Allergies:   Baclofen  ---------------------------------------------------------------------------------------------------------------------   Prior to Admission Medications     Prescriptions Last Dose Informant Patient Reported? Taking?    amLODIPine (NORVASC) 2.5 MG tablet 10/5/2021 Pharmacy No Yes    Take 1 tablet by mouth Daily.    aspirin 81 MG EC tablet 10/5/2021 Child Yes Yes    Take 81 mg by mouth Daily.    atorvastatin (LIPITOR) 10 MG tablet 10/5/2021 Pharmacy No Yes    Take 1 tablet by mouth Daily.    busPIRone (BUSPAR) 5 MG tablet 10/5/2021 Pharmacy Yes Yes    Take 2.5 mg by mouth 2 (Two) Times a Day.    Calcium Polycarbophil (FIBER-CAPS PO) 10/5/2021 Child Yes Yes    Take 1 capsule by mouth Daily.    dexlansoprazole (DEXILANT) 60 MG capsule 10/5/2021 Child Yes Yes    Take 60 mg by mouth Daily.    FLUoxetine (PROzac) 20 MG capsule 10/5/2021 Pharmacy Yes Yes    Take 20 mg by mouth Daily.    hydrALAZINE (APRESOLINE) 10 MG tablet 10/5/2021 Pharmacy No Yes    Take 1 tablet by mouth Every 8 (Eight) Hours.    Patient taking differently:  Take 10 mg by mouth Every 8 (Eight) Hours. Prior to [facility] Admission, Patient was on:   HOLD if SBP less than 140 or DBP less than 80    HYDROcodone-acetaminophen (NORCO)  MG per tablet 10/5/2021 Pharmacy Yes Yes    Take 1 tablet by mouth 3 (Three) Times a Day As Needed for Moderate Pain .    loratadine (CLARITIN) 10 MG tablet 10/5/2021 Pharmacy Yes Yes    Take 10 mg by mouth Daily.    megestrol (MEGACE) 40 MG tablet 10/5/2021 Pharmacy Yes Yes    Take 40 mg by mouth 2 (Two) Times a Day.    metoprolol tartrate (LOPRESSOR) 50 MG tablet 10/5/2021 Pharmacy Yes Yes    Take 50 mg by mouth 2 (Two) Times a Day.    nitrofurantoin (MACRODANTIN) 50 MG capsule 10/5/2021 Pharmacy Yes Yes    Take 50 mg by mouth Daily.    NON FORMULARY Past Week Pharmacy Yes Yes    Apply 1 dose topically to the appropriate area as directed As Needed (Compounded Pain cream: Ingredients: 2.5% ketamine, 2.5%  lidocaine, 2.5% prilocaine, 0.09% meloxicam (120 grams total)). Prior to [facility] Admission, Patient was on: Rx wrote to use 2-4 grams to affected area 3-4 times daily. Pt caregiver reports using PRN every 1-2 weeks.    pregabalin (LYRICA) 25 MG capsule 10/5/2021 Pharmacy No Yes    Take 1 capsule by mouth 2 (Two) Times a Day.    Probiotic Product (Align) capsule 10/5/2021 Child Yes Yes    Take 1 capsule by mouth Daily.    promethazine (PHENERGAN) 12.5 MG tablet Past Week Pharmacy Yes Yes    Take 12.5 mg by mouth 2 (Two) Times a Day As Needed for Nausea or Vomiting.        Hospital Scheduled Meds:     sodium chloride, 75 mL/hr, Last Rate: 75 mL/hr (10/05/21 1811)      ---------------------------------------------------------------------------------------------------------------------   Review of Systems   On review of systems the patient denies the following unless noted above:     Constitutional:  Fevers, chills, weight change, fatigue     Eyes: Vision changes, headache, double vision, loss of vision     ENT: Runny nose, nose bleeds, ringing in ears, pain with swallowing, sore throat     Cardiovascular: Chest pains, palpitations, PND, orthopnea     Respiratory: Cough, wheezing, SOA, hemoptysis     GI:  Abdominal pain, diarrhea, constipation, change in stool caliber,    Rectal bleeding, vomiting or nausea     : Difficulty voiding, dysuria, hematuria     Musculoskeletal: Changes of any chronic joint pain, swelling     Skin: Rash, itching, easy bruisability     Neurological: Unilateral weakness, new onset numbness, speech difficulties     Psychiatric: Sadness, tearfulness, feelings of hopelessness, racing thoughts     Endocrine:  Heat or cold intolerance, mood swings, polydipsia, polyphagia,    recent hypoglycemia  --------------------------------------------------------------------------------------------------------------------  Vital Signs:  Temp:  [98.4 °F (36.9 °C)-99.2 °F (37.3 °C)] 98.4 °F (36.9 °C)  Heart  Rate:  [86-92] 90  Resp:  [20-24] 21  BP: (179-192)/(66-92) 179/66      10/05/21  1346   Weight: 53.5 kg (118 lb)     Body mass index is 23.05 kg/m².  ---------------------------------------------------------------------------------------------------------------------   Physical exam:  Constitutional: Well-nourished elderly appearing  female in no apparent distress.     HENT:  Head:  Normocephalic and atraumatic.  Mouth:  Moist mucous membranes.    Eyes:  Conjunctivae and EOM are normal.  Pupils are equal, round, and reactive to light.  No scleral icterus.    Neck:  Neck supple. No thyromegaly.  No JVD present.    Cardiovascular:  Regular rate and rhythm with no murmurs, rubs, clicks or gallops appreciated.  Pulmonary/Chest:  Clear to auscultation bilaterally with no crackles, wheezes or rhonchi appreciated.  Abdominal:  Soft. Nontender. Nondistended  Bowel sounds are normal in all four quadrants. No organomegally appreciated.   Musculoskeletal:  No edema, left hip joint tender to palpation, left lower extremity is externally rotated.      Neurological:  Alert and oriented to person, place, and time. Cranial nerves II-XII intact with no focal defecits.  No facial droop.  No slurred speech.   Skin:  Warm and dry to palpation with no rashes or lesions appreciated.  Peripheral vascular:  2+ radial and pedal pulses in bilateral upper and lower extremities.  Psychiatric:  Alert and oriented x3, demonstrates appropriate judgement and insight.  ------------------------------------------------------------------------------  ---------------------------------------------------------------------------------------------------------------------   Results from last 7 days   Lab Units 10/05/21  1418   WBC 10*3/mm3 15.67*   HEMOGLOBIN g/dL 11.4*   HEMATOCRIT % 35.5   MCV fL 93.2   MCHC g/dL 32.1   PLATELETS 10*3/mm3 298   INR  1.00     Results from last 7 days   Lab Units 10/05/21  1403   PH, ARTERIAL pH units 7.468*   PO2  ART mm Hg 49.8*   PCO2, ARTERIAL mm Hg 28.8*   HCO3 ART mmol/L 20.8     Results from last 7 days   Lab Units 10/05/21  1418   SODIUM mmol/L 138   POTASSIUM mmol/L 3.4*   MAGNESIUM mg/dL 1.7   CHLORIDE mmol/L 103   CO2 mmol/L 20.0*   BUN mg/dL 9   CREATININE mg/dL 0.72   EGFR IF NONAFRICN AM mL/min/1.73 78   CALCIUM mg/dL 9.3   GLUCOSE mg/dL 128*   ALBUMIN g/dL 4.45   BILIRUBIN mg/dL 0.5   ALK PHOS U/L 105   AST (SGOT) U/L 19   ALT (SGPT) U/L 11   Estimated Creatinine Clearance: 48.9 mL/min (by C-G formula based on SCr of 0.72 mg/dL).  No results found for: AMMONIA  Results from last 7 days   Lab Units 10/05/21  1632 10/05/21  1433   TROPONIN T ng/mL <0.010 <0.010     Results from last 7 days   Lab Units 10/05/21  1433   PROBNP pg/mL 607.2     Lab Results   Component Value Date    HGBA1C 4.30 (L) 11/07/2019     Lab Results   Component Value Date    TSH 1.170 04/08/2021    FREET4 1.26 05/27/2020     No results found for: PREGTESTUR, PREGSERUM, HCG, HCGQUANT  Pain Management Panel     Pain Management Panel Latest Ref Rng & Units 4/9/2021    AMPHETAMINES SCREEN, URINE Negative Negative    BARBITURATES SCREEN Negative Negative    BENZODIAZEPINE SCREEN, URINE Negative Negative    BUPRENORPHINEUR Negative Negative    COCAINE SCREEN, URINE Negative Negative    METHADONE SCREEN, URINE Negative Negative        No results found for: BLOODCX  No results found for: URINECX  No results found for: WOUNDCX  No results found for: STOOLCX      ---------------------------------------------------------------------------------------------------------------------  Imaging Results (Last 7 Days)     Procedure Component Value Units Date/Time    XR Chest 1 View [[id number]] Collected: 10/05/21 1447     Updated: 10/05/21 1449    Narrative:      XR CHEST 1 VW-     CLINICAL INDICATION: fall, hip fx        COMPARISON: 05/15/2021      TECHNIQUE: Single frontal view of the chest.     FINDINGS:     There is no focal alveolar infiltrate or  effusion.  Appearance suggestive of at least mild pulmonary congestion.  Central line is stable from the previous exam  There are no suspicious-appearing parenchymal soft tissue nodules.          Impression:      Appearance suggestive of CHF     This report was finalized on 10/5/2021 2:47 PM by Dr. Chente Choi MD.       XR Femur 2 View Left [[id number]] Collected: 10/05/21 1446     Updated: 10/05/21 1449    Narrative:      EXAMINATION: XR FEMUR 2 VW LEFT-      CLINICAL INDICATION: fall, left femoral neck fx        COMPARISON: None available     FINDINGS:  4 views of the left femur show left femoral neck fracture     No distal femoral fracture is seen.       Impression:      Left femoral neck fracture.      This report was finalized on 10/5/2021 2:46 PM by Dr. Chente Choi MD.             I have personally reviewed the radiology images and read the final radiology report.  ---------------------------------------------------------------------------------------------------------------------  Assessment and Plan:    1.  Left femoral neck fracture -we will admit to inpatient MedSurg and consult orthopedic surgery.  Patient will likely require surgical intervention.  Patient will be made n.p.o. at midnight.  Patient will be typed and screened in preparation for surgery tomorrow.    2.  Hypokalemia -will place on electrolyte replacement protocol    3.  Essential hypertension - we will obtain home medications and restart once available    4.  GERD -PPI    Disposition plan for surgical intervention of left hip fracture tomorrow.  May need placement on discharge.    Kvng Quintanilla DO  10/05/21  19:29 EDT

## 2021-10-05 NOTE — ED PROVIDER NOTES
Subjective   76-year-old female who presents to the ED today due to a fall. She was at Dr. Luciano' office and had an EGD today. She states after the procedure she had to go to the bathroom. They assisted her to the restroom. She states she was sitting on the toilet and slid off into the floor. She states she did not pass out. She states she fell and landed on her left hip. She states she was sent to the outpatient diagnostic center for an x-ray. It was found that she has a left femoral neck fracture and she was sent to the ER. She denies any past injury to the hip. She states she is not injured anywhere else. She states she did not hit her head or lose consciousness. She denies any neck or back pain. She denies any chest pain or difficulty breathing. She denies any abdominal pain. It is noted that her oxygen saturations are low, this may be from her recent sedative medications.      History provided by:  Patient  Fall  Mechanism of injury: fall    Injury location:  Pelvis  Pelvic injury location:  L hip  Incident location: doctor's office.  Time since incident:  1 hour  Fall:     Fall occurred:  In the bathroom    Impact surface:  Hard floor  Prior to arrival data:     Blood loss:  None    Responsiveness at scene:  Alert    Orientation at scene:  Person, place, situation and time    Loss of consciousness: no      Amnesic to event: no    Associated symptoms: nausea    Associated symptoms: no abdominal pain, no back pain, no chest pain, no difficulty breathing, no headaches, no loss of consciousness, no neck pain and no vomiting        Review of Systems   Constitutional: Negative.    HENT: Negative.    Eyes: Negative.    Respiratory: Negative.    Cardiovascular: Negative for chest pain.   Gastrointestinal: Positive for nausea. Negative for abdominal pain and vomiting.   Genitourinary: Negative.    Musculoskeletal: Positive for arthralgias. Negative for back pain and neck pain.   Skin: Negative.    Neurological:  Negative for loss of consciousness and headaches.   Psychiatric/Behavioral: Negative.    All other systems reviewed and are negative.      Past Medical History:   Diagnosis Date   • Arthritis    • Cardiac disorder    • Diverticulitis    • Dysphagia    • Fibromyalgia    • Follicular lymphoma (CMS/HCC)     Remisson    • GERD (gastroesophageal reflux disease)    • History of rheumatic fever    • Hypertension    • Hypokalemia due to inadequate potassium intake 03/03/2014   • Lipoma    • Malignant neoplasm (CMS/HCC)    • Migraine    • Neutropenia (CMS/HCC) 11/7/2019   • Obstructive sleep apnea 7/14/2016   • Prolonged Q-T interval on ECG 11/7/2019   • Recurrent UTI    • Rheumatoid arthritis (CMS/HCC)    • Sleep apnea    • Thyroid cancer (CMS/HCC)        Allergies   Allergen Reactions   • Baclofen Delirium       Past Surgical History:   Procedure Laterality Date   • BLADDER SURGERY     • FINGER SURGERY      reattached    • HYSTERECTOMY     • THYROID SURGERY         Family History   Problem Relation Age of Onset   • Diabetes Mother    • Hypertension Mother    • Other Other         cardiac disorder,cardiovascular disease, malignant neoplasm of brain   • Breast cancer Paternal Aunt        Social History     Socioeconomic History   • Marital status:      Spouse name: Not on file   • Number of children: Not on file   • Years of education: Not on file   • Highest education level: Not on file   Tobacco Use   • Smoking status: Never Smoker   • Smokeless tobacco: Never Used   Substance and Sexual Activity   • Alcohol use: No   • Drug use: No   • Sexual activity: Defer           Objective   Physical Exam  Vitals and nursing note reviewed.   Constitutional:       General: She is not in acute distress.     Appearance: Normal appearance. She is not diaphoretic.   HENT:      Head: Normocephalic and atraumatic.      Right Ear: External ear normal.      Left Ear: External ear normal.   Eyes:      Extraocular Movements: Extraocular  movements intact.      Conjunctiva/sclera: Conjunctivae normal.      Pupils: Pupils are equal, round, and reactive to light.   Cardiovascular:      Rate and Rhythm: Normal rate and regular rhythm.      Pulses: Normal pulses.      Heart sounds: Normal heart sounds.   Pulmonary:      Effort: Pulmonary effort is normal.      Breath sounds: Normal breath sounds. No wheezing or rhonchi.   Chest:      Chest wall: No tenderness.   Abdominal:      General: Bowel sounds are normal.      Palpations: Abdomen is soft.      Tenderness: There is no abdominal tenderness. There is no guarding or rebound.   Musculoskeletal:         General: Tenderness (left hip tenderness with palpation) present.      Cervical back: Normal range of motion and neck supple. No tenderness.   Lymphadenopathy:      Cervical: No cervical adenopathy.   Skin:     General: Skin is warm and dry.      Capillary Refill: Capillary refill takes less than 2 seconds.   Neurological:      General: No focal deficit present.      Mental Status: She is alert and oriented to person, place, and time.   Psychiatric:         Mood and Affect: Mood normal.         Procedures           ED Course  ED Course as of Oct 05 1741   Tue Oct 05, 2021   1420 Patient started on 2L of oxygen after ABG.    [AH]   1520 FINDINGS:     There is no focal alveolar infiltrate or effusion.  Appearance suggestive of at least mild pulmonary congestion.  Central line is stable from the previous exam  There are no suspicious-appearing parenchymal soft tissue nodules.        IMPRESSION:  Appearance suggestive of CHF   XR Chest 1 View [AH]   1520 FINDINGS:  4 views of the left femur show left femoral neck fracture     No distal femoral fracture is seen.     IMPRESSION:  Left femoral neck fracture.    XR Femur 2 View Left [AH]   1544 Left message for Dr. Samano    [AH]   1608 Will discuss with hospitalist service    []   1612 Spoke with Dr. Samano's mid-level, available for consult.    []      ED  Course User Index  [AH] Monica Brasher PA                                           MDM  Number of Diagnoses or Management Options     Amount and/or Complexity of Data Reviewed  Clinical lab tests: reviewed  Tests in the radiology section of CPT®: reviewed  Tests in the medicine section of CPT®: reviewed  Discuss the patient with other providers: yes    Patient Progress  Patient progress: stable      Final diagnoses:   Closed fracture of left hip, initial encounter (HCC)       ED Disposition  ED Disposition     ED Disposition Condition Comment    Decision to Admit  Level of Care: Med/Surg [1]   Diagnosis: Femur fracture ([facility]) [[id number]]   Certification: I Certify That Inpatient Hospital Services Are Medically Necessary For Greater Than 2 Midnights            No follow-up provider specified.       Medication List      ASK your doctor about these medications    promethazine 12.5 MG tablet  Commonly known as: PHENERGAN  Ask about: Which instructions should I use?             Monica Brsaher PA  10/05/21 0041

## 2021-10-05 NOTE — ED NOTES
Patient was put on a bedpan to use the bathroom, went back and took her off of it     Kelechi Perez RN  10/05/21 5511

## 2021-10-06 PROBLEM — S72.002A CLOSED FRACTURE OF LEFT HIP (HCC): Status: ACTIVE | Noted: 2021-01-01

## 2021-10-06 NOTE — NURSING NOTE
Upon hearing the bed alarm activated, I entered the room to find the patient sitting beside of the bed in floor.  STAR Team called over head. Head to toe assessment performed, noting a small red librado to the lower back.  Lead nurse notified, AYDEN Norris notified. Family notified.   Patient has no new complaints.  VSS.  Will continue to monitor.

## 2021-10-06 NOTE — PLAN OF CARE
Goal Outcome Evaluation:              Outcome Summary: Patient has complained of pain and nausea since arrival to the floor PRN medications given, VSS, no other complaints or request at this time, Will continue to monitor.

## 2021-10-06 NOTE — CASE MANAGEMENT/SOCIAL WORK
Discharge Planning Assessment   Jered     Patient Name: Kiah Conway  MRN: [id number]  Today's Date: 10/6/2021    Admit Date: 10/5/2021    Discharge Needs Assessment     Row Name 10/06/21 1417       Living Environment    Lives With  child(wendy), adult    Name(s) of Who Lives With Patient  dtr Eunice    Current Living Arrangements  home/apartment/condo    Primary Care Provided by  self;child(wendy)    Provides Primary Care For  no one    Family Caregiver if Needed  child(wendy), adult    Family Caregiver Names  Eunice    Quality of Family Relationships  helpful;involved;supportive    Able to Return to Prior Arrangements  yes       Resource/Environmental Concerns    Resource/Environmental Concerns  none    Transportation Concerns  car, none       Transition Planning    Patient/Family Anticipates Transition to  home with family    Patient/Family Anticipated Services at Transition  none    Transportation Anticipated  family or friend will provide       Discharge Needs Assessment    Equipment Currently Used at Home  wheelchair;walker, standard    Concerns to be Addressed  no discharge needs identified    Anticipated Changes Related to Illness  none    Equipment Needed After Discharge  none        Discharge Plan     Row Name 10/06/21 8987       Plan    Plan Pt lives at home with daughter, Eunice. Pt does not utilize home health services at this time. Pt has standard walker and wheelchair at home. PCP is Ronny Acevedo. Pt does not have a POA or living will on file. Pt's daughter to provide transportation home at discharge. Pt may need rehab services arranged after surgery on 10/7. SS to follow and assist.    Patient/Family in Agreement with Plan  yes        Demographic Summary     Row Name 10/06/21 1416       General Information    Referral Source  nursing    Reason for Consult  -- d/c planning; 75 y/o with femoral neck fracture; had Prof HH earlier this year        ZOHREH Sosa

## 2021-10-06 NOTE — PAYOR COMM NOTE
"CONTACT:  Emilia Whitaker, DOLLY    Utilization Management Dept.   [facility]  [address] [place] [address]    Phone:[phone]  Fax: [phone]    REQUEST FOR INPATIENT AUTHORIZATION  REF # [id number]  ICD 10:S72.90XA [id number]  ATTENDING MD:    Kvng Quintanilla NPI:  FACILITY NPI: [id number]  ADM DATE: 10/5/21      Kiah Conway (76 y.o. Female)     Date of Birth Social Security Number Address Home Phone MRN    1945  [id number] [address] [phone] [id number]    [employer] Marital Status          Church        Admission Date Admission Type Admitting Provider Attending Provider Department, Room/Bed    10/5/21 Emergency Kvng Quintanilla DO Mullins, Thomas Anthony, DO [address] [id number]/    Discharge Date Discharge Disposition Discharge Destination                       Attending Provider: Kvng Quintanilla DO    Allergies: Baclofen    Isolation: None   Infection: None   Code Status: CPR    Ht: 152.4 cm (60\")   Wt: 54.1 kg (119 lb 3.2 oz)    Admission Cmt: None   Principal Problem: None                Active Insurance as of 10/5/2021     Primary Coverage     Payor Plan Insurance Group Employer/Plan Group    [facility] MEDICARE REPLACEMENT [employer] MEDICARE REPLACEMENT      Payor Plan Address Payor Plan Phone Number Payor Plan Fax Number Effective Dates    [address] [address] [phone]  4/1/2021 - None Entered    [facility] [address]       Subscriber Name Subscriber Birth Date Member ID       KIAH CONWAY 1945 [id number]                 Emergency Contacts      (Rel.) Home Phone Work Phone Mobile Phone    Eunice Sharif (Daughter) [phone] -- [phone]    YrnCorrie rodriguez (Grandchild) [phone] -- [phone]               History & Physical      Kvng Quintanilla DO at 10/05/21 1929              [facility] HOSPITALIST HISTORY AND PHYSICAL    Patient " Identification:  Name:  Kiah Conway  Age:  76 y.o.  Sex:  female  :  1945  MRN:  [id number]   Admit Date: 10/5/2021   Visit Number:  [id number]  Primary Care Physician:  Ronny Acevedo APRN         Chief complaint: Left hip pain    History of presenting illness: Ms. Conway is a 76 y.o. female who presented to [facility] emergency department on 2021 with a chief complaint of left hip pain.  Patient has a past medical history remarkable for diverticulosis, fibromyalgia, follicular lymphoma, GERD and distant history of thyroid cancer.  Patient states that she was being seen in an outpatient surgery center where she had colonoscopy performed today.  After her colonoscopy was performed, patient was taken to the restroom where a nurse assisted her to the toilet.  Patient states the nurse then exited the room and while she attempted to use the restroom she accidentally fell off of the toilet.  She states she fell onto her left side and denies hitting her head.  She denies losing consciousness.  However, patient did report immediate pain in her left hip joint.  Patient does not remember the exact events that occurred after her fall.  According to ER personnel, patient was taken by private vehicle to outpatient imaging center where a x-ray was performed of her left hip joint which did demonstrate a left femoral neck fracture.  As such, patient was brought onto the emergency department for further treatment and evaluation.  Initial evaluation in the emergency department did consist of basic laboratory work as well as physical exam and vital signs.  Initial vital signs found patient hypertensive with blood pressure 192/91, respirations 20, heart rate 86, temperature 99.2 °F and oxygen saturation 90% on room air.  Initial lab work did include CBC and CMP.  CBC demonstrated elevated white blood cell count of 15.6 and hemoglobin 11.4.  Initial CMP demonstrated very mildly decreased  potassium of 3.4 but otherwise was within normal limits.  Again, hip x-ray did demonstrate a left femoral neck fracture.  For this reason, patient will be admitted for further treatment and evaluation.  -------------------------------------------------------------------------------------------------------------------  Past Medical History:   Diagnosis Date   • Arthritis    • Cardiac disorder    • Diverticulitis    • Dysphagia    • Fibromyalgia    • Follicular lymphoma (CMS/HCC)     Remisson    • GERD (gastroesophageal reflux disease)    • History of rheumatic fever    • Hypertension    • Hypokalemia due to inadequate potassium intake 03/03/2014   • Lipoma    • Malignant neoplasm (CMS/HCC)    • Migraine    • Neutropenia (CMS/HCC) 11/7/2019   • Obstructive sleep apnea 7/14/2016   • Prolonged Q-T interval on ECG 11/7/2019   • Recurrent UTI    • Rheumatoid arthritis (CMS/HCC)    • Sleep apnea    • Thyroid cancer (CMS/HCC)      Past Surgical History:   Procedure Laterality Date   • BLADDER SURGERY     • FINGER SURGERY      reattached    • HYSTERECTOMY     • THYROID SURGERY       Family History   Problem Relation Age of Onset   • Diabetes Mother    • Hypertension Mother    • Other Other         cardiac disorder,cardiovascular disease, malignant neoplasm of brain   • Breast cancer Paternal Aunt      Social History     Socioeconomic History   • Marital status:      Spouse name: Not on file   • Number of children: Not on file   • Years of education: Not on file   • Highest education level: Not on file   Tobacco Use   • Smoking status: Never Smoker   • Smokeless tobacco: Never Used   Substance and Sexual Activity   • Alcohol use: No   • Drug use: No   • Sexual activity: Defer     ---------------------------------------------------------------------------------------------------------------------   Allergies:   Baclofen  ---------------------------------------------------------------------------------------------------------------------   Prior to Admission Medications     Prescriptions Last Dose Informant Patient Reported? Taking?    amLODIPine (NORVASC) 2.5 MG tablet 10/5/2021 Pharmacy No Yes    Take 1 tablet by mouth Daily.    aspirin 81 MG EC tablet 10/5/2021 Child Yes Yes    Take 81 mg by mouth Daily.    atorvastatin (LIPITOR) 10 MG tablet 10/5/2021 Pharmacy No Yes    Take 1 tablet by mouth Daily.    busPIRone (BUSPAR) 5 MG tablet 10/5/2021 Pharmacy Yes Yes    Take 2.5 mg by mouth 2 (Two) Times a Day.    Calcium Polycarbophil (FIBER-CAPS PO) 10/5/2021 Child Yes Yes    Take 1 capsule by mouth Daily.    dexlansoprazole (DEXILANT) 60 MG capsule 10/5/2021 Child Yes Yes    Take 60 mg by mouth Daily.    FLUoxetine (PROzac) 20 MG capsule 10/5/2021 Pharmacy Yes Yes    Take 20 mg by mouth Daily.    hydrALAZINE (APRESOLINE) 10 MG tablet 10/5/2021 Pharmacy No Yes    Take 1 tablet by mouth Every 8 (Eight) Hours.    Patient taking differently:  Take 10 mg by mouth Every 8 (Eight) Hours. Prior to [facility] Admission, Patient was on:   HOLD if SBP less than 140 or DBP less than 80    HYDROcodone-acetaminophen (NORCO)  MG per tablet 10/5/2021 Pharmacy Yes Yes    Take 1 tablet by mouth 3 (Three) Times a Day As Needed for Moderate Pain .    loratadine (CLARITIN) 10 MG tablet 10/5/2021 Pharmacy Yes Yes    Take 10 mg by mouth Daily.    megestrol (MEGACE) 40 MG tablet 10/5/2021 Pharmacy Yes Yes    Take 40 mg by mouth 2 (Two) Times a Day.    metoprolol tartrate (LOPRESSOR) 50 MG tablet 10/5/2021 Pharmacy Yes Yes    Take 50 mg by mouth 2 (Two) Times a Day.    nitrofurantoin (MACRODANTIN) 50 MG capsule 10/5/2021 Pharmacy Yes Yes    Take 50 mg by mouth Daily.    NON FORMULARY Past Week Pharmacy Yes Yes    Apply 1 dose topically to the appropriate area as directed As Needed (Compounded Pain cream: Ingredients: 2.5% ketamine, 2.5%  lidocaine, 2.5% prilocaine, 0.09% meloxicam (120 grams total)). Prior to [facility] Admission, Patient was on: Rx wrote to use 2-4 grams to affected area 3-4 times daily. Pt caregiver reports using PRN every 1-2 weeks.    pregabalin (LYRICA) 25 MG capsule 10/5/2021 Pharmacy No Yes    Take 1 capsule by mouth 2 (Two) Times a Day.    Probiotic Product (Align) capsule 10/5/2021 Child Yes Yes    Take 1 capsule by mouth Daily.    promethazine (PHENERGAN) 12.5 MG tablet Past Week Pharmacy Yes Yes    Take 12.5 mg by mouth 2 (Two) Times a Day As Needed for Nausea or Vomiting.        Hospital Scheduled Meds:     sodium chloride, 75 mL/hr, Last Rate: 75 mL/hr (10/05/21 1811)      ---------------------------------------------------------------------------------------------------------------------   Review of Systems   On review of systems the patient denies the following unless noted above:     Constitutional:  Fevers, chills, weight change, fatigue     Eyes: Vision changes, headache, double vision, loss of vision     ENT: Runny nose, nose bleeds, ringing in ears, pain with swallowing, sore throat     Cardiovascular: Chest pains, palpitations, PND, orthopnea     Respiratory: Cough, wheezing, SOA, hemoptysis     GI:  Abdominal pain, diarrhea, constipation, change in stool caliber,    Rectal bleeding, vomiting or nausea     : Difficulty voiding, dysuria, hematuria     Musculoskeletal: Changes of any chronic joint pain, swelling     Skin: Rash, itching, easy bruisability     Neurological: Unilateral weakness, new onset numbness, speech difficulties     Psychiatric: Sadness, tearfulness, feelings of hopelessness, racing thoughts     Endocrine:  Heat or cold intolerance, mood swings, polydipsia, polyphagia,    recent hypoglycemia  --------------------------------------------------------------------------------------------------------------------  Vital Signs:  Temp:  [98.4 °F (36.9 °C)-99.2 °F (37.3 °C)] 98.4 °F (36.9 °C)  Heart  Rate:  [86-92] 90  Resp:  [20-24] 21  BP: (179-192)/(66-92) 179/66      10/05/21  1346   Weight: 53.5 kg (118 lb)     Body mass index is 23.05 kg/m².  ---------------------------------------------------------------------------------------------------------------------   Physical exam:  Constitutional: Well-nourished elderly appearing  female in no apparent distress.     HENT:  Head:  Normocephalic and atraumatic.  Mouth:  Moist mucous membranes.    Eyes:  Conjunctivae and EOM are normal.  Pupils are equal, round, and reactive to light.  No scleral icterus.    Neck:  Neck supple. No thyromegaly.  No JVD present.    Cardiovascular:  Regular rate and rhythm with no murmurs, rubs, clicks or gallops appreciated.  Pulmonary/Chest:  Clear to auscultation bilaterally with no crackles, wheezes or rhonchi appreciated.  Abdominal:  Soft. Nontender. Nondistended  Bowel sounds are normal in all four quadrants. No organomegally appreciated.   Musculoskeletal:  No edema, left hip joint tender to palpation, left lower extremity is externally rotated.      Neurological:  Alert and oriented to person, place, and time. Cranial nerves II-XII intact with no focal defecits.  No facial droop.  No slurred speech.   Skin:  Warm and dry to palpation with no rashes or lesions appreciated.  Peripheral vascular:  2+ radial and pedal pulses in bilateral upper and lower extremities.  Psychiatric:  Alert and oriented x3, demonstrates appropriate judgement and insight.  ------------------------------------------------------------------------------  ---------------------------------------------------------------------------------------------------------------------   Results from last 7 days   Lab Units 10/05/21  1418   WBC 10*3/mm3 15.67*   HEMOGLOBIN g/dL 11.4*   HEMATOCRIT % 35.5   MCV fL 93.2   MCHC g/dL 32.1   PLATELETS 10*3/mm3 298   INR  1.00     Results from last 7 days   Lab Units 10/05/21  1403   PH, ARTERIAL pH units 7.468*   PO2  ART mm Hg 49.8*   PCO2, ARTERIAL mm Hg 28.8*   HCO3 ART mmol/L 20.8     Results from last 7 days   Lab Units 10/05/21  1418   SODIUM mmol/L 138   POTASSIUM mmol/L 3.4*   MAGNESIUM mg/dL 1.7   CHLORIDE mmol/L 103   CO2 mmol/L 20.0*   BUN mg/dL 9   CREATININE mg/dL 0.72   EGFR IF NONAFRICN AM mL/min/1.73 78   CALCIUM mg/dL 9.3   GLUCOSE mg/dL 128*   ALBUMIN g/dL 4.45   BILIRUBIN mg/dL 0.5   ALK PHOS U/L 105   AST (SGOT) U/L 19   ALT (SGPT) U/L 11   Estimated Creatinine Clearance: 48.9 mL/min (by C-G formula based on SCr of 0.72 mg/dL).  No results found for: AMMONIA  Results from last 7 days   Lab Units 10/05/21  1632 10/05/21  1433   TROPONIN T ng/mL <0.010 <0.010     Results from last 7 days   Lab Units 10/05/21  1433   PROBNP pg/mL 607.2     Lab Results   Component Value Date    HGBA1C 4.30 (L) 11/07/2019     Lab Results   Component Value Date    TSH 1.170 04/08/2021    FREET4 1.26 05/27/2020     No results found for: PREGTESTUR, PREGSERUM, HCG, HCGQUANT  Pain Management Panel     Pain Management Panel Latest Ref Rng & Units 4/9/2021    AMPHETAMINES SCREEN, URINE Negative Negative    BARBITURATES SCREEN Negative Negative    BENZODIAZEPINE SCREEN, URINE Negative Negative    BUPRENORPHINEUR Negative Negative    COCAINE SCREEN, URINE Negative Negative    METHADONE SCREEN, URINE Negative Negative        No results found for: BLOODCX  No results found for: URINECX  No results found for: WOUNDCX  No results found for: STOOLCX      ---------------------------------------------------------------------------------------------------------------------  Imaging Results (Last 7 Days)     Procedure Component Value Units Date/Time    XR Chest 1 View [[id number]] Collected: 10/05/21 1447     Updated: 10/05/21 1449    Narrative:      XR CHEST 1 VW-     CLINICAL INDICATION: fall, hip fx        COMPARISON: 05/15/2021      TECHNIQUE: Single frontal view of the chest.     FINDINGS:     There is no focal alveolar infiltrate or  effusion.  Appearance suggestive of at least mild pulmonary congestion.  Central line is stable from the previous exam  There are no suspicious-appearing parenchymal soft tissue nodules.          Impression:      Appearance suggestive of CHF     This report was finalized on 10/5/2021 2:47 PM by Dr. Chente Choi MD.       XR Femur 2 View Left [[id number]] Collected: 10/05/21 1446     Updated: 10/05/21 1449    Narrative:      EXAMINATION: XR FEMUR 2 VW LEFT-      CLINICAL INDICATION: fall, left femoral neck fx        COMPARISON: None available     FINDINGS:  4 views of the left femur show left femoral neck fracture     No distal femoral fracture is seen.       Impression:      Left femoral neck fracture.      This report was finalized on 10/5/2021 2:46 PM by Dr. Chente Choi MD.             I have personally reviewed the radiology images and read the final radiology report.  ---------------------------------------------------------------------------------------------------------------------  Assessment and Plan:    1.  Left femoral neck fracture -we will admit to inpatient MedSurg and consult orthopedic surgery.  Patient will likely require surgical intervention.  Patient will be made n.p.o. at midnight.  Patient will be typed and screened in preparation for surgery tomorrow.    2.  Hypokalemia -will place on electrolyte replacement protocol    3.  Essential hypertension - we will obtain home medications and restart once available    4.  GERD -PPI    Disposition plan for surgical intervention of left hip fracture tomorrow.  May need placement on discharge.    Kvng Quintanilla DO  10/05/21  19:29 EDT    Electronically signed by Kvng Quintanilla DO at 10/05/21 1937          Emergency Department Notes      Monica Brasher PA at 10/05/21 1348     Attestation signed by Brandyn Desai MD at 10/06/21 2805          For this patient encounter, I reviewed the NP or PA documentation, treatment plan, and  medical decision making. Brandyn Desai MD 10/6/2021 00:55 EDT                  Subjective   76-year-old female who presents to the ED today due to a fall. She was at Dr. Luciano' office and had an EGD today. She states after the procedure she had to go to the bathroom. They assisted her to the restroom. She states she was sitting on the toilet and slid off into the floor. She states she did not pass out. She states she fell and landed on her left hip. She states she was sent to the outpatient diagnostic center for an x-ray. It was found that she has a left femoral neck fracture and she was sent to the ER. She denies any past injury to the hip. She states she is not injured anywhere else. She states she did not hit her head or lose consciousness. She denies any neck or back pain. She denies any chest pain or difficulty breathing. She denies any abdominal pain. It is noted that her oxygen saturations are low, this may be from her recent sedative medications.      History provided by:  Patient  Fall  Mechanism of injury: fall    Injury location:  Pelvis  Pelvic injury location:  L hip  Incident location: doctor's office.  Time since incident:  1 hour  Fall:     Fall occurred:  In the bathroom    Impact surface:  Hard floor  Prior to arrival data:     Blood loss:  None    Responsiveness at scene:  Alert    Orientation at scene:  Person, place, situation and time    Loss of consciousness: no      Amnesic to event: no    Associated symptoms: nausea    Associated symptoms: no abdominal pain, no back pain, no chest pain, no difficulty breathing, no headaches, no loss of consciousness, no neck pain and no vomiting        Review of Systems   Constitutional: Negative.    HENT: Negative.    Eyes: Negative.    Respiratory: Negative.    Cardiovascular: Negative for chest pain.   Gastrointestinal: Positive for nausea. Negative for abdominal pain and vomiting.   Genitourinary: Negative.    Musculoskeletal: Positive for  arthralgias. Negative for back pain and neck pain.   Skin: Negative.    Neurological: Negative for loss of consciousness and headaches.   Psychiatric/Behavioral: Negative.    All other systems reviewed and are negative.      Past Medical History:   Diagnosis Date   • Arthritis    • Cardiac disorder    • Diverticulitis    • Dysphagia    • Fibromyalgia    • Follicular lymphoma (CMS/HCC)     Remisson    • GERD (gastroesophageal reflux disease)    • History of rheumatic fever    • Hypertension    • Hypokalemia due to inadequate potassium intake 03/03/2014   • Lipoma    • Malignant neoplasm (CMS/HCC)    • Migraine    • Neutropenia (CMS/HCC) 11/7/2019   • Obstructive sleep apnea 7/14/2016   • Prolonged Q-T interval on ECG 11/7/2019   • Recurrent UTI    • Rheumatoid arthritis (CMS/HCC)    • Sleep apnea    • Thyroid cancer (CMS/HCC)        Allergies   Allergen Reactions   • Baclofen Delirium       Past Surgical History:   Procedure Laterality Date   • BLADDER SURGERY     • FINGER SURGERY      reattached    • HYSTERECTOMY     • THYROID SURGERY         Family History   Problem Relation Age of Onset   • Diabetes Mother    • Hypertension Mother    • Other Other         cardiac disorder,cardiovascular disease, malignant neoplasm of brain   • Breast cancer Paternal Aunt        Social History     Socioeconomic History   • Marital status:      Spouse name: Not on file   • Number of children: Not on file   • Years of education: Not on file   • Highest education level: Not on file   Tobacco Use   • Smoking status: Never Smoker   • Smokeless tobacco: Never Used   Substance and Sexual Activity   • Alcohol use: No   • Drug use: No   • Sexual activity: Defer           Objective   Physical Exam  Vitals and nursing note reviewed.   Constitutional:       General: She is not in acute distress.     Appearance: Normal appearance. She is not diaphoretic.   HENT:      Head: Normocephalic and atraumatic.      Right Ear: External ear  normal.      Left Ear: External ear normal.   Eyes:      Extraocular Movements: Extraocular movements intact.      Conjunctiva/sclera: Conjunctivae normal.      Pupils: Pupils are equal, round, and reactive to light.   Cardiovascular:      Rate and Rhythm: Normal rate and regular rhythm.      Pulses: Normal pulses.      Heart sounds: Normal heart sounds.   Pulmonary:      Effort: Pulmonary effort is normal.      Breath sounds: Normal breath sounds. No wheezing or rhonchi.   Chest:      Chest wall: No tenderness.   Abdominal:      General: Bowel sounds are normal.      Palpations: Abdomen is soft.      Tenderness: There is no abdominal tenderness. There is no guarding or rebound.   Musculoskeletal:         General: Tenderness (left hip tenderness with palpation) present.      Cervical back: Normal range of motion and neck supple. No tenderness.   Lymphadenopathy:      Cervical: No cervical adenopathy.   Skin:     General: Skin is warm and dry.      Capillary Refill: Capillary refill takes less than 2 seconds.   Neurological:      General: No focal deficit present.      Mental Status: She is alert and oriented to person, place, and time.   Psychiatric:         Mood and Affect: Mood normal.         Procedures          ED Course  ED Course as of Oct 05 1741   Tue Oct 05, 2021   1420 Patient started on 2L of oxygen after ABG.    [AH]   1520 FINDINGS:     There is no focal alveolar infiltrate or effusion.  Appearance suggestive of at least mild pulmonary congestion.  Central line is stable from the previous exam  There are no suspicious-appearing parenchymal soft tissue nodules.        IMPRESSION:  Appearance suggestive of CHF   XR Chest 1 View [AH]   1520 FINDINGS:  4 views of the left femur show left femoral neck fracture     No distal femoral fracture is seen.     IMPRESSION:  Left femoral neck fracture.    XR Femur 2 View Left [AH]   3962 Left message for Dr. Samano    []   6444 Will discuss with hospitalist service     []   1612 Spoke with Dr. Samano's mid-level, available for consult.    []      ED Course User Index  [] Monica Brasher PA                                           [facility]  Number of Diagnoses or Management Options     Amount and/or Complexity of Data Reviewed  Clinical lab tests: reviewed  Tests in the radiology section of CPT®: reviewed  Tests in the medicine section of CPT®: reviewed  Discuss the patient with other providers: yes    Patient Progress  Patient progress: stable      Final diagnoses:   Closed fracture of left hip, initial encounter (HCC)       ED Disposition  ED Disposition     ED Disposition Condition Comment    Decision to Admit  Level of Care: Med/Surg [1]   Diagnosis: Femur fracture ([facility]) [195713]   Certification: I Certify That Inpatient Hospital Services Are Medically Necessary For Greater Than 2 Midnights            No follow-up provider specified.       Medication List      ASK your doctor about these medications    promethazine 12.5 MG tablet  Commonly known as: PHENERGAN  Ask about: Which instructions should I use?             Monica Brasher PA  10/05/21 1741      Electronically signed by Brandyn Desai MD at 10/06/21 0055     Kelechi Perez, RN at 10/05/21 1549        Patient was put on a bedpan to use the bathroom, went back and took her off of it     Kelechi Perez, RN  10/05/21 1550      Electronically signed by Kelechi Perez RN at 10/05/21 1550       Vital Signs (last day)     Date/Time   Temp   Temp src   Pulse   Resp   BP   Patient Position   SpO2    10/06/21 0628   97.6 (36.4)   Oral   100   18   162/70   Lying   95    10/06/21 0300   98.5 (36.9)   Oral   92   18   (!) 184/84   Lying   95    10/06/21 0030   98.1 (36.7)   Oral   103   18   (!) 188/88   Lying   95    10/05/21 2248   --   --   95   18   (!) 199/82   Lying   92    10/05/21 2120   --   --   --   --   --   --   95    10/05/21 2049   99.7 (37.6)   Oral   97   20   135/93   Lying   94     10/05/21 18:09:18   --   --   90   21   179/66   Lying   94    10/05/21 16:55:55   --   --   92   20   (!) 182/82   Lying   97    10/05/21 1600   98.4 (36.9)   Oral   90   24   (!) 184/92   Lying   97    10/05/21 15:05:19   --   --   --   --   --   --   91    10/05/21 14:56:55   --   --   --   --   --   --   90    10/05/21 14:17:52   --   --   --   --   --   --   93    10/05/21 1346   99.2 (37.3)   Oral   86   20   (!) 192/91   Sitting   90                Current Facility-Administered Medications   Medication Dose Route Frequency Provider Last Rate Last Admin   • morphine injection 1 mg  1 mg Intravenous Q4H PRN Landon King MD   1 mg at 10/06/21 0352   • ondansetron (ZOFRAN) injection 4 mg  4 mg Intravenous Q6H PRN Ksenia Barreto PA-C   4 mg at 10/06/21 0623   • sodium chloride 0.9 % flush 10 mL  10 mL Intravenous PRN Monica Brasher PA       • sodium chloride 0.9 % flush 10 mL  10 mL Intravenous Q12H Kvng Quintanilla DO   10 mL at 10/05/21 2320   • sodium chloride 0.9 % flush 10 mL  10 mL Intravenous PRN Kvng Quintanilla DO       • sodium chloride 0.9 % infusion  75 mL/hr Intravenous Continuous Monica Brasher PA 75 mL/hr at 10/05/21 1811 75 mL/hr at 10/05/21 1811   • sodium chloride 0.9 % infusion  75 mL/hr Intravenous Continuous Kvng Quintanilla DO 75 mL/hr at 10/06/21 0617 75 mL/hr at 10/06/21 0617       Lab Results (last 24 hours)     Procedure Component Value Units Date/Time    Urinalysis With Microscopic If Indicated (No Culture) - Urine, Clean Catch [[id number]]  (Normal) Collected: 10/05/21 1654    Specimen: Urine, Clean Catch Updated: 10/05/21 1710     Color, UA Yellow     Appearance, UA Clear     pH, UA 7.0     Specific Gravity, UA 1.010     Glucose, UA Negative     Ketones, UA Negative     Bilirubin, UA Negative     Blood, UA Negative     Protein, UA Negative     Leuk Esterase, UA Negative     Nitrite, UA Negative     Urobilinogen, UA 0.2 E.U./dL    Narrative:      Urine  microscopic not indicated.    Troponin [[id number]]  (Normal) Collected: 10/05/21 1632    Specimen: Blood Updated: 10/05/21 1703     Troponin T <0.010 ng/mL     Narrative:      Troponin T Reference Range:  <= 0.03 ng/mL-   Negative for AMI  >0.03 ng/mL-     Abnormal for myocardial necrosis.  Clinicians would have to utilize clinical acumen, EKG, Troponin and serial changes to determine if it is an Acute Myocardial Infarction or myocardial injury due to an underlying chronic condition.       Results may be falsely decreased if patient taking Biotin.      COVID PRE-OP / PRE-PROCEDURE SCREENING ORDER (NO ISOLATION) - Swab, Nasopharynx [[id number]]  (Normal) Collected: 10/05/21 1507    Specimen: Swab from Nasopharynx Updated: 10/05/21 1539    Narrative:      The following orders were created for panel order COVID PRE-OP / PRE-PROCEDURE SCREENING ORDER (NO ISOLATION) - Swab, Nasopharynx.  Procedure                               Abnormality         Status                     ---------                               -----------         ------                     COVID-19 and FLU A/B PCR...[[id number]]  Normal              Final result                 Please view results for these tests on the individual orders.    COVID-19 and FLU A/B PCR - Swab, Nasopharynx [[id number]]  (Normal) Collected: 10/05/21 1507    Specimen: Swab from Nasopharynx Updated: 10/05/21 1539     COVID19 Not Detected     Influenza A PCR Not Detected     Influenza B PCR Not Detected    Narrative:      Fact sheet for providers: https://www.fda.gov/media/912541/download    Fact sheet for patients: https://www.fda.gov/media/964186/download    Test performed by PCR.    [place] Draw [[id number]] Collected: 10/05/21 1418    Specimen: Blood from Arm, Right Updated: 10/05/21 1531    Narrative:      The following orders were created for panel order [place] Draw.  Procedure                               Abnormality         Status                     ---------                                -----------         ------                     Green Top (Gel)[[id number]]                                  Final result               Lavender Top[[id number]]                                     Final result               Gold Top - SST[[id number]]                                   Final result               Light Blue Top[[id number]]                                   Final result                 Please view results for these tests on the individual orders.    Green Top (Gel) [[id number]] Collected: 10/05/21 1418    Specimen: Blood from Arm, Right Updated: 10/05/21 1531     Extra Tube Hold for add-ons.     Comment: Auto resulted.       Gold Top - SST [[id number]] Collected: 10/05/21 1418    Specimen: Blood from Arm, Right Updated: 10/05/21 1531     Extra Tube Hold for add-ons.     Comment: Auto resulted.       Light Blue Top [[id number]] Collected: 10/05/21 1418    Specimen: Blood from Arm, Right Updated: 10/05/21 1531     Extra Tube hold for add-on     Comment: Auto resulted       Lavender Top [[id number]] Collected: 10/05/21 1418    Specimen: Blood from Arm, Right Updated: 10/05/21 1531     Extra Tube hold for add-on     Comment: Auto resulted       BNP [[id number]]  (Normal) Collected: 10/05/21 1433    Specimen: Blood from Arm, Right Updated: 10/05/21 1452     proBNP 607.2 pg/mL     Narrative:      Among patients with dyspnea, NT-proBNP is highly sensitive for the detection of acute congestive heart failure. In addition NT-proBNP of <300 pg/ml effectively rules out acute congestive heart failure with 99% negative predictive value.    Results may be falsely decreased if patient taking Biotin.      Troponin [[id number]]  (Normal) Collected: 10/05/21 1433    Specimen: Blood from Arm, Right Updated: 10/05/21 1452     Troponin T <0.010 ng/mL     Narrative:      Troponin T Reference Range:  <= 0.03 ng/mL-   Negative for AMI  >0.03 ng/mL-     Abnormal for myocardial necrosis.  Clinicians would have to utilize  clinical acumen, EKG, Troponin and serial changes to determine if it is an Acute Myocardial Infarction or myocardial injury due to an underlying chronic condition.       Results may be falsely decreased if patient taking Biotin.      Comprehensive Metabolic Panel [[id number]]  (Abnormal) Collected: 10/05/21 1418    Specimen: Blood from Arm, Right Updated: 10/05/21 1452     Glucose 128 mg/dL      BUN 9 mg/dL      Creatinine 0.72 mg/dL      Sodium 138 mmol/L      Potassium 3.4 mmol/L      Chloride 103 mmol/L      CO2 20.0 mmol/L      Calcium 9.3 mg/dL      Total Protein 6.9 g/dL      Albumin 4.45 g/dL      ALT (SGPT) 11 U/L      AST (SGOT) 19 U/L      Alkaline Phosphatase 105 U/L      Total Bilirubin 0.5 mg/dL      eGFR Non African Amer 78 mL/min/1.73      Globulin 2.5 gm/dL      A/G Ratio 1.8 g/dL      BUN/Creatinine Ratio 12.5     Anion Gap 15.0 mmol/L     Narrative:      GFR Normal >60  Chronic Kidney Disease <60  Kidney Failure <15      Magnesium [[id number]]  (Normal) Collected: 10/05/21 1418    Specimen: Blood from Arm, Right Updated: 10/05/21 1452     Magnesium 1.7 mg/dL     aPTT [[id number]]  (Abnormal) Collected: 10/05/21 1418    Specimen: Blood from Arm, Right Updated: 10/05/21 1445     PTT 23.8 seconds     Narrative:      PTT Heparin Therapeutic Range:  59 - 95 seconds      Protime-INR [[id number]]  (Normal) Collected: 10/05/21 1418    Specimen: Blood from Arm, Right Updated: 10/05/21 1445     Protime 13.6 Seconds      INR 1.00    Narrative:      Suggested INR therapeutic range for stable oral anticoagulant therapy:    Low Intensity therapy:   1.5-2.0  Moderate Intensity therapy:   2.0-3.0  High Intensity therapy:   2.5-4.0    CBC & Differential [[id number]]  (Abnormal) Collected: 10/05/21 1418    Specimen: Blood from Arm, Right Updated: 10/05/21 1432    Narrative:      The following orders were created for panel order CBC & Differential.  Procedure                               Abnormality         Status                      ---------                               -----------         ------                     CBC Auto Differential[[id number]]        Abnormal            Final result                 Please view results for these tests on the individual orders.    CBC Auto Differential [[id number]]  (Abnormal) Collected: 10/05/21 1418    Specimen: Blood from Arm, Right Updated: 10/05/21 1432     WBC 15.67 10*3/mm3      RBC 3.81 10*6/mm3      Hemoglobin 11.4 g/dL      Hematocrit 35.5 %      MCV 93.2 fL      MCH 29.9 pg      MCHC 32.1 g/dL      RDW 13.5 %      RDW-SD 46.4 fl      MPV 11.0 fL      Platelets 298 10*3/mm3      Neutrophil % 78.9 %      Lymphocyte % 12.6 %      Monocyte % 6.3 %      Eosinophil % 1.3 %      Basophil % 0.3 %      Immature Grans % 0.6 %      Neutrophils, Absolute 12.35 10*3/mm3      Lymphocytes, Absolute 1.98 10*3/mm3      Monocytes, Absolute 0.99 10*3/mm3      Eosinophils, Absolute 0.21 10*3/mm3      Basophils, Absolute 0.05 10*3/mm3      Immature Grans, Absolute 0.09 10*3/mm3      nRBC 0.0 /100 WBC     Blood Gas, Arterial With Co-Ox [[id number]]  (Abnormal) Collected: 10/05/21 1403    Specimen: Arterial Blood Updated: 10/05/21 1408     Site Left Brachial     Kaveh's Test N/A     pH, Arterial 7.468 pH units      Comment: 83 Value above reference range        pCO2, Arterial 28.8 mm Hg      Comment: 84 Value below reference range        pO2, Arterial 49.8 mm Hg      Comment: 85 Value below critical limit        HCO3, Arterial 20.8 mmol/L      Base Excess, Arterial -1.9 mmol/L      O2 Saturation, Arterial 87.0 %      Comment: 84 Value below reference range        Hemoglobin, Blood Gas 12.2 g/dL      Comment: 84 Value below reference range        Hematocrit, Blood Gas 37.5 %      Comment: 84 Value below reference range        Oxyhemoglobin 85.8 %      Comment: 84 Value below reference range        Methemoglobin 0.20 %      Carboxyhemoglobin 1.2 %      A-a Gradiant 60.8 mmHg      CO2 Content 21.7 mmol/L       Temperature 0.0 C      Barometric Pressure for Blood Gas 729 mmHg      Modality Room Air     FIO2 21 %      Ventilator Mode NA     Note --     Notified Who AYDEN BURDICK AND KASHMIR RN, ER     Notified By [id number]     Notified Time 10/05/2021 14:11     Collected by [id number]     Comment: Meter: [id number]     :  [id number]        pH, Temp Corrected --     pCO2, Temperature Corrected --     pO2, Temperature Corrected --        Imaging Results (Last 24 Hours)     Procedure Component Value Units Date/Time    XR Chest 1 View [[id number]] Collected: 10/05/21 1447     Updated: 10/05/21 1449    Narrative:      XR CHEST 1 VW-     CLINICAL INDICATION: fall, hip fx        COMPARISON: 05/15/2021      TECHNIQUE: Single frontal view of the chest.     FINDINGS:     There is no focal alveolar infiltrate or effusion.  Appearance suggestive of at least mild pulmonary congestion.  Central line is stable from the previous exam  There are no suspicious-appearing parenchymal soft tissue nodules.          Impression:      Appearance suggestive of CHF     This report was finalized on 10/5/2021 2:47 PM by Dr. Chente Choi MD.       XR Femur 2 View Left [[id number]] Collected: 10/05/21 1446     Updated: 10/05/21 1449    Narrative:      EXAMINATION: XR FEMUR 2 VW LEFT-      CLINICAL INDICATION: fall, left femoral neck fx        COMPARISON: None available     FINDINGS:  4 views of the left femur show left femoral neck fracture     No distal femoral fracture is seen.       Impression:      Left femoral neck fracture.      This report was finalized on 10/5/2021 2:46 PM by Dr. Chente Choi MD.           ECG/EMG Results (last 24 hours)     Procedure Component Value Units Date/Time    ECG 12 Lead [[id number]] Collected: 10/05/21 1525     Updated: 10/05/21 1525

## 2021-10-06 NOTE — CONSULTS
Referring Provider: MERRY Roth  Reason for Consultation: left hip fracture    Patient Care Team:  Ronny Acevedo APRN as PCP - General (Nurse Practitioner)    Chief complaint hip pain    Subjective .   Lying in bed, awake alert, no acute distress.  History of present illness: History of Present Illness  Ms. Conway is a 76-year-old female currently admitted on the medical surgical floor with a left hip fracture sustained after a fall.  The patient explained that she was in the bathroom at an outpatient surgery facility after undergoing a colonoscopy.  She states that she fell off of the toilet sustaining injury to the left hip.  She was seen and examined to the emergency department with findings of a left hip fracture and subsequently admitted.  Currently her pain is well controlled.  She denies chest pain, shortness of breath but endorses nausea.  Review of Systems  Review of Systems   Constitutional: Positive for activity change and appetite change.   HENT: Negative.    Eyes: Negative.    Respiratory: Negative.    Cardiovascular: Negative.    Gastrointestinal: Positive for nausea.   Endocrine: Negative.    Genitourinary: Negative.    Musculoskeletal: Positive for arthralgias.   Skin: Negative.    Allergic/Immunologic: Negative.    Neurological: Negative.    Hematological: Negative.    Psychiatric/Behavioral: Negative.         History  Past Medical History:   Diagnosis Date   • Arthritis    • Cardiac disorder    • Diverticulitis    • Dysphagia    • Fibromyalgia    • Follicular lymphoma (CMS/HCC)     Remisson    • GERD (gastroesophageal reflux disease)    • History of rheumatic fever    • Hypertension    • Hypokalemia due to inadequate potassium intake 03/03/2014   • Lipoma    • Malignant neoplasm (CMS/HCC)    • Migraine    • Neutropenia (CMS/HCC) 11/7/2019   • Obstructive sleep apnea 7/14/2016   • Prolonged Q-T interval on ECG 11/7/2019   • Recurrent UTI    • Rheumatoid arthritis (CMS/HCC)    •  Sleep apnea    • Thyroid cancer (CMS/HCC)    ,   Past Surgical History:   Procedure Laterality Date   • BLADDER SURGERY     • FINGER SURGERY      reattached    • HYSTERECTOMY     • THYROID SURGERY     ,   Family History   Problem Relation Age of Onset   • Diabetes Mother    • Hypertension Mother    • Other Other         cardiac disorder,cardiovascular disease, malignant neoplasm of brain   • Breast cancer Paternal Aunt    ,   Social History     Tobacco Use   • Smoking status: Never Smoker   • Smokeless tobacco: Never Used   Substance Use Topics   • Alcohol use: No   • Drug use: No   ,   Medications Prior to Admission   Medication Sig Dispense Refill Last Dose   • amLODIPine (NORVASC) 2.5 MG tablet Take 1 tablet by mouth Daily. 30 tablet 0 10/5/2021 at am   • aspirin 81 MG EC tablet Take 81 mg by mouth Daily.   10/5/2021 at am   • atorvastatin (LIPITOR) 10 MG tablet Take 1 tablet by mouth Daily. 30 tablet 11 10/5/2021 at Unknown time   • busPIRone (BUSPAR) 5 MG tablet Take 2.5 mg by mouth 2 (Two) Times a Day.   10/5/2021 at am   • Calcium Polycarbophil (FIBER-CAPS PO) Take 1 capsule by mouth Daily.   10/5/2021 at am   • dexlansoprazole (DEXILANT) 60 MG capsule Take 60 mg by mouth Daily.   10/5/2021 at am   • FLUoxetine (PROzac) 20 MG capsule Take 20 mg by mouth Daily.   10/5/2021 at am   • hydrALAZINE (APRESOLINE) 10 MG tablet Take 1 tablet by mouth Every 8 (Eight) Hours. (Patient taking differently: Take 10 mg by mouth Every 8 (Eight) Hours. Prior to [facility] Admission, Patient was on:   HOLD if SBP less than 140 or DBP less than 80) 90 tablet 0 10/5/2021 at am   • HYDROcodone-acetaminophen (NORCO)  MG per tablet Take 1 tablet by mouth 3 (Three) Times a Day As Needed for Moderate Pain .   10/5/2021 at am   • loratadine (CLARITIN) 10 MG tablet Take 10 mg by mouth Daily.   10/5/2021 at am   • megestrol (MEGACE) 40 MG tablet Take 40 mg by mouth 2 (Two) Times a Day.   10/5/2021 at am   • metoprolol tartrate  (LOPRESSOR) 50 MG tablet Take 50 mg by mouth 2 (Two) Times a Day.   10/5/2021 at am   • nitrofurantoin (MACRODANTIN) 50 MG capsule Take 50 mg by mouth Daily.   10/5/2021 at am   • NON FORMULARY Apply 1 dose topically to the appropriate area as directed As Needed (Compounded Pain cream: Ingredients: 2.5% ketamine, 2.5% lidocaine, 2.5% prilocaine, 0.09% meloxicam (120 grams total)). Prior to [facility] Admission, Patient was on: Rx wrote to use 2-4 grams to affected area 3-4 times daily. Pt caregiver reports using PRN every 1-2 weeks.   Past Week at Unknown time   • pregabalin (LYRICA) 25 MG capsule Take 1 capsule by mouth 2 (Two) Times a Day. 30 capsule 0 10/5/2021 at am   • Probiotic Product (Align) capsule Take 1 capsule by mouth Daily.   10/5/2021 at am   • promethazine (PHENERGAN) 12.5 MG tablet Take 12.5 mg by mouth 2 (Two) Times a Day As Needed for Nausea or Vomiting.   Past Week at Unknown time   , Scheduled Meds:  sodium chloride, 10 mL, Intravenous, Q12H    , Continuous Infusions:  sodium chloride, 75 mL/hr, Last Rate: 75 mL/hr (10/05/21 1811)  sodium chloride, 75 mL/hr, Last Rate: 75 mL/hr (10/06/21 0617)    , PRN Meds:  •  Morphine  •  ondansetron  •  Access VAD **AND** sodium chloride  •  sodium chloride and Allergies:  Baclofen    Objective     Vital Signs   Temp:  [97.6 °F (36.4 °C)-99.7 °F (37.6 °C)] 97.6 °F (36.4 °C)  Heart Rate:  [] 100  Resp:  [18-24] 18  BP: (135-199)/(66-93) 162/70    Physical Exam:   Physical Exam  Vitals reviewed.   Constitutional:       General: She is not in acute distress.     Appearance: Normal appearance.   HENT:      Head: Normocephalic.      Nose: Nose normal.      Mouth/Throat:      Mouth: Mucous membranes are moist.   Cardiovascular:      Rate and Rhythm: Normal rate.      Pulses: Normal pulses.   Pulmonary:      Effort: Pulmonary effort is normal. No respiratory distress.   Abdominal:      Palpations: Abdomen is soft.   Musculoskeletal:         General: Tenderness  and signs of injury present.      Cervical back: Normal range of motion.      Comments: Shortening and external rotation of the left lower extremity.  Pain to palpation.  No visible break in skin integrity.  Positive pedal pulse.  Positive dorsi plantarflexion.   Skin:     General: Skin is warm.   Neurological:      General: No focal deficit present.      Mental Status: She is alert and oriented to person, place, and time.   Psychiatric:         Mood and Affect: Mood normal.         Behavior: Behavior normal.         Results Review:   I reviewed the patient's new clinical results.  X-ray reveals a left femoral neck fracture.    Assessment/Plan         Femur fracture (HCC)  Left femoral neck fracture.  Plan for surgical intervention on 10/7/2021 per Dr. Samano.  Further preop laboratory studies will be requested to include PT/INR.  She will be placed n.p.o. after midnight.  Risk of surgery will be discussed with the patient after further exam to followed by Dr. Samano..  Patient was understanding treatment plan at this time.        I discussed the patient's findings and my recommendations with the patient    MERRY Duenas  10/06/21  10:11 EDT

## 2021-10-06 NOTE — PROGRESS NOTES
[facility] HOSPITALIST PROGRESS NOTE     Patient Identification:  Name:  Kiah Conway  Age:  76 y.o.  Sex:  female  :  1945  MRN:  [id number]  Visit Number:  [id number]  Primary Care Provider:  Ronny Acevedo APRN    Length of stay:  1    Chief complaint: Left hip pain    Subjective:    Patient reports continued left hip pain but is decently controlled with current pain medication regimen.  She also endorses persistent nausea but denies any vomiting.  No new events overnight.  ----------------------------------------------------------------------------------------------------------------------  Current Hospital Meds:  sodium chloride, 10 mL, Intravenous, Q12H      sodium chloride, 75 mL/hr, Last Rate: 75 mL/hr (10/05/21 1811)  sodium chloride, 75 mL/hr, Last Rate: 75 mL/hr (10/06/21 0617)      ----------------------------------------------------------------------------------------------------------------------  Vital Signs:  Temp:  [97.6 °F (36.4 °C)-99.7 °F (37.6 °C)] 98 °F (36.7 °C)  Heart Rate:  [] 105  Resp:  [18-24] 18  BP: (135-199)/(66-93) 178/87      10/05/21  1346 10/05/21  2049   Weight: 53.5 kg (118 lb) 54.1 kg (119 lb 3.2 oz)     Body mass index is 23.28 kg/m².    Intake/Output Summary (Last 24 hours) at 10/6/2021 1052  Last data filed at 10/6/2021 0900  Gross per 24 hour   Intake 981.36 ml   Output 600 ml   Net 381.36 ml     NPO Diet  Diet Regular  ----------------------------------------------------------------------------------------------------------------------  Physical exam:  Constitutional: Well-nourished elderly appearing  female in no apparent distress.     HENT:  Head:  Normocephalic and atraumatic.  Mouth:  Moist mucous membranes.    Eyes:  Conjunctivae and EOM are normal.  Pupils are equal, round, and reactive to light.  No scleral icterus.    Neck:  Neck supple. No thyromegaly.  No JVD present.    Cardiovascular:  Regular rate and rhythm with  no murmurs, rubs, clicks or gallops appreciated.  Pulmonary/Chest:  Clear to auscultation bilaterally with no crackles, wheezes or rhonchi appreciated.  Abdominal:  Soft. Nontender. Nondistended  Bowel sounds are normal in all four quadrants. No organomegally appreciated.   Musculoskeletal:  No edema, left hip joint tender to palpation, left lower extremity is externally rotated.      Neurological:  Alert and oriented to person, place, and time. Cranial nerves II-XII intact with no focal defecits.  No facial droop.  No slurred speech.   Skin:  Warm and dry to palpation with no rashes or lesions appreciated.  Peripheral vascular:  2+ radial and pedal pulses in bilateral upper and lower extremities.  Psychiatric:  Alert and oriented x3, demonstrates appropriate judgement and insight.  ----------------------------------------------------------------------------------------------------------------------  Tele:    ----------------------------------------------------------------------------------------------------------------------  Results from last 7 days   Lab Units 10/05/21  1632 10/05/21  1433   TROPONIN T ng/mL <0.010 <0.010     Results from last 7 days   Lab Units 10/05/21  1418   WBC 10*3/mm3 15.67*   HEMOGLOBIN g/dL 11.4*   HEMATOCRIT % 35.5   MCV fL 93.2   MCHC g/dL 32.1   PLATELETS 10*3/mm3 298   INR  1.00     Results from last 7 days   Lab Units 10/05/21  1403   PH, ARTERIAL pH units 7.468*   PO2 ART mm Hg 49.8*   PCO2, ARTERIAL mm Hg 28.8*   HCO3 ART mmol/L 20.8     Results from last 7 days   Lab Units 10/05/21  1418   SODIUM mmol/L 138   POTASSIUM mmol/L 3.4*   MAGNESIUM mg/dL 1.7   CHLORIDE mmol/L 103   CO2 mmol/L 20.0*   BUN mg/dL 9   CREATININE mg/dL 0.72   EGFR IF NONAFRICN AM mL/min/1.73 78   CALCIUM mg/dL 9.3   GLUCOSE mg/dL 128*   ALBUMIN g/dL 4.45   BILIRUBIN mg/dL 0.5   ALK PHOS U/L 105   AST (SGOT) U/L 19   ALT (SGPT) U/L 11   Estimated Creatinine Clearance: 49.5 mL/min (by C-G formula based on SCr  of 0.72 mg/dL).    No results found for: AMMONIA      No results found for: BLOODCX  No results found for: URINECX  No results found for: WOUNDCX  No results found for: STOOLCX    I have personally looked at the labs and they are summarized above.  ----------------------------------------------------------------------------------------------------------------------  Imaging Results (Last 24 Hours)     ** No results found for the last 24 hours. **        ----------------------------------------------------------------------------------------------------------------------  Assessment and Plan:  1.  Left femoral neck fracture -continue pain control, plan for surgical intervention on 10/7/2021.  Appreciate orthopedic surgery recommendations.  Will obtain PT/INR prior to surgery.       2.  Hypokalemia -have placed on electrolyte replacement protocol, repeat BMP tomorrow.     3.  Essential hypertension - will continue patient's home dose of amlodipine, hydralazine and metoprolol.  Will monitor blood pressure and make medication adjustments as necessary.  We will obtain home medications and restart once available     4.  GERD -PPI    Disposition plan for surgery tomorrow          Kvng Quintanilla,   10/06/21  10:52 EDT

## 2021-10-07 NOTE — PLAN OF CARE
Goal Outcome Evaluation:   PT is resting in bed. Blood pressure has been elevated today, has been resolved with medications. Still continues to have episodes of vomiting and nausea, also relieved with medication. No other acute issues noted at this time. Will continue to monitor.

## 2021-10-07 NOTE — OP NOTE
Kiah Conway  10/7/2021      Operative Note:    Surgeon: KACI Samano MD    Assistant: CRISTY Becerra    Pre-Operative Diagnosis: Left femoral neck fracture, displaced, traumatic, closed, acute    Post-Operative Diagnosis: Same    Procedure(s): 1.  Left hip hemiarthroplasty for fracture, CPT code 59938    Anesthesia: General    Estimated Blood Loss: 100 cc    Specimen Obtained:  None    Complication(s):  None apparent.     Implants: Biomet 45 Endo head, standard offset, 7 Echo stem cemented, -3 neck    Operative Indication:     Kiah is a 76-year-old female who fell from standing height and sustained the above injury.  She is a community ambulator with an assistive device.  She was advised of the risk benefits alternatives or complications to the procedure stated above elected proceed with surgery.    Operative Details:    The patient was met in the preoperative suite where the correct operative site was marked. The patient was brought to the operating room where anesthesia was initiated. The patient was positioned appropriately with all bony prominences well padded. The patient was prepped and draped in the usual sterile fashion and prior to incision a timeout was observed to verify the correct operative site, procedure and antibiotics.    A longitudinal incision on the lateral aspect of the proximal thigh was taken down to the IT band.  The IT band was incised in line with the incision.  The abductors were released off the anterior two thirds of the greater trochanter.  There was an avulsion of the abductors noted at the site.  These were tagged for later repair.  Hohmann retractors were placed around the capsule and an H capsulotomy was performed with a fresh hematoma expressed from the hip joint.  The leg was externally rotated and placed on the anterior side bag.  A femoral neck osteotomy was performed with the oscillating saw.  The femoral head was then removed with a corkscrew and measured to a 45 Endo  head.    Sequential broaching was then performed up to a size 8 mm broach.  A a -6 standard offset 45 Endo head trial was placed on the proximal femur and reduced although there was stability that was noted within the range of motion we were short clinically on the exam table with limb lengths.  A -3 neck was then added to the proximal femur trial components which appropriate limb lengths and similar stability exam.    Trial implants were removed.  Thorough irrigation of the proximal canal was performed.  Cemented final 7 stem was placed in the proximal femur.  A -3, standard offset, 45 Endo head was placed on the proximal femur and reduced with a similar stability and limb length exam noted with the trial components.  The hip joint was irrigated thoroughly with 3 L of sterile saline.  The hip capsule abductors and IT band were all closed.  The remainder of the hip wound was closed in standard layered fashion.  A soft dressing was applied.  The patient was placed in abduction pillow.  The patient was extubated, transferred to hospital bed in the PACU in stable condition.  The patient tolerated procedure well without complications.    Post-operative Plan:    Transfer back to the floor.  Dressing-maintain dressing, may reinforce as needed saturation  Weight Bear/Lifting Status-as tolerated  DVT PPx-Lovenox 40, x14 days  Follow up-2 weeks in the office    Orthopedics will continue to follow    Electronically Signed by: Jonah Samano MD

## 2021-10-07 NOTE — PAYOR COMM NOTE
"CONTACT:  Emilia Whitaker RN    Utilization [place] [address]    Phone:[phone]  Fax: [phone]    REQUEST FOR INPATIENT AUTHORIZATION  REF # [id number]  ICD 10:S72.002A   ATTENDING MD:     Kvng Pino NPI:[id number]  FACILITY NPI: [id number]  ADM DATE: 10/5/21      Neil Conway (76 y.o. Female)     Date of Birth Social Security Number Address Home Phone MRN    1945  [id number] [address] [phone] [id number]    [employer] Marital Status          Sabianist        Admission Date Admission Type Admitting Provider Attending Provider Department, Room/Bed    10/5/21 Emergency Kvng Quintanilla DO Mullins, Thomas Anthony, DO [facility] OPERATING ROOM DEPARTMENT, COR OR/MAIN OR    Discharge Date Discharge Disposition Discharge Destination                       Attending Provider: Kvng Quintanilla DO    Allergies: Baclofen    Isolation: None   Infection: None   Code Status: CPR    Ht: 152.4 cm (60\")   Wt: 54.1 kg (119 lb 3.2 oz)    Admission Cmt: None   Principal Problem: Closed fracture of left hip (HCC) [S72.002A] More...                 Active Insurance as of 10/5/2021     Primary Coverage     Payor Plan Insurance Group Employer/Plan Group    [facility]IC[facility]      Payor Plan Address Payor Plan Phone Number Payor Plan Fax Number Effective Dates     [address] [address] [phone]  4/1/2021 - None Entered    [facility] [id number]       Subscriber Name Subscriber Birth Date Member ID       NEIL CONWAY 1945 [id number]                 Emergency Contacts      (Rel.) Home Phone Work Phone Mobile Phone    KoleEunice guillermo (Daughter) [phone] -- [phone]    RynCorrie rodriguez (Grandchild) [phone] -- [phone]               History & Physical      Kvng Quintanilla DO at 10/05/21 1929              MARGA" [facility] HOSPITALIST HISTORY AND PHYSICAL    Patient Identification:  Name:  Kiah Conway  Age:  76 y.o.  Sex:  female  :  1945  MRN:  [id number]   Admit Date: 10/5/2021   Visit Number:  [id number]  Primary Care Physician:  Ronny Acevdeo APRN         Chief complaint: Left hip pain    History of presenting illness: Ms. Conway is a 76 y.o. female who presented to [facility] emergency department on 2021 with a chief complaint of left hip pain.  Patient has a past medical history remarkable for diverticulosis, fibromyalgia, follicular lymphoma, GERD and distant history of thyroid cancer.  Patient states that she was being seen in an outpatient surgery center where she had colonoscopy performed today.  After her colonoscopy was performed, patient was taken to the restroom where a nurse assisted her to the toilet.  Patient states the nurse then exited the room and while she attempted to use the restroom she accidentally fell off of the toilet.  She states she fell onto her left side and denies hitting her head.  She denies losing consciousness.  However, patient did report immediate pain in her left hip joint.  Patient does not remember the exact events that occurred after her fall.  According to ER personnel, patient was taken by private vehicle to outpatient imaging center where a x-ray was performed of her left hip joint which did demonstrate a left femoral neck fracture.  As such, patient was brought onto the emergency department for further treatment and evaluation.  Initial evaluation in the emergency department did consist of basic laboratory work as well as physical exam and vital signs.  Initial vital signs found patient hypertensive with blood pressure 192/91, respirations 20, heart rate 86, temperature 99.2 °F and oxygen saturation 90% on room air.  Initial lab work did include CBC and CMP.  CBC demonstrated elevated white blood cell count of 15.6 and hemoglobin  11.4.  Initial CMP demonstrated very mildly decreased potassium of 3.4 but otherwise was within normal limits.  Again, hip x-ray did demonstrate a left femoral neck fracture.  For this reason, patient will be admitted for further treatment and evaluation.  -------------------------------------------------------------------------------------------------------------------  Past Medical History:   Diagnosis Date   • Arthritis    • Cardiac disorder    • Diverticulitis    • Dysphagia    • Fibromyalgia    • Follicular lymphoma (CMS/HCC)     Remisson    • GERD (gastroesophageal reflux disease)    • History of rheumatic fever    • Hypertension    • Hypokalemia due to inadequate potassium intake 03/03/2014   • Lipoma    • Malignant neoplasm (CMS/HCC)    • Migraine    • Neutropenia (CMS/HCC) 11/7/2019   • Obstructive sleep apnea 7/14/2016   • Prolonged Q-T interval on ECG 11/7/2019   • Recurrent UTI    • Rheumatoid arthritis (CMS/HCC)    • Sleep apnea    • Thyroid cancer (CMS/HCC)      Past Surgical History:   Procedure Laterality Date   • BLADDER SURGERY     • FINGER SURGERY      reattached    • HYSTERECTOMY     • THYROID SURGERY       Family History   Problem Relation Age of Onset   • Diabetes Mother    • Hypertension Mother    • Other Other         cardiac disorder,cardiovascular disease, malignant neoplasm of brain   • Breast cancer Paternal Aunt      Social History     Socioeconomic History   • Marital status:      Spouse name: Not on file   • Number of children: Not on file   • Years of education: Not on file   • Highest education level: Not on file   Tobacco Use   • Smoking status: Never Smoker   • Smokeless tobacco: Never Used   Substance and Sexual Activity   • Alcohol use: No   • Drug use: No   • Sexual activity: Defer     ---------------------------------------------------------------------------------------------------------------------   Allergies:   Baclofen  ---------------------------------------------------------------------------------------------------------------------   Prior to Admission Medications     Prescriptions Last Dose Informant Patient Reported? Taking?    amLODIPine (NORVASC) 2.5 MG tablet 10/5/2021 Pharmacy No Yes    Take 1 tablet by mouth Daily.    aspirin 81 MG EC tablet 10/5/2021 Child Yes Yes    Take 81 mg by mouth Daily.    atorvastatin (LIPITOR) 10 MG tablet 10/5/2021 Pharmacy No Yes    Take 1 tablet by mouth Daily.    busPIRone (BUSPAR) 5 MG tablet 10/5/2021 Pharmacy Yes Yes    Take 2.5 mg by mouth 2 (Two) Times a Day.    Calcium Polycarbophil (FIBER-CAPS PO) 10/5/2021 Child Yes Yes    Take 1 capsule by mouth Daily.    dexlansoprazole (DEXILANT) 60 MG capsule 10/5/2021 Child Yes Yes    Take 60 mg by mouth Daily.    FLUoxetine (PROzac) 20 MG capsule 10/5/2021 Pharmacy Yes Yes    Take 20 mg by mouth Daily.    hydrALAZINE (APRESOLINE) 10 MG tablet 10/5/2021 Pharmacy No Yes    Take 1 tablet by mouth Every 8 (Eight) Hours.    Patient taking differently:  Take 10 mg by mouth Every 8 (Eight) Hours. Prior to [facility] Admission, Patient was on:   HOLD if SBP less than 140 or DBP less than 80    HYDROcodone-acetaminophen (NORCO)  MG per tablet 10/5/2021 Pharmacy Yes Yes    Take 1 tablet by mouth 3 (Three) Times a Day As Needed for Moderate Pain .    loratadine (CLARITIN) 10 MG tablet 10/5/2021 Pharmacy Yes Yes    Take 10 mg by mouth Daily.    megestrol (MEGACE) 40 MG tablet 10/5/2021 Pharmacy Yes Yes    Take 40 mg by mouth 2 (Two) Times a Day.    metoprolol tartrate (LOPRESSOR) 50 MG tablet 10/5/2021 Pharmacy Yes Yes    Take 50 mg by mouth 2 (Two) Times a Day.    nitrofurantoin (MACRODANTIN) 50 MG capsule 10/5/2021 Pharmacy Yes Yes    Take 50 mg by mouth Daily.    NON FORMULARY Past Week Pharmacy Yes Yes    Apply 1 dose topically to the appropriate area as directed As Needed (Compounded Pain cream: Ingredients: 2.5% ketamine, 2.5%  lidocaine, 2.5% prilocaine, 0.09% meloxicam (120 grams total)). Prior to [facility] Admission, Patient was on: Rx wrote to use 2-4 grams to affected area 3-4 times daily. Pt caregiver reports using PRN every 1-2 weeks.    pregabalin (LYRICA) 25 MG capsule 10/5/2021 Pharmacy No Yes    Take 1 capsule by mouth 2 (Two) Times a Day.    Probiotic Product (Align) capsule 10/5/2021 Child Yes Yes    Take 1 capsule by mouth Daily.    promethazine (PHENERGAN) 12.5 MG tablet Past Week Pharmacy Yes Yes    Take 12.5 mg by mouth 2 (Two) Times a Day As Needed for Nausea or Vomiting.        Hospital Scheduled Meds:     sodium chloride, 75 mL/hr, Last Rate: 75 mL/hr (10/05/21 1811)      ---------------------------------------------------------------------------------------------------------------------   Review of Systems   On review of systems the patient denies the following unless noted above:     Constitutional:  Fevers, chills, weight change, fatigue     Eyes: Vision changes, headache, double vision, loss of vision     ENT: Runny nose, nose bleeds, ringing in ears, pain with swallowing, sore throat     Cardiovascular: Chest pains, palpitations, PND, orthopnea     Respiratory: Cough, wheezing, SOA, hemoptysis     GI:  Abdominal pain, diarrhea, constipation, change in stool caliber,    Rectal bleeding, vomiting or nausea     : Difficulty voiding, dysuria, hematuria     Musculoskeletal: Changes of any chronic joint pain, swelling     Skin: Rash, itching, easy bruisability     Neurological: Unilateral weakness, new onset numbness, speech difficulties     Psychiatric: Sadness, tearfulness, feelings of hopelessness, racing thoughts     Endocrine:  Heat or cold intolerance, mood swings, polydipsia, polyphagia,    recent hypoglycemia  --------------------------------------------------------------------------------------------------------------------  Vital Signs:  Temp:  [98.4 °F (36.9 °C)-99.2 °F (37.3 °C)] 98.4 °F (36.9 °C)  Heart  Rate:  [86-92] 90  Resp:  [20-24] 21  BP: (179-192)/(66-92) 179/66      10/05/21  1346   Weight: 53.5 kg (118 lb)     Body mass index is 23.05 kg/m².  ---------------------------------------------------------------------------------------------------------------------   Physical exam:  Constitutional: Well-nourished elderly appearing  female in no apparent distress.     HENT:  Head:  Normocephalic and atraumatic.  Mouth:  Moist mucous membranes.    Eyes:  Conjunctivae and EOM are normal.  Pupils are equal, round, and reactive to light.  No scleral icterus.    Neck:  Neck supple. No thyromegaly.  No JVD present.    Cardiovascular:  Regular rate and rhythm with no murmurs, rubs, clicks or gallops appreciated.  Pulmonary/Chest:  Clear to auscultation bilaterally with no crackles, wheezes or rhonchi appreciated.  Abdominal:  Soft. Nontender. Nondistended  Bowel sounds are normal in all four quadrants. No organomegally appreciated.   Musculoskeletal:  No edema, left hip joint tender to palpation, left lower extremity is externally rotated.      Neurological:  Alert and oriented to person, place, and time. Cranial nerves II-XII intact with no focal defecits.  No facial droop.  No slurred speech.   Skin:  Warm and dry to palpation with no rashes or lesions appreciated.  Peripheral vascular:  2+ radial and pedal pulses in bilateral upper and lower extremities.  Psychiatric:  Alert and oriented x3, demonstrates appropriate judgement and insight.  ------------------------------------------------------------------------------  ---------------------------------------------------------------------------------------------------------------------   Results from last 7 days   Lab Units 10/05/21  1418   WBC 10*3/mm3 15.67*   HEMOGLOBIN g/dL 11.4*   HEMATOCRIT % 35.5   MCV fL 93.2   MCHC g/dL 32.1   PLATELETS 10*3/mm3 298   INR  1.00     Results from last 7 days   Lab Units 10/05/21  1403   PH, ARTERIAL pH units 7.468*   PO2  ART mm Hg 49.8*   PCO2, ARTERIAL mm Hg 28.8*   HCO3 ART mmol/L 20.8     Results from last 7 days   Lab Units 10/05/21  1418   SODIUM mmol/L 138   POTASSIUM mmol/L 3.4*   MAGNESIUM mg/dL 1.7   CHLORIDE mmol/L 103   CO2 mmol/L 20.0*   BUN mg/dL 9   CREATININE mg/dL 0.72   EGFR IF NONAFRICN AM mL/min/1.73 78   CALCIUM mg/dL 9.3   GLUCOSE mg/dL 128*   ALBUMIN g/dL 4.45   BILIRUBIN mg/dL 0.5   ALK PHOS U/L 105   AST (SGOT) U/L 19   ALT (SGPT) U/L 11   Estimated Creatinine Clearance: 48.9 mL/min (by C-G formula based on SCr of 0.72 mg/dL).  No results found for: AMMONIA  Results from last 7 days   Lab Units 10/05/21  1632 10/05/21  1433   TROPONIN T ng/mL <0.010 <0.010     Results from last 7 days   Lab Units 10/05/21  1433   PROBNP pg/mL 607.2     Lab Results   Component Value Date    HGBA1C 4.30 (L) 11/07/2019     Lab Results   Component Value Date    TSH 1.170 04/08/2021    FREET4 1.26 05/27/2020     No results found for: PREGTESTUR, PREGSERUM, HCG, HCGQUANT  Pain Management Panel     Pain Management Panel Latest Ref Rng & Units 4/9/2021    AMPHETAMINES SCREEN, URINE Negative Negative    BARBITURATES SCREEN Negative Negative    BENZODIAZEPINE SCREEN, URINE Negative Negative    BUPRENORPHINEUR Negative Negative    COCAINE SCREEN, URINE Negative Negative    METHADONE SCREEN, URINE Negative Negative        No results found for: BLOODCX  No results found for: URINECX  No results found for: WOUNDCX  No results found for: STOOLCX      ---------------------------------------------------------------------------------------------------------------------  Imaging Results (Last 7 Days)     Procedure Component Value Units Date/Time    XR Chest 1 View [[id number]] Collected: 10/05/21 1447     Updated: 10/05/21 1449    Narrative:      XR CHEST 1 VW-     CLINICAL INDICATION: fall, hip fx        COMPARISON: 05/15/2021      TECHNIQUE: Single frontal view of the chest.     FINDINGS:     There is no focal alveolar infiltrate or  effusion.  Appearance suggestive of at least mild pulmonary congestion.  Central line is stable from the previous exam  There are no suspicious-appearing parenchymal soft tissue nodules.          Impression:      Appearance suggestive of CHF     This report was finalized on 10/5/2021 2:47 PM by Dr. Chente Choi MD.       XR Femur 2 View Left [[id number]] Collected: 10/05/21 1446     Updated: 10/05/21 1449    Narrative:      EXAMINATION: XR FEMUR 2 VW LEFT-      CLINICAL INDICATION: fall, left femoral neck fx        COMPARISON: None available     FINDINGS:  4 views of the left femur show left femoral neck fracture     No distal femoral fracture is seen.       Impression:      Left femoral neck fracture.      This report was finalized on 10/5/2021 2:46 PM by Dr. Chente Choi MD.             I have personally reviewed the radiology images and read the final radiology report.  ---------------------------------------------------------------------------------------------------------------------  Assessment and Plan:    1.  Left femoral neck fracture -we will admit to inpatient MedSurg and consult orthopedic surgery.  Patient will likely require surgical intervention.  Patient will be made n.p.o. at midnight.  Patient will be typed and screened in preparation for surgery tomorrow.    2.  Hypokalemia -will place on electrolyte replacement protocol    3.  Essential hypertension - we will obtain home medications and restart once available    4.  GERD -PPI    Disposition plan for surgical intervention of left hip fracture tomorrow.  May need placement on discharge.    Kvng Quintanilla DO  10/05/21  19:29 EDT    Electronically signed by Kvng Quintanilla DO at 10/05/21 1937          Emergency Department Notes      Monica Brasher PA at 10/05/21 1348     Attestation signed by Brandyn Desai MD at 10/06/21 9085          For this patient encounter, I reviewed the NP or PA documentation, treatment plan, and  medical decision making. Brandyn Desai MD 10/6/2021 00:55 EDT                  Subjective   76-year-old female who presents to the ED today due to a fall. She was at Dr. Luciano' office and had an EGD today. She states after the procedure she had to go to the bathroom. They assisted her to the restroom. She states she was sitting on the toilet and slid off into the floor. She states she did not pass out. She states she fell and landed on her left hip. She states she was sent to the outpatient diagnostic center for an x-ray. It was found that she has a left femoral neck fracture and she was sent to the ER. She denies any past injury to the hip. She states she is not injured anywhere else. She states she did not hit her head or lose consciousness. She denies any neck or back pain. She denies any chest pain or difficulty breathing. She denies any abdominal pain. It is noted that her oxygen saturations are low, this may be from her recent sedative medications.      History provided by:  Patient  Fall  Mechanism of injury: fall    Injury location:  Pelvis  Pelvic injury location:  L hip  Incident location: doctor's office.  Time since incident:  1 hour  Fall:     Fall occurred:  In the bathroom    Impact surface:  Hard floor  Prior to arrival data:     Blood loss:  None    Responsiveness at scene:  Alert    Orientation at scene:  Person, place, situation and time    Loss of consciousness: no      Amnesic to event: no    Associated symptoms: nausea    Associated symptoms: no abdominal pain, no back pain, no chest pain, no difficulty breathing, no headaches, no loss of consciousness, no neck pain and no vomiting        Review of Systems   Constitutional: Negative.    HENT: Negative.    Eyes: Negative.    Respiratory: Negative.    Cardiovascular: Negative for chest pain.   Gastrointestinal: Positive for nausea. Negative for abdominal pain and vomiting.   Genitourinary: Negative.    Musculoskeletal: Positive for  arthralgias. Negative for back pain and neck pain.   Skin: Negative.    Neurological: Negative for loss of consciousness and headaches.   Psychiatric/Behavioral: Negative.    All other systems reviewed and are negative.      Past Medical History:   Diagnosis Date   • Arthritis    • Cardiac disorder    • Diverticulitis    • Dysphagia    • Fibromyalgia    • Follicular lymphoma (CMS/HCC)     Remisson    • GERD (gastroesophageal reflux disease)    • History of rheumatic fever    • Hypertension    • Hypokalemia due to inadequate potassium intake 03/03/2014   • Lipoma    • Malignant neoplasm (CMS/HCC)    • Migraine    • Neutropenia (CMS/HCC) 11/7/2019   • Obstructive sleep apnea 7/14/2016   • Prolonged Q-T interval on ECG 11/7/2019   • Recurrent UTI    • Rheumatoid arthritis (CMS/HCC)    • Sleep apnea    • Thyroid cancer (CMS/HCC)        Allergies   Allergen Reactions   • Baclofen Delirium       Past Surgical History:   Procedure Laterality Date   • BLADDER SURGERY     • FINGER SURGERY      reattached    • HYSTERECTOMY     • THYROID SURGERY         Family History   Problem Relation Age of Onset   • Diabetes Mother    • Hypertension Mother    • Other Other         cardiac disorder,cardiovascular disease, malignant neoplasm of brain   • Breast cancer Paternal Aunt        Social History     Socioeconomic History   • Marital status:      Spouse name: Not on file   • Number of children: Not on file   • Years of education: Not on file   • Highest education level: Not on file   Tobacco Use   • Smoking status: Never Smoker   • Smokeless tobacco: Never Used   Substance and Sexual Activity   • Alcohol use: No   • Drug use: No   • Sexual activity: Defer           Objective   Physical Exam  Vitals and nursing note reviewed.   Constitutional:       General: She is not in acute distress.     Appearance: Normal appearance. She is not diaphoretic.   HENT:      Head: Normocephalic and atraumatic.      Right Ear: External ear  normal.      Left Ear: External ear normal.   Eyes:      Extraocular Movements: Extraocular movements intact.      Conjunctiva/sclera: Conjunctivae normal.      Pupils: Pupils are equal, round, and reactive to light.   Cardiovascular:      Rate and Rhythm: Normal rate and regular rhythm.      Pulses: Normal pulses.      Heart sounds: Normal heart sounds.   Pulmonary:      Effort: Pulmonary effort is normal.      Breath sounds: Normal breath sounds. No wheezing or rhonchi.   Chest:      Chest wall: No tenderness.   Abdominal:      General: Bowel sounds are normal.      Palpations: Abdomen is soft.      Tenderness: There is no abdominal tenderness. There is no guarding or rebound.   Musculoskeletal:         General: Tenderness (left hip tenderness with palpation) present.      Cervical back: Normal range of motion and neck supple. No tenderness.   Lymphadenopathy:      Cervical: No cervical adenopathy.   Skin:     General: Skin is warm and dry.      Capillary Refill: Capillary refill takes less than 2 seconds.   Neurological:      General: No focal deficit present.      Mental Status: She is alert and oriented to person, place, and time.   Psychiatric:         Mood and Affect: Mood normal.         Procedures          ED Course  ED Course as of Oct 05 1741   Tue Oct 05, 2021   1420 Patient started on 2L of oxygen after ABG.    [AH]   1520 FINDINGS:     There is no focal alveolar infiltrate or effusion.  Appearance suggestive of at least mild pulmonary congestion.  Central line is stable from the previous exam  There are no suspicious-appearing parenchymal soft tissue nodules.        IMPRESSION:  Appearance suggestive of CHF   XR Chest 1 View [AH]   1520 FINDINGS:  4 views of the left femur show left femoral neck fracture     No distal femoral fracture is seen.     IMPRESSION:  Left femoral neck fracture.    XR Femur 2 View Left [AH]   5780 Left message for Dr. Samano    []   1599 Will discuss with hospitalist service     []   1612 Spoke with Dr. Samano's mid-level, available for consult.    []      ED Course User Index  [] Monica Brasher PA                                           [facility]  Number of Diagnoses or Management Options     Amount and/or Complexity of Data Reviewed  Clinical lab tests: reviewed  Tests in the radiology section of CPT®: reviewed  Tests in the medicine section of CPT®: reviewed  Discuss the patient with other providers: yes    Patient Progress  Patient progress: stable      Final diagnoses:   Closed fracture of left hip, initial encounter (HCC)       ED Disposition  ED Disposition     ED Disposition Condition Comment    Decision to Admit  Level of Care: Med/Surg [1]   Diagnosis: Femur fracture ([facility]) [195713]   Certification: I Certify That Inpatient Hospital Services Are Medically Necessary For Greater Than 2 Midnights            No follow-up provider specified.       Medication List      ASK your doctor about these medications    promethazine 12.5 MG tablet  Commonly known as: PHENERGAN  Ask about: Which instructions should I use?             Monica Brasher PA  10/05/21 1741      Electronically signed by Brandyn Desai MD at 10/06/21 0055     Kelechi Perez, RN at 10/05/21 1549        Patient was put on a bedpan to use the bathroom, went back and took her off of it     Kelechi Perez, RN  10/05/21 1550      Electronically signed by Kelechi Perez RN at 10/05/21 1550       Vital Signs (last day)     Date/Time   Temp   Temp src   Pulse   Resp   BP   Patient Position   SpO2    10/07/21 1322   --   --   (!) 122   --   169/77   Lying   --    10/07/21 1154   --   --   112   --   (!) 193/88   Lying   --    10/07/21 1045   98.4 (36.9)   Oral   112   18   (!) 181/92   Lying   94    10/07/21 0918   --   --   118   --   (!) 184/105   --   --    10/07/21 0640   98.5 (36.9)   Oral   109   18   (!) 182/96   Lying   94    10/07/21 0430   --   --   --   --   140/68   --   --    10/07/21 0300    --   --   105   --   (!) 181/86   --   --    10/07/21 0244   97.6 (36.4)   Oral   106   16   (!) 186/96   Lying   98    10/06/21 2300   98.1 (36.7)   Oral   95   16   165/84   Lying   98    10/06/21 2218   --   --   101   --   174/87   --   --    10/06/21 1900   98.8 (37.1)   Oral   113   20   159/87   Lying   97    10/06/21 1358   99.4 (37.4)   Oral   112   20   178/80   Lying   94    10/06/21 1045   98 (36.7)   Oral   105   18   178/87   Lying   96    10/06/21 0628   97.6 (36.4)   Oral   100   18   162/70   Lying   95    10/06/21 0300   98.5 (36.9)   Oral   92   18   (!) 184/84   Lying   95    10/06/21 0030   98.1 (36.7)   Oral   103   18   (!) 188/88   Lying   95                Current Facility-Administered Medications   Medication Dose Route Frequency Provider Last Rate Last Admin   • amLODIPine (NORVASC) tablet 2.5 mg  2.5 mg Oral Q24H Kvng Quintanilla DO   2.5 mg at 10/07/21 0918   • [MAR Hold] atorvastatin (LIPITOR) tablet 10 mg  10 mg Oral Daily Kvng Quintanilla DO   10 mg at 10/06/21 1230   • [MAR Hold] busPIRone (BUSPAR) tablet 2.5 mg  2.5 mg Oral BID Kvng Quintanilla DO   2.5 mg at 10/06/21 2102   • ceFAZolin Sodium-Dextrose (ANCEF) IVPB (duplex) 2 g  2 g Intravenous Once Patrica Monroe, APRN       • [MAR Hold] cetirizine (zyrTEC) tablet 5 mg  5 mg Oral Daily Kvng Quintanilla DO   5 mg at 10/06/21 1230   • [MAR Hold] FLUoxetine (PROzac) capsule 20 mg  20 mg Oral Daily Kvng Quintanilla DO   20 mg at 10/06/21 1230   • hydrALAZINE (APRESOLINE) injection 10 mg  10 mg Intravenous Q6H PRN Kvng Quintanilla DO   10 mg at 10/07/21 1215   • hydrALAZINE (APRESOLINE) tablet 10 mg  10 mg Oral Q8H Kvng Quintanilla DO   10 mg at 10/06/21 2218   • [MAR Hold] HYDROcodone-acetaminophen (NORCO)  MG per tablet 1 tablet  1 tablet Oral TID PRN Kvng Quintanilla DO   1 tablet at 10/07/21 0244   • [MAR Hold] megestrol (MEGACE) tablet 40 mg  40 mg Oral  BID Kvng Quintanilla DO   40 mg at 10/06/21 2102   • metoprolol tartrate (LOPRESSOR) tablet 50 mg  50 mg Oral Q12H Kvng Quintanilla DO   50 mg at 10/07/21 0919   • [MAR Hold] morphine injection 1 mg  1 mg Intravenous Q4H PRN Landon King MD   1 mg at 10/07/21 1122   • [MAR Hold] ondansetron (ZOFRAN) injection 4 mg  4 mg Intravenous Q6H PRN Ksenia Barreto PA-C   4 mg at 10/07/21 1106   • [MAR Hold] pantoprazole (PROTONIX) EC tablet 40 mg  40 mg Oral BID AC Kvng Quintanilla DO   40 mg at 10/06/21 1807   • potassium chloride (MICRO-K) CR capsule 40 mEq  40 mEq Oral PRN Kvng Quintanilla DO        Or   • potassium chloride (KLOR-CON) packet 40 mEq  40 mEq Oral PRN Kvng Quintanilla DO        Or   • potassium chloride 10 mEq in 100 mL IVPB  10 mEq Intravenous Q1H PRN Kvng Quintanilla DO       • pregabalin (LYRICA) capsule 25 mg  25 mg Oral BID Kvng Quintanilla DO   25 mg at 10/06/21 2102   • [MAR Hold] promethazine (PHENERGAN) tablet 12.5 mg  12.5 mg Oral BID PRN Kvng Quintanilla DO       • [MAR Hold] psyllium (METAMUCIL MULTIHEALTH FIBER) 58.12 % packet 1 packet  1 packet Oral Daily Kvng Quintanilla DO       • [MAR Hold] sodium chloride 0.9 % flush 10 mL  10 mL Intravenous PRN Monica Brasher PA       • [MAR Hold] sodium chloride 0.9 % flush 10 mL  10 mL Intravenous Q12H Kvng Quintanilla DO   10 mL at 10/07/21 0925   • [MAR Hold] sodium chloride 0.9 % flush 10 mL  10 mL Intravenous PRN Kvng Quintanilla DO       • sodium chloride 0.9 % infusion  75 mL/hr Intravenous Continuous Monica Brasher PA 75 mL/hr at 10/05/21 1811 75 mL/hr at 10/05/21 1811   • sodium chloride 0.9 % infusion  75 mL/hr Intravenous Continuous Kvng Quintanilla DO 75 mL/hr at 10/07/21 1106 75 mL/hr at 10/07/21 1106       Lab Results (last 24 hours)     Procedure Component Value Units Date/Time    POC Glucose Once [[id number]]  (Normal) Collected:  10/07/21 1133    Specimen: Blood Updated: 10/07/21 1141     Glucose 106 mg/dL      Comment: Meter: [id number] : [id number] Brittany Collett       Basic Metabolic Panel [[id number]]  (Abnormal) Collected: 10/07/21 0328    Specimen: Blood Updated: 10/07/21 0405     Glucose 136 mg/dL      BUN 8 mg/dL      Creatinine 0.66 mg/dL      Sodium 133 mmol/L      Potassium 3.9 mmol/L      Chloride 102 mmol/L      CO2 16.3 mmol/L      Calcium 8.9 mg/dL      eGFR Non African Amer 87 mL/min/1.73      BUN/Creatinine Ratio 12.1     Anion Gap 14.7 mmol/L     Narrative:      GFR Normal >60  Chronic Kidney Disease <60  Kidney Failure <15      Protime-INR [[id number]]  (Abnormal) Collected: 10/07/21 0328    Specimen: Blood Updated: 10/07/21 0358     Protime 15.5 Seconds      INR 1.19    Narrative:      Suggested INR therapeutic range for stable oral anticoagulant therapy:    Low Intensity therapy:   1.5-2.0  Moderate Intensity therapy:   2.0-3.0  High Intensity therapy:   2.5-4.0    CBC (No Diff) [[id number]]  (Abnormal) Collected: 10/07/21 0328    Specimen: Blood Updated: 10/07/21 0345     WBC 15.04 10*3/mm3      RBC 3.86 10*6/mm3      Hemoglobin 11.5 g/dL      Hematocrit 35.3 %      MCV 91.5 fL      MCH 29.8 pg      MCHC 32.6 g/dL      RDW 13.8 %      RDW-SD 46.5 fl      MPV 11.6 fL      Platelets 245 10*3/mm3         Imaging Results (Last 24 Hours)     ** No results found for the last 24 hours. **        ECG/EMG Results (last 24 hours)     ** No results found for the last 24 hours. **           Physician Progress Notes (most recent note)      Kvng Quintanilla DO at 10/06/21 1052              [facility]     Patient Identification:  Name:  Kiah Conway  Age:  76 y.o.  Sex:  female  :  1945  MRN:  [id number]  Visit Number:  [id number]  Primary Care Provider:  Ronny Acevedo APRN    Length of stay:  1    Chief complaint: Left hip pain    Subjective:    Patient reports  continued left hip pain but is decently controlled with current pain medication regimen.  She also endorses persistent nausea but denies any vomiting.  No new events overnight.  ----------------------------------------------------------------------------------------------------------------------  Current [facility] Meds:  sodium chloride, 10 mL, Intravenous, Q12H      sodium chloride, 75 mL/hr, Last Rate: 75 mL/hr (10/05/21 1811)  sodium chloride, 75 mL/hr, Last Rate: 75 mL/hr (10/06/21 0617)      ----------------------------------------------------------------------------------------------------------------------  Vital Signs:  Temp:  [97.6 °F (36.4 °C)-99.7 °F (37.6 °C)] 98 °F (36.7 °C)  Heart Rate:  [] 105  Resp:  [18-24] 18  BP: (135-199)/(66-93) 178/87      10/05/21  1346 10/05/21  2049   Weight: 53.5 kg (118 lb) 54.1 kg (119 lb 3.2 oz)     Body mass index is 23.28 kg/m².    Intake/Output Summary (Last 24 hours) at 10/6/2021 1052  Last data filed at 10/6/2021 0900  Gross per 24 hour   Intake 981.36 ml   Output 600 ml   Net 381.36 ml     NPO Diet  Diet Regular  ----------------------------------------------------------------------------------------------------------------------  Physical exam:  Constitutional: Well-nourished elderly appearing  female in no apparent distress.     HENT:  Head:  Normocephalic and atraumatic.  Mouth:  Moist mucous membranes.    Eyes:  Conjunctivae and EOM are normal.  Pupils are equal, round, and reactive to light.  No scleral icterus.    Neck:  Neck supple. No thyromegaly.  No JVD present.    Cardiovascular:  Regular rate and rhythm with no murmurs, rubs, clicks or gallops appreciated.  Pulmonary/Chest:  Clear to auscultation bilaterally with no crackles, wheezes or rhonchi appreciated.  Abdominal:  Soft. Nontender. Nondistended  Bowel sounds are normal in all four quadrants. No organomegally appreciated.   Musculoskeletal:  No edema, left hip joint tender to  palpation, left lower extremity is externally rotated.      Neurological:  Alert and oriented to person, place, and time. Cranial nerves II-XII intact with no focal defecits.  No facial droop.  No slurred speech.   Skin:  Warm and dry to palpation with no rashes or lesions appreciated.  Peripheral vascular:  2+ radial and pedal pulses in bilateral upper and lower extremities.  Psychiatric:  Alert and oriented x3, demonstrates appropriate judgement and insight.  ----------------------------------------------------------------------------------------------------------------------  Tele:    ----------------------------------------------------------------------------------------------------------------------  Results from last 7 days   Lab Units 10/05/21  1632 10/05/21  1433   TROPONIN T ng/mL <0.010 <0.010     Results from last 7 days   Lab Units 10/05/21  1418   WBC 10*3/mm3 15.67*   HEMOGLOBIN g/dL 11.4*   HEMATOCRIT % 35.5   MCV fL 93.2   MCHC g/dL 32.1   PLATELETS 10*3/mm3 298   INR  1.00     Results from last 7 days   Lab Units 10/05/21  1403   PH, ARTERIAL pH units 7.468*   PO2 ART mm Hg 49.8*   PCO2, ARTERIAL mm Hg 28.8*   HCO3 ART mmol/L 20.8     Results from last 7 days   Lab Units 10/05/21  1418   SODIUM mmol/L 138   POTASSIUM mmol/L 3.4*   MAGNESIUM mg/dL 1.7   CHLORIDE mmol/L 103   CO2 mmol/L 20.0*   BUN mg/dL 9   CREATININE mg/dL 0.72   EGFR IF NONAFRICN AM mL/min/1.73 78   CALCIUM mg/dL 9.3   GLUCOSE mg/dL 128*   ALBUMIN g/dL 4.45   BILIRUBIN mg/dL 0.5   ALK PHOS U/L 105   AST (SGOT) U/L 19   ALT (SGPT) U/L 11   Estimated Creatinine Clearance: 49.5 mL/min (by C-G formula based on SCr of 0.72 mg/dL).    No results found for: AMMONIA      No results found for: BLOODCX  No results found for: URINECX  No results found for: WOUNDCX  No results found for: STOOLCX    I have personally looked at the labs and they are summarized  above.  ----------------------------------------------------------------------------------------------------------------------  Imaging Results (Last 24 Hours)     ** No results found for the last 24 hours. **        ----------------------------------------------------------------------------------------------------------------------  Assessment and Plan:  1.  Left femoral neck fracture -continue pain control, plan for surgical intervention on 10/7/2021.  Appreciate orthopedic surgery recommendations.  Will obtain PT/INR prior to surgery.       2.  Hypokalemia -have placed on electrolyte replacement protocol, repeat BMP tomorrow.     3.  Essential hypertension - will continue patient's home dose of amlodipine, hydralazine and metoprolol.  Will monitor blood pressure and make medication adjustments as necessary.  We will obtain home medications and restart once available     4.  GERD -PPI    Disposition plan for surgery tomorrow          Kvng Quintanilla DO  10/06/21  10:52 EDT    Electronically signed by Kvng Quintanilla DO at 10/06/21 1056          Consult Notes (most recent note)      Patrica Monroe APRN at 10/06/21 1011     Attestation signed by Jonah Samano MD at 10/07/21 1251    I have reviewed this documentation and agree.    Patient is a community ambulatory with a cane and walker about 50 % of the time.    Jonah Samano MD                        Referring Provider: MERRY Roth  Reason for Consultation: left hip fracture    Patient Care Team:  Ronny Acevedo APRN as PCP - General (Nurse Practitioner)    Chief complaint hip pain    Subjective .   Lying in bed, awake alert, no acute distress.  History of present illness: History of Present Illness  Ms. Conway is a 76-year-old female currently admitted on the medical surgical floor with a left hip fracture sustained after a fall.  The patient explained that she was in the bathroom at an outpatient surgery facility after  undergoing a colonoscopy.  She states that she fell off of the toilet sustaining injury to the left hip.  She was seen and examined to the emergency department with findings of a left hip fracture and subsequently admitted.  Currently her pain is well controlled.  She denies chest pain, shortness of breath but endorses nausea.  Review of Systems  Review of Systems   Constitutional: Positive for activity change and appetite change.   HENT: Negative.    Eyes: Negative.    Respiratory: Negative.    Cardiovascular: Negative.    Gastrointestinal: Positive for nausea.   Endocrine: Negative.    Genitourinary: Negative.    Musculoskeletal: Positive for arthralgias.   Skin: Negative.    Allergic/Immunologic: Negative.    Neurological: Negative.    Hematological: Negative.    Psychiatric/Behavioral: Negative.         History  Past Medical History:   Diagnosis Date   • Arthritis    • Cardiac disorder    • Diverticulitis    • Dysphagia    • Fibromyalgia    • Follicular lymphoma (CMS/HCC)     Remisson    • GERD (gastroesophageal reflux disease)    • History of rheumatic fever    • Hypertension    • Hypokalemia due to inadequate potassium intake 03/03/2014   • Lipoma    • Malignant neoplasm (CMS/HCC)    • Migraine    • Neutropenia (CMS/HCC) 11/7/2019   • Obstructive sleep apnea 7/14/2016   • Prolonged Q-T interval on ECG 11/7/2019   • Recurrent UTI    • Rheumatoid arthritis (CMS/HCC)    • Sleep apnea    • Thyroid cancer (CMS/HCC)    ,   Past Surgical History:   Procedure Laterality Date   • BLADDER SURGERY     • FINGER SURGERY      reattached    • HYSTERECTOMY     • THYROID SURGERY     ,   Family History   Problem Relation Age of Onset   • Diabetes Mother    • Hypertension Mother    • Other Other         cardiac disorder,cardiovascular disease, malignant neoplasm of brain   • Breast cancer Paternal Aunt    ,   Social History     Tobacco Use   • Smoking status: Never Smoker   • Smokeless tobacco: Never Used   Substance Use Topics    • Alcohol use: No   • Drug use: No   ,   Medications Prior to Admission   Medication Sig Dispense Refill Last Dose   • amLODIPine (NORVASC) 2.5 MG tablet Take 1 tablet by mouth Daily. 30 tablet 0 10/5/2021 at am   • aspirin 81 MG EC tablet Take 81 mg by mouth Daily.   10/5/2021 at am   • atorvastatin (LIPITOR) 10 MG tablet Take 1 tablet by mouth Daily. 30 tablet 11 10/5/2021 at Unknown time   • busPIRone (BUSPAR) 5 MG tablet Take 2.5 mg by mouth 2 (Two) Times a Day.   10/5/2021 at am   • Calcium Polycarbophil (FIBER-CAPS PO) Take 1 capsule by mouth Daily.   10/5/2021 at am   • dexlansoprazole (DEXILANT) 60 MG capsule Take 60 mg by mouth Daily.   10/5/2021 at am   • FLUoxetine (PROzac) 20 MG capsule Take 20 mg by mouth Daily.   10/5/2021 at am   • hydrALAZINE (APRESOLINE) 10 MG tablet Take 1 tablet by mouth Every 8 (Eight) Hours. (Patient taking differently: Take 10 mg by mouth Every 8 (Eight) Hours. Prior to [facility] Admission, Patient was on:   HOLD if SBP less than 140 or DBP less than 80) 90 tablet 0 10/5/2021 at am   • HYDROcodone-acetaminophen (NORCO)  MG per tablet Take 1 tablet by mouth 3 (Three) Times a Day As Needed for Moderate Pain .   10/5/2021 at am   • loratadine (CLARITIN) 10 MG tablet Take 10 mg by mouth Daily.   10/5/2021 at am   • megestrol (MEGACE) 40 MG tablet Take 40 mg by mouth 2 (Two) Times a Day.   10/5/2021 at am   • metoprolol tartrate (LOPRESSOR) 50 MG tablet Take 50 mg by mouth 2 (Two) Times a Day.   10/5/2021 at am   • nitrofurantoin (MACRODANTIN) 50 MG capsule Take 50 mg by mouth Daily.   10/5/2021 at am   • NON FORMULARY Apply 1 dose topically to the appropriate area as directed As Needed (Compounded Pain cream: Ingredients: 2.5% ketamine, 2.5% lidocaine, 2.5% prilocaine, 0.09% meloxicam (120 grams total)). Prior to [facility] Admission, Patient was on: Rx wrote to use 2-4 grams to affected area 3-4 times daily. Pt caregiver reports using PRN every 1-2 weeks.   Past Week at Unknown  time   • pregabalin (LYRICA) 25 MG capsule Take 1 capsule by mouth 2 (Two) Times a Day. 30 capsule 0 10/5/2021 at am   • Probiotic Product (Align) capsule Take 1 capsule by mouth Daily.   10/5/2021 at am   • promethazine (PHENERGAN) 12.5 MG tablet Take 12.5 mg by mouth 2 (Two) Times a Day As Needed for Nausea or Vomiting.   Past Week at Unknown time   , Scheduled Meds:  sodium chloride, 10 mL, Intravenous, Q12H    , Continuous Infusions:  sodium chloride, 75 mL/hr, Last Rate: 75 mL/hr (10/05/21 1811)  sodium chloride, 75 mL/hr, Last Rate: 75 mL/hr (10/06/21 0617)    , PRN Meds:  •  Morphine  •  ondansetron  •  Access VAD **AND** sodium chloride  •  sodium chloride and Allergies:  Baclofen    Objective     Vital Signs   Temp:  [97.6 °F (36.4 °C)-99.7 °F (37.6 °C)] 97.6 °F (36.4 °C)  Heart Rate:  [] 100  Resp:  [18-24] 18  BP: (135-199)/(66-93) 162/70    Physical Exam:   Physical Exam  Vitals reviewed.   Constitutional:       General: She is not in acute distress.     Appearance: Normal appearance.   HENT:      Head: Normocephalic.      Nose: Nose normal.      Mouth/Throat:      Mouth: Mucous membranes are moist.   Cardiovascular:      Rate and Rhythm: Normal rate.      Pulses: Normal pulses.   Pulmonary:      Effort: Pulmonary effort is normal. No respiratory distress.   Abdominal:      Palpations: Abdomen is soft.   Musculoskeletal:         General: Tenderness and signs of injury present.      Cervical back: Normal range of motion.      Comments: Shortening and external rotation of the left lower extremity.  Pain to palpation.  No visible break in skin integrity.  Positive pedal pulse.  Positive dorsi plantarflexion.   Skin:     General: Skin is warm.   Neurological:      General: No focal deficit present.      Mental Status: She is alert and oriented to person, place, and time.   Psychiatric:         Mood and Affect: Mood normal.         Behavior: Behavior normal.         Results Review:   I reviewed the  patient's new clinical results.  X-ray reveals a left femoral neck fracture.    Assessment/Plan         Femur fracture (HCC)  Left femoral neck fracture.  Plan for surgical intervention on 10/7/2021 per Dr. Samano.  Further preop laboratory studies will be requested to include PT/INR.  She will be placed n.p.o. after midnight.  Risk of surgery will be discussed with the patient after further exam to followed by Dr. Samano..  Patient was understanding treatment plan at this time.        I discussed the patient's findings and my recommendations with the patient    MERRY Duenas  10/06/21  10:11 EDT            Electronically signed by Jonah Samano MD at 10/07/21 1947

## 2021-10-07 NOTE — PROGRESS NOTES
[facility] HOSPITALIST PROGRESS NOTE     Patient Identification:  Name:  Kiah Conway  Age:  76 y.o.  Sex:  female  :  1945  MRN:  [id number]  Visit Number:  [id number]  Primary Care Provider:  Ronny Acevedo APRN    Length of stay:  2    Chief complaint: Left hip pain    Subjective:    Patient reports that she is doing well today except for some mild left hip pain which she states is fairly well controlled with current pain medication regimen.  She denies any fevers, chills, sweats or rigors.  Denies any new symptoms overnight.  However, per nursing staff patient has had episodes of marked hypertension.  ----------------------------------------------------------------------------------------------------------------------  Current Hospital Meds:  amLODIPine, 2.5 mg, Oral, Q24H  atorvastatin, 10 mg, Oral, Daily  busPIRone, 2.5 mg, Oral, BID  ceFAZolin, 2 g, Intravenous, Once  cetirizine, 5 mg, Oral, Daily  FLUoxetine, 20 mg, Oral, Daily  hydrALAZINE, 10 mg, Oral, Q8H  megestrol, 40 mg, Oral, BID  metoprolol tartrate, 50 mg, Oral, Q12H  pantoprazole, 40 mg, Oral, BID AC  pregabalin, 25 mg, Oral, BID  psyllium, 1 packet, Oral, Daily  sodium chloride, 10 mL, Intravenous, Q12H      sodium chloride, 75 mL/hr, Last Rate: 75 mL/hr (10/05/21 1811)  sodium chloride, 75 mL/hr, Last Rate: 75 mL/hr (10/07/21 1106)      ----------------------------------------------------------------------------------------------------------------------  Vital Signs:  Temp:  [97.6 °F (36.4 °C)-99.4 °F (37.4 °C)] 98.4 °F (36.9 °C)  Heart Rate:  [] 112  Resp:  [16-20] 18  BP: (140-193)/() 193/88      10/05/21  1346 10/05/21  2049   Weight: 53.5 kg (118 lb) 54.1 kg (119 lb 3.2 oz)     Body mass index is 23.28 kg/m².    Intake/Output Summary (Last 24 hours) at 10/7/2021 1305  Last data filed at 10/7/2021 0918  Gross per 24 hour   Intake 968.21 ml   Output 1100 ml   Net -131.79 ml     NPO  Diet  ----------------------------------------------------------------------------------------------------------------------  Physical exam:  Constitutional: Well-nourished elderly appearing  female in no apparent distress.     HENT:  Head:  Normocephalic and atraumatic.  Mouth:  Moist mucous membranes.    Eyes:  Conjunctivae and EOM are normal.  Pupils are equal, round, and reactive to light.  No scleral icterus.    Neck:  Neck supple. No thyromegaly.  No JVD present.    Cardiovascular:  Regular rate and rhythm with no murmurs, rubs, clicks or gallops appreciated.  Pulmonary/Chest:  Clear to auscultation bilaterally with no crackles, wheezes or rhonchi appreciated.  Abdominal:  Soft. Nontender. Nondistended  Bowel sounds are normal in all four quadrants. No organomegally appreciated.   Musculoskeletal:  No edema, left hip joint tender to palpation, left lower extremity is externally rotated.      Neurological:  Alert and oriented to person, place, and time. Cranial nerves II-XII intact with no focal defecits.  No facial droop.  No slurred speech.   Skin:  Warm and dry to palpation with no rashes or lesions appreciated.  Peripheral vascular:  2+ radial and pedal pulses in bilateral upper and lower extremities.  Psychiatric:  Alert and oriented x3, demonstrates appropriate judgement and insight.  ----------------------------------------------------------------------------------------------------------------------  Tele:    ----------------------------------------------------------------------------------------------------------------------  Results from last 7 days   Lab Units 10/05/21  1632 10/05/21  1433   TROPONIN T ng/mL <0.010 <0.010     Results from last 7 days   Lab Units 10/07/21  0328 10/06/21  1036 10/05/21  1418   WBC 10*3/mm3 15.04* 13.71* 15.67*   HEMOGLOBIN g/dL 11.5* 11.1* 11.4*   HEMATOCRIT % 35.3 34.1 35.5   MCV fL 91.5 91.7 93.2   MCHC g/dL 32.6 32.6 32.1   PLATELETS 10*3/mm3 860 549 346    INR  1.19* 1.10 1.00     Results from last 7 days   Lab Units 10/05/21  1403   PH, ARTERIAL pH units 7.468*   PO2 ART mm Hg 49.8*   PCO2, ARTERIAL mm Hg 28.8*   HCO3 ART mmol/L 20.8     Results from last 7 days   Lab Units 10/07/21  0328 10/06/21  1036 10/05/21  1418   SODIUM mmol/L 133* 133* 138   POTASSIUM mmol/L 3.9 3.2* 3.4*   MAGNESIUM mg/dL  --   --  1.7   CHLORIDE mmol/L 102 101 103   CO2 mmol/L 16.3* 17.6* 20.0*   BUN mg/dL 8 7* 9   CREATININE mg/dL 0.66 0.57 0.72   EGFR IF NONAFRICN AM mL/min/1.73 87 103 78   CALCIUM mg/dL 8.9 8.2* 9.3   GLUCOSE mg/dL 136* 133* 128*   ALBUMIN g/dL  --   --  4.45   BILIRUBIN mg/dL  --   --  0.5   ALK PHOS U/L  --   --  105   AST (SGOT) U/L  --   --  19   ALT (SGPT) U/L  --   --  11   Estimated Creatinine Clearance: 49.5 mL/min (by C-G formula based on SCr of 0.66 mg/dL).    No results found for: AMMONIA      No results found for: BLOODCX  No results found for: URINECX  No results found for: WOUNDCX  No results found for: STOOLCX    I have personally looked at the labs and they are summarized above.  ----------------------------------------------------------------------------------------------------------------------  Imaging Results (Last 24 Hours)     ** No results found for the last 24 hours. **        ----------------------------------------------------------------------------------------------------------------------  Assessment and Plan:  1.  Left femoral neck fracture - continue pain control, plan for surgical intervention today.  Appreciate orthopedic surgery recommendations.  Will obtain PT/INR prior to surgery.       2.  Hypokalemia -resolved, continue electrolyte replacement protocol, repeat BMP tomorrow.     3.  Essential hypertension - will continue patient's home dose of amlodipine, hydralazine and metoprolol.  Patient with episodes of breakthrough hypertension today, will start hydralazine 10 mg IV every 6 hours as needed systolic blood pressure greater than  160.       4.  GERD -PPI    Disposition plan for surgery today          Kvng Quintanilla,   10/07/21  13:05 EDT

## 2021-10-07 NOTE — ANESTHESIA PREPROCEDURE EVALUATION
Anesthesia Evaluation     Patient summary reviewed and Nursing notes reviewed   no history of anesthetic complications:               Airway   Mallampati: II  TM distance: >3 FB  Neck ROM: limited  No difficulty expected  Dental - normal exam   (+) poor dentition    Pulmonary - normal exam   (+) sleep apnea,   Cardiovascular - normal exam  Exercise tolerance: poor (<4 METS)    NYHA Classification: II    (+) hypertension,       Neuro/Psych  (+) headaches, psychiatric history Depression,     GI/Hepatic/Renal/Endo    (+)  GERD,  thyroid problem thyroid cancer    Musculoskeletal     (+) arthralgias,   Abdominal  - normal exam    Bowel sounds: normal.   Substance History - negative use     OB/GYN negative ob/gyn ROS         Other   arthritis,    history of cancer                    Anesthesia Plan    ASA 3     general     intravenous induction     Anesthetic plan, all risks, benefits, and alternatives have been provided, discussed and informed consent has been obtained with: patient.

## 2021-10-07 NOTE — ANESTHESIA POSTPROCEDURE EVALUATION
Patient: Kiah Conway    Procedure Summary     Date: 10/07/21 Room / Location: [facility] OR  /  [facility] OR    Anesthesia Start: 1416 Anesthesia Stop: 1604    Procedure: HIP HEMIARTHROPLASTY BIOMET instrumentation  10/7/2021 1300 Dr. Samano (Left Hip) Diagnosis:       Closed fracture of left hip, initial encounter ([facility])      (Closed fracture of left hip, initial encounter ([facility]) [S72.002A])    Surgeons: Jonha Samano MD Provider: Navjot Gregory DO    Anesthesia Type: general ASA Status: 3          Anesthesia Type: general    Vitals  Vitals Value Taken Time   /82 10/07/21 1649   Temp 97.3 °F (36.3 °C) 10/07/21 1639   Pulse 94 10/07/21 1649   Resp 16 10/07/21 1649   SpO2 98 % 10/07/21 1649           Post Anesthesia Care and Evaluation    Patient location during evaluation: PHASE II  Patient participation: complete - patient participated  Level of consciousness: awake and alert  Pain score: 1  Pain management: adequate  Airway patency: patent  Anesthetic complications: No anesthetic complications  PONV Status: controlled  Cardiovascular status: acceptable  Respiratory status: acceptable  Hydration status: acceptable

## 2021-10-07 NOTE — BRIEF OP NOTE
HIP HEMIARTHROPLASTY  Progress Note    Kiah Conway  10/7/2021    Pre-op Diagnosis:   Closed fracture of left hip, initial encounter ([facility]) [S72.002A]       Post-Op Diagnosis Codes:     * Closed fracture of left hip, initial encounter ([facility]) [S72.002A]    Procedure/CPT® Codes:    1.  Left hip hemiarthroplasty for fracture, CPT code 07427    Procedure(s):  HIP HEMIARTHROPLASTY BIOMET instrumentation  10/7/2021 1300 Dr. Samano    Surgeon(s):  Jonah Samano MD    Anesthesia: Choice    Staff:   Circulator: Theodore Duckworth RN  Scrub Person: Chente Headley  Assistant: Laurent Flores  Assistant: Laurent Flores      Estimated Blood Loss: 100 mL    Urine Voided: * No values recorded between 10/7/2021  2:14 PM and 10/7/2021  3:51 PM *    Specimens:                None          Drains:   External Urinary Catheter (Active)   Site Assessment Clean;Skin intact 10/07/21 0700   Application/Removal skin care provided 10/07/21 0700   Collection Container Wall suction 10/07/21 0700   Securement Method Securing device 10/07/21 0700   Catheter care complete Yes 10/07/21 0700   Output (mL) 325 mL 10/07/21 1322       [REMOVED] NG/OG Tube Orogastric 16 Fr Center mouth (Removed)       Findings: See operative note    Complications: None apparent    Assistant: Laurent Flores  was responsible for performing the following activities: Retraction, Suction, Irrigation, Suturing, Closing and Placing Dressing and their skilled assistance was necessary for the success of this case.    Jonah Samano MD     Date: 10/7/2021  Time: 15:59 EDT

## 2021-10-07 NOTE — ANESTHESIA PROCEDURE NOTES
Airway  Urgency: elective    Date/Time: 10/7/2021 2:22 PM  Airway not difficult    General Information and Staff    Patient location during procedure: OR  Anesthesiologist: Navjot Gregory DO  CRNA: Ish Anderson CRNA    Indications and Patient Condition  Indications for airway management: airway protection    Preoxygenated: yes  MILS maintained throughout  Mask difficulty assessment: 0 - not attempted    Final Airway Details  Final airway type: endotracheal airway      Successful airway: ETT  Cuffed: yes   Successful intubation technique: direct laryngoscopy  Facilitating devices/methods: intubating stylet  Endotracheal tube insertion site: oral  Blade: Antelmo  Blade size: 3  ETT size (mm): 7.0  Cormack-Lehane Classification: grade I - full view of glottis  Placement verified by: chest auscultation, capnometry and palpation of cuff   Cuff volume (mL): 10  Measured from: lips  ETT/EBT  to lips (cm): 21  Number of attempts at approach: 1  Assessment: lips, teeth, and gum same as pre-op and atraumatic intubation    Additional Comments  Dentition and lips same as preop

## 2021-10-07 NOTE — PLAN OF CARE
Goal Outcome Evaluation:  Plan of Care Reviewed With: patient        Progress: no change  Outcome Summary: Pt rested well tonight. pain medication given x2. nausea medication given x2. VS stable

## 2021-10-08 NOTE — PROGRESS NOTES
[facility]     Patient Identification:  Name:  Kiah Conway  Age:  76 y.o.  Sex:  female  :  1945  MRN:  [id number]  Visit Number:  [id number]  Primary Care Provider:  Ronny Acevedo APRN    Length of stay:  3    Chief complaint: Left hip pain    Subjective:    Patient is sleeping when I entered the room but is easily awakened.  She states she has some mild left hip pain but otherwise has no new complaints.  ----------------------------------------------------------------------------------------------------------------------  Current Hospital Meds:  acetaminophen, 1,000 mg, Oral, Q8H  amLODIPine, 2.5 mg, Oral, Q24H  atorvastatin, 10 mg, Oral, Daily  busPIRone, 2.5 mg, Oral, BID  cetirizine, 5 mg, Oral, Daily  enoxaparin, 40 mg, Subcutaneous, Q24H  FLUoxetine, 20 mg, Oral, Daily  hydrALAZINE, 10 mg, Oral, Q8H  megestrol, 40 mg, Oral, BID  metoprolol tartrate, 50 mg, Oral, Q12H  pantoprazole, 40 mg, Oral, BID AC  pregabalin, 25 mg, Oral, BID  psyllium, 1 packet, Oral, Daily  sodium chloride, 10 mL, Intravenous, Q12H      sodium chloride, 75 mL/hr, Last Rate: 75 mL/hr (10/05/21 1811)  sodium chloride, 75 mL/hr, Last Rate: 75 mL/hr (10/07/21 2230)      ----------------------------------------------------------------------------------------------------------------------  Vital Signs:  Temp:  [97 °F (36.1 °C)-99 °F (37.2 °C)] 98 °F (36.7 °C)  Heart Rate:  [] 97  Resp:  [14-20] 18  BP: (115-193)/(55-90) 150/62      10/05/21  1346 10/05/21  2049   Weight: 53.5 kg (118 lb) 54.1 kg (119 lb 3.2 oz)     Body mass index is 23.28 kg/m².    Intake/Output Summary (Last 24 hours) at 10/8/2021 1558  Last data filed at 10/8/2021 0700  Gross per 24 hour   Intake 2305 ml   Output --   Net 2305 ml     Diet Regular  ----------------------------------------------------------------------------------------------------------------------  Physical exam:  Constitutional:  Well-nourished elderly appearing  female in no apparent distress.     HENT:  Head:  Normocephalic and atraumatic.  Mouth:  Moist mucous membranes.    Eyes:  Conjunctivae and EOM are normal.  Pupils are equal, round, and reactive to light.  No scleral icterus.    Neck:  Neck supple. No thyromegaly.  No JVD present.    Cardiovascular:  Regular rate and rhythm with no murmurs, rubs, clicks or gallops appreciated.  Pulmonary/Chest:  Clear to auscultation bilaterally with no crackles, wheezes or rhonchi appreciated.  Abdominal:  Soft. Nontender. Nondistended  Bowel sounds are normal in all four quadrants. No organomegally appreciated.   Musculoskeletal:  No edema, mild bruising around left hip joint    Neurological:  Alert and oriented to person, place, and time. Cranial nerves II-XII intact with no focal defecits.  No facial droop.  No slurred speech.   Skin:  Warm and dry to palpation with no rashes or lesions appreciated.  Peripheral vascular:  2+ radial and pedal pulses in bilateral upper and lower extremities.  Psychiatric:  Alert and oriented x3, demonstrates appropriate judgement and insight.  ----------------------------------------------------------------------------------------------------------------------  Tele:    ----------------------------------------------------------------------------------------------------------------------  Results from last 7 days   Lab Units 10/05/21  1632 10/05/21  1433   TROPONIN T ng/mL <0.010 <0.010     Results from last 7 days   Lab Units 10/08/21  0548 10/07/21  0328 10/06/21  1036 10/05/21  1418 10/05/21  1418   WBC 10*3/mm3 12.94* 15.04* 13.71*   < > 15.67*   HEMOGLOBIN g/dL 10.5* 11.5* 11.1*   < > 11.4*   HEMATOCRIT % 33.1* 35.3 34.1   < > 35.5   MCV fL 95.7 91.5 91.7   < > 93.2   MCHC g/dL 31.7 32.6 32.6   < > 32.1   PLATELETS 10*3/mm3 191 245 269   < > 298   INR   --  1.19* 1.10  --  1.00    < > = values in this interval not displayed.     Results from last 7  days   Lab Units 10/05/21  1403   PH, ARTERIAL pH units 7.468*   PO2 ART mm Hg 49.8*   PCO2, ARTERIAL mm Hg 28.8*   HCO3 ART mmol/L 20.8     Results from last 7 days   Lab Units 10/08/21  0548 10/07/21  0328 10/06/21  1036 10/05/21  1418 10/05/21  1418   SODIUM mmol/L 136 133* 133*   < > 138   POTASSIUM mmol/L 3.3* 3.9 3.2*   < > 3.4*   MAGNESIUM mg/dL  --   --   --   --  1.7   CHLORIDE mmol/L 107 102 101   < > 103   CO2 mmol/L 14.2* 16.3* 17.6*   < > 20.0*   BUN mg/dL 14 8 7*   < > 9   CREATININE mg/dL 0.84 0.66 0.57   < > 0.72   EGFR IF NONAFRICN AM mL/min/1.73 66 87 103   < > 78   CALCIUM mg/dL 8.3* 8.9 8.2*   < > 9.3   GLUCOSE mg/dL 99 136* 133*   < > 128*   ALBUMIN g/dL  --   --   --   --  4.45   BILIRUBIN mg/dL  --   --   --   --  0.5   ALK PHOS U/L  --   --   --   --  105   AST (SGOT) U/L  --   --   --   --  19   ALT (SGPT) U/L  --   --   --   --  11    < > = values in this interval not displayed.   Estimated Creatinine Clearance: 47.1 mL/min (by C-G formula based on SCr of 0.84 mg/dL).    No results found for: AMMONIA      No results found for: BLOODCX  No results found for: URINECX  No results found for: WOUNDCX  No results found for: STOOLCX    I have personally looked at the labs and they are summarized above.  ----------------------------------------------------------------------------------------------------------------------  Imaging Results (Last 24 Hours)     ** No results found for the last 24 hours. **        ----------------------------------------------------------------------------------------------------------------------  Assessment and Plan:  1.  Left femoral neck fracture -status post left hip hemiarthroplasty postop day #1, appreciate orthopedic surgery recommendations.  PT/OT consulted, will likely require placement at discharge.      2.  Hypokalemia -resolved, continue electrolyte replacement protocol, repeat BMP tomorrow.     3.  Essential hypertension - will continue patient's home dose  of amlodipine, hydralazine and metoprolol.  Continue as needed hydralazine for breakthrough hypertension     4.  GERD -PPI    Disposition will likely need placement on discharge.          Kvng Quintanilla DO  10/08/21  15:58 EDT

## 2021-10-08 NOTE — PROGRESS NOTES
LOS: 3 days     Chief Complaint: Left hip fracture    Subjective   Lying in bed eyes closed, drowsy upon awakening.  Interval History:   Postoperative day 1 left hip hemiarthroplasty per Dr. Samano secondary to femoral neck fracture.  She has not dissipated in physical therapy yet.  She is very drowsy this morning difficulty arousing for exam.    Patient Complaints: None  History taken from: Medical record      Objective   Dressing clean dry and intact to left hip.  No drainage seen.  Positive pedal pulse.  Vital Signs  Temp:  [97 °F (36.1 °C)-99.8 °F (37.7 °C)] 98 °F (36.7 °C)  Heart Rate:  [] 109  Resp:  [14-20] 18  BP: (115-193)/(55-92) 136/55    Labs & Rads  Lab Results (last 72 hours)     Procedure Component Value Units Date/Time    CBC (No Diff) [[id number]]  (Abnormal) Collected: 10/08/21 0548    Specimen: Blood Updated: 10/08/21 0702     WBC 12.94 10*3/mm3      RBC 3.46 10*6/mm3      Hemoglobin 10.5 g/dL      Hematocrit 33.1 %      MCV 95.7 fL      MCH 30.3 pg      MCHC 31.7 g/dL      RDW 14.2 %      RDW-SD 49.8 fl      MPV 12.2 fL      Platelets 191 10*3/mm3     Basic Metabolic Panel [[id number]]  (Abnormal) Collected: 10/08/21 0548    Specimen: Blood Updated: 10/08/21 0643     Glucose 99 mg/dL      BUN 14 mg/dL      Creatinine 0.84 mg/dL      Sodium 136 mmol/L      Potassium 3.3 mmol/L      Comment: Slight hemolysis detected by analyzer. Results may be affected.        Chloride 107 mmol/L      CO2 14.2 mmol/L      Calcium 8.3 mg/dL      eGFR Non African Amer 66 mL/min/1.73      BUN/Creatinine Ratio 16.7     Anion Gap 14.8 mmol/L     Narrative:      GFR Normal >60  Chronic Kidney Disease <60  Kidney Failure <15      POC Glucose Once [[id number]]  (Normal) Collected: 10/07/21 1133    Specimen: Blood Updated: 10/07/21 1141     Glucose 106 mg/dL      Comment: Meter: [id number] : [id number] Brittany Collett       Basic Metabolic Panel [[id number]]  (Abnormal) Collected: 10/07/21 0328    Specimen:  Blood Updated: 10/07/21 0405     Glucose 136 mg/dL      BUN 8 mg/dL      Creatinine 0.66 mg/dL      Sodium 133 mmol/L      Potassium 3.9 mmol/L      Chloride 102 mmol/L      CO2 16.3 mmol/L      Calcium 8.9 mg/dL      eGFR Non African Amer 87 mL/min/1.73      BUN/Creatinine Ratio 12.1     Anion Gap 14.7 mmol/L     Narrative:      GFR Normal >60  Chronic Kidney Disease <60  Kidney Failure <15      Protime-INR [[id number]]  (Abnormal) Collected: 10/07/21 0328    Specimen: Blood Updated: 10/07/21 0358     Protime 15.5 Seconds      INR 1.19    Narrative:      Suggested INR therapeutic range for stable oral anticoagulant therapy:    Low Intensity therapy:   1.5-2.0  Moderate Intensity therapy:   2.0-3.0  High Intensity therapy:   2.5-4.0    CBC (No Diff) [[id number]]  (Abnormal) Collected: 10/07/21 0328    Specimen: Blood Updated: 10/07/21 0345     WBC 15.04 10*3/mm3      RBC 3.86 10*6/mm3      Hemoglobin 11.5 g/dL      Hematocrit 35.3 %      MCV 91.5 fL      MCH 29.8 pg      MCHC 32.6 g/dL      RDW 13.8 %      RDW-SD 46.5 fl      MPV 11.6 fL      Platelets 245 10*3/mm3     aPTT [[id number]]  (Normal) Collected: 10/06/21 1036    Specimen: Blood Updated: 10/06/21 1140     PTT 32.3 seconds     Narrative:      PTT Heparin Therapeutic Range:  59 - 95 seconds      Protime-INR [[id number]]  (Abnormal) Collected: 10/06/21 1036    Specimen: Blood Updated: 10/06/21 1140     Protime 14.7 Seconds      INR 1.10    Narrative:      Suggested INR therapeutic range for stable oral anticoagulant therapy:    Low Intensity therapy:   1.5-2.0  Moderate Intensity therapy:   2.0-3.0  High Intensity therapy:   2.5-4.0    Basic Metabolic Panel [[id number]]  (Abnormal) Collected: 10/06/21 1036    Specimen: Blood Updated: 10/06/21 1121     Glucose 133 mg/dL      BUN 7 mg/dL      Creatinine 0.57 mg/dL      Sodium 133 mmol/L      Potassium 3.2 mmol/L      Chloride 101 mmol/L      CO2 17.6 mmol/L      Calcium 8.2 mg/dL      eGFR Non  Amer 103  mL/min/1.73      BUN/Creatinine Ratio 12.3     Anion Gap 14.4 mmol/L     Narrative:      GFR Normal >60  Chronic Kidney Disease <60  Kidney Failure <15      CBC (No Diff) [[id number]]  (Abnormal) Collected: 10/06/21 1036    Specimen: Blood Updated: 10/06/21 1101     WBC 13.71 10*3/mm3      RBC 3.72 10*6/mm3      Hemoglobin 11.1 g/dL      Hematocrit 34.1 %      MCV 91.7 fL      MCH 29.8 pg      MCHC 32.6 g/dL      RDW 13.7 %      RDW-SD 46.0 fl      MPV 11.5 fL      Platelets 269 10*3/mm3     Urinalysis With Microscopic If Indicated (No Culture) - Urine, Clean Catch [[id number]]  (Normal) Collected: 10/05/21 1654    Specimen: Urine, Clean Catch Updated: 10/05/21 1710     Color, UA Yellow     Appearance, UA Clear     pH, UA 7.0     Specific Gravity, UA 1.010     Glucose, UA Negative     Ketones, UA Negative     Bilirubin, UA Negative     Blood, UA Negative     Protein, UA Negative     Leuk Esterase, UA Negative     Nitrite, UA Negative     Urobilinogen, UA 0.2 E.U./dL    Narrative:      Urine microscopic not indicated.    Troponin [[id number]]  (Normal) Collected: 10/05/21 1632    Specimen: Blood Updated: 10/05/21 1703     Troponin T <0.010 ng/mL     Narrative:      Troponin T Reference Range:  <= 0.03 ng/mL-   Negative for AMI  >0.03 ng/mL-     Abnormal for myocardial necrosis.  Clinicians would have to utilize clinical acumen, EKG, Troponin and serial changes to determine if it is an Acute Myocardial Infarction or myocardial injury due to an underlying chronic condition.       Results may be falsely decreased if patient taking Biotin.      COVID PRE-OP / PRE-PROCEDURE SCREENING ORDER (NO ISOLATION) - Swab, Nasopharynx [[id number]]  (Normal) Collected: 10/05/21 1507    Specimen: Swab from Nasopharynx Updated: 10/05/21 1539    Narrative:      The following orders were created for panel order COVID PRE-OP / PRE-PROCEDURE SCREENING ORDER (NO ISOLATION) - Swab, Nasopharynx.  Procedure                                Abnormality         Status                     ---------                               -----------         ------                     COVID-19 and FLU A/B PCR...[[id number]]  Normal              Final result                 Please view results for these tests on the individual orders.    COVID-19 and FLU A/B PCR - Swab, Nasopharynx [[id number]]  (Normal) Collected: 10/05/21 1507    Specimen: Swab from Nasopharynx Updated: 10/05/21 1539     COVID19 Not Detected     Influenza A PCR Not Detected     Influenza B PCR Not Detected    Narrative:      Fact sheet for providers: https://www.fda.gov/media/052906/download    Fact sheet for patients: https://www.fda.gov/media/770003/download    Test performed by PCR.    Sims Draw [[id number]] Collected: 10/05/21 1418    Specimen: Blood from Arm, Right Updated: 10/05/21 1531    Narrative:      The following orders were created for panel order Sims Draw.  Procedure                               Abnormality         Status                     ---------                               -----------         ------                     Green Top (Gel)[[id number]]                                  Final result               Lavender Top[[id number]]                                     Final result               Gold Top - SST[[id number]]                                   Final result               Light Blue Top[[id number]]                                   Final result                 Please view results for these tests on the individual orders.    Green Top (Gel) [[id number]] Collected: 10/05/21 1418    Specimen: Blood from Arm, Right Updated: 10/05/21 1531     Extra Tube Hold for add-ons.     Comment: Auto resulted.       Gold Top - SST [[id number]] Collected: 10/05/21 1418    Specimen: Blood from Arm, Right Updated: 10/05/21 1531     Extra Tube Hold for add-ons.     Comment: Auto resulted.       Light Blue Top [[id number]] Collected: 10/05/21 1418    Specimen: Blood from Arm, Right Updated:  10/05/21 1531     Extra Tube hold for add-on     Comment: Auto resulted       Lavender Top [[id number]] Collected: 10/05/21 1418    Specimen: Blood from Arm, Right Updated: 10/05/21 1531     Extra Tube hold for add-on     Comment: Auto resulted       BNP [[id number]]  (Normal) Collected: 10/05/21 1433    Specimen: Blood from Arm, Right Updated: 10/05/21 1452     proBNP 607.2 pg/mL     Narrative:      Among patients with dyspnea, NT-proBNP is highly sensitive for the detection of acute congestive heart failure. In addition NT-proBNP of <300 pg/ml effectively rules out acute congestive heart failure with 99% negative predictive value.    Results may be falsely decreased if patient taking Biotin.      Troponin [[id number]]  (Normal) Collected: 10/05/21 1433    Specimen: Blood from Arm, Right Updated: 10/05/21 1452     Troponin T <0.010 ng/mL     Narrative:      Troponin T Reference Range:  <= 0.03 ng/mL-   Negative for AMI  >0.03 ng/mL-     Abnormal for myocardial necrosis.  Clinicians would have to utilize clinical acumen, EKG, Troponin and serial changes to determine if it is an Acute Myocardial Infarction or myocardial injury due to an underlying chronic condition.       Results may be falsely decreased if patient taking Biotin.      Comprehensive Metabolic Panel [[id number]]  (Abnormal) Collected: 10/05/21 1418    Specimen: Blood from Arm, Right Updated: 10/05/21 1452     Glucose 128 mg/dL      BUN 9 mg/dL      Creatinine 0.72 mg/dL      Sodium 138 mmol/L      Potassium 3.4 mmol/L      Chloride 103 mmol/L      CO2 20.0 mmol/L      Calcium 9.3 mg/dL      Total Protein 6.9 g/dL      Albumin 4.45 g/dL      ALT (SGPT) 11 U/L      AST (SGOT) 19 U/L      Alkaline Phosphatase 105 U/L      Total Bilirubin 0.5 mg/dL      eGFR Non African Amer 78 mL/min/1.73      Globulin 2.5 gm/dL      A/G Ratio 1.8 g/dL      BUN/Creatinine Ratio 12.5     Anion Gap 15.0 mmol/L     Narrative:      GFR Normal >60  Chronic Kidney Disease  <60  Kidney Failure <15      Magnesium [[id number]]  (Normal) Collected: 10/05/21 1418    Specimen: Blood from Arm, Right Updated: 10/05/21 1452     Magnesium 1.7 mg/dL     aPTT [[id number]]  (Abnormal) Collected: 10/05/21 1418    Specimen: Blood from Arm, Right Updated: 10/05/21 1445     PTT 23.8 seconds     Narrative:      PTT Heparin Therapeutic Range:  59 - 95 seconds      Protime-INR [[id number]]  (Normal) Collected: 10/05/21 1418    Specimen: Blood from Arm, Right Updated: 10/05/21 1445     Protime 13.6 Seconds      INR 1.00    Narrative:      Suggested INR therapeutic range for stable oral anticoagulant therapy:    Low Intensity therapy:   1.5-2.0  Moderate Intensity therapy:   2.0-3.0  High Intensity therapy:   2.5-4.0    CBC & Differential [[id number]]  (Abnormal) Collected: 10/05/21 1418    Specimen: Blood from Arm, Right Updated: 10/05/21 1432    Narrative:      The following orders were created for panel order CBC & Differential.  Procedure                               Abnormality         Status                     ---------                               -----------         ------                     CBC Auto Differential[[id number]]        Abnormal            Final result                 Please view results for these tests on the individual orders.    CBC Auto Differential [[id number]]  (Abnormal) Collected: 10/05/21 1418    Specimen: Blood from Arm, Right Updated: 10/05/21 1432     WBC 15.67 10*3/mm3      RBC 3.81 10*6/mm3      Hemoglobin 11.4 g/dL      Hematocrit 35.5 %      MCV 93.2 fL      MCH 29.9 pg      MCHC 32.1 g/dL      RDW 13.5 %      RDW-SD 46.4 fl      MPV 11.0 fL      Platelets 298 10*3/mm3      Neutrophil % 78.9 %      Lymphocyte % 12.6 %      Monocyte % 6.3 %      Eosinophil % 1.3 %      Basophil % 0.3 %      Immature Grans % 0.6 %      Neutrophils, Absolute 12.35 10*3/mm3      Lymphocytes, Absolute 1.98 10*3/mm3      Monocytes, Absolute 0.99 10*3/mm3      Eosinophils, Absolute 0.21 10*3/mm3       Basophils, Absolute 0.05 10*3/mm3      Immature Grans, Absolute 0.09 10*3/mm3      nRBC 0.0 /100 WBC         Imaging Results (Last 72 Hours)     Procedure Component Value Units Date/Time    XR Pelvis 1 or 2 View [[id number]] Collected: 10/07/21 1657     Updated: 10/07/21 1701    Narrative:      EXAM:    XR Pelvis, 1 or 2 Views     EXAM DATE:    10/07/2021     CLINICAL HISTORY:    Postop left hip hemiarthroplasty; S72.002A-Fracture of unspecified  part of neck of left femur, initial encounter for closed fracture     TECHNIQUE:    Frontal view of the pelvis.     COMPARISON:    10/05/2021     FINDINGS:    Bones/joints:  Interval left hip through plaster.  No acute bony  findings.  No dislocation.    Soft tissues:  Post surgical changes within the soft tissues.    Other findings:  Anatomic alignment.       Impression:        Interval changes of left hip arthroplasty.     This report was finalized on 10/7/2021 4:57 PM by Dr. Jonah Salgado MD.       XR Chest 1 View [[id number]] Collected: 10/05/21 1447     Updated: 10/05/21 1449    Narrative:      XR CHEST 1 VW-     CLINICAL INDICATION: fall, hip fx        COMPARISON: 05/15/2021      TECHNIQUE: Single frontal view of the chest.     FINDINGS:     There is no focal alveolar infiltrate or effusion.  Appearance suggestive of at least mild pulmonary congestion.  Central line is stable from the previous exam  There are no suspicious-appearing parenchymal soft tissue nodules.          Impression:      Appearance suggestive of CHF     This report was finalized on 10/5/2021 2:47 PM by Dr. Chente Choi MD.       XR Femur 2 View Left [[id number]] Collected: 10/05/21 1446     Updated: 10/05/21 1449    Narrative:      EXAMINATION: XR FEMUR 2 VW LEFT-      CLINICAL INDICATION: fall, left femoral neck fx        COMPARISON: None available     FINDINGS:  4 views of the left femur show left femoral neck fracture     No distal femoral fracture is seen.       Impression:      Left femoral  neck fracture.      This report was finalized on 10/5/2021 2:46 PM by Dr. Chente Choi MD.             Physical Exam:   Physical Exam  Constitutional:       General: She is not in acute distress.  HENT:      Head: Normocephalic.   Cardiovascular:      Rate and Rhythm: Normal rate.      Pulses: Normal pulses.   Pulmonary:      Effort: Pulmonary effort is normal. No respiratory distress.   Musculoskeletal:      Cervical back: Normal range of motion.      Comments: Dressing clean dry and intact to left hip.  Positive pedal pulse.  Unable to follow command for dorsi plantarflexion.   Skin:     General: Skin is warm and dry.   Neurological:      Comments: Drowsy   Psychiatric:      Comments: Drowsy difficult to arouse          Results Review:     I reviewed the patient's new clinical results.      Assessment/Plan     Principal Problem:    Closed fracture of left hip (HCC)  Active Problems:    Femur fracture (HCC)    Postop day 1 left hip hemiarthroplasty secondary to femoral neck fracture.  Physical therapy/occupational therapy: Weight-bear as tolerated  DVT/PPx: Lovenox 40 mg x 14 days  Wound care: Change dressing as needed due to saturation left hip  Follow-up appointment in orthopedic office of Dr. Samano in 2 weeks.    Plan for disposition: Recommend short-term rehab.    MERRY Duenas  10/08/21  10:17 EDT

## 2021-10-08 NOTE — CASE MANAGEMENT/SOCIAL WORK
Discharge Planning Assessment   Jered     Patient Name: Kiah Conway  MRN: [id number]  Today's Date: 10/8/2021    Admit Date: 10/5/2021    Discharge Plan     Row Name 10/08/21 1539       Plan    Plan Pt had surgery on 10/7/21. PT has been consulted. SS ask for a OT consult. Pt lives with her daughter. Pt may need rehab prior to returning home. SS will continue to follow and assist as needed with discharge planning.        ZOHREH Dillon

## 2021-10-08 NOTE — PLAN OF CARE
Goal Outcome Evaluation:   Pt resting in bed. No acute changes noted at this time. Will continue with plan of care.

## 2021-10-09 NOTE — PROGRESS NOTES
[facility] HOSPITALIST PROGRESS NOTE     Patient Identification:  Name:  Kiah Conway  Age:  76 y.o.  Sex:  female  :  1945  MRN:  [id number]  Visit Number:  [id number]  Primary Care Provider:  Ronny Acevedo APRN    Length of stay:  4    Chief complaint: Left hip pain    Subjective:    Patient reports mild left hip pain but states it is very well controlled with current pain medication regimen.  Patient states she is eager to return home.  Did have lengthy discussion with patient regarding possible need for rehab placement.  Patient states she really would prefer to go home but also understands that she is weak and would be agreeable to rehab placement at this time.  ----------------------------------------------------------------------------------------------------------------------  Current Hospital Meds:  acetaminophen, 1,000 mg, Oral, Q8H  amLODIPine, 2.5 mg, Oral, Q24H  atorvastatin, 10 mg, Oral, Daily  busPIRone, 2.5 mg, Oral, BID  cetirizine, 5 mg, Oral, Daily  enoxaparin, 40 mg, Subcutaneous, Q24H  FLUoxetine, 20 mg, Oral, Daily  hydrALAZINE, 10 mg, Oral, Q8H  megestrol, 40 mg, Oral, BID  metoprolol tartrate, 50 mg, Oral, Q12H  nystatin, 5 mL, Swish & Spit, 4x Daily  pantoprazole, 40 mg, Oral, BID AC  pregabalin, 25 mg, Oral, BID  psyllium, 1 packet, Oral, Daily  sodium chloride, 10 mL, Intravenous, Q12H      sodium chloride, 75 mL/hr, Last Rate: 75 mL/hr (10/05/21 181)  sodium chloride, 75 mL/hr, Last Rate: 75 mL/hr (10/09/21 1039)      ----------------------------------------------------------------------------------------------------------------------  Vital Signs:  Temp:  [97.7 °F (36.5 °C)-98.9 °F (37.2 °C)] 97.7 °F (36.5 °C)  Heart Rate:  [] 100  Resp:  [17-19] 18  BP: (124-180)/(57-74) 124/74      10/05/21  1346 10/05/21  2049   Weight: 53.5 kg (118 lb) 54.1 kg (119 lb 3.2 oz)     Body mass index is 23.28 kg/m².    Intake/Output Summary (Last 24 hours) at  10/9/2021 1811  Last data filed at 10/9/2021 1700  Gross per 24 hour   Intake 2084 ml   Output 750 ml   Net 1334 ml     Diet Regular  ----------------------------------------------------------------------------------------------------------------------  Physical exam:  Constitutional: Well-nourished elderly appearing  female in no apparent distress.     HENT:  Head:  Normocephalic and atraumatic.  Mouth:  Moist mucous membranes.    Eyes:  Conjunctivae and EOM are normal.  Pupils are equal, round, and reactive to light.  No scleral icterus.    Neck:  Neck supple. No thyromegaly.  No JVD present.    Cardiovascular:  Regular rate and rhythm with no murmurs, rubs, clicks or gallops appreciated.  Pulmonary/Chest:  Clear to auscultation bilaterally with no crackles, wheezes or rhonchi appreciated.  Abdominal:  Soft. Nontender. Nondistended  Bowel sounds are normal in all four quadrants. No organomegally appreciated.   Musculoskeletal:  No edema, mild bruising around left hip joint    Neurological:  Alert and oriented to person, place, and time. Cranial nerves II-XII intact with no focal defecits.  No facial droop.  No slurred speech.   Skin:  Warm and dry to palpation with no rashes or lesions appreciated.  Peripheral vascular:  2+ radial and pedal pulses in bilateral upper and lower extremities.  Psychiatric:  Alert and oriented x3, demonstrates appropriate judgement and insight.  ----------------------------------------------------------------------------------------------------------------------  Tele:    ----------------------------------------------------------------------------------------------------------------------  Results from last 7 days   Lab Units 10/05/21  1632 10/05/21  1433   TROPONIN T ng/mL <0.010 <0.010     Results from last 7 days   Lab Units 10/09/21  0525 10/08/21  0548 10/07/21  0328 10/06/21  1036 10/06/21  1036 10/05/21  1418 10/05/21  1418   WBC 10*3/mm3 11.15* 12.94* 15.04*   < >  13.71*   < > 15.67*   HEMOGLOBIN g/dL 9.6* 10.5* 11.5*   < > 11.1*   < > 11.4*   HEMATOCRIT % 31.5* 33.1* 35.3   < > 34.1   < > 35.5   MCV fL 96.9 95.7 91.5   < > 91.7   < > 93.2   MCHC g/dL 30.5* 31.7 32.6   < > 32.6   < > 32.1   PLATELETS 10*3/mm3 185 191 245   < > 269   < > 298   INR   --   --  1.19*  --  1.10  --  1.00    < > = values in this interval not displayed.     Results from last 7 days   Lab Units 10/05/21  1403   PH, ARTERIAL pH units 7.468*   PO2 ART mm Hg 49.8*   PCO2, ARTERIAL mm Hg 28.8*   HCO3 ART mmol/L 20.8     Results from last 7 days   Lab Units 10/09/21  0525 10/08/21  0548 10/07/21  0328 10/06/21  1036 10/05/21  1418   SODIUM mmol/L 139 136 133*   < > 138   POTASSIUM mmol/L 3.2* 3.3* 3.9   < > 3.4*   MAGNESIUM mg/dL  --   --   --   --  1.7   CHLORIDE mmol/L 109* 107 102   < > 103   CO2 mmol/L 12.6* 14.2* 16.3*   < > 20.0*   BUN mg/dL 11 14 8   < > 9   CREATININE mg/dL 0.69 0.84 0.66   < > 0.72   EGFR IF NONAFRICN AM mL/min/1.73 82 66 87   < > 78   CALCIUM mg/dL 8.1* 8.3* 8.9   < > 9.3   GLUCOSE mg/dL 96 99 136*   < > 128*   ALBUMIN g/dL  --   --   --   --  4.45   BILIRUBIN mg/dL  --   --   --   --  0.5   ALK PHOS U/L  --   --   --   --  105   AST (SGOT) U/L  --   --   --   --  19   ALT (SGPT) U/L  --   --   --   --  11    < > = values in this interval not displayed.   Estimated Creatinine Clearance: 49.5 mL/min (by C-G formula based on SCr of 0.69 mg/dL).    No results found for: AMMONIA      No results found for: BLOODCX  No results found for: URINECX  No results found for: WOUNDCX  No results found for: STOOLCX    I have personally looked at the labs and they are summarized above.  ----------------------------------------------------------------------------------------------------------------------  Imaging Results (Last 24 Hours)     ** No results found for the last 24 hours. **         ----------------------------------------------------------------------------------------------------------------------  Assessment and Plan:  1.  Left femoral neck fracture -status post left hip hemiarthroplasty postop day #2, appreciate orthopedic surgery recommendations.  PT/OT consulted, will likely require placement at discharge.      2.  Hypokalemia -resolved, continue electrolyte replacement protocol, repeat BMP tomorrow.     3.  Essential hypertension - will continue patient's home dose of amlodipine, hydralazine and metoprolol.  Continue as needed hydralazine for breakthrough hypertension     4.  GERD -PPI    Disposition will likely need placement on discharge.          Kvng Quintanilla,   10/09/21  18:11 EDT

## 2021-10-09 NOTE — PLAN OF CARE
Goal Outcome Evaluation:   Patient has rested throughout day. Dressing change per order. Patients family has called and want to speak to Dr. Quintanilla. Dr Quintanilla been notified. Denies any needs.

## 2021-10-09 NOTE — THERAPY EVALUATION
Acute Care - Physical Therapy Initial Evaluation   Jered     Patient Name: Kiah Conway  : 1945  MRN: [id number]  Today's Date: 10/9/2021   Onset of Illness/Injury or Date of Surgery: 10/05/21  Visit Dx:     ICD-10-CM ICD-9-CM   1. Closed fracture of left hip, initial encounter ([facility])  S72.002A 820.8     Patient Active Problem List   Diagnosis   • Essential hypertension   • Atrophic urethritis   • Hyperglycemia   • GERD without esophagitis   • Fibromyalgia   • Arthritis   • Dysphagia   • History of rheumatic fever   • Migraines   • History of thyroid cancer   • Hypokalemia   • Hypocarbia   • Hyperchloremia   • Hypocalcemia   • Normocytic anemia   • Urinary tract infection without hematuria   • Acute encephalopathy   • Femur fracture ([facility])   • Closed fracture of left hip ([facility])     Past Medical History:   Diagnosis Date   • Arthritis    • Cardiac disorder    • Diverticulitis    • Dysphagia    • Fibromyalgia    • Follicular lymphoma (HCC)     Remisson    • GERD (gastroesophageal reflux disease)    • History of rheumatic fever    • Hypertension    • Hypokalemia due to inadequate potassium intake 2014   • Lipoma    • Malignant neoplasm (HCC)    • Migraine    • Neutropenia (HCC) 2019   • Obstructive sleep apnea 2016   • Prolonged Q-T interval on ECG 2019   • Recurrent UTI    • Rheumatoid arthritis (HCC)    • Sleep apnea    • Thyroid cancer (HCC)      Past Surgical History:   Procedure Laterality Date   • BLADDER SURGERY     • FINGER SURGERY      reattached    • HYSTERECTOMY     • THYROID SURGERY       PT Assessment (last 12 hours)     PT Evaluation and Treatment     Row Name 10/09/21 1346          Physical Therapy Time and Intention    Subjective Information complains of; nausea/vomiting  -KH     Document Type evaluation  -KH     Mode of Treatment physical therapy  -KH     Patient Effort good  -KH     Comment Pt seen for PT evaluation this date. Pt cooperative with therapy. She  describes living at home with her daughter. She used a rollator and RW for PLOF and daughter assisted with ADL's.  -     Row Name 10/09/21 1346          General Information    Patient Profile Reviewed yes  -     Onset of Illness/Injury or Date of Surgery 10/05/21  -     Referring Physician Dwight  -     Patient Observations alert; cooperative  -     General Observations of Patient Pt seen supine in bed this AM, pleasant and willing to work with therapy.  -     Equipment Currently Used at Home walker, rolling; rollator  -     Pertinent History of Current Functional Problem Pt s/p L ROBBIE s/p fall  -     Existing Precautions/Restrictions fall; hip  -     Equipment Issued to Patient gait belt  -     Row Name 10/09/21 1346          Previous Level of Function/Home Environm    BADLs, Premorbid Functional Level needs assistance for safe performance  -     Household Ambulation, Premorbid Functional Level needs assistive device for safe performance  -     Row Name 10/09/21 1346          Living Environment    Current Living Arrangements home/apartment/condo  -     Lives With child(wendy), adult  -     Row Name 10/09/21 1346          Cognition    Affect/Mental Status (Cognitive) WFL  -     Orientation Status (Cognition) oriented to; person; place; time; oriented x 3  -     Row Name 10/09/21 1346          Range of Motion Comprehensive    Comment, General Range of Motion A/PROM WFL, limited secondary to pain/weakness  -     Row Name 10/09/21 1346          Strength Comprehensive (MMT)    Comment, General Manual Muscle Testing (MMT) Assessment Grossly 2/5 BLE  -     Row Name 10/09/21 1346          Bed Mobility    Bed Mobility supine-sit; sit-supine  -     Supine-Sit [place] (Bed Mobility) moderate assist (50% patient effort); maximum assist (25% patient effort)  -     Sit-Supine [place] (Bed Mobility) maximum assist (25% patient effort)  -     Comment (Bed Mobility) Pt assisted with  LE initiation for sup<>sit and UE to pull herself upright.  -     Row Name 10/09/21 1346          Transfers    Transfers sit-stand transfer; stand-sit transfer  -     Comment (Transfers) Pt attempted STS with RW with inability to perform. Pt attempted a second trial with PT assisting, pt demonstrates weakness/debility d/t max/dependency of transfer.  -     Sit-Stand [place] (Transfers) maximum assist (25% patient effort); dependent (less than 25% patient effort)  -     Stand-Sit [place] (Transfers) maximum assist (25% patient effort); dependent (less than 25% patient effort)  -     Row Name 10/09/21 1346          Sit-Stand Transfer    Assistive Device (Sit-Stand Transfers) other (see comments); walker, front-wheeled  this PT  -     Row Name 10/09/21 1346          Stand-Sit Transfer    Assistive Device (Stand-Sit Transfers) other (see comments)  this PT  -     Row Name 10/09/21 1346          Gait/Stairs (Locomotion)    Comment (Gait/Stairs) Unable to perform at this time  -     Row Name 10/09/21 1346          Balance    Balance Assessment sitting static balance  -     Static Sitting Balance mild impairment; moderate impairment  -     Row Name             Wound 10/07/21 1537 Left lateral hip Incision    Wound - Properties Group Placement Date: 10/07/21  -WM Placement Time: 1537 -WM Present on Hospital Admission: N  -WM Side: Left  -WM Orientation: lateral  -WM Location: hip  -WM Primary Wound Type: Incision  -WM Additional Comments: opsite closed with suture & staples dressed with optifoam dressing & abduction pillow  -WM     Retired Wound - Properties Group Date first assessed: 10/07/21  -WM Time first assessed: 1537  -WM Present on Hospital Admission: N  -WM Side: Left  -WM Location: hip  -WM Primary Wound Type: Incision  -WM Additional Comments: opsite closed with suture & staples dressed with optifoam dressing & abduction pillow  -     Row Name 10/09/21 1346          Coping     Observed Emotional State calm; cooperative; pleasant  -[facility] Name 10/09/21 1346          Plan of Care Review    Outcome Summary Pt tolerated evaluation fair, overall maximum assist with bed mobility, max/dependent with transfers. Current D/C recommendation pending patient improvement.  -[facility] Name 10/09/21 1346          Physical Therapy Goals    Bed Mobility Goal Selection (PT) bed mobility, PT goal 1  -     Transfer Goal Selection (PT) transfer, PT goal 1  -     Gait Training Goal Selection (PT) gait training, PT goal 1  -[facility] Name 10/09/21 1346          Bed Mobility Goal 1 (PT)    Activity/Assistive Device (Bed Mobility Goal 1, PT) bed mobility activities, all  -     [place] Level/Cues Needed (Bed Mobility Goal 1, PT) minimum assist (75% or more patient effort)  -     Time Frame (Bed Mobility Goal 1, PT) by discharge  -[facility] Name 10/09/21 1346          Transfer Goal 1 (PT)    Activity/Assistive Device (Transfer Goal 1, PT) transfers, all; sit-to-stand/stand-to-sit; bed-to-chair/chair-to-bed; toilet; wheelchair transfer  -     [place] Level/Cues Needed (Transfer Goal 1, PT) minimum assist (75% or more patient effort)  -     Time Frame (Transfer Goal 1, PT) by discharge  -[facility] Name 10/09/21 1346          Gait Training Goal 1 (PT)    Activity/Assistive Device (Gait Training Goal 1, PT) gait (walking locomotion); assistive device use  -     [place] Level (Gait Training Goal 1, PT) contact guard assist; minimum assist (75% or more patient effort)  -     Distance (Gait Training Goal 1, PT) 2  -     Time Frame (Gait Training Goal 1, PT) by discharge  -[facility] Name 10/09/21 1346          Positioning and Restraints    Pre-Treatment Position in bed  -     Post Treatment Position bed  -     In Bed supine; call light within reach; encouraged to call for assist; exit alarm on  -[facility] Name 10/09/21 1346          Therapy Assessment/Plan (PT)    Functional  Level at Time of Evaluation (PT) Max/Dep  -     PT Diagnosis (PT) L ROBBIE  -     Rehab Potential (PT) fair, will monitor progress closely  -     Criteria for Skilled Interventions Met (PT) yes; meets criteria; skilled treatment is necessary  -     Predicted Duration of Therapy Intervention (PT) LOS  -     Problem List (PT) problems related to; mobility; strength  -           User Key  (r) = Recorded By, (t) = Taken By, (c) = Cosigned By    Initials Name Provider Type    Theodore Nam RN Registered Nurse    Francisca Nguyen, PT Physical Therapist                PT Recommendation and Plan  Anticipated Discharge Disposition (PT): skilled nursing facility, inpatient rehabilitation facility  Planned Therapy Interventions (PT): bed mobility training, gait training, transfer training, strengthening  Therapy Frequency (PT): 6 times/wk  Outcome Summary: Pt tolerated evaluation fair, overall maximum assist with bed mobility, max/dependent with transfers. Current D/C recommendation pending patient improvement.       Time Calculation:    PT Charges     Row Name 10/09/21 1401             Time Calculation    PT Received On 10/09/21  -RUDY      PT Goal Re-Cert Due Date 11/06/21  -RUDY            User Key  (r) = Recorded By, (t) = Taken By, (c) = Cosigned By    Initials Name Provider Type    Francisca Nguyen, ANGELES Physical Therapist              Therapy Charges for Today     Code Description Service Date Service Provider Modifiers Qty    90050913503 HC PT EVAL MOD COMPLEXITY 4 10/9/2021 Francisca Lofton, PT GP 1               Francisca Lofton, PT  10/9/2021

## 2021-10-09 NOTE — PLAN OF CARE
Goal Outcome Evaluation:              Outcome Summary: Pt tolerated evaluation fair, overall maximum assist with bed mobility, max/dependent with transfers. Current D/C recommendation pending patient improvement.

## 2021-10-09 NOTE — PROGRESS NOTES
LOS: 4 days     Chief Complaint: Left hip fracture    Subjective   In bed asleep easily awakened.   Interval History:   Postoperative day 2 left hip hemiarthroplasty.  Patient currently complains of discomfort all over.  She continues to complain of nausea.  She states that she has not been out of bed since surgery.  Patient Complaints: Discomfort and nausea  History taken from: Patient      Objective   Dressing with greater than 50% saturation.  Vital Signs  Temp:  [97.5 °F (36.4 °C)-98.9 °F (37.2 °C)] 98.9 °F (37.2 °C)  Heart Rate:  [] 118  Resp:  [17-20] 18  BP: (140-180)/(57-74) 180/74    Labs & Rads  Lab Results (last 72 hours)     Procedure Component Value Units Date/Time    Basic Metabolic Panel [[id number]]  (Abnormal) Collected: 10/09/21 0525    Specimen: Blood Updated: 10/09/21 0607     Glucose 96 mg/dL      BUN 11 mg/dL      Creatinine 0.69 mg/dL      Sodium 139 mmol/L      Potassium 3.2 mmol/L      Comment: Slight hemolysis detected by analyzer. Results may be affected.        Chloride 109 mmol/L      CO2 12.6 mmol/L      Calcium 8.1 mg/dL      eGFR Non African Amer 82 mL/min/1.73      BUN/Creatinine Ratio 15.9     Anion Gap 17.4 mmol/L     Narrative:      GFR Normal >60  Chronic Kidney Disease <60  Kidney Failure <15      CBC (No Diff) [[id number]]  (Abnormal) Collected: 10/09/21 0525    Specimen: Blood Updated: 10/09/21 0542     WBC 11.15 10*3/mm3      RBC 3.25 10*6/mm3      Hemoglobin 9.6 g/dL      Hematocrit 31.5 %      MCV 96.9 fL      MCH 29.5 pg      MCHC 30.5 g/dL      RDW 14.4 %      RDW-SD 51.2 fl      MPV 12.5 fL      Platelets 185 10*3/mm3     CBC (No Diff) [[id number]]  (Abnormal) Collected: 10/08/21 0548    Specimen: Blood Updated: 10/08/21 0702     WBC 12.94 10*3/mm3      RBC 3.46 10*6/mm3      Hemoglobin 10.5 g/dL      Hematocrit 33.1 %      MCV 95.7 fL      MCH 30.3 pg      MCHC 31.7 g/dL      RDW 14.2 %      RDW-SD 49.8 fl      MPV 12.2 fL      Platelets 191 10*3/mm3     Basic  Metabolic Panel [[id number]]  (Abnormal) Collected: 10/08/21 0548    Specimen: Blood Updated: 10/08/21 0643     Glucose 99 mg/dL      BUN 14 mg/dL      Creatinine 0.84 mg/dL      Sodium 136 mmol/L      Potassium 3.3 mmol/L      Comment: Slight hemolysis detected by analyzer. Results may be affected.        Chloride 107 mmol/L      CO2 14.2 mmol/L      Calcium 8.3 mg/dL      eGFR Non African Amer 66 mL/min/1.73      BUN/Creatinine Ratio 16.7     Anion Gap 14.8 mmol/L     Narrative:      GFR Normal >60  Chronic Kidney Disease <60  Kidney Failure <15      POC Glucose Once [[id number]]  (Normal) Collected: 10/07/21 1133    Specimen: Blood Updated: 10/07/21 1141     Glucose 106 mg/dL      Comment: Meter: [id number] : [id number] Ashleyy Collett       Basic Metabolic Panel [[id number]]  (Abnormal) Collected: 10/07/21 0328    Specimen: Blood Updated: 10/07/21 0405     Glucose 136 mg/dL      BUN 8 mg/dL      Creatinine 0.66 mg/dL      Sodium 133 mmol/L      Potassium 3.9 mmol/L      Chloride 102 mmol/L      CO2 16.3 mmol/L      Calcium 8.9 mg/dL      eGFR Non African Amer 87 mL/min/1.73      BUN/Creatinine Ratio 12.1     Anion Gap 14.7 mmol/L     Narrative:      GFR Normal >60  Chronic Kidney Disease <60  Kidney Failure <15      Protime-INR [[id number]]  (Abnormal) Collected: 10/07/21 0328    Specimen: Blood Updated: 10/07/21 0358     Protime 15.5 Seconds      INR 1.19    Narrative:      Suggested INR therapeutic range for stable oral anticoagulant therapy:    Low Intensity therapy:   1.5-2.0  Moderate Intensity therapy:   2.0-3.0  High Intensity therapy:   2.5-4.0    CBC (No Diff) [[id number]]  (Abnormal) Collected: 10/07/21 0328    Specimen: Blood Updated: 10/07/21 0345     WBC 15.04 10*3/mm3      RBC 3.86 10*6/mm3      Hemoglobin 11.5 g/dL      Hematocrit 35.3 %      MCV 91.5 fL      MCH 29.8 pg      MCHC 32.6 g/dL      RDW 13.8 %      RDW-SD 46.5 fl      MPV 11.6 fL      Platelets 245 10*3/mm3     aPTT [[id number]]   (Normal) Collected: 10/06/21 1036    Specimen: Blood Updated: 10/06/21 1140     PTT 32.3 seconds     Narrative:      PTT Heparin Therapeutic Range:  59 - 95 seconds      Protime-INR [[id number]]  (Abnormal) Collected: 10/06/21 1036    Specimen: Blood Updated: 10/06/21 1140     Protime 14.7 Seconds      INR 1.10    Narrative:      Suggested INR therapeutic range for stable oral anticoagulant therapy:    Low Intensity therapy:   1.5-2.0  Moderate Intensity therapy:   2.0-3.0  High Intensity therapy:   2.5-4.0    Basic Metabolic Panel [[id number]]  (Abnormal) Collected: 10/06/21 1036    Specimen: Blood Updated: 10/06/21 1121     Glucose 133 mg/dL      BUN 7 mg/dL      Creatinine 0.57 mg/dL      Sodium 133 mmol/L      Potassium 3.2 mmol/L      Chloride 101 mmol/L      CO2 17.6 mmol/L      Calcium 8.2 mg/dL      eGFR Non African Amer 103 mL/min/1.73      BUN/Creatinine Ratio 12.3     Anion Gap 14.4 mmol/L     Narrative:      GFR Normal >60  Chronic Kidney Disease <60  Kidney Failure <15      CBC (No Diff) [[id number]]  (Abnormal) Collected: 10/06/21 1036    Specimen: Blood Updated: 10/06/21 1101     WBC 13.71 10*3/mm3      RBC 3.72 10*6/mm3      Hemoglobin 11.1 g/dL      Hematocrit 34.1 %      MCV 91.7 fL      MCH 29.8 pg      MCHC 32.6 g/dL      RDW 13.7 %      RDW-SD 46.0 fl      MPV 11.5 fL      Platelets 269 10*3/mm3         Imaging Results (Last 72 Hours)     Procedure Component Value Units Date/Time    XR Pelvis 1 or 2 View [[id number]] Collected: 10/07/21 1657     Updated: 10/07/21 1701    Narrative:      EXAM:    XR Pelvis, 1 or 2 Views     EXAM DATE:    10/07/2021     CLINICAL HISTORY:    Postop left hip hemiarthroplasty; S72.002A-Fracture of unspecified  part of neck of left femur, initial encounter for closed fracture     TECHNIQUE:    Frontal view of the pelvis.     COMPARISON:    10/05/2021     FINDINGS:    Bones/joints:  Interval left hip through plaster.  No acute bony  findings.  No dislocation.    Soft  tissues:  Post surgical changes within the soft tissues.    Other findings:  Anatomic alignment.       Impression:        Interval changes of left hip arthroplasty.     This report was finalized on 10/7/2021 4:57 PM by Dr. Jonah Salgado MD.             Physical Exam:   Physical Exam  Vitals reviewed.   Constitutional:       General: She is not in acute distress.  HENT:      Head: Normocephalic.   Cardiovascular:      Rate and Rhythm: Normal rate.      Pulses: Normal pulses.   Pulmonary:      Effort: Pulmonary effort is normal. No respiratory distress.   Musculoskeletal:      Cervical back: Normal range of motion.      Comments: Dressing with greater than 50% saturation to left hip.  Sensation intact left left lower extremity.  Positive pedal pulse positive dorsi plantarflexion.   Skin:     General: Skin is warm and dry.   Neurological:      General: No focal deficit present.   Psychiatric:         Mood and Affect: Mood normal.          Results Review:     I reviewed the patient's new clinical results.      Assessment/Plan     Principal Problem:    Closed fracture of left hip (HCC)  Active Problems:    Femur fracture (HCC)      Postop day 2 left hip hemiarthroplasty.  Patient states she has not been out of bed postoperatively.  Physical occupational therapy has been consulted.  Encourage mobilization by PT/OT and\ or nursing staff.  May weight-bear as tolerated.  Discontinue Verdugo catheter if possible.  Wound care: Dressing will be changed today due to saturation.  Make follow-up appointment in orthopedic office in 2 weeks.  DVT/PPx: Lovenox 40 mg    Plan for disposition: Recommend short-term rehab facility.    Patrica Monroe, MERRY  10/09/21  07:26 EDT

## 2021-10-10 NOTE — PROGRESS NOTES
Orthopedic Surgery Progress Note    Interval History:     No acute events overnight.     Patient reports improved pain to her left hip.  She reports that she has not been out of bed yet ambulating with physical therapy.  No other current complaints at this time.    Vital Signs  Temp:  [97.7 °F (36.5 °C)-100.1 °F (37.8 °C)] 98.3 °F (36.8 °C)  Heart Rate:  [] 89  Resp:  [18-24] 20  BP: (124-158)/(57-74) 153/69    Physical Exam:  General:  Alert. Oriented. No acute distress.      Dressing:  C/D/I  Neurological: Left lower extremity SILT s/s/dp/sp/t, +DF/PF/EHL  Vascular: Left lower extremity +2 dp/pt  No SCDs noted on the patient     Labs:    Lab Results   Component Value Date    WBC 9.48 10/10/2021    HGB 9.3 (L) 10/10/2021    HCT 30.0 (L) 10/10/2021    MCV 95.5 10/10/2021     10/10/2021     Lab Results   Component Value Date    GLUCOSE 105 (H) 10/10/2021    CALCIUM 8.3 (L) 10/10/2021     10/10/2021    K 4.2 10/10/2021    CO2 15.1 (L) 10/10/2021     (H) 10/10/2021    BUN 9 10/10/2021    CREATININE 0.52 (L) 10/10/2021    EGFRIFNONA 114 10/10/2021    BCR 17.3 10/10/2021    ANIONGAP 12.9 10/10/2021       Images:      None.    Assessment    POD 3 s/p left hip hemiarthroplasty for fracture    Plan    Pain Control  PT/OT - post operative mobilization  Weight Bear/Lifting Status-as tolerated  Dressing - change prn saturation only, if need to change - 4x4s/ABDs and tape only, no Betadine or wound cleansing needed  DVT Ppx- SCD's and Lovenox 40 mg subcutaneous once daily x 14 days  Follow up- 14 days in the office    Orthopedics will continue to follow    Please call for any questions    Jonah Samano MD  10/10/21  09:53 EDT

## 2021-10-10 NOTE — PLAN OF CARE
Goal Outcome Evaluation:   Patient has rested throughout day, denies pain, Meds per Mar, Family has called several times throughout day for updates, dressing changes per MD order.

## 2021-10-10 NOTE — PROGRESS NOTES
[facility] HOSPITALIST PROGRESS NOTE     Patient Identification:  Name:  Kiah Conway  Age:  76 y.o.  Sex:  female  :  1945  MRN:  [id number]  Visit Number:  [id number]  Primary Care Provider:  Ronny Acevedo APRN    Length of stay:  5    Chief complaint: Left hip pain    Subjective:    Patient states that she is having some nausea with intermittent vomiting today.  She denies any abdominal pain, diarrhea or constipation.  She denies any fevers, chills, sweats or rigors.  ----------------------------------------------------------------------------------------------------------------------  Current Hospital Meds:  acetaminophen, 1,000 mg, Oral, Q8H  amLODIPine, 2.5 mg, Oral, Q24H  atorvastatin, 10 mg, Oral, Daily  busPIRone, 2.5 mg, Oral, BID  cetirizine, 5 mg, Oral, Daily  enoxaparin, 40 mg, Subcutaneous, Q24H  FLUoxetine, 20 mg, Oral, Daily  hydrALAZINE, 10 mg, Oral, Q8H  megestrol, 40 mg, Oral, BID  metoprolol tartrate, 50 mg, Oral, Q12H  nystatin, 5 mL, Swish & Spit, 4x Daily  pantoprazole, 40 mg, Oral, BID AC  pregabalin, 25 mg, Oral, BID  psyllium, 1 packet, Oral, Daily  sodium chloride, 10 mL, Intravenous, Q12H      sodium chloride, 75 mL/hr, Last Rate: 75 mL/hr (10/05/21 1811)  sodium chloride, 75 mL/hr, Last Rate: 75 mL/hr (10/10/21 0533)      ----------------------------------------------------------------------------------------------------------------------  Vital Signs:  Temp:  [97.7 °F (36.5 °C)-100.1 °F (37.8 °C)] 98.3 °F (36.8 °C)  Heart Rate:  [] 89  Resp:  [18-24] 20  BP: (124-158)/(57-74) 153/69      10/05/21  1346 10/05/21  2049   Weight: 53.5 kg (118 lb) 54.1 kg (119 lb 3.2 oz)     Body mass index is 23.28 kg/m².    Intake/Output Summary (Last 24 hours) at 10/10/2021 1452  Last data filed at 10/10/2021 0300  Gross per 24 hour   Intake 240 ml   Output 750 ml   Net -510 ml     Diet  Regular  ----------------------------------------------------------------------------------------------------------------------  Physical exam:  Constitutional: Well-nourished elderly appearing  female in no apparent distress.     HENT:  Head:  Normocephalic and atraumatic.  Mouth:  Moist mucous membranes.    Eyes:  Conjunctivae and EOM are normal.  Pupils are equal, round, and reactive to light.  No scleral icterus.    Neck:  Neck supple. No thyromegaly.  No JVD present.    Cardiovascular:  Regular rate and rhythm with no murmurs, rubs, clicks or gallops appreciated.  Pulmonary/Chest:  Clear to auscultation bilaterally with no crackles, wheezes or rhonchi appreciated.  Abdominal:  Soft. Nontender. Nondistended  Bowel sounds are normal in all four quadrants. No organomegally appreciated.   Musculoskeletal:  No edema, mild bruising around left hip joint    Neurological:  Alert and oriented to person, place, and time. Cranial nerves II-XII intact with no focal defecits.  No facial droop.  No slurred speech.   Skin:  Warm and dry to palpation with no rashes or lesions appreciated.  Peripheral vascular:  2+ radial and pedal pulses in bilateral upper and lower extremities.  Psychiatric:  Alert and oriented x3, demonstrates appropriate judgement and insight.  ------------------------------------------------------------------------------  ----------------------------------------------------------------------------------------------------------------------  Results from last 7 days   Lab Units 10/05/21  1632 10/05/21  1433   TROPONIN T ng/mL <0.010 <0.010     Results from last 7 days   Lab Units 10/10/21  0235 10/09/21  0525 10/08/21  0548 10/07/21  0328 10/07/21  0328 10/06/21  1036 10/06/21  1036 10/05/21  1418 10/05/21  1418   WBC 10*3/mm3 9.48 11.15* 12.94*   < > 15.04*   < > 13.71*   < > 15.67*   HEMOGLOBIN g/dL 9.3* 9.6* 10.5*   < > 11.5*   < > 11.1*   < > 11.4*   HEMATOCRIT % 30.0* 31.5* 33.1*   < > 35.3   <  > 34.1   < > 35.5   MCV fL 95.5 96.9 95.7   < > 91.5   < > 91.7   < > 93.2   MCHC g/dL 31.0* 30.5* 31.7   < > 32.6   < > 32.6   < > 32.1   PLATELETS 10*3/mm3 214 185 191   < > 245   < > 269   < > 298   INR   --   --   --   --  1.19*  --  1.10  --  1.00    < > = values in this interval not displayed.     Results from last 7 days   Lab Units 10/05/21  1403   PH, ARTERIAL pH units 7.468*   PO2 ART mm Hg 49.8*   PCO2, ARTERIAL mm Hg 28.8*   HCO3 ART mmol/L 20.8     Results from last 7 days   Lab Units 10/10/21  0235 10/09/21  1747 10/09/21  0525 10/08/21  0548 10/08/21  0548 10/06/21  1036 10/05/21  1418   SODIUM mmol/L 138  --  139  --  136   < > 138   POTASSIUM mmol/L 4.2 3.0* 3.2*   < > 3.3*   < > 3.4*   MAGNESIUM mg/dL  --   --   --   --   --   --  1.7   CHLORIDE mmol/L 110*  --  109*  --  107   < > 103   CO2 mmol/L 15.1*  --  12.6*  --  14.2*   < > 20.0*   BUN mg/dL 9  --  11  --  14   < > 9   CREATININE mg/dL 0.52*  --  0.69  --  0.84   < > 0.72   EGFR IF NONAFRICN AM mL/min/1.73 114  --  82  --  66   < > 78   CALCIUM mg/dL 8.3*  --  8.1*  --  8.3*   < > 9.3   GLUCOSE mg/dL 105*  --  96  --  99   < > 128*   ALBUMIN g/dL  --   --   --   --   --   --  4.45   BILIRUBIN mg/dL  --   --   --   --   --   --  0.5   ALK PHOS U/L  --   --   --   --   --   --  105   AST (SGOT) U/L  --   --   --   --   --   --  19   ALT (SGPT) U/L  --   --   --   --   --   --  11    < > = values in this interval not displayed.   Estimated Creatinine Clearance: 49.5 mL/min (A) (by C-G formula based on SCr of 0.52 mg/dL (L)).    No results found for: AMMONIA      No results found for: BLOODCX  No results found for: URINECX  No results found for: WOUNDCX  No results found for: STOOLCX    I have personally looked at the labs and they are summarized above.  ----------------------------------------------------------------------------------------------------------------------  Imaging Results (Last 24 Hours)     ** No results found for the last 24  hours. **        ----------------------------------------------------------------------------------------------------------------------  Assessment and Plan:  1.  Left femoral neck fracture -status post left hip hemiarthroplasty postop day #3, appreciate orthopedic surgery recommendations.  PT/OT consulted, will likely require placement at discharge.      2.  Hypokalemia -resolved, continue electrolyte replacement protocol, repeat BMP tomorrow.     3.  Essential hypertension - will continue patient's home dose of amlodipine, hydralazine and metoprolol.  Continue as needed hydralazine for breakthrough hypertension     4.  GERD -PPI    Disposition will likely need placement on discharge.          Kvng Quintanilla,   10/10/21  14:52 EDT

## 2021-10-11 NOTE — THERAPY EVALUATION
Acute Care - Occupational Therapy Initial Evaluation   [place]     Patient Name: Kiah Conway  : 1945  MRN: [id number]  Today's Date: 10/11/2021  Onset of Illness/Injury or Date of Surgery: 10/05/21     Referring Physician: Dwight    Admit Date: 10/5/2021       ICD-10-CM ICD-9-CM   1. Closed fracture of left hip, initial encounter ([facility])  S72.002A 820.8     Patient Active Problem List   Diagnosis   • Essential hypertension   • Atrophic urethritis   • Hyperglycemia   • GERD without esophagitis   • Fibromyalgia   • Arthritis   • Dysphagia   • History of rheumatic fever   • Migraines   • History of thyroid cancer   • Hypokalemia   • Hypocarbia   • Hyperchloremia   • Hypocalcemia   • Normocytic anemia   • Urinary tract infection without hematuria   • Acute encephalopathy   • Femur fracture ([facility])   • Closed fracture of left hip ([facility])     Past Medical History:   Diagnosis Date   • Arthritis    • Cardiac disorder    • Diverticulitis    • Dysphagia    • Fibromyalgia    • 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    • GERD (gastroesophageal reflux disease)    • History of rheumatic fever    • Hypertension    • Hypokalemia due to inadequate potassium intake 2014   • Lipoma    • Malignant neoplasm ([facility])    • Migraine    • Neutropenia ([facility]) 2019   • Obstructive sleep apnea 2016   • Prolonged Q-T interval on ECG 2019   • Recurrent UTI    • Rheumatoid arthritis ([facility])    • Sleep apnea    • Thyroid cancer (H[facility])      Past Surgical History:   Procedure Laterality Date   • BLADDER SURGERY     • FINGER SURGERY      reattached    • HYSTERECTOMY     • THYROID SURGERY           OT ASSESSMENT FLOWSHEET (last 12 hours)     OT Evaluation and Treatment     Row Name 10/11/21 0948                   OT Time and Intention    Document Type evaluation  -KR        Mode of Treatment occupational therapy  -KR        Patient Effort adequate  -KR        Symptoms Noted During/After Treatment fatigue  -KR                   Living Environment    Current Living Arrangements home/apartment/condo  -KR        Lives With child(wendy), adult  -KR                  Cognition    Affect/Mental Status (Cognitive) WFL  -KR        Orientation Status (Cognition) oriented to; person; place  -KR        Follows Commands (Cognition) verbal cues/prompting required; increased processing time needed  -KR                  Range of Motion Comprehensive    Comment, General Range of Motion BUE WFL  -KR                  Strength Comprehensive (MMT)    Comment, General Manual Muscle Testing (MMT) Assessment BUE 3-/5  -KR                  Activities of Daily Living    BADL Assessment/Intervention bathing; upper body dressing; lower body dressing; grooming; feeding; toileting  -KR                  Bathing Assessment/Intervention    Hope Level (Bathing) bathing skills; maximum assist (25% patient effort)  -KR                  Upper Body Dressing Assessment/Training    Hope Level (Upper Body Dressing) upper body dressing skills; maximum assist (25% patient effort)  -KR                  Lower Body Dressing Assessment/Training    Hope Level (Lower Body Dressing) lower body dressing skills; dependent (less than 25% patient effort)  -KR                  Grooming Assessment/Training    Hope Level (Grooming) grooming skills; moderate assist (50% patient effort)  -KR                  Self-Feeding Assessment/Training    Hope Level (Feeding) feeding skills; minimum assist (75% patient effort)  -KR                  Toileting Assessment/Training    Hope Level (Toileting) toileting skills; dependent (less than 25% patient effort)  -KR                  Wound 10/07/21 1537 Left lateral hip Incision    Wound - Properties Group Placement Date: 10/07/21  -WM Placement Time: 1537  -WM Present on Hospital Admission: N  -WM Side: Left  -WM Orientation: lateral  -WM Location: hip  -WM Primary Wound Type: Incision  -WM Additional Comments:  opsite closed with suture & staples dressed with optifoam dressing & abduction pillow  -WM        Retired Wound - Properties Group Date first assessed: 10/07/21  -WM Time first assessed: 1537  -WM Present on Hospital Admission: N  -WM Side: Left  -WM Location: hip  -WM Primary Wound Type: Incision  -WM Additional Comments: opsite closed with suture & staples dressed with optifoam dressing & abduction pillow  -WM                  Plan of Care Review    Plan of Care Reviewed With patient  -KR                  OT Goals    Strength Goal Selection (OT) strength, OT goal 1  -KR        Activity Tolerance Goal Selection (OT) activity tolerance, OT goal 1  -KR                  Strength Goal 1 (OT)    Strength Goal 1 (OT) BUE increase x 1 to enhance self care skills  -KR        Time Frame (Strength Goal 1, OT) by discharge  -KR                   Activity Tolerance Goal 1 (OT)    Activity Tolerance Goal 1 (OT) Increase to enhance performance of self care skills  -KR        Activity Level (Endurance Goal 1, OT) 15 min activity  -KR        Time Frame (Activity Tolerance Goal 1, OT) by discharge  -KR                  Therapy Assessment/Plan (OT)    Planned Therapy Interventions (OT) activity tolerance training; ROM/therapeutic exercise; strengthening exercise  -KR                  Therapy Plan Review/Discharge Plan (OT)    Anticipated Discharge Disposition (OT) home with 24/7 care  -KR              User Key  (r) = Recorded By, (t) = Taken By, (c) = Cosigned By    Initials Name Effective Dates     Theodore Duckworth, RN 06/16/21 -     KR Jonah Real, OT 06/16/21 -                        OT Recommendation and Plan  Planned Therapy Interventions (OT): activity tolerance training, ROM/therapeutic exercise, strengthening exercise  Plan of Care Review  Plan of Care Reviewed With: patient  Plan of Care Reviewed With: patient        Time Calculation:     Therapy Charges for Today     Code Description Service Date Service Provider  Modifiers Qty    [id number]  OT EVAL HIGH COMPLEXITY 4 10/11/2021 Jonah Real OT GO 1               Jonah Real OT  10/11/2021

## 2021-10-11 NOTE — NURSING NOTE
Rehab consult noted and patient is not appropriate for IRF admission at this time per their documentation.  Rehab signing off on patient.

## 2021-10-11 NOTE — PLAN OF CARE
Goal Outcome Evaluation:           Progress: no change  Outcome Summary: Patient had episode of SVT this AM with HR in the 160s IV metoprolol 5mg 1x dose given per orders, EKG and Echo obtained, cardiology also saw patient, patient remains in sinus tach at this time, patient has had nausea and vomiting throughout today PRN medication has been given, mag and K are being replaced per protocol, PRN Hydralazine given per orders. Patient has worked with PT today

## 2021-10-11 NOTE — CONSULTS
Date of Admit: 10/5/2021  Date of Consult: 10/11/21  Provider, No Known        Closed fracture of left hip (HCC)    Femur fracture (HCC)      Assessment      Intermittent SVT  Hypokalemia  Status post hip fracture status post surgery  Obstructive sleep apnea  Essential hypertension  Normal EF per echocardiogram  Essential hypertension      Recommendations     EKG is consistent with multiatrial focal tachycardia would recommend correcting potassium to keep it between 40-4.5 and magnesium 2-2.2  If tolerated will try metoprolol 50 mg twice daily for rate control  Blood pressure is relatively well controlled  She needs to continue CPAP management  We will follow the distance please call if assistance is required        Reason for consultation: Episodes of tachycardia    Subjective       Subjective     History of Present Illness     Kiah Conway is a 76-year-old female with a past medical history significant for essential hypertension, obstructive sleep apnea, paroxysmal SVT and lymphoma.  Patient presented to the ER on October 5, 2021 with chief complaint of left hip pain.  She was found to have left femoral neck fracture and underwent left hip hemiarthroplasty on 10/7/2021.  Patient has been doing overall well throughout her admission.  Cardiology consulted for episodes of intermittent tachycardia.  Patient reports a history of paroxysmal SVT and follows in the cardiology office. Denies any shortness of breath, chest pain or palpations.  Does report some intermittent nausea and vomiting today.  Echocardiogram in 2019 showed EF of 66 to 70%. EKG showed multifocal atrial tachycardia with no obvious atrial fibrillation. Denies any history of atrial fibrillation. Potasium noted to be 2.6 today.     Cardiac risk factors:hypertension and Sedentary life style    Last Echo: 11/8/2019  Normal left ventricular cavity size and wall thickness noted. All left ventricular wall segments contract normally.  Estimated EF appears  to be in the range of 66 - 70%.  The aortic valve is structurally normal. Mild aortic valve regurgitation is present. No aortic valve stenosis is present.  The mitral valve is normal in structure. Trace mitral valve regurgitation is present. No significant mitral valve stenosis is present.  The tricuspid valve is normal. Mild tricuspid valve regurgitation is present. Estimated right ventricular systolic pressure from tricuspid regurgitation is mildly elevated (35-45 mmHg).  There is no evidence of pericardial effusion.    Past Medical History:   Diagnosis Date   • Arthritis    • Cardiac disorder    • Diverticulitis    • Dysphagia    • Fibromyalgia    • Follicular lymphoma (HCC)     Remisson    • GERD (gastroesophageal reflux disease)    • History of rheumatic fever    • Hypertension    • Hypokalemia due to inadequate potassium intake 03/03/2014   • Lipoma    • Malignant neoplasm (HCC)    • Migraine    • Neutropenia (HCC) 11/7/2019   • Obstructive sleep apnea 7/14/2016   • Prolonged Q-T interval on ECG 11/7/2019   • Recurrent UTI    • Rheumatoid arthritis (HCC)    • Sleep apnea    • Thyroid cancer (HCC)      Past Surgical History:   Procedure Laterality Date   • BLADDER SURGERY     • FINGER SURGERY      reattached    • HYSTERECTOMY     • THYROID SURGERY       Family History   Problem Relation Age of Onset   • Diabetes Mother    • Hypertension Mother    • Other Other         cardiac disorder,cardiovascular disease, malignant neoplasm of brain   • Breast cancer Paternal Aunt      Social History     Tobacco Use   • Smoking status: Never Smoker   • Smokeless tobacco: Never Used   Substance Use Topics   • Alcohol use: No   • Drug use: No     Medications Prior to Admission   Medication Sig Dispense Refill Last Dose   • amLODIPine (NORVASC) 2.5 MG tablet Take 1 tablet by mouth Daily. 30 tablet 0 10/5/2021 at am   • aspirin 81 MG EC tablet Take 81 mg by mouth Daily.   10/5/2021 at am   • atorvastatin (LIPITOR) 10 MG tablet  Take 1 tablet by mouth Daily. 30 tablet 11 10/5/2021 at Unknown time   • busPIRone (BUSPAR) 5 MG tablet Take 2.5 mg by mouth 2 (Two) Times a Day.   10/5/2021 at am   • Calcium Polycarbophil (FIBER-CAPS PO) Take 1 capsule by mouth Daily.   10/5/2021 at am   • dexlansoprazole (DEXILANT) 60 MG capsule Take 60 mg by mouth Daily.   10/5/2021 at am   • FLUoxetine (PROzac) 20 MG capsule Take 20 mg by mouth Daily.   10/5/2021 at am   • hydrALAZINE (APRESOLINE) 10 MG tablet Take 1 tablet by mouth Every 8 (Eight) Hours. (Patient taking differently: Take 10 mg by mouth Every 8 (Eight) Hours. Prior to [facility] Admission, Patient was on:   HOLD if SBP less than 140 or DBP less than 80) 90 tablet 0 10/5/2021 at am   • HYDROcodone-acetaminophen (NORCO)  MG per tablet Take 1 tablet by mouth 3 (Three) Times a Day As Needed for Moderate Pain .   10/5/2021 at am   • loratadine (CLARITIN) 10 MG tablet Take 10 mg by mouth Daily.   10/5/2021 at am   • megestrol (MEGACE) 40 MG tablet Take 40 mg by mouth 2 (Two) Times a Day.   10/5/2021 at am   • metoprolol tartrate (LOPRESSOR) 50 MG tablet Take 50 mg by mouth 2 (Two) Times a Day.   10/5/2021 at am   • nitrofurantoin (MACRODANTIN) 50 MG capsule Take 50 mg by mouth Daily.   10/5/2021 at am   • NON FORMULARY Apply 1 dose topically to the appropriate area as directed As Needed (Compounded Pain cream: Ingredients: 2.5% ketamine, 2.5% lidocaine, 2.5% prilocaine, 0.09% meloxicam (120 grams total)). Prior to [facility] Admission, Patient was on: Rx wrote to use 2-4 grams to affected area 3-4 times daily. Pt caregiver reports using PRN every 1-2 weeks.   Past Week at Unknown time   • phenylephrine (SUDAFED PE) 10 MG tablet Take 20 mg by mouth 2 (two) times a day.   10/5/2021 at Unknown time   • pregabalin (LYRICA) 25 MG capsule Take 1 capsule by mouth 2 (Two) Times a Day. 30 capsule 0 10/5/2021 at am   • Probiotic Product (Align) capsule Take 1 capsule by mouth Daily.   10/5/2021 at am   •  promethazine (PHENERGAN) 12.5 MG tablet Take 12.5 mg by mouth 2 (Two) Times a Day As Needed for Nausea or Vomiting.   Past Week at Unknown time     Allergies:  Baclofen    Review of Systems   Constitutional: Negative for fatigue and fever.   HENT: Negative for congestion.    Eyes: Negative for photophobia and visual disturbance.   Respiratory: Negative for chest tightness, shortness of breath and wheezing.    Cardiovascular: Negative for chest pain and leg swelling.   Gastrointestinal: Positive for vomiting. Negative for abdominal pain and nausea.   Endocrine: Negative for polyphagia and polyuria.   Genitourinary: Negative for dysuria and hematuria.   Musculoskeletal: Negative for neck pain and neck stiffness.   Skin: Negative for rash and wound.   Allergic/Immunologic: Negative for food allergies and immunocompromised state.   Neurological: Positive for weakness. Negative for dizziness, syncope and light-headedness.   Hematological: Negative for adenopathy. Does not bruise/bleed easily.   Psychiatric/Behavioral: Negative for confusion and suicidal ideas.       Objective       Objective      Vital Signs  Temp:  [97.1 °F (36.2 °C)-98.7 °F (37.1 °C)] 98.3 °F (36.8 °C)  Heart Rate:  [] 120  Resp:  [18-20] 18  BP: (126-184)/(52-77) 163/58  Vital Signs (last 72 hrs)       10/08 0700  10/09 0659 10/09 0700  10/10 0659 10/10 0700  10/11 0659 10/11 0700  10/11 1132   Most Recent      Temp (°F) 97.5 -  98.9    97.7 -  100.1    97.1 -  98.7       98.3 (36.8) 10/11 0600    Heart Rate 68 -  118    85 -  112    84 -  112    120 -  127     120 10/11 0953    Resp 17 -  20    18 -  24    18 -  20       18 10/11 0600    /55 -  180/74    124/74 -  158/70    129/59 -  184/77    126/52 -  163/58     163/58 10/11 0953    SpO2 (%) 95 -  99    92 -  96    94 -  98       95 10/11 0600        Body mass index is 23.28 kg/m².  Documented weights    10/05/21 1346 10/05/21 2049   Weight: 53.5 kg (118 lb) 54.1 kg (119 lb 3.2 oz)             Intake/Output Summary (Last 24 hours) at 10/11/2021 1132  Last data filed at 10/11/2021 1100  Gross per 24 hour   Intake 1265 ml   Output 1150 ml   Net 115 ml     Physical Exam  Constitutional:       General: She is not in acute distress.     Appearance: Normal appearance. She is well-developed and normal weight.   HENT:      Head: Normocephalic and atraumatic.   Eyes:      General: Lids are normal.      Conjunctiva/sclera: Conjunctivae normal.      Pupils: Pupils are equal, round, and reactive to light.   Neck:      Vascular: No carotid bruit or JVD.   Cardiovascular:      Rate and Rhythm: Normal rate and regular rhythm.      Pulses: Normal pulses.      Heart sounds: Normal heart sounds, S1 normal and S2 normal. No murmur heard.      Pulmonary:      Effort: Pulmonary effort is normal. No respiratory distress.      Breath sounds: Normal breath sounds. No wheezing.   Abdominal:      General: Bowel sounds are normal. There is no distension.      Palpations: Abdomen is soft. There is no hepatomegaly or splenomegaly.      Tenderness: There is no abdominal tenderness.   Musculoskeletal:         General: No swelling. Normal range of motion.      Cervical back: Normal range of motion and neck supple.      Right lower leg: No edema.      Left lower leg: No edema.   Skin:     General: Skin is warm and dry.      Coloration: Skin is not jaundiced.      Findings: No rash.   Neurological:      General: No focal deficit present.      Mental Status: She is alert and oriented to person, place, and time. Mental status is at baseline.   Psychiatric:         Mood and Affect: Mood normal.         Speech: Speech normal.         Behavior: Behavior normal.         Thought Content: Thought content normal.         Judgment: Judgment normal.         Results review     Results Review:    I reviewed the patient's new clinical results.  Results from last 7 days   Lab Units 10/05/21  1632 10/05/21  1433   TROPONIN T ng/mL <0.010 <0.010      Results from last 7 days   Lab Units 10/11/21  0510 10/10/21  0235 10/09/21  0525 10/08/21  0548 10/07/21  0328 10/06/21  1036 10/05/21  1418   WBC 10*3/mm3 9.52 9.48 11.15* 12.94* 15.04* 13.71* 15.67*   HEMOGLOBIN g/dL 9.7* 9.3* 9.6* 10.5* 11.5* 11.1* 11.4*   PLATELETS 10*3/mm3 264 214 185 191 245 269 298     Results from last 7 days   Lab Units 10/11/21  0510 10/10/21  0235 10/09/21  1747 10/09/21  0525 10/08/21  0548 10/07/21  0328 10/06/21  1036 10/05/21  1418 10/05/21  1418   SODIUM mmol/L 140 138  --  139 136 133* 133*  --  138   POTASSIUM mmol/L 2.6* 4.2 3.0* 3.2* 3.3* 3.9 3.2*   < > 3.4*   CHLORIDE mmol/L 106 110*  --  109* 107 102 101  --  103   CO2 mmol/L 14.8* 15.1*  --  12.6* 14.2* 16.3* 17.6*  --  20.0*   BUN mg/dL 9 9  --  11 14 8 7*  --  9   CREATININE mg/dL 0.65 0.52*  --  0.69 0.84 0.66 0.57  --  0.72   CALCIUM mg/dL 8.0* 8.3*  --  8.1* 8.3* 8.9 8.2*  --  9.3   GLUCOSE mg/dL 82 105*  --  96 99 136* 133*  --  128*   ALT (SGPT) U/L  --   --   --   --   --   --   --   --  11   AST (SGOT) U/L  --   --   --   --   --   --   --   --  19    < > = values in this interval not displayed.     Lab Results   Component Value Date    INR 1.19 (H) 10/07/2021    INR 1.10 10/06/2021    INR 1.00 10/05/2021    INR 0.99 09/21/2020    INR <0.90 05/06/2016     Lab Results   Component Value Date    MG 1.7 10/11/2021    MG 1.7 10/05/2021    MG 1.8 05/15/2021     Lab Results   Component Value Date    TSH 1.170 04/08/2021    TRIG 150 04/08/2021    HDL 30 (L) 04/08/2021    LDL 97 04/08/2021      Lab Results   Component Value Date    PROBNP 607.2 10/05/2021       ECG         ECG/EMG Results (last 24 hours)     Procedure Component Value Units Date/Time    ECG 12 Lead [[id number]] Collected: 10/11/21 0911     Updated: 10/11/21 0934     QT Interval 312 ms      QTC Interval 477 ms     Narrative:      Test Reason : Tachycardia  Blood Pressure :   */*   mmHG  Vent. Rate : 141 BPM     Atrial Rate : 141 BPM     P-R Int :   * ms           QRS Dur :  72 ms      QT Int : 312 ms       P-R-T Axes :   * -26 104 degrees     QTc Int : 477 ms    Atrial fibrillation with rapid ventricular response  Minimal voltage criteria for LVH, may be normal variant  Septal infarct (cited on or before 05-OCT-2021)  Abnormal ECG  When compared with ECG of 11-OCT-2021 09:11, (Unconfirmed)  Previous ECG has undetermined rhythm, needs review  Nonspecific T wave abnormality, worse in Lateral leads    Referred By: GUNJAN           Confirmed By:           Imaging Results (Last 72 Hours)     Procedure Component Value Units Date/Time    XR Abdomen KUB [[id number]] Collected: 10/10/21 2006     Updated: 10/10/21 2008    Narrative:      CR Abdomen 1 Vw    INDICATION:   Nausea and vomiting today.    COMPARISON:   None available    FINDINGS:  AP radiograph(s) of the abdomen. The renal shadows are symmetric. No renal calculi. No bladder calculi.    The bowel gas pattern is nonobstructive. No acute osseous abnormalities. No radiopaque foreign body.      Impression:      Negative KUB.    Signer Name: Alexandru Henderson MD   Signed: 10/10/2021 8:06 PM   Workstation Name: [email or web address]    [facility]          I have discussed my impression and recommendations with the patient and family.    Thank you very much for asking us to be involved in this patient's care.  We will follow along with you.      Electronically signed by MERRY Menezes, 10/11/21, 11:32 AM EDT.  Electronically signed by Shefali Morris MD, 10/11/21, 11:51 AM EDT.    Please note that portions of this note were completed with a voice recognition program.

## 2021-10-11 NOTE — CASE MANAGEMENT/SOCIAL WORK
Discharge Planning Assessment   [place]     Patient Name: Kiah Conway  MRN: [id number]  Today's Date: 10/11/2021    Admit Date: 10/5/2021     Discharge Needs Assessment    No documentation.                Discharge Plan     Row Name 10/11/21 1400       Plan    Plan Pt was admitted on 10/05/21. Pt lives with her daugther. Pt does not utilize home health services. Pt has a standard walker, and wheelchair for DME needs.  spoke with PT,Francisca and OT, Jonah  about discharge planning.   asked Dr. Wing for inpt rehab consult. SS to follow.    1559pm: SS notified  inpt rehab per Crystal of consult. SS to follow.                 Monica Ha

## 2021-10-11 NOTE — PLAN OF CARE
Goal Outcome Evaluation:              Outcome Summary: Pt poorly cooperative with PT tx this day, she required much encouragement to perform some ROM of L LE. Pt demonstrates confusion and is not suitable for IPRF at this time. Once pt cognition improves, she would benefit from rehabilitation.

## 2021-10-11 NOTE — PROGRESS NOTES
Subjective     History:   Kiah Conway is a 76 y.o. female admitted on 10/5/2021 secondary to Closed fracture of left hip (HCC)     Procedures:   10/7/21: Left hip hemiarthroplasty     CC: Follow up hip fracture     Patient seen and examined with DOLLY Mazariegos. Awake and alert. Reports some palpitations during episode of tachycardia this AM. No reported CP or dyspnea. No reported nausea or vomiting. No acute events overnight per RN.     History taken from: patient, chart, and RN.      Objective     Vital Signs  Temp:  [98.1 °F (36.7 °C)-98.7 °F (37.1 °C)] 98.1 °F (36.7 °C)  Heart Rate:  [] 114  Resp:  [18-22] 22  BP: (126-188)/(52-95) 188/77    Intake/Output Summary (Last 24 hours) at 10/11/2021 1935  Last data filed at 10/11/2021 1741  Gross per 24 hour   Intake 2188.25 ml   Output 2175 ml   Net 13.25 ml         Physical Exam:  General:    Awake, alert, in no acute distress   Heart:      Normal S1 and S2. Regular rate and rhythm. No significant murmur, rubs or gallops appreciated.   Lungs:     Respirations regular, even and unlabored. Lungs clear to auscultation B/L. No wheezes, rales or rhonchi.   Abdomen:   Soft and nontender. No guarding, rebound tenderness or  organomegaly noted. Bowel sounds present x 4.   Extremities:  No clubbing, cyanosis or edema noted.      Results Review:    Results from last 7 days   Lab Units 10/11/21  0510 10/10/21  0235 10/09/21  0525 10/08/21  0548 10/07/21  0328 10/06/21  1036 10/05/21  1418   WBC 10*3/mm3 9.52 9.48 11.15* 12.94* 15.04* 13.71* 15.67*   HEMOGLOBIN g/dL 9.7* 9.3* 9.6* 10.5* 11.5* 11.1* 11.4*   PLATELETS 10*3/mm3 264 214 185 191 245 269 298     Results from last 7 days   Lab Units 10/11/21  0510 10/10/21  0235 10/09/21  1747 10/09/21  0525 10/08/21  0548 10/07/21  0328 10/06/21  1036 10/05/21  1418 10/05/21  1418   SODIUM mmol/L 140 138  --  139 136 133* 133*  --  138   POTASSIUM mmol/L 2.6* 4.2 3.0* 3.2* 3.3* 3.9 3.2*   < > 3.4*   CHLORIDE mmol/L 106 110*   --  109* 107 102 101  --  103   CO2 mmol/L 14.8* 15.1*  --  12.6* 14.2* 16.3* 17.6*  --  20.0*   BUN mg/dL 9 9  --  11 14 8 7*  --  9   CREATININE mg/dL 0.65 0.52*  --  0.69 0.84 0.66 0.57  --  0.72   CALCIUM mg/dL 8.0* 8.3*  --  8.1* 8.3* 8.9 8.2*  --  9.3   GLUCOSE mg/dL 82 105*  --  96 99 136* 133*  --  128*    < > = values in this interval not displayed.     Results from last 7 days   Lab Units 10/05/21  1418   BILIRUBIN mg/dL 0.5   ALK PHOS U/L 105   AST (SGOT) U/L 19   ALT (SGPT) U/L 11     Results from last 7 days   Lab Units 10/11/21  0830 10/05/21  1418   MAGNESIUM mg/dL 1.7 1.7     Results from last 7 days   Lab Units 10/07/21  0328 10/06/21  1036 10/05/21  1418   INR  1.19* 1.10 1.00     Results from last 7 days   Lab Units 10/05/21  1632 10/05/21  1433   TROPONIN T ng/mL <0.010 <0.010       Imaging Results (Last 24 Hours)     Procedure Component Value Units Date/Time    XR Abdomen KUB [[id number]] Collected: 10/10/21 2006     Updated: 10/10/21 2008    Narrative:      CR Abdomen 1 Vw    INDICATION:   Nausea and vomiting today.    COMPARISON:   None available    FINDINGS:  AP radiograph(s) of the abdomen. The renal shadows are symmetric. No renal calculi. No bladder calculi.    The bowel gas pattern is nonobstructive. No acute osseous abnormalities. No radiopaque foreign body.      Impression:      Negative KUB.    Signer Name: Alexandru Henderson MD   Signed: 10/10/2021 8:06 PM   Workstation [facility]: [email or web address]    [facility]            Medications:  acetaminophen, 1,000 mg, Oral, Q8H  amLODIPine, 2.5 mg, Oral, Q24H  atorvastatin, 10 mg, Oral, Daily  busPIRone, 2.5 mg, Oral, BID  cetirizine, 5 mg, Oral, Daily  enoxaparin, 40 mg, Subcutaneous, Q24H  FLUoxetine, 20 mg, Oral, Daily  hydrALAZINE, 10 mg, Oral, Q8H  megestrol, 40 mg, Oral, BID  metoprolol tartrate, 50 mg, Oral, Q12H  nystatin, 5 mL, Swish & Spit, 4x Daily  pantoprazole, 40 mg, Oral, BID AC  pregabalin, 25 mg, Oral,  BID  psyllium, 1 packet, Oral, Daily  sodium chloride, 10 mL, Intravenous, Q12H      sodium chloride, 75 mL/hr, Last Rate: 75 mL/hr (10/05/21 1811)  sodium chloride, 75 mL/hr, Last Rate: 75 mL/hr (10/11/21 0955)            Assessment/Plan   Left femoral neck fracture: S/P left hip hemiarthroplasty. Pain control. PT/OT. Ortho input appreciated.     Intermittent SVT: Echo reveals an EF of 61-65%, moderate LVH, grade I diastolic dysfunction and mild MR.  Improved with metoprolol. Replace electrolytes.  Cont to monitor on telemetry. Cardiology input appreciated.     Hypokalemia: K+ low today. Mg<2. Cont electrolyte replacement protocols.     Normocytic anemia: Possible component of acute blood loss. Cont to monitor.     Essential HTN: BP intermittently elevated. Cont Norvasc, metoprolol and hydralazine.     DVT PPX: Lovenox     Disposition: Will need short term rehab placement.       Gustavo Wing DO  10/11/21  19:35 EDT

## 2021-10-11 NOTE — PROGRESS NOTES
Inpatient Progress Note  Kiah Conway  Date: 10/11/21  MRN: [id number]      Subjective:   Patient is postop day #4 status post left hip hemiarthroplasty.  The patient notes some left hip pain on today's exam.  Has had some intermittent episodes of vomiting as well as shortness of breath.  Nursing noted that the patient had episode of A. fib with RVR.  Cardiology has been consulted.  The patient has had limited mobility with physical therapy.  She has not ambulated with physical therapy.  The patient is on Lovenox for DVT prophylaxis.  No chest pain is noted today.      Objective:    Vitals:    10/11/21 0300 10/11/21 0600 10/11/21 0941 10/11/21 0953   BP: (!) 184/77 178/66 126/52 163/58   BP Location: Left arm Left arm     Patient Position: Lying Lying     Pulse: 112 110 (!) 127 120   Resp: 18 18     Temp: 98.4 °F (36.9 °C) 98.3 °F (36.8 °C)     TempSrc: Oral Oral     SpO2: 95% 95%     Weight:       Height:              Physical Exam:  Constitutional: Chronically ill.  No respiratory distress.        Musculoskeletal: Upon examination left hip dressing there is approximately 70% saturation with serous drainage being noted.  No purulence or bloody drainage noted.  Neurovascularly intact left lower extremity.  The surgical incision was visualized, it is healing well.  No infectious process noted.  No cellulitis or erythema noted.  No dehiscence noted.  Dorsiflexion plantarflexion intact left ankle.  No foot drop abnormality is noted.    Labs:    Results from last 7 days   Lab Units 10/11/21  0510 10/10/21  0235 10/09/21  0525 10/08/21  0548 10/07/21  0328 10/06/21  1036 10/06/21  1036 10/05/21  1418 10/05/21  1418   WBC 10*3/mm3 9.52 9.48 11.15*   < > 15.04*   < > 13.71*   < > 15.67*   HEMOGLOBIN g/dL 9.7* 9.3* 9.6*   < > 11.5*   < > 11.1*   < > 11.4*   HEMATOCRIT % 31.4* 30.0* 31.5*   < > 35.3   < > 34.1   < > 35.5   MCV fL 94.9 95.5 96.9   < > 91.5   < > 91.7   < > 93.2   MCHC g/dL 30.9* 31.0* 30.5*   < > 32.6    < > 32.6   < > 32.1   PLATELETS 10*3/mm3 264 214 185   < > 245   < > 269   < > 298   INR   --   --   --   --  1.19*  --  1.10  --  1.00    < > = values in this interval not displayed.     Results from last 7 days   Lab Units 10/05/21  1403   PH, ARTERIAL pH units 7.468*   PO2 ART mm Hg 49.8*   PCO2, ARTERIAL mm Hg 28.8*   HCO3 ART mmol/L 20.8     Results from last 7 days   Lab Units 10/11/21  0830 10/11/21  0510 10/10/21  0235 10/09/21  1747 10/09/21  0525 10/09/21  0525 10/06/21  1036 10/05/21  1418   SODIUM mmol/L  --  140 138  --   --  139   < > 138   POTASSIUM mmol/L  --  2.6* 4.2 3.0*   < > 3.2*   < > 3.4*   MAGNESIUM mg/dL 1.7  --   --   --   --   --   --  1.7   CHLORIDE mmol/L  --  106 110*  --   --  109*   < > 103   CO2 mmol/L  --  14.8* 15.1*  --   --  12.6*   < > 20.0*   BUN mg/dL  --  9 9  --   --  11   < > 9   CREATININE mg/dL  --  0.65 0.52*  --   --  0.69   < > 0.72   EGFR IF NONAFRICN AM mL/min/1.73  --  88 114  --   --  82   < > 78   CALCIUM mg/dL  --  8.0* 8.3*  --   --  8.1*   < > 9.3   GLUCOSE mg/dL  --  82 105*  --   --  96   < > 128*   ALBUMIN g/dL  --   --   --   --   --   --   --  4.45   BILIRUBIN mg/dL  --   --   --   --   --   --   --  0.5   ALK PHOS U/L  --   --   --   --   --   --   --  105   AST (SGOT) U/L  --   --   --   --   --   --   --  19   ALT (SGPT) U/L  --   --   --   --   --   --   --  11    < > = values in this interval not displayed.   Estimated Creatinine Clearance: 49.5 mL/min (by C-G formula based on SCr of 0.65 mg/dL).  No results found for: AMMONIA  Results from last 7 days   Lab Units 10/05/21  1632 10/05/21  1433   TROPONIN T ng/mL <0.010 <0.010     Results from last 7 days   Lab Units 10/05/21  1433   PROBNP pg/mL 607.2     No results found for: HGBA1C  Lab Results   Component Value Date    TSH 1.170 04/08/2021    FREET4 1.26 05/27/2020         Pain Management Panel     Pain Management Panel Latest Ref Rng & Units 4/9/2021    AMPHETAMINES SCREEN, URINE Negative Negative     BARBITURATES SCREEN Negative Negative    BENZODIAZEPINE SCREEN, URINE Negative Negative    BUPRENORPHINEUR Negative Negative    COCAINE SCREEN, URINE Negative Negative    METHADONE SCREEN, URINE Negative Negative          No results found for: BLOODCX  No results found for: URINECX  No results found for: WOUNDCX  No results found for: STOOLCX              Radiology:  Imaging Results (Last 72 Hours)     Procedure Component Value Units Date/Time    XR Abdomen KUB [[id number]] Collected: 10/10/21 2006     Updated: 10/10/21 2008    Narrative:      CR Abdomen 1 Vw    INDICATION:   Nausea and vomiting today.    COMPARISON:   None available    FINDINGS:  AP radiograph(s) of the abdomen. The renal shadows are symmetric. No renal calculi. No bladder calculi.    The bowel gas pattern is nonobstructive. No acute osseous abnormalities. No radiopaque foreign body.      Impression:      Negative KUB.    Signer Name: Alexandru Henderson MD   Signed: 10/10/2021 8:06 PM   Work[facility]: [email or web address]    [facility]            Assessment:   #1 status post left hip hemiarthroplasty          Plan:   Physical therapy on board, patient can weight-bear as tolerated left lower extremity.  Cardiology is following the patient.  Lovenox is on board for DVT prophylaxis.  Okay to change dressing today with 4 x 4's and ABDs and tape only.  Patient's pain is controlled.  Orthopedic surgery will continue to follow.  Will likely benefit from rehab placement.        Laurent Truong, APRN October 11, 2021 11:53 EDT

## 2021-10-11 NOTE — THERAPY TREATMENT NOTE
Acute Care - Physical Therapy Treatment Note   Jered     Patient Name: Kiah Conway  : 1945  MRN: [id number]  Today's Date: 10/11/2021   Onset of Illness/Injury or Date of Surgery: 10/05/21  Visit Dx:     ICD-10-CM ICD-9-CM   1. Closed fracture of left hip, initial encounter ([facility])  S72.002A 820.8     Patient Active Problem List   Diagnosis   • Essential hypertension   • Atrophic urethritis   • Hyperglycemia   • GERD without esophagitis   • Fibromyalgia   • Arthritis   • Dysphagia   • History of rheumatic fever   • Migraines   • History of thyroid cancer   • Hypokalemia   • Hypocarbia   • Hyperchloremia   • Hypocalcemia   • Normocytic anemia   • Urinary tract infection without hematuria   • Acute encephalopathy   • Femur fracture ([facility])   • Closed fracture of left hip ([facility])     Past Medical History:   Diagnosis Date   • Arthritis    • Cardiac disorder    • Diverticulitis    • Dysphagia    • Fibromyalgia    • Follicular lymphoma (HCC)     Remisson    • GERD (gastroesophageal reflux disease)    • History of rheumatic fever    • Hypertension    • Hypokalemia due to inadequate potassium intake 2014   • Lipoma    • Malignant neoplasm (HCC)    • Migraine    • Neutropenia (HCC) 2019   • Obstructive sleep apnea 2016   • Prolonged Q-T interval on ECG 2019   • Recurrent UTI    • Rheumatoid arthritis (HCC)    • Sleep apnea    • Thyroid cancer (HCC)      Past Surgical History:   Procedure Laterality Date   • BLADDER SURGERY     • FINGER SURGERY      reattached    • HIP HEMIARTHROPLASTY Left 10/7/2021    Procedure: HIP HEMIARTHROPLASTY BIOMET instrumentation  10/7/2021 1300 Dr. Samano;  Surgeon: Jonah Samano MD;  Location: [facility];  Service: Orthopedics;  Laterality: Left;   • HYSTERECTOMY     • THYROID SURGERY       PT Assessment (last 12 hours)     PT Evaluation and Treatment     Row Name 10/11/21 1623          Physical Therapy Time and Intention    Document Type therapy note (daily  note)  -     Mode of Treatment physical therapy  -     Patient Effort poor  -     Symptoms Noted During/After Treatment increased pain; fatigue  -     Row Name 10/11/21 1623          Motor Skills    Therapeutic Exercise hip  flexion & abduction, AA/PROM  -     Row Name             Wound 10/07/21 1537 Left lateral hip Incision    Wound - Properties Group Placement Date: 10/07/21  -WM Placement Time: 1537  -WM Present on Hospital Admission: N  -WM Side: Left  -WM Orientation: lateral  -WM Location: hip  -WM Primary Wound Type: Incision  -WM Additional Comments: opsite closed with suture & staples dressed with optifoam dressing & abduction pillow  -WM     Retired Wound - Properties Group Date first assessed: 10/07/21  -WM Time first assessed: 1537  -WM Present on Hospital Admission: N  -WM Side: Left  -WM Location: hip  -WM Primary Wound Type: Incision  -WM Additional Comments: opsite closed with suture & staples dressed with optifoam dressing & abduction pillow  -     Row Name 10/11/21 1623          Coping    Observed Emotional State frustrated; uncooperative  -     Trust Relationship/Rapport care explained; thoughts/feelings acknowledged  -     Row Name 10/11/21 1623          Plan of Care Review    Outcome Summary Pt poorly cooperative with PT tx this day, she required much encouragement to perform some ROM of L LE. Pt demonstrates confusion and is not suitable for IPRF at this time. Once pt cognition improves, she would benefit from rehabilitation.  -     Row Name 10/11/21 1623          Positioning and Restraints    Pre-Treatment Position in bed  -     Post Treatment Position bed  -     In Bed supine  -           User Key  (r) = Recorded By, (t) = Taken By, (c) = Cosigned By    Initials Name Provider Type     Theodore Duckworth RN Registered Nurse    Francisca Nguyen, PT Physical Therapist                PT Recommendation and Plan  Anticipated Discharge Disposition (PT): skilled nursing  facility, inpatient rehabilitation facility  Planned Therapy Interventions (PT): bed mobility training, gait training, transfer training, strengthening  Therapy Frequency (PT): 6 times/wk  Outcome Summary: Pt poorly cooperative with PT tx this day, she required much encouragement to perform some ROM of L LE. Pt demonstrates confusion and is not suitable for IPRF at this time. Once pt cognition improves, she would benefit from rehabilitation.       Time Calculation:    PT Charges     Row Name 10/11/21 1629             Time Calculation    PT Received On 10/11/21  -      PT Goal Re-Cert Due Date 11/06/21  -              Time Calculation- PT    Total Timed Code Minutes- PT 15 minute(s)  -            User Key  (r) = Recorded By, (t) = Taken By, (c) = Cosigned By    Initials Name Provider Type    Francisca Nguyen, ANGELES Physical Therapist              Therapy Charges for Today     Code Description Service Date Service Provider Modifiers Qty    37596732290 HC PT THERAPEUTIC ACT EA 15 MIN 10/11/2021 Francisca Lofton, PT GP 1               Francisca Lofton PT  10/11/2021

## 2021-10-12 PROBLEM — R79.81 HYPOCARBIA: Status: RESOLVED | Noted: 2019-11-07 | Resolved: 2021-01-01

## 2021-10-12 PROBLEM — G93.40 ACUTE ENCEPHALOPATHY: Status: RESOLVED | Noted: 2021-01-01 | Resolved: 2021-01-01

## 2021-10-12 PROBLEM — N39.0 URINARY TRACT INFECTION WITHOUT HEMATURIA: Status: RESOLVED | Noted: 2019-12-11 | Resolved: 2021-01-01

## 2021-10-12 PROBLEM — R73.9 HYPERGLYCEMIA: Status: RESOLVED | Noted: 2019-11-07 | Resolved: 2021-01-01

## 2021-10-12 PROBLEM — E87.6 HYPOKALEMIA: Status: RESOLVED | Noted: 2019-11-07 | Resolved: 2021-01-01

## 2021-10-12 PROBLEM — E87.8 HYPOCARBIA: Status: RESOLVED | Noted: 2019-11-07 | Resolved: 2021-01-01

## 2021-10-12 PROBLEM — E87.8 HYPERCHLOREMIA: Status: RESOLVED | Noted: 2019-11-07 | Resolved: 2021-01-01

## 2021-10-12 PROBLEM — E83.51 HYPOCALCEMIA: Status: RESOLVED | Noted: 2019-11-07 | Resolved: 2021-01-01

## 2021-10-12 NOTE — PLAN OF CARE
Goal Outcome Evaluation:              Outcome Summary: Pt functionally performed much better this day wtih decreased confusion noted. Pt participated overall with min/moderate assist with bed mobility and transfers near bedside. Pt also performed LE exercises with active/active assistance. D/C recommendation for inpatient rehab facility to address deficits and receive appropriate rehab s/p L ROBBIE.

## 2021-10-12 NOTE — THERAPY TREATMENT NOTE
Acute Care - Physical Therapy Treatment Note  [facility]     Patient Name: Kiah Conway  : 1945  MRN: [id number]  Today's Date: 10/12/2021   Onset of Illness/Injury or Date of Surgery: 10/05/21  Visit Dx:     ICD-10-CM ICD-9-CM   1. Closed fracture of left hip, initial encounter ([facility])  S72.002A 820.8     Patient Active Problem List   Diagnosis   • Essential hypertension   • Atrophic urethritis   • GERD without esophagitis   • Fibromyalgia   • Arthritis   • Dysphagia   • History of rheumatic fever   • Migraines   • History of thyroid cancer   • Normocytic anemia   • Femur fracture (HCC)   • Closed fracture of left hip ([facility])     Past Medical History:   Diagnosis Date   • Arthritis    • Cardiac disorder    • Diverticulitis    • Dysphagia    • Fibromyalgia    • Follicular lymphoma (HCC)     Remisson    • GERD (gastroesophageal reflux disease)    • History of rheumatic fever    • Hypertension    • Hypokalemia due to inadequate potassium intake 2014   • Lipoma    • Malignant neoplasm (HCC)    • Migraine    • Neutropenia (HCC) 2019   • Obstructive sleep apnea 2016   • Prolonged Q-T interval on ECG 2019   • Recurrent UTI    • Rheumatoid arthritis (HCC)    • Sleep apnea    • Thyroid cancer (HCC)      Past Surgical History:   Procedure Laterality Date   • BLADDER SURGERY     • FINGER SURGERY      reattached    • HIP HEMIARTHROPLASTY Left 10/7/2021    Procedure: HIP HEMIARTHROPLASTY BIOMET instrumentation  10/7/2021 1300 Dr. Samano;  Surgeon: Jonah Samano MD;  Location: [facility];  Service: Orthopedics;  Laterality: Left;   • HYSTERECTOMY     • THYROID SURGERY       PT Assessment (last 12 hours)     PT Evaluation and Treatment     Row Name 10/12/21 1658          Physical Therapy Time and Intention    Document Type therapy note (daily note)  -KH     Mode of Treatment physical therapy  -KH     Patient Effort adequate  -KH     Symptoms Noted During/After Treatment fatigue  -KH     Comment Pt  seen for PT tx this day, pt cooperated well performing bed mobility, transfers, and supine LE TE.  -     Row Name 10/12/21 1658          Bed Mobility    Bed Mobility supine-sit; sit-supine  -     Supine-Sit [place] (Bed Mobility) minimum assist (75% patient effort)  -     Sit-Supine [place] (Bed Mobility) moderate assist (50% patient effort); maximum assist (25% patient effort)  -     Row Name 10/12/21 1658          Transfers    Transfers sit-stand transfer; stand-sit transfer  -     Sit-Stand [place] (Transfers) contact guard; minimum assist (75% patient effort)  -     Stand-Sit [place] (Transfers) contact guard; standby assist  -     Row Name 10/12/21 1658          Sit-Stand Transfer    Assistive Device (Sit-Stand Transfers) walker, front-wheeled  -     Row Name 10/12/21 1658          Stand-Sit Transfer    Assistive Device (Stand-Sit Transfers) walker, front-wheeled  -     Row Name 10/12/21 1658          Motor Skills    Therapeutic Exercise hip  hip flexion & abduction, AAROM  -     Row Name             Wound 10/07/21 1537 Left lateral hip Incision    Wound - Properties Group Placement Date: 10/07/21  -WM Placement Time: 1537  -WM Present on Hospital Admission: N  -WM Side: Left  -WM Orientation: lateral  -WM Location: hip  -WM Primary Wound Type: Incision  -WM Additional Comments: opsite closed with suture & staples dressed with optifoam dressing & abduction pillow  -WM     Retired Wound - Properties Group Date first assessed: 10/07/21  -WM Time first assessed: 1537  -WM Present on Hospital Admission: N  -WM Side: Left  -WM Location: hip  -WM Primary Wound Type: Incision  -WM Additional Comments: opsite closed with suture & staples dressed with optifoam dressing & abduction pillow  -WM     Row Name 10/12/21 1658          Plan of Care Review    Outcome Summary Pt functionally performed much better this day wtih decreased confusion noted. Pt participated overall with  min/moderate assist with bed mobility and transfers near bedside. Pt also performed LE exercises with active/active assistance. D/C recommendation for inpatient rehab facility to address deficits and perform appropriate rehab s/p L ROBBIE.  -           User Key  (r) = Recorded By, (t) = Taken By, (c) = Cosigned By    Initials Name Provider Type    Theodore Nam RN Registered Nurse    Francisca Nguyen, PT Physical Therapist                PT Recommendation and Plan  Anticipated Discharge Disposition (PT): skilled nursing facility, inpatient rehabilitation facility  Planned Therapy Interventions (PT): bed mobility training, gait training, transfer training, strengthening  Therapy Frequency (PT): 6 times/wk  Outcome Summary: Pt functionally performed much better this day wtih decreased confusion noted. Pt participated overall with min/moderate assist with bed mobility and transfers near bedside. Pt also performed LE exercises with active/active assistance. D/C recommendation for inpatient rehab facility to address deficits and perform appropriate rehab s/p L ROBBIE.       Time Calculation:    PT Charges     Row Name 10/12/21 1709             Time Calculation    PT Received On 10/12/21  -      PT Goal Re-Cert Due Date 11/06/21  -              Time Calculation- PT    Total Timed Code Minutes- PT 38 minute(s)  -            User Key  (r) = Recorded By, (t) = Taken By, (c) = Cosigned By    Initials Name Provider Type    Francisca Nguyen PT Physical Therapist              Therapy Charges for Today     Code Description Service Date Service Provider Modifiers Qty    73264367759 HC PT THERAPEUTIC ACT EA 15 MIN 10/11/2021 Francisca Lofton, PT GP 1    57357592232 HC PT THER PROC EA 15 MIN 10/12/2021 Francisca Lofton, PT GP 1    12760292625 HC PT THERAPEUTIC ACT EA 15 MIN 10/12/2021 Francisca Lofton, PT GP 2               Francisca Lofton PT  10/12/2021

## 2021-10-12 NOTE — PROGRESS NOTES
"       [facility] HOSPITALIST PROGRESS NOTE     Patient Identification:  Name:  Kiah Conway  Age:  76 y.o.  Sex:  female  :  1945  MRN:  [id number]  Visit Number:  [id number]  Primary Care Provider:  Ronny Acevedo APRN    Date of admission: 10/5/2021  Length of stay:  7    ----------------------------------------------------------------------------------------------------------------------  Subjective     Chief Complaint:   Chief Complaint   Patient presents with   • Fall     pt reports she fell off toilet at home approx 2 hours pta, she reports she had outpt xray at diagnostic center and they told her she has a broken left hip. pt reports left hip pain/nausea. pt denies loc.     Subjective/Interval History:    76 y.o. female who was admitted on 10/5/2021 with fall at the outpatient surgery center where she had just underwent a colonoscopy. She was noted to have a subsequent left femoral neck fracture.    PMH is significant for hypertension, GERD, fibromyalgia, arthritis, history of rheumatic fever, history of thyroid cancer, follicular lymphoma, rheumatoid arthritis, and sleep apnea. For complete admission information, please see history and physical.     Consultations:  Orthopedic surgery  Cardiology    Procedures/Scans:  Transthoracic echocardiogram    Today, the patient complains of not feeling very well. She has had nausea with little oral intake. Denies having much left hip pain at this time. She states she hasn't been up walking with PT yet. Discussed with her RN, Delilah who mentions nausea. When the patient does try to eat, she eats large amounts quickly and then throws them up. She has been moving her bowels, \"too much\" per the patient. She has been taking metamucil.     Review of Systems   Constitutional: Positive for fatigue. Negative for chills and fever.   Respiratory: Negative for cough and shortness of breath.    Cardiovascular: Negative for chest pain. "   Gastrointestinal: Positive for nausea and vomiting. Negative for abdominal pain, constipation and diarrhea.        + Frequent BMs.    Musculoskeletal: Positive for arthralgias.   Skin: Positive for wound. Negative for color change, pallor and rash.   Neurological: Negative for numbness.   Psychiatric/Behavioral: Negative for agitation, behavioral problems and confusion.      ----------------------------------------------------------------------------------------------------------------------  Objective   [facility] Meds:  acetaminophen, 1,000 mg, Oral, Q8H  amLODIPine, 2.5 mg, Oral, Q24H  atorvastatin, 10 mg, Oral, Daily  busPIRone, 2.5 mg, Oral, BID  cetirizine, 5 mg, Oral, Daily  enoxaparin, 40 mg, Subcutaneous, Q24H  FLUoxetine, 20 mg, Oral, Daily  hydrALAZINE, 10 mg, Oral, Q8H  megestrol, 40 mg, Oral, BID  metoprolol tartrate, 50 mg, Oral, Q12H  nystatin, 5 mL, Swish & Spit, 4x Daily  pantoprazole, 40 mg, Oral, BID AC  pregabalin, 25 mg, Oral, BID  psyllium, 1 packet, Oral, Daily  sodium chloride, 10 mL, Intravenous, Q12H         ----------------------------------------------------------------------------------------------------------------------  Vital Signs:  Temp:  [98.1 °F (36.7 °C)-99.1 °F (37.3 °C)] 98.2 °F (36.8 °C)  Heart Rate:  [] 87  Resp:  [18-22] 18  BP: (140-188)/(62-95) 160/68  Mean Arterial Pressure (Non-Invasive) for the past 24 hrs (Last 3 readings):   Noninvasive MAP (mmHg)   10/11/21 1859 113     SpO2 Percentage    10/12/21 0300 10/12/21 0600 10/12/21 1000   SpO2: 99% 94% 94%     SpO2:  [94 %-99 %] 94 %  on  Flow (L/min):  [2] 2;   Device (Oxygen Therapy): nasal cannula    Body mass index is 23.28 kg/m².  Wt Readings from Last 3 Encounters:   10/05/21 54.1 kg (119 lb 3.2 oz)   05/14/21 57.2 kg (126 lb)   04/09/21 54 kg (119 lb)        Intake/Output Summary (Last 24 hours) at 10/12/2021 1225  Last data filed at 10/12/2021 0550  Gross per 24 hour   Intake 2368.25 ml   Output 1025  ml   Net 1343.25 ml     Diet Regular  ----------------------------------------------------------------------------------------------------------------------  Physical exam:  Constitutional: Vital signs reviewed. Well-developed and well-nourished.  No respiratory distress on 2L NC.      HENT:  Head:  Normocephalic and atraumatic.  Mouth:  Moist mucous membranes.    Eyes:  Conjunctivae and EOM are normal. No scleral icterus. No erythema or drainage.  Cardiovascular:  Normal rate, regular rhythm and normal heart sounds with no murmur.  Pulmonary/Chest:  No respiratory distress, no wheezes, no crackles, with normal breath sounds and good air movement.  Abdominal:  Soft.  Bowel sounds are present x4.  No distension and no tenderness.   Musculoskeletal:  No edema, no tenderness, and no deformity. Left hip wound is clean/dry. No surrounding erythema. +Neurovascularly intact.   Neurological:  Alert and oriented to person, place, and time. No facial droop.  No slurred speech.   Skin:  Skin is warm and dry. No rash noted. No pallor.   Peripheral vascular: No clubbing, no cyanosis, no edema. 2+ pedal pulses bilaterally.   Genitourinary: No azevedo catheter in place.    I have reviewed and updated the physical exam as needed to reflect that of 10/12/21  ----------------------------------------------------------------------------------------------------------------------  Tele: Sinus rhythm in the 90s with short 4-5 beat episodes of nonsustained SVT, intermittent PACs.    ----------------------------------------------------------------------------------------------------------------------  Results from last 7 days   Lab Units 10/05/21  1632 10/05/21  1433   TROPONIN T ng/mL <0.010 <0.010     Results from last 7 days   Lab Units 10/05/21  1433   PROBNP pg/mL 607.2       Results from last 7 days   Lab Units 10/05/21  1403   PH, ARTERIAL pH units 7.468*   PO2 ART mm Hg 49.8*   PCO2, ARTERIAL mm Hg 28.8*   HCO3 ART mmol/L 20.8      Results from last 7 days   Lab Units 10/11/21  2237 10/11/21  0510 10/10/21  0235 10/08/21  0548 10/07/21  0328 10/06/21  1036 10/06/21  1036 10/05/21  1418 10/05/21  1418   WBC 10*3/mm3 11.72* 9.52 9.48   < > 15.04*   < > 13.71*   < > 15.67*   HEMOGLOBIN g/dL 8.8* 9.7* 9.3*   < > 11.5*   < > 11.1*   < > 11.4*   HEMATOCRIT % 28.6* 31.4* 30.0*   < > 35.3   < > 34.1   < > 35.5   MCV fL 94.7 94.9 95.5   < > 91.5   < > 91.7   < > 93.2   MCHC g/dL 30.8* 30.9* 31.0*   < > 32.6   < > 32.6   < > 32.1   PLATELETS 10*3/mm3 312 264 214   < > 245   < > 269   < > 298   INR   --   --   --   --  1.19*  --  1.10  --  1.00    < > = values in this interval not displayed.     Results from last 7 days   Lab Units 10/11/21  2236 10/11/21  0830 10/11/21  0510 10/10/21  0235 10/06/21  1036 10/05/21  1418   SODIUM mmol/L 139  --  140 138   < > 138   POTASSIUM mmol/L 3.6  --  2.6* 4.2   < > 3.4*   MAGNESIUM mg/dL 2.5* 1.7  --   --   --  1.7   CHLORIDE mmol/L 108*  --  106 110*   < > 103   CO2 mmol/L 13.1*  --  14.8* 15.1*   < > 20.0*   BUN mg/dL 8  --  9 9   < > 9   CREATININE mg/dL 0.67  --  0.65 0.52*   < > 0.72   EGFR IF NONAFRICN AM mL/min/1.73 85  --  88 114   < > 78   CALCIUM mg/dL 8.0*  --  8.0* 8.3*   < > 9.3   GLUCOSE mg/dL 91  --  82 105*   < > 128*   ALBUMIN g/dL 2.84*  --   --   --   --  4.45   BILIRUBIN mg/dL 0.4  --   --   --   --  0.5   ALK PHOS U/L 93  --   --   --   --  105   AST (SGOT) U/L 19  --   --   --   --  19   ALT (SGPT) U/L 10  --   --   --   --  11    < > = values in this interval not displayed.   Estimated Creatinine Clearance: 49.5 mL/min (by C-G formula based on SCr of 0.67 mg/dL).  No results found for: AMMONIA    No results found for: HGBA1C, POCGLU  Lab Results   Component Value Date    HGBA1C 4.30 (L) 11/07/2019     Lab Results   Component Value Date    TSH 1.170 04/08/2021    FREET4 1.26 05/27/2020     No results found for: BLOODCX  No results found for: URINECX  No results found for: WOUNDCX  No  results found for: STOOLCX  No results found for: RESPCX    Pain Management Panel     Pain Management Panel Latest Ref Rng & Units 4/9/2021    AMPHETAMINES SCREEN, URINE Negative Negative    BARBITURATES SCREEN Negative Negative    BENZODIAZEPINE SCREEN, URINE Negative Negative    BUPRENORPHINEUR Negative Negative    COCAINE SCREEN, URINE Negative Negative    METHADONE SCREEN, URINE Negative Negative        I have personally reviewed the above laboratory results for 10/12/21  ----------------------------------------------------------------------------------------------------------------------  Imaging Results (Last 24 Hours)     ** No results found for the last 24 hours. **            ----------------------------------------------------------------------------------------------------------------------  Assessment/Plan     #Left femoral neck fracture   #Mechanical fall   S/p left hip hemiarthroplasty 10/7, Dr. Samano.   PT/OT, weight bear as tolerated per ortho.   Denied for inpatient rehab. Daughter declines local SNF placement. PT/OT evaluations pending today, will determine safety to return home with home health for PT/OT.   Continue lovenox for DVT prophylaxis.     #Short runs of non-sustained paroxysmal SVT   #Acute hypokalemia   TTE report reviewed and listed above, EF 61-65%, grade I diastolic dysfunction.   Cardiology consulted, received a dose of IV metoprolol. Continued on home metoprolol 50mg BID.   Potassium/magnesium replaced with improvement noted. Continue to replace per protocol PRN.   Check TSH.     #Normocytic anemia, likely component of blood loss anemia   Slight decline noted overnight. IV fluids likely contributing and discontinued.   No significant bleeding reported from surgical site.   Monitor with repeat CBC in AM.     #Elevated anion gap metabolic acidosis   #Nausea/vomiting  Anion gap decreased today, bicarb also decreased down to 13.1 on AM chem panel.   Likely related to GI losses from  N/V as well as IV fluids. KUB negative 10/10.  IV fluids discontinued.   Glucose within normal limits.   Narcotic pain medication likely contributing to N/V. Use tylenol when possible for pain control. Try PRN IV compazine prior to next meal.   Encouraged intake of small portions and then monitoring for nausea.   May consider sodium bicarb supplementation. Will discuss with attending.     #Essential hypertension, uncontrolled  Currently on home doses of amlodipine, metoprolol tartrate, and hydralazine.  Given uncontrolled hypertension and runs of SVT, will increase metoprolol to 75mg BID.     #?Acute hypoxic respiratory failure   ABG on admission showed a PO2 of 49.8 with arterial O2 saturation of 87% on room air.  Patient has been maintaining O2 saturations in the mid to high 90s on 2L.  CXR on admission showed changes of possible CHF.  ProBNP was within normal limits at 607.2. Patient does not currently appear to be volume overloaded.   I have asked her RN to see if the patient uses any O2 from home. If not, will wean O2 as tolerated and consider repeating BNP/CXR.     #DVT Prophylaxis  Lovenox.     The patient is high risk due to the following diagnoses/reasons: Hip fracture, SVT, electrolyte abnormalities.     I have discussed the patient's assessment and plan with the patient, DOLLY Mazariegos, and attending physician, Dr. Wing.     Disposition: Patient was denied for inpatient rehab, patient's daughter refused short-term nursing home placement. Possible home with home health at discharge if felt to be safe per PT/OT    Discharge needs:  Possible home health for PT/OT.   Orthopedic surgery follow up in 10 cays.   Lovenox x 14days postop for DVT prophylaxis.     AYDEN Manzo  10/12/21  12:25 EDT

## 2021-10-12 NOTE — PLAN OF CARE
Goal Outcome Evaluation:           Progress: no change  Outcome Summary: Patient was able to work with PT more today, patient has been started on PRN compazine for nausea, potassium replaced and redraw is within normal limits now, patient states that she just feels tired now and wants to rest, patient voices no other concerns at this time

## 2021-10-12 NOTE — PROGRESS NOTES
Inpatient Progress Note  Kiah Conway  Date: 10/12/21  MRN: [id number]      Subjective:   Patient is postop day #5 status post left hip hemiarthroplasty.  The patient is pending rehab placement.  Has had limited mobility postop.  The patient was noted to have intermittent episodes of SVT, this is being followed by hospitalist service.  The patient is on Lovenox for DVT prophylaxis.  Is afebrile on exam.  Hemoglobin is 8.8 on today's exam.  Patient notes no chest pain or shortness of breath on exam today.  The patient's nausea and vomiting has improved from yesterday.        Objective:    Vitals:    10/12/21 0300 10/12/21 0550 10/12/21 0600 10/12/21 1000   BP: 140/63 144/62 146/62 160/68   BP Location: Right arm Right arm Right arm Right arm   Patient Position: Lying Lying Lying Lying   Pulse: 85 90 89 87   Resp: 18  18 18   Temp: 98.5 °F (36.9 °C)  98.2 °F (36.8 °C) 98.2 °F (36.8 °C)   TempSrc: Oral  Oral Oral   SpO2: 99%  94% 94%   Weight:       Height:              Physical Exam:  Constitutional: Chronically ill.  No respiratory distress.        Musculoskeletal: On examination left hip dressings are clean dry and intact.  Neurovascularly intact left lower extremity.  No foot drop abnormality is noted.  Dorsiflexion plantarflexion intact.  Positive pedal pulses noted.  No cyanosis noted.      Labs:    Results from last 7 days   Lab Units 10/11/21  2237 10/11/21  0510 10/10/21  0235 10/08/21  0548 10/07/21  0328 10/06/21  1036 10/06/21  1036 10/05/21  1418 10/05/21  1418   WBC 10*3/mm3 11.72* 9.52 9.48   < > 15.04*   < > 13.71*   < > 15.67*   HEMOGLOBIN g/dL 8.8* 9.7* 9.3*   < > 11.5*   < > 11.1*   < > 11.4*   HEMATOCRIT % 28.6* 31.4* 30.0*   < > 35.3   < > 34.1   < > 35.5   MCV fL 94.7 94.9 95.5   < > 91.5   < > 91.7   < > 93.2   MCHC g/dL 30.8* 30.9* 31.0*   < > 32.6   < > 32.6   < > 32.1   PLATELETS 10*3/mm3 312 264 214   < > 245   < > 269   < > 298   INR   --   --   --   --  1.19*  --  1.10  --  1.00    <  > = values in this interval not displayed.     Results from last 7 days   Lab Units 10/05/21  1403   PH, ARTERIAL pH units 7.468*   PO2 ART mm Hg 49.8*   PCO2, ARTERIAL mm Hg 28.8*   HCO3 ART mmol/L 20.8     Results from last 7 days   Lab Units 10/11/21  2236 10/11/21  0830 10/11/21  0510 10/10/21  0235 10/06/21  1036 10/05/21  1418   SODIUM mmol/L 139  --  140 138   < > 138   POTASSIUM mmol/L 3.6  --  2.6* 4.2   < > 3.4*   MAGNESIUM mg/dL 2.5* 1.7  --   --   --  1.7   CHLORIDE mmol/L 108*  --  106 110*   < > 103   CO2 mmol/L 13.1*  --  14.8* 15.1*   < > 20.0*   BUN mg/dL 8  --  9 9   < > 9   CREATININE mg/dL 0.67  --  0.65 0.52*   < > 0.72   EGFR IF NONAFRICN AM mL/min/1.73 85  --  88 114   < > 78   CALCIUM mg/dL 8.0*  --  8.0* 8.3*   < > 9.3   GLUCOSE mg/dL 91  --  82 105*   < > 128*   ALBUMIN g/dL 2.84*  --   --   --   --  4.45   BILIRUBIN mg/dL 0.4  --   --   --   --  0.5   ALK PHOS U/L 93  --   --   --   --  105   AST (SGOT) U/L 19  --   --   --   --  19   ALT (SGPT) U/L 10  --   --   --   --  11    < > = values in this interval not displayed.   Estimated Creatinine Clearance: 49.5 mL/min (by C-G formula based on SCr of 0.67 mg/dL).  No results found for: AMMONIA  Results from last 7 days   Lab Units 10/05/21  1632 10/05/21  1433   TROPONIN T ng/mL <0.010 <0.010     Results from last 7 days   Lab Units 10/05/21  1433   PROBNP pg/mL 607.2     No results found for: HGBA1C  Lab Results   Component Value Date    TSH 1.170 04/08/2021    FREET4 1.26 05/27/2020         Pain Management Panel     Pain Management Panel Latest Ref Rng & Units 4/9/2021    AMPHETAMINES SCREEN, URINE Negative Negative    BARBITURATES SCREEN Negative Negative    BENZODIAZEPINE SCREEN, URINE Negative Negative    BUPRENORPHINEUR Negative Negative    COCAINE SCREEN, URINE Negative Negative    METHADONE SCREEN, URINE Negative Negative          No results found for: BLOODCX  No results found for: URINECX  No results found for: WOUNDCX  No results  found for: STOOLCX              Radiology:  Imaging Results (Last 72 Hours)     Procedure Component Value Units Date/Time    XR Abdomen KUB [[id number]] Collected: 10/10/21 2006     Updated: 10/10/21 2008    Narrative:      CR Abdomen 1 Vw    INDICATION:   Nausea and vomiting today.    COMPARISON:   None available    FINDINGS:  AP radiograph(s) of the abdomen. The renal shadows are symmetric. No renal calculi. No bladder calculi.    The bowel gas pattern is nonobstructive. No acute osseous abnormalities. No radiopaque foreign body.      Impression:      Negative KUB.    Signer Name: Alexandru Henderson MD   Signed: 10/10/2021 8:06 PM   Workstation Name: [facility]    [facility]            Assessment:   #1 status post left hip hemiarthroplasty          Plan:   Patient is pending rehab placement.  Hemoglobin is stable 8.8.  Has not required blood transfusion.  Orthopedic surgery recommends Lovenox subcu for DVT prophylaxis for 14 days postoperatively.  Patient can weight-bear as tolerated to left lower extremity.  PT OT for postoperative mobilization.  Dressing change as needed for saturation only, if need to change utilize 4 x 4's, ABDs, and tape only.  Patient will follow up with Dr. Samano in 10 days.        Laurent Truong, APRN October 12, 2021 12:52 EDT

## 2021-10-12 NOTE — CASE MANAGEMENT/SOCIAL WORK
Discharge Planning Assessment   Jered     Patient Name: Kiah Conway  MRN: [id number]  Today's Date: 10/12/2021    Admit Date: 10/5/2021     Discharge Needs Assessment    No documentation.                Discharge Plan     Row Name 10/12/21 1110       Plan    Plan SS noted  inpt rehab per Crystal states Pt is not appropriate for IRF admission at this time. SS spoke with Pt's daughter, Eunice [phone] about NHP for short term rehab. Pt's daughter refused short term NHP. Daughter requested to take Pt back home with home health at discharge. SS spoke with PT, Francisca who states she plans see Pt this afternoon. SS notified Dr. Wing. SS to follow.                Monica Ha

## 2021-10-13 NOTE — SIGNIFICANT NOTE
10/13/21 1624   OTHER   Discipline physical therapist   Rehab Time/Intention   Session Not Performed patient/family declined treatment   Recommendation   PT - Next Appointment 10/14/21  (Pt refused to participate in therapy this day, pt demonstrates confusion, unable to be persuaded to participate despite PT attempt to educate on importance of mobility. F/U 10/14/2021)

## 2021-10-13 NOTE — SIGNIFICANT NOTE
.    [facility]  CROSS COVER NOTE    Contacted per Iza RN. Patient with low grade fever. WBC increasing. Per RN report, patient also confused this shift which is not her baseline. Also noted PT note from day shift reporting confusion. RN did discuss with daughter, daughter reports the patient typically will have episodes of confusion when she has a urinary tract infection. Last UA on 10/5/2021 unremarkable. She has had surgery since that time, she did have external cath at one point, no azevedo cath per chart review. Will proceed with non contrasted head CT as well as repeat urinalysis. Will also obtain blood cultures x 2. Further care pending clinical course.       Ayaka Gamino PA-C  Hospitalist Service -- [facility]   Pager: [phone]    10/13/21  03:10 EDT

## 2021-10-13 NOTE — PROGRESS NOTES
Patient Identification:  Name:  Kiah Conway  Age:  76 y.o.  Sex:  female  :  1945  MRN:  [id number]  Visit Number:  [id number]  Primary Care Provider:  Ronny Acevedo APRN    Length of stay:  8    Chief Complaint: f/u fracture, nausea and vomiting.    HPI    Mrs. Conway is a 76 year old female patient who was admitted to [facility] on 10/5/21 for left formal neck fracture. Her past medical history is significant for GERD, NEIL, Follicular lymphoma, Thyroid cancer, Arthritis, Fibromyalgia.     Subjective:      Mrs. Conway is resting in bed, she is having nausea and some epigastric pain, she states she has vomited twice today, this has not been seen by nursing staff. She denies any left hip pain. Discussed with DOLLY hollins no events reported overnight.   ----------------------------------------------------------------------------------------------------------------------  Current Hospital Meds:  acetaminophen, 1,000 mg, Oral, Q8H  amLODIPine, 2.5 mg, Oral, Q24H  atorvastatin, 10 mg, Oral, Daily  busPIRone, 2.5 mg, Oral, BID  cetirizine, 5 mg, Oral, Daily  enoxaparin, 40 mg, Subcutaneous, Q24H  FLUoxetine, 20 mg, Oral, Daily  hydrALAZINE, 10 mg, Oral, Q8H  magnesium sulfate, 4 g, Intravenous, Once  megestrol, 40 mg, Oral, BID  metoprolol tartrate, 75 mg, Oral, Q12H  pantoprazole, 40 mg, Oral, BID AC  potassium chloride, 10 mEq, Intravenous, Q1H  pregabalin, 25 mg, Oral, BID  sodium bicarbonate, 650 mg, Oral, Daily  sodium chloride, 10 mL, Intravenous, Q12H         ----------------------------------------------------------------------------------------------------------------------  Vital Signs:  Temp:  [98.1 °F (36.7 °C)-100 °F (37.8 °C)] 98.9 °F (37.2 °C)  Heart Rate:  [] 91  Resp:  [18-20] 20  BP: (148-168)/(66-78) 164/78      10/05/21  1346 10/05/21  2049   Weight: 53.5 kg (118 lb) 54.1 kg (119 lb 3.2 oz)     Body mass index is 23.28 kg/m².    Intake/Output Summary (Last 24 hours) at 10/13/2021  0918  Last data filed at 10/13/2021 0755  Gross per 24 hour   Intake 1872.64 ml   Output 700 ml   Net 1172.64 ml     I/O this shift:  In: 240 [P.O.:240]  Out: -   Diet Regular  ----------------------------------------------------------------------------------------------------------------------  Physical exam:  Constitutional:  Chronically ill appearing female in no distress  HENT:  Head:  Normocephalic and atraumatic.  Mouth:  Moist mucous membranes.    Eyes:  Conjunctivae and EOM are normal.  Pupils are equal, round, and reactive to light.  No scleral icterus.    Neck:  Neck supple.  No JVD present.    Cardiovascular:  Normal rate, regular rhythm and normal heart sounds with no murmur.  Pulmonary/Chest:  No respiratory distress, no wheezes, no crackles, with normal breath sounds and good air movement.  Abdominal:  Soft.  Bowel sounds are normal.  No distension and no tenderness.   Musculoskeletal:  Left hip dressing intact with no drainage noted.   Neurological:  Alert and oriented to person, place, confused to year No tongue deviation.  No facial droop.  No slurred speech.   Skin:  Skin is warm and dry. No rash noted. No pallor.   ----------------------------------------------------------------------------------------------------------------------  Tele:      SR 90s    No events noted on telemetry since 10/11/21  ----------------------------------------------------------------------------------------------------------------------      Results from last 7 days   Lab Units 10/13/21  0506 10/11/21  2237 10/11/21  0510 10/08/21  0548 10/07/21  0328 10/06/21  1036 10/06/21  1036   WBC 10*3/mm3 9.52 11.72* 9.52   < > 15.04*   < > 13.71*   HEMOGLOBIN g/dL 8.9* 8.8* 9.7*   < > 11.5*   < > 11.1*   HEMATOCRIT % 28.8* 28.6* 31.4*   < > 35.3   < > 34.1   MCV fL 95.7 94.7 94.9   < > 91.5   < > 91.7   MCHC g/dL 30.9* 30.8* 30.9*   < > 32.6   < > 32.6   PLATELETS 10*3/mm3 331 312 264   < > 245   < > 269   INR   --   --   --    --  1.19*  --  1.10    < > = values in this interval not displayed.         Results from last 7 days   Lab Units 10/13/21  0506 10/12/21  1418 10/11/21  2236 10/11/21  0830 10/11/21  0510 10/11/21  0510   SODIUM mmol/L 139  --  139  --   --  140   POTASSIUM mmol/L 3.2* 3.8 3.6  --    < > 2.6*   MAGNESIUM mg/dL 1.8  --  2.5* 1.7  --   --    CHLORIDE mmol/L 105  --  108*  --   --  106   CO2 mmol/L 21.5*  --  13.1*  --   --  14.8*   BUN mg/dL 7*  --  8  --   --  9   CREATININE mg/dL 0.63  --  0.67  --   --  0.65   EGFR IF NONAFRICN AM mL/min/1.73 91  --  85  --   --  88   CALCIUM mg/dL 8.0*  --  8.0*  --   --  8.0*   GLUCOSE mg/dL 84  --  91  --   --  82   ALBUMIN g/dL  --   --  2.84*  --   --   --    BILIRUBIN mg/dL  --   --  0.4  --   --   --    ALK PHOS U/L  --   --  93  --   --   --    AST (SGOT) U/L  --   --  19  --   --   --    ALT (SGPT) U/L  --   --  10  --   --   --     < > = values in this interval not displayed.   Estimated Creatinine Clearance: 49.5 mL/min (by C-G formula based on SCr of 0.63 mg/dL).  No results found for: AMMONIA      No results found for: BLOODCX  No results found for: URINECX  No results found for: WOUNDCX  No results found for: STOOLCX  ----------------------------------------------------------------------------------------------------------------------  Imaging Results (Last 24 Hours)     Procedure Component Value Units Date/Time    XR Chest 1 View [[id number]] Collected: 10/13/21 0417     Updated: 10/13/21 0419    Narrative:      CHEST X-RAY, 10/13/2021 (03:58)      HISTORY:    76-year-old female hospital inpatient with hip fracture. Mental status changes, delirium. Fever.      TECHNIQUE:  AP portable chest x-ray.    COMPARISON:  *  Chest x-ray, 10/5/2021.    FINDINGS:  Diffusely increased and indistinct interstitial markings throughout both lungs suggest mild vascular congestion or volume overload that is new since the 10th 21 exam.    Shallow lung expansion. Chronic elevation right  hemidiaphragm. Heart size is within normal limits. Central venous port catheter remains in good position.      Impression:      Radiographic findings suggesting new mild interstitial edema. Correlate for vascular congestion or volume overload.    Signer Name: Suhail Encarnacion MD   Signed: 10/13/2021 4:17 AM   Workstation Name: [email or web address]    [facility]    CT Head Without Contrast [[id number]] Collected: 10/13/21 0413     Updated: 10/13/21 0415    Narrative:      CT HEAD, NONCONTRAST, 10/13/2021    HISTORY:  76-year-old female hospital inpatient with hip fracture. Noted mental status changes, delirium.    TECHNIQUE:  CT imaging of the head without IV contrast. Radiation dose reduction techniques included automated exposure control. Radiation audit for CT and nuclear cardiology exams in the last 12 months: 3.    COMPARISON:  *  CT head, 4/9/2021.    FINDINGS:  No acute intracranial abnormality is demonstrated. No visible skull fracture. No acute intracranial hemorrhage.    Chronic right cortical and subcortical right occipital infarct. Mild generalized age-appropriate cerebral volume loss. Mild diffuse chronic small vessel type white matter changes. These findings are stable.    No evidence of intracranial mass, mass effect, cerebral edema, extra-axial fluid collection or progressive ventricular enlargement.      Impression:      1.  No acute intracranial abnormality.  2.  Chronic right occipital infarct.  3.  Stable diffuse chronic changes as noted above.  4.  No change since 4/9/2021.    Signer Name: Suhail Encarnacino MD   Signed: 10/13/2021 4:13 AM   Workstation Name: [email or web address]    [facility]        ----------------------------------------------------------------------------------------------------------------------  Assessment and Plan:    Left Formal neck Fracture  -s/p left hip hemiarthroplasty on 10/7/21 by Dr. Selwyn leonard is recommending Lovenox SQ  for DVT prophylaxis for 14 days post operatively   -Weight bearing as tolerated to left lower extremity   -Follow up with Dr. Samano 10 days post op    Normocytic Anemia  -HGB/HCT on admission was  11.4/35.5  -HGB/HCT today is 8.9/28.8  -has not required transfusion post operatively   -hgb has stayed stable since 10/11  -repeat CBC In the am     Acute Hypoxic respiratory failure  -ABG on admission showed PO2 of 49.8 with arterial O2 saturation of 87% on room air.  -CXR on admission showed changes of possible CHF.  ProBNP was within normal limits at 607.2  -CT of the chest with PE protocol showed Tiny left pleural effusion, Subpleural groundglass attenuation of right upper lobe that could represent fibrotic change, atelectasis, early change of COVID-19 pneumonia or potentially PE. While there is no definite visualized PE, certainly subsegmental branch pulmonary arteries are not well evaluated on this study due to bolus timing and motion artifact near the bases.  -Dr. Wing spoke with Dr. hernandez (radiologist) regarding CT findings and their is a high suspicion of PE, changes on CT are new.   -Repeat COVID test is pending  -Doppler of bilateral lower extremities pending  -Dr. Wing discussed with Dr. samano and agreeable to fully anticoagulate due to concern of PE, Lovenox increased to 50 mg SQ BID.     Elevated Anion Gap  Nausea and Vomiting  -KUB on 10/10/21 negative  -Anion gap closed today at   -Bicarb is 21.5  -Sodium bicarbonate 650 mg PO daily started on 10/12  -Lipase 200 today   -CT of the A/P with IV contrast 10/13 showed a very distended urinary bladder. Bladder scan has been ordered  -Will continue to monitor, no mention of pancreatitis on CT today  -Repeat CMP and lipase in the am     Fibromyalgia   Arthritis   -Lyrica 25 mg PO BID   -supportive care    HTN  Hx of PSVT  HLD  -/78 HR 91  -TTE report reviewed and listed above, EF 61-65%, grade I diastolic dysfunction  -Cardiology saw her on 10/11  and recommended keeping electrolytes replete, and starting Lopressor 50mg PO BID for rate control.   Lipitor 10 mg PO daily   -Hydralazine 10mg PO TID with holding parameters   -Norvasc 2.5 mg PO daily   -Lopressor 75mg PO BID ordered  -HR has been controlled today, monitor on telemetry     Anxiety  Depression  -Continue Buspar 2.5mg PO BID  -Continue prozac 20mg PO daily   -supportive care     Hx of Follicular lymphoma   Hx of thyroid cancer in remission s/p surgery     GERD  -continue protonix 40mg PO daily     DVT prophylaxis: Lovenox 40mg  SQ Daily     Disposition: was denied inpatient rehab, daughter has refused SNF for short term rehab, will await PT evaluation today for further plan.     The patient is high risk due to the following diagnoses/reasons:  Possible PE, IV pain medication    *I attempted to call her daughter, Eunice Sharif at 1440 today with no answer*    Perla Falcon, APRN  10/13/21  09:18 EDT

## 2021-10-13 NOTE — NURSING NOTE
Pt is confused, and per patient's daughter, pt becomes that way when she has a UTI. Pt is also running a low grade fever. Notified AYDEN Marley, who will order a urinalysis. Pt's daughter was also concerned that pt needed a chest xray; however, pt's lung sounds are clear and pt was not SOA in my presence. She was also concerned that the physician has not called her to update her on pt's condition. I told her I'd write a sticky note and pass along the info to the day nurse.     0426: Wrote a sticky note to physician requesting an update call be made to pt's daughter.

## 2021-10-13 NOTE — PLAN OF CARE
Goal Outcome Evaluation:           Progress: no change  Outcome Summary: Patient has rested in bed most of today, patient was able to work with PT, currently on 2LNC, CT scan of chest and abdomen have been done, dopplers of BLE have been ordrered.

## 2021-10-13 NOTE — PLAN OF CARE
Goal Outcome Evaluation:  Plan of Care Reviewed With: patient        Progress: no change    Pt still confused, is running a low grade fever. No other changes.

## 2021-10-13 NOTE — CASE MANAGEMENT/SOCIAL WORK
Discharge Planning Assessment   Jered     Patient Name: Kiah Conway  MRN: [id number]  Today's Date: 10/13/2021    Admit Date: 10/5/2021     Discharge Needs Assessment    No documentation.                Discharge Plan     Row Name 10/13/21 1707       Plan    Plan Pt was admitted on 10/05/21. SS spoke with Pt on this date. Pt refused inpt rehab or SNF placement. Pt and family prefer for Pt to return home with home health services. Pt lives with her daughter, Eunice. Pt has a standard walker and wheelchair for DME needs. SS to follow.                Monica Ha

## 2021-10-14 NOTE — THERAPY TREATMENT NOTE
Acute Care - Physical Therapy Treatment Note  [facility]     Patient Name: Kiah Conway  : 1945  MRN: [id number]  Today's Date: 10/14/2021   Onset of Illness/Injury or Date of Surgery: 10/05/21  Visit Dx:     ICD-10-CM ICD-9-CM   1. Closed fracture of left hip, initial encounter ([facility])  S72.002A 820.8     Patient Active Problem List   Diagnosis   • Essential hypertension   • Atrophic urethritis   • GERD without esophagitis   • Fibromyalgia   • Arthritis   • Dysphagia   • History of rheumatic fever   • Migraines   • History of thyroid cancer   • Normocytic anemia   • Femur fracture (HCC)   • Closed fracture of left hip ([facility])     Past Medical History:   Diagnosis Date   • Arthritis    • Cardiac disorder    • Diverticulitis    • Dysphagia    • Fibromyalgia    • Follicular lymphoma (HCC)     Remisson    • GERD (gastroesophageal reflux disease)    • History of rheumatic fever    • Hypertension    • Hypokalemia due to inadequate potassium intake 2014   • Lipoma    • Malignant neoplasm (HCC)    • Migraine    • Neutropenia (HCC) 2019   • Obstructive sleep apnea 2016   • Prolonged Q-T interval on ECG 2019   • Recurrent UTI    • Rheumatoid arthritis (HCC)    • Sleep apnea    • Thyroid cancer (HCC)      Past Surgical History:   Procedure Laterality Date   • BLADDER SURGERY     • FINGER SURGERY      reattached    • HIP HEMIARTHROPLASTY Left 10/7/2021    Procedure: HIP HEMIARTHROPLASTY BIOMET instrumentation  10/7/2021 1300 Dr. Samano;  Surgeon: Jonah Samano MD;  Location: [facility];  Service: Orthopedics;  Laterality: Left;   • HYSTERECTOMY     • THYROID SURGERY       PT Assessment (last 12 hours)     PT Evaluation and Treatment     Row Name 10/14/21 1708          Physical Therapy Time and Intention    Subjective Information no complaints  -CW     Document Type therapy note (daily note)  -CW     Mode of Treatment physical therapy  -CW     Patient Effort adequate  -CW     Symptoms Noted  During/After Treatment fatigue  -CW     Comment Patient agreeable to physical therapy treatment this date. She reports that she is ready to return home. Nursing reports she was minimally ambulatory prior to this admission.  -     Row Name 10/14/21 1708          General Information    Patient Profile Reviewed yes  -CW     Patient Observations alert; cooperative  -CW     Barriers to Rehab cognitive status; previous functional deficit  -     Row Name 10/14/21 1708          Cognition    Affect/Mental Status (Cognitive) confused  -     Orientation Status (Cognition) oriented to; person; place  -CW     Follows Commands (Cognition) verbal cues/prompting required; increased processing time needed; physical/tactile prompts required; repetition of directions required; follows one-step commands  -     Row Name 10/14/21 1708          Pain Scale: Word Pre/Post-Treatment    Pre/Posttreatment Pain Comment Patient reports minimal pain consistent with recent Hemiarthroplasty.  -     Pain Intervention(s) Repositioned  -     Row Name 10/14/21 1708          Bed Mobility    Bed Mobility supine-sit; sit-supine  -     Supine-Sit [place] (Bed Mobility) minimum assist (75% patient effort); moderate assist (50% patient effort)  -CW     Bed Mobility, Safety Issues decreased use of arms for pushing/pulling; decreased use of legs for bridging/pushing  -     Assistive Device (Bed Mobility) bed rails; head of bed elevated  -     Row Name 10/14/21 1708          Transfers    Transfers sit-stand transfer; stand-sit transfer; bed-chair transfer  -     Bed-Chair [place] (Transfers) moderate assist (50% patient effort); verbal cues; nonverbal cues (demo/gesture)  -     Assistive Device (Bed-Chair Transfers) other (see comments)  HHA  -     Sit-Stand [place] (Transfers) minimum assist (75% patient effort); verbal cues; nonverbal cues (demo/gesture)  -CW     Stand-Sit [place] (Transfers) minimum assist (75%  patient effort); verbal cues; nonverbal cues (demo/gesture)  -CW     Row Name 10/14/21 1708          Sit-Stand Transfer    Assistive Device (Sit-Stand Transfers) walker, front-wheeled  -CW     Row Name 10/14/21 1708          Stand-Sit Transfer    Assistive Device (Stand-Sit Transfers) walker, front-wheeled  -CW     Row Name             Wound 10/07/21 1537 Left lateral hip Incision    Wound - Properties Group Placement Date: 10/07/21  -WM Placement Time: 1537  -WM Present on Hospital Admission: N  -WM Side: Left  -WM Orientation: lateral  -WM Location: hip  -WM Primary Wound Type: Incision  -WM Additional Comments: opsite closed with suture & staples dressed with optifoam dressing & abduction pillow  -WM     Retired Wound - Properties Group Date first assessed: 10/07/21  -WM Time first assessed: 1537  -WM Present on Hospital Admission: N  -WM Side: Left  -WM Location: hip  -WM Primary Wound Type: Incision  -WM Additional Comments: opsite closed with suture & staples dressed with optifoam dressing & abduction pillow  -WM     Row Name 10/14/21 1708          Coping    Observed Emotional State calm; cooperative  -CW     Trust Relationship/Rapport care explained; choices provided; questions answered; thoughts/feelings acknowledged  -CW     Row Name 10/14/21 1708          Plan of Care Review    Plan of Care Reviewed With patient  -CW     Outcome Summary Patient demonstrates improved ability to achieve bed to chair transfer this date, but she continues to be confused. Nursing reports that patient was minimally ambulatory prior to this episode of care. Patient's prognosis for achieving safe/independent ambulation is doubtful at this time. She is suitable for dc home with assist or SNF.  -CW     Row Name 10/14/21 1708          Positioning and Restraints    Pre-Treatment Position in bed  -CW     Post Treatment Position chair  -CW     In Chair notified nsg; sitting; call light within reach; encouraged to call for assist; exit  alarm on; with nsg  -CW     Row Name 10/14/21 1706          Therapy Plan Review/Discharge Plan (PT)    Therapy Plan Review (PT) evaluation/treatment results reviewed  -JAROCHO           User Key  (r) = Recorded By, (t) = Taken By, (c) = Cosigned By    Initials Name Provider Type    Theodore Nam RN Registered Nurse    Daniel Orellana PT Physical Therapist                PT Recommendation and Plan  Anticipated Discharge Disposition (PT): skilled nursing facility, home with assist  Plan of Care Reviewed With: patient  Outcome Summary: Patient demonstrates improved ability to achieve bed to chair transfer this date, but she continues to be confused. Nursing reports that patient was minimally ambulatory prior to this episode of care. Patient's prognosis for achieving safe/independent ambulation is doubtful at this time. She is suitable for dc home with assist or SNF.       Time Calculation:    PT Charges     Row Name 10/14/21 1568             Time Calculation    PT Received On 10/14/21  -CW              Time Calculation- PT    Total Timed Code Minutes- PT 39 minute(s)  -JAROCHO            User Key  (r) = Recorded By, (t) = Taken By, (c) = Cosigned By    Initials Name Provider Type    Daniel Orellana PT Physical Therapist              Therapy Charges for Today     Code Description Service Date Service Provider Modifiers Qty    [id number]  PT THERAPEUTIC ACT EA 15 MIN 10/14/2021 Daniel Dent PT GP 3               Daniel Dent PT  10/14/2021

## 2021-10-14 NOTE — PLAN OF CARE
Goal Outcome Evaluation:              Outcome Summary: Patient complained of nausea and pain PRN medications given, VSS, no other complaints or request at this time, Will continue to monitor.

## 2021-10-14 NOTE — PLAN OF CARE
Goal Outcome Evaluation:  Plan of Care Reviewed With: patient           Outcome Summary: Patient demonstrates improved ability to achieve bed to chair transfer this date, but she continues to be confused. Nursing reports that patient was minimally ambulatory prior to this episode of care. Patient's prognosis for achieving safe/independent ambulation is doubtful at this time. She is suitable for dc home with assist or SNF.

## 2021-10-14 NOTE — CASE MANAGEMENT/SOCIAL WORK
Discharge Planning Assessment   Jered     Patient Name: Kiah Conway  MRN: [id number]  Today's Date: 10/14/2021    Admit Date: 10/5/2021     Discharge Needs Assessment    No documentation.                Discharge Plan     Row Name 10/14/21 1617       Plan    Plan Pt was admitted on 10/05/21. Pt lives with her daughter, Eunice and plans to return there at discharge. Pt and family have refused inpt rehab and SNF, but are agreeable to home health services. Pt does not utilize home health services at this time.  Pt has a standard walkerand wheelchair for DME needs. SS to follow.                Monica Ha

## 2021-10-14 NOTE — CONSULTS
Consulting physician:  Dr. Cope    Referring physician: hospitalist    Date of consultation: 10/14/21     Chief complaint nausea and vomiting    Subjective     Patient is a 76 y.o. female who was admitted on 10/5/2021 after she fell in the restroom after an EGD at the outpatient surgery center and broke her hip.  Initial H&P and some notes report that the patient had a colonoscopy but the surgery center was called directly and patient did have an EGD.  She did have some biopsies done which demonstrated gastritis.  Note is not available for release as Dr. Luciano has not signed off on that.  Patient states that the indication for the EGD was abdominal pain in speaking with the patient she has been having issues of chronic nausea and vomiting although she thinks it may be a little worse.  Patient is a poor historian.  Per the nursing staff the patient is also having several bowel movements a day.  Patient states this is not her norm.  The nursing staff also reports that when the patient sits up she appears to have some reflux and spits up a little bit.  She has not had any large amounts of vomiting prior nursing staff and today she has not required anything for nausea.  According to the nursing staff the patient's daughter feels that she is close to baseline.  At baseline she has poor appetite.  Patient did have a CT of the abdomen and pelvis on 10/13/2021 which demonstrated a markedly distended bladder but no other significant findings.      Review of Systems  Review of Systems - General ROS: Poor appetite  Psychological ROS: negative for - behavioral disorder  Ophthalmic ROS: negative for - dry eyes  ENT ROS: negative for - vertigo or vocal changes  Hematological and Lymphatic ROS: negative for - swollen lymph nodes, DVT, PE.   Respiratory ROS: negative for - sputum changes or stridor.  Cardiovascular ROS: negative for - irregular heartbeat or murmur  Gastrointestinal ROS: Positive for diarrhea  Genito-Urinary  ROS: negative for - hematuria or incontinence  Musculoskeletal ROS: Positive for - gait disturbance      History  Past Medical History:   Diagnosis Date   • Arthritis    • Cardiac disorder    • Diverticulitis    • Dysphagia    • Fibromyalgia    • Follicular lymphoma (HCC)     Remisson    • GERD (gastroesophageal reflux disease)    • History of rheumatic fever    • Hypertension    • Hypokalemia due to inadequate potassium intake 03/03/2014   • Lipoma    • Malignant neoplasm (HCC)    • Migraine    • Neutropenia (HCC) 11/7/2019   • Obstructive sleep apnea 7/14/2016   • Prolonged Q-T interval on ECG 11/7/2019   • Recurrent UTI    • Rheumatoid arthritis (HCC)    • Sleep apnea    • Thyroid cancer (HCC)      Past Surgical History:   Procedure Laterality Date   • BLADDER SURGERY     • FINGER SURGERY      reattached    • HIP HEMIARTHROPLASTY Left 10/7/2021    Procedure: HIP HEMIARTHROPLASTY BIOMET instrumentation  10/7/2021 1300 Dr. Samano;  Surgeon: Jonah Samano MD;  Location: [facility];  Service: Orthopedics;  Laterality: Left;   • HYSTERECTOMY     • THYROID SURGERY       Family History   Problem Relation Age of Onset   • Diabetes Mother    • Hypertension Mother    • Other Other         cardiac disorder,cardiovascular disease, malignant neoplasm of brain   • Breast cancer Paternal Aunt      Social History     Tobacco Use   • Smoking status: Never Smoker   • Smokeless tobacco: Never Used   Substance Use Topics   • Alcohol use: No   • Drug use: No     Medications Prior to Admission   Medication Sig Dispense Refill Last Dose   • amLODIPine (NORVASC) 2.5 MG tablet Take 1 tablet by mouth Daily. 30 tablet 0 10/5/2021 at am   • aspirin 81 MG EC tablet Take 81 mg by mouth Daily.   10/5/2021 at am   • atorvastatin (LIPITOR) 10 MG tablet Take 1 tablet by mouth Daily. 30 tablet 11 10/5/2021 at Unknown time   • busPIRone (BUSPAR) 5 MG tablet Take 2.5 mg by mouth 2 (Two) Times a Day.   10/5/2021 at am   • Calcium Polycarbophil  (FIBER-CAPS PO) Take 1 capsule by mouth Daily.   10/5/2021 at am   • dexlansoprazole (DEXILANT) 60 MG capsule Take 60 mg by mouth Daily.   10/5/2021 at am   • FLUoxetine (PROzac) 20 MG capsule Take 20 mg by mouth Daily.   10/5/2021 at am   • hydrALAZINE (APRESOLINE) 10 MG tablet Take 1 tablet by mouth Every 8 (Eight) Hours. (Patient taking differently: Take 10 mg by mouth Every 8 (Eight) Hours. Prior to [facility] Admission, Patient was on:   HOLD if SBP less than 140 or DBP less than 80) 90 tablet 0 10/5/2021 at am   • HYDROcodone-acetaminophen (NORCO)  MG per tablet Take 1 tablet by mouth 3 (Three) Times a Day As Needed for Moderate Pain .   10/5/2021 at am   • loratadine (CLARITIN) 10 MG tablet Take 10 mg by mouth Daily.   10/5/2021 at am   • megestrol (MEGACE) 40 MG tablet Take 40 mg by mouth 2 (Two) Times a Day.   10/5/2021 at am   • metoprolol tartrate (LOPRESSOR) 50 MG tablet Take 50 mg by mouth 2 (Two) Times a Day.   10/5/2021 at am   • nitrofurantoin (MACRODANTIN) 50 MG capsule Take 50 mg by mouth Daily.   10/5/2021 at am   • NON FORMULARY Apply 1 dose topically to the appropriate area as directed As Needed (Compounded Pain cream: Ingredients: 2.5% ketamine, 2.5% lidocaine, 2.5% prilocaine, 0.09% meloxicam (120 grams total)). Prior to [facility] Admission, Patient was on: Rx wrote to use 2-4 grams to affected area 3-4 times daily. Pt caregiver reports using PRN every 1-2 weeks.   Past Week at Unknown time   • phenylephrine (SUDAFED PE) 10 MG tablet Take 20 mg by mouth 2 (two) times a day.   10/5/2021 at Unknown time   • pregabalin (LYRICA) 25 MG capsule Take 1 capsule by mouth 2 (Two) Times a Day. 30 capsule 0 10/5/2021 at am   • Probiotic Product (Align) capsule Take 1 capsule by mouth Daily.   10/5/2021 at am   • promethazine (PHENERGAN) 12.5 MG tablet Take 12.5 mg by mouth 2 (Two) Times a Day As Needed for Nausea or Vomiting.   Past Week at Unknown time     Allergies:  Baclofen    Objective     Vital  Signs  Temp:  [97.7 °F (36.5 °C)-99 °F (37.2 °C)] 97.8 °F (36.6 °C)  Heart Rate:  [82-92] 85  Resp:  [16-20] 18  BP: (124-169)/(64-74) 124/72    Physical Exam:  General:  This is a WD WN in frail-appearing female in no acute distress  Vital signs stable, afebrile  HEENT exam:  WNL. Sclerae are anicteric.  EOMI  Neck:  supple, FROM.  No JVD.  Trachea midline  Lungs:  Respiratory effort normal. Auscultation: Clear, without wheezes, rhonchi, rales  Heart:  Regular rate and rhythm, without murmur, gallop, rub.  No pedal edema  Abdomen: Bowel sounds are hypoactive.  Patient reports tenderness to palpation in all quadrants of the abdomen, none greater than another.  There is no palpable mass.  There is no rebound or guarding  Psych:  alert, oriented x 3.  Mood and affect are appropriate  skin:  Warm with good turgor.  Without rash or lesion      Results Review:       Results from last 7 days   Lab Units 10/14/21  0524 10/13/21  0506 10/11/21  2237   WBC 10*3/mm3 8.06 9.52 11.72*   HEMOGLOBIN g/dL 8.5* 8.9* 8.8*   HEMATOCRIT % 27.4* 28.8* 28.6*   PLATELETS 10*3/mm3 385 331 312         Results from last 7 days   Lab Units 10/14/21  1019 10/14/21  0524 10/13/21  1911 10/13/21  0506 10/13/21  0506 10/12/21  1418 10/11/21  2236   SODIUM mmol/L  --  137  --   --  139  --  139   POTASSIUM mmol/L 4.2 4.2 3.2*   < > 3.2*   < > 3.6   MAGNESIUM mg/dL  --  2.2  --   --  1.8  --  2.5*   CHLORIDE mmol/L  --  105  --   --  105  --  108*   CO2 mmol/L  --  20.0*  --   --  21.5*  --  13.1*   BUN mg/dL  --  8  --   --  7*  --  8   CREATININE mg/dL  --  0.69  --   --  0.63  --  0.67   EGFR IF NONAFRICN AM mL/min/1.73  --  82  --   --  91  --  85   CALCIUM mg/dL  --  7.9*  --   --  8.0*  --  8.0*   GLUCOSE mg/dL  --  82  --   --  84  --  91   ALBUMIN g/dL  --  2.48*  --   --   --   --  2.84*   BILIRUBIN mg/dL  --  0.4  --   --   --   --  0.4   ALK PHOS U/L  --  92  --   --   --   --  93   AST (SGOT) U/L  --  17  --   --   --   --  19   ALT  (SGPT) U/L  --  8  --   --   --   --  10    < > = values in this interval not displayed.   Estimated Creatinine Clearance: 49.5 mL/min (by C-G formula based on SCr of 0.69 mg/dL).  No results found for: AMMONIA      Blood Culture   Date Value Ref Range Status   10/13/2021 No growth at 24 hours  Preliminary   10/13/2021 No growth at 24 hours  Preliminary     No results found for: URINECX  No results found for: WOUNDCX  No results found for: STOOLCX    Imaging:  Imaging Results (Last 24 Hours)     Procedure Component Value Units Date/Time    US Venous Doppler Lower Extremity Bilateral (duplex) [[id number]] Collected: 10/14/21 1114     Updated: 10/14/21 1116    Narrative:      EXAMINATION: US VENOUS DOPPLER LOWER EXTREMITY BILATERAL (DUPLEX)-      CLINICAL INDICATION:  Concern for PE; S72.002A-Fracture of unspecified  part of neck of left femur, initial encounter for closed fracture     TECHNIQUE: Multiplanar gray scale and Doppler vascular sonographic  imaging of the deep veins of BILATERAL lower extremity, without and with  compression.      COMPARISON: NONE      FINDINGS:   Deep Veins: The visualized deep veins demonstrate flow and are  compressible. No evidence of deep venous thrombosis.   Superficial veins: Unremarkable. Saphenofemoral junction is patent  without thrombus.  Soft tissues: Unremarkable.       Impression:      No DVT.     This report was finalized on 10/14/2021 11:14 AM by Dr. Jonah Salgado MD.           CT abdomen report and images were reviewed.  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  Given the fact that the patient has had an EGD by Dr. Lcuiano with biopsy 10 days ago I do not see an indication to repeat at this time.  Continue medical management for the gastritis.    Plan:  As the diarrhea does appear to be different from the patient's baseline will order PCR to check for etiologies.      Discussion:      Deneen Cope MD  10/14/21  16:44 EDT    Time: Time spent:    Please note that portions of this note were completed with a voice recognition program.

## 2021-10-14 NOTE — PLAN OF CARE
Goal Outcome Evaluation:      Pt worked with physical therapy to get up to the chair at bedside. Pt tolerated well and continues to sit in chair for now. Pt is on room air and tolerating well. Multiple BM's today and MD/NP was notified. No complaints at this time.

## 2021-10-14 NOTE — PROGRESS NOTES
Patient Identification:  Name:  Kiah Conway  Age:  76 y.o.  Sex:  female  :  1945  MRN:  [id number]  Visit Number:  [id number]  Primary Care Provider:  Ronny Acevedo APRN    Length of stay:  9    Chief Complaint: f/u fracture, possible PE    HPI     Mrs. Conway is a 76 year old female patient who was admitted to [facility] on 10/5/21 for left formal neck fracture. Her past medical history is significant for GERD, NEIL, Follicular lymphoma, Thyroid cancer, Arthritis, Fibromyalgia.     Subjective:      Mrs. Conway is doing well this morning, she denies any vomiting but has had nausea. No events on telemetry. Her pain is controlled. Dicussed with DOLLY Evans.    Called and discussed patients care, test and clinical status with her daughter, Eunice via phone for 20 minutes.   ----------------------------------------------------------------------------------------------------------------------  Current [facility] Meds:  acetaminophen, 1,000 mg, Oral, Q8H  amLODIPine, 2.5 mg, Oral, Q24H  atorvastatin, 10 mg, Oral, Daily  busPIRone, 2.5 mg, Oral, BID  cetirizine, 5 mg, Oral, Daily  enoxaparin, 1 mg/kg, Subcutaneous, Q12H  FLUoxetine, 20 mg, Oral, Daily  hydrALAZINE, 10 mg, Oral, Q8H  megestrol, 40 mg, Oral, BID  metoprolol tartrate, 75 mg, Oral, Q12H  pantoprazole, 40 mg, Oral, BID AC  pregabalin, 25 mg, Oral, BID  sodium bicarbonate, 650 mg, Oral, Daily  sodium chloride, 10 mL, Intravenous, Q12H         ----------------------------------------------------------------------------------------------------------------------  Vital Signs:  Temp:  [97.7 °F (36.5 °C)-99.1 °F (37.3 °C)] 98.6 °F (37 °C)  Heart Rate:  [82-92] 92  Resp:  [16-20] 20  BP: (138-180)/(64-74) 163/72      10/05/21  1346 10/05/21  2049   Weight: 53.5 kg (118 lb) 54.1 kg (119 lb 3.2 oz)     Body mass index is 23.28 kg/m².    Intake/Output Summary (Last 24 hours) at 10/14/2021 0858  Last data filed at 10/14/2021 0552  Gross per 24 hour   Intake  908.88 ml   Output --   Net 908.88 ml     No intake/output data recorded.  Diet Regular  ----------------------------------------------------------------------------------------------------------------------  Physical exam:    Constitutional:  Chronically ill appearing female in no distress  HENT:  Head:  Normocephalic and atraumatic.  Mouth:  Moist mucous membranes.    Eyes:  Pupils are equal, round, and reactive to light.  No scleral icterus.    Neck:  Neck supple.  No JVD present.    Cardiovascular:  Normal rate, regular rhythm and normal heart sounds with no murmur.  Pulmonary/Chest:  No respiratory distress, no wheezes, no crackles, with normal breath sounds and good air movement.  Abdominal:  Soft.  Bowel sounds are normal.  No distension and no tenderness.   Musculoskeletal:  Left hip dressing intact with no drainage noted.   Neurological:  Alert and oriented to person, place, confused to year No tongue deviation.  No facial droop.  No slurred speech.   Skin:  Skin is warm and dry. No rash noted. No pallor.     Assessment unchanged from yesterday     ----------------------------------------------------------------------------------------------------------------------  Tele:      SR 88    No telemetry alarms since 10/11/21  ----------------------------------------------------------------------------------------------------------------------      Results from last 7 days   Lab Units 10/14/21  0524 10/13/21  0506 10/11/21  2237   WBC 10*3/mm3 8.06 9.52 11.72*   HEMOGLOBIN g/dL 8.5* 8.9* 8.8*   HEMATOCRIT % 27.4* 28.8* 28.6*   MCV fL 95.1 95.7 94.7   MCHC g/dL 31.0* 30.9* 30.8*   PLATELETS 10*3/mm3 385 331 312         Results from last 7 days   Lab Units 10/14/21  0524 10/13/21  1911 10/13/21  0506 10/12/21  1418 10/11/21  2236   SODIUM mmol/L 137  --  139  --  139   POTASSIUM mmol/L 4.2 3.2* 3.2*   < > 3.6   MAGNESIUM mg/dL 2.2  --  1.8  --  2.5*   CHLORIDE mmol/L 105  --  105  --  108*   CO2 mmol/L 20.0*  --   21.5*  --  13.1*   BUN mg/dL 8  --  7*  --  8   CREATININE mg/dL 0.69  --  0.63  --  0.67   EGFR IF NONAFRICN AM mL/min/1.73 82  --  91  --  85   CALCIUM mg/dL 7.9*  --  8.0*  --  8.0*   GLUCOSE mg/dL 82  --  84  --  91   ALBUMIN g/dL 2.48*  --   --   --  2.84*   BILIRUBIN mg/dL 0.4  --   --   --  0.4   ALK PHOS U/L 92  --   --   --  93   AST (SGOT) U/L 17  --   --   --  19   ALT (SGPT) U/L 8  --   --   --  10    < > = values in this interval not displayed.   Estimated Creatinine Clearance: 49.5 mL/min (by C-G formula based on SCr of 0.69 mg/dL).  No results found for: AMMONIA      Blood Culture   Date Value Ref Range Status   10/13/2021 No growth at 24 hours  Preliminary   10/13/2021 No growth at 24 hours  Preliminary     No results found for: URINECX  No results found for: WOUNDCX  No results found for: STOOLCX  ----------------------------------------------------------------------------------------------------------------------  Imaging Results (Last 24 Hours)     Procedure Component Value Units Date/Time    CT Abdomen Pelvis With Contrast [[id number]] Collected: 10/13/21 1427     Updated: 10/13/21 1429    Narrative:      EXAM:    CT Abdomen and Pelvis With Intravenous Contrast     EXAM DATE:    10/13/2021 1:03 PM     CLINICAL HISTORY:    nausea, vomiting, epigastric pain; S72.002A-Fracture of unspecified  part of neck of left femur, initial encounter for closed fracture     TECHNIQUE:    Axial computed tomography images of the abdomen and pelvis with  intravenous contrast.  Sagittal and coronal reformatted images were  created and reviewed.  This CT exam was performed using one or more of  the following dose reduction techniques:  automated exposure control,  adjustment of the mA and/or kV according to patient size, and/or use of  iterative reconstruction technique.     COMPARISON:    No relevant prior studies available.     FINDINGS:    LUNG BASES:  Unremarkable.  No mass.  No consolidation.    PLEURAL SPACE:   Tiny left pleural effusion.      ABDOMEN:    LIVER:  Unremarkable.  No mass.    GALLBLADDER AND BILE DUCTS:  Unremarkable.  No calcified stones.  No  ductal dilation.    PANCREAS:  Unremarkable.  No mass.  No ductal dilation.    SPLEEN:  Unremarkable.  No splenomegaly.    ADRENALS:  Unremarkable.  No mass.    KIDNEYS AND URETERS:  Unremarkable.  No solid mass.  No  hydronephrosis.    STOMACH AND BOWEL:  Unremarkable.  No obstruction.  No mucosal  thickening.      PELVIS:    APPENDIX:  No findings to suggest acute appendicitis.    BLADDER:  Very distended urinary bladder.    REPRODUCTIVE:  Unremarkable as visualized.      ABDOMEN and PELVIS:    INTRAPERITONEAL SPACE:  Unremarkable.  No free air.  No significant  fluid collection.    BONES/JOINTS:  No acute fracture.  No dislocation.    SOFT TISSUES:  Unremarkable.    VASCULATURE:  Atherosclerotic vascular calcification.  No abdominal  aortic aneurysm.    LYMPH NODES:  Unremarkable.  No enlarged lymph nodes.       Impression:        Very distended urinary bladder.     This report was finalized on 10/13/2021 2:27 PM by Dr. Santosh Andrade MD.       CT Chest Pulmonary Embolism [[id number]] Collected: 10/13/21 1329     Updated: 10/13/21 1333    Narrative:      EXAM:    CT Angiography Chest With Intravenous Contrast     EXAM DATE:    10/13/2021 12:47 PM     CLINICAL HISTORY:    r/o PE; S72.002A-Fracture of unspecified part of neck of left femur,  initial encounter for closed fracture     TECHNIQUE:    Axial computed tomographic angiography images of the chest with  intravenous contrast.  This CT exam was performed using one or more of  the following dose reduction techniques:  automated exposure control,  adjustment of the mA and/or kV according to patient size, and/or use of  iterative reconstruction technique.    MIP reconstructed images were created and reviewed.     COMPARISON:    No relevant prior studies available.     FINDINGS:    Pulmonary arteries:  See below.       Aorta:  No acute findings.  No thoracic aortic aneurysm.    Lungs:  Subpleural groundglass attenuation of right upper lobe that  could represent fibrotic change, atelectasis, early change of COVID-19  pneumonia or potentially PE. While there is no definite visualized PE,  certainly subsegmental branch pulmonary arteries are not well evaluated  on this study due to bolus timing and motion artifact near the bases.   Bibasilar atelectasis.  No mass.    Pleural space:  Tiny left pleural effusion.  No pneumothorax.    Heart:  Unremarkable.  No cardiomegaly.  No significant pericardial  effusion.  No evidence of RV dysfunction.    Bones/joints:  No acute fracture.  No dislocation.    Soft tissues:  Unremarkable.    Lymph nodes:  Unremarkable.  No enlarged lymph nodes.       Impression:      1.  Tiny left pleural effusion.  2.  Subpleural groundglass attenuation of right upper lobe that could  represent fibrotic change, atelectasis, early change of COVID-19  pneumonia or potentially PE. While there is no definite visualized PE,  certainly subsegmental branch pulmonary arteries are not well evaluated  on this study due to bolus timing and motion artifact near the bases.     This report was finalized on 10/13/2021 1:31 PM by Dr. Santosh Andrade MD.           ----------------------------------------------------------------------------------------------------------------------  Assessment and Plan:      Left Formal neck Fracture  -s/p left hip hemiarthroplasty on 10/7/21 by Dr. Samano  -ortho is recommending Lovenox SQ for DVT prophylaxis for 14 days post operatively, no receiving full dose anticoagulation, see below.  -Weight bearing as tolerated to left lower extremity   -Follow up with Dr. Samano 10 days post op  -spoke with her daughter via phone today they continue to refuse rehab or SNF placement, she states at baseline her mother has falls and can not walk independently, even needs much assistance with a walker and does  not transfer independently. She states she has plenty of help at home. I have discused with her the likely payne that this fracture and her baseline physical ability and going home could result in a bedfast state and she v/u.      Normocytic Anemia  -HGB/HCT on admission was  11.4/35.5  -HGB/HCT today is 8.5/27.4  -has not required transfusion post operatively   -hgb has stayed stable since 10/11  -repeat CBC In the am      Acute Hypoxic respiratory failure  Possible PE  -ABG on admission showed PO2 of 49.8 with arterial O2 saturation of 87% on room air.  -CXR on admission showed changes of possible CHF.  ProBNP was within normal limits at 607.2  -CT of the chest with PE protocol showed Tiny left pleural effusion, Subpleural groundglass attenuation of right upper lobe that could represent fibrotic change, atelectasis, early change of COVID-19 pneumonia or potentially PE. While there is no definite visualized PE, certainly subsegmental branch pulmonary arteries are not well evaluated on this study due to bolus timing and motion artifact near the bases.  -Dr. Wing spoke with Dr. andrade (radiologist) regarding CT findings and their is a high suspicion of PE, changes on CT are new, Dr. Andrade did not feel like a VQ lung scan would be beneficial at this time.   -Repeat COVID test on 10/13 was negative  -Doppler of bilateral lower extremities negative for DVT  -Dr. Wing discussed with Dr. mina and agreeable to fully anticoagulate due to concern of PE, Lovenox increased to 50 mg SQ BID on 10/14/21  -Doing well on my exam today, is on room air, Sp02 94-96%. No tachycardia.      Acute Confusion/Delirium vs Dementia  -Patient is alert and oriented to self and place, confused to year and month  -CT of the head done earlier in the stay and showed no acute changes  -she is able to answer most questions appropriately during conversation  -her daughter states every now and then she does get confused at home  -no concerns at  present for acute cva or neuro event, likely has underlying dementia and receiving pan medication and hospital stay.     Elevated Anion Gap  Chronic Nausea and Vomiting  -KUB on 10/10/21 negative  -Anion gap closed today at   -Bicarb is 20.0  -Sodium bicarbonate 650 mg PO daily started on 10/12  -Lipase 200 on 10/13 and down to 182 today, no mention of concern for pancreatitis on CT 10/13  -CT of the A/P with IV contrast 10/13 showed a very distended urinary bladder, bladder scan was 696 ml, she was able to void, PVR was 124 ml, q4hr bladder scans ordered with protocol to straight cath if needed  -Repeat CMP remains stable this am  -lipase trended down to 182  -has had nausea but no vomiting.   -She did have a EGD on the day of admission when she fell prior to the procedure, her daughter tells me that she has been having nausea and intermittent vomiting since around April of this year, she states that she was told the findings on her EGD were that she had a tiny hiatal hernia and her Prilosec was stopped and she was started on Dexilant 60 mg p.o. daily.  -H.Pylori ordered. Unlikely GB related given no findings on CT and normal LFTs/Total bili. I dicsused with her daughter today that if further work up is negative would recommend she see her oncology group ASAP once discharged.      Fibromyalgia   Arthritis   -Lyrica 25 mg PO BID   -supportive care     HTN  Hx of PSVT  HLD  -/72 HR 92  -TTE report reviewed and listed above, EF 61-65%, grade I diastolic dysfunction  -Cardiology saw her on 10/11 and recommended keeping electrolytes replete, and starting Lopressor 50mg PO BID for rate control.   Lipitor 10 mg PO daily   -Hydralazine 10mg PO TID with holding parameters, BPs have been running higher   -Norvasc 2.5 mg PO daily   -Lopressor 75mg PO BID ordered  -HR has been controlled today, monitor on telemetry      Anxiety  Depression  -Continue Buspar 2.5mg PO BID  -Continue prozac 20mg PO daily    -supportive care       Hx of Follicular lymphoma   Hx of thyroid cancer in remission s/p surgery      GERD  -continue protonix 40mg PO daily      DVT prophylaxis: Lovenox 50mg SQ BID    Dietary Orders (From admission, onward)     Start     Ordered    10/11/21 1200  Dietary Nutrition Supplements Boost Plus (Ensure Enlive, Ensure Plus)  Daily With Lunch & Dinner      Question:  Select Supplement  Answer:  Boost Plus (Ensure Enlive, Ensure Plus)    10/11/21 1157    10/07/21 1705  Diet Regular  Diet Effective Now        Question:  Diet Texture / Consistency  Answer:  Regular    10/07/21 1704                Code Status and Medical Interventions:   Ordered at: 10/05/21 1631     Code Status:    CPR     Medical Interventions (Level of Support Prior to Arrest):    Full      Disposition: was denied inpatient rehab, daughter has refused SNF for short term rehab, will await PT evaluation today for further plan.       Perla Falcon, APRN  10/14/21  08:39 EDT

## 2021-10-15 NOTE — PLAN OF CARE
Goal Outcome Evaluation:  Plan of Care Reviewed With: patient        Progress: no change  Outcome Summary: Pt has rested in bed throughout shift. PRN meds given for complaints of pain. No acute changes noted at this time.

## 2021-10-15 NOTE — SIGNIFICANT NOTE
10/15/21 1521   OTHER   Discipline physical therapist   Rehab Time/Intention   Session Not Performed patient/family declined treatment; patient/family declined, not feeling well  (Patient states she is not getting up today. Educated patient on purpose of hospitalization and benefit of participation with therapy including improved function, wellbeing, and likelihood of home discharge. Patient adamant in refusal.)   Recommendation   PT - Next Appointment 10/16/21

## 2021-10-15 NOTE — PROGRESS NOTES
Patient Identification:  Name:  Kiah Conway  Age:  76 y.o.  Sex:  female  :  1945  MRN:  [id number]  Visit Number:  [id number]  Primary Care Provider:  Ronny Acevedo APRN    Length of stay:  10    Chief Complaint: f/u fracture, possible PE    HPI     Mrs. Conway is a 76 year old female patient who was admitted to [facility] on 10/5/21 for left formal neck fracture. Her past medical history is significant for GERD, NEIL, Follicular lymphoma, Thyroid cancer, Arthritis, Fibromyalgia.     Subjective:      Mrs. Conway is doing well this morning, she is much more awake and talkative today, seems to overall be feeling better. She denies any dyspnea, no cough, no vomiting, continues to have nausea denies abdominal pain. Did have several episodes of diarrhea yesterday, no fevers. Discussed with DOLLY Sims.  ----------------------------------------------------------------------------------------------------------------------  Current [facility] Meds:  acetaminophen, 1,000 mg, Oral, Q8H  amLODIPine, 2.5 mg, Oral, Q24H  atorvastatin, 10 mg, Oral, Daily  busPIRone, 2.5 mg, Oral, BID  cetirizine, 5 mg, Oral, Daily  enoxaparin, 1 mg/kg, Subcutaneous, Q12H  FLUoxetine, 20 mg, Oral, Daily  hydrALAZINE, 25 mg, Oral, Q8H  megestrol, 40 mg, Oral, BID  metoprolol tartrate, 75 mg, Oral, Q12H  pantoprazole, 40 mg, Oral, BID AC  pregabalin, 25 mg, Oral, BID  sodium bicarbonate, 650 mg, Oral, Daily  sodium chloride, 10 mL, Intravenous, Q12H         ----------------------------------------------------------------------------------------------------------------------  Vital Signs:  Temp:  [97.8 °F (36.6 °C)-99 °F (37.2 °C)] 97.8 °F (36.6 °C)  Heart Rate:  [85-96] 96  Resp:  [18-20] 18  BP: (124-165)/(63-73) 158/63      10/05/21  1346 10/05/21  2049   Weight: 53.5 kg (118 lb) 54.1 kg (119 lb 3.2 oz)     Body mass index is 23.28 kg/m².    Intake/Output Summary (Last 24 hours) at 10/15/2021 0733  Last data filed at 10/15/2021  0300  Gross per 24 hour   Intake 600 ml   Output --   Net 600 ml     No intake/output data recorded.  Diet Regular  ----------------------------------------------------------------------------------------------------------------------  Physical exam:    Constitutional:  Chronically ill appearing female in no distress on room air, pleasant  HENT:  Head:  Normocephalic and atraumatic.  Mouth:  Moist mucous membranes.    Eyes:  Pupils are equal, round, and reactive to light.  No scleral icterus.    Neck:  Neck supple.  No JVD present.    Cardiovascular:  Normal rate, regular rhythm and normal heart sounds with no murmur.  Pulmonary/Chest:  No respiratory distress, no wheezes, no crackles, with normal breath sounds and good air movement.  Abdominal:  Soft.  Bowel sounds are normal.  No distension and no tenderness.   Musculoskeletal:  Left hip dressing intact with no drainage noted.   Neurological:  Alert and oriented to person, place, confused to year No tongue deviation.  No facial droop.  No slurred speech.   Skin:  Skin is warm and dry. No rash noted. No pallor.      Assessment unchanged from yesterday  ----------------------------------------------------------------------------------------------------------------------  Tele:               Event this am at 9:49-9:50 SVT HR up to 150 appears                ----------------------------------------------------------------------------------------------------------------------      Results from last 7 days   Lab Units 10/15/21  0521 10/14/21  0524 10/13/21  0506   WBC 10*3/mm3 10.19 8.06 9.52   HEMOGLOBIN g/dL 9.7* 8.5* 8.9*   HEMATOCRIT % 33.8* 27.4* 28.8*   MCV fL 101.2* 95.1 95.7   MCHC g/dL 28.7* 31.0* 30.9*   PLATELETS 10*3/mm3 402 385 331         Results from last 7 days   Lab Units 10/15/21  0521 10/14/21  1019 10/14/21  0524 10/13/21  1911 10/13/21  0506 10/12/21  1418 10/11/21  2236   SODIUM mmol/L 137  --  137  --  139  --  139   POTASSIUM mmol/L 4.9 4.2  4.2   < > 3.2*   < > 3.6   MAGNESIUM mg/dL 2.0  --  2.2  --  1.8  --  2.5*   CHLORIDE mmol/L 104  --  105  --  105  --  108*   CO2 mmol/L 15.3*  --  20.0*  --  21.5*  --  13.1*   BUN mg/dL 15  --  8  --  7*  --  8   CREATININE mg/dL 0.83  --  0.69  --  0.63  --  0.67   EGFR IF NONAFRICN AM mL/min/1.73 66  --  82  --  91  --  85   CALCIUM mg/dL 8.0*  --  7.9*  --  8.0*  --  8.0*   GLUCOSE mg/dL 62*  --  82  --  84  --  91   ALBUMIN g/dL 2.36*  --  2.48*  --   --   --  2.84*   BILIRUBIN mg/dL 0.4  --  0.4  --   --   --  0.4   ALK PHOS U/L 97  --  92  --   --   --  93   AST (SGOT) U/L 17  --  17  --   --   --  19   ALT (SGPT) U/L 7  --  8  --   --   --  10    < > = values in this interval not displayed.   Estimated Creatinine Clearance: 47.7 mL/min (by C-G formula based on SCr of 0.83 mg/dL).  No results found for: AMMONIA      Blood Culture   Date Value Ref Range Status   10/13/2021 No growth at 2 days  Preliminary   10/13/2021 No growth at 2 days  Preliminary     No results found for: URINECX  No results found for: WOUNDCX  No results found for: STOOLCX  ----------------------------------------------------------------------------------------------------------------------  Imaging Results (Last 24 Hours)     Procedure Component Value Units Date/Time    US Venous Doppler Lower Extremity Bilateral (duplex) [[id number]] Collected: 10/14/21 1114     Updated: 10/14/21 1116    Narrative:      EXAMINATION: US VENOUS DOPPLER LOWER EXTREMITY BILATERAL (DUPLEX)-      CLINICAL INDICATION:  Concern for PE; S72.002A-Fracture of unspecified  part of neck of left femur, initial encounter for closed fracture     TECHNIQUE: Multiplanar gray scale and Doppler vascular sonographic  imaging of the deep veins of BILATERAL lower extremity, without and with  compression.      COMPARISON: NONE      FINDINGS:   Deep Veins: The visualized deep veins demonstrate flow and are  compressible. No evidence of deep venous thrombosis.   Superficial  veins: Unremarkable. Saphenofemoral junction is patent  without thrombus.  Soft tissues: Unremarkable.       Impression:      No DVT.     This report was finalized on 10/14/2021 11:14 AM by Dr. Jonah Salgado MD.           ----------------------------------------------------------------------------------------------------------------------  Assessment and Plan:    Left Formal neck Fracture  -s/p left hip hemiarthroplasty on 10/7/21 by Dr. Samano  -ortho is recommending Lovenox SQ for DVT prophylaxis for 14 days post operatively, no receiving full dose anticoagulation, see below.  -Weight bearing as tolerated to left lower extremity   -Follow up with Dr. Samano 10 days post op  -spoke with her daughter via phone on 10/14 they continue to refuse rehab or SNF placement, she states at baseline her mother has falls and can not walk independently, even needs much assistance with a walker and does not transfer independently. She states she has plenty of help at home. I have discused with her the likely payne that this fracture and her baseline physical ability and going home could result in a bedfast state and she v/u.   -continue PT, H/H stable, dressing is clean and dry, no edema in leg.     Macrocytic Anemia  -HGB/HCT on admission was  11.4/35.5  -HGB/HCT today is 9.7/33.8  -has not required transfusion post operatively   -hgb has stayed stable since 10/11  -Vitamin b12 and folate ordered  -repeat CBC In the am      Acute Hypoxic respiratory failure  Possible PE  -ABG on admission showed PO2 of 49.8 with arterial O2 saturation of 87% on room air.  -CXR on admission showed changes of possible CHF.  ProBNP was within normal limits at 607.2  -CT of the chest with PE protocol showed Tiny left pleural effusion, Subpleural groundglass attenuation of right upper lobe that could represent fibrotic change, atelectasis, early change of COVID-19 pneumonia or potentially PE. While there is no definite visualized PE, certainly  subsegmental branch pulmonary arteries are not well evaluated on this study due to bolus timing and motion artifact near the bases.  -Dr. Wing spoke with Dr. andrade (radiologist) regarding CT findings and their is a high suspicion of PE, changes on CT are new, Dr. Andrade did not feel like a VQ lung scan would be beneficial at this time.   -Repeat COVID test on 10/13 was negative  -Doppler of bilateral lower extremities negative for DVT  -Dr. Wing discussed with Dr. samano and agreeable to fully anticoagulate due to concern of PE, Lovenox increased to 50 mg SQ BID on 10/14/21  -Continue lovenox for now, will discuss with Dr. Wing and Dr. Samano on transitioning to oral anticoagulation. She remains stable on room air.     Acute Confusion/Delirium vs Dementia  -Patient is alert and oriented to self and place, confused to year and month  -CT of the head done earlier in the stay and showed no acute changes  -she is able to answer most questions appropriately during conversation  -her daughter states every now and then she does get confused at home  -no concerns at present for acute cva or neuro event, likely has underlying dementia and receiving pan medication and hospital stay.  -She is alert and oriented to self and place, continues to be confused to year but answers questions appropriately.      Elevated Anion Gap  Chronic Nausea and Vomiting  Diarrhea   -KUB on 10/10/21 negative  -Sodium bicarbonate 650 mg PO daily started on 10/12  -Underwent EGD on 10/5/21 outpatient by Dr. Luciano reportedly had a tiny hiatal hernia per her daughter.  -Lipase 200 on 10/13 and down to 182 10/14, no mention of concern for pancreatitis on CT 10/13  -CT of the A/P with IV contrast 10/13 showed a very distended urinary bladder, bladder scan was 696 ml, she was able to void, PVR was 124 ml, q4hr bladder scans ordered with protocol to straight cath if needed  -Repeat CMP remains stable this am  -lipase trended down to 182  -has had  nausea but no vomiting.   -She did have a EGD on the day of admission when she fell prior to the procedure, her daughter tells me that she has been having nausea and intermittent vomiting since around April of this year, her Prilosec was stopped and she was started on Dexilant 60 mg p.o. daily.  -H.Pylori ordered and pending  -general surgery consulted yesterday   -Gallbladder US ordered this am is normal   -General surgery saw yesterday and ordered C-Diff.   -Bicarb did drop again today to 15.3 and Anion Gap went up to 17.7  -Will discuss further with Dr. Wing, sodium bicarb increased to BID     Fibromyalgia   Arthritis   -Lyrica 25 mg PO BID   -supportive care     HTN  Hx of PSVT  HLD  -BP 163/72 HR 92  -TTE report reviewed and listed above, EF 61-65%, grade I diastolic dysfunction  -Cardiology saw her on 10/11 and recommended keeping electrolytes replete, and starting Lopressor 50mg PO BID for rate control.   Lipitor 10 mg PO daily   -Hydralazine 10mg PO TID with holding parameters, BPs have been running higher   -Norvasc 2.5 mg PO daily   -Lopressor 75mg PO BID ordered  -episode of SVT noted this am, she was NPO this am and just had lopressor 75mg PO at 0941 she was asymptomatic BP was appropriate and no chest pain. Continue to monitor closely.       Anxiety  Depression  -Continue Buspar 2.5mg PO BID  -Continue prozac 20mg PO daily    -supportive care      Hx of Follicular lymphoma   Hx of thyroid cancer in remission s/p surgery      GERD  -continue protonix 40mg PO daily     Was notified at 1540 of patient having left shoulder pain, went to bedside to see her. She was complaining of some pain in her left chest wall, she was asleep on arrival to her room, no changes on telemetry. EKG stat ordered, Troponin x1 ordered and repeat 2 hours after. V/S have been stable. She appears in no distress.     1715: discussed EKG with Dr. Morris, he does not see any acute concerns, troponin is pending. Held NPO after MN  incase trop does rise. He recommended to monitor and increase lopressor to 100mg BID, holding parameters in place. Will repeat EKG in the am.      DVT prophylaxis: Lovenox 50mg SQ BID    Dietary Orders (From admission, onward)     Start     Ordered    10/11/21 1200  Dietary Nutrition Supplements Boost Plus (Ensure Enlive, Ensure Plus)  Daily With Lunch & Dinner      Question:  Select Supplement  Answer:  Boost Plus (Ensure Enlive, Ensure Plus)    10/11/21 1157    10/07/21 1705  Diet Regular  Diet Effective Now        Question:  Diet Texture / Consistency  Answer:  Regular    10/07/21 1704                Code Status and Medical Interventions:   Ordered at: 10/05/21 1631     Code Status:    CPR     Medical Interventions (Level of Support Prior to Arrest):    Full       Disposition: family plans on taking her home with home health services when ready for discharge    Perla Falcon, MERRY  10/15/21  07:33 EDT

## 2021-10-15 NOTE — NURSING NOTE
Pt refused to get up with PT today. However we did get her up to the [facility] twice today and she took several steps.

## 2021-10-15 NOTE — CASE MANAGEMENT/SOCIAL WORK
Discharge Planning Assessment  [facility] [place]     Patient Name: Kiah Conway  MRN: [id number]  Today's Date: 10/15/2021    Admit Date: 10/5/2021     Discharge Needs Assessment    No documentation.                Discharge Plan     Row Name 10/15/21 1526       Plan    Plan Pt was admitted on 10/05/21. Pt lives with her daughter, Eunice and plans to return there at discharge. Pt and family have refused inpt rehab and SNF, but are agreeable to home health services. Pt does not have a preference of home health agency. Pt does not utilize home health services at this time.  Pt has a standard walkerand wheelchair for DME needs. SS to follow                Monica Ha

## 2021-10-16 NOTE — PLAN OF CARE
Goal Outcome Evaluation:  Plan of Care Reviewed With: patient        Progress: no change  Outcome Summary: Pt frequently called out for staff. Would not get up to the bathroom and used her brief. She was encouraged to get up multiple times. Complains or sore throat, nystatin was given. VSS. Left hip incision dressing dry and in tact. LR at 75 was started. Will continue to monitor.

## 2021-10-16 NOTE — PROGRESS NOTES
Patient Identification:  Name:  Kiah Conway  Age:  76 y.o.  Sex:  female  :  1945  MRN:  [id number]  Visit Number:  [id number]  Primary Care Provider:  Ronny Acevedo APRN    Length of stay:  11    Chief Complaint: f/u hip fracture, diarrhea    HPI     Mrs. Conway is a 76 year old female patient who was admitted to [facility] on 10/5/21 for left formal neck fracture. Her past medical history is significant for GERD, NEIL, Follicular lymphoma, Thyroid cancer, Arthritis, Fibromyalgia.     Subjective:      Mrs. Conway is resting in bed in no distress. She denies abdominal pain, 2 episodes of diarrhea yesterday and one so far this am, she denies nausea or vomiting is not eating very well. She denies any chest pain, her hip pain is also controlled. No events reported overnight, discussed with DOLLY Coronel.  ----------------------------------------------------------------------------------------------------------------------  Current Hospital Meds:  acetaminophen, 1,000 mg, Oral, Q8H  amLODIPine, 2.5 mg, Oral, Q24H  atorvastatin, 10 mg, Oral, Daily  busPIRone, 2.5 mg, Oral, BID  cefTRIAXone, 1 g, Intravenous, Q24H  cetirizine, 5 mg, Oral, Daily  enoxaparin, 1 mg/kg, Subcutaneous, Q12H  fluticasone, 2 spray, Each Nare, Daily  hydrALAZINE, 25 mg, Oral, Q8H  megestrol, 40 mg, Oral, BID  metoprolol tartrate, 100 mg, Oral, Q12H  nystatin, 5 mL, Swish & Spit, 4x Daily  pantoprazole, 40 mg, Oral, BID AC  pregabalin, 25 mg, Oral, BID  sodium bicarbonate, 650 mg, Oral, BID  sodium chloride, 10 mL, Intravenous, Q12H      lactated ringers, 75 mL/hr, Last Rate: 75 mL/hr (10/15/21 2200)      ----------------------------------------------------------------------------------------------------------------------  Vital Signs:  Temp:  [97.5 °F (36.4 °C)-98.1 °F (36.7 °C)] 97.8 °F (36.6 °C)  Heart Rate:  [] 102  Resp:  [16-20] 18  BP: (132-157)/(51-72) 148/64      10/05/21  1346 10/05/21  2049   Weight: 53.5 kg (118 lb)  54.1 kg (119 lb 3.2 oz)     Body mass index is 23.28 kg/m².    Intake/Output Summary (Last 24 hours) at 10/16/2021 0705  Last data filed at 10/16/2021 0649  Gross per 24 hour   Intake 875 ml   Output 550 ml   Net 325 ml     No intake/output data recorded.  NPO Diet  ----------------------------------------------------------------------------------------------------------------------  Physical exam:       Constitutional:  Chronically ill appearing female in no distress on room air, pleasant  HENT:  Head:  Normocephalic and atraumatic.  Mouth:  Moist mucous membranes.    Eyes:  Pupils are equal, round, and reactive to light.  No scleral icterus.    Neck:  Neck supple.  No JVD present.    Cardiovascular:  Normal rate, regular rhythm and normal heart sounds with no murmur.  Pulmonary/Chest:  No respiratory distress, no wheezes, no crackles, with normal breath sounds and good air movement.  Abdominal:  Soft.  Bowel sounds are normal, no tenderness on palpation, abdomen seems to be slightly distended she states she feels like it stays that way.  Musculoskeletal:  Left hip dressing intact with no drainage noted.   Neurological:  Alert and oriented to person, place, confused to year No tongue deviation.  No facial droop.  No slurred speech.   Skin:  Skin is warm and dry. No rash noted. No pallor.     ----------------------------------------------------------------------------------------------------------------------  Tele:              EKG this am:      Telemetry reviewed, no events noted     ----------------------------------------------------------------------------------------------------------------------  Results from last 7 days   Lab Units 10/15/21  1930 10/15/21  1609   TROPONIN T ng/mL <0.010 <0.010     Results from last 7 days   Lab Units 10/16/21  0118 10/15/21  0521 10/14/21  0524   WBC 10*3/mm3 11.74* 10.19 8.06   HEMOGLOBIN g/dL 8.6* 9.7* 8.5*   HEMATOCRIT % 27.7* 33.8* 27.4*   MCV fL 95.8 101.2* 95.1    MCHC g/dL 31.0* 28.7* 31.0*   PLATELETS 10*3/mm3 525* 402 385         Results from last 7 days   Lab Units 10/16/21  0118 10/15/21  0521 10/14/21  1019 10/14/21  0524 10/14/21  0524   SODIUM mmol/L 138 137  --   --  137   POTASSIUM mmol/L 3.7 4.9 4.2   < > 4.2   MAGNESIUM mg/dL 2.0 2.0  --   --  2.2   CHLORIDE mmol/L 105 104  --   --  105   CO2 mmol/L 19.1* 15.3*  --   --  20.0*   BUN mg/dL 16 15  --   --  8   CREATININE mg/dL 0.72 0.83  --   --  0.69   EGFR IF NONAFRICN AM mL/min/1.73 78 66  --   --  82   CALCIUM mg/dL 7.9* 8.0*  --   --  7.9*   GLUCOSE mg/dL 79 62*  --   --  82   ALBUMIN g/dL 2.82* 2.36*  --   --  2.48*   BILIRUBIN mg/dL 0.2 0.4  --   --  0.4   ALK PHOS U/L 104 97  --   --  92   AST (SGOT) U/L 17 17  --   --  17   ALT (SGPT) U/L 6 7  --   --  8    < > = values in this interval not displayed.   Estimated Creatinine Clearance: 49.5 mL/min (by C-G formula based on SCr of 0.72 mg/dL).  No results found for: AMMONIA      Blood Culture   Date Value Ref Range Status   10/13/2021 No growth at 3 days  Preliminary   10/13/2021 No growth at 3 days  Preliminary     No results found for: URINECX  No results found for: WOUNDCX  No results found for: STOOLCX  ----------------------------------------------------------------------------------------------------------------------  Imaging Results (Last 24 Hours)     Procedure Component Value Units Date/Time    US Gallbladder [[id number]] Collected: 10/15/21 0836     Updated: 10/15/21 0838    Narrative:      EXAM:    US Abdomen Limited, Gallbladder     EXAM DATE:    10/15/2021 8:31 AM     CLINICAL HISTORY:    Vomiting; S72.002A-Fracture of unspecified part of neck of left femur,  initial encounter for closed fracture     TECHNIQUE:    Real-time ultrasound of the right upper quadrant with image  documentation.     COMPARISON:    No relevant prior studies available.     FINDINGS:    Gallbladder:  Unremarkable.  No gallstones.    Common bile duct:  The CBD measures  2.44 mm.  No stones.  No dilation.    Pancreas:  Unremarkable as visualized.       Impression:        No acute findings in the right upper quadrant.     This report was finalized on 10/15/2021 8:36 AM by Dr. Santosh Andrade MD.           ----------------------------------------------------------------------------------------------------------------------  Assessment and Plan:    Left Formal neck Fracture  -s/p left hip hemiarthroplasty on 10/7/21 by Dr. Samano  -ortho is recommending Lovenox SQ for DVT prophylaxis for 14 days post operatively, no receiving full dose anticoagulation, see below.  -Weight bearing as tolerated to left lower extremity   -Follow up with Dr. Samano 10 days post op  -spoke with her daughter via phone on 10/14 they continue to refuse rehab or SNF placement, she states at baseline her mother has falls and can not walk independently, even needs much assistance with a walker and does not transfer independently. She states she has plenty of help at home. I have discused with her the likely payne that this fracture and her baseline physical ability and going home could result in a bedfast state and she v/u.   -continue PT, H/H stable, dressing is clean and dry, no edema in left leg.      Macrocytic Anemia  -HGB/HCT on admission was  11.4/35.5  -HGB/HCT today is 8.6/27.7  -has not required transfusion post operatively   -hgb has stayed stable since 10/11  -Vitamin b12 (764) and folate 5.76 (WNL)  -repeat CBC In the am      Acute Hypoxic respiratory failure  Possible PE  -ABG on admission showed PO2 of 49.8 with arterial O2 saturation of 87% on room air.  -CXR on admission showed changes of possible CHF.  ProBNP was within normal limits at 607.2  -CT of the chest with PE protocol showed Tiny left pleural effusion, Subpleural groundglass attenuation of right upper lobe that could represent fibrotic change, atelectasis, early change of COVID-19 pneumonia or potentially PE. While there is no definite  visualized PE, certainly subsegmental branch pulmonary arteries are not well evaluated on this study due to bolus timing and motion artifact near the bases.  -Dr. Wing spoke with Dr. andrade (radiologist) regarding CT findings and their is a high suspicion of PE, changes on CT are new, Dr. Andrade did not feel like a VQ lung scan would be beneficial at this time.   -Repeat COVID test on 10/13 was negative  -Doppler of bilateral lower extremities negative for DVT  -Dr. Wing discussed with Dr. mina and agreeable to fully anticoagulate due to concern of PE, Lovenox increased to 50 mg SQ BID on 10/14/21  -Doing well on room air, no chest pain or dyspnea, monitor.      Acute Confusion/Delirium vs Dementia  -Patient is alert and oriented to self and place, confused to year and month  -CT of the head done earlier in the stay and showed no acute changes  -she is able to answer most questions appropriately during conversation  -her daughter states every now and then she does get confused at home  -no concerns at present for acute cva or neuro event, likely has underlying dementia and receiving pan medication and hospital stay.  -She is alert and oriented to self and place, continues to be confused to year but answers questions appropriately.   -no changes, no concerns, monitor     Elevated Anion Gap  Chronic Nausea and Vomiting  Diarrhea   -KUB on 10/10/21 negative  -Sodium bicarbonate 650 mg PO daily started on 10/12  -Underwent EGD on 10/5/21 outpatient by Dr. Luciano reportedly had a tiny hiatal hernia per her daughter.  -Lipase 200 on 10/13 and down to 182 10/14, no mention of concern for pancreatitis on CT 10/13  -CT of the A/P with IV contrast 10/13 showed a very distended urinary bladder, bladder scan was 696 ml, she was able to void, PVR was 124 ml, q4hr bladder scans ordered with protocol to straight cath if needed  -Repeat CMP remains stable this am  -lipase trended down to 182  -has had nausea but no vomiting.    -She did have a EGD on the day of admission when she fell prior to the procedure, her daughter tells me that she has been having nausea and intermittent vomiting since around April of this year, her Prilosec was stopped and she was started on Dexilant 60 mg p.o. daily.  -H.Pylori negative  -general surgery consulted yesterday   -Gallbladder US is normal on 10/15/21  -C-Diff negative 10/15/21  -Unable to do a GI PCR due to admission past 3 days. We prophylacticly started rocephin 1gm yesterday and her Diarrhea seems to have improved. KUB ordered this am since she has been having diarrhea. We will continue this for now and monitor closely.   -Diarrhea is slowing, no vomiting, having overall poor oral intake, Bicarb was increased to BID yesterday, LR @ 75ml/hr was also started, labs improved today, anion gap closed. We will see how she does as far as diarrhea and eating today and consider stopping fluids later today.        Fibromyalgia   Arthritis   -Lyrica 25 mg PO BID   -supportive care     HTN  Hx of PSVT  HLD  Prolonged QTc  -BP 163/72 HR 92  -TTE report reviewed and listed above, EF 61-65%, grade I diastolic dysfunction  -Cardiology saw her on 10/11 and recommended keeping electrolytes replete, and starting Lopressor 50mg PO BID for rate control.   Lipitor 10 mg PO daily   -Hydralazine 10mg PO TID with holding parameters, BPs have been running higher   -Norvasc 2.5 mg PO daily   -Lopressor increased to 100mg PO BID (holding parameters in place) yesterday after event on telemetry as discussed with Dr. Morris. no further events noted on telemetry.   -She did have some left chest wall pain yesterday, EKG and troponin's x2 <0.010, had Cards review EKG with no acute concerns. She is chest pain free this am, held NPO in the event cardiology wanted to plan anything, they will see her today.  -QTc this am is 510, Prozac discontinued, electrolytes are replete, monitor.     Anxiety  Depression  -Continue Buspar 2.5mg PO  BID  -prozac 20mg PO daily  (discontinued today due to prolonged QTc)  -supportive care      Hx of Follicular lymphoma   Hx of thyroid cancer in remission s/p surgery      GERD  -continue protonix 40mg PO daily     DVT prophylaxis: Lovenox 50mg SQ BID     Dietary Orders (From admission, onward)     Start     Ordered    10/16/21 0001  NPO Diet  Diet Effective Midnight         10/15/21 1706    10/11/21 1200  Dietary Nutrition Supplements Boost Plus (Ensure Enlive, Ensure Plus)  Daily With Lunch & Dinner      Question:  Select Supplement  Answer:  Boost Plus (Ensure Enlive, Ensure Plus)    10/11/21 1157                Code Status and Medical Interventions:   Ordered at: 10/05/21 1631     Code Status:    CPR     Medical Interventions (Level of Support Prior to Arrest):    Full     Disposition: family plans to have her return home at discharge with home health PT and nursing     MERRY Adames  10/16/21  07:05 EDT

## 2021-10-16 NOTE — PROGRESS NOTES
LOS: 11 days     Name: Kiah Conway  Age/Sex: 76 y.o. female  :  1945        PCP: Ronny Acevedo APRN  REF: No Known Provider    Principal Problem:    Closed fracture of left hip (HCC)  Active Problems:    Femur fracture (HCC)      Reason for follow-up: SVT     Subjective       Subjective     Kiah Conway is a 76-year-old female with a past medical history significant for essential hypertension, obstructive sleep apnea, paroxysmal SVT and lymphoma.  Patient presented to the ER on 2021 with chief complaint of left hip pain.  She was found to have left femoral neck fracture and underwent left hip hemiarthroplasty on 10/7/2021.  Patient has been doing overall well throughout her admission.  Cardiology consulted for episodes of intermittent tachycardia.  Patient reports a history of paroxysmal SVT and follows in the cardiology office.     Interval History: Patient had episode of SVT yesterday and reported left shoulder/chest pain.  Troponin has been negative.  Denies any palpitations.  Continues to report some left shoulder pain.  No SVT overnight.    Vital Signs  Temp:  [97.5 °F (36.4 °C)-98.1 °F (36.7 °C)] 97.8 °F (36.6 °C)  Heart Rate:  [] 102  Resp:  [16-20] 18  BP: (132-157)/(51-72) 148/64     Vital Signs (last 72 hrs)       10/13 0700  10/14 0659 10/14 0700  10/15 0659 10/15 0700  10/16 0659 10/16 0700  10/16 0851   Most Recent      Temp (°F) 97.7 -  99.1    97.8 -  99    97.5 -  98.1       97.8 (36.6) 10/16 0649    Heart Rate 82 -  89    85 -  96    76 -  102       102 10/16 06    Resp 16 -  20    18 -  20    16 -  20       18 10/16 0649    /74 -  180/70    124/72 -  165/65    132/59 -  157/69       148/64 10/16 0649    SpO2 (%) 94 -  97    94 -  98    93 -  96      95     95 10/16 07        Documented weights    10/05/21 1346 10/05/21 2049   Weight: 53.5 kg (118 lb) 54.1 kg (119 lb 3.2 oz)      Body mass index is 23.28 kg/m².    Intake/Output Summary (Last 24  hours) at 10/16/2021 0851  Last data filed at 10/16/2021 0649  Gross per 24 hour   Intake 875 ml   Output 550 ml   Net 325 ml     Objective    Objective       Physical Exam:     General Appearance:    Alert, cooperative, in no acute distress   Head:    Normocephalic, without obvious abnormality, atraumatic   Eyes:            Conjunctivae and sclerae normal, no   icterus, no pallor, corneas clear.   Neck:   No adenopathy, supple, trachea midline, no thyromegaly, no   carotid bruit, no JVD   Lungs:     Clear to auscultation,respirations regular, even and                  unlabored    Heart:    Regular rhythm and normal rate, normal S1 and S2, no            murmur, no gallop, no rub, no click   Chest Wall:    No abnormalities observed   Abdomen:     Normal bowel sounds, no masses, no organomegaly, soft        non-tender, non-distended, no guarding, no rebound                tenderness   Extremities:   Moves all extremities well, no edema, no cyanosis, no             redness   Pulses:   Pulses palpable and equal bilaterally   Skin:   No bleeding, bruising or rash       Neurologic:  Alert and oriented     Results review       Results Review:   Results from last 7 days   Lab Units 10/16/21  0118 10/15/21  0521 10/14/21  0524 10/13/21  0506 10/11/21  2237 10/11/21  0510 10/10/21  0235   WBC 10*3/mm3 11.74* 10.19 8.06 9.52 11.72* 9.52 9.48   HEMOGLOBIN g/dL 8.6* 9.7* 8.5* 8.9* 8.8* 9.7* 9.3*   PLATELETS 10*3/mm3 525* 402 385 331 312 264 214     Results from last 7 days   Lab Units 10/16/21  0118 10/15/21  0521 10/14/21  1019 10/14/21  0524 10/13/21  1911 10/13/21  0506 10/12/21  1418 10/11/21  2236 10/11/21  2236 10/11/21  0510 10/11/21  0510 10/10/21  0235 10/10/21  0235   SODIUM mmol/L 138 137  --  137  --  139  --   --  139  --  140  --  138   POTASSIUM mmol/L 3.7 4.9 4.2 4.2 3.2* 3.2* 3.8   < > 3.6   < > 2.6*   < > 4.2   CHLORIDE mmol/L 105 104  --  105  --  105  --   --  108*  --  106  --  110*   CO2 mmol/L 19.1* 15.3*   --  20.0*  --  21.5*  --   --  13.1*  --  14.8*  --  15.1*   BUN mg/dL 16 15  --  8  --  7*  --   --  8  --  9  --  9   CREATININE mg/dL 0.72 0.83  --  0.69  --  0.63  --   --  0.67  --  0.65  --  0.52*   CALCIUM mg/dL 7.9* 8.0*  --  7.9*  --  8.0*  --   --  8.0*  --  8.0*  --  8.3*   GLUCOSE mg/dL 79 62*  --  82  --  84  --   --  91  --  82  --  105*   ALT (SGPT) U/L 6 7  --  8  --   --   --   --  10  --   --   --   --    AST (SGOT) U/L 17 17  --  17  --   --   --   --  19  --   --   --   --     < > = values in this interval not displayed.     Results from last 7 days   Lab Units 10/15/21  1930 10/15/21  1609   TROPONIN T ng/mL <0.010 <0.010     Lab Results   Component Value Date    INR 1.19 (H) 10/07/2021    INR 1.10 10/06/2021    INR 1.00 10/05/2021    INR 0.99 09/21/2020    INR <0.90 05/06/2016     Lab Results   Component Value Date    MG 2.0 10/16/2021    MG 2.0 10/15/2021    MG 2.2 10/14/2021     Lab Results   Component Value Date    TSH 3.300 10/13/2021    TRIG 150 04/08/2021    HDL 30 (L) 04/08/2021    LDL 97 04/08/2021      Imaging Results (Last 48 Hours)     Procedure Component Value Units Date/Time    US Gallbladder [[id number]] Collected: 10/15/21 0836     Updated: 10/15/21 0838    Narrative:      EXAM:    US Abdomen Limited, Gallbladder     EXAM DATE:    10/15/2021 8:31 AM     CLINICAL HISTORY:    Vomiting; S72.002A-Fracture of unspecified part of neck of left femur,  initial encounter for closed fracture     TECHNIQUE:    Real-time ultrasound of the right upper quadrant with image  documentation.     COMPARISON:    No relevant prior studies available.     FINDINGS:    Gallbladder:  Unremarkable.  No gallstones.    Common bile duct:  The CBD measures 2.44 mm.  No stones.  No dilation.    Pancreas:  Unremarkable as visualized.       Impression:        No acute findings in the right upper quadrant.     This report was finalized on 10/15/2021 8:36 AM by Dr. Santosh Andrade MD.       US Venous Doppler Lower  Extremity Bilateral (duplex) [[id number]] Collected: 10/14/21 1114     Updated: 10/14/21 1116    Narrative:      EXAMINATION: US VENOUS DOPPLER LOWER EXTREMITY BILATERAL (DUPLEX)-      CLINICAL INDICATION:  Concern for PE; S72.002A-Fracture of unspecified  part of neck of left femur, initial encounter for closed fracture     TECHNIQUE: Multiplanar gray scale and Doppler vascular sonographic  imaging of the deep veins of BILATERAL lower extremity, without and with  compression.      COMPARISON: NONE      FINDINGS:   Deep Veins: The visualized deep veins demonstrate flow and are  compressible. No evidence of deep venous thrombosis.   Superficial veins: Unremarkable. Saphenofemoral junction is patent  without thrombus.  Soft tissues: Unremarkable.       Impression:      No DVT.     This report was finalized on 10/14/2021 11:14 AM by Dr. Jonah Salgado MD.           Lab Results   Component Value Date    .0 (H) 03/07/2019     Echo   Results for orders placed during the hospital encounter of 10/05/21    Adult Transthoracic Echo Complete W/ Cont if Necessary Per Protocol    Interpretation Summary  · Normal left ventricular cavity size noted. Left ventricular wall thickness is consistent with moderate concentric hypertrophy  · Left ventricular ejection fraction appears to be 61 - 65%.  · Left ventricular diastolic function is consistent with (grade I) impaired relaxation.  · The aortic valve is structurally normal with no regurgitation or stenosis present.  · There are myxomatous changes of the mitral valve apparatus present. Mild mitral valve regurgitation is present. No significant mitral valve stenosis is present.  · There is no evidence of pericardial effusion.     I reviewed the patient's new clinical results.    Telemetry: Normal sinus rhythm 80 to 90 bpm     Medication Review:   acetaminophen, 1,000 mg, Oral, Q8H  amLODIPine, 2.5 mg, Oral, Q24H  atorvastatin, 10 mg, Oral, Daily  busPIRone, 2.5 mg, Oral,  BID  cefTRIAXone, 1 g, Intravenous, Q24H  cetirizine, 5 mg, Oral, Daily  enoxaparin, 1 mg/kg, Subcutaneous, Q12H  fluticasone, 2 spray, Each Nare, Daily  hydrALAZINE, 25 mg, Oral, Q8H  megestrol, 40 mg, Oral, BID  metoprolol tartrate, 100 mg, Oral, Q12H  nystatin, 5 mL, Swish & Spit, 4x Daily  pantoprazole, 40 mg, Oral, BID AC  pregabalin, 25 mg, Oral, BID  sodium bicarbonate, 650 mg, Oral, BID  sodium chloride, 10 mL, Intravenous, Q12H        lactated ringers, 75 mL/hr, Last Rate: 75 mL/hr (10/15/21 2200)        Assessment      Assessment:  Intermittent runs of multifocal atrial tachycardia  Left shoulder pain  Status post hip fracture status post surgery  Obstructive sleep apnea  Essential hypertension  Normal EF per echocardiogram  Essential hypertension          Plan     Recommendations:  Patient is very poor historian she was having some shoulder pain yesterday none today denies any other symptoms we will hold off ischemia work-up at this point her troponins are negative if pain recurs consider ischemia work-up  Increase metoprolol 200 mg twice daily currently she is in sinus rhythm continue current management  Blood pressures well controlled continue current management  We will follow the distance please call if assistance is required    I discussed the patients findings and my recommendations with patient and family    Electronically signed by MERRY Menezes, 10/16/21, 8:52 AM EDT.  Electronically signed by Shefali Morris MD, 10/16/21, 1:10 PM EDT.    Please note that portions of this note were completed with a voice recognition program.

## 2021-10-16 NOTE — PHARMACY PATIENT ASSISTANCE
Pharmacy was asked to look at the cost of DOACs for the patient.  The patient will have a $9.20 copay with her current insurance for either Xarelto or Eliquis.

## 2021-10-16 NOTE — PLAN OF CARE
Goal Outcome Evaluation:           Progress: no change  Patient has been difficult today. Frequently rings out for staff. Patient deaccessed her port and took telemetry off.  Port reaccessed and telemetry reapplied.  Will continue to monitor.

## 2021-10-16 NOTE — THERAPY TREATMENT NOTE
Acute Care - Physical Therapy Treatment Note  [facility]     Patient Name: Kiah Conway  : 1945  MRN: [id number]  Today's Date: 10/16/2021   Onset of Illness/Injury or Date of Surgery: 10/05/21  Visit Dx:     ICD-10-CM ICD-9-CM   1. Closed fracture of left hip, initial encounter ([facility], located within [facility])  S72.002A 820.8     Patient Active Problem List   Diagnosis   • Essential hypertension   • Atrophic urethritis   • GERD without esophagitis   • Fibromyalgia   • Arthritis   • Dysphagia   • History of rheumatic fever   • Migraines   • History of thyroid cancer   • Normocytic anemia   • Femur fracture (HCC)   • Closed fracture of left hip ([facility], located [facility])     Past Medical History:   Diagnosis Date   • Arthritis    • Cardiac disorder    • Diverticulitis    • Dysphagia    • Fibromyalgia    • Follicular lymphoma (HCC)     Remisson    • GERD (gastroesophageal reflux disease)    • History of rheumatic fever    • Hypertension    • Hypokalemia due to inadequate potassium intake 2014   • Lipoma    • Malignant neoplasm (HCC)    • Migraine    • Neutropenia (HCC) 2019   • Obstructive sleep apnea 2016   • Prolonged Q-T interval on ECG 2019   • Recurrent UTI    • Rheumatoid arthritis (HCC)    • Sleep apnea    • Thyroid cancer (HCC)      Past Surgical History:   Procedure Laterality Date   • BLADDER SURGERY     • FINGER SURGERY      reattached    • HIP HEMIARTHROPLASTY Left 10/7/2021    Procedure: HIP HEMIARTHROPLASTY BIOMET instrumentation  10/7/2021 1300 Dr. Samano;  Surgeon: Jonah Samano MD;  Location: [facility];  Service: Orthopedics;  Laterality: Left;   • HYSTERECTOMY     • THYROID SURGERY       PT Assessment (last 12 hours)     PT Evaluation and Treatment     Row Name 10/16/21 1126          Physical Therapy Time and Intention    Subjective Information complains of; nausea/vomiting  -RG     Document Type therapy note (daily note)  -RG     Mode of Treatment physical therapy  -RG     Patient Effort adequate  -RG      Symptoms Noted During/After Treatment fatigue; nausea  -RG     Comment Pt required encouragement to participate with skilled PT c/o sore throat and neausea.  Pt completed EOB sitting exercises before stating that she had to lay back down. She was able to complete supine exercises of hs, hip abd, ap, and SAQ.   Therapist educated pt on the importance of being compliant with skilled PT for her benefit.  -RG     Row Name 10/16/21 1126          General Information    Patient Profile Reviewed yes  -RG     Row Name 10/16/21 1126          Cognition    Affect/Mental Status (Cognitive) confused  -RG     Orientation Status (Cognition) oriented to; person; place  -RG     Follows Commands (Cognition) verbal cues/prompting required; increased processing time needed; physical/tactile prompts required; repetition of directions required; follows one-step commands  -RG     Personal Safety Interventions gait belt; nonskid shoes/slippers when out of bed; fall prevention program maintained  -RG     Row Name 10/16/21 1126          Bed Mobility    Bed Mobility supine-sit; sit-supine  -RG     Supine-Sit [place] (Bed Mobility) minimum assist (75% patient effort); moderate assist (50% patient effort)  -RG     Bed Mobility, Safety Issues decreased use of arms for pushing/pulling; decreased use of legs for bridging/pushing  -RG     Assistive Device (Bed Mobility) bed rails; head of bed elevated  -     Row Name 10/16/21 1126          Transfers    Transfers sit-stand transfer; stand-sit transfer; bed-chair transfer  -RG     Bed-Chair [place] (Transfers) moderate assist (50% patient effort); verbal cues; nonverbal cues (demo/gesture)  -RG     Assistive Device (Bed-Chair Transfers) other (see comments)  HHA  -RG     Sit-Stand [place] (Transfers) minimum assist (75% patient effort); verbal cues; nonverbal cues (demo/gesture)  -RG     Stand-Sit [place] (Transfers) minimum assist (75% patient effort); verbal cues; nonverbal  cues (demo/gesture)  -RG     Row Name 10/16/21 1126          Sit-Stand Transfer    Assistive Device (Sit-Stand Transfers) walker, front-wheeled  -RG     Row Name 10/16/21 1126          Stand-Sit Transfer    Assistive Device (Stand-Sit Transfers) walker, front-wheeled  -RG     Row Name 10/16/21 1126          Gait/Stairs (Locomotion)    Comment (Gait/Stairs) unable  -     Row Name 10/16/21 1126          Balance    Balance Assessment sitting static balance  -     Static Sitting Balance mild impairment  -     Row Name 10/16/21 1126          Motor Skills    Therapeutic Exercise knee; ankle  -RG     Row Name 10/16/21 1126          Hip (Therapeutic Exercise)    Hip (Therapeutic Exercise) strengthening exercise  -RG     Hip Strengthening (Therapeutic Exercise) bilateral; flexion; aBduction; aDduction; heel slides; marching while seated; sitting; supine; 10 repetitions; 2 sets  -     Row Name 10/16/21 1126          Knee (Therapeutic Exercise)    Knee (Therapeutic Exercise) strengthening exercise  -     Knee Strengthening (Therapeutic Exercise) bilateral; flexion; extension; marching while seated; SAQ (short arc quad); LAQ (long arc quad); sitting; supine; 10 repetitions; 2 sets  -     Row Name 10/16/21 1126          Ankle (Therapeutic Exercise)    Ankle (Therapeutic Exercise) strengthening exercise  -RG     Ankle Strengthening (Therapeutic Exercise) bilateral; dorsiflexion; plantarflexion; sitting; supine; 10 repetitions; 2 sets  -     Row Name             Wound 10/07/21 1537 Left lateral hip Incision    Wound - Properties Group Placement Date: 10/07/21  - Placement Time: 1537  -WM Present on Hospital Admission: N  -WM Side: Left  -WM Orientation: lateral  -WM Location: hip  -WM Primary Wound Type: Incision  -WM Additional Comments: opsite closed with suture & staples dressed with optifoam dressing & abduction pillow  -WM     Retired Wound - Properties Group Date first assessed: 10/07/21  - Time first  assessed: 1537  -WM Present on Hospital Admission: N  -WM Side: Left  -WM Location: hip  -WM Primary Wound Type: Incision  -WM Additional Comments: opsite closed with suture & staples dressed with optifoam dressing & abduction pillow  -WM     Row Name 10/16/21 1126          Coping    Observed Emotional State calm; cooperative  -RG     Row Name 10/16/21 1126          Positioning and Restraints    Pre-Treatment Position in bed  -RG     Post Treatment Position bed  -RG     In Bed notified nsg; supine; call light within reach; encouraged to call for assist; legs elevated  -RG           User Key  (r) = Recorded By, (t) = Taken By, (c) = Cosigned By    Initials Name Provider Type     Theodore Duckworth RN Registered Nurse    Joe Chamberlain PTA Physical Therapy Assistant                PT Recommendation and Plan  Anticipated Discharge Disposition (PT): skilled nursing facility, home with assist          Time Calculation:    PT Charges     Row Name 10/16/21 1135             Time Calculation    PT Received On 10/16/21  -TERESITA              Time Calculation- PT    Total Timed Code Minutes- PT 30 minute(s)  -RG            User Key  (r) = Recorded By, (t) = Taken By, (c) = Cosigned By    Initials Name Provider Type    Joe Chamberlain PTA Physical Therapy Assistant              Therapy Charges for Today     Code Description Service Date Service Provider Modifiers Qty    06816522332 HC PT THERAPEUTIC ACT EA 15 MIN 10/16/2021 Joe Cote PTA GP 1    00301225496 HC PT THER PROC EA 15 MIN 10/16/2021 Joe Cote PTA GP 1               Joe Cote PTA  10/16/2021

## 2021-10-17 NOTE — PLAN OF CARE
Goal Outcome Evaluation:  Plan of Care Reviewed With: patient        Progress: declining  Outcome Summary: Pt potassium and magnesium is low and is being replaced. She did get up to the bed side commode with and assist x 2. Daughter Lamar states patient is only able to stand and take a step or two and that is her baseline. VSS. Pt has not voided thus far and bladder scan shows 410 ml. Will continue to monitor.

## 2021-10-17 NOTE — PLAN OF CARE
Goal Outcome Evaluation:           Progress: declining  Outcome Summary: Potassium replaced, redraw for 6pm ordered.  Patient sat up in chair for 20 minutes but refused to stay up longer.  Patient has not voided this shift but bladder scans remain less than 400ml. Dressing changed to left hip, tolerated well.  Will continue to monitor.

## 2021-10-17 NOTE — PROGRESS NOTES
Patient Identification:  Name:  Kiah Conway  Age:  76 y.o.  Sex:  female  :  1945  MRN:  [id number]  Visit Number:  [id number]  Primary Care Provider:  Ronny Acevedo APRN    Length of stay:  12    Chief Complaint: f/u diarrhea, hip fracture, PE    HPI     Mrs. Conway is a 76 year old female patient who was admitted to [facility] on 10/5/21 for left formal neck fracture. Her past medical history is significant for GERD, NEIL, Follicular lymphoma, Thyroid cancer, Arthritis, Fibromyalgia.     Subjective:      Mr. Conway is resting in bed in no distress, she is awake and much more alert does look like she is feeling better overall. No diarrhea or vomiting yesterday or so far today. She is trying to eat breakfast, she states her mouth is feeling better. She has had urinary retention and has required straight cathing by the nursing staff.   ----------------------------------------------------------------------------------------------------------------------  Current Hospital Meds:  acetaminophen, 1,000 mg, Oral, Q8H  amLODIPine, 2.5 mg, Oral, Q24H  atorvastatin, 10 mg, Oral, Daily  busPIRone, 2.5 mg, Oral, BID  cefTRIAXone, 1 g, Intravenous, Q24H  cetirizine, 5 mg, Oral, Daily  enoxaparin, 1 mg/kg, Subcutaneous, Q12H  fluticasone, 2 spray, Each Nare, Daily  hydrALAZINE, 25 mg, Oral, Q8H  megestrol, 40 mg, Oral, BID  metoprolol tartrate, 100 mg, Oral, Q12H  nystatin, 5 mL, Swish & Spit, 4x Daily  pantoprazole, 40 mg, Oral, BID AC  potassium chloride, 10 mEq, Intravenous, Q1H  pregabalin, 25 mg, Oral, BID  sodium bicarbonate, 650 mg, Oral, BID  sodium chloride, 10 mL, Intravenous, Q12H      Pharmacy Consult,       ----------------------------------------------------------------------------------------------------------------------  Vital Signs:  Temp:  [97.6 °F (36.4 °C)-98.6 °F (37 °C)] 98.4 °F (36.9 °C)  Heart Rate:  [] 84  Resp:  [16-18] 18  BP: (132-165)/(51-78) 132/53      10/05/21  5048  10/05/21  2049   Weight: 53.5 kg (118 lb) 54.1 kg (119 lb 3.2 oz)     Body mass index is 23.28 kg/m².    Intake/Output Summary (Last 24 hours) at 10/17/2021 1058  Last data filed at 10/17/2021 0500  Gross per 24 hour   Intake 935 ml   Output 1200 ml   Net -265 ml     No intake/output data recorded.  Diet Regular  ----------------------------------------------------------------------------------------------------------------------  Physical exam:    Constitutional:  Chronically ill appearing female in no distress on room air, pleasant  HENT:  Head:  Normocephalic and atraumatic.  Mouth:  Moist mucous membranes.    Eyes:  Pupils are equal, round, and reactive to light.  No scleral icterus.    Neck:  Neck supple.  No JVD present.    Cardiovascular:  Normal rate, regular rhythm and normal heart sounds with no murmur.  Pulmonary/Chest:  No respiratory distress, no wheezes, no crackles, with normal breath sounds and good air movement.  Abdominal:  Soft.  Bowel sounds are normal, no tenderness on palpation, abdomen seems to be slightly distended she states she feels like it stays that way.  Musculoskeletal:  Left hip dressing intact with no drainage noted.   Neurological:  Alert and oriented to person, place, confused to year No tongue deviation.  No facial droop.  No slurred speech.   Skin:  Skin is warm and dry. No rash noted. No pallor.     Assessment unchanged     ----------------------------------------------------------------------------------------------------------------------  Tele:                ----------------------------------------------------------------------------------------------------------------------  Results from last 7 days   Lab Units 10/15/21  1930 10/15/21  1609   TROPONIN T ng/mL <0.010 <0.010     Results from last 7 days   Lab Units 10/17/21  0014 10/16/21  0118 10/15/21  0521   WBC 10*3/mm3 11.49* 11.74* 10.19   HEMOGLOBIN g/dL 8.0* 8.6* 9.7*   HEMATOCRIT % 26.2* 27.7* 33.8*   MCV fL 97.0  95.8 101.2*   MCHC g/dL 30.5* 31.0* 28.7*   PLATELETS 10*3/mm3 554* 525* 402         Results from last 7 days   Lab Units 10/17/21  0014 10/16/21  0118 10/15/21  0521   SODIUM mmol/L 140 138 137   POTASSIUM mmol/L 3.4* 3.7 4.9   MAGNESIUM mg/dL 1.7 2.0 2.0   CHLORIDE mmol/L 107 105 104   CO2 mmol/L 19.3* 19.1* 15.3*   BUN mg/dL 12 16 15   CREATININE mg/dL 0.69 0.72 0.83   EGFR IF NONAFRICN AM mL/min/1.73 82 78 66   CALCIUM mg/dL 7.7* 7.9* 8.0*   GLUCOSE mg/dL 93 79 62*   ALBUMIN g/dL 2.48* 2.82* 2.36*   BILIRUBIN mg/dL 0.2 0.2 0.4   ALK PHOS U/L 99 104 97   AST (SGOT) U/L 20 17 17   ALT (SGPT) U/L 8 6 7   Estimated Creatinine Clearance: 49.5 mL/min (by C-G formula based on SCr of 0.69 mg/dL).  No results found for: AMMONIA      Blood Culture   Date Value Ref Range Status   10/13/2021 No growth at 4 days  Preliminary   10/13/2021 No growth at 4 days  Preliminary     No results found for: URINECX  No results found for: WOUNDCX  No results found for: STOOLCX  ----------------------------------------------------------------------------------------------------------------------  Imaging Results (Last 24 Hours)     ** No results found for the last 24 hours. **        ----------------------------------------------------------------------------------------------------------------------  Assessment and Plan:    Left Formal neck Fracture  -s/p left hip hemiarthroplasty on 10/7/21 by Dr. Samano  -ortho is recommending Lovenox SQ for DVT prophylaxis for 14 days post operatively, no receiving full dose anticoagulation, see below.  -Weight bearing as tolerated to left lower extremity   -Follow up with Dr. Samano 10 days post op  -spoke with her daughter via phone on 10/14 they continue to refuse rehab or SNF placement, she states at baseline her mother has falls and can not walk independently, even needs much assistance with a walker and does not transfer independently. She states she has plenty of help at home. I have discused  with her the likely payne that this fracture and her baseline physical ability and going home could result in a bedfast state and she v/u.   -continue PT, H/H stable, dressing is clean and dry, no edema in left leg.      Macrocytic Anemia  -HGB/HCT on admission was  11.4/35.5  -HGB/HCT today is 8.0/26.2  -has not required transfusion post operatively   -hgb has stayed stable since 10/11  -Vitamin b12 (764) and folate 5.76 (WNL)  -repeat CBC In the am   -HGB did drop some today from yesterday, has had IV Fluids for around 24 hours going, likely diluted, will repeat HGB today, if stable consider changed her lovenox to PO anticoagulation.      Acute Hypoxic respiratory failure  Possible PE  -ABG on admission showed PO2 of 49.8 with arterial O2 saturation of 87% on room air.  -CXR on admission showed changes of possible CHF.  ProBNP was within normal limits at 607.2  -CT of the chest with PE protocol showed Tiny left pleural effusion, Subpleural groundglass attenuation of right upper lobe that could represent fibrotic change, atelectasis, early change of COVID-19 pneumonia or potentially PE. While there is no definite visualized PE, certainly subsegmental branch pulmonary arteries are not well evaluated on this study due to bolus timing and motion artifact near the bases.  -Dr. Wing spoke with Dr. andrade (radiologist) regarding CT findings and their is a high suspicion of PE, changes on CT are new, Dr. Andrade did not feel like a VQ lung scan would be beneficial at this time.   -Repeat COVID test on 10/13 was negative  -Doppler of bilateral lower extremities negative for DVT  -Dr. Wing discussed with Dr. mina and agreeable to fully anticoagulate due to concern of PE, Lovenox increased to 50 mg SQ BID on 10/14/21  -Cost of Eliquis checked with pharmacy, it or xarelto would be $9.20, will discuss this cost with her daughter today.   -ordered for pharmacy to dose eliquis for PE and start in the am and stop lovenox    -Doing well on room air, no chest pain or dyspnea, monitor.      Acute Confusion/Delirium vs Dementia  -Patient is alert and oriented to self and place, confused to year and month  -CT of the head done earlier in the stay and showed no acute changes  -she is able to answer most questions appropriately during conversation  -her daughter states every now and then she does get confused at home  -no concerns at present for acute cva or neuro event, likely has underlying dementia and receiving pan medication and hospital stay.  -She is alert and oriented to self and place, continues to be confused to year but answers questions appropriately.   -no changes, no concerns, monitor     Elevated Anion Gap  Chronic Nausea and Vomiting  Diarrhea   -KUB on 10/10/21 negative  -Sodium bicarbonate 650 mg PO daily started on 10/12  -Underwent EGD on 10/5/21 outpatient by Dr. Luciano reportedly had a tiny hiatal hernia per her daughter.  -Lipase 200 on 10/13 and down to 182 10/14, no mention of concern for pancreatitis on CT 10/13  -CT of the A/P with IV contrast 10/13 showed a very distended urinary bladder, bladder scan was 696 ml, she was able to void, PVR was 124 ml, q4hr bladder scans ordered with protocol to straight cath if needed  -Repeat CMP remains stable this am  -lipase trended down to 182  -has had nausea but no vomiting.   -She did have a EGD on the day of admission when she fell prior to the procedure, her daughter tells me that she has been having nausea and intermittent vomiting since around April of this year, her Prilosec was stopped and she was started on Dexilant 60 mg p.o. daily.  -H.Pylori negative  -general surgery consulted yesterday   -Gallbladder US is normal on 10/15/21  -C-Diff negative 10/15/21  -KUB on 10/16/21 with no acute concerns.   -Unable to do a GI PCR due to admission past 3 days. We prophylacticly started rocephin 1gm on 10/15/21 and her diarrhea improved.    -IV Fluids dc'd this am, continue  PO biarb BID for now. Will repeat labs in the am. She is starting to eat and drink better.        Fibromyalgia   Arthritis   -Lyrica 25 mg PO BID   -supportive care     HTN  Hx of PSVT  HLD  Prolonged QTc  -BP 163/72 HR 92  -TTE report reviewed and listed above, EF 61-65%, grade I diastolic dysfunction  -Cardiology saw her on 10/11 and recommended keeping electrolytes replete, and starting Lopressor 50mg PO BID for rate control.   Lipitor 10 mg PO daily   -Hydralazine 10mg PO TID with holding parameters, BPs have been running higher   -Norvasc 2.5 mg PO daily   -Lopressor increased to 100mg PO BID (holding parameters in place) 10/15 after event on telemetry as discussed with Dr. Morris.  -chest wall pain Friday 10/15, EKG and troponin's x2 <0.010, had Cards review EKG with no acute concerns.   -QTc 10/16 is 510, Prozac discontinued, electrolytes are replete, monitor.   -EKG repeat this am to re-evaluate her prolonged QTc which has improved to 489ms. On telemetry it looked like she had inverted T waves, had cardiology review the EKG from this morning and they noted LVH with some strain pattern but is currently asymptomatic and will just monitor for now.      Anxiety  Depression  -Continue Buspar 2.5mg PO BID  -prozac 20mg PO daily  (discontinued 10/16 due to prolonged QTc)  -supportive care      Hx of Follicular lymphoma   Hx of thyroid cancer in remission s/p surgery      GERD  -continue protonix 40mg PO daily     Urinary retention  -she required straight cathing yesterday evening and through the night.   -UA pending  -continue bladder scan every 4-6 hours and follow protocol      DVT prophylaxis: Lovenox 50mg SQ BID     Dietary Orders (From admission, onward)     Start     Ordered    10/16/21 1426  Diet Regular  Diet Effective Now        Question:  Diet Texture / Consistency  Answer:  Regular    10/16/21 1425    10/11/21 1200  Dietary Nutrition Supplements Boost Plus (Ensure Enlive, Ensure Plus)  Daily With Lunch &  Dinner      Question:  Select Supplement  Answer:  Boost Plus (Ensure Enlive, Ensure Plus)    10/11/21 4221                Code Status and Medical Interventions:   Ordered at: 10/05/21 1631     Code Status:    CPR     Medical Interventions (Level of Support Prior to Arrest):    Full     Disposition: family plans for her to return home at discharge with home health nursing and PT.    Perla Falcon, APRN  10/17/21  10:58 EDT

## 2021-10-18 NOTE — PLAN OF CARE
Goal Outcome Evaluation:           Progress: no change  Outcome Summary: Patient resting in bed.  Arden azevedo. Spoke with Nate from ortho who says he will come see pt for 10 day follow up.

## 2021-10-18 NOTE — CASE MANAGEMENT/SOCIAL WORK
Discharge Planning Assessment   Jered     Patient Name: Kiah Conway  MRN: [id number]  Today's Date: 10/18/2021    Admit Date: 10/5/2021     Discharge Needs Assessment    No documentation.                Discharge Plan     Row Name 10/18/21 1644       Plan    Plan Pt was admitted on 10/05/21. Pt lives with her daughter, Eunice and plans to return there at discharge. Pt and family have refused inpt rehab and SNF, but are agreeable to home health services. Pt does not have a preference of home health agency. Pt has a standard walker and wheelchair for DME needs. SS to follow.              Monica Ha

## 2021-10-18 NOTE — PROGRESS NOTES
Inpatient Progress Note  Kiah Conway  Date: 10/18/21  MRN: [id number]      Subjective: Pt seen today for sp left hip hemiarthroplasty.  Pt states doing well no new complaints with the left hip.  Pt states she is wanting to go home.          Objective:    Vitals:    10/18/21 0300 10/18/21 0708 10/18/21 0735 10/18/21 1408   BP: 132/58 148/62  143/65   BP Location: Right arm Right arm  Right arm   Patient Position: Lying Lying  Lying   Pulse: 88 85  96   Resp: 18 20  20   Temp: 98 °F (36.7 °C) 98.7 °F (37.1 °C)  98.1 °F (36.7 °C)   TempSrc: Oral Oral  Oral   SpO2: 96% 97% 95% 96%   Weight:       Height:              Physical Exam:  Constitutional:  No respiratory distress.        Cardiovascular:  Normal rate,   Pulmonary/Chest:  No respiratory distress, no wheezes, .   Musculoskeletal:  Active rom of the left hip, knee, and digits   Neurological:  Alert and oriented to person, place, and time..   Skin: Surgical site dressing CDI      Labs:    Results from last 7 days   Lab Units 10/18/21  0201 10/17/21  1414 10/17/21  0014 10/16/21  0118 10/16/21  0118   WBC 10*3/mm3 13.24*  --  11.49*  --  11.74*   HEMOGLOBIN g/dL 8.7* 9.0* 8.0*   < > 8.6*   HEMATOCRIT % 28.5* 28.7* 26.2*   < > 27.7*   MCV fL 95.6  --  97.0  --  95.8   MCHC g/dL 30.5*  --  30.5*  --  31.0*   PLATELETS 10*3/mm3 627*  --  554*  --  525*    < > = values in this interval not displayed.         Results from last 7 days   Lab Units 10/18/21  0201 10/17/21  1725 10/17/21  0014 10/16/21  0118 10/16/21  0118 10/15/21  0521 10/15/21  0521   SODIUM mmol/L 135*  --  140  --  138   < > 137   POTASSIUM mmol/L 3.9 3.9 3.4*   < > 3.7   < > 4.9   MAGNESIUM mg/dL 2.1  --  1.7  --  2.0   < > 2.0   CHLORIDE mmol/L 102  --  107  --  105   < > 104   CO2 mmol/L 18.4*  --  19.3*  --  19.1*   < > 15.3*   BUN mg/dL 7*  --  12  --  16   < > 15   CREATININE mg/dL 0.56*  --  0.69  --  0.72   < > 0.83   EGFR IF NONAFRICN AM mL/min/1.73 105  --  82  --  78   < > 66   CALCIUM  mg/dL 8.0*  --  7.7*  --  7.9*   < > 8.0*   GLUCOSE mg/dL 79  --  93  --  79   < > 62*   ALBUMIN g/dL  --   --  2.48*  --  2.82*  --  2.36*   BILIRUBIN mg/dL  --   --  0.2  --  0.2  --  0.4   ALK PHOS U/L  --   --  99  --  104  --  97   AST (SGOT) U/L  --   --  20  --  17  --  17   ALT (SGPT) U/L  --   --  8  --  6  --  7    < > = values in this interval not displayed.   Estimated Creatinine Clearance: 49.5 mL/min (A) (by C-G formula based on SCr of 0.56 mg/dL (L)).  No results found for: AMMONIA  Results from last 7 days   Lab Units 10/15/21  1930 10/15/21  1609   TROPONIN T ng/mL <0.010 <0.010         No results found for: HGBA1C  Lab Results   Component Value Date    TSH 3.300 10/13/2021    FREET4 1.26 05/27/2020         Pain Management Panel     Pain Management Panel Latest Ref Rng & Units 4/9/2021    AMPHETAMINES SCREEN, URINE Negative Negative    BARBITURATES SCREEN Negative Negative    BENZODIAZEPINE SCREEN, URINE Negative Negative    BUPRENORPHINEUR Negative Negative    COCAINE SCREEN, URINE Negative Negative    METHADONE SCREEN, URINE Negative Negative          Blood Culture   Date Value Ref Range Status   10/13/2021 No growth at 5 days  Final   10/13/2021 No growth at 5 days  Final     Urine Culture   Date Value Ref Range Status   10/17/2021 <25,000 CFU/mL Gram Positive Cocci (A)  Final     No results found for: WOUNDCX  No results found for: STOOLCX              Radiology:  Imaging Results (Last 72 Hours)     Procedure Component Value Units Date/Time    XR Abdomen KUB [[id number]] Collected: 10/16/21 0943     Updated: 10/16/21 0945    Narrative:      CR Abdomen 1 Vw    INDICATION:   Abdominal distention with bloating    COMPARISON:   10/10/2021    FINDINGS:  AP radiograph(s) of the abdomen. The renal shadows are symmetric. No renal calculi. No bladder calculi.    The bowel gas pattern is nonobstructive. No acute osseous abnormalities. No radiopaque foreign body.      Impression:      Negative  ROSENDA.    Signer Name: Diego Adhikari MD   Signed: 10/16/2021 9:43 AM   Workstation Name: [email or web address]    [facility]            Assessment: S/P left hip hemiarthroplasty           Plan: Continue post op ortho orders:  - Lovenox subcu for DVT prophylaxis for 14 days postoperatively.    - Patient can weight-bear as tolerated to left lower extremity.    - PT OT for postoperative mobilization.    - Dressing change as needed for saturation only, if need to change utilize 4 x 4's, ABDs, and tape only.    - Patient will follow up with Dr. Samano next week        Nate Lewis, APRN October 18, 2021 15:09 EDT

## 2021-10-18 NOTE — PROGRESS NOTES
Nutrition Services    Patient Name:  Kiah Conway  YOB: 1945  MRN: [id number]  Admit Date:  10/5/2021    Recommend evaluate for appetite stimulant - minimal po intake for two weeks - supplements provided with minimal acceptance     Electronically signed by:  Kezia Gonzáles RD  10/18/21 08:57 EDT

## 2021-10-18 NOTE — THERAPY TREATMENT NOTE
Acute Care - Physical Therapy Treatment Note  [facility]     Patient Name: Kiah Conway  : 1945  MRN: [id number]  Today's Date: 10/18/2021   Onset of Illness/Injury or Date of Surgery: 10/05/21  Visit Dx:     ICD-10-CM ICD-9-CM   1. Closed fracture of left hip, initial encounter ([facility])  S72.002A 820.8     Patient Active Problem List   Diagnosis   • Essential hypertension   • Atrophic urethritis   • GERD without esophagitis   • Fibromyalgia   • Arthritis   • Dysphagia   • History of rheumatic fever   • Migraines   • History of thyroid cancer   • Normocytic anemia   • Femur fracture (HCC)   • Closed fracture of left hip ([facility])     Past Medical History:   Diagnosis Date   • Arthritis    • Cardiac disorder    • Diverticulitis    • Dysphagia    • Fibromyalgia    • Follicular lymphoma (HCC)     Remisson    • GERD (gastroesophageal reflux disease)    • History of rheumatic fever    • Hypertension    • Hypokalemia due to inadequate potassium intake 2014   • Lipoma    • Malignant neoplasm (HCC)    • Migraine    • Neutropenia (HCC) 2019   • Obstructive sleep apnea 2016   • Prolonged Q-T interval on ECG 2019   • Recurrent UTI    • Rheumatoid arthritis (HCC)    • Sleep apnea    • Thyroid cancer (HCC)      Past Surgical History:   Procedure Laterality Date   • BLADDER SURGERY     • FINGER SURGERY      reattached    • HIP HEMIARTHROPLASTY Left 10/7/2021    Procedure: HIP HEMIARTHROPLASTY BIOMET instrumentation  10/7/2021 1300 Dr. Samano;  Surgeon: Jonah Samano MD;  Location: [facility];  Service: Orthopedics;  Laterality: Left;   • HYSTERECTOMY     • THYROID SURGERY       PT Assessment (last 12 hours)     PT Evaluation and Treatment     Row Name 10/18/21 1600          Physical Therapy Time and Intention    Subjective Information complains of; weakness; fatigue  -BC     Document Type therapy note (daily note)  -BC     Mode of Treatment physical therapy  -BC     Patient Effort fair  -BC     Row Name  10/18/21 1600          General Information    Patient Profile Reviewed yes  -BC     Onset of Illness/Injury or Date of Surgery 10/05/21  -BC     Referring Physician Dr Dent  -BC     Patient Observations alert; cooperative; agree to therapy  -BC     Risks Reviewed patient:; LOB; nausea/vomiting; dizziness; increased discomfort; change in vital signs; increased drainage; lines disloged  -BC     Benefits Reviewed patient:; improve function; increase independence; increase strength; increase balance; decrease pain; improve skin integrity; decrease risk of DVT; increase knowledge  -BC     Row Name 10/18/21 1600          Previous Level of Function/Home Environm    BADLs, Premorbid Functional Level needs assistive device for safe performance  -BC     Row Name 10/18/21 1600          Living Environment    Current Living Arrangements home/apartment/condo  -BC     Row Name 10/18/21 1600          Cognition    Affect/Mental Status (Cognitive) WFL  -BC     Orientation Status (Cognition) oriented x 3  -BC     Follows Commands (Cognition) follows one-step commands  -BC     Personal Safety Interventions gait belt; muscle strengthening facilitated; nonskid shoes/slippers when out of bed; supervised activity  -BC     Row Name 10/18/21 1600          Range of Motion (ROM)    Range of Motion ROM is WFL  -BC     Row Name 10/18/21 1600          Range of Motion Comprehensive    General Range of Motion bilateral lower extremity ROM [facility]  -BC     Row Name 10/18/21 1600          Mobility    Extremity Weight-bearing Status left lower extremity; right lower extremity  -BC     Left Lower Extremity (Weight-bearing Status) weight-bearing as tolerated (WBAT)  -BC     Right Lower Extremity (Weight-bearing Status) weight-bearing as tolerated (WBAT)  -BC     Row Name 10/18/21 1600          Bed Mobility    Bed Mobility bed mobility (all) activities  -BC     Supine-Sit [place] (Bed Mobility) moderate assist (50% patient effort)  -BC      Sit-Supine [place] (Bed Mobility) moderate assist (50% patient effort)  -BC     Row Name 10/18/21 1600          Transfers    Transfers sit-stand transfer; stand-sit transfer  -BC     Maintains Weight-bearing Status (Transfers) physical assist to maintain; verbal cues to maintain  -BC     Bed-Chair [place] (Transfers) moderate assist (50% patient effort)  -BC     Assistive Device (Bed-Chair Transfers) walker, front-wheeled  -BC     Sit-Stand [place] (Transfers) moderate assist (50% patient effort)  -BC     Stand-Sit [place] (Transfers) moderate assist (50% patient effort)  -BC     Row Name 10/18/21 1600          Sit-Stand Transfer    Assistive Device (Sit-Stand Transfers) walker, front-wheeled  -BC     Row Name 10/18/21 1600          Stand-Sit Transfer    Assistive Device (Stand-Sit Transfers) walker, front-wheeled  -BC     Row Name 10/18/21 1600          Gait/Stairs (Locomotion)    Gait/Stairs Locomotion gait/ambulation assistive device  -BC     [place] Level (Gait) moderate assist (50% patient effort)  -BC     Assistive Device (Gait) walker, front-wheeled  -BC     Distance in Feet (Gait) 6ft  -BC     Row Name             Wound 10/07/21 1537 Left lateral hip Incision    Wound - Properties Group Placement Date: 10/07/21  -WM Placement Time: 1537  -WM Present on Hospital Admission: N  -WM Side: Left  -WM Orientation: lateral  -WM Location: hip  -WM Primary Wound Type: Incision  -WM Additional Comments: opsite closed with suture & staples dressed with optifoam dressing & abduction pillow  -WM     Retired Wound - Properties Group Date first assessed: 10/07/21  -WM Time first assessed: 1537  -WM Present on Hospital Admission: N  -WM Side: Left  -WM Location: hip  -WM Primary Wound Type: Incision  -WM Additional Comments: opsite closed with suture & staples dressed with optifoam dressing & abduction pillow  -WM     Row Name 10/18/21 1600          Coping    Observed Emotional State anxious;  cooperative  -BC     Verbalized Emotional State acceptance  -BC     Trust Relationship/Rapport care explained; choices provided  -BC     Involvement in Care not present at bedside  -BC     Family/Support System Care self-care encouraged; support provided  -BC     Diversional Activities television  -BC     Row Name 10/18/21 1600          Plan of Care Review    Plan of Care Reviewed With patient  walked 6 ft  -BC     Row Name 10/18/21 1600          Positioning and Restraints    Pre-Treatment Position in bed  -BC     Post Treatment Position bed  -BC     In Bed notified nsg; supine; call light within reach; encouraged to call for assist; side rails up x3  -BC           User Key  (r) = Recorded By, (t) = Taken By, (c) = Cosigned By    Initials Name Provider Type     Theodore Duckworth RN Registered Nurse    BC Jessica Bennett PT Physical Therapist                PT Recommendation and Plan     Plan of Care Reviewed With: patient (walked 6 ft)       Time Calculation:    PT Charges     Row Name 10/18/21 1640             Time Calculation    PT Received On 10/18/21  -BC              Time Calculation- PT    Total Timed Code Minutes- PT 45 minute(s)  -BC              Timed Charges    37153 - PT Therapeutic Exercise Minutes 15  -BC      88672 - Gait Training Minutes  30  -BC              Total Minutes    Timed Charges Total Minutes 45  -BC       Total Minutes 45  -BC            User Key  (r) = Recorded By, (t) = Taken By, (c) = Cosigned By    Initials Name Provider Type    Jessica Gaffney PT Physical Therapist              Therapy Charges for Today     Code Description Service Date Service Provider Modifiers Qty    51184869397 HC PT THER PROC EA 15 MIN 10/18/2021 Jessica Bennett, PT GP 1    [id number] HC GAIT TRAINING EA 15 MIN 10/18/2021 Jessica Bennett, PT GP 2               Jessica Bennett PT  10/18/2021

## 2021-10-18 NOTE — PHARMACY PATIENT ASSISTANCE
Pharmacy evaluated cost of Eliquis for patient. Eliquis is covered on patient's insurance with a copay of $9.20.     Thank you,  Janice Nolan, Pharmacy Intern  10/18/21  10:31 EDT

## 2021-10-18 NOTE — PLAN OF CARE
Goal Outcome Evaluation:  Plan of Care Reviewed With: patient           Outcome Summary: Pt resting in bed with eyes closed, resp even and unlabored.  Will monitor

## 2021-10-18 NOTE — PROGRESS NOTES
Patient Identification:  Name:  Kiah Conway  Age:  76 y.o.  Sex:  female  :  1945  MRN:  [id number]  Visit Number:  [id number]  Primary Care Provider:  Ronny Acevedo APRN    Length of stay:  13    Chief Complaint: f/u urinary retention, hip fracture, PE    HPI     Mrs. Conway is a 76 year old female patient who was admitted to [facility] [place] on 10/5/21 for left formal neck fracture. Her past medical history is significant for GERD, NEIL, Follicular lymphoma, Thyroid cancer, Arthritis, Fibromyalgia.     Subjective:      Mrs. Conway is doing well this morning, she is still having poor appetite. She has had some nausea but no vomiting. 1 small episode of diarrhea this am. She had no events on telemetry, she denies any chest pain. Overall she does clinically look like she feels some better. Discussed with DOLLY Glover.    I called updated her daughter via phone today, all questions were answered.   ----------------------------------------------------------------------------------------------------------------------  Current Hospital Meds:  acetaminophen, 1,000 mg, Oral, Q8H  amLODIPine, 2.5 mg, Oral, Q24H  apixaban, 10 mg, Oral, Q12H   Followed by  [START ON 10/25/2021] apixaban, 5 mg, Oral, Q12H  atorvastatin, 10 mg, Oral, Daily  busPIRone, 2.5 mg, Oral, BID  cefTRIAXone, 1 g, Intravenous, Q24H  cetirizine, 5 mg, Oral, Daily  fluticasone, 2 spray, Each Nare, Daily  hydrALAZINE, 25 mg, Oral, Q8H  megestrol, 40 mg, Oral, BID  metoprolol tartrate, 150 mg, Oral, Q12H  nystatin, 5 mL, Swish & Spit, 4x Daily  pantoprazole, 40 mg, Oral, BID AC  pregabalin, 25 mg, Oral, BID  sodium bicarbonate, 650 mg, Oral, BID  sodium chloride, 10 mL, Intravenous, Q12H      Pharmacy Consult - Pharmacy to dose,   Pharmacy Consult,       ----------------------------------------------------------------------------------------------------------------------  Vital Signs:  Temp:  [97.1 °F (36.2 °C)-98.7 °F (37.1 °C)] 98.7 °F (37.1  °C)  Heart Rate:  [75-97] 85  Resp:  [18-20] 20  BP: (130-148)/(58-64) 148/62      10/05/21  1346 10/05/21  2049   Weight: 53.5 kg (118 lb) 54.1 kg (119 lb 3.2 oz)     Body mass index is 23.28 kg/m².    Intake/Output Summary (Last 24 hours) at 10/18/2021 0816  Last data filed at 10/18/2021 0300  Gross per 24 hour   Intake 300 ml   Output 1191 ml   Net -891 ml     No intake/output data recorded.  Diet Regular  ----------------------------------------------------------------------------------------------------------------------  Physical exam:     Constitutional:  Chronically ill appearing female in no distress on room air, pleasant  HENT:  Head:  Normocephalic and atraumatic.  Mouth:  Moist mucous membranes.    Eyes:  Pupils are equal, round, and reactive to light.  No scleral icterus.    Neck:  Neck supple.  No JVD present.    Cardiovascular:  Normal rate, regular rhythm and normal heart sounds with no murmur.  Pulmonary/Chest:  No respiratory distress, no wheezes, no crackles, with normal breath sounds and good air movement.  Abdominal:  Soft.  Bowel sounds are normal, no tenderness on palpation, abdomen seems to be slightly distended she states she feels like it stays that way.  Musculoskeletal:  Left hip dressing intact with no drainage noted.   Neurological:  Alert and oriented to person, place, confused to year No tongue deviation.  No facial droop.  No slurred speech.   Skin:  Skin is warm and dry. No rash noted. No pallor.      Assessment unchanged     ----------------------------------------------------------------------------------------------------------------------  Tele:              Telemetry reviewed, she did have alarms but was artifact.     ----------------------------------------------------------------------------------------------------------------------  Results from last 7 days   Lab Units 10/15/21  1930 10/15/21  1609   TROPONIN T ng/mL <0.010 <0.010     Results from last 7 days   Lab Units  10/18/21  0201 10/17/21  1414 10/17/21  0014 10/16/21  0118 10/16/21  0118   WBC 10*3/mm3 13.24*  --  11.49*  --  11.74*   HEMOGLOBIN g/dL 8.7* 9.0* 8.0*   < > 8.6*   HEMATOCRIT % 28.5* 28.7* 26.2*   < > 27.7*   MCV fL 95.6  --  97.0  --  95.8   MCHC g/dL 30.5*  --  30.5*  --  31.0*   PLATELETS 10*3/mm3 627*  --  554*  --  525*    < > = values in this interval not displayed.         Results from last 7 days   Lab Units 10/18/21  0201 10/17/21  1725 10/17/21  0014 10/16/21  0118 10/16/21  0118 10/15/21  0521 10/15/21  0521   SODIUM mmol/L 135*  --  140  --  138   < > 137   POTASSIUM mmol/L 3.9 3.9 3.4*   < > 3.7   < > 4.9   MAGNESIUM mg/dL 2.1  --  1.7  --  2.0   < > 2.0   CHLORIDE mmol/L 102  --  107  --  105   < > 104   CO2 mmol/L 18.4*  --  19.3*  --  19.1*   < > 15.3*   BUN mg/dL 7*  --  12  --  16   < > 15   CREATININE mg/dL 0.56*  --  0.69  --  0.72   < > 0.83   EGFR IF NONAFRICN AM mL/min/1.73 105  --  82  --  78   < > 66   CALCIUM mg/dL 8.0*  --  7.7*  --  7.9*   < > 8.0*   GLUCOSE mg/dL 79  --  93  --  79   < > 62*   ALBUMIN g/dL  --   --  2.48*  --  2.82*  --  2.36*   BILIRUBIN mg/dL  --   --  0.2  --  0.2  --  0.4   ALK PHOS U/L  --   --  99  --  104  --  97   AST (SGOT) U/L  --   --  20  --  17  --  17   ALT (SGPT) U/L  --   --  8  --  6  --  7    < > = values in this interval not displayed.   Estimated Creatinine Clearance: 49.5 mL/min (A) (by C-G formula based on SCr of 0.56 mg/dL (L)).  No results found for: AMMONIA      Blood Culture   Date Value Ref Range Status   10/13/2021 No growth at 5 days  Final   10/13/2021 No growth at 5 days  Final     No results found for: URINECX  No results found for: WOUNDCX  No results found for: STOOLCX  ----------------------------------------------------------------------------------------------------------------------  Imaging Results (Last 24 Hours)     ** No results found for the last 24 hours. **         ----------------------------------------------------------------------------------------------------------------------  Assessment and Plan:    Left Formal neck Fracture  -s/p left hip hemiarthroplasty on 10/7/21 by Dr. Samano  -ortho is recommending Lovenox SQ for DVT prophylaxis for 14 days post (now requiring full dose anticoagulation).  -Weight bearing as tolerated to left lower extremity   -Follow up with Dr. Samano 10 days post op  -spoke with her daughter via phone on 10/14 they continue to refuse rehab or SNF placement, she states at baseline her mother has falls/is weakn and can not walk independently, even needs much assistance with a walker and does not transfer independently. She states she has plenty of help at home. I have discused with her the likely payne that this fracture and her baseline physical immobility and going home could result in a bedfast state and she v/u.   -continue PT, H/H stable, dressing remians clean and dry, no edema in left leg. have asked Belen to have Ortho come and see her today for her post op follow up.      Macrocytic Anemia  -HGB/HCT on admission was  11.4/35.5  -HGB/HCT today is 8.7/28.5  -has not required transfusion post operatively   -hgb has stayed stable since 10/11  -Vitamin b12 (764) and folate 5.76 (WNL)  -repeat CBC In the am   -repeat HGB yesterday was stable and improved at 9, repeat HGB this am is 8.7/28.5, lovenox was stopped yesterday evening and eliquis started today for her PE, we will monitor her hgb closely.     Acute Hypoxic respiratory failure  Possible PE  -ABG on admission showed PO2 of 49.8 with arterial O2 saturation of 87% on room air.  -CXR on admission showed changes of possible CHF.  ProBNP was within normal limits at 607.2  -CT of the chest with PE protocol showed Tiny left pleural effusion, Subpleural groundglass attenuation of right upper lobe that could represent fibrotic change, atelectasis, early change of COVID-19 pneumonia or  potentially PE. While there is no definite visualized PE, certainly subsegmental branch pulmonary arteries are not well evaluated on this study due to bolus timing and motion artifact near the bases.  -Dr. Wing spoke with Dr. andrade (radiologist) regarding CT findings and their is a high suspicion of PE, changes on CT are new, Dr. Andrade did not feel like a VQ lung scan would be beneficial at this time.   -Repeat COVID test on 10/13 was negative  -Doppler of bilateral lower extremities negative for DVT  -Dr. Wing discussed with Dr. mina and agreeable to fully anticoagulate due to concern of PE, Lovenox increased to 50 mg SQ BID on 10/14/21  -Cost of Eliquis checked with pharmacy, it or xarelto/eliquis would be $9.20, will discuss this cost with her daughter today.   -Lovenox was stopped after her evening dose last night. Eliquis started today at 10mg PO BID for 7 days then 5mg PO BID.   -Doing well on room air, no chest pain or dyspnea, monitor.      Acute Confusion/Delirium vs Dementia  -Patient is alert and oriented to self and place, confused to year and month  -CT of the head done earlier in the stay and showed no acute changes  -she is able to answer most questions appropriately during conversation  -her daughter states every now and then she does get confused at home  -no concerns at present for acute cva or neuro event, likely has underlying dementia and receiving pan medication and hospital stay.  -She is alert and oriented to self and place, continues to be confused to year but answers questions appropriately.   -no changes, no concerns, monitor     Elevated Anion Gap  Chronic Nausea and Vomiting  Diarrhea   -KUB on 10/10/21 negative  -Sodium bicarbonate 650 mg PO daily started on 10/12  -Underwent EGD on 10/5/21 outpatient by Dr. Luciano reportedly had a tiny hiatal hernia per her daughter.  -Lipase 200 on 10/13 and down to 182 10/14, no mention of concern for pancreatitis on CT 10/13  -CT of the A/P  with IV contrast 10/13 showed a very distended urinary bladder, bladder scan was 696 ml, she was able to void, PVR was 124 ml, q4hr bladder scans ordered with protocol to straight cath if needed  -Repeat CMP remains stable this am  -lipase trended down to 182  -has had nausea but no vomiting.   -She did have a EGD on the day of admission when she fell prior to the procedure, her daughter tells me that she has been having nausea and intermittent vomiting since around April of this year, her Prilosec was stopped and she was started on Dexilant 60 mg p.o. daily.  -H.Pylori negative  -general surgery consulted yesterday   -Gallbladder US is normal on 10/15/21  -C-Diff negative 10/15/21  -KUB on 10/16/21 with no acute concerns.   -Megace discontinued today, nutrition consulted.   -Unable to do a GI PCR due to admission past 3 days. We prophylacticly started rocephin 1gm on 10/15/21 and her diarrhea improved.    -IV Fluids dc'd yesterday, continue PO biarb BID for now. Will repeat labs in the am.   -Blood acetone noted as small this am, HCO is 18.4, creatinine is 0.56, urine CL (92), potassium (31.4) and sodium (114) will discuss with Dr. Dent.   -Nephrology consulted for RTA      Fibromyalgia   Arthritis   -Lyrica 25 mg PO BID   -supportive care     HTN  Hx of PSVT  HLD  Prolonged QTc  -BP 163/72 HR 92  -TTE report reviewed and listed above, EF 61-65%, grade I diastolic dysfunction  -Cardiology saw her on 10/11 and recommended keeping electrolytes replete, and starting Lopressor 50mg PO BID for rate control.   Lipitor 10 mg PO daily   -Hydralazine 10mg PO TID with holding parameters, BPs have been running higher   -Norvasc 2.5 mg PO daily   -Lopressor increased to 100mg PO BID (holding parameters in place) 10/15 after event on telemetry as discussed with Dr. Morris.  -chest wall pain Friday 10/15, EKG and troponin's x2 <0.010, had Cards review EKG with no acute concerns.   -QTc 10/16 is 510, Prozac discontinued,  electrolytes are replete, monitor.   -EKG 10/17 to re-evaluate her prolonged QTc which has improved to 489ms. On telemetry it looked like she had inverted T waves, had cardiology review the EKG from this morning and they noted LVH with some strain pattern but is currently asymptomatic and will just monitor for now.   -continues to be chest pain free, no concerns at present, monitor.      Anxiety  Depression  -Continue Buspar 2.5mg PO BID  -prozac 20mg PO daily  (discontinued 10/16 due to prolonged QTc)  -supportive care      Hx of Follicular lymphoma   Hx of thyroid cancer in remission s/p surgery      GERD  -continue protonix 40mg PO daily      Urinary retention  -she continues to require straight cathing  -UA showed 40mg/dL of ketones, 6-12 WBC, trace bacteria and small budding yeast, 3-6 squamous epithelial cells, WBC is normal, no fevers, is on rocephin, now day #4  -urine culture sent   -due to continued need of straight cathing, [place] was anchored.       DVT prophylaxis: Eliquis     Dietary Orders (From admission, onward)     Start     Ordered    10/16/21 1426  Diet Regular  Diet Effective Now        Question:  Diet Texture / Consistency  Answer:  Regular    10/16/21 1425    10/11/21 1200  Dietary Nutrition Supplements Boost Plus (Ensure Enlive, Ensure Plus)  Daily With Lunch & Dinner      Question:  Select Supplement  Answer:  Boost Plus (Ensure Enlive, Ensure Plus)    10/11/21 1157                Code Status and Medical Interventions:   Ordered at: 10/05/21 1631     Code Status:    CPR     Medical Interventions (Level of Support Prior to Arrest):    Full     Disposition: home with PT and nursing home health when clinically stable for discharge.        Perla Falcon, APRN  10/18/21  08:16 EDT

## 2021-10-19 NOTE — PROGRESS NOTES
Discussed nephrology referral with Dr. Dent. Bicarb PO stopped, LR @ 75ml/hr started tonight. Repeat labs in the am, monitor.

## 2021-10-19 NOTE — THERAPY TREATMENT NOTE
Acute Care - Physical Therapy Treatment Note  [facility]     Patient Name: Kiah Conway  : 1945  MRN: [id number]  Today's Date: 10/19/2021   Onset of Illness/Injury or Date of Surgery: 10/05/21  Visit Dx:     ICD-10-CM ICD-9-CM   1. Closed fracture of left hip, initial encounter ([facility])  S72.002A 820.8     Patient Active Problem List   Diagnosis   • Essential hypertension   • Atrophic urethritis   • GERD without esophagitis   • Fibromyalgia   • Arthritis   • Dysphagia   • History of rheumatic fever   • Migraines   • History of thyroid cancer   • Normocytic anemia   • Femur fracture (HCC)   • Closed fracture of left hip ([facility])     Past Medical History:   Diagnosis Date   • Arthritis    • Cardiac disorder    • Diverticulitis    • Dysphagia    • Fibromyalgia    • Follicular lymphoma (HCC)     Remisson    • GERD (gastroesophageal reflux disease)    • History of rheumatic fever    • Hypertension    • Hypokalemia due to inadequate potassium intake 2014   • Lipoma    • Malignant neoplasm (HCC)    • Migraine    • Neutropenia (HCC) 2019   • Obstructive sleep apnea 2016   • Prolonged Q-T interval on ECG 2019   • Recurrent UTI    • Rheumatoid arthritis (HCC)    • Sleep apnea    • Thyroid cancer (HCC)      Past Surgical History:   Procedure Laterality Date   • BLADDER SURGERY     • FINGER SURGERY      reattached    • HIP HEMIARTHROPLASTY Left 10/7/2021    Procedure: HIP HEMIARTHROPLASTY BIOMET instrumentation  10/7/2021 1300 Dr. Samano;  Surgeon: Jonah Samano MD;  Location: [facility];  Service: Orthopedics;  Laterality: Left;   • HYSTERECTOMY     • THYROID SURGERY       PT Assessment (last 12 hours)     PT Evaluation and Treatment     Row Name 10/19/21 1500          Physical Therapy Time and Intention    Subjective Information no complaints  -BC     Document Type therapy note (daily note)  -BC     Mode of Treatment physical therapy  -BC     Patient Effort adequate  -BC     Row Name 10/19/21 1500           General Information    Patient Profile Reviewed yes  -BC     Onset of Illness/Injury or Date of Surgery 10/05/21  -BC     Referring Physician Dr Dent  -BC     Patient Observations alert; cooperative; agree to therapy  -BC     Equipment Currently Used at Home wheelchair; walker, standard  -BC     Risks Reviewed patient:; LOB; nausea/vomiting; dizziness; increased discomfort; change in vital signs; increased drainage; lines disloged  -BC     Benefits Reviewed patient:; improve function; increase independence; increase strength; increase balance; decrease pain; decrease risk of DVT; improve skin integrity; increase knowledge  -BC     Row Name 10/19/21 1500          Previous Level of Function/Home Environm    BADLs, Premorbid Functional Level needs assistive device for safe performance  -BC     Row Name 10/19/21 1500          Living Environment    Lives With alone  -BC     Row Name 10/19/21 1500          Home Use of Assistive/Adaptive Equipment    Equipment Currently Used at Home walker, standard; wheelchair  -BC     Row Name 10/19/21 1500          Cognition    Affect/Mental Status (Cognitive) WFL  -BC     Orientation Status (Cognition) oriented x 3  -BC     Follows Commands (Cognition) follows one-step commands  -BC     Row Name 10/19/21 1500          Range of Motion (ROM)    Range of Motion ROM is WFL  -BC     Row Name 10/19/21 1500          Mobility    Extremity Weight-bearing Status left lower extremity; right lower extremity  -BC     Left Lower Extremity (Weight-bearing Status) weight-bearing as tolerated (WBAT)  -BC     Right Lower Extremity (Weight-bearing Status) weight-bearing as tolerated (WBAT)  -BC     Row Name 10/19/21 1500          Bed Mobility    Bed Mobility bed mobility (all) activities  -BC     All Activities, [place] (Bed Mobility) minimum assist (75% patient effort)  -BC     Supine-Sit [place] (Bed Mobility) minimum assist (75% patient effort)  -BC     Sit-Supine [place]  (Bed Mobility) moderate assist (50% patient effort)  -BC     Bed Mobility, Safety Issues decreased use of legs for bridging/pushing  -BC     Assistive Device (Bed Mobility) bed rails  -BC     Row Name 10/19/21 1500          Transfers    Transfers sit-stand transfer; stand-sit transfer  -BC     Maintains Weight-bearing Status (Transfers) physical assist to maintain  -BC     Bed-[place]air [place] (Transfers) minimum assist (75% patient effort)  -BC     Assistive Device (Bed-Chair Transfers) walker, front-wheeled  -BC     Sit-Stand [place] (Transfers) minimum assist (75% patient effort)  -BC     Stand-Sit [place] (Transfers) minimum assist (75% patient effort)  -BC     Row Name 10/19/21 1500          Sit-Stand Transfer    Assistive Device (Sit-Stand Transfers) walker, front-wheeled  -BC     Row Name 10/19/21 1500          Stand-Sit Transfer    Assistive Device (Stand-Sit Transfers) walker, front-wheeled  -BC     Row Name 10/19/21 1500          Gait/Stairs (Locomotion)    Comment (Gait/Stairs) pt refused to get out of bed  -BC     Row Name             Wound 10/07/21 1537 Left lateral hip Incision    Wound - Properties Group Placement Date: 10/07/21  -WM Placement Time: 1537  -WM Present on Hospital Admission: N  -WM Side: Left  -WM Orientation: lateral  -WM Location: hip  -WM Primary Wound Type: Incision  -WM Additional Comments: opsite closed with suture & staples dressed with optifoam dressing & abduction pillow  -WM     Retired Wound - Properties Group Date first assessed: 10/07/21  -WM Time first assessed: 1537  -WM Present on Hospital Admission: N  -WM Side: Left  -WM Location: hip  -WM Primary Wound Type: Incision  -WM Additional Comments: opsite closed with suture & staples dressed with optifoam dressing & abduction pillow  -WM     Row Name 10/19/21 1500          Coping    Observed Emotional State calm; cooperative  -BC     Verbalized Emotional State acceptance  -BC     Trust Relationship/Rapport care  explained; choices provided  -BC     Family/Support Persons family  -BC     Involvement in Care not present at bedside  -BC     Family/Support System Care self-care encouraged; support provided  -BC     Diversional Activities television  -BC     Row Name 10/19/21 1500          Plan of Care Review    Plan of Care Reviewed With patient  pt worked in bed  -BC     Row Name 10/19/21 1500          Positioning and Restraints    Pre-Treatment Position in bed  -BC     Post Treatment Position bed  -BC     In Bed notified nsg; supine; call light within reach; encouraged to call for assist; side rails up x3  -BC           User Key  (r) = Recorded By, (t) = Taken By, (c) = Cosigned By    Initials Name Provider Type     Theodore Duckworth RN Registered Nurse    BC Jessica Bennett, PT Physical Therapist                PT Recommendation and Plan     Plan of Care Reviewed With: patient (pt worked in bed)       Time Calculation:    PT Charges     Row Name 10/19/21 1543             Time Calculation    PT Received On 10/19/21  -BC              Time Calculation- PT    Total Timed Code Minutes- PT 30 minute(s)  -BC              Timed Charges    87177 - PT Therapeutic Exercise Minutes 30  -BC              Total Minutes    Timed Charges Total Minutes 30  -BC       Total Minutes 30  -BC            User Key  (r) = Recorded By, (t) = Taken By, (c) = Cosigned By    Initials Name Provider Type    BC Jessica Bennett, PT Physical Therapist              Therapy Charges for Today     Code Description Service Date Service Provider Modifiers Qty    72741480908 HC PT THER PROC EA 15 MIN 10/18/2021 Jessica Bennett, PT GP 1    77721026668 HC GAIT TRAINING EA 15 MIN 10/18/2021 Jessica Bennett, PT GP 2    94058628759 HC PT THER PROC EA 15 MIN 10/19/2021 Jessica Bennett, PT GP 2               Jessica Bennett PT  10/19/2021

## 2021-10-19 NOTE — PLAN OF CARE
Goal Outcome Evaluation:  Plan of Care Reviewed With: patient           Outcome Summary: Pt resting in bed with eyes closed, resp even and unlabored at this time.  Will monitor

## 2021-10-19 NOTE — PLAN OF CARE
Goal Outcome Evaluation:  Plan of Care Reviewed With: patient        Progress: no change  Outcome Summary: Patient has rested in bed during shift with no complaints. VSS. Will continue to monitor.

## 2021-10-19 NOTE — PROGRESS NOTES
Patient Identification:  Name:  Kiah Conway  Age:  76 y.o.  Sex:  female  :  1945  MRN:  [id number]  Visit Number:  [id number]  Primary Care Provider:  Ronny Acevedo APRN    Length of stay:  14    Chief Complaint: f/u urinary retention, hip fracture, PE  HPI     Mrs. Conway is a 76 year old female patient who was admitted to [facility] on 10/5/21 for left formal neck fracture. Her past medical history is significant for GERD, NEIL, Follicular lymphoma, Thyroid cancer, Arthritis, Fibromyalgia.     Subjective:      Mr. Conway is doing well this morning, she is resting in bed on room air. She denies any nausea no vomiting, no diarrhea this am but reports she had some yesterday. Verdugo in place, denies hip pain, dressing and hip look great with no swelling. Discussed with DOLLY Siddiqi.  ----------------------------------------------------------------------------------------------------------------------  Current Hospital Meds:  acetaminophen, 1,000 mg, Oral, Q8H  amLODIPine, 2.5 mg, Oral, Q24H  apixaban, 10 mg, Oral, Q12H   Followed by  [START ON 10/25/2021] apixaban, 5 mg, Oral, Q12H  atorvastatin, 10 mg, Oral, Daily  busPIRone, 2.5 mg, Oral, BID  cefTRIAXone, 1 g, Intravenous, Q24H  cetirizine, 5 mg, Oral, Daily  fluticasone, 2 spray, Each Nare, Daily  hydrALAZINE, 25 mg, Oral, Q8H  metoprolol tartrate, 150 mg, Oral, Q12H  nystatin, 5 mL, Swish & Spit, 4x Daily  pantoprazole, 40 mg, Oral, BID AC  pregabalin, 25 mg, Oral, BID  sodium bicarbonate, 650 mg, Oral, BID  sodium chloride, 10 mL, Intravenous, Q12H      Pharmacy Consult - Pharmacy to dose,   Pharmacy Consult,       ----------------------------------------------------------------------------------------------------------------------  Vital Signs:  Temp:  [98.1 °F (36.7 °C)-98.7 °F (37.1 °C)] 98.5 °F (36.9 °C)  Heart Rate:  [75-96] 81  Resp:  [16-20] 16  BP: (119-143)/(51-72) 139/56      10/05/21  1346 10/05/21  2049   Weight: 53.5 kg (118 lb) 54.1  kg (119 lb 3.2 oz)     Body mass index is 23.28 kg/m².    Intake/Output Summary (Last 24 hours) at 10/19/2021 1010  Last data filed at 10/19/2021 0901  Gross per 24 hour   Intake 860 ml   Output 650 ml   Net 210 ml     I/O this shift:  In: 260 [P.O.:260]  Out: -   Diet Regular  ----------------------------------------------------------------------------------------------------------------------  Physical exam:    Constitutional:  Chronically ill appearing female in no distress on room air, pleasant  HENT:  Head:  Normocephalic and atraumatic.  Mouth:  Moist mucous membranes.    Eyes:  Pupils are equal, round, and reactive to light.  No scleral icterus.    Neck:  Neck supple.  No JVD present.    Cardiovascular:  Normal rate, regular rhythm and normal heart sounds with no murmur.  Pulmonary/Chest:  No respiratory distress, no wheezes, no crackles, with normal breath sounds and good air movement.  Abdominal:  Soft.  Bowel sounds are normal, no tenderness on palpation, abdomen seems to be slightly distended she states she feels like it stays that way.  : azevedo in place with clear yellow urine  Musculoskeletal:  Left hip dressing intact with no drainage noted.   Neurological:  Alert and oriented to person, place, confused to year No tongue deviation.  No facial droop.  No slurred speech.   Skin:  Skin is warm and dry. No rash noted. No pallor.      Assessment unchanged     ----------------------------------------------------------------------------------------------------------------------  Tele:          Telemetry reviewed, did have alarms but mostly looks like artifact.   ----------------------------------------------------------------------------------------------------------------------  Results from last 7 days   Lab Units 10/15/21  1930 10/15/21  1609   TROPONIN T ng/mL <0.010 <0.010     Results from last 7 days   Lab Units 10/19/21  0737 10/18/21  0201 10/17/21  1414 10/17/21  0014 10/17/21  0014   WBC 10*3/mm3  9.23 13.24*  --   --  11.49*   HEMOGLOBIN g/dL 8.7* 8.7* 9.0*   < > 8.0*   HEMATOCRIT % 29.0* 28.5* 28.7*   < > 26.2*   MCV fL 97.0 95.6  --   --  97.0   MCHC g/dL 30.0* 30.5*  --   --  30.5*   PLATELETS 10*3/mm3 605* 627*  --   --  554*    < > = values in this interval not displayed.         Results from last 7 days   Lab Units 10/19/21  0737 10/18/21  0201 10/17/21  1725 10/17/21  0014 10/17/21  0014 10/16/21  0118 10/16/21  0118   SODIUM mmol/L 139 135*  --   --  140   < > 138   POTASSIUM mmol/L 3.9 3.9 3.9   < > 3.4*   < > 3.7   MAGNESIUM mg/dL  --  2.1  --   --  1.7  --  2.0   CHLORIDE mmol/L 105 102  --   --  107   < > 105   CO2 mmol/L 19.2* 18.4*  --   --  19.3*   < > 19.1*   BUN mg/dL 8 7*  --   --  12   < > 16   CREATININE mg/dL 0.67 0.56*  --   --  0.69   < > 0.72   EGFR IF NONAFRICN AM mL/min/1.73 85 105  --   --  82   < > 78   CALCIUM mg/dL 8.2* 8.0*  --   --  7.7*   < > 7.9*   GLUCOSE mg/dL 82 79  --   --  93   < > 79   ALBUMIN g/dL 2.69*  --   --   --  2.48*  --  2.82*   BILIRUBIN mg/dL 0.2  --   --   --  0.2  --  0.2   ALK PHOS U/L 96  --   --   --  99  --  104   AST (SGOT) U/L 20  --   --   --  20  --  17   ALT (SGPT) U/L 9  --   --   --  8  --  6    < > = values in this interval not displayed.   Estimated Creatinine Clearance: 49.5 mL/min (by C-G formula based on SCr of 0.67 mg/dL).  No results found for: AMMONIA      Blood Culture   Date Value Ref Range Status   10/13/2021 No growth at 5 days  Final   10/13/2021 No growth at 5 days  Final     Urine Culture   Date Value Ref Range Status   10/17/2021 <25,000 CFU/mL Gram Positive Cocci (A)  Final     No results found for: WOUNDCX  No results found for: STOOLCX  ----------------------------------------------------------------------------------------------------------------------  Imaging Results (Last 24 Hours)     ** No results found for the last 24 hours. **         ----------------------------------------------------------------------------------------------------------------------  Assessment and Plan:    Left Formal neck Fracture  -s/p left hip hemiarthroplasty on 10/7/21 by Dr. Samano  -ortho is recommending Lovenox SQ for DVT prophylaxis for 14 days post (now requiring full dose anticoagulation).  -Weight bearing as tolerated to left lower extremity   -Follow up with Dr. Samano 10 days post op  -spoke with her daughter via phone on 10/14 they continue to refuse rehab or SNF placement, she states at baseline her mother has falls/is weakn and can not walk independently, even needs much assistance with a walker and does not transfer independently. She states she has plenty of help at home. I have discused with her the likely payne that this fracture and her baseline physical immobility and going home could result in a bedfast state and she v/u.   -continue PT, H/H stable, dressing remians clean and dry, no edema in left leg.   -ortho saw yesterday for follow up, noted continue weight bearing as tolerated, dressing changes needed for saturation only, if needed to change up as 4 x 4's ABDs and tape only follow with Dr. Samano next week.     Macrocytic Anemia  Thrombocytosis  -HGB/HCT on admission was  11.4/35.5  -HGB/HCT today is 8.7/29.0  -has not required transfusion post operatively   -hgb has stayed stable since 10/11  -Vitamin b12 (764) and folate 5.76 (WNL)  -repeat CBC In the am   -Plan count is 605 repeat in the a.m.  -H&H is stable post-op as well as being on Eliquis we will continue to monitor.     Acute Hypoxic respiratory failure  Possible PE  -ABG on admission showed PO2 of 49.8 with arterial O2 saturation of 87% on room air.  -CXR on admission showed changes of possible CHF.  ProBNP was within normal limits at 607.2  -CT of the chest with PE protocol showed Tiny left pleural effusion, Subpleural groundglass attenuation of right upper lobe that could represent  fibrotic change, atelectasis, early change of COVID-19 pneumonia or potentially PE. While there is no definite visualized PE, certainly subsegmental branch pulmonary arteries are not well evaluated on this study due to bolus timing and motion artifact near the bases.  -Dr. Wing spoke with Dr. andrade (radiologist) regarding CT findings and their is a high suspicion of PE, changes on CT are new, Dr. Andrade did not feel like a VQ lung scan would be beneficial at this time.   -Repeat COVID test on 10/13 was negative  -Doppler of bilateral lower extremities negative for DVT  -Dr. Wing discussed with Dr. mina and agreeable to fully anticoagulate due to concern of PE, Lovenox increased to 50 mg SQ BID on 10/14/21  -Cost of Eliquis checked with pharmacy, it or xarelto/eliquis would be $9.20, will discuss this cost with her daughter and she is agreeable.  -Lovenox 50 mg subcu twice daily was discontinued and Eliquis started.  -Eliquis 10 mg p.o. twice daily on 10/18/2021 for 7 days then Eliquis 5 mg p.o. twice daily  -Doing well on room air, no chest pain or dyspnea, monitor.      Acute Confusion/Delirium vs Dementia  -Patient is alert and oriented to self and place, confused to year and month  -CT of the head done earlier in the stay and showed no acute changes  -she is able to answer most questions appropriately during conversation  -her daughter states every now and then she does get confused at home  -no concerns at present for acute cva or neuro event, likely has underlying dementia and receiving pan medication and hospital stay.  -She is alert and oriented to self and place, continues to be confused to year but answers questions appropriately.   -no changes, no concerns, monitor     Elevated Anion Gap  Chronic Nausea and Vomiting  Diarrhea   -KUB on 10/10/21 negative  -Sodium bicarbonate 650 mg PO daily started on 10/12  -Underwent EGD on 10/5/21 outpatient by Dr. Luciano reportedly had a tiny hiatal hernia per  her daughter.  -Lipase 200 on 10/13 and down to 182 10/14, no mention of concern for pancreatitis on CT 10/13  -CT of the A/P with IV contrast 10/13 showed a very distended urinary bladder, bladder scan was 696 ml, she was able to void, PVR was 124 ml, q4hr bladder scans ordered with protocol to straight cath if needed  -Repeat CMP remains stable this am  -lipase trended down to 182  -has had nausea but no vomiting.   -She did have a EGD on the day of admission when she fell prior to the procedure, her daughter tells me that she has been having nausea and intermittent vomiting since around April of this year, her Prilosec was stopped and she was started on Dexilant 60 mg p.o. daily.  -H.Pylori negative  -general surgery consulted yesterday   -Gallbladder US is normal on 10/15/21  -C-Diff negative 10/15/21  -KUB on 10/16/21 with no acute concerns.   -Megace discontinued today, nutrition consulted.   -Unable to do a GI PCR due to admission past 3 days. We prophylacticly started rocephin 1gm on 10/15/21 and her diarrhea improved., today is last dose, day #5  -continue PO biarb BID for now. Will repeat labs in the am.   -Blood acetone noted as small this am, HCO is 18.4, creatinine is 0.56, urine CL (92), potassium (31.4) and sodium (114)   -Nephrology consulted for RTA on 10/18 awaiting their evaluation.   -no nausea or vomiting this morning, she believes she had some diarrhea yesterday am but nursing staff is not aware if so.       Fibromyalgia   Arthritis   -Lyrica 25 mg PO BID   -supportive care     HTN  Hx of PSVT  HLD  Prolonged QTc  -BP 139/56 HR 81  -TTE report reviewed and listed above, EF 61-65%, grade I diastolic dysfunction  -Cardiology saw her on 10/11 and recommended keeping electrolytes replete, and starting Lopressor 50mg PO BID for rate control.   Lipitor 10 mg PO daily   -Hydralazine 10mg PO TID with holding parameters, BPs have been running higher   -Norvasc 2.5 mg PO daily   -Lopressor increased to  100mg PO BID (holding parameters in place) 10/15 after event on telemetry as discussed with Dr. Morris.  -chest wall pain Friday 10/15, EKG and troponin's x2 <0.010, had Cards review EKG with no acute concerns.   -QTc 10/16 is 510, Prozac discontinued, electrolytes are replete, monitor.   -EKG 10/17 to re-evaluate her prolonged QTc which has improved to 489ms. On telemetry it looked like she had inverted T waves, had cardiology review the EKG from this morning and they noted LVH with some strain pattern but is currently asymptomatic and will just monitor for now.   -continues to be chest pain free, no concerns at present, monitor.  Telemetry was reviewed and her rate has been controlled she did have a few alarms but they appear to be artifact she had no sustained SVT, continue Lopressor 150 mg twice a day.      Anxiety  Depression  -Continue Buspar 2.5mg PO BID  -prozac 20mg PO daily  (discontinued 10/16 due to prolonged QTc)  -supportive care      Hx of Follicular lymphoma   Hx of thyroid cancer in remission s/p surgery      GERD  -continue protonix 40mg PO daily      Urinary retention  -she continues to require straight cathing  -UA showed 40mg/dL of ketones, 6-12 WBC, trace bacteria and small budding yeast, 3-6 squamous epithelial cells, WBC is normal, no fevers, is on rocephin, now day #5  -urine culture finalized at <25,000 gram positive cocci, will discuss with Dr. Dent.   -due to continued need of straight cathing, azevedo was anchored 10/18     DVT prophylaxis: Eliquis     Dietary Orders (From admission, onward)     Start     Ordered    10/18/21 1200  Dietary Nutrition Supplements Boost Plus (Ensure Enlive, Ensure Plus)  Daily With Lunch & Dinner      Comments: Magic cup lunch and dinner   Continue with boost all meals   Question:  Select Supplement  Answer:  Boost Plus (Ensure Enlive, Ensure Plus)    10/18/21 0856    10/16/21 1426  Diet Regular  Diet Effective Now        Question:  Diet Texture /  Consistency  Answer:  Regular    10/16/21 1425                Code Status and Medical Interventions:   Ordered at: 10/05/21 2711     Code Status:    CPR     Medical Interventions (Level of Support Prior to Arrest):    Full     Disposition: home with Home health PT and nursing once clinically stable      Perla Falcon, MERRY  10/19/21  10:10 EDT

## 2021-10-19 NOTE — CONSULTS
Nephrology Consult Note    Referring Provider: Perla Flacon  Reason for Consultation: Concern of RTA    Subjective       History of present illness:  Kiah Conway is a 76 y.o. female who presented to [facility] emergency department with chief complaint of left hip pain and was admitted with left femur neck fracture s/p S/P left hip hemiarthroplasty. Nephrology has been asked to evaluate for possible renal tubar acidosis. Pt says she feels fine and wants to go home.   Pt has very poor oral intake and does not eat whole lot in home. She denied any nausea or vomiting or diarrhea.   Pt also denied any history of Chronic NSAIDS use. Patient denies hematuria, dysuria, difficulty passing urine. No prior history of renal stones. No family history of renal disease    History  Past Medical History:   Diagnosis Date   • Arthritis    • Cardiac disorder    • Diverticulitis    • Dysphagia    • Fibromyalgia    • Follicular lymphoma (HCC)     Remisson    • GERD (gastroesophageal reflux disease)    • History of rheumatic fever    • Hypertension    • Hypokalemia due to inadequate potassium intake 03/03/2014   • Lipoma    • Malignant neoplasm (HCC)    • Migraine    • Neutropenia (HCC) 11/7/2019   • Obstructive sleep apnea 7/14/2016   • Prolonged Q-T interval on ECG 11/7/2019   • Recurrent UTI    • Rheumatoid arthritis (HCC)    • Sleep apnea    • Thyroid cancer (HCC)    ,   Past Surgical History:   Procedure Laterality Date   • BLADDER SURGERY     • FINGER SURGERY      reattached    • HIP HEMIARTHROPLASTY Left 10/7/2021    Procedure: HIP HEMIARTHROPLASTY BIOMET instrumentation  10/7/2021 1300 Dr. Samano;  Surgeon: Jonah Samano MD;  Location: [facility];  Service: Orthopedics;  Laterality: Left;   • HYSTERECTOMY     • THYROID SURGERY     ,   Family History   Problem Relation Age of Onset   • Diabetes Mother    • Hypertension Mother    • Other Other         cardiac disorder,cardiovascular disease, malignant  neoplasm of brain   • Breast cancer Paternal Aunt    ,   Social History     Tobacco Use   • Smoking status: Never Smoker   • Smokeless tobacco: Never Used   Substance Use Topics   • Alcohol use: No   • Drug use: No   ,   Medications Prior to Admission   Medication Sig Dispense Refill Last Dose   • amLODIPine (NORVASC) 2.5 MG tablet Take 1 tablet by mouth Daily. 30 tablet 0 10/5/2021 at am   • aspirin 81 MG EC tablet Take 81 mg by mouth Daily.   10/5/2021 at am   • atorvastatin (LIPITOR) 10 MG tablet Take 1 tablet by mouth Daily. 30 tablet 11 10/5/2021 at Unknown time   • busPIRone (BUSPAR) 5 MG tablet Take 2.5 mg by mouth 2 (Two) Times a Day.   10/5/2021 at am   • Calcium Polycarbophil (FIBER-CAPS PO) Take 1 capsule by mouth Daily.   10/5/2021 at am   • dexlansoprazole (DEXILANT) 60 MG capsule Take 60 mg by mouth Daily.   10/5/2021 at am   • FLUoxetine (PROzac) 20 MG capsule Take 20 mg by mouth Daily.   10/5/2021 at am   • hydrALAZINE (APRESOLINE) 10 MG tablet Take 1 tablet by mouth Every 8 (Eight) Hours. (Patient taking differently: Take 10 mg by mouth Every 8 (Eight) Hours. Prior to [facility] Admission, Patient was on:   HOLD if SBP less than 140 or DBP less than 80) 90 tablet 0 10/5/2021 at am   • HYDROcodone-acetaminophen (NORCO)  MG per tablet Take 1 tablet by mouth 3 (Three) Times a Day As Needed for Moderate Pain .   10/5/2021 at am   • loratadine (CLARITIN) 10 MG tablet Take 10 mg by mouth Daily.   10/5/2021 at am   • megestrol (MEGACE) 40 MG tablet Take 40 mg by mouth 2 (Two) Times a Day.   10/5/2021 at am   • metoprolol tartrate (LOPRESSOR) 50 MG tablet Take 50 mg by mouth 2 (Two) Times a Day.   10/5/2021 at am   • nitrofurantoin (MACRODANTIN) 50 MG capsule Take 50 mg by mouth Daily.   10/5/2021 at am   • NON FORMULARY Apply 1 dose topically to the appropriate area as directed As Needed (Compounded Pain cream: Ingredients: 2.5% ketamine, 2.5% lidocaine, 2.5% prilocaine, 0.09% meloxicam (120 grams  total)). Prior to [facility] Admission, Patient was on: Rx wrote to use 2-4 grams to affected area 3-4 times daily. Pt caregiver reports using PRN every 1-2 weeks.   Past Week at Unknown time   • phenylephrine (SUDAFED PE) 10 MG tablet Take 20 mg by mouth 2 (two) times a day.   10/5/2021 at Unknown time   • pregabalin (LYRICA) 25 MG capsule Take 1 capsule by mouth 2 (Two) Times a Day. 30 capsule 0 10/5/2021 at am   • Probiotic Product (Align) capsule Take 1 capsule by mouth Daily.   10/5/2021 at am   • promethazine (PHENERGAN) 12.5 MG tablet Take 12.5 mg by mouth 2 (Two) Times a Day As Needed for Nausea or Vomiting.   Past Week at Unknown time   , Scheduled Meds:  acetaminophen, 1,000 mg, Oral, Q8H  amLODIPine, 2.5 mg, Oral, Q24H  apixaban, 10 mg, Oral, Q12H   Followed by  [START ON 10/25/2021] apixaban, 5 mg, Oral, Q12H  atorvastatin, 10 mg, Oral, Daily  busPIRone, 2.5 mg, Oral, BID  cefTRIAXone, 1 g, Intravenous, Q24H  cetirizine, 5 mg, Oral, Daily  fluticasone, 2 spray, Each Nare, Daily  hydrALAZINE, 25 mg, Oral, Q8H  metoprolol tartrate, 150 mg, Oral, Q12H  nystatin, 5 mL, Swish & Spit, 4x Daily  pantoprazole, 40 mg, Oral, BID AC  pregabalin, 25 mg, Oral, BID  sodium bicarbonate, 650 mg, Oral, BID  sodium chloride, 10 mL, Intravenous, Q12H    , Continuous Infusions:  Pharmacy Consult - Pharmacy to dose,   Pharmacy Consult,     , PRN Meds:  dextrose  •  dextrose  •  glucagon (human recombinant)  •  hydrALAZINE  •  HYDROcodone-acetaminophen  •  magnesium sulfate **OR** magnesium sulfate **OR** magnesium sulfate  •  ondansetron  •  Pharmacy Consult - Pharmacy to dose  •  Pharmacy Consult  •  potassium chloride **OR** potassium chloride **OR** potassium chloride  •  prochlorperazine  •  promethazine  •  psyllium  •  Access VAD **AND** sodium chloride  •  sodium chloride and Allergies:  Baclofen    Review of Systems  More than 10 point review of systems was done. Pertinent items are noted in HPI, all other systems  reviewed and negative    Objective     Vital Signs  Temp:  [98 °F (36.7 °C)-98.7 °F (37.1 °C)] 98 °F (36.7 °C)  Heart Rate:  [75-96] 77  Resp:  [16-20] 16  BP: (119-143)/(51-72) 120/65    I/O this shift:  In: 260 [P.O.:260]  Out: -   I/O last 3 completed shifts:  In: 900 [P.O.:900]  Out: 1341 [Urine:1341]    Physical Examination:  General Appearance: not in acute distress  Head: Normocephalic, without obvious abnormality and atraumatic  Throat: Oral mucosa moist  Neck: No JVD  Lungs: Clear to auscultation, respirations regular and unlabored  Heart: Regular rhythm & normal rate, normal S1, S2, no murmur, no gallop, no rub   Abdomen: Normal bowel sounds, no masses and soft non-tender  Extremities: no edema  Pulses: Palpable and equal bilaterally  Neurologic: unable to assess fully    Laboratory Data :      WBC WBC   Date Value Ref Range Status   10/19/2021 9.23 3.40 - 10.80 10*3/mm3 Final   10/18/2021 13.24 (H) 3.40 - 10.80 10*3/mm3 Final   10/17/2021 11.49 (H) 3.40 - 10.80 10*3/mm3 Final      HGB Hemoglobin   Date Value Ref Range Status   10/19/2021 8.7 (L) 12.0 - 15.9 g/dL Final   10/18/2021 8.7 (L) 12.0 - 15.9 g/dL Final   10/17/2021 9.0 (L) 12.0 - 15.9 g/dL Final   10/17/2021 8.0 (L) 12.0 - 15.9 g/dL Final      HCT Hematocrit   Date Value Ref Range Status   10/19/2021 29.0 (L) 34.0 - 46.6 % Final   10/18/2021 28.5 (L) 34.0 - 46.6 % Final   10/17/2021 28.7 (L) 34.0 - 46.6 % Final   10/17/2021 26.2 (L) 34.0 - 46.6 % Final      Platlets No results found for: LABPLAT   MCV MCV   Date Value Ref Range Status   10/19/2021 97.0 79.0 - 97.0 fL Final   10/18/2021 95.6 79.0 - 97.0 fL Final   10/17/2021 97.0 79.0 - 97.0 fL Final          Sodium Sodium   Date Value Ref Range Status   10/19/2021 139 136 - 145 mmol/L Final   10/18/2021 135 (L) 136 - 145 mmol/L Final   10/17/2021 140 136 - 145 mmol/L Final      Potassium Potassium   Date Value Ref Range Status   10/19/2021 3.9 3.5 - 5.2 mmol/L Final   10/18/2021 3.9 3.5 - 5.2  mmol/L Final   10/17/2021 3.9 3.5 - 5.2 mmol/L Final   10/17/2021 3.4 (L) 3.5 - 5.2 mmol/L Final      Chloride Chloride   Date Value Ref Range Status   10/19/2021 105 98 - 107 mmol/L Final   10/18/2021 102 98 - 107 mmol/L Final   10/17/2021 107 98 - 107 mmol/L Final      CO2 CO2   Date Value Ref Range Status   10/19/2021 19.2 (L) 22.0 - 29.0 mmol/L Final   10/18/2021 18.4 (L) 22.0 - 29.0 mmol/L Final   10/17/2021 19.3 (L) 22.0 - 29.0 mmol/L Final      BUN BUN   Date Value Ref Range Status   10/19/2021 8 8 - 23 mg/dL Final   10/18/2021 7 (L) 8 - 23 mg/dL Final   10/17/2021 12 8 - 23 mg/dL Final      Creatinine Creatinine   Date Value Ref Range Status   10/19/2021 0.67 0.57 - 1.00 mg/dL Final   10/18/2021 0.56 (L) 0.57 - 1.00 mg/dL Final   10/17/2021 0.69 0.57 - 1.00 mg/dL Final      Calcium Calcium   Date Value Ref Range Status   10/19/2021 8.2 (L) 8.6 - 10.5 mg/dL Final   10/18/2021 8.0 (L) 8.6 - 10.5 mg/dL Final   10/17/2021 7.7 (L) 8.6 - 10.5 mg/dL Final      PO4 No results found for: CAPO4   Albumin Albumin   Date Value Ref Range Status   10/19/2021 2.69 (L) 3.50 - 5.20 g/dL Final   10/17/2021 2.48 (L) 3.50 - 5.20 g/dL Final      Magnesium Magnesium   Date Value Ref Range Status   10/18/2021 2.1 1.6 - 2.4 mg/dL Final   10/17/2021 1.7 1.6 - 2.4 mg/dL Final      Uric Acid No results found for: URICACID     Radiology results :     Imaging Results (Last 72 Hours)     ** No results found for the last 72 hours. **            Medications:      acetaminophen, 1,000 mg, Oral, Q8H  amLODIPine, 2.5 mg, Oral, Q24H  apixaban, 10 mg, Oral, Q12H   Followed by  [START ON 10/25/2021] apixaban, 5 mg, Oral, Q12H  atorvastatin, 10 mg, Oral, Daily  busPIRone, 2.5 mg, Oral, BID  cefTRIAXone, 1 g, Intravenous, Q24H  cetirizine, 5 mg, Oral, Daily  fluticasone, 2 spray, Each Nare, Daily  hydrALAZINE, 25 mg, Oral, Q8H  metoprolol tartrate, 150 mg, Oral, Q12H  nystatin, 5 mL, Swish & Spit, 4x Daily  pantoprazole, 40 mg, Oral, BID  AC  pregabalin, 25 mg, Oral, BID  sodium bicarbonate, 650 mg, Oral, BID  sodium chloride, 10 mL, Intravenous, Q12H      Pharmacy Consult - Pharmacy to dose,   Pharmacy Consult,         Assessment/Plan       Closed fracture of left hip (HCC)    Femur fracture (HCC)    1. Anion gap metabolic acidosis  2. Left  Hip fracture s/p left left hip hemiarthroplasty after sustaining mechanical fall  3. Protein-calorie malnutrition  4. Chronic diarrhea    Pt has elevated anion gap metabolic acidosis that is not consistent with RTA. With poor nutrional status, hypoalbuminemia with no proteinuria and positive urine ketones on face of normoglycemia, most likely explanation of AGMA is starvation ketosis and should be addressed appropriately.   At times, on review of her chart, she has normal gap metabolic acidosis that is most likely due to chronic diarrhea and agree on GI workup  I will see her in clinic to follow acid base status once her said issues resolves. Please call if any questions.     Thanks you for the consult. Nephrology will follow the patient.   I discussed the patient's findings and my recommendations with     Joanie Pandey MD  10/19/21  11:07 EDT

## 2021-10-20 NOTE — DISCHARGE PLACEMENT REQUEST
"Neil Perez (76 y.o. Female)             Date of Birth Social Security Number Address Home Phone MRN    1945  [address] [id number] [phone] [id number]    Sabianist Marital Status             Nondenominational        Admission Date Admission Type Admitting Provider Attending Provider Department, Room/Bed    10/5/21 Emergency Kvng Quintanilla DO Williams, Curtis Anthony, DO [address], 3349/2S    Discharge Date Discharge Disposition Discharge Destination           Home or Self Care              Attending Provider: Ed Dent DO    Allergies: Baclofen    Isolation: None   Infection: None   Code Status: CPR   Advance Care Planning Activity    Ht: 152.4 cm (60\")   Wt: 54.1 kg (119 lb 3.2 oz)    Admission Cmt: None   Principal Problem: Closed fracture of left hip (HCC) [S72.002A] More...                 Active Insurance as of 10/5/2021     Primary Coverage     Payor Plan Insurance Group Employer/Plan Group    [facility] MEDICARE REPLACEMENT [facility] MEDICARE REPLACEMENT      Payor Plan Address Payor Plan Phone Number Payor Plan Fax Number Effective Dates    [address] [address] [phone]  2021 - None Entered    [facility] [phone]       Subscriber Name Subscriber Birth Date Member ID       NEIL PEREZ 1945 [id number]                 Emergency Contacts      (Rel.) Home Phone Work Phone Mobile Phone    Kole,Eunice (Daughter) -- -- [phone]    Corrie Pool (Grandchild) [phone] -- [phone]        [address] [address]  Phone:  [phone]  Fax:  [phone] Date: Oct 20, 2021      Ambulatory Referral to Home Health     Patient:  Neil Perez MRN:  [id number]   [address] [address] :  1945  SSN:    Phone: [phone] Sex:  F      INSURANCE PAYOR PLAN GROUP # SUBSCRIBER ID   Primary:    [facility] " MEDICARE REPLACEMENT [id number]   [id number]      Referring Provider Information:  SERG SHARMA Phone: [phone] Fax: [phone]      Referral Information:   # Visits:  999 Referral Type: Home Health [42]   Urgency:  Routine Referral Reason: Specialty Services Required   Start Date: Oct 20, 2021 End Date:  To be determined by Insurer   Diagnosis: Closed fracture of left hip, sequela (S72.002S [ICD-10-CM] 905.3 [ICD-9-CM])  Acute urinary retention (R33.8 [ICD-10-CM] 788.29 [ICD-9-CM])      Refer to Dept:   Refer to Provider:   Refer to Facility:       Face to Face Visit Date: 10/20/2021  Follow-up provider for Plan of Care? I treated the patient in an acute care facility and will not continue treatment after discharge.  Follow-up provider: BERNARDO GAR [[id number]]  Reason/Clinical Findings: Hip fracture, urinary retention, discharged home with azevedo catheter which will need removed when patient more mobile and able to urinate on her own.  Describe mobility limitations that make leaving home difficult: Hip fracture.  Nursing/Therapeutic Services Requested: Skilled Nursing  Nursing/Therapeutic Services Requested: Physical Therapy  Nursing/Therapeutic Services Requested: Occupational Therapy  Skilled nursing orders: Monthly catheter care (Will need bladder training/azevedo catheter removed when more mobile and able to urinate on her own.)  PT orders: Therapeutic exercise  PT orders: Gait Training  PT orders: Transfer training  PT orders: Total joint pathway  PT orders: Strengthening  PT orders: Home safety assessment  Weight Bearing Status: Full Weight Bearing  Occupational orders: Activities of daily living  Occupational orders: Energy conservation  Occupational orders: Strengthening  Occupational orders: Fine motor  Occupational orders: Home safety assessment  Frequency: 1 Week 1     This document serves as a request of services and does not constitute Insurance authorization or approval of services.  To  determine eligibility, please contact the members Insurance carrier to verify and review coverage.     If you have medical questions regarding this request for services. Please contact [address] at [phone] during normal business hours.         Authorizing Provider:Corrie Schilling PA  Authorizing Provider's NPI: [id number]  Order Entered By: Corrie Schilling PA 10/20/2021 12:11 PM     Electronically signed by: Corrie Schilling PA 10/20/2021 12:11 PM               History & Physical      Kvng Quintanilla DO at 10/05/21 1929              [facility] HOSPITALIST HISTORY AND PHYSICAL    Patient Identification:  Name:  Kiah Conway  Age:  76 y.o.  Sex:  female  :  1945  MRN:  [id number]   Admit Date: 10/5/2021   Visit Number:  [id number]  Primary Care Physician:  Ronny Acevedo APRN         Chief complaint: Left hip pain    History of presenting illness: Ms. Conway is a 76 y.o. female who presented to [facility] emergency department on 2021 with a chief complaint of left hip pain.  Patient has a past medical history remarkable for diverticulosis, fibromyalgia, follicular lymphoma, GERD and distant history of thyroid cancer.  Patient states that she was being seen in an outpatient surgery center where she had colonoscopy performed today.  After her colonoscopy was performed, patient was taken to the restroom where a nurse assisted her to the toilet.  Patient states the nurse then exited the room and while she attempted to use the restroom she accidentally fell off of the toilet.  She states she fell onto her left side and denies hitting her head.  She denies losing consciousness.  However, patient did report immediate pain in her left hip joint.  Patient does not remember the exact events that occurred after her fall.  According to ER personnel, patient was taken by private vehicle to outpatient imaging center  where a x-ray was performed of her left hip joint which did demonstrate a left femoral neck fracture.  As such, patient was brought onto the emergency department for further treatment and evaluation.  Initial evaluation in the emergency department did consist of basic laboratory work as well as physical exam and vital signs.  Initial vital signs found patient hypertensive with blood pressure 192/91, respirations 20, heart rate 86, temperature 99.2 °F and oxygen saturation 90% on room air.  Initial lab work did include CBC and CMP.  CBC demonstrated elevated white blood cell count of 15.6 and hemoglobin 11.4.  Initial CMP demonstrated very mildly decreased potassium of 3.4 but otherwise was within normal limits.  Again, hip x-ray did demonstrate a left femoral neck fracture.  For this reason, patient will be admitted for further treatment and evaluation.  -------------------------------------------------------------------------------------------------------------------  Past Medical History:   Diagnosis Date   • Arthritis    • Cardiac disorder    • Diverticulitis    • Dysphagia    • Fibromyalgia    • Follicular lymphoma (CMS/HCC)     Remisson    • GERD (gastroesophageal reflux disease)    • History of rheumatic fever    • Hypertension    • Hypokalemia due to inadequate potassium intake 03/03/2014   • Lipoma    • Malignant neoplasm (CMS/HCC)    • Migraine    • Neutropenia (CMS/HCC) 11/7/2019   • Obstructive sleep apnea 7/14/2016   • Prolonged Q-T interval on ECG 11/7/2019   • Recurrent UTI    • Rheumatoid arthritis (CMS/HCC)    • Sleep apnea    • Thyroid cancer (CMS/HCC)      Past Surgical History:   Procedure Laterality Date   • BLADDER SURGERY     • FINGER SURGERY      reattached    • HYSTERECTOMY     • THYROID SURGERY       Family History   Problem Relation Age of Onset   • Diabetes Mother    • Hypertension Mother    • Other Other         cardiac disorder,cardiovascular disease, malignant neoplasm of brain   •  Breast cancer Paternal Aunt      Social History     Socioeconomic History   • Marital status:      Spouse name: Not on file   • Number of children: Not on file   • Years of education: Not on file   • Highest education level: Not on file   Tobacco Use   • Smoking status: Never Smoker   • Smokeless tobacco: Never Used   Substance and Sexual Activity   • Alcohol use: No   • Drug use: No   • Sexual activity: Defer     ---------------------------------------------------------------------------------------------------------------------   Allergies:  Baclofen  ---------------------------------------------------------------------------------------------------------------------   Prior to Admission Medications     Prescriptions Last Dose Informant Patient Reported? Taking?    amLODIPine (NORVASC) 2.5 MG tablet 10/5/2021 Pharmacy No Yes    Take 1 tablet by mouth Daily.    aspirin 81 MG EC tablet 10/5/2021 Child Yes Yes    Take 81 mg by mouth Daily.    atorvastatin (LIPITOR) 10 MG tablet 10/5/2021 Pharmacy No Yes    Take 1 tablet by mouth Daily.    busPIRone (BUSPAR) 5 MG tablet 10/5/2021 Pharmacy Yes Yes    Take 2.5 mg by mouth 2 (Two) Times a Day.    Calcium Polycarbophil (FIBER-CAPS PO) 10/5/2021 Child Yes Yes    Take 1 capsule by mouth Daily.    dexlansoprazole (DEXILANT) 60 MG capsule 10/5/2021 Child Yes Yes    Take 60 mg by mouth Daily.    FLUoxetine (PROzac) 20 MG capsule 10/5/2021 Pharmacy Yes Yes    Take 20 mg by mouth Daily.    hydrALAZINE (APRESOLINE) 10 MG tablet 10/5/2021 Pharmacy No Yes    Take 1 tablet by mouth Every 8 (Eight) Hours.    Patient taking differently:  Take 10 mg by mouth Every 8 (Eight) Hours. Prior to [facility] Admission, Patient was on:   HOLD if SBP less than 140 or DBP less than 80    HYDROcodone-acetaminophen (NORCO)  MG per tablet 10/5/2021 Pharmacy Yes Yes    Take 1 tablet by mouth 3 (Three) Times a Day As Needed for Moderate Pain .    loratadine (CLARITIN) 10 MG tablet 10/5/2021  Pharmacy Yes Yes    Take 10 mg by mouth Daily.    megestrol (MEGACE) 40 MG tablet 10/5/2021 Pharmacy Yes Yes    Take 40 mg by mouth 2 (Two) Times a Day.    metoprolol tartrate (LOPRESSOR) 50 MG tablet 10/5/2021 Pharmacy Yes Yes    Take 50 mg by mouth 2 (Two) Times a Day.    nitrofurantoin (MACRODANTIN) 50 MG capsule 10/5/2021 Pharmacy Yes Yes    Take 50 mg by mouth Daily.    NON FORMULARY Past Week Pharmacy Yes Yes    Apply 1 dose topically to the appropriate area as directed As Needed (Compounded Pain cream: Ingredients: 2.5% ketamine, 2.5% lidocaine, 2.5% prilocaine, 0.09% meloxicam (120 grams total)). Prior to [facility]mission, Patient was on: Rx wrote to use 2-4 grams to affected area 3-4 times daily. Pt caregiver reports using PRN every 1-2 weeks.    pregabalin (LYRICA) 25 MG capsule 10/5/2021 Pharmacy No Yes    Take 1 capsule by mouth 2 (Two) Times a Day.    Probiotic Product (Align) capsule 10/5/2021 Child Yes Yes    Take 1 capsule by mouth Daily.    promethazine (PHENERGAN) 12.5 MG tablet Past Week Pharmacy Yes Yes    Take 12.5 mg by mouth 2 (Two) Times a Day As Needed for Nausea or Vomiting.        Hospital Scheduled Meds:     sodium chloride, 75 mL/hr, Last Rate: 75 mL/hr (10/05/21 1811)      ---------------------------------------------------------------------------------------------------------------------   Review of Systems   On review of systems the patient denies the following unless noted above:     Constitutional:  Fevers, chills, weight change, fatigue     Eyes: Vision changes, headache, double vision, loss of vision     ENT: Runny nose, nose bleeds, ringing in ears, pain with swallowing, sore throat     Cardiovascular: Chest pains, palpitations, PND, orthopnea     Respiratory: Cough, wheezing, SOA, hemoptysis     GI:  Abdominal pain, diarrhea, constipation, change in stool caliber,    Rectal bleeding, vomiting or nausea     : Difficulty voiding, dysuria, hematuria     Musculoskeletal: Changes  of any chronic joint pain, swelling     Skin: Rash, itching, easy bruisability     Neurological: Unilateral weakness, new onset numbness, speech difficulties     Psychiatric: Sadness, tearfulness, feelings of hopelessness, racing thoughts     Endocrine:  Heat or cold intolerance, mood swings, polydipsia, polyphagia,    recent hypoglycemia  --------------------------------------------------------------------------------------------------------------------  Vital Signs:  Temp:  [98.4 °F (36.9 °C)-99.2 °F (37.3 °C)] 98.4 °F (36.9 °C)  Heart Rate:  [86-92] 90  Resp:  [20-24] 21  BP: (179-192)/(66-92) 179/66      10/05/21  1346   Weight: 53.5 kg (118 lb)     Body mass index is 23.05 kg/m².  ---------------------------------------------------------------------------------------------------------------------   Physical exam:  Constitutional: Well-nourished elderly appearing  female in no apparent distress.     HENT:  Head:  Normocephalic and atraumatic.  Mouth:  Moist mucous membranes.    Eyes:  Conjunctivae and EOM are normal.  Pupils are equal, round, and reactive to light.  No scleral icterus.    Neck:  Neck supple. No thyromegaly.  No JVD present.    Cardiovascular:  Regular rate and rhythm with no murmurs, rubs, clicks or gallops appreciated.  Pulmonary/Chest:  Clear to auscultation bilaterally with no crackles, wheezes or rhonchi appreciated.  Abdominal:  Soft. Nontender. Nondistended  Bowel sounds are normal in all four quadrants. No organomegally appreciated.   Musculoskeletal:  No edema, left hip joint tender to palpation, left lower extremity is externally rotated.      Neurological:  Alert and oriented to person, place, and time. Cranial nerves II-XII intact with no focal defecits.  No facial droop.  No slurred speech.   Skin:  Warm and dry to palpation with no rashes or lesions appreciated.  Peripheral vascular:  2+ radial and pedal pulses in bilateral upper and lower extremities.  Psychiatric:  Alert  and oriented x3, demonstrates appropriate judgement and insight.  ------------------------------------------------------------------------------  ---------------------------------------------------------------------------------------------------------------------   Results from last 7 days   Lab Units 10/05/21  1418   WBC 10*3/mm3 15.67*   HEMOGLOBIN g/dL 11.4*   HEMATOCRIT % 35.5   MCV fL 93.2   MCHC g/dL 32.1   PLATELETS 10*3/mm3 298   INR  1.00     Results from last 7 days   Lab Units 10/05/21  1403   PH, ARTERIAL pH units 7.468*   PO2 ART mm Hg 49.8*   PCO2, ARTERIAL mm Hg 28.8*   HCO3 ART mmol/L 20.8     Results from last 7 days   Lab Units 10/05/21  1418   SODIUM mmol/L 138   POTASSIUM mmol/L 3.4*   MAGNESIUM mg/dL 1.7   CHLORIDE mmol/L 103   CO2 mmol/L 20.0*   BUN mg/dL 9   CREATININE mg/dL 0.72   EGFR IF NONAFRICN AM mL/min/1.73 78   CALCIUM mg/dL 9.3   GLUCOSE mg/dL 128*   ALBUMIN g/dL 4.45   BILIRUBIN mg/dL 0.5   ALK PHOS U/L 105   AST (SGOT) U/L 19   ALT (SGPT) U/L 11   Estimated Creatinine Clearance: 48.9 mL/min (by C-G formula based on SCr of 0.72 mg/dL).  No results found for: AMMONIA  Results from last 7 days   Lab Units 10/05/21  1632 10/05/21  1433   TROPONIN T ng/mL <0.010 <0.010     Results from last 7 days   Lab Units 10/05/21  1433   PROBNP pg/mL 607.2     Lab Results   Component Value Date    HGBA1C 4.30 (L) 11/07/2019     Lab Results   Component Value Date    TSH 1.170 04/08/2021    FREET4 1.26 05/27/2020     No results found for: PREGTESTUR, PREGSERUM, HCG, HCGQUANT  Pain Management Panel     Pain Management Panel Latest Ref Rng & Units 4/9/2021    AMPHETAMINES SCREEN, URINE Negative Negative    BARBITURATES SCREEN Negative Negative    BENZODIAZEPINE SCREEN, URINE Negative Negative    BUPRENORPHINEUR Negative Negative    COCAINE SCREEN, URINE Negative Negative    METHADONE SCREEN, URINE Negative Negative        No results found for: BLOODCX  No results found for: URINECX  No results found  for: WOUNDCX  No results found for: STOOLCX      ---------------------------------------------------------------------------------------------------------------------  Imaging Results (Last 7 Days)     Procedure Component Value Units Date/Time    XR Chest 1 View [[id number]] Collected: 10/05/21 1447     Updated: 10/05/21 1449    Narrative:      XR CHEST 1 VW-     CLINICAL INDICATION: fall, hip fx        COMPARISON: 05/15/2021      TECHNIQUE: Single frontal view of the chest.     FINDINGS:     There is no focal alveolar infiltrate or effusion.  Appearance suggestive of at least mild pulmonary congestion.  Central line is stable from the previous exam  There are no suspicious-appearing parenchymal soft tissue nodules.          Impression:      Appearance suggestive of CHF     This report was finalized on 10/5/2021 2:47 PM by Dr. Chente Choi MD.       XR Femur 2 View Left [[id number]] Collected: 10/05/21 1446     Updated: 10/05/21 1449    Narrative:      EXAMINATION: XR FEMUR 2 VW LEFT-      CLINICAL INDICATION: fall, left femoral neck fx        COMPARISON: None available     FINDINGS:  4 views of the left femur show left femoral neck fracture     No distal femoral fracture is seen.       Impression:      Left femoral neck fracture.      This report was finalized on 10/5/2021 2:46 PM by Dr. Chente Choi MD.             I have personally reviewed the radiology images and read the final radiology report.  ---------------------------------------------------------------------------------------------------------------------  Assessment and Plan:    1.  Left femoral neck fracture -we will admit to inpatient [facility] and consult orthopedic surgery.  Patient will likely require surgical intervention.  Patient will be made n.p.o. at midnight.  Patient will be typed and screened in preparation for surgery tomorrow.    2.  Hypokalemia -will place on electrolyte replacement protocol    3.  Essential hypertension - we will obtain  home medications and restart once available    4.  GERD -PPI    Disposition plan for surgical intervention of left hip fracture tomorrow.  May need placement on discharge.    Kvng Quintanilla DO  10/05/21  19:29 EDT    Electronically signed by Kvng Quintanilla DO at 10/05/21 1937       Vital Signs (last day)     Date/Time Temp Temp src Pulse Resp BP Patient Position SpO2    10/20/21 1104 97.8 (36.6) Oral 79 18 134/56 Lying 96    10/20/21 0640 98 (36.7) Oral 78 16 158/64 Lying 95    10/20/21 0300 98 (36.7) Oral 73 16 122/56 Lying 94    10/19/21 2300 97.8 (36.6) Oral 84 18 138/74 Lying 93    10/19/21 2030 -- -- 86 -- 136/78 -- --    10/19/21 1900 98.1 (36.7) Oral 78 18 126/46 Lying 96    10/19/21 1837 -- -- -- -- -- -- 95    10/19/21 1403 97.9 (36.6) Axillary 75 18 128/56 Lying 95    10/19/21 1052 98 (36.7) Oral 77 16 120/65 Lying 97    10/19/21 0926 -- -- 81 -- 139/56 -- --    10/19/21 0614 98.5 (36.9) Oral 78 16 119/51 Lying --    10/19/21 0300 98.1 (36.7) Oral 75 18 129/72 Lying 96          Intake & Output (last day)       10/19 0701  10/20 0700 10/20 0701  10/21 0700    P.O. 480     Total Intake(mL/kg) 480 (8.9)     Urine (mL/kg/hr) 600 (0.5)     Stool 0     Total Output 600     Net -120           Stool Unmeasured Occurrence 2 x           Current Facility-Administered Medications   Medication Dose Route Frequency Provider Last Rate Last Admin   • acetaminophen (TYLENOL) tablet 1,000 mg  1,000 mg Oral Q8H Jonah Samano MD   1,000 mg at 10/20/21 0542   • amLODIPine (NORVASC) tablet 2.5 mg  2.5 mg Oral Q24H Jonah Samano MD   2.5 mg at 10/20/21 0720   • apixaban (ELIQUIS) tablet 10 mg  10 mg Oral Q12H Gustavo Wing DO   10 mg at 10/20/21 0719    Followed by   • [START ON 10/25/2021] apixaban (ELIQUIS) tablet 5 mg  5 mg Oral Q12H Gustavo Wing DO       • atorvastatin (LIPITOR) tablet 10 mg  10 mg Oral Daily Jonah Samano MD   10 mg at 10/20/21 0720   • busPIRone (BUSPAR) tablet  2.5 mg  2.5 mg Oral BID Jonah Samano MD   2.5 mg at 10/20/21 0720   • cetirizine (zyrTEC) tablet 5 mg  5 mg Oral Daily Jonah Samano MD   5 mg at 10/20/21 0720   • dextrose (D50W) (25 g/50 mL) IV injection 25 g  25 g Intravenous Q15 Min PRN Gustavo Wing DO       • dextrose (GLUTOSE) oral gel 15 g  15 g Oral Q15 Min PRN Gustavo Wing DO       • fluticasone (FLONASE) 50 MCG/ACT nasal spray 2 spray  2 spray Each Nare Daily Perla Falcon APRN   2 spray at 10/20/21 0720   • glucagon (human recombinant) (GLUCAGEN DIAGNOSTIC) injection 1 mg  1 mg Subcutaneous Q15 Min PRN Gustavo Wing DO       • hydrALAZINE (APRESOLINE) injection 10 mg  10 mg Intravenous Q6H PRN Jonah Samano MD   10 mg at 10/11/21 2307   • hydrALAZINE (APRESOLINE) tablet 25 mg  25 mg Oral Q8H Perla Falcon APRN   25 mg at 10/19/21 2029   • HYDROcodone-acetaminophen (NORCO)  MG per tablet 1 tablet  1 tablet Oral TID PRN Jonah Samano MD   1 tablet at 10/20/21 1018   • lactated ringers infusion  75 mL/hr Intravenous Continuous Perla Falcon APRN 75 mL/hr at 10/20/21 0727 75 mL/hr at 10/20/21 0727   • Magnesium Sulfate 2 gram Bolus, followed by 8 gram infusion (total Mg dose 10 grams)- Mg less than or equal to 1mg/dL  2 g Intravenous PRN Gustavo Wing DO        Or   • Magnesium Sulfate 2 gram / 50mL Infusion (GIVE X 3 BAGS TO EQUAL 6GM TOTAL DOSE) - Mg 1.1 - 1.5 mg/dl  2 g Intravenous PRN Gustavo Wing DO        Or   • Magnesium Sulfate 4 gram infusion- Mg 1.6-1.9 mg/dL  4 g Intravenous PRN Gustavo Wing DO       • metoprolol tartrate (LOPRESSOR) tablet 150 mg  150 mg Oral Q12H Perla Falcon APRN   150 mg at 10/20/21 0720   • ondansetron (ZOFRAN) injection 4 mg  4 mg Intravenous Q6H PRN Jonah Samano MD   4 mg at 10/15/21 1318   • pantoprazole (PROTONIX) EC tablet 40 mg  40 mg Oral BID AC Jonah Samano MD   40 mg at 10/20/21 0720   • Pharmacy Consult -  Pharmacy to dose   Does not apply Continuous PRN Perla Falcon APRN       • Pharmacy Consult   Does not apply Continuous PRN Pelra Falcon APRN       • potassium chloride (MICRO-K) CR capsule 40 mEq  40 mEq Oral PRN Jonah Samano MD        Or   • potassium chloride (KLOR-CON) packet 40 mEq  40 mEq Oral PRN Jonah Samano MD        Or   • potassium chloride 10 mEq in 100 mL IVPB  10 mEq Intravenous Q1H PRN Jonah Samano MD       • pregabalin (LYRICA) capsule 25 mg  25 mg Oral BID Jonah Samano MD   25 mg at 10/20/21 0720   • prochlorperazine (COMPAZINE) injection 5 mg  5 mg Intravenous Q6H PRN Corrie Schilling PA   5 mg at 10/13/21 2138   • promethazine (PHENERGAN) tablet 12.5 mg  12.5 mg Oral BID PRN Jonah Samano MD   12.5 mg at 10/11/21 1433   • psyllium (METAMUCIL MULTIHEALTH FIBER) 58.12 % packet 1 packet  1 packet Oral Daily PRN Corrie Schilling PA       • sodium chloride 0.9 % flush 10 mL  10 mL Intravenous PRN Jonah Samano MD       • sodium chloride 0.9 % flush 10 mL  10 mL Intravenous Q12H Jonah Samano MD   10 mL at 10/19/21 2032   • sodium chloride 0.9 % flush 10 mL  10 mL Intravenous PRN Jonah Samano MD   10 mL at 10/15/21 0949     Lab Results (most recent)     Procedure Component Value Units Date/Time    POC Glucose Once [[id number]]  (Normal) Collected: 10/20/21 1020    Specimen: Blood Updated: 10/20/21 1026     Glucose 71 mg/dL      Comment: Meter: [id number] : [id number] Parviz MEJIA       Basic Metabolic Panel [[id number]]  (Abnormal) Collected: 10/20/21 0815    Specimen: Blood Updated: 10/20/21 0848     Glucose 84 mg/dL      BUN 8 mg/dL      Creatinine 0.57 mg/dL      Sodium 138 mmol/L      Potassium 3.8 mmol/L      Chloride 104 mmol/L      CO2 18.0 mmol/L      Calcium 8.1 mg/dL      eGFR Non African Amer 103 mL/min/1.73      BUN/Creatinine Ratio 14.0     Anion Gap 16.0 mmol/L     Narrative:      GFR Normal >60  Chronic Kidney Disease <60  Kidney  Failure <15      POC Glucose Once [[id number]]  (Normal) Collected: 10/20/21 0642    Specimen: Blood Updated: 10/20/21 0649     Glucose 81 mg/dL      Comment: Meter: [id number] : [id number] khushboo albright       Comprehensive Metabolic Panel [[id number]]  (Abnormal) Collected: 10/19/21 0737    Specimen: Blood Updated: 10/19/21 0806     Glucose 82 mg/dL      BUN 8 mg/dL      Creatinine 0.67 mg/dL      Sodium 139 mmol/L      Potassium 3.9 mmol/L      Chloride 105 mmol/L      CO2 19.2 mmol/L      Calcium 8.2 mg/dL      Total Protein 5.2 g/dL      Albumin 2.69 g/dL      ALT (SGPT) 9 U/L      AST (SGOT) 20 U/L      Alkaline Phosphatase 96 U/L      Total Bilirubin 0.2 mg/dL      eGFR Non African Amer 85 mL/min/1.73      Globulin 2.5 gm/dL      A/G Ratio 1.1 g/dL      BUN/Creatinine Ratio 11.9     Anion Gap 14.8 mmol/L     Narrative:      GFR Normal >60  Chronic Kidney Disease <60  Kidney Failure <15      CBC & Differential [[id number]]  (Abnormal) Collected: 10/19/21 0737    Specimen: Blood Updated: 10/19/21 0744    Narrative:      The following orders were created for panel order CBC & Differential.  Procedure                               Abnormality         Status                     ---------                               -----------         ------                     CBC Auto Differential[[id number]]        Abnormal            Final result                 Please view results for these tests on the individual orders.    CBC Auto Differential [[id number]]  (Abnormal) Collected: 10/19/21 0737    Specimen: Blood Updated: 10/19/21 0744     WBC 9.23 10*3/mm3      RBC 2.99 10*6/mm3      Hemoglobin 8.7 g/dL      Hematocrit 29.0 %      MCV 97.0 fL      MCH 29.1 pg      MCHC 30.0 g/dL      RDW 15.1 %      RDW-SD 52.8 fl      MPV 10.6 fL      Platelets 605 10*3/mm3      Neutrophil % 73.9 %      Lymphocyte % 10.6 %      Monocyte % 10.1 %      Eosinophil % 4.4 %      Basophil % 0.5 %      Immature Grans % 0.5 %      Neutrophils,  Absolute 6.81 10*3/mm3      Lymphocytes, Absolute 0.98 10*3/mm3      Monocytes, Absolute 0.93 10*3/mm3      Eosinophils, Absolute 0.41 10*3/mm3      Basophils, Absolute 0.05 10*3/mm3      Immature Grans, Absolute 0.05 10*3/mm3      nRBC 0.0 /100 WBC     Osmolality, Urine - Urine, Catheter [[id number]] Collected: 10/18/21 0935    Specimen: Urine, Catheter Updated: 10/1943     Osmolality, Urine 486 mOsm/kg     Narrative:      Osmo Normal Reference Ranges:    Random:  mOsm/kg H2O, depending on fluid intake.  Random: >850 mOsm/kg H20, after 12 hour fluid restriction.    24 Hour: 300-900 mOsm/kg H2O.    Calcium, Urine, Random - Urine, Catheter [[id number]] Collected: 10/18/21 0935    Specimen: Urine, Catheter Updated: 10/18/21 1853     Calcium, Urine 12.9 mg/dL     Narrative:      Reference intervals for random urine have not been established.  Clinical usage is dependent upon physician's interpretation in combination with other laboratory tests.       Urine Culture - Urine, Urine, Random Void [[id number]]  (Abnormal) Collected: 10/17/21 1737    Specimen: Urine, Random Void Updated: 10/18/21 1310     Urine Culture <25,000 CFU/mL Gram Positive Cocci    Narrative:      Call if further workup needed.      Sodium, Urine, Random - Urine, Catheter [[id number]] Collected: 10/18/21 0935    Specimen: Urine, Catheter Updated: 10/18/21 0952     Sodium, Urine 114 mmol/L     Narrative:      Reference intervals for random urine have not been established.  Clinical usage is dependent upon physician's interpretation in combination with other laboratory tests.       Potassium, Urine, Random - Urine, Catheter [[id number]] Collected: 10/18/21 0935    Specimen: Urine, Catheter Updated: 10/18/21 0952     Potassium, Urine 31.4 mmol/L     Narrative:      Reference intervals for random urine have not been established.  Clinical usage is dependent upon physician's interpretation in combination with other laboratory tests.       Chloride,  Urine, Random - Urine, Catheter [[id number]] Collected: 10/18/21 0935    Specimen: Urine, Catheter Updated: 10/18/21 0952     Chloride, Urine 92 mmol/L     Narrative:      Reference intervals for random urine have not been established.  Clinical usage is dependent upon physician's interpretation in combination with other laboratory tests.       Acetone [[id number]]  (Abnormal) Collected: 10/18/21 0848    Specimen: Blood Updated: 10/18/21 0948     Acetone Small    Blood Culture - Blood, Arm, Left [[id number]]  (Normal) Collected: 10/13/21 0502    Specimen: Blood from Arm, Left Updated: 10/18/21 0530     Blood Culture No growth at 5 days    Blood Culture - Blood, Arm, Right [[id number]]  (Normal) Collected: 10/13/21 0502    Specimen: Blood from Arm, Right Updated: 10/18/21 0530     Blood Culture No growth at 5 days    Basic Metabolic Panel [[id number]]  (Abnormal) Collected: 10/18/21 0201    Specimen: Blood Updated: 10/18/21 0302     Glucose 79 mg/dL      BUN 7 mg/dL      Creatinine 0.56 mg/dL      Sodium 135 mmol/L      Potassium 3.9 mmol/L      Chloride 102 mmol/L      CO2 18.4 mmol/L      Calcium 8.0 mg/dL      eGFR Non African Amer 105 mL/min/1.73      BUN/Creatinine Ratio 12.5     Anion Gap 14.6 mmol/L     Narrative:      GFR Normal >60  Chronic Kidney Disease <60  Kidney Failure <15      Magnesium [[id number]]  (Normal) Collected: 10/18/21 0201    Specimen: Blood Updated: 10/18/21 0302     Magnesium 2.1 mg/dL     CBC & Differential [[id number]]  (Abnormal) Collected: 10/18/21 0201    Specimen: Blood Updated: 10/18/21 0235    Narrative:      The following orders were created for panel order CBC & Differential.  Procedure                               Abnormality         Status                     ---------                               -----------         ------                     CBC Auto Differential[[id number]]        Abnormal            Final result                 Please view results for these tests on the  individual orders.    CBC Auto Differential [[id number]]  (Abnormal) Collected: 10/18/21 0201    Specimen: Blood Updated: 10/18/21 0235     WBC 13.24 10*3/mm3      RBC 2.98 10*6/mm3      Hemoglobin 8.7 g/dL      Hematocrit 28.5 %      MCV 95.6 fL      MCH 29.2 pg      MCHC 30.5 g/dL      RDW 15.1 %      RDW-SD 51.7 fl      MPV 11.0 fL      Platelets 627 10*3/mm3      Neutrophil % 79.3 %      Lymphocyte % 8.8 %      Monocyte % 7.4 %      Eosinophil % 3.4 %      Basophil % 0.3 %      Immature Grans % 0.8 %      Neutrophils, Absolute 10.49 10*3/mm3      Lymphocytes, Absolute 1.17 10*3/mm3      Monocytes, Absolute 0.98 10*3/mm3      Eosinophils, Absolute 0.45 10*3/mm3      Basophils, Absolute 0.04 10*3/mm3      Immature Grans, Absolute 0.11 10*3/mm3      nRBC 0.0 /100 WBC     Urinalysis, Microscopic Only - Urine, Random Void [[id number]]  (Abnormal) Collected: 10/17/21 1737    Specimen: Urine, Random Void Updated: 10/17/21 1825     RBC, UA 0-2 /HPF      WBC, UA 6-12 /HPF      Bacteria, UA Trace /HPF      Squamous Epithelial Cells, UA 3-6 /HPF      Yeast, UA Small/1+ Budding Yeast /HPF      Hyaline Casts, UA None Seen /LPF      Methodology Automated Microscopy    Urinalysis With Culture If Indicated - Urine, Random Void [[id number]]  (Abnormal) Collected: 10/17/21 1737    Specimen: Urine, Random Void Updated: 10/17/21 1825     Color, UA Yellow     Appearance, UA Clear     pH, UA 5.5     Specific Gravity, UA 1.014     Glucose, UA Negative     Ketones, UA 40 mg/dL (2+)     Bilirubin, UA Negative     Blood, UA Negative     Protein, UA Negative     Leuk Esterase, UA Trace     Nitrite, UA Negative     Urobilinogen, UA 0.2 E.U./dL    Potassium [[id number]]  (Normal) Collected: 10/17/21 1725    Specimen: Blood Updated: 10/17/21 1747     Potassium 3.9 mmol/L     Hemoglobin & Hematocrit, Blood [[id number]]  (Abnormal) Collected: 10/17/21 1414    Specimen: Blood Updated: 10/17/21 1446     Hemoglobin 9.0 g/dL      Hematocrit 28.7 %      Comprehensive Metabolic Panel [[id number]]  (Abnormal) Collected: 10/17/21 0014    Specimen: Blood Updated: 10/17/21 0117     Glucose 93 mg/dL      BUN 12 mg/dL      Creatinine 0.69 mg/dL      Sodium 140 mmol/L      Potassium 3.4 mmol/L      Chloride 107 mmol/L      CO2 19.3 mmol/L      Calcium 7.7 mg/dL      Total Protein 4.8 g/dL      Albumin 2.48 g/dL      ALT (SGPT) 8 U/L      AST (SGOT) 20 U/L      Alkaline Phosphatase 99 U/L      Total Bilirubin 0.2 mg/dL      eGFR Non African Amer 82 mL/min/1.73      Globulin 2.3 gm/dL      A/G Ratio 1.1 g/dL      BUN/Creatinine Ratio 17.4     Anion Gap 13.7 mmol/L     Narrative:      GFR Normal >60  Chronic Kidney Disease <60  Kidney Failure <15      Phosphorus [[id number]]  (Normal) Collected: 10/17/21 0014    Specimen: Blood Updated: 10/17/21 0117     Phosphorus 3.3 mg/dL     Magnesium [[id number]]  (Normal) Collected: 10/17/21 0014    Specimen: Blood Updated: 10/17/21 0117     Magnesium 1.7 mg/dL     Troponin [[id number]]  (Normal) Collected: 10/15/21 1930    Specimen: Blood Updated: 10/15/21 2053     Troponin T <0.010 ng/mL     Narrative:      Troponin T Reference Range:  <= 0.03 ng/mL-   Negative for AMI  >0.03 ng/mL-     Abnormal for myocardial necrosis.  Clinicians would have to utilize clinical acumen, EKG, Troponin and serial changes to determine if it is an Acute Myocardial Infarction or myocardial injury due to an underlying chronic condition.       Results may be falsely decreased if patient taking Biotin.      Folate [[id number]]  (Normal) Collected: 10/15/21 0915    Specimen: Blood Updated: 10/15/21 1849     Folate 5.76 ng/mL     Narrative:      Results may be falsely increased if patient taking Biotin.      Vitamin B12 [[id number]]  (Normal) Collected: 10/15/21 0915    Specimen: Blood Updated: 10/15/21 1849     Vitamin B-12 764 pg/mL     Narrative:      Results may be falsely increased if patient taking Biotin.      Troponin [[id number]]  (Normal) Collected: 10/15/21  1609    Specimen: Blood Updated: 10/15/21 1717     Troponin T <0.010 ng/mL     Narrative:      Troponin T Reference Range:  <= 0.03 ng/mL-   Negative for AMI  >0.03 ng/mL-     Abnormal for myocardial necrosis.  Clinicians would have to utilize clinical acumen, EKG, Troponin and serial changes to determine if it is an Acute Myocardial Infarction or myocardial injury due to an underlying chronic condition.       Results may be falsely decreased if patient taking Biotin.      Helicobacter Pylori, IgA IgG IgM [[id number]] Collected: 10/14/21 1317    Specimen: Blood Updated: 10/15/21 1612     H. pylori IgG 0.19 Index Value      Comment:                              Negative           <0.80                               Equivocal    0.80 - 0.89                               Positive           >0.89        H. pylori, IgA ABS <9.0 units      Comment:                                 Negative          <9.0                                  Equivocal   9.0 - 11.0                                  Positive         >11.0        H. Pylori, IgM <9.0 units      Comment:                                 Negative          <9.0                                  Equivocal   9.0 - 11.0                                  Positive         >11.0  This test was developed and its performance characteristics  determined by [facility]. It has not been cleared or approved  by the Food and Drug Administration.       Narrative:      Performed at:  [address] [place] [address]  [id number]  : Fili Kramer PhD, Phone:  [phone]    Clostridium Difficile Toxin - Stool, Per Rectum [[id number]] Collected: 10/15/21 1314    Specimen: Stool from Per Rectum Updated: 10/15/21 1513    Narrative:      The following orders were created for panel order Clostridium Difficile Toxin - Stool, Per Rectum.  Procedure                               Abnormality         Status                     ---------                                -----------         ------                     Clostridium Difficile To...[[id number]]                      Final result                 Please view results for these tests on the individual orders.    Clostridium Difficile Toxin, PCR - Stool, Per Rectum [[id number]] Collected: 10/15/21 1314    Specimen: Stool from Per Rectum Updated: 10/15/21 1513     C. Difficile Toxins by PCR Negative     027 Toxin Presumptive Negative    Narrative:      For In Vitro Diagnostic use only.  027-NAP1-BI results are NOT intended to guide treatment. 027 typing is relative to PCR ribotyping and shown to be equivalent to B1 typing by restriction endonuclease analysis or NAP1 typing by pulse field gel electrophoresis.    Scan Slide [[id number]] Collected: 10/15/21 0521    Specimen: Blood Updated: 10/15/21 0726     Hypochromia Slight/1+     Poikilocytes Slight/1+     Platelet Morphology Normal    Phosphorus [[id number]]  (Normal) Collected: 10/15/21 0521    Specimen: Blood Updated: 10/15/21 0707     Phosphorus 4.3 mg/dL     Potassium [[id number]]  (Normal) Collected: 10/14/21 1019    Specimen: Blood Updated: 10/14/21 1043     Potassium 4.2 mmol/L     Lipase [[id number]]  (Abnormal) Collected: 10/14/21 0524    Specimen: Blood Updated: 10/14/21 0723     Lipase 182 U/L     COVID PRE-OP / PRE-PROCEDURE SCREENING ORDER (NO ISOLATION) - Swab, Nasopharynx [[id number]]  (Normal) Collected: 10/13/21 1504    Specimen: Swab from Nasopharynx Updated: 10/13/21 1540    Narrative:      The following orders were created for panel order COVID PRE-OP / PRE-PROCEDURE SCREENING ORDER (NO ISOLATION) - Swab, Nasopharynx.  Procedure                               Abnormality         Status                     ---------                               -----------         ------                     COVID-19 and FLU A/B PCR...[[id number]]  Normal              Final result                 Please view results for these tests on the individual orders.    COVID-19 and FLU A/B  PCR - Swab, Nasopharynx [[id number]]  (Normal) Collected: 10/13/21 1504    Specimen: Swab from Nasopharynx Updated: 10/13/21 1540     COVID19 Not Detected     Influenza A PCR Not Detected     Influenza B PCR Not Detected    Narrative:      Fact sheet for providers: https://www.fda.gov/media/973543/download    Fact sheet for patients: https://www.fda.gov/media/567234/download    Test performed by PCR.    Lipase [[id number]]  (Abnormal) Collected: 10/13/21 0506    Specimen: Blood Updated: 10/13/21 1137     Lipase 200 U/L     TSH [[id number]]  (Normal) Collected: 10/13/21 0506    Specimen: Blood Updated: 10/13/21 0605     TSH 3.300 uIU/mL     Urinalysis With Microscopic If Indicated (No Culture) - [[id number]]  (Abnormal) Collected: 10/13/21 0327    Specimen: Urine Updated: 10/13/21 0419     Color, UA Yellow     Appearance, UA Clear     pH, UA 5.5     Specific Gravity, UA 1.013     Glucose, UA Negative     Ketones, UA 15 mg/dL (1+)     Bilirubin, UA Negative     Blood, UA Negative     Protein, UA Negative     Leuk Esterase, UA Negative     Nitrite, UA Negative     Urobilinogen, UA 0.2 E.U./dL    Narrative:      Urine microscopic not indicated.    CBC (No Diff) [[id number]]  (Abnormal) Collected: 10/11/21 0510    Specimen: Blood Updated: 10/11/21 0531     WBC 9.52 10*3/mm3      RBC 3.31 10*6/mm3      Hemoglobin 9.7 g/dL      Hematocrit 31.4 %      MCV 94.9 fL      MCH 29.3 pg      MCHC 30.9 g/dL      RDW 14.2 %      RDW-SD 49.2 fl      MPV 11.6 fL      Platelets 264 10*3/mm3     CBC (No Diff) [[id number]]  (Abnormal) Collected: 10/10/21 0235    Specimen: Blood Updated: 10/10/21 0301     WBC 9.48 10*3/mm3      RBC 3.14 10*6/mm3      Hemoglobin 9.3 g/dL      Hematocrit 30.0 %      MCV 95.5 fL      MCH 29.6 pg      MCHC 31.0 g/dL      RDW 14.4 %      RDW-SD 49.8 fl      MPV 12.1 fL      Platelets 214 10*3/mm3     Protime-INR [[id number]]  (Abnormal) Collected: 10/07/21 0328    Specimen: Blood Updated: 10/07/21 0358     Protime  15.5 Seconds      INR 1.19    Narrative:      Suggested INR therapeutic range for stable oral anticoagulant therapy:    Low Intensity therapy:   1.5-2.0  Moderate Intensity therapy:   2.0-3.0  High Intensity therapy:   2.5-4.0    aPTT [[id number]]  (Normal) Collected: 10/06/21 1036    Specimen: Blood Updated: 10/06/21 1140     PTT 32.3 seconds     Narrative:      PTT Heparin Therapeutic Range:  59 - 95 seconds      Protime-INR [[id number]]  (Abnormal) Collected: 10/06/21 1036    Specimen: Blood Updated: 10/06/21 1140     Protime 14.7 Seconds      INR 1.10    Narrative:      Suggested INR therapeutic range for stable oral anticoagulant therapy:    Low Intensity therapy:   1.5-2.0  Moderate Intensity therapy:   2.0-3.0  High Intensity therapy:   2.5-4.0    Urinalysis With Microscopic If Indicated (No Culture) - Urine, Clean Catch [[id number]]  (Normal) Collected: 10/05/21 1654    Specimen: Urine, Clean Catch Updated: 10/05/21 1710     Color, UA Yellow     Appearance, UA Clear     pH, UA 7.0     Specific Gravity, UA 1.010     Glucose, UA Negative     Ketones, UA Negative     Bilirubin, UA Negative     Blood, UA Negative     Protein, UA Negative     Leuk Esterase, UA Negative     Nitrite, UA Negative     Urobilinogen, UA 0.2 E.U./dL    Narrative:      Urine microscopic not indicated.    COVID PRE-OP / PRE-PROCEDURE SCREENING ORDER (NO ISOLATION) - Swab, Nasopharynx [[id number]]  (Normal) Collected: 10/05/21 1507    Specimen: Swab from Nasopharynx Updated: 10/05/21 1539    Narrative:      The following orders were created for panel order COVID PRE-OP / PRE-PROCEDURE SCREENING ORDER (NO ISOLATION) - Swab, Nasopharynx.  Procedure                               Abnormality         Status                     ---------                               -----------         ------                     COVID-19 and FLU A/B PCR...[[id number]]  Normal              Final result                 Please view results for these tests on the  individual orders.    COVID-19 and FLU A/B PCR - Swab, Nasopharynx [[id number]]  (Normal) Collected: 10/05/21 1507    Specimen: Swab from Nasopharynx Updated: 10/05/21 1539     COVID19 Not Detected     Influenza A PCR Not Detected     Influenza B PCR Not Detected    Narrative:      Fact sheet for providers: https://www.fda.gov/media/894015/download    Fact sheet for patients: https://www.fda.gov/media/780195/download    Test performed by PCR.    [place] Draw [[id number]] Collected: 10/05/21 1418    Specimen: Blood from Arm, Right Updated: 10/05/21 1531    Narrative:      The following orders were created for panel order [place] Draw.  Procedure                               Abnormality         Status                     ---------                               -----------         ------                     Green Top (Gel)[[id number]]                                  Final result               Lavender Top[[id number]]                                     Final result               Gold Top - SST[[id number]]                                   Final result               Light Blue Top[[id number]]                                   Final result                 Please view results for these tests on the individual orders.    Green Top (Gel) [[id number]] Collected: 10/05/21 1418    Specimen: Blood from Arm, Right Updated: 10/05/21 1531     Extra Tube Hold for add-ons.     Comment: Auto resulted.       Gold Top - SST [[id number]] Collected: 10/05/21 1418    Specimen: Blood from Arm, Right Updated: 10/05/21 1531     Extra Tube Hold for add-ons.     Comment: Auto resulted.       Light Blue Top [[id number]] Collected: 10/05/21 1418    Specimen: Blood from Arm, Right Updated: 10/05/21 1531     Extra Tube hold for add-on     Comment: Auto resulted       Lavender Top [[id number]] Collected: 10/05/21 1418    Specimen: Blood from Arm, Right Updated: 10/05/21 1531     Extra Tube hold for add-on     Comment: Auto resulted       BNP [[id number]]   (Normal) Collected: 10/05/21 1433    Specimen: Blood from Arm, Right Updated: 10/05/21 1452     proBNP 607.2 pg/mL     Narrative:      Among patients with dyspnea, NT-proBNP is highly sensitive for the detection of acute congestive heart failure. In addition NT-proBNP of <300 pg/ml effectively rules out acute congestive heart failure with 99% negative predictive value.    Results may be falsely decreased if patient taking Biotin.      aPTT [[id number]]  (Abnormal) Collected: 10/05/21 1418    Specimen: Blood from Arm, Right Updated: 10/05/21 1445     PTT 23.8 seconds     Narrative:      PTT Heparin Therapeutic Range:  59 - 95 seconds      Blood Gas, Arterial With Co-Ox [[id number]]  (Abnormal) Collected: 10/05/21 1403    Specimen: Arterial Blood Updated: 10/05/21 1408     Site Left Brachial     Kaveh's Test N/A     pH, Arterial 7.468 pH units      Comment: 83 Value above reference range        pCO2, Arterial 28.8 mm Hg      Comment: 84 Value below reference range        pO2, Arterial 49.8 mm Hg      Comment: 85 Value below critical limit        HCO3, Arterial 20.8 mmol/L      Base Excess, Arterial -1.9 mmol/L      O2 Saturation, Arterial 87.0 %      Comment: 84 Value below reference range        Hemoglobin, Blood Gas 12.2 g/dL      Comment: 84 Value below reference range        Hematocrit, Blood Gas 37.5 %      Comment: 84 Value below reference range        Oxyhemoglobin 85.8 %      Comment: 84 Value below reference range        Methemoglobin 0.20 %      Carboxyhemoglobin 1.2 %      A-a Gradiant 60.8 mmHg      CO2 Content 21.7 mmol/L      Temperature 0.0 C      Barometric Pressure for Blood Gas 729 mmHg      Modality Room Air     FIO2 21 %      Ventilator Mode NA     Note --     Notified Who AYDEN BURDICK AND KASHMIR ALBERTO, ER     Notified By [id number]     Notified Time 10/05/2021 14:11     Collected by [id number]     Comment: Meter: [id number]     :  [id number]        pH, Temp Corrected --     pCO2, Temperature Corrected  --     pO2, Temperature Corrected --          Imaging Results (Most Recent)     Procedure Component Value Units Date/Time    XR Abdomen KUB [[id number]] Collected: 10/16/21 0943     Updated: 10/16/21 0945    Narrative:      CR Abdomen 1 Vw    INDICATION:   Abdominal distention with bloating    COMPARISON:   10/10/2021    FINDINGS:  AP radiograph(s) of the abdomen. The renal shadows are symmetric. No renal calculi. No bladder calculi.    The bowel gas pattern is nonobstructive. No acute osseous abnormalities. No radiopaque foreign body.      Impression:      Negative KUB.    Signer Name: Diego Adhikari MD   Signed: 10/16/2021 9:43 AM   Workstation Name: [id number]    [facility] Gallbladder [[id number]] Collected: 10/15/21 0836     Updated: 10/15/21 0838    Narrative:      EXAM:    US Abdomen Limited, Gallbladder     EXAM DATE:    10/15/2021 8:31 AM     CLINICAL HISTORY:    Vomiting; S72.002A-Fracture of unspecified part of neck of left femur,  initial encounter for closed fracture     TECHNIQUE:    Real-time ultrasound of the right upper quadrant with image  documentation.     COMPARISON:    No relevant prior studies available.     FINDINGS:    Gallbladder:  Unremarkable.  No gallstones.    Common bile duct:  The CBD measures 2.44 mm.  No stones.  No dilation.    Pancreas:  Unremarkable as visualized.       Impression:        No acute findings in the right upper quadrant.     This report was finalized on 10/15/2021 8:36 AM by Dr. Santosh Andrade MD.       US Venous Doppler Lower Extremity Bilateral (duplex) [[id number]] Collected: 10/14/21 1114     Updated: 10/14/21 1116    Narrative:      EXAMINATION: US VENOUS DOPPLER LOWER EXTREMITY BILATERAL (DUPLEX)-      CLINICAL INDICATION:  Concern for PE; S72.002A-Fracture of unspecified  part of neck of left femur, initial encounter for closed fracture     TECHNIQUE: Multiplanar gray scale and Doppler vascular sonographic  imaging of the deep veins  of BILATERAL lower extremity, without and with  compression.      COMPARISON: NONE      FINDINGS:   Deep Veins: The visualized deep veins demonstrate flow and are  compressible. No evidence of deep venous thrombosis.   Superficial veins: Unremarkable. Saphenofemoral junction is patent  without thrombus.  Soft tissues: Unremarkable.       Impression:      No DVT.     This report was finalized on 10/14/2021 11:14 AM by Dr. Jonah Salgado MD.       CT Abdomen Pelvis With Contrast [[id number]] Collected: 10/13/21 1427     Updated: 10/13/21 1429    Narrative:      EXAM:    CT Abdomen and Pelvis With Intravenous Contrast     EXAM DATE:    10/13/2021 1:03 PM     CLINICAL HISTORY:    nausea, vomiting, epigastric pain; S72.002A-Fracture of unspecified  part of neck of left femur, initial encounter for closed fracture     TECHNIQUE:    Axial computed tomography images of the abdomen and pelvis with  intravenous contrast.  Sagittal and coronal reformatted images were  created and reviewed.  This CT exam was performed using one or more of  the following dose reduction techniques:  automated exposure control,  adjustment of the mA and/or kV according to patient size, and/or use of  iterative reconstruction technique.     COMPARISON:    No relevant prior studies available.     FINDINGS:    LUNG BASES:  Unremarkable.  No mass.  No consolidation.    PLEURAL SPACE:  Tiny left pleural effusion.      ABDOMEN:    LIVER:  Unremarkable.  No mass.    GALLBLADDER AND BILE DUCTS:  Unremarkable.  No calcified stones.  No  ductal dilation.    PANCREAS:  Unremarkable.  No mass.  No ductal dilation.    SPLEEN:  Unremarkable.  No splenomegaly.    ADRENALS:  Unremarkable.  No mass.    KIDNEYS AND URETERS:  Unremarkable.  No solid mass.  No  hydronephrosis.    STOMACH AND BOWEL:  Unremarkable.  No obstruction.  No mucosal  thickening.      PELVIS:    APPENDIX:  No findings to suggest acute appendicitis.    BLADDER:  Very distended urinary  bladder.    REPRODUCTIVE:  Unremarkable as visualized.      ABDOMEN and PELVIS:    INTRAPERITONEAL SPACE:  Unremarkable.  No free air.  No significant  fluid collection.    BONES/JOINTS:  No acute fracture.  No dislocation.    SOFT TISSUES:  Unremarkable.    VASCULATURE:  Atherosclerotic vascular calcification.  No abdominal  aortic aneurysm.    LYMPH NODES:  Unremarkable.  No enlarged lymph nodes.       Impression:        Very distended urinary bladder.     This report was finalized on 10/13/2021 2:27 PM by Dr. Santosh Andrade MD.       CT Chest Pulmonary Embolism [[id number]] Collected: 10/13/21 1329     Updated: 10/13/21 1333    Narrative:      EXAM:    CT Angiography Chest With Intravenous Contrast     EXAM DATE:    10/13/2021 12:47 PM     CLINICAL HISTORY:    r/o PE; S72.002A-Fracture of unspecified part of neck of left femur,  initial encounter for closed fracture     TECHNIQUE:    Axial computed tomographic angiography images of the chest with  intravenous contrast.  This CT exam was performed using one or more of  the following dose reduction techniques:  automated exposure control,  adjustment of the mA and/or kV according to patient size, and/or use of  iterative reconstruction technique.    MIP reconstructed images were created and reviewed.     COMPARISON:    No relevant prior studies available.     FINDINGS:    Pulmonary arteries:  See below.      Aorta:  No acute findings.  No thoracic aortic aneurysm.    Lungs:  Subpleural groundglass attenuation of right upper lobe that  could represent fibrotic change, atelectasis, early change of COVID-19  pneumonia or potentially PE. While there is no definite visualized PE,  certainly subsegmental branch pulmonary arteries are not well evaluated  on this study due to bolus timing and motion artifact near the bases.   Bibasilar atelectasis.  No mass.    Pleural space:  Tiny left pleural effusion.  No pneumothorax.    Heart:  Unremarkable.  No cardiomegaly.  No  significant pericardial  effusion.  No evidence of RV dysfunction.    Bones/joints:  No acute fracture.  No dislocation.    Soft tissues:  Unremarkable.    Lymph nodes:  Unremarkable.  No enlarged lymph nodes.       Impression:      1.  Tiny left pleural effusion.  2.  Subpleural groundglass attenuation of right upper lobe that could  represent fibrotic change, atelectasis, early change of COVID-19  pneumonia or potentially PE. While there is no definite visualized PE,  certainly subsegmental branch pulmonary arteries are not well evaluated  on this study due to bolus timing and motion artifact near the bases.     This report was finalized on 10/13/2021 1:31 PM by Dr. Santosh Andrade MD.       XR Chest 1 View [[id number]] Collected: 10/13/21 0417     Updated: 10/13/21 0419    Narrative:      CHEST X-RAY, 10/13/2021 (03:58)      HISTORY:    76-year-old female hospital inpatient with hip fracture. Mental status changes, delirium. Fever.      TECHNIQUE:  AP portable chest x-ray.    COMPARISON:  *  Chest x-ray, 10/5/2021.    FINDINGS:  Diffusely increased and indistinct interstitial markings throughout both lungs suggest mild vascular congestion or volume overload that is new since the 10th 21 exam.    Shallow lung expansion. Chronic elevation right hemidiaphragm. Heart size is within normal limits. Central venous port catheter remains in good position.      Impression:      Radiographic findings suggesting new mild interstitial edema. Correlate for vascular congestion or volume overload.    Signer Name: Suhail Encarnacion MD   Signed: 10/13/2021 4:17 AM   Workstation [facility]: [facility]    [facility]    CT Head Without Contrast [[id number]] Collected: 10/13/21 0413     Updated: 10/13/21 0415    Narrative:      CT HEAD, NONCONTRAST, 10/13/2021    HISTORY:  76-year-old female hospital inpatient with hip fracture. Noted mental status changes, delirium.    TECHNIQUE:  CT imaging of the head without IV  contrast. Radiation dose reduction techniques included automated exposure control. Radiation audit for CT and nuclear cardiology exams in the last 12 months: 3.    COMPARISON:  *  CT head, 4/9/2021.    FINDINGS:  No acute intracranial abnormality is demonstrated. No visible skull fracture. No acute intracranial hemorrhage.    Chronic right cortical and subcortical right occipital infarct. Mild generalized age-appropriate cerebral volume loss. Mild diffuse chronic small vessel type white matter changes. These findings are stable.    No evidence of intracranial mass, mass effect, cerebral edema, extra-axial fluid collection or progressive ventricular enlargement.      Impression:      1.  No acute intracranial abnormality.  2.  Chronic right occipital infarct.  3.  Stable diffuse chronic changes as noted above.  4.  No change since 4/9/2021.    Signer Name: Suhail Encarnacion MD   Signed: 10/13/2021 4:13 AM   Workstation [facility]: [facility]    [facility]    XR Abdomen KUB [[id number]] Collected: 10/10/21 2006     Updated: 10/10/21 2008    Narrative:      CR Abdomen 1 Vw    INDICATION:   Nausea and vomiting today.    COMPARISON:   None available    FINDINGS:  AP radiograph(s) of the abdomen. The renal shadows are symmetric. No renal calculi. No bladder calculi.    The bowel gas pattern is nonobstructive. No acute osseous abnormalities. No radiopaque foreign body.      Impression:      Negative KUB.    Signer Name: Alexandru Henderson MD   Signed: 10/10/2021 8:06 PM   Workstation Name: [facility]    [facility]    XR Pelvis 1 or 2 View [[id number]] Collected: 10/07/21 1657     Updated: 10/07/21 1701    Narrative:      EXAM:    XR Pelvis, 1 or 2 Views     EXAM DATE:    10/07/2021     CLINICAL HISTORY:    Postop left hip hemiarthroplasty; S72.002A-Fracture of unspecified  part of neck of left femur, initial encounter for closed fracture     TECHNIQUE:    Frontal view of the  pelvis.     COMPARISON:    10/05/2021     FINDINGS:    Bones/joints:  Interval left hip through plaster.  No acute bony  findings.  No dislocation.    Soft tissues:  Post surgical changes within the soft tissues.    Other findings:  Anatomic alignment.       Impression:        Interval changes of left hip arthroplasty.     This report was finalized on 10/7/2021 4:57 PM by Dr. Jonah Salgado MD.       XR Chest 1 View [[id number]] Collected: 10/05/21 1447     Updated: 10/05/21 1449    Narrative:      XR CHEST 1 VW-     CLINICAL INDICATION: fall, hip fx        COMPARISON: 05/15/2021      TECHNIQUE: Single frontal view of the chest.     FINDINGS:     There is no focal alveolar infiltrate or effusion.  Appearance suggestive of at least mild pulmonary congestion.  Central line is stable from the previous exam  There are no suspicious-appearing parenchymal soft tissue nodules.          Impression:      Appearance suggestive of CHF     This report was finalized on 10/5/2021 2:47 PM by Dr. Chente Choi MD.       XR Femur 2 View Left [[id number]] Collected: 10/05/21 1446     Updated: 10/05/21 1449    Narrative:      EXAMINATION: XR FEMUR 2 VW LEFT-      CLINICAL INDICATION: fall, left femoral neck fx        COMPARISON: None available     FINDINGS:  4 views of the left femur show left femoral neck fracture     No distal femoral fracture is seen.       Impression:      Left femoral neck fracture.      This report was finalized on 10/5/2021 2:46 PM by Dr. Chente Choi MD.              Operative/Procedure Notes (most recent note)      Jonah Samano MD at 10/07/21 1445        Kiah Conway  10/7/2021      Operative Note:    Surgeon: KACI Samano MD    Assistant: CRISTY Becerra    Pre-Operative Diagnosis: Left femoral neck fracture, displaced, traumatic, closed, acute    Post-Operative Diagnosis: Same    Procedure(s): 1.  Left hip hemiarthroplasty for fracture, CPT code 33172    Anesthesia: General    Estimated Blood Loss: 100  cc    Specimen Obtained:  None    Complication(s):  None apparent.     Implants: Biomet 45 Endo head, standard offset, 7 Echo stem cemented, -3 neck    Operative Indication:     Kiah is a 76-year-old female who fell from standing height and sustained the above injury.  She is a community ambulator with an assistive device.  She was advised of the risk benefits alternatives or complications to the procedure stated above elected proceed with surgery.    Operative Details:    The patient was met in the preoperative suite where the correct operative site was marked. The patient was brought to the operating room where anesthesia was initiated. The patient was positioned appropriately with all bony prominences well padded. The patient was prepped and draped in the usual sterile fashion and prior to incision a timeout was observed to verify the correct operative site, procedure and antibiotics.    A longitudinal incision on the lateral aspect of the proximal thigh was taken down to the IT band.  The IT band was incised in line with the incision.  The abductors were released off the anterior two thirds of the greater trochanter.  There was an avulsion of the abductors noted at the site.  These were tagged for later repair.  Hohmann retractors were placed around the capsule and an H capsulotomy was performed with a fresh hematoma expressed from the hip joint.  The leg was externally rotated and placed on the anterior side bag.  A femoral neck osteotomy was performed with the oscillating saw.  The femoral head was then removed with a corkscrew and measured to a 45 Endo head.    Sequential broaching was then performed up to a size 8 mm broach.  A a -6 standard offset 45 Endo head trial was placed on the proximal femur and reduced although there was stability that was noted within the range of motion we were short clinically on the exam table with limb lengths.  A -3 neck was then added to the proximal femur trial components  which appropriate limb lengths and similar stability exam.    Trial implants were removed.  Thorough irrigation of the proximal canal was performed.  Cemented final 7 stem was placed in the proximal femur.  A -3, standard offset, 45 Endo head was placed on the proximal femur and reduced with a similar stability and limb length exam noted with the trial components.  The hip joint was irrigated thoroughly with 3 L of sterile saline.  The hip capsule abductors and IT band were all closed.  The remainder of the hip wound was closed in standard layered fashion.  A soft dressing was applied.  The patient was placed in abduction pillow.  The patient was extubated, transferred to hospital bed in the PACU in stable condition.  The patient tolerated procedure well without complications.    Post-operative Plan:    Transfer back to the floor.  Dressing-maintain dressing, may reinforce as needed saturation  Weight Bear/Lifting Status-as tolerated  DVT PPx-Lovenox 40, x14 days  Follow up-2 weeks in the office    Orthopedics will continue to follow    Electronically Signed by: Jonah Samano MD        Electronically signed by Jonah Samano MD at 10/07/21 1611          Physician Progress Notes (most recent note)      Pelra Falcon APRN at 10/19/21 1832        Attempted to call her daughter this evening with no answer.    Electronically signed by Perla Falcon APRN at 10/19/21 1832          Consult Notes (most recent note)      Joanie Pandey MD at 10/19/21 1106      Consult Orders    1. Inpatient Nephrology Consult [[id number]] ordered by Perla Falcon APRN at 10/18/21 1052               Nephrology Consult Note    Referring Provider: Perla Falcon  Reason for Consultation: Concern of RTA    Subjective       History of present illness:  Kiah Conway is a 76 y.o. female who presented to [facility] emergency department with chief complaint of left hip pain and was admitted with left femur  neck fracture s/p S/P left hip hemiarthroplasty. Nephrology has been asked to evaluate for possible renal tubar acidosis. Pt says she feels fine and wants to go home.   Pt has very poor oral intake and does not eat whole lot in home. She denied any nausea or vomiting or diarrhea.   Pt also denied any history of Chronic NSAIDS use. Patient denies hematuria, dysuria, difficulty passing urine. No prior history of renal stones. No family history of renal disease    History  Past Medical History:   Diagnosis Date   • Arthritis    • Cardiac disorder    • Diverticulitis    • Dysphagia    • Fibromyalgia    • Follicular lymphoma (HCC)     Remisson    • GERD (gastroesophageal reflux disease)    • History of rheumatic fever    • Hypertension    • Hypokalemia due to inadequate potassium intake 03/03/2014   • Lipoma    • Malignant neoplasm (HCC)    • Migraine    • Neutropenia (HCC) 11/7/2019   • Obstructive sleep apnea 7/14/2016   • Prolonged Q-T interval on ECG 11/7/2019   • Recurrent UTI    • Rheumatoid arthritis (HCC)    • Sleep apnea    • Thyroid cancer (HCC)    ,   Past Surgical History:   Procedure Laterality Date   • BLADDER SURGERY     • FINGER SURGERY      reattached    • HIP HEMIARTHROPLASTY Left 10/7/2021    Procedure: HIP HEMIARTHROPLASTY BIOMET instrumentation  10/7/2021 1300 Dr. Samaon;  Surgeon: Jonah Samano MD;  Location: [facility];  Service: Orthopedics;  Laterality: Left;   • HYSTERECTOMY     • THYROID SURGERY     ,   Family History   Problem Relation Age of Onset   • Diabetes Mother    • Hypertension Mother    • Other Other         cardiac disorder,cardiovascular disease, malignant neoplasm of brain   • Breast cancer Paternal Aunt    ,   Social History     Tobacco Use   • Smoking status: Never Smoker   • Smokeless tobacco: Never Used   Substance Use Topics   • Alcohol use: No   • Drug use: No   ,   Medications Prior to Admission   Medication Sig Dispense Refill Last Dose   • amLODIPine (NORVASC) 2.5 MG  tablet Take 1 tablet by mouth Daily. 30 tablet 0 10/5/2021 at am   • aspirin 81 MG EC tablet Take 81 mg by mouth Daily.   10/5/2021 at am   • atorvastatin (LIPITOR) 10 MG tablet Take 1 tablet by mouth Daily. 30 tablet 11 10/5/2021 at Unknown time   • busPIRone (BUSPAR) 5 MG tablet Take 2.5 mg by mouth 2 (Two) Times a Day.   10/5/2021 at am   • Calcium Polycarbophil (FIBER-CAPS PO) Take 1 capsule by mouth Daily.   10/5/2021 at am   • dexlansoprazole (DEXILANT) 60 MG capsule Take 60 mg by mouth Daily.   10/5/2021 at am   • FLUoxetine (PROzac) 20 MG capsule Take 20 mg by mouth Daily.   10/5/2021 at am   • hydrALAZINE (APRESOLINE) 10 MG tablet Take 1 tablet by mouth Every 8 (Eight) Hours. (Patient taking differently: Take 10 mg by mouth Every 8 (Eight) Hours. Prior to [employer] Admission, Patient was on:   HOLD if SBP less than 140 or DBP less than 80) 90 tablet 0 10/5/2021 at am   • HYDROcodone-acetaminophen (NORCO)  MG per tablet Take 1 tablet by mouth 3 (Three) Times a Day As Needed for Moderate Pain .   10/5/2021 at am   • loratadine (CLARITIN) 10 MG tablet Take 10 mg by mouth Daily.   10/5/2021 at am   • megestrol (MEGACE) 40 MG tablet Take 40 mg by mouth 2 (Two) Times a Day.   10/5/2021 at am   • metoprolol tartrate (LOPRESSOR) 50 MG tablet Take 50 mg by mouth 2 (Two) Times a Day.   10/5/2021 at am   • nitrofurantoin (MACRODANTIN) 50 MG capsule Take 50 mg by mouth Daily.   10/5/2021 at am   • NON FORMULARY Apply 1 dose topically to the appropriate area as directed As Needed (Compounded Pain cream: Ingredients: 2.5% ketamine, 2.5% lidocaine, 2.5% prilocaine, 0.09% meloxicam (120 grams total)). Prior to [employer] Admission, Patient was on: Rx wrote to use 2-4 grams to affected area 3-4 times daily. Pt caregiver reports using PRN every 1-2 weeks.   Past Week at Unknown time   • phenylephrine (SUDAFED PE) 10 MG tablet Take 20 mg by mouth 2 (two) times a day.   10/5/2021 at Unknown time   • pregabalin (LYRICA) 25 MG  capsule Take 1 capsule by mouth 2 (Two) Times a Day. 30 capsule 0 10/5/2021 at am   • Probiotic Product (Align) capsule Take 1 capsule by mouth Daily.   10/5/2021 at am   • promethazine (PHENERGAN) 12.5 MG tablet Take 12.5 mg by mouth 2 (Two) Times a Day As Needed for Nausea or Vomiting.   Past Week at Unknown time   , Scheduled Meds:  acetaminophen, 1,000 mg, Oral, Q8H  amLODIPine, 2.5 mg, Oral, Q24H  apixaban, 10 mg, Oral, Q12H   Followed by  [START ON 10/25/2021] apixaban, 5 mg, Oral, Q12H  atorvastatin, 10 mg, Oral, Daily  busPIRone, 2.5 mg, Oral, BID  cefTRIAXone, 1 g, Intravenous, Q24H  cetirizine, 5 mg, Oral, Daily  fluticasone, 2 spray, Each Nare, Daily  hydrALAZINE, 25 mg, Oral, Q8H  metoprolol tartrate, 150 mg, Oral, Q12H  nystatin, 5 mL, Swish & Spit, 4x Daily  pantoprazole, 40 mg, Oral, BID AC  pregabalin, 25 mg, Oral, BID  sodium bicarbonate, 650 mg, Oral, BID  sodium chloride, 10 mL, Intravenous, Q12H    , Continuous Infusions:  Pharmacy Consult - Pharmacy to dose,   Pharmacy Consult,     , PRN Meds:  dextrose  •  dextrose  •  glucagon (human recombinant)  •  hydrALAZINE  •  HYDROcodone-acetaminophen  •  magnesium sulfate **OR** magnesium sulfate **OR** magnesium sulfate  •  ondansetron  •  Pharmacy Consult - Pharmacy to dose  •  Pharmacy Consult  •  potassium chloride **OR** potassium chloride **OR** potassium chloride  •  prochlorperazine  •  promethazine  •  psyllium  •  Access VAD **AND** sodium chloride  •  sodium chloride and Allergies:  Baclofen    Review of Systems  More than 10 point review of systems was done. Pertinent items are noted in HPI, all other systems reviewed and negative    Objective     Vital Signs  Temp:  [98 °F (36.7 °C)-98.7 °F (37.1 °C)] 98 °F (36.7 °C)  Heart Rate:  [75-96] 77  Resp:  [16-20] 16  BP: (119-143)/(51-72) 120/65    I/O this shift:  In: 260 [P.O.:260]  Out: -   I/O last 3 completed shifts:  In: 900 [P.O.:900]  Out: 1341 [Urine:1341]    Physical  Examination:  General Appearance: not in acute distress  Head: Normocephalic, without obvious abnormality and atraumatic  Throat: Oral mucosa moist  Neck: No JVD  Lungs: Clear to auscultation, respirations regular and unlabored  Heart: Regular rhythm & normal rate, normal S1, S2, no murmur, no gallop, no rub   Abdomen: Normal bowel sounds, no masses and soft non-tender  Extremities: no edema  Pulses: Palpable and equal bilaterally  Neurologic: unable to assess fully    Laboratory Data :      WBC WBC   Date Value Ref Range Status   10/19/2021 9.23 3.40 - 10.80 10*3/mm3 Final   10/18/2021 13.24 (H) 3.40 - 10.80 10*3/mm3 Final   10/17/2021 11.49 (H) 3.40 - 10.80 10*3/mm3 Final      HGB Hemoglobin   Date Value Ref Range Status   10/19/2021 8.7 (L) 12.0 - 15.9 g/dL Final   10/18/2021 8.7 (L) 12.0 - 15.9 g/dL Final   10/17/2021 9.0 (L) 12.0 - 15.9 g/dL Final   10/17/2021 8.0 (L) 12.0 - 15.9 g/dL Final      HCT Hematocrit   Date Value Ref Range Status   10/19/2021 29.0 (L) 34.0 - 46.6 % Final   10/18/2021 28.5 (L) 34.0 - 46.6 % Final   10/17/2021 28.7 (L) 34.0 - 46.6 % Final   10/17/2021 26.2 (L) 34.0 - 46.6 % Final      Platlets No results found for: LABPLAT   MCV MCV   Date Value Ref Range Status   10/19/2021 97.0 79.0 - 97.0 fL Final   10/18/2021 95.6 79.0 - 97.0 fL Final   10/17/2021 97.0 79.0 - 97.0 fL Final          Sodium Sodium   Date Value Ref Range Status   10/19/2021 139 136 - 145 mmol/L Final   10/18/2021 135 (L) 136 - 145 mmol/L Final   10/17/2021 140 136 - 145 mmol/L Final      Potassium Potassium   Date Value Ref Range Status   10/19/2021 3.9 3.5 - 5.2 mmol/L Final   10/18/2021 3.9 3.5 - 5.2 mmol/L Final   10/17/2021 3.9 3.5 - 5.2 mmol/L Final   10/17/2021 3.4 (L) 3.5 - 5.2 mmol/L Final      Chloride Chloride   Date Value Ref Range Status   10/19/2021 105 98 - 107 mmol/L Final   10/18/2021 102 98 - 107 mmol/L Final   10/17/2021 107 98 - 107 mmol/L Final      CO2 CO2   Date Value Ref Range Status   10/19/2021  19.2 (L) 22.0 - 29.0 mmol/L Final   10/18/2021 18.4 (L) 22.0 - 29.0 mmol/L Final   10/17/2021 19.3 (L) 22.0 - 29.0 mmol/L Final      BUN BUN   Date Value Ref Range Status   10/19/2021 8 8 - 23 mg/dL Final   10/18/2021 7 (L) 8 - 23 mg/dL Final   10/17/2021 12 8 - 23 mg/dL Final      Creatinine Creatinine   Date Value Ref Range Status   10/19/2021 0.67 0.57 - 1.00 mg/dL Final   10/18/2021 0.56 (L) 0.57 - 1.00 mg/dL Final   10/17/2021 0.69 0.57 - 1.00 mg/dL Final      Calcium Calcium   Date Value Ref Range Status   10/19/2021 8.2 (L) 8.6 - 10.5 mg/dL Final   10/18/2021 8.0 (L) 8.6 - 10.5 mg/dL Final   10/17/2021 7.7 (L) 8.6 - 10.5 mg/dL Final      PO4 No results found for: CAPO4   Albumin Albumin   Date Value Ref Range Status   10/19/2021 2.69 (L) 3.50 - 5.20 g/dL Final   10/17/2021 2.48 (L) 3.50 - 5.20 g/dL Final      Magnesium Magnesium   Date Value Ref Range Status   10/18/2021 2.1 1.6 - 2.4 mg/dL Final   10/17/2021 1.7 1.6 - 2.4 mg/dL Final      Uric Acid No results found for: URICACID     Radiology results :     Imaging Results (Last 72 Hours)     ** No results found for the last 72 hours. **            Medications:      acetaminophen, 1,000 mg, Oral, Q8H  amLODIPine, 2.5 mg, Oral, Q24H  apixaban, 10 mg, Oral, Q12H   Followed by  [START ON 10/25/2021] apixaban, 5 mg, Oral, Q12H  atorvastatin, 10 mg, Oral, Daily  busPIRone, 2.5 mg, Oral, BID  cefTRIAXone, 1 g, Intravenous, Q24H  cetirizine, 5 mg, Oral, Daily  fluticasone, 2 spray, Each Nare, Daily  hydrALAZINE, 25 mg, Oral, Q8H  metoprolol tartrate, 150 mg, Oral, Q12H  nystatin, 5 mL, Swish & Spit, 4x Daily  pantoprazole, 40 mg, Oral, BID AC  pregabalin, 25 mg, Oral, BID  sodium bicarbonate, 650 mg, Oral, BID  sodium chloride, 10 mL, Intravenous, Q12H      Pharmacy Consult - Pharmacy to dose,   Pharmacy Consult,         Assessment/Plan       Closed fracture of left hip (HCC)    Femur fracture (HCC)    1. Anion gap metabolic acidosis  2. Left  Hip fracture s/p left  left hip hemiarthroplasty after sustaining mechanical fall  3. Protein-calorie malnutrition  4. Chronic diarrhea    Pt has elevated anion gap metabolic acidosis that is not consistent with RTA. With poor nutrional status, hypoalbuminemia with no proteinuria and positive urine ketones on face of normoglycemia, most likely explanation of AGMA is starvation ketosis and should be addressed appropriately.   At times, on review of her chart, she has normal gap metabolic acidosis that is most likely due to chronic diarrhea and agree on GI workup  I will see her in clinic to follow acid base status once her said issues resolves. Please call if any questions.     Thanks you for the consult. Nephrology will follow the patient.   I discussed the patient's findings and my recommendations with     Joanie Pandey MD  10/19/21  11:07 EDT      Electronically signed by Joanie Pandey MD at 10/19/21 1221          Nutrition Notes (most recent note)      Kezia Gonzáles RD at 10/18/21 0857        Nutrition Services    Patient Name:  Kiah Conway  YOB: 1945  MRN: [id number]  Admit Date:  10/5/2021    Recommend evaluate for appetite stimulant - minimal po intake for two weeks - supplements provided with minimal acceptance     Electronically signed by:  Kezia Gonzáles RD  10/18/21 08:57 EDT     Electronically signed by Kezia Gonzáles RD at 10/18/21 0858          Physical Therapy Notes (most recent note)      Jessica Bennett PT at 10/19/21 1545  Version 1 of 1       Goal Outcome Evaluation:  Plan of Care Reviewed With: patient (pt worked in bed)                Electronically signed by Jessica Bennett PT at 10/19/21 1546          Occupational Therapy Notes (most recent note)      Jonah Real OT at 10/11/21 1005          Acute Care - Occupational Therapy Initial Evaluation  [facility] [place]     Patient Name: Kiah Conway  : 1945  MRN: [id number]  Today's Date: 10/11/2021  Onset of Illness/Injury  or Date of Surgery: 10/05/21     Referring Physician: Dwight    Admit Date: 10/5/2021       ICD-10-CM ICD-9-CM   1. Closed fracture of left hip, initial encounter ([facility])  S72.002A 820.8     Patient Active Problem List   Diagnosis   • Essential hypertension   • Atrophic urethritis   • Hyperglycemia   • GERD without esophagitis   • Fibromyalgia   • Arthritis   • Dysphagia   • History of rheumatic fever   • Migraines   • History of thyroid cancer   • Hypokalemia   • Hypocarbia   • Hyperchloremia   • Hypocalcemia   • Normocytic anemia   • Urinary tract infection without hematuria   • Acute encephalopathy   • Femur fracture (HCC)   • Closed fracture of left hip (HCC)     Past Medical History:   Diagnosis Date   • Arthritis    • Cardiac disorder    • Diverticulitis    • Dysphagia    • Fibromyalgia    • Follicular lymphoma (HCC)     Remisson    • GERD (gastroesophageal reflux disease)    • History of rheumatic fever    • Hypertension    • Hypokalemia due to inadequate potassium intake 03/03/2014   • Lipoma    • Malignant neoplasm (HCC)    • Migraine    • Neutropenia (HCC) 11/7/2019   • Obstructive sleep apnea 7/14/2016   • Prolonged Q-T interval on ECG 11/7/2019   • Recurrent UTI    • Rheumatoid arthritis (HCC)    • Sleep apnea    • Thyroid cancer (HCC)      Past Surgical History:   Procedure Laterality Date   • BLADDER SURGERY     • FINGER SURGERY      reattached    • HYSTERECTOMY     • THYROID SURGERY           OT ASSESSMENT FLOWSHEET (last 12 hours)     OT Evaluation and Treatment     Row Name 10/11/21 0948                   OT Time and Intention    Document Type evaluation  -KR        Mode of Treatment occupational therapy  -KR        Patient Effort adequate  -KR        Symptoms Noted During/After Treatment fatigue  -KR                  Living Environment    Current Living Arrangements home/apartment/condo  -KR        Lives With child(wendy), adult  -KR                  Cognition    Affect/Mental Status (Cognitive) WFL   -KR        Orientation Status (Cognition) oriented to; person; place  -KR        Follows Commands (Cognition) verbal cues/prompting required; increased processing time needed  -KR                  Range of Motion Comprehensive    Comment, General Range of Motion BUE WFL  -KR                  Strength Comprehensive (MMT)    Comment, General Manual Muscle Testing (MMT) Assessment BUE 3-/5  -KR                  Activities of Daily Living    BADL Assessment/Intervention bathing; upper body dressing; lower body dressing; grooming; feeding; toileting  -KR                  Bathing Assessment/Intervention    [place] Level (Bathing) bathing skills; maximum assist (25% patient effort)  -KR                  Upper Body Dressing Assessment/Training    [place] Level (Upper Body Dressing) upper body dressing skills; maximum assist (25% patient effort)  -KR                  Lower Body Dressing Assessment/Training    [place] Level (Lower Body Dressing) lower body dressing skills; dependent (less than 25% patient effort)  -KR                  Grooming Assessment/Training    [place] Level (Grooming) grooming skills; moderate assist (50% patient effort)  -KR                  Self-Feeding Assessment/Training    [place] Level (Feeding) feeding skills; minimum assist (75% patient effort)  -KR                  Toileting Assessment/Training    [place] Level (Toileting) toileting skills; dependent (less than 25% patient effort)  -KR                  Wound 10/07/21 1537 Left lateral hip Incision    Wound - Properties Group Placement Date: 10/07/21  - Placement Time: 1537  -WM Present on Hospital Admission: N  -WM Side: Left  -WM Orientation: lateral  -WM Location: hip  -WM Primary Wound Type: Incision  -WM Additional Comments: opsite closed with suture & staples dressed with optifoam dressing & abduction pillow  -WM        Retired Wound - Properties Group Date first assessed: 10/07/21  - Time first assessed:  1537  -WM Present on Hospital Admission: N  -WM Side: Left  -WM Location: hip  -WM Primary Wound Type: Incision  -WM Additional Comments: opsite closed with suture & staples dressed with optifoam dressing & abduction pillow  -WM                  Plan of Care Review    Plan of Care Reviewed With patient  -KR                  OT Goals    Strength Goal Selection (OT) strength, OT goal 1  -KR        Activity Tolerance Goal Selection (OT) activity tolerance, OT goal 1  -KR                  Strength Goal 1 (OT)    Strength Goal 1 (OT) BUE increase x 1 to enhance self care skills  -KR        Time Frame (Strength Goal 1, OT) by discharge  -KR                   Activity Tolerance Goal 1 (OT)    Activity Tolerance Goal 1 (OT) Increase to enhance performance of self care skills  -KR        Activity Level (Endurance Goal 1, OT) 15 min activity  -KR        Time Frame (Activity Tolerance Goal 1, OT) by discharge  -KR                  Therapy Assessment/Plan (OT)    Planned Therapy Interventions (OT) activity tolerance training; ROM/therapeutic exercise; strengthening exercise  -KR                  Therapy Plan Review/Discharge Plan (OT)    Anticipated Discharge Disposition (OT) home with 24/7 care  -KR              User Key  (r) = Recorded By, (t) = Taken By, (c) = Cosigned By    Initials Name Effective Dates     Theodore Duckworth RN 06/16/21 -     Jonah Medeiros OT 06/16/21 -                        OT Recommendation and Plan  Planned Therapy Interventions (OT): activity tolerance training, ROM/therapeutic exercise, strengthening exercise  Plan of Care Review  Plan of Care Reviewed With: patient  Plan of Care Reviewed With: patient        Time Calculation:     Therapy Charges for Today     Code Description Service Date Service Provider Modifiers Qty    39221086127  OT EVAL HIGH COMPLEXITY 4 10/11/2021 Jonah Real OT GO 1               Jonah Real OT  10/11/2021    Electronically signed by Jonah Real OT at  10/11/21 1005       Speech Language Pathology Notes (most recent note)    No notes exist for this encounter.         Respiratory Therapy Notes (most recent note)    No notes exist for this encounter.         ADL Documentation (most recent)      Most Recent Value   Transferring 3 - assistive equipment and person   Toileting 3 - assistive equipment and person   Bathing 2 - assistive person   Dressing 2 - assistive person   Eating 0 - independent   Communication 0 - understands/communicates without difficulty   Swallowing 0 - swallows foods/liquids without difficulty   Equipment Currently Used at Home walker, standard,  wheelchair          Discharge Summary    No notes of this type exist for this encounter.         Discharge Order (From admission, onward)     Start     Ordered    10/20/21 1154  Discharge patient  Once        Expected Discharge Date: 10/20/21    Expected Discharge Time: Morning    Discharge Disposition: Home or Self Care    Physician of Record for Attribution - Please select from Treatment Team: MAILE MUNOZ [[id number]]    Review needed by CMO to determine Physician of Record: No       Question Answer Comment   Physician of Record for Attribution - Please select from Treatment Team MAILE MUNOZ    Review needed by CMO to determine Physician of Record No        10/20/21 9674

## 2021-10-20 NOTE — CASE MANAGEMENT/SOCIAL WORK
Discharge Planning Assessment   [place]     Patient Name: Kiah Conway  MRN: [id number]  Today's Date: 10/20/2021    Admit Date: 10/5/2021       Discharge Plan     Row Name 10/20/21 1207           Plan Pt is going to be discharged home today with home health services. SS contacted pt's daughter, Eunice who states pt has utilized [facility] in the past and they would prefer them again. SS to follow.    12:16 pm: Pt is being discharged home today. SS received order for home health services. SS faxed referral to [facility]. SS to follow.    13:05 pm: SS contacted [facility] per Rachel who states she will call if referral is not received. RN to call report to [facility] . No other needs identified.               ZOHREH Dillon

## 2021-10-20 NOTE — PLAN OF CARE
Goal Outcome Evaluation:  Plan of Care Reviewed With: patient           Outcome Summary: Pt resting in bed with eyes closed resp even and unlabored at this time. Will monitor

## 2021-10-20 NOTE — PLAN OF CARE
Goal Outcome Evaluation:               Pt being discharged. Tele monitor and Port access removed.

## 2021-10-20 NOTE — THERAPY TREATMENT NOTE
Acute Care - Physical Therapy Treatment Note   [place]     Patient Name: iKah Conway  : 1945  MRN: [id number]  Today's Date: 10/20/2021   Onset of Illness/Injury or Date of Surgery: 10/05/21  Visit Dx:     ICD-10-CM ICD-9-CM   1. Closed fracture of left hip, initial encounter ([facility])  S72.002A 820.8   2. Closed fracture of left hip, sequela  S72.002S 905.3   3. Acute urinary retention  R33.8 788.29     Patient Active Problem List   Diagnosis   • Essential hypertension   • Atrophic urethritis   • GERD without esophagitis   • Fibromyalgia   • Arthritis   • Dysphagia   • History of rheumatic fever   • Migraines   • History of thyroid cancer   • Normocytic anemia   • Femur fracture ([facility])   • Closed fracture of left hip ([facility])     Past Medical History:   Diagnosis Date   • Arthritis    • Cardiac disorder    • Diverticulitis    • Dysphagia    • Fibromyalgia    • Follicular lymphoma (HCC)     Remisson    • GERD (gastroesophageal reflux disease)    • History of rheumatic fever    • Hypertension    • Hypokalemia due to inadequate potassium intake 2014   • Lipoma    • Malignant neoplasm (HCC)    • Migraine    • Neutropenia (HCC) 2019   • Obstructive sleep apnea 2016   • Prolonged Q-T interval on ECG 2019   • Recurrent UTI    • Rheumatoid arthritis (HCC)    • Sleep apnea    • Thyroid cancer (HCC)      Past Surgical History:   Procedure Laterality Date   • BLADDER SURGERY     • FINGER SURGERY      reattached    • HIP HEMIARTHROPLASTY Left 10/7/2021    Procedure: HIP HEMIARTHROPLASTY BIOMET instrumentation  10/7/2021 1300 Dr. Samano;  Surgeon: Jonah Samano MD;  Location: [facility];  Service: Orthopedics;  Laterality: Left;   • HYSTERECTOMY     • THYROID SURGERY       PT Assessment (last 12 hours)     PT Evaluation and Treatment     Row Name 10/20/21 1334          Physical Therapy Time and Intention    Subjective Information complains of  repeatedly c/o headache; RN notified.  -AG     Document  Type therapy note (daily note)  -AG     Mode of Treatment physical therapy  -AG     Patient Effort poor  -AG     Symptoms Noted During/After Treatment fatigue  -AG     Comment pt. is poorly motivated.  Requires significant encouragement to agree to supine>sit; however, not agreeable to maintain sitting for longer than 2-3 minutes.  -     Row Name 10/20/21 1334          Cognition    Behavioral Issues (Cognitive) uncooperative  -     Personal Safety Interventions fall prevention program maintained; nonskid shoes/slippers when out of bed; supervised activity  -     Row Name 10/20/21 1334          Pain    Additional Documentation --  pt. denies pain other than headache  -     Row Name 10/20/21 1334          Pain Scale: Word Pre/Post-Treatment    Pain: Word Scale, Pretreatment --  reports very minimal L hip pain  -[facility] Name 10/20/21 1334          Bed Mobility    Supine-Sit [place] (Bed Mobility) verbal cues; nonverbal cues (demo/gesture); minimum assist (75% patient effort)  -     Sit-Supine [place] (Bed Mobility) nonverbal cues (demo/gesture); minimum assist (75% patient effort)  -     Row Name 10/20/21 133          Transfers    Comment (Transfers) pt. not cooperative for out of bed activity.  She repeatedly tried to return to supine, then began to resist therapist's attempts to keep her sitting EOB in order to take meds from RN.  -     Assistive Device (Bed-Chair Transfers) --  pt. not cooperative.  -     Row Name 10/20/21 1334          Gait/Stairs (Locomotion)    Comment (Gait/Stairs) pt. not cooperative.  -     Row Name 10/20/21 1330          Safety Issues, Functional Mobility    Impairments Affecting Function (Mobility) strength  -     Comment, Safety Issues/Impairments (Mobility) poor motivation to participate.  -     Row Name 10/20/21 1334          Motor Skills    Therapeutic Exercise hip; knee; ankle  -     Row Name 10/20/21 1334          Hip (Therapeutic Exercise)    Hip  Strengthening (Therapeutic Exercise) bilateral; aBduction; heel slides; sitting; supine  -AG     Row Name 10/20/21 1334          Knee (Therapeutic Exercise)    Knee Strengthening (Therapeutic Exercise) bilateral; heel slides; LAQ (long arc quad); sitting; supine  -AG     Row Name 10/20/21 1334          Ankle (Therapeutic Exercise)    Ankle Strengthening (Therapeutic Exercise) bilateral; dorsiflexion; plantarflexion; supine  -AG     Row Name             Wound 10/07/21 1537 Left lateral hip Incision    Wound - Properties Group Placement Date: 10/07/21  -WM Placement Time: 1537  -WM Present on Hospital Admission: N  -WM Side: Left  -WM Orientation: lateral  -WM Location: hip  -WM Primary Wound Type: Incision  -WM Additional Comments: opsite closed with suture & staples dressed with optifoam dressing & abduction pillow  -WM     Retired Wound - Properties Group Date first assessed: 10/07/21  -WM Time first assessed: 1537  -WM Present on Hospital Admission: N  -WM Side: Left  -WM Location: hip  -WM Primary Wound Type: Incision  -WM Additional Comments: opsite closed with suture & staples dressed with optifoam dressing & abduction pillow  -WM     Row Name 10/20/21 1334          Coping    Observed Emotional State calm; uncooperative  -AG     Verbalized Emotional State acceptance  -AG     Row Name 10/20/21 1334          Plan of Care Review    Plan of Care Reviewed With patient  -AG     Progress no change  -AG     Outcome Summary pt. not motivated to progress with PT; appears disinterested in participation.  -AG     Row Name 10/20/21 1334          Positioning and Restraints    Pre-Treatment Position in bed  -AG     Post Treatment Position bed  -AG     In Bed supine; call light within reach; encouraged to call for assist; with nsg; side rails up x3  -AG           User Key  (r) = Recorded By, (t) = Taken By, (c) = Cosigned By    Initials Name Provider Type     Theodore Duckworth, RN Registered Nurse    Monica Berry, PT  Physical Therapist              Physical Therapy Education                 Title: PT OT SLP Therapies (In Progress)     Topic: Physical Therapy (In Progress)     Point: Mobility training (In Progress)     Learning Progress Summary           Patient Acceptance, E,D, NR by  at 10/20/2021 1332                   Point: Home exercise program (In Progress)     Learning Progress Summary           Patient Acceptance, E,D, NR by  at 10/20/2021 1332                   Point: Body mechanics (In Progress)     Learning Progress Summary           Patient Acceptance, E,D, NR by  at 10/20/2021 1332                   Point: Precautions (In Progress)     Learning Progress Summary           Patient Acceptance, E,D, NR by  at 10/20/2021 1332                               User Key     Initials Effective Dates Name Provider Type Discipline     06/16/21 -  Monica Jimenez PT Physical Therapist PT              PT Recommendation and Plan     Plan of Care Reviewed With: patient  Progress: no change  Outcome Summary: pt. not motivated to progress with PT; appears disinterested in participation.       Time Calculation:    PT Charges     Row Name 10/20/21 1333             Time Calculation    PT Received On 10/20/21  -      PT - Next Appointment 10/21/21  -            User Key  (r) = Recorded By, (t) = Taken By, (c) = Cosigned By    Initials Name Provider Type     Monica Jimenez PT Physical Therapist              Therapy Charges for Today     Code Description Service Date Service Provider Modifiers Qty    32334828218 HC PT THER PROC EA 15 MIN 10/20/2021 Monica Jimenez, PT GP 1    44294085294 HC PT THERAPEUTIC ACT EA 15 MIN 10/20/2021 Monica Jimenez, PT GP 1               Monica Jimenez PT  10/20/2021

## 2021-10-20 NOTE — DISCHARGE INSTR - APPOINTMENTS
You have a scheduled follow-up with Dr. Pandey on 11/3/21 at 11:00  You have a scheduled follow-up appointment with Ronny SOUSA on 10/27/21 at 11:45

## 2021-10-20 NOTE — DISCHARGE SUMMARY
[facility] HOSPITALIST DISCHARGE SUMMARY    Patient Identification:  Name:  Kiah Conway  Age:  76 y.o.  Sex:  female  :  1945  MRN:  [id number]  Visit Number:  [id number]    Date of Admission: 10/5/2021  Date of Discharge: 10/20/2021    PCP: Ronny Acevedo APRN    Discharging Provider: Corrie Schilling PA-C / Dr. Ed Dent    Discharge Diagnoses     Discharge Diagnoses:  Acute left femoral neck fracture s/p repair  Acute hypoxic respiratory failure, resolved  Possible subsegmental branch pulmonary embolism  Acute confusion, delirium versus dementia  Urinary retention  Macrocytic anemia  Thrombocytosis  Elevated anion gap metabolic acidosis  Starvation ketosis  Nausea/vomiting/diarrhea, improved  Short runs of PSVT  Prolonged QTC    Secondary Diagnoses:  Essential hypertension  Dyslipidemia  Fibromyalgia  Arthritis  Anxiety/depression  History of follicular lymphoma  History of thyroid cancer  GERD    Needs on follow up:  Orthopedic surgery follow up 1 week.   Nephrology follow up 2 weeks.   PCP follow up 1 week, evaluate for azevedo catheter removal. (in place 2/2 urinary retention)    Consults/Procedures     Consults:   Consults     Date and Time Order Name Status Description    10/18/2021 10:52 AM Inpatient Nephrology Consult Completed     10/14/2021  1:19 PM Inpatient General Surgery Consult Completed     10/11/2021  9:28 AM Inpatient Cardiology Consult Completed         Procedures/Scans Performed:  HIP HEMIARTHROPLASTY BIOMET instrumentation, 10/7/2021, Dr. Samano  CT head without contrast  CT chest PE protocol  CT ABD/PEL with contrast  US venous doppler bilateral lower extremities   US gallbladder  Transthoracic echocardiogram     History of Presenting Illness     Chief Complaint   Patient presents with   • Fall     pt reports she fell off toilet at home approx 2 hours pta, she reports she had outpt xray at diagnostic center and they told her she has a broken left hip. pt  reports left hip pain/nausea. pt denies loc.     Ms. Conway is a 76 y.o. female presented to [facility] complaining of fall at the outpatient surgery center where she had just underwent a colonoscopy. She was noted to have a subsequent left femoral neck fracture. She was admitted for further evaluation and therapy. Please see the admitting history and physical for further details.      Hospital Course     She was admitted to the medical/surgical unit.  Orthopedic surgery consultation was obtained and she underwent left hip hemiarthroplasty by Dr. Samano on 10/7. She tolerated the procedure well without any acute complications. PT/OT were consulted postoperatively. It was initially felt that she would require short term rehab placement.     She developed some intermittent SVT on telemetry monitoring and cardiology was consulted. Electrolytes were replaced and she was started on metoprolol tartrate which was gradually advanced. She had a transthoracic echocardiogram showing EF of 61-65% and grade 1 diastolic dysfunction. Report listed below.     On 10/13, she developed some increased confusion, low grade fever, and increasing WBC count. CT head without contrast showed no acute intracranial abnormality, chronic right occipital infarct, and stable chronic diffuse changes. A repeat UA showed some leukocytes, trace bacteria, and small 1+ budding yeast. Subsequent urine culture has since finalized as <25,000 CFU/mL gram + cocci. Repeat blood cultures were obtained and have since finalized with no growth. Repeat CXR showed findings of new interstitial edema, correlate for vascular congestion or volume.  Subsequent CT chest PE protocol showed changes of possible early COVID-19 pneumonia or potentially pulmonary embolism.  Unable to completely rule out a PE.  Venous Dopplers of the bilateral lower extremities showed no DVT.  The case was discussed with orthopedic surgery who was agreeable for full  anticoagulation. She was eventually transitioned to Eliquis at acute PE dosing.  It was also discussed with radiology who felt that findings were high suspicion for PE.  Repeat COVID-19 testing remained negative.    The patient developed nausea/vomiting and diarrhea during her admission. Her home metamucil was discontinued. She reported some chronic intermittent GI upset over the past year with EGD reportedly showing a small hiatal hernia.  CT abdomen/pelvis with contrast showed a very distended urinary bladder.  Her azevedo catheter had been removed postoperatively and was replaced secondary to urinary retention.  General surgery was consulted and testing for C. difficile was negative.  We were unable to obtain a GI PCR panel as the patient had been admitted for greater than 3 days at that point.  She was prophylactically started on IV Rocephin.      She was noted to have an elevated anion gap metabolic acidosis and was initially started on sodium bicarb tablets as well as gentle IV fluids.  A small amount of acetone was noted.  Subsequent nephrology consultation was obtained and it was not felt that she had RTA.  Oral bicarbonate was discontinued and she was started on LR IV fluids.  It was felt that she was likely having starvation ketosis, outpatient follow-up recommended.    An inpatient rehab consultation was obtained and it was not felt the patient was appropriate for admission.  The patient and her daughter refused placement at a nursing home for rehab and ultimately decided that the patient would return home with her daughter.  The patient did not work very well with PT/OT and mostly stayed the bed during her stay.  The daughter reported that she was not very active prior to admission.  Her oral intake did gradually improve as well as diarrhea.  On 10/20/2021, it was felt that the patient was stable for discharge home under the care of her daughter.  She will have home health for continued PT/OT.  Follow-up  with nephrology in 2 weeks with a repeat BMP.  Follow-up with orthopedic surgery in 1 week.  She will continue Eliquis at the acute PE dose, transition to 5 mg twice daily per dosing instructions.  Due to continued urinary retention and poor mobility status during her stay, she was discharged home with a Verdugo catheter.  She will follow-up with PCP in 1 week, would consider bladder training and removal if possible.  Home health agency was contacted and they stated that they could remove the Verdugo catheter if an order from her PCP was received.  Will allow follow-up in 1 week and go from there.  The patient was discharged home in stable condition on this date.    Discharge Vitals/Physical Examination     Vital Signs:  Temp:  [97.8 °F (36.6 °C)-98.1 °F (36.7 °C)] 98.1 °F (36.7 °C)  Heart Rate:  [73-86] 76  Resp:  [16-20] 20  BP: (122-158)/(56-78) 146/60  No data found.  SpO2 Percentage    10/20/21 0640 10/20/21 1104 10/20/21 1300   SpO2: 95% 96% 96%     SpO2:  [93 %-96 %] 96 %  on   ;   Device (Oxygen Therapy): room air    Body mass index is 23.28 kg/m².  Wt Readings from Last 3 Encounters:   10/05/21 54.1 kg (119 lb 3.2 oz)   05/14/21 57.2 kg (126 lb)   04/09/21 54 kg (119 lb)       Physical Exam:  Physical Exam  Constitutional:       General: She is not in acute distress.     Appearance: Normal appearance. She is not ill-appearing.   HENT:      Head: Normocephalic and atraumatic.   Cardiovascular:      Rate and Rhythm: Normal rate and regular rhythm.   Pulmonary:      Effort: Pulmonary effort is normal. No respiratory distress.      Breath sounds: Normal breath sounds. No wheezing or rales.   Abdominal:      General: Bowel sounds are normal.      Palpations: Abdomen is soft.   Musculoskeletal:      Right lower leg: No edema.      Left lower leg: No edema.      Comments: Left hip postop dressing in place, clean/dry. No surrounding erythema or edema. +Neurovascularly intact.    Neurological:      General: No focal  deficit present.      Mental Status: She is alert. Mental status is at baseline.      Comments: Oriented to person, place, situation. Confused to year.    Psychiatric:         Mood and Affect: Mood normal.         Behavior: Behavior normal.       Pertinent Laboratory/Radiology Results     Pertinent Laboratory Results:  Results from last 7 days   Lab Units 10/15/21  1930 10/15/21  1609   TROPONIN T ng/mL <0.010 <0.010           Results from last 7 days   Lab Units 10/19/21  0737 10/18/21  0201 10/17/21  1414 10/17/21  0014 10/17/21  0014   WBC 10*3/mm3 9.23 13.24*  --   --  11.49*   HEMOGLOBIN g/dL 8.7* 8.7* 9.0*   < > 8.0*   HEMATOCRIT % 29.0* 28.5* 28.7*   < > 26.2*   MCV fL 97.0 95.6  --   --  97.0   MCHC g/dL 30.0* 30.5*  --   --  30.5*   PLATELETS 10*3/mm3 605* 627*  --   --  554*    < > = values in this interval not displayed.     Results from last 7 days   Lab Units 10/20/21  0815 10/19/21  0737 10/18/21  0201 10/17/21  1725 10/17/21  0014 10/16/21  0118 10/16/21  0118   SODIUM mmol/L 138 139 135*  --  140   < > 138   POTASSIUM mmol/L 3.8 3.9 3.9   < > 3.4*   < > 3.7   MAGNESIUM mg/dL  --   --  2.1  --  1.7  --  2.0   CHLORIDE mmol/L 104 105 102  --  107   < > 105   CO2 mmol/L 18.0* 19.2* 18.4*  --  19.3*   < > 19.1*   BUN mg/dL 8 8 7*  --  12   < > 16   CREATININE mg/dL 0.57 0.67 0.56*  --  0.69   < > 0.72   EGFR IF NONAFRICN AM mL/min/1.73 103 85 105  --  82   < > 78   CALCIUM mg/dL 8.1* 8.2* 8.0*  --  7.7*   < > 7.9*   GLUCOSE mg/dL 84 82 79  --  93   < > 79   ALBUMIN g/dL  --  2.69*  --   --  2.48*  --  2.82*   BILIRUBIN mg/dL  --  0.2  --   --  0.2  --  0.2   ALK PHOS U/L  --  96  --   --  99  --  104   AST (SGOT) U/L  --  20  --   --  20  --  17   ALT (SGPT) U/L  --  9  --   --  8  --  6    < > = values in this interval not displayed.   Estimated Creatinine Clearance: 49.5 mL/min (by C-G formula based on SCr of 0.57 mg/dL).  No results found for: AMMONIA    Glucose   Date/Time Value Ref Range Status    10/20/2021 1020 71 70 - 130 mg/dL Final     Comment:     Meter: [id number] : [id number] Parviz MEJIA   10/20/2021 0642 81 70 - 130 mg/dL Final     Comment:     Meter: [id number] : [id number] khushboo albright   10/19/2021 2029 94 70 - 130 mg/dL Final     Comment:     Meter: [id number] : [id number] MIKHAIL SAMS   10/19/2021 1653 107 70 - 130 mg/dL Final     Comment:     Meter: [id number] : [id number] ADI CURTIS   10/19/2021 1030 79 70 - 130 mg/dL Final     Comment:     Meter: [id number] : [id number] ADI CURTIS   10/19/2021 0755 75 70 - 130 mg/dL Final     Comment:     Meter: [id number] : [id number] Kimberlyn De La Paz   10/18/2021 2030 95 70 - 130 mg/dL Final     Comment:     Meter: [id number] : [id number] MIKHAIL SAMS   10/18/2021 1640 78 70 - 130 mg/dL Final     Comment:     Meter: [id number] : [id number] JUDE DUVAL     Lab Results   Component Value Date    HGBA1C 4.30 (L) 11/07/2019     Lab Results   Component Value Date    TSH 3.300 10/13/2021    FREET4 1.26 05/27/2020       No results found for: BLOODCX  Urine Culture   Date Value Ref Range Status   10/17/2021 <25,000 CFU/mL Gram Positive Cocci (A)  Final     No results found for: WOUNDCX  No results found for: STOOLCX  No results found for: RESPCX  Pain Management Panel     Pain Management Panel Latest Ref Rng & Units 4/9/2021    AMPHETAMINES SCREEN, URINE Negative Negative    BARBITURATES SCREEN Negative Negative    BENZODIAZEPINE SCREEN, URINE Negative Negative    BUPRENORPHINEUR Negative Negative    COCAINE SCREEN, URINE Negative Negative    METHADONE SCREEN, URINE Negative Negative          Pertinent Radiology Results:  Imaging Results (All)     Procedure Component Value Units Date/Time    XR Abdomen KUB [[id number]] Collected: 10/16/21 0943     Updated: 10/16/21 0945    Narrative:      CR Abdomen 1 Vw    INDICATION:   Abdominal distention with bloating    COMPARISON:   10/10/2021    FINDINGS:  SHAKA  radiograph(s) of the abdomen. The renal shadows are symmetric. No renal calculi. No bladder calculi.    The bowel gas pattern is nonobstructive. No acute osseous abnormalities. No radiopaque foreign body.      Impression:      Negative KUB.    Signer Name: Diego Adhikari MD   Signed: 10/16/2021 9:43 AM   Workstation Name: [email or web address]-    [facility] Gallbladder [[id number]] Collected: 10/15/21 0836     Updated: 10/15/21 0838    Narrative:      EXAM:    US Abdomen Limited, Gallbladder     EXAM DATE:    10/15/2021 8:31 AM     CLINICAL HISTORY:    Vomiting; S72.002A-Fracture of unspecified part of neck of left femur,  initial encounter for closed fracture     TECHNIQUE:    Real-time ultrasound of the right upper quadrant with image  documentation.     COMPARISON:    No relevant prior studies available.     FINDINGS:    Gallbladder:  Unremarkable.  No gallstones.    Common bile duct:  The CBD measures 2.44 mm.  No stones.  No dilation.    Pancreas:  Unremarkable as visualized.       Impression:        No acute findings in the right upper quadrant.     This report was finalized on 10/15/2021 8:36 AM by Dr. Santosh Andrade MD.       US Venous Doppler Lower Extremity Bilateral (duplex) [[id number]] Collected: 10/14/21 1114     Updated: 10/14/21 1116    Narrative:      EXAMINATION: US VENOUS DOPPLER LOWER EXTREMITY BILATERAL (DUPLEX)-      CLINICAL INDICATION:  Concern for PE; S72.002A-Fracture of unspecified  part of neck of left femur, initial encounter for closed fracture     TECHNIQUE: Multiplanar gray scale and Doppler vascular sonographic  imaging of the deep veins of BILATERAL lower extremity, without and with  compression.      COMPARISON: NONE      FINDINGS:   Deep Veins: The visualized deep veins demonstrate flow and are  compressible. No evidence of deep venous thrombosis.   Superficial veins: Unremarkable. Saphenofemoral junction is patent  without thrombus.  Soft tissues: Unremarkable.        Impression:      No DVT.     This report was finalized on 10/14/2021 11:14 AM by Dr. Jonah Salgado MD.       CT Abdomen Pelvis With Contrast [[id number]] Collected: 10/13/21 1427     Updated: 10/13/21 1429    Narrative:      EXAM:    CT Abdomen and Pelvis With Intravenous Contrast     EXAM DATE:    10/13/2021 1:03 PM     CLINICAL HISTORY:    nausea, vomiting, epigastric pain; S72.002A-Fracture of unspecified  part of neck of left femur, initial encounter for closed fracture     TECHNIQUE:    Axial computed tomography images of the abdomen and pelvis with  intravenous contrast.  Sagittal and coronal reformatted images were  created and reviewed.  This CT exam was performed using one or more of  the following dose reduction techniques:  automated exposure control,  adjustment of the mA and/or kV according to patient size, and/or use of  iterative reconstruction technique.     COMPARISON:    No relevant prior studies available.     FINDINGS:    LUNG BASES:  Unremarkable.  No mass.  No consolidation.    PLEURAL SPACE:  Tiny left pleural effusion.      ABDOMEN:    LIVER:  Unremarkable.  No mass.    GALLBLADDER AND BILE DUCTS:  Unremarkable.  No calcified stones.  No  ductal dilation.    PANCREAS:  Unremarkable.  No mass.  No ductal dilation.    SPLEEN:  Unremarkable.  No splenomegaly.    ADRENALS:  Unremarkable.  No mass.    KIDNEYS AND URETERS:  Unremarkable.  No solid mass.  No  hydronephrosis.    STOMACH AND BOWEL:  Unremarkable.  No obstruction.  No mucosal  thickening.      PELVIS:    APPENDIX:  No findings to suggest acute appendicitis.    BLADDER:  Very distended urinary bladder.    REPRODUCTIVE:  Unremarkable as visualized.      ABDOMEN and PELVIS:    INTRAPERITONEAL SPACE:  Unremarkable.  No free air.  No significant  fluid collection.    BONES/JOINTS:  No acute fracture.  No dislocation.    SOFT TISSUES:  Unremarkable.    VASCULATURE:  Atherosclerotic vascular calcification.  No abdominal  aortic  aneurysm.    LYMPH NODES:  Unremarkable.  No enlarged lymph nodes.       Impression:        Very distended urinary bladder.     This report was finalized on 10/13/2021 2:27 PM by Dr. Santosh Andrade MD.       CT Chest Pulmonary Embolism [[id number]] Collected: 10/13/21 1329     Updated: 10/13/21 1333    Narrative:      EXAM:    CT Angiography Chest With Intravenous Contrast     EXAM DATE:    10/13/2021 12:47 PM     CLINICAL HISTORY:    r/o PE; S72.002A-Fracture of unspecified part of neck of left femur,  initial encounter for closed fracture     TECHNIQUE:    Axial computed tomographic angiography images of the chest with  intravenous contrast.  This CT exam was performed using one or more of  the following dose reduction techniques:  automated exposure control,  adjustment of the mA and/or kV according to patient size, and/or use of  iterative reconstruction technique.    MIP reconstructed images were created and reviewed.     COMPARISON:    No relevant prior studies available.     FINDINGS:    Pulmonary arteries:  See below.      Aorta:  No acute findings.  No thoracic aortic aneurysm.    Lungs:  Subpleural groundglass attenuation of right upper lobe that  could represent fibrotic change, atelectasis, early change of COVID-19  pneumonia or potentially PE. While there is no definite visualized PE,  certainly subsegmental branch pulmonary arteries are not well evaluated  on this study due to bolus timing and motion artifact near the bases.   Bibasilar atelectasis.  No mass.    Pleural space:  Tiny left pleural effusion.  No pneumothorax.    Heart:  Unremarkable.  No cardiomegaly.  No significant pericardial  effusion.  No evidence of RV dysfunction.    Bones/joints:  No acute fracture.  No dislocation.    Soft tissues:  Unremarkable.    Lymph nodes:  Unremarkable.  No enlarged lymph nodes.       Impression:      1.  Tiny left pleural effusion.  2.  Subpleural groundglass attenuation of right upper lobe that  could  represent fibrotic change, atelectasis, early change of COVID-19  pneumonia or potentially PE. While there is no definite visualized PE,  certainly subsegmental branch pulmonary arteries are not well evaluated  on this study due to bolus timing and motion artifact near the bases.     This report was finalized on 10/13/2021 1:31 PM by Dr. Santosh Andrade MD.       XR Chest 1 View [[id number]] Collected: 10/13/21 0417     Updated: 10/13/21 0419    Narrative:      CHEST X-RAY, 10/13/2021 (03:58)      HISTORY:    76-year-old female hospital inpatient with hip fracture. Mental status changes, delirium. Fever.      TECHNIQUE:  AP portable chest x-ray.    COMPARISON:  *  Chest x-ray, 10/5/2021.    FINDINGS:  Diffusely increased and indistinct interstitial markings throughout both lungs suggest mild vascular congestion or volume overload that is new since the 10th 21 exam.    Shallow lung expansion. Chronic elevation right hemidiaphragm. Heart size is within normal limits. Central venous port catheter remains in good position.      Impression:      Radiographic findings suggesting new mild interstitial edema. Correlate for vascular congestion or volume overload.    Signer Name: Suhail Encarnacion MD   Signed: 10/13/2021 4:17 AM   Workstation Name: [email or web address]    [facility]    CT Head Without Contrast [[id number]] Collected: 10/13/21 0413     Updated: 10/13/21 0415    Narrative:      CT HEAD, NONCONTRAST, 10/13/2021    HISTORY:  76-year-old female hospital inpatient with hip fracture. Noted mental status changes, delirium.    TECHNIQUE:  CT imaging of the head without IV contrast. Radiation dose reduction techniques included automated exposure control. Radiation audit for CT and nuclear cardiology exams in the last 12 months: 3.    COMPARISON:  *  CT head, 4/9/2021.    FINDINGS:  No acute intracranial abnormality is demonstrated. No visible skull fracture. No acute intracranial  hemorrhage.    Chronic right cortical and subcortical right occipital infarct. Mild generalized age-appropriate cerebral volume loss. Mild diffuse chronic small vessel type white matter changes. These findings are stable.    No evidence of intracranial mass, mass effect, cerebral edema, extra-axial fluid collection or progressive ventricular enlargement.      Impression:      1.  No acute intracranial abnormality.  2.  Chronic right occipital infarct.  3.  Stable diffuse chronic changes as noted above.  4.  No change since 4/9/2021.    Signer Name: Suhail Encarnacion MD   Signed: 10/13/2021 4:13 AM   [facility]: [facility]    [facility]    XR Abdomen KUB [[id number]] Collected: 10/10/21 2006     Updated: 10/10/21 2008    Narrative:      CR Abdomen 1 Vw    INDICATION:   Nausea and vomiting today.    COMPARISON:   None available    FINDINGS:  AP radiograph(s) of the abdomen. The renal shadows are symmetric. No renal calculi. No bladder calculi.    The bowel gas pattern is nonobstructive. No acute osseous abnormalities. No radiopaque foreign body.      Impression:      Negative KUB.    Signer Name: Alexandru Henderson MD   Signed: 10/10/2021 8:06 PM   Workstation [facility]: [email or web address]    [facility]    XR Pelvis 1 or 2 View [[id number]] Collected: 10/07/21 1657     Updated: 10/07/21 1701    Narrative:      EXAM:    XR Pelvis, 1 or 2 Views     EXAM DATE:    10/07/2021     CLINICAL HISTORY:    Postop left hip hemiarthroplasty; S72.002A-Fracture of unspecified  part of neck of left femur, initial encounter for closed fracture     TECHNIQUE:    Frontal view of the pelvis.     COMPARISON:    10/05/2021     FINDINGS:    Bones/joints:  Interval left hip through plaster.  No acute bony  findings.  No dislocation.    Soft tissues:  Post surgical changes within the soft tissues.    Other findings:  Anatomic alignment.       Impression:        Interval changes of left hip  arthroplasty.     This report was finalized on 10/7/2021 4:57 PM by Dr. Jonah Salgado MD.       XR Chest 1 View [[id number]] Collected: 10/05/21 1447     Updated: 10/05/21 1449    Narrative:      XR CHEST 1 VW-     CLINICAL INDICATION: fall, hip fx        COMPARISON: 05/15/2021      TECHNIQUE: Single frontal view of the chest.     FINDINGS:     There is no focal alveolar infiltrate or effusion.  Appearance suggestive of at least mild pulmonary congestion.  Central line is stable from the previous exam  There are no suspicious-appearing parenchymal soft tissue nodules.          Impression:      Appearance suggestive of CHF     This report was finalized on 10/5/2021 2:47 PM by Dr. Chente Choi MD.       XR Femur 2 View Left [[id number]] Collected: 10/05/21 1446     Updated: 10/05/21 1449    Narrative:      EXAMINATION: XR FEMUR 2 VW LEFT-      CLINICAL INDICATION: fall, left femoral neck fx        COMPARISON: None available     FINDINGS:  4 views of the left femur show left femoral neck fracture     No distal femoral fracture is seen.       Impression:      Left femoral neck fracture.      This report was finalized on 10/5/2021 2:46 PM by Dr. Chenet Choi MD.               Test Results Pending at Discharge:  None.     Discharge Disposition/Discharge Medications/Discharge Appointments     Discharge Disposition:   Home or Self Care    Condition at Discharge:  Stable    Discharge Diet:  Diet Instructions     Diet: Regular      Discharge Diet: Regular          Discharge Activity:  Activity Instructions     Gradually Increase Activity Until at Pre-Hospitalization Level            Code Status While Inpatient:  Code Status and Medical Interventions:   Ordered at: 10/05/21 1631     Code Status:    CPR     Medical Interventions (Level of Support Prior to Arrest):    Full       Discharge Medications:     Discharge Medications      New Medications      Instructions Start Date   Eliquis 5 MG tablet tablet  Generic drug:  apixaban   Take 2 tablets by mouth Every 12 (Twelve) Hours for 9 doses, then take 1 tablet by mouth Every 12 (Twelve) Hours.      fluticasone 50 MCG/ACT nasal spray  Commonly known as: FLONASE   2 sprays, Each Nare, Daily         Changes to Medications      Instructions Start Date   hydrALAZINE 25 MG tablet  Commonly known as: APRESOLINE  What changed:   medication strength  how much to take   25 mg, Oral, Every 8 Hours Scheduled      metoprolol tartrate 100 MG tablet  Commonly known as: LOPRESSOR  What changed:   medication strength  how much to take  when to take this   Take 1 & 1/2  tablets by mouth Every 12 (Twelve) Hours.         Continue These Medications      Instructions Start Date   Align capsule   1 capsule, Oral, Daily      amLODIPine 2.5 MG tablet  Commonly known as: NORVASC   2.5 mg, Oral, Every 24 Hours Scheduled      atorvastatin 10 MG tablet  Commonly known as: LIPITOR   10 mg, Oral, Daily      busPIRone 5 MG tablet  Commonly known as: BUSPAR   2.5 mg, Oral, 2 Times Daily      dexlansoprazole 60 MG capsule  Commonly known as: DEXILANT   60 mg, Oral, Daily      FIBER-CAPS PO   1 capsule, Oral, Daily      HYDROcodone-acetaminophen  MG per tablet  Commonly known as: NORCO   1 tablet, Oral, 3 Times Daily PRN      loratadine 10 MG tablet  Commonly known as: CLARITIN   10 mg, Oral, Daily      Lyrica 25 MG capsule  Generic drug: pregabalin   25 mg, Oral, 2 Times Daily      NON FORMULARY   1 dose, Topical, As Needed, Prior to [facility] Admission, Patient was on: Rx wrote to use 2-4 grams to affected area 3-4 times daily. Pt caregiver reports using PRN every 1-2 weeks.      promethazine 12.5 MG tablet  Commonly known as: PHENERGAN   12.5 mg, Oral, 2 Times Daily PRN         Stop These Medications    aspirin 81 MG EC tablet     FLUoxetine 20 MG capsule  Commonly known as: PROzac     megestrol 40 MG tablet  Commonly known as: MEGACE     nitrofurantoin 50 MG capsule  Commonly known as: MACRODANTIN      phenylephrine 10 MG tablet  Commonly known as: SUDAFED PE            Discharge Appointments:  Your Scheduled Appointments    Nov 04, 2021  1:00 PM  Follow Up with MERRY Land  [facility] MEDICAL [facility] CARDIOLOGY ([place]) [place] [id number] [phone]   -Bring photo ID, insurance card, and list of medications to appointment  -If testing was completed outside of [facility] then patient must bring images on a disc  -Copay will be collected at time of appointment  -Established patients should arrive 10 minutes prior to appointment       Additional instructions:    You have a scheduled follow-up with Dr. Pandey on 11/3/21 at 11:00  You have a scheduled follow-up appointment with Ronny SOUSA on 10/27/21 at 11:45           Additional Instructions for the Follow-ups that You Need to Schedule     Ambulatory Referral to Home Health   As directed      Face to Face Visit Date: 10/20/2021    Follow-up provider for Plan of Care?: I treated the patient in an acute care facility and will not continue treatment after discharge.    Follow-up provider: RONNY GAR [[id number]]    Reason/Clinical Findings: Hip fracture, urinary retention, discharged home with azevedo catheter which will need removed when patient more mobile and able to urinate on her own.    Describe mobility limitations that make leaving home difficult: Hip fracture.    Nursing/Therapeutic Services Requested: Skilled Nursing Physical Therapy Occupational Therapy    Skilled nursing orders: Monthly catheter care (Will need bladder training/azevedo catheter removed when more mobile and able to urinate on her own.)    PT orders: Therapeutic exercise Gait Training Transfer training Total joint pathway Strengthening Home safety assessment    Weight Bearing Status: Full Weight Bearing    Occupational orders: Activities of daily living Energy conservation Strengthening Fine motor Home safety assessment    Frequency: 1 Week 1          Call MD With Problems / Concerns   As directed      Instructions: Contact PCP or return to the ED with recurrent symptoms or concerns.    Order Comments: Instructions: Contact PCP or return to the ED with recurrent symptoms or concerns.          Discharge Follow-up with PCP   As directed       Currently Documented PCP:    Ronny Gar APRN    PCP Phone Number:    [phone]     Follow Up Details: 1 week.         Discharge Follow-up with Specialty: Nephrology, BMP on follow up.; 2 Weeks   As directed      Specialty: Nephrology, BMP on follow up.    Follow Up: 2 Weeks         Discharge Follow-up with Specified Provider: Dr. Samano; 1 Week   As directed      To: Dr. Samano    Follow Up: 1 Week            Contact information for follow-up providers     Jonah Samano MD Follow up in 10 day(s).    Specialty: Orthopedic Surgery  Contact information:  [address] [place] [address]  [phone]             Ronny Gar APRN .    Specialty: Nurse Practitioner  Why: 1 week.  Contact information:  [address]  [place] [address]  [phone]             Ronny Gar APRN .    Specialty: Nurse Practitioner  Contact information:  [address]  [place] [address]  [phone]                   Contact information for after-discharge care     Home Medical Care     [facility] .    Service: Home Health Services  Contact information:  [address] [address]  [phone]                             Additional Instructions for the Follow-ups that You Need to Schedule     Ambulatory Referral to Home Health   As directed      Face to Face Visit Date: 10/20/2021    Follow-up provider for Plan of Care?: I treated the patient in an acute care facility and will not continue treatment after discharge.    Follow-up provider: RONNY GAR [[id number]]    Reason/Clinical Findings: Hip fracture, urinary retention, discharged home with azevedo catheter which will  need removed when patient more mobile and able to urinate on her own.    Describe mobility limitations that make leaving home difficult: Hip fracture.    Nursing/Therapeutic Services Requested: Skilled Nursing Physical Therapy Occupational Therapy    Skilled nursing orders: Monthly catheter care (Will need bladder training/azevedo catheter removed when more mobile and able to urinate on her own.)    PT orders: Therapeutic exercise Gait Training Transfer training Total joint pathway Strengthening Home safety assessment    Weight Bearing Status: Full Weight Bearing    Occupational orders: Activities of daily living Energy conservation Strengthening Fine motor Home safety assessment    Frequency: 1 Week 1         Call MD With Problems / Concerns   As directed      Instructions: Contact PCP or return to the ED with recurrent symptoms or concerns.    Order Comments: Instructions: Contact PCP or return to the ED with recurrent symptoms or concerns.          Discharge Follow-up with PCP   As directed       Currently Documented PCP:    Ronny Acevedo APRN    PCP Phone Number:    [phone]     Follow Up Details: 1 week.         Discharge Follow-up with Specialty: Nephrology, BMP on follow up.; 2 Weeks   As directed      Specialty: Nephrology, BMP on follow up.    Follow Up: 2 Weeks         Discharge Follow-up with Specified Provider: Dr. Samano; 1 Week   As directed      To: Dr. Samano    Follow Up: 1 Week               Attestation: I personally discussed the patient's hospital course, disposition, discharge planning, and discharge medications with attending physician, Dr. Ed Dent, prior to time of discharge. I have also discussed with RN, Dina, prior to discharge.     Corrie Schilling PA-C  10/20/21  19:19 EDT    Time to complete discharge: >30 minutes.     Please send a copy of this dictation to the following providers:  Ronny Acevedo APRN

## 2021-10-21 NOTE — OUTREACH NOTE
Prep Survey      Responses   [facility] discharged from? [facility]   Is LACE score < 7 ? No   Emergency Room discharge w/ pulse ox? No   Eligibility Readm Mgmt   Discharge diagnosis Closed fracture of left hip   Does the patient have one of the following disease processes/diagnoses(primary or secondary)? General Surgery   Does the patient have Home health ordered? Yes   What is the Home health agency?  [facility]   Is there a DME ordered? No   Comments regarding appointments Appts needed   Medication alerts for this patient Eliquis,  change to Apresoline and Lopressor   Prep survey completed? Yes          Vicky Saeed RN

## 2021-10-25 NOTE — OUTREACH NOTE
General Surgery Week 1 Survey      Responses   [facility] patient discharged from? [facility]   Does the patient have one of the following disease processes/diagnoses(primary or secondary)? General Surgery   Week 1 attempt successful? Yes   Call start time 0737   Call end time 0756   Discharge diagnosis Closed fracture of left hip   Is patient permission given to speak with other caregiver? Yes   List who call center can speak with Eunice Solis   Person spoke with today (if not patient) and relationship DaughterEunice   Medication alerts for this patient Eliquis,  change to Apresoline and Lopressor   Meds reviewed with patient/caregiver? Yes   Is the patient having any side effects they believe may be caused by any medication additions or changes? No   Does the patient have all medications related to this admission filled (includes all antibiotics, pain medications, etc.) Yes   Is the patient taking all medications as directed (includes completed medication regime)? Yes   Does the patient have a follow up appointment scheduled with their surgeon? Yes   Has the patient kept scheduled appointments due by today? N/A   Comments Surgical appt 10/26/21 was moved up due to redness at surgical site   What is the Home health agency?  [facility]   Has home health visited the patient within 72 hours of discharge? Yes   Home health comments  saw redness at the surgical site and got her appt moved up to tomorrow   What DME was ordered? N/A   DME comments Would like some hazel, wipes and gloves to be ordered.  told her they do not order those any more unless prior authorized to do so.    Psychosocial issues? Yes   Psychosocial comments Dementia   Did the patient receive a copy of their discharge instructions? Yes   Nursing interventions Reviewed instructions with patient   What is the patient's perception of their health status since discharge? Improving   Nursing interventions Nurse provided patient  education   Is the patient /caregiver able to teach back basic post-op care? Continue use of incentive spirometry at least 1 week post discharge,  Lifting as instructed by MD in discharge instructions,  Do not remove steri-strips,  Keep incision areas clean,dry and protected,  No tub bath, swimming, or hot tub until instructed by MD,  Take showers only when approved by MD-sponge bathe until then,  Drive as instructed by MD in discharge instructions,  Practice 'cough and deep breath'   Is the patient/caregiver able to teach back signs and symptoms of incisional infection? Increased redness, swelling or pain at the incisonal site,  Increased drainage or bleeding,  Incisional warmth,  Pus or odor from incision,  Fever   Is the patient/caregiver able to teach back steps to recovery at home? Set small, achievable goals for return to baseline health,  Rest and rebuild strength, gradually increase activity,  Practice good oral hygiene,  Eat a well-balance diet,  Make a list of questions for surgeon's appointment   If the patient is a current smoker, are they able to teach back resources for cessation? Not a smoker   Is the patient/caregiver able to teach back the hierarchy of who to call/visit for symptoms/problems? PCP, Specialist, Home health nurse, Urgent Care, ED, 911 Yes   Week 1 call completed? Yes   Wrap up additional comments HH coming once weekly PT/OT, she did not work well with them while inpt.  Daughter reports she stays in bed and does not ambulate on the hip at all, uses wheelchair.  She was not active prior to fall.  Redness noted to top of hip incision,w/ no drainage today. Daughter notified HH, they got appt with surgeon moved up tomorrow.  Daughter not sure how they will get her in car.  Urinary catheter in place with clear xu urine in bag,  She is on anticoagulants for PE.  Temp 98.3. /73. Hr 70.  SPO2 92% RA          Sushila Fisher, RN

## 2021-11-03 NOTE — OUTREACH NOTE
General Surgery Week 2 Survey      Responses   [facility] patient discharged from? [facility]   Does the patient have one of the following disease processes/diagnoses(primary or secondary)? General Surgery   Week 2 attempt successful? Yes   Call start time 0830   Call end time 0836   Discharge diagnosis Closed fracture of left hip   Is patient permission given to speak with other caregiver? Yes   List who call center can speak with Eunice Solis   Person spoke with today (if not patient) and relationship DaughterEunice   Meds reviewed with patient/caregiver? Yes   Is the patient taking all medications as directed (includes completed medication regime)? Yes   Medication comments antibiotics for UTI   Has the patient kept scheduled appointments due by today? Yes   Comments Staples removed   Comments Daughter thinks she needs fluids--waiting on Dr read the labs.   What is the patient's perception of their health status since discharge? Same   Nursing interventions Nurse provided patient education   Is the patient/caregiver able to teach back signs and symptoms of incisional infection? Increased redness, swelling or pain at the incisonal site,  Increased drainage or bleeding,  Incisional warmth,  Pus or odor from incision,  Fever   Is the patient/caregiver able to teach back steps to recovery at home? Rest and rebuild strength, gradually increase activity,  Eat a well-balance diet  [Per daughter, pt is picky eater and does not drink alot of fluids. ]   Is the patient/caregiver able to teach back the hierarchy of who to call/visit for symptoms/problems? PCP, Specialist, Home health nurse, Urgent Care, ED, 911 Yes   Additional teach back comments Daughter states surgical site looks better after pt had staples removed.    Week 2 call completed? Yes          Yusra Douglas, RN

## 2021-11-04 NOTE — PROGRESS NOTES
Ronny Acevedo APRN  Kiah Conway  1945 11/04/2021    Patient Active Problem List   Diagnosis   • Essential hypertension   • Atrophic urethritis   • GERD without esophagitis   • Fibromyalgia   • Arthritis   • Dysphagia   • History of rheumatic fever   • Migraines   • History of thyroid cancer   • Normocytic anemia   • Femur fracture (HCC)   • Closed fracture of left hip (HCC)     You have chosen to receive care through a telephone visit. Do you consent to use a telephone visit for your medical care today? Yes      Dear Ronny Acevedo APRN:    Subjective     Chief Complaint   Patient presents with   • Follow-up     6 month follow up    • Med Management     verbal           History of Present Illness:    Kiah Conway is a 76 y.o. female with a past medical history of hypertension, sleep apnea, lymphoma, and paroxysmal SVT.  She presents today for cardiology follow-up.  She recently fractured her hip and underwent ORIF.  She is unable to come into our office today so she requested a telephone visit.  While inpatient, she denies any cardiac issues.  An echocardiogram was ordered which revealed an LVEF of 61 to 65% with grade 1 LV diastolic dysfunction and moderate LVH.  She denies any chest pains or palpitations.  She did have short runs of SVT while inpatient and metoprolol dosage was adjusted.  Since discharge, her blood pressure has been running low at home, less than 110 systolic.  So her daughter has held the amlodipine and hydralazine.  She is giving her metoprolol tartrate 50 mg twice a day monitoring blood pressure closely.  Her PCP recently obtained labs which revealed hypokalemia and hypomagnesemia.  She is currently receiving supplementation for this.          Allergies   Allergen Reactions   • Baclofen Delirium   :      Current Outpatient Medications:   •  atorvastatin (LIPITOR) 10 MG tablet, Take 1 tablet by mouth Daily., Disp: 30 tablet, Rfl: 11  •  busPIRone (BUSPAR) 5 MG tablet,  Take 2.5 mg by mouth 2 (Two) Times a Day., Disp: , Rfl:   •  Calcium Polycarbophil (FIBER-CAPS PO), Take 1 capsule by mouth Daily., Disp: , Rfl:   •  cyproheptadine (PERIACTIN) 4 MG tablet, Take 4 mg by mouth 3 (Three) Times a Day As Needed for Allergies., Disp: , Rfl:   •  dexlansoprazole (DEXILANT) 60 MG capsule, Take 60 mg by mouth Daily., Disp: , Rfl:   •  Eliquis 5 MG tablet tablet, Take 2 tablets by mouth Every 12 (Twelve) Hours for 9 doses, then take 1 tablet by mouth Every 12 (Twelve) Hours., Disp: 78 tablet, Rfl: 0  •  fluticasone (FLONASE) 50 MCG/ACT nasal spray, 2 sprays by Each Nare route Daily., Disp: , Rfl:   •  HYDROcodone-acetaminophen (NORCO)  MG per tablet, Take 1 tablet by mouth 3 (Three) Times a Day As Needed for Moderate Pain ., Disp: , Rfl:   •  metoprolol tartrate (LOPRESSOR) 100 MG tablet, Take 1 & 1/2  tablets by mouth Every 12 (Twelve) Hours. (Patient taking differently: Take 50 mg by mouth Every 12 (Twelve) Hours.), Disp: 90 tablet, Rfl: 0  •  NON FORMULARY, Apply 1 dose topically to the appropriate area as directed As Needed (Compounded Pain cream: Ingredients: 2.5% ketamine, 2.5% lidocaine, 2.5% prilocaine, 0.09% meloxicam (120 grams total)). Prior to [facility] Admission, Patient was on: Rx wrote to use 2-4 grams to affected area 3-4 times daily. Pt caregiver reports using PRN every 1-2 weeks., Disp: , Rfl:   •  pregabalin (LYRICA) 25 MG capsule, Take 1 capsule by mouth 2 (Two) Times a Day., Disp: 30 capsule, Rfl: 0  •  Probiotic Product (Align) capsule, Take 1 capsule by mouth Daily., Disp: , Rfl:   •  promethazine (PHENERGAN) 12.5 MG tablet, Take 12.5 mg by mouth 2 (Two) Times a Day As Needed for Nausea or Vomiting., Disp: , Rfl:   •  cefdinir (OMNICEF) 300 MG capsule, TAKE ONE CAPSULE BY MOUTH TWICE A DAY FOR INFECTION FOR 10 DAYS, Disp: , Rfl:   •  hydrALAZINE (APRESOLINE) 25 MG tablet, Take 1 tablet by mouth Every 8 (Eight) Hours., Disp: 90 tablet, Rfl: 0  •  levothyroxine  (SYNTHROID, LEVOTHROID) 25 MCG tablet, Take 25 mcg by mouth Daily., Disp: , Rfl:   •  loratadine (CLARITIN) 10 MG tablet, Take 10 mg by mouth Daily., Disp: , Rfl:   •  magnesium oxide (MAG-OX) 400 MG tablet, Take 1 tablet by mouth Daily., Disp: , Rfl:   •  potassium chloride 10 MEQ CR tablet, Take 10 mEq by mouth Daily., Disp: , Rfl:   No current facility-administered medications for this visit.      The following portions of the patient's history were reviewed and updated as appropriate: allergies, current medications, past family history, past medical history, past social history, past surgical history and problem list.    Social History     Tobacco Use   • Smoking status: Never Smoker   • Smokeless tobacco: Never Used   Substance Use Topics   • Alcohol use: No   • Drug use: No       ROS    Objective   There were no vitals filed for this visit.  There is no height or weight on file to calculate BMI.        Physical Exam    Lab Results   Component Value Date     11/02/2021    K 3.1 (L) 11/02/2021     11/02/2021    CO2 24.3 11/02/2021    BUN 7 (L) 11/02/2021    CREATININE 0.64 11/02/2021    GLUCOSE 117 (H) 11/02/2021    CALCIUM 8.0 (L) 11/02/2021    AST 17 11/02/2021    ALT 10 11/02/2021    ALKPHOS 117 11/02/2021    LABIL2 1.4 (L) 05/06/2016     Lab Results   Component Value Date    CKTOTAL 80 10/08/2018     Lab Results   Component Value Date    WBC 4.75 11/02/2021    HGB 8.9 (L) 11/02/2021    HCT 28.6 (L) 11/02/2021     11/02/2021     Lab Results   Component Value Date    INR 1.19 (H) 10/07/2021    INR 1.10 10/06/2021    INR 1.00 10/05/2021     Lab Results   Component Value Date    MG 1.4 (L) 11/02/2021     Lab Results   Component Value Date    TSH 4.410 (H) 11/02/2021    TRIG 155 (H) 11/02/2021    HDL 25 (L) 11/02/2021    LDL 40 11/02/2021      Lab Results   Component Value Date    .0 (H) 03/07/2019           Procedures      Assessment/Plan    Diagnosis Plan   1. Essential hypertension      2. Paroxysmal SVT (supraventricular tachycardia) ([facility])                  Recommendations:    1. Essential hypertension - reportedly hypotensive at home. Hydralazine and amlodipine held. Continue with current dose of metoprolol and monitor BP closely. Goal is for BP to be <140/90.  2. Paroxysmal SVT - on beta blocker therapy. No recent palpitations. Potassium and magnesium replacement ordered by PCP.  3. Follow up in 6 months or sooner if needed.     This visit has been rescheduled as a phone visit to comply with patient safety concerns in accordance with CDC recommendations. Total time of discussion was 9 minutes.        Return in about 6 months (around 5/4/2022) for Recheck.    As always, I appreciate very much the opportunity to participate in the cardiovascular care of your patients.      With Best Regards,    MERRY Land

## 2021-11-10 NOTE — OUTREACH NOTE
General Surgery Week 3 Survey      Responses   [facility] patient discharged from? [facility]   Does the patient have one of the following disease processes/diagnoses(primary or secondary)? General Surgery   Week 3 attempt successful? Yes   Call start time 0721   Call end time 0727   Discharge diagnosis Closed fracture of left hip   Person spoke with today (if not patient) and relationship Daughter, Eunice Contreras reviewed with patient/caregiver? Yes   Is the patient taking all medications as directed (includes completed medication regime)? Yes   Has the patient kept scheduled appointments due by today? Yes   What is the Home health agency?  [facility]   Home health comments PT 3x week  SN once weekly   What is the patient's perception of their health status since discharge? Same  [Eating some better. Still very weak.]   Week 3 call completed? Yes          Armida Albarado RN

## 2021-11-16 NOTE — ED NOTES
Placed external catheter at this time. Pt tolerated well.      Norberto Taylor, RN  11/16/21 0154

## 2021-11-18 NOTE — ED PROVIDER NOTES
Subjective   76-year-old female presents to the ER with complaints of abdominal pain and nausea.  Patient has had decreased appetite.  Source of history is patient's daughter.  Patient's daughter verbalizes that patient has a urinary catheter placements been in place since early October.  Catheter was placed secondary to hip fracture.  Patient is currently being seen by home health and participating in rehab at home.  Patient still is nonambulatory and also uses incontinence pads.  Daughter states the patient has had episodes of vomiting.          Review of Systems   Constitutional: Positive for fatigue. Negative for fever.   HENT: Negative.    Respiratory: Negative.    Cardiovascular: Negative.  Negative for chest pain.   Gastrointestinal: Positive for abdominal pain, nausea and vomiting.   Endocrine: Negative.    Genitourinary: Positive for frequency. Negative for dysuria.   Skin: Negative.    Neurological: Negative.    Psychiatric/Behavioral: Negative.    All other systems reviewed and are negative.      Past Medical History:   Diagnosis Date   • Arthritis    • Cardiac disorder    • Diverticulitis    • Dysphagia    • Fibromyalgia    • Follicular lymphoma (HCC)     Remisson    • GERD (gastroesophageal reflux disease)    • History of rheumatic fever    • Hypertension    • Hypokalemia due to inadequate potassium intake 03/03/2014   • Lipoma    • Malignant neoplasm (HCC)    • Migraine    • Neutropenia (HCC) 11/7/2019   • Obstructive sleep apnea 7/14/2016   • Prolonged Q-T interval on ECG 11/7/2019   • Recurrent UTI    • Rheumatoid arthritis (HCC)    • Sleep apnea    • Thyroid cancer (HCC)        Allergies   Allergen Reactions   • Baclofen Delirium       Past Surgical History:   Procedure Laterality Date   • BLADDER SURGERY     • FINGER SURGERY      reattached    • HIP HEMIARTHROPLASTY Left 10/7/2021    Procedure: HIP HEMIARTHROPLASTY BIOMET instrumentation  10/7/2021 1300 Dr. Samano;  Surgeon: Jonah Samano MD;   Location: [facility];  Service: Orthopedics;  Laterality: Left;   • HYSTERECTOMY     • THYROID SURGERY         Family History   Problem Relation Age of Onset   • Diabetes Mother    • Hypertension Mother    • Other Other         cardiac disorder,cardiovascular disease, malignant neoplasm of brain   • Breast cancer Paternal Aunt        Social History     Socioeconomic History   • Marital status:    Tobacco Use   • Smoking status: Never Smoker   • Smokeless tobacco: Never Used   Substance and Sexual Activity   • Alcohol use: No   • Drug use: No   • Sexual activity: Defer           Objective   Physical Exam  Vitals and nursing note reviewed.   Constitutional:       General: She is not in acute distress.     Appearance: She is well-developed. She is not diaphoretic.   HENT:      Head: Normocephalic and atraumatic.      Right Ear: External ear normal.      Left Ear: External ear normal.      Nose: Nose normal.   Eyes:      Conjunctiva/sclera: Conjunctivae normal.   Neck:      Vascular: No JVD.      Trachea: No tracheal deviation.   Cardiovascular:      Rate and Rhythm: Normal rate and regular rhythm.      Heart sounds: No murmur heard.      Pulmonary:      Effort: Pulmonary effort is normal. No respiratory distress.      Breath sounds: No wheezing.   Abdominal:      General: Bowel sounds are normal.      Palpations: Abdomen is soft.      Tenderness: There is no abdominal tenderness.      Comments: Palpation of the abdomen did not reveal any tenderness within all 4 quadrants.   Musculoskeletal:         General: No deformity. Normal range of motion.      Cervical back: Normal range of motion and neck supple.   Skin:     General: Skin is warm and dry.      Coloration: Skin is not pale.      Findings: No erythema or rash.   Neurological:      Mental Status: She is alert and oriented to person, place, and time.      Cranial Nerves: No cranial nerve deficit.   Psychiatric:         Behavior: Behavior normal.          Thought Content: Thought content normal.         Procedures           ED Course                                           MDM  Number of Diagnoses or Management Options  Complicated UTI (urinary tract infection): new and requires workup     Amount and/or Complexity of Data Reviewed  Clinical lab tests: ordered and reviewed  Decide to obtain previous medical records or to obtain history from someone other than the patient: yes  Review and summarize past medical records: yes    Risk of Complications, Morbidity, and/or Mortality  Presenting problems: moderate  Diagnostic procedures: moderate  Management options: low    Patient Progress  Patient progress: stable      Final diagnoses:   Complicated UTI (urinary tract infection)       ED Disposition  ED Disposition     ED Disposition Condition Comment    Discharge Stable           Ronny Acevedo, APRN  [address]  [place] [address]  [phone]    Schedule an appointment as soon as possible for a visit            Medication List      New Prescriptions    dicyclomine 20 MG tablet  Commonly known as: BENTYL  Take 1 tablet by mouth Every 8 (Eight) Hours As Needed (diarrhea).        Changed    * cefdinir 300 MG capsule  Commonly known as: OMNICEF  What changed: Another medication with the same name was added. Make sure you understand how and when to take each.     * cefdinir 300 MG capsule  Commonly known as: OMNICEF  Take 1 capsule by mouth 2 (Two) Times a Day.  What changed: You were already taking a medication with the same name, and this prescription was added. Make sure you understand how and when to take each.     metoprolol tartrate 100 MG tablet  Commonly known as: LOPRESSOR  Take 1 & 1/2  tablets by mouth Every 12 (Twelve) Hours.  What changed: how much to take         * This list has 2 medication(s) that are the same as other medications prescribed for you. Read the directions carefully, and ask your doctor or other care provider to review them with you.                Where to Get Your Medications      You can get these medications from any pharmacy    Bring a paper prescription for each of these medications  · cefdinir 300 MG capsule  · dicyclomine 20 MG tablet          Rogerio Cloud II, PA  11/18/21 0356

## 2021-11-24 NOTE — PROGRESS NOTES
Subjective   Kiah Conway is a 76 y.o. female is being seen for consultation today at the request of Ronny Acevedo APRN    Kiah Conway is a 76 y.o. female With chronic diarrhea.  She has loose stools and despite antidiarrheal therapy no improvement is noted.  Symptoms began after fracture repair following a fall.  No recent active sources no specific food association.      Past Medical History:   Diagnosis Date   • Arthritis    • Cardiac disorder    • Diverticulitis    • Dysphagia    • Fibromyalgia    • Follicular lymphoma (HCC)     Remisson    • GERD (gastroesophageal reflux disease)    • History of rheumatic fever    • Hypertension    • Hypokalemia due to inadequate potassium intake 03/03/2014   • Lipoma    • Malignant neoplasm (HCC)    • Migraine    • Neutropenia (HCC) 11/7/2019   • Obstructive sleep apnea 7/14/2016   • Prolonged Q-T interval on ECG 11/7/2019   • Recurrent UTI    • Rheumatoid arthritis (HCC)    • Sleep apnea    • Thyroid cancer (HCC)        Family History   Problem Relation Age of Onset   • Diabetes Mother    • Hypertension Mother    • Other Other         cardiac disorder,cardiovascular disease, malignant neoplasm of brain   • Breast cancer Paternal Aunt        Social History     Socioeconomic History   • Marital status:    Tobacco Use   • Smoking status: Never Smoker   • Smokeless tobacco: Never Used   Vaping Use   • Vaping Use: Never used   Substance and Sexual Activity   • Alcohol use: No   • Drug use: No   • Sexual activity: Defer       Past Surgical History:   Procedure Laterality Date   • BLADDER SURGERY     • FINGER SURGERY      reattached    • HIP HEMIARTHROPLASTY Left 10/7/2021    Procedure: HIP HEMIARTHROPLASTY BIOMET instrumentation  10/7/2021 1300 Dr. Samano;  Surgeon: Jonah Samano MD;  Location: [facility];  Service: Orthopedics;  Laterality: Left;   • HYSTERECTOMY     • THYROID SURGERY         Review of Systems   Constitutional: Negative for activity  "change, appetite change, chills and fever.   HENT: Negative for sore throat and trouble swallowing.    Eyes: Negative for visual disturbance.   Respiratory: Negative for cough and shortness of breath.    Cardiovascular: Negative for chest pain and palpitations.   Gastrointestinal: Positive for diarrhea. Negative for abdominal distention, abdominal pain, blood in stool, constipation, nausea and vomiting.   Endocrine: Negative for cold intolerance and heat intolerance.   Genitourinary: Negative for dysuria.   Musculoskeletal: Negative for joint swelling.   Skin: Negative for color change, rash and wound.   Allergic/Immunologic: Negative for immunocompromised state.   Neurological: Negative for dizziness, seizures, weakness and headaches.   Hematological: Negative for adenopathy. Does not bruise/bleed easily.   Psychiatric/Behavioral: Negative for agitation and confusion.         /70   Pulse 77   Ht 160 cm (62.99\")   Wt 53.5 kg (118 lb)   BMI 20.91 kg/m²   Objective   Physical Exam  Constitutional:       Appearance: She is well-developed.   HENT:      Head: Normocephalic and atraumatic.   Eyes:      Conjunctiva/sclera: Conjunctivae normal.      Pupils: Pupils are equal, round, and reactive to light.   Neck:      Thyroid: No thyromegaly.      Vascular: No JVD.      Trachea: No tracheal deviation.   Cardiovascular:      Rate and Rhythm: Normal rate and regular rhythm.      Heart sounds: No murmur heard.  No friction rub. No gallop.    Pulmonary:      Effort: Pulmonary effort is normal.      Breath sounds: Normal breath sounds.   Abdominal:      General: There is no distension.      Palpations: Abdomen is soft. There is no hepatomegaly or splenomegaly.      Tenderness: There is no abdominal tenderness.      Hernia: No hernia is present.   Musculoskeletal:         General: No deformity. Normal range of motion.      Cervical back: Neck supple.   Skin:     General: Skin is warm and dry.   Neurological:      Mental " Status: She is alert and oriented to person, place, and time.               Assessment   Diagnoses and all orders for this visit:    1. Diarrhea, unspecified type (Primary)  -     XR abdomen kub; Future  -     Stool Culture (Reference Lab) - Stool, Per Rectum; Future      Kiah MARTINEZ Zoraida is a 76 y.o. female with chronic diarrhea concern etiology.  Stool cultures and KUB will be performed.  Follow-up in 1 week to discuss results.    Patient's Body mass index is 20.91 kg/m². indicating that she is within normal range (BMI 18.5-24.9). No BMI management plan needed..

## 2021-11-27 NOTE — OUTREACH NOTE
General Surgery Week 5 Survey      Responses   [facility] patient discharged from? [place]   Does the patient have one of the following disease processes/diagnoses(primary or secondary)? General Surgery   Week 5 attempt successful? Yes   Call start time 1140   Call end time 1149   Discharge diagnosis Closed fracture of left hip   Is patient permission given to speak with other caregiver? Yes   List who call center can speak with Eunice Solis   Person spoke with today (if not patient) and relationship DaughterEunice   Medication alerts for this patient Out of Eliqus, but diahrrea is improving   Is the patient taking all medications as directed (includes completed medication regime)? No   What is preventing the patient from taking all medications as directed? Side effects,  Other   Nursing Interventions Nurse provided patient education   Medication comments to call provider about being out of Eliquis   Has the patient kept scheduled appointments due by today? Yes  [will repeat follow-up in 3 months]   Comments saw GI doctor last week   Is the patient still receiving Home Health Services? Yes   Home health comments PT changed to once a week   Psychosocial issues? Yes   Psychosocial comments Dementia, no change in status   What is the patient's perception of their health status since discharge? Improving   Nursing interventions Nurse provided patient education   Is the patient/caregiver able to teach back steps to recovery at home? Set small, achievable goals for return to baseline health,  Rest and rebuild strength, gradually increase activity,  Eat a well-balance diet   If the patient is a current smoker, are they able to teach back resources for cessation? Not a smoker   Is the patient/caregiver able to teach back the hierarchy of who to call/visit for symptoms/problems? PCP, Specialist, Home health nurse, Urgent Care, ED, 911 Yes   Additional teach back comments Daughter states surgical site is healed   Did  the patient feel the follow up calls were helpful during their recovery period? Yes   Was the number of calls appropriate? Yes   Wrap up additional comments Daughter states patient diaallegra is resolving since she has been off the Eliquis, has been giving her a baby ASA daily, will call PCP on Monday regarding this change. States patient is tolerating PO intake, no change in dementia, and PT reduced to one time weekly.           Margarita Blanc RN

## 2022-01-01 ENCOUNTER — OFFICE VISIT (OUTPATIENT)
Dept: UROLOGY | Facility: CLINIC | Age: 77
End: 2022-01-01

## 2022-01-01 ENCOUNTER — LAB (OUTPATIENT)
Dept: LAB | Facility: HOSPITAL | Age: 77
End: 2022-01-01

## 2022-01-01 ENCOUNTER — TELEPHONE (OUTPATIENT)
Dept: UROLOGY | Facility: CLINIC | Age: 77
End: 2022-01-01

## 2022-01-01 ENCOUNTER — TRANSCRIBE ORDERS (OUTPATIENT)
Dept: ADMINISTRATIVE | Facility: HOSPITAL | Age: 77
End: 2022-01-01

## 2022-01-01 VITALS — BODY MASS INDEX: 20.91 KG/M2 | WEIGHT: 118 LBS | HEIGHT: 63 IN

## 2022-01-01 DIAGNOSIS — R35.0 URINARY FREQUENCY: ICD-10-CM

## 2022-01-01 DIAGNOSIS — R35.0 URINARY FREQUENCY: Primary | ICD-10-CM

## 2022-01-01 DIAGNOSIS — E04.9 THYROID ENLARGED: ICD-10-CM

## 2022-01-01 DIAGNOSIS — B95.2 ENTEROCOCCUS FAECALIS INFECTION: ICD-10-CM

## 2022-01-01 DIAGNOSIS — D50.9 IRON DEFICIENCY ANEMIA, UNSPECIFIED IRON DEFICIENCY ANEMIA TYPE: ICD-10-CM

## 2022-01-01 DIAGNOSIS — R30.9 URINARY PAIN: Primary | ICD-10-CM

## 2022-01-01 DIAGNOSIS — R33.9 INCOMPLETE EMPTYING OF BLADDER: ICD-10-CM

## 2022-01-01 DIAGNOSIS — R35.0 FREQUENCY OF URINATION: ICD-10-CM

## 2022-01-01 DIAGNOSIS — N30.00 ACUTE CYSTITIS WITHOUT HEMATURIA: Primary | ICD-10-CM

## 2022-01-01 DIAGNOSIS — Z22.338 COLONIZATION WITH VRE (VANCOMYCIN-RESISTANT ENTEROCOCCUS): ICD-10-CM

## 2022-01-01 DIAGNOSIS — F03.90 SENILE DEMENTIA, UNCOMPLICATED: ICD-10-CM

## 2022-01-01 DIAGNOSIS — R30.9 URINARY PAIN: ICD-10-CM

## 2022-01-01 LAB
ALBUMIN SERPL-MCNC: 3.8 G/DL (ref 3.5–5.2)
ALBUMIN/GLOB SERPL: 1.6 G/DL
ALP SERPL-CCNC: 91 U/L (ref 39–117)
ALT SERPL W P-5'-P-CCNC: 7 U/L (ref 1–33)
ANION GAP SERPL CALCULATED.3IONS-SCNC: 8.5 MMOL/L (ref 5–15)
AST SERPL-CCNC: 13 U/L (ref 1–32)
BACTERIA SPEC AEROBE CULT: ABNORMAL
BACTERIA SPEC AEROBE CULT: ABNORMAL
BACTERIA UR QL AUTO: ABNORMAL /HPF
BACTERIA UR QL AUTO: ABNORMAL /HPF
BASOPHILS # BLD AUTO: 0.02 10*3/MM3 (ref 0–0.2)
BASOPHILS NFR BLD AUTO: 0.2 % (ref 0–1.5)
BILIRUB BLD-MCNC: NEGATIVE MG/DL
BILIRUB SERPL-MCNC: 0.3 MG/DL (ref 0–1.2)
BILIRUB UR QL STRIP: NEGATIVE
BILIRUB UR QL STRIP: NEGATIVE
BLADDER EPITHELIAL: ABNORMAL /LPF
BUN SERPL-MCNC: 19 MG/DL (ref 8–23)
BUN/CREAT SERPL: 26 (ref 7–25)
CALCIUM SPEC-SCNC: 8.5 MG/DL (ref 8.6–10.5)
CHLORIDE SERPL-SCNC: 101 MMOL/L (ref 98–107)
CLARITY UR: ABNORMAL
CLARITY UR: ABNORMAL
CLARITY, POC: ABNORMAL
CO2 SERPL-SCNC: 25.5 MMOL/L (ref 22–29)
COLOR UR: ABNORMAL
COLOR UR: ABNORMAL
COLOR UR: YELLOW
CREAT SERPL-MCNC: 0.73 MG/DL (ref 0.57–1)
DEPRECATED RDW RBC AUTO: 49.8 FL (ref 37–54)
EOSINOPHIL # BLD AUTO: 0.06 10*3/MM3 (ref 0–0.4)
EOSINOPHIL NFR BLD AUTO: 0.7 % (ref 0.3–6.2)
ERYTHROCYTE [DISTWIDTH] IN BLOOD BY AUTOMATED COUNT: 15.1 % (ref 12.3–15.4)
EXPIRATION DATE: ABNORMAL
GFR SERPL CREATININE-BSD FRML MDRD: 77 ML/MIN/1.73
GLOBULIN UR ELPH-MCNC: 2.4 GM/DL
GLUCOSE SERPL-MCNC: 84 MG/DL (ref 65–99)
GLUCOSE UR STRIP-MCNC: NEGATIVE MG/DL
HCT VFR BLD AUTO: 39.1 % (ref 34–46.6)
HGB BLD-MCNC: 12.3 G/DL (ref 12–15.9)
HGB UR QL STRIP.AUTO: ABNORMAL
HGB UR QL STRIP.AUTO: ABNORMAL
HYALINE CASTS UR QL AUTO: ABNORMAL /LPF
HYALINE CASTS UR QL AUTO: ABNORMAL /LPF
IMM GRANULOCYTES # BLD AUTO: 0.02 10*3/MM3 (ref 0–0.05)
IMM GRANULOCYTES NFR BLD AUTO: 0.2 % (ref 0–0.5)
KETONES UR QL STRIP: NEGATIVE
KETONES UR QL STRIP: NEGATIVE
KETONES UR QL: ABNORMAL
LEUKOCYTE EST, POC: ABNORMAL
LEUKOCYTE ESTERASE UR QL STRIP.AUTO: ABNORMAL
LEUKOCYTE ESTERASE UR QL STRIP.AUTO: ABNORMAL
LYMPHOCYTES # BLD AUTO: 1.93 10*3/MM3 (ref 0.7–3.1)
LYMPHOCYTES NFR BLD AUTO: 23.7 % (ref 19.6–45.3)
Lab: ABNORMAL
MCH RBC QN AUTO: 28.2 PG (ref 26.6–33)
MCHC RBC AUTO-ENTMCNC: 31.5 G/DL (ref 31.5–35.7)
MCV RBC AUTO: 89.7 FL (ref 79–97)
MONOCYTES # BLD AUTO: 0.72 10*3/MM3 (ref 0.1–0.9)
MONOCYTES NFR BLD AUTO: 8.8 % (ref 5–12)
NEUTROPHILS NFR BLD AUTO: 5.4 10*3/MM3 (ref 1.7–7)
NEUTROPHILS NFR BLD AUTO: 66.4 % (ref 42.7–76)
NITRITE UR QL STRIP: NEGATIVE
NITRITE UR QL STRIP: NEGATIVE
NITRITE UR-MCNC: NEGATIVE MG/ML
NRBC BLD AUTO-RTO: 0 /100 WBC (ref 0–0.2)
PH UR STRIP.AUTO: 5.5 [PH] (ref 5–8)
PH UR STRIP.AUTO: 6 [PH] (ref 5–8)
PH UR: 6 [PH] (ref 5–8)
PLATELET # BLD AUTO: 334 10*3/MM3 (ref 140–450)
PMV BLD AUTO: 11.8 FL (ref 6–12)
POTASSIUM SERPL-SCNC: 3.5 MMOL/L (ref 3.5–5.2)
PROT SERPL-MCNC: 6.2 G/DL (ref 6–8.5)
PROT UR QL STRIP: ABNORMAL
PROT UR QL STRIP: ABNORMAL
PROT UR STRIP-MCNC: ABNORMAL MG/DL
RBC # BLD AUTO: 4.36 10*6/MM3 (ref 3.77–5.28)
RBC # UR STRIP: ABNORMAL /HPF
RBC # UR STRIP: ABNORMAL /HPF
RBC # UR STRIP: ABNORMAL /UL
REF LAB TEST METHOD: ABNORMAL
REF LAB TEST METHOD: ABNORMAL
SODIUM SERPL-SCNC: 135 MMOL/L (ref 136–145)
SP GR UR STRIP: 1.02 (ref 1–1.03)
SP GR UR STRIP: 1.03 (ref 1–1.03)
SP GR UR: 1.03 (ref 1–1.03)
SQUAMOUS #/AREA URNS HPF: ABNORMAL /HPF
SQUAMOUS #/AREA URNS HPF: ABNORMAL /HPF
TSH SERPL DL<=0.05 MIU/L-ACNC: 3.49 UIU/ML (ref 0.27–4.2)
UROBILINOGEN UR QL STRIP: ABNORMAL
UROBILINOGEN UR QL STRIP: ABNORMAL
UROBILINOGEN UR QL: NORMAL
WBC # UR STRIP: ABNORMAL /HPF
WBC # UR STRIP: ABNORMAL /HPF
WBC NRBC COR # BLD: 8.15 10*3/MM3 (ref 3.4–10.8)
YEAST URNS QL MICRO: ABNORMAL /HPF

## 2022-01-01 PROCEDURE — 81001 URINALYSIS AUTO W/SCOPE: CPT

## 2022-01-01 PROCEDURE — 36415 COLL VENOUS BLD VENIPUNCTURE: CPT

## 2022-01-01 PROCEDURE — 99214 OFFICE O/P EST MOD 30 MIN: CPT | Performed by: NURSE PRACTITIONER

## 2022-01-01 PROCEDURE — 81003 URINALYSIS AUTO W/O SCOPE: CPT | Performed by: NURSE PRACTITIONER

## 2022-01-01 PROCEDURE — 80053 COMPREHEN METABOLIC PANEL: CPT

## 2022-01-01 PROCEDURE — 84443 ASSAY THYROID STIM HORMONE: CPT

## 2022-01-01 PROCEDURE — 87186 SC STD MICRODIL/AGAR DIL: CPT

## 2022-01-01 PROCEDURE — 85025 COMPLETE CBC W/AUTO DIFF WBC: CPT

## 2022-01-01 PROCEDURE — 87077 CULTURE AEROBIC IDENTIFY: CPT

## 2022-01-01 PROCEDURE — 87086 URINE CULTURE/COLONY COUNT: CPT

## 2022-01-01 PROCEDURE — 87086 URINE CULTURE/COLONY COUNT: CPT | Performed by: NURSE PRACTITIONER

## 2022-01-01 RX ORDER — FERROUS SULFATE 7.5 MG/0.5
SYRINGE (EA) ORAL
COMMUNITY
Start: 2022-01-01

## 2022-01-01 RX ORDER — OMEPRAZOLE 20 MG/1
CAPSULE, DELAYED RELEASE ORAL
COMMUNITY
Start: 2022-01-01

## 2022-01-01 RX ORDER — FLUOXETINE HYDROCHLORIDE 20 MG/1
20 CAPSULE ORAL DAILY
COMMUNITY
Start: 2022-01-01

## 2022-01-01 RX ORDER — ONDANSETRON 4 MG/1
4 TABLET, FILM COATED ORAL DAILY PRN
Qty: 20 TABLET | Refills: 0 | Status: SHIPPED | OUTPATIENT
Start: 2022-01-01

## 2022-01-01 RX ORDER — NITROFURANTOIN MACROCRYSTALS 50 MG/1
50 CAPSULE ORAL DAILY
COMMUNITY
Start: 2022-01-01

## 2022-01-01 RX ORDER — LINEZOLID 600 MG/1
600 TABLET, FILM COATED ORAL 2 TIMES DAILY
Qty: 20 TABLET | Refills: 0 | Status: SHIPPED | OUTPATIENT
Start: 2022-01-01 | End: 2022-02-17

## 2022-01-01 RX ORDER — MEGESTROL ACETATE 40 MG/1
40 TABLET ORAL 2 TIMES DAILY
COMMUNITY
Start: 2022-01-01

## 2022-01-01 RX ORDER — PHENAZOPYRIDINE HYDROCHLORIDE 200 MG/1
200 TABLET, FILM COATED ORAL 3 TIMES DAILY PRN
Qty: 20 TABLET | Refills: 0 | Status: SHIPPED | OUTPATIENT
Start: 2022-01-01

## 2022-01-01 RX ORDER — PARICALCITOL 4 UG/1
4 CAPSULE, LIQUID FILLED ORAL DAILY
COMMUNITY

## 2022-02-07 NOTE — PROGRESS NOTES
Chief Complaint  RECURRENT UTIs/ATROPTHIC URETHRITIS (2 YEAR FOLLOW UP)    Hebert Conway presents to [facility] GASTRO[facility]OLOGY [facility] UROLOGY  History of Present Illness     Ms Kiah Conway is a Pleasant, but ill looking and frail 76 year old Patient  Who returns to clinic today for evaluation, accompanied by her daugther-Lamar and grand dtr. She is a follow-up for recurrent urinary tract infections.Patient was last seen in clinic in December of 2019 by Dr. Ferreira, and at that time reported doing relatively well, she was placed on prohylaxis and had been lost to follow up until recently.  She returns to clinic today for evaluation of recurrent UTIs.    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 Her last UTI was in November 16/21 she had enterobacter and was treated with Invanz x 10 days, November 19 -Dec 3rd approx,  >100,000 CFU/mL Enterobacter cloacae complex Abnormal        Her repeat cultures in December were still positive for same infection and recently on 02/02/22, her urine culture is still positive for   >100,000 CFU/mL Enterococcus faecium, VRE Critical          She is here to discuss therapy going forward. She has been on suppressive therapy with macrobid since November I told them to stop at this time due to resistance. She has also been on Cipro, Augmentin, clindamycin and bactrim prior to this.    Besides her foul/odorous urine during incontinent episodes per her dtr, some swelling, chronic lower back pain and intermittent confusion, she is generally asymptomatic. She denies dysuria, burning on urination, or gross hematuria.  Denies abdominal pain, pelvic pain and pressure, denies flank pain or any CVA tenderness. She denies fever or chills, she does not have nausea, vomiting, or diarrhea. She has an extremely low PO fliud intake. She is on megace but her dtr reports  "decreased appetite with very low calorie intake(she would only eat popcorn, occasionally some cheese)    Her Urine dipstick show 3+ leukocyte Esterase, negative nitrite,2+Microscopic Hematuria and 2+ proteinuria. Overall she reports doing well, she states she is currently taking her antibiotics and probiotics diligently.     She is a  and has had a complete hysterectomy. She has had a bladder tack by Dr. Burnette in [place],  a history of thyroid cancer and a follicular lymphoma post chemotherapy.  She has severe COPD, recent hip fracture in November requiring an indwelling Verdugo catheterization for extensive period of time.    The rest of her PMHx as listed below.    Objective   Vital Signs:   Ht 160 cm (62.99\")   Wt 53.5 kg (118 lb)   BMI 20.91 kg/m²     Physical Exam  Constitutional:       General: She is in acute distress.      Appearance: She is well-developed. She is ill-appearing and toxic-appearing.   HENT:      Head: Normocephalic and atraumatic.   Eyes:      Pupils: Pupils are equal, round, and reactive to light.   Neck:      Thyroid: No thyromegaly.      Trachea: No tracheal deviation.   Cardiovascular:      Rate and Rhythm: Normal rate and regular rhythm.      Heart sounds: No murmur heard.      Pulmonary:      Effort: Pulmonary effort is normal. No respiratory distress.      Breath sounds: Normal breath sounds. No stridor. No wheezing.   Abdominal:      General: Bowel sounds are normal.      Palpations: Abdomen is soft.      Tenderness: There is abdominal tenderness.   Genitourinary:     Labia:         Right: No tenderness.         Left: No tenderness.       Vagina: Normal. No vaginal discharge.      Comments: Deferred -patient is wheelchair dependent.  Recent hip fracture/debility  Musculoskeletal:         General: Tenderness present. No deformity. Normal range of motion.      Cervical back: Normal range of motion.   Skin:     General: Skin is warm and dry.      Capillary Refill: Capillary refill " takes less than 2 seconds.      Coloration: Skin is pale.      Findings: No erythema or rash.   Neurological:      Mental Status: She is alert and oriented to person, place, and time.      Cranial Nerves: No cranial nerve deficit.      Sensory: No sensory deficit.      Motor: Weakness present.      Coordination: Coordination normal.   Psychiatric:         Behavior: Behavior normal.         Thought Content: Thought content normal.         Judgment: Judgment normal.        Result Review :     CMP    CMP 11/16/21 12/15/21 1/27/22   Glucose 102 (A) 115 (A) 84   BUN 8 11 19   Creatinine 0.75 0.64 0.73   eGFR Non African Am 75 90 77   Sodium 139 138 135 (A)   Potassium 3.3 (A) 4.2 3.5   Chloride 104 99 101   Calcium 8.5 (A) 9.4 8.5 (A)   Albumin 3.22 (A) 3.40 (A) 3.80   Total Bilirubin 0.3 0.3 0.3   Alkaline Phosphatase 128 (A) 123 (A) 91   AST (SGOT) 11 17 13   ALT (SGPT) <5 8 7   (A) Abnormal value            CBC w/diff    CBC w/Diff 11/16/21 12/15/21 1/27/22   WBC 6.40 3.09 (A) 8.15   RBC 3.42 (A) 3.82 4.36   Hemoglobin 9.8 (A) 11.0 (A) 12.3   Hematocrit 32.0 (A) 35.0 39.1   MCV 93.6 91.6 89.7   MCH 28.7 28.8 28.2   MCHC 30.6 (A) 31.4 (A) 31.5   RDW 14.9 14.5 15.1   Platelets 325 349 334   Neutrophil Rel % 74.0 11.4 (A) 66.4   Immature Granulocyte Rel % 0.3 0.0 0.2   Lymphocyte Rel % 12.7 (A) 53.4 (A) 23.7   Monocyte Rel % 9.5 19.4 (A) 8.8   Eosinophil Rel % 3.0 14.2 (A) 0.7   Basophil Rel % 0.5 1.6 (A) 0.2   (A) Abnormal value            Renal Profile    Renal Profile 11/16/21 12/15/21 1/27/22   BUN 8 11 19   Creatinine 0.75 0.64 0.73   eGFR Non  Am 75 90 77           UA    Urinalysis 1/27/22 1/27/22 2/2/22 2/2/22 2/7/22    1237 1237 1216 1216    Squamous Epithelial Cells, UA  7-12 (A)  Unable to determine due to loaded field (A)    Specific Gravity, UA 1.029  1.022     Ketones, UA Negative  Negative  1+ (A)   Blood, UA Moderate (2+) (A)  Small (1+) (A)     Leukocytes, UA Large (3+) (A)  Large (3+) (A)  Large  (3+) (A)   Nitrite, UA Negative  Negative     RBC, UA  21-30 (A)  Unable to determine due to loaded field (A)    WBC, UA  Too Numerous to Count (A)  Too Numerous to Count (A)    Bacteria, UA  4+ (A)  3+ (A)    (A) Abnormal value            Urine Culture    Urine Culture 12/17/21 2/2/22 2/7/22   Urine Culture Final report (A) >100,000 CFU/mL Enterococcus faecium, VRE (A) >100,000 CFU/mL Streptococcus, Alpha Hemolytic (A)   (A) Abnormal value       Comments are available for some flowsheets but are not being displayed.           Covid Tests    Common Labsle 10/13/21   COVID19 Not Detected                     Assessment and Plan    Diagnoses and all orders for this visit:    1. Acute cystitis without hematuria (Primary)  -     Urine Culture - Urine, Urine, Clean Catch  -     linezolid (ZYVOX) 600 MG tablet; Take 1 tablet by mouth 2 (Two) Times a Day for 10 days.  Dispense: 20 tablet; Refill: 0  -     ondansetron (Zofran) 4 MG tablet; Take 1 tablet by mouth Daily As Needed for Nausea.  Dispense: 20 tablet; Refill: 0  -     phenazopyridine (Pyridium) 200 MG tablet; Take 1 tablet by mouth 3 (Three) Times a Day As Needed for Bladder Spasms (dysuria).  Dispense: 20 tablet; Refill: 0    2. Frequency of urination  -     POC Urinalysis Dipstick, Automated  -     linezolid (ZYVOX) 600 MG tablet; Take 1 tablet by mouth 2 (Two) Times a Day for 10 days.  Dispense: 20 tablet; Refill: 0  -     ondansetron (Zofran) 4 MG tablet; Take 1 tablet by mouth Daily As Needed for Nausea.  Dispense: 20 tablet; Refill: 0  -     phenazopyridine (Pyridium) 200 MG tablet; Take 1 tablet by mouth 3 (Three) Times a Day As Needed for Bladder Spasms (dysuria).  Dispense: 20 tablet; Refill: 0    3. Incomplete emptying of bladder  -     Bladder Scan  -     linezolid (ZYVOX) 600 MG tablet; Take 1 tablet by mouth 2 (Two) Times a Day for 10 days.  Dispense: 20 tablet; Refill: 0  -     ondansetron (Zofran) 4 MG tablet; Take 1 tablet by mouth Daily As Needed  for Nausea.  Dispense: 20 tablet; Refill: 0  -     phenazopyridine (Pyridium) 200 MG tablet; Take 1 tablet by mouth 3 (Three) Times a Day As Needed for Bladder Spasms (dysuria).  Dispense: 20 tablet; Refill: 0    4. Colonization with VRE (vancomycin-resistant enterococcus)  -     linezolid (ZYVOX) 600 MG tablet; Take 1 tablet by mouth 2 (Two) Times a Day for 10 days.  Dispense: 20 tablet; Refill: 0  -     ondansetron (Zofran) 4 MG tablet; Take 1 tablet by mouth Daily As Needed for Nausea.  Dispense: 20 tablet; Refill: 0  -     phenazopyridine (Pyridium) 200 MG tablet; Take 1 tablet by mouth 3 (Three) Times a Day As Needed for Bladder Spasms (dysuria).  Dispense: 20 tablet; Refill: 0    5. Enterococcus faecalis infection  -     linezolid (ZYVOX) 600 MG tablet; Take 1 tablet by mouth 2 (Two) Times a Day for 10 days.  Dispense: 20 tablet; Refill: 0  -     ondansetron (Zofran) 4 MG tablet; Take 1 tablet by mouth Daily As Needed for Nausea.  Dispense: 20 tablet; Refill: 0  -     phenazopyridine (Pyridium) 200 MG tablet; Take 1 tablet by mouth 3 (Three) Times a Day As Needed for Bladder Spasms (dysuria).  Dispense: 20 tablet; Refill: 0                                        ASSESSMENT  Recurrent urinary tract infections/OverActiveBladder/Atrophic/yeast vaginitis: Pleasant patient returns to clinic today accompanied by her daughter and granddaughter with concerns of recurrent UTIs.  She is ill-looking, wheelchair dependent, reports post hospitalization for hip surgery few months ago.  She is in no apparent distress and reports doing relatively well, nevertheless, her daughters report she has had back pain, abdominal pain, with nausea and decreased appetite.       Again,  We discussed the types of organisms that are found in the urinary tract indicating that the vast majority are results of the patient's own gastrointestinal tejas.  We discussed how many of the antibiotics that are utilized can actually exacerbate these  infections by creating resistant organisms and there is only a very few antibiotics that are concentrated in the urine and do not affect the rectal reservoir nor cause recurrent yeast vaginitis.      We discussed the risk factors for recurrent infections being intercourse in younger patients and atrophic changes in older patients.  We discussed the symptoms that are found including pain, pressure, burning, frequency, urgency suprapubic pain and painful intercourse.  I discussed upper tract symptoms including fevers, chills, and indicated the workup would be much more aggressive if the patient were to present with recurrent infections in the face of upper tract symptomatology such as fever. I discussed the history of vesicoureteral reflux in young patients and finally chronic renal scarring as a result of such.         PLAN  We resent her urine for culture. I will call her with results if any bacteria growth not sensitive to her current therapy    We stoppedMacrobid 100 mg PO Daily -Suppressive Therapy.     Start Zyvox 600 mg p.o. every 12 hours Per pharmacist recommendation    She has been encouraged to increase her p.o. fluid intake to at least 1 to 2 L daily and avoid bladder irritants such as caffeine products, spicy foods, and citrusy foods.     I recommend concomitant probiotics with treatment with antibiotics to protect the rectal reservoir including over-the-counter yogurt preparations to nasrin oral pills containing the appropriate probiotics. Patient reports the diligent use of Probiotics.    She is to also continue other medications for yeast vaginitis, atrophic vaginitis as prescribed above.    Will see her back in 2weeks for Follow up in clinic and recheck her urinalysis at that time.    Patient/daughter are agreeable to plan of care.    Follow Up   Return in about 15 days (around 2/22/2022) for Next scheduled follow up, RECURRENT UTI/DYSURIA/DETRUSSOR INSTABILITYRECHECK URINE.  Patient was given  instructions and counseling regarding her condition or for health maintenance advice. Please see specific information pulled into the AVS if appropriate.

## 2022-02-08 NOTE — PATIENT INSTRUCTIONS
Urinary Tract Infection, Adult    A urinary tract infection (UTI) is an infection of any part of the urinary tract. The urinary tract includes the kidneys, ureters, bladder, and urethra. These organs make, store, and get rid of urine in the body.  Your health care provider may use other names to describe the infection. An upper UTI affects the ureters and kidneys (pyelonephritis). A lower UTI affects the bladder (cystitis) and urethra (urethritis).  What are the causes?  Most urinary tract infections are caused by bacteria in your genital area, around the entrance to your urinary tract (urethra). These bacteria grow and cause inflammation of your urinary tract.  What increases the risk?  You are more likely to develop this condition if:  · You have a urinary catheter that stays in place (indwelling).  · You are not able to control when you urinate or have a bowel movement (you have incontinence).  · You are female and you:  ? Use a spermicide or diaphragm for birth control.  ? Have low estrogen levels.  ? Are pregnant.  · You have certain genes that increase your risk (genetics).  · You are sexually active.  · You take antibiotic medicines.  · You have a condition that causes your flow of urine to slow down, such as:  ? An enlarged prostate, if you are male.  ? Blockage in your urethra (stricture).  ? A kidney stone.  ? A nerve condition that affects your bladder control (neurogenic bladder).  ? Not getting enough to drink, or not urinating often.  · You have certain medical conditions, such as:  ? Diabetes.  ? A weak disease-fighting system (immunesystem).  ? Sickle cell disease.  ? Gout.  ? Spinal cord injury.  What are the signs or symptoms?  Symptoms of this condition include:  · Needing to urinate right away (urgently).  · Frequent urination or passing small amounts of urine frequently.  · Pain or burning with urination.  · Blood in the urine.  · Urine that smells bad or unusual.  · Trouble urinating.  · Cloudy  urine.  · Vaginal discharge, if you are female.  · Pain in the abdomen or the lower back.  You may also have:  · Vomiting or a decreased appetite.  · Confusion.  · Irritability or tiredness.  · A fever.  · Diarrhea.  The first symptom in older adults may be confusion. In some cases, they may not have any symptoms until the infection has worsened.  How is this diagnosed?  This condition is diagnosed based on your medical history and a physical exam. You may also have other tests, including:  · Urine tests.  · Blood tests.  · Tests for sexually transmitted infections (STIs).  If you have had more than one UTI, a cystoscopy or imaging studies may be done to determine the cause of the infections.  How is this treated?  Treatment for this condition includes:  · Antibiotic medicine.  · Over-the-counter medicines to treat discomfort.  · Drinking enough water to stay hydrated.  If you have frequent infections or have other conditions such as a kidney stone, you may need to see a health care provider who specializes in the urinary tract (urologist).  In rare cases, urinary tract infections can cause sepsis. Sepsis is a life-threatening condition that occurs when the body responds to an infection. Sepsis is treated in the hospital with IV antibiotics, fluids, and other medicines.  Follow these instructions at home:    Medicines  · Take over-the-counter and prescription medicines only as told by your health care provider.  · If you were prescribed an antibiotic medicine, take it as told by your health care provider. Do not stop using the antibiotic even if you start to feel better.  General instructions  · Make sure you:  ? Empty your bladder often and completely. Do not hold urine for long periods of time.  ? Empty your bladder after sex.  ? Wipe from front to back after a bowel movement if you are female. Use each tissue one time when you wipe.  · Drink enough fluid to keep your urine pale yellow.  · Keep all follow-up  "visits as told by your health care provider. This is important.  Contact a health care provider if:  · Your symptoms do not get better after 1-2 days.  · Your symptoms go away and then return.  Get help right away if you have:  · Severe pain in your back or your lower abdomen.  · A fever.  · Nausea or vomiting.  Summary  · A urinary tract infection (UTI) is an infection of any part of the urinary tract, which includes the kidneys, ureters, bladder, and urethra.  · Most urinary tract infections are caused by bacteria in your genital area, around the entrance to your urinary tract (urethra).  · Treatment for this condition often includes antibiotic medicines.  · If you were prescribed an antibiotic medicine, take it as told by your health care provider. Do not stop using the antibiotic even if you start to feel better.  · Keep all follow-up visits as told by your health care provider. This is important.  This information is not intended to replace advice given to you by your health care provider. Make sure you discuss any questions you have with your health care provider.  Document Revised: 12/05/2019 Document Reviewed: 06/27/2019  [employer] Patient Education © 2021 [employer]  Obstetrics: Normal and problem pregnancies (7th ed., pp. 850-861). [place]: [employer]vier.\"> [facility] U[facility] (12th ed., pp. 4012-4887). [place]: [employer].\">   Asymptomatic Bacteriuria  Asymptomatic bacteriuria is the presence of a large number of bacteria in the urine without the usual symptoms of burning or frequent urination. This is usually not harmful, and treatment may not be needed. A person with this condition will not be more likely to develop an infection in the future.  If you are diagnosed with this condition, talk with your health care provider about any concerns that you have.  What are the causes?  This condition is caused by an increase in bacteria in your urine. This increase can be caused " by:  · Bacteria entering the urinary tract, such as during sex.  · A blockage in the urinary tract, such as from kidney stones or a tumor.  · Bladder problems that prevent the bladder from emptying.  What increases the risk?  You are more likely to develop this condition if:  · You have diabetes mellitus.  · You are an older adult. This especially affects older adults in a long-term care facility.  · You are pregnant and in the first trimester.  · You have kidney stones.  · You are female.  · You have had a kidney transplant.  · You have a leaky kidney tube valve (reflux).  · You had a urinary catheter for a long period of time. This is a long, thin tube that collects urine.  What are the signs or symptoms?  There are no symptoms of this condition.  How is this diagnosed?  This condition is diagnosed with a urine test. Because this condition does not cause symptoms, it is usually diagnosed when a urine sample is taken to treat or diagnose another condition, such as pregnancy or kidney problems. Most women who are in their first trimester of pregnancy are screened for asymptomatic bacteriuria.  How is this treated?  Usually, treatment is not needed for this condition. Treating the condition can lead to other problems, such as a yeast infection or the growth of bacteria that do not respond to treatment (antibiotic-resistant bacteria).  Some people do need treatment with antibiotic medicines to prevent kidney infection, known as pyelonephritis. Treatment is needed if:  · You are pregnant. In pregnant women, kidney infection can lead to:  ? Early labor (premature labor).  ? Very low birth weight (fetal growth restriction).  ?  death.  · You are having a urologic procedure.  · You have had a kidney transplant.  Follow these instructions at home:  Medicines  · Take over-the-counter and prescription medicines only as told by your health care provider.  · If you were prescribed an antibiotic medicine, take it as told  by your health care provider. It is important that you finish all the antibiotics you were given.  General instructions  · Monitor your condition for any changes.  · Drink enough fluid to keep your urine pale yellow.  · Urinate more often to keep your bladder empty.  · If you are female, keep the area around your vagina and rectum clean. Wipe yourself from front to back after urinating.  · Keep all follow-up visits as told by your health care provider. This is important.  Contact a health care provider if:  · You have symptoms of a urine infection, such as:  ? A burning sensation, or pain when you urinate.  ? A strong need to urinate, or urinating more often.  ? Urine turning discolored or cloudy.  ? Blood in your urine.  ? Urine that smells bad.  Get help right away if:  · You develop signs of a kidney infection, such as:  ? Back pain or pelvic pain.  ? A fever or chills.  ? Nausea or vomiting.  ? Severe pain that cannot be controlled with medicine.  Summary  · Asymptomatic bacteriuria is the presence of a large number of bacteria in the urine without the usual symptoms of burning or frequent urination.  · Usually, treatment is not needed for this condition. Treating the condition can lead to other problems, such as a yeast infection or the growth of bacteria that do not respond to treatment.  · Some people do need treatment with antibiotic medicines to prevent kidney infection. Treatment is needed if you are pregnant, if you are having a urologic procedure, or if you have had a kidney transplant.  · If you were prescribed an antibiotic medicine, take it as told by your health care provider. It is important that you finish all the antibiotics you were given.  This information is not intended to replace advice given to you by your health care provider. Make sure you discuss any questions you have with your health care provider.  Document Revised: 10/26/2020 Document Reviewed: 10/26/2020  [employer] Patient Education ©  2021 [employer]

## 2022-02-10 NOTE — TELEPHONE ENCOUNTER
Called patients dtr Eunice to check on her, and to discuss urine culture results.She is currently on zyvox, but continues with decrease PO intake, not drinking or eating. Discussed evaluation per PCP/Go to Er for labs and possible iv fluids. pts dtr agreeable to get mom evaluated.    Will continue zyvod daily x 5 days. Follow up as discussed.    Urine Culture >100,000 CFU/mL Streptococcus, Alpha Hemolytic Abnormal     This organism commonly represents colonization or contamination. No further workup unless requested by provider.        Resulting Agency:  [facility]

## 2022-02-10 NOTE — TELEPHONE ENCOUNTER
Cheng-Akwa, Griselda, APRN McNeil, Katanna, MA  Caller: Unspecified (Yesterday, 11:43 AM)  No worries K,  will not treat. Did call buzz crum dtr already, we talked. She will continue her current abx-zyvox for 5 days only. Will recollect via cath a sample at her follow up 2/22.

## 2022-02-17 ENCOUNTER — TELEPHONE (OUTPATIENT)
Dept: UROLOGY | Facility: CLINIC | Age: 77
End: 2022-02-17

## 2022-02-17 NOTE — TELEPHONE ENCOUNTER
“Please be informed that patient has passed. Patient has been marked  in the system. The date of death is: 22    Caller: LORI ZHAO    Relationship: DAUGHTER    Best call back number: [phone]    PT'S DAUGHTER WANTED TO SAY A SPECIAL THANK YOU FOR ALL THAT YOUR OFFICE HAS DONE TO HELP THEM.

## 2022-10-10 NOTE — PROGRESS NOTES
DATE OF CONSULTATION:  4/22/2019    REASON FOR REFERRAL: Elevated Ferritin    REFERRING PHYSICIAN:  MERRY Felix    CHIEF COMPLAINT:  Chronic aches/pains related arthritis/fibromyalgia, chronic fatigue    HISTORY OF PRESENT ILLNESS:   Kiah Conway is a  73 y.o. female with history of fibromyalgia, Rh arthritis, osteoarthritis, thyroid cancer s/p partial thyroidectomy and follicular lymphoma (being followed by [facility] s/p R-CHOP and currently receiving maintenance Rituxan every 3 months) who is being seen today at the request of MERRY Felix for evaluation and treatment of elevated ferritin. Ms. Conway reports following with her PCP routinely with repeat labs. She says she was recently told her ferritin was elevated and was not aware of this before. She reports having two first cousins on her mother's side with iron overload disorder. Otherwise, denies any other family history. She denies taking oral iron supplementation or multivitamin. Denies eating iron rich foods in excess. Labs done with PCP at beginning of March showed ferritin of 315 ng/mL. No other ferritin levels available to review for comparison of trend. Iron panel was not done. LFTs have been normal. Patient complains of chronic fatigue and generalized weakness. She is currently receiving treatment for follicular lymphoma as above with maintenance Rituxan. She reports having chronic aches/pains related to arthritis and takes Norco prn. Otherwise, she denies any other complaints today.     PAST MEDICAL HISTORY:  Past Medical History:   Diagnosis Date   • Arthritis    • Cardiac disorder    • Diverticulitis    • Dysphagia    • Dysuria    • Fibromyalgia    • GERD (gastroesophageal reflux disease)    • History of rheumatic fever    • Hypertension    • Hypokalemia due to inadequate potassium intake 03/03/2014   • Lipoma    • Malignant neoplasm (CMS/HCC)    • Migraine    • Recurrent UTI    • Sleep apnea    • Thyroid  cancer (CMS/HCC)    • UTI (urinary tract infection)        PAST SURGICAL HISTORY:  Past Surgical History:   Procedure Laterality Date   • BLADDER SURGERY     • HYSTERECTOMY     • THYROID SURGERY         FAMILY HISTORY:  Family History   Problem Relation Age of Onset   • Diabetes Mother    • Hypertension Mother    • Other Other         cardiac disorder,cardiovascular disease, malignant neoplasm of brain   • Breast cancer Paternal Aunt        SOCIAL HISTORY:  Social History     Socioeconomic History   • Marital status:      Spouse name: Not on file   • Number of children: Not on file   • Years of education: Not on file   • Highest education level: Not on file   Tobacco Use   • Smoking status: Never Smoker   • Smokeless tobacco: Never Used   Substance and Sexual Activity   • Alcohol use: No   • Drug use: No   • Sexual activity: Defer     MEDICATIONS:  The current medication list was reviewed in the EMR    Current Outpatient Medications:   •  amLODIPine (NORVASC) 2.5 MG tablet, Take 1 tablet by mouth Daily. (Patient taking differently: Take 2.5 mg by mouth 1 (One) Time As Needed.), Disp: 30 tablet, Rfl: 5  •  aspirin 81 MG EC tablet, Take 81 mg by mouth Daily., Disp: , Rfl:   •  baclofen (LIORESAL) 10 MG tablet, Take 10 mg by mouth Daily As Needed., Disp: , Rfl:   •  busPIRone (BUSPAR) 5 MG tablet, Take 5 mg by mouth 2 (Two) Times a Day., Disp: , Rfl:   •  Calcium Polycarbophil (FIBER-CAPS PO), Take 1 capsule by mouth Daily., Disp: , Rfl:   •  conjugated estrogens (PREMARIN) 0.625 MG/GM vaginal cream, Insert  into the vagina Daily., Disp: 30 g, Rfl: 2  •  FLUoxetine (PROzac) 20 MG capsule, Take 20 mg by mouth Daily., Disp: , Rfl:   •  HYDROcodone-acetaminophen (NORCO) 7.5-325 MG per tablet, Take 1 tablet by mouth 3 (Three) Times a Day As Needed for Moderate Pain ., Disp: , Rfl:   •  hydroxychloroquine (PLAQUENIL) 200 MG tablet, Take 200 mg by mouth Daily., Disp: , Rfl:   •  lactobacillus acidophilus (RISAQUAD)  "capsule capsule, Take 1 capsule by mouth Daily., Disp: 7 capsule, Rfl: 0  •  loratadine (CLARITIN) 10 MG tablet, Take 10 mg by mouth Daily., Disp: , Rfl:   •  megestrol (MEGACE) 40 MG/ML suspension, Take 40 mg by mouth Daily., Disp: , Rfl:   •  metoprolol tartrate (LOPRESSOR) 50 MG tablet, Take 1 tablet by mouth Every 12 (Twelve) Hours., Disp: 60 tablet, Rfl: 5  •  nitrofurantoin, macrocrystal-monohydrate, (MACROBID) 100 MG capsule, Take 1 capsule by mouth Daily., Disp: 60 capsule, Rfl: 3  •  omeprazole (priLOSEC) 40 MG capsule, Take 40 mg by mouth Daily., Disp: , Rfl:   •  pregabalin (LYRICA) 300 MG capsule, Take 300 mg by mouth 2 (Two) Times a Day., Disp: , Rfl:   •  raNITIdine (ZANTAC) 150 MG tablet, , Disp: , Rfl:     ALLERGIES:  No Known Allergies    REVIEW OF SYSTEMS:    A comprehensive 14 point review of systems was performed.  Significant findings as mentioned above.  All other systems reviewed and are negative.      Physical Exam   Vital Signs: /67   Pulse 70   Temp 98.1 °F (36.7 °C) (Oral)   Resp 20   Ht 152.5 cm (60.05\")   Wt 51.8 kg (114 lb 3.2 oz)   SpO2 95%   BMI 22.27 kg/m²   General: Well developed, well nourished, alert and oriented x 3, in no acute distress.   Head: ATNC   Eyes: PERRL, No evidence of conjunctivitis.   Nose: No nasal discharge.   Mouth: Oral mucosal membranes moist. No oral ulceration or hemorrhages.   Neck: Neck supple. No thyromegaly. No JVD.   Lungs: Clear in all fields to A&P without rales, rhonchi or wheezing.   Heart: S1, S2. Regular rate and rhythm. No murmurs, rubs, or gallops.   Abdomen: Soft. Bowel sounds are normoactive. Nontender with palpation. No Hepatosplenomegaly can be appreciated.   Extremities: No cyanosis or edema. Peripheral pulses palpable and equal bilaterally.   Neurologic: Grossly non-focal exam.     RECENT LABS:  3/7/19      ASSESSMENT & PLAN:  Kiahjose f Conway is a very pleasant 73 y.o. female with    1.  Hyperferritinemia:  -Ferritin of 315 " ng/mL noted on blood testing with PCP in March 2019.   -No other ferritin levels available to review for comparison of trend. Iron panel was not done. LFTs have been normal.   -She reports having two first cousins on her mother's side with iron overload disorder. Otherwise, denies any other family history.  -Denies taking oral iron supplementation or multivitamins. Denies eating iron rich foods in excess.  -Will have additional labs today to further evaluate including CBC, CMP, Iron studies, ferritin, CRP and ESR.   -D/w patient based on history above, ferritin likely elevated secondary to underlying inflammation.   -If iron studies from today are normal and ferritin is stable or improved, will plan to follow up in 3 months with repeat labs for further comparison of trend. Will hold off on sending Hemochromatosis mutation analysis panel for now as this is unlikely.     The patient and her daughter were in agreement with the plan and all questions were answered to their satisfaction.     [facility] Quality measures  - Kiah Conway does not use tobacco products.  - Recommended flu vaccine.   - Current outpatient and discharge medications have been reconciled for the patient.  Reviewed by: MERRY Porter    Thank you so much for allowing us to participate in the care of Kiah Conway . Please do not hesitate to contact us with any questions or concerns.     I spent 30 minutes with Kiah Conway today. More than 50% of the time was spent in counseling / coordination of care for the above problems.      Electronically Signed by: MERRY Porter , April 22, 2019 4:33 PM       CC:   No ref. provider found  Aundrea Headley APRN          none anxiety/none anxiety/anxiety disorder
